# Patient Record
Sex: FEMALE | Race: WHITE | NOT HISPANIC OR LATINO | Employment: UNEMPLOYED | URBAN - METROPOLITAN AREA
[De-identification: names, ages, dates, MRNs, and addresses within clinical notes are randomized per-mention and may not be internally consistent; named-entity substitution may affect disease eponyms.]

---

## 2017-01-03 DIAGNOSIS — Z98.890 HISTORY OF HIP SURGERY: Primary | ICD-10-CM

## 2017-01-17 DIAGNOSIS — M25.552 HIP PAIN, LEFT: Primary | ICD-10-CM

## 2017-01-17 NOTE — TELEPHONE ENCOUNTER
Medication refill information reviewed.  Last visit on 12/21 states:      1. Medication Management:    1. Increase of oxycodone to 5/day x 4 days for dental pain, then decrease back to 3/day    2. Next month will continue the decrease of oxycodone by 15 tabs/month    Due date for oxycodone 5 mg is 1/22/17     Prescription prepped for review. Decrease monthly supply to #75 per Dr. Nicholas's plan.     Will route to provider.     LANCE BarberN, RN-BC  Patient Care Supervisor/Care Coordinator  [facility] [place]

## 2017-01-17 NOTE — TELEPHONE ENCOUNTER
Call coming in at 7:49am 1/16/17. Pt requesting a refill of Oxycodone.She would like to [facility] at  [facility]. Will route to SANG max.  Taisha rangel rn

## 2017-01-17 NOTE — TELEPHONE ENCOUNTER
Received call from patient requesting refill(s) of oxyCODONE (ROXICODONE) 5 MG IR tablet    Last picked up from pharmacy on 12/21/16    Pt last seen by prescribing provider on 12/21/16  Next appt scheduled for none    Last urine drug screen date 10/20/16  Current opioid agreement on file (completed within the last year) Yes Date of opioid agreement: 6/20/16    Processing (pick one and delete the others):   at [facility]    Iman Tan MA  [facility]    Will route to nursing pool for review and preparation of prescription(s).

## 2017-01-18 RX ORDER — OXYCODONE HYDROCHLORIDE 5 MG/1
5-10 TABLET ORAL EVERY 4 HOURS PRN
Qty: 75 TABLET | Refills: 0 | Status: SHIPPED | OUTPATIENT
Start: 2017-01-18 | End: 2017-02-14

## 2017-01-18 NOTE — TELEPHONE ENCOUNTER
Signed Prescriptions:                        Disp   Refills    oxyCODONE (ROXICODONE) 5 MG IR tablet      75 tab*0        Sig: Take 1-2 tablets (5-10 mg) by mouth every 4 hours as           needed for pain or other (Moderate to Severe) .           Max of 3/day, but also max of 75 tabs/month. 30           day supply.  Ok to dispense on/after 1/19/17 and           start on 1/22/17  Authorizing Provider: SHUKRI WAYNE MD  [facility]

## 2017-01-19 ENCOUNTER — TELEPHONE (OUTPATIENT)
Dept: NEUROLOGY | Facility: CLINIC | Age: 37
End: 2017-01-19

## 2017-01-19 NOTE — TELEPHONE ENCOUNTER
Prior Authorization Retail Medication Request  Medication/Dose: Rizatriptan Benzoate 10mg  Diagnosis and ICD code: Migraine with aura and without status migrainosus, not intractable [G43.109]   New/Renewal/Insurance Change PA: Renewal  Previously Tried and Failed Therapies:     Insurance ID (if provided): [employer]: [id number]  Insurance Phone (if provided): [phone]    Any additional info from fax request:     If you received a fax notification from an outside Pharmacy:  Pharmacy Name:[facility]  Pharmacy #:[id number]  Pharmacy Fax:[phone]

## 2017-02-01 ENCOUNTER — TRANSFERRED RECORDS (OUTPATIENT)
Dept: HEALTH INFORMATION MANAGEMENT | Facility: CLINIC | Age: 37
End: 2017-02-01

## 2017-02-08 NOTE — TELEPHONE ENCOUNTER
[facility] Prior Authorization Team   Phone: [phone]  Fax: [phone]      PA Initiation    Medication: Rizatriptan Benzoate 10mg  Insurance Company: [employer] - Phone [phone] Fax [phone]  Pharmacy Filling the Rx: [facility] - [id number] [facility]  Filling Pharmacy Phone: [phone]  Filling Pharmacy Fax: [phone]  Start Date: 2/8/2017

## 2017-02-13 NOTE — TELEPHONE ENCOUNTER
Called INS to follow up. Per rep this has been dismissed since they did not receive additional info. Clearly I had faxed it back and they even called to verify additional info. I'd requested them to refax form so I can start a new PA.

## 2017-02-14 DIAGNOSIS — M25.552 HIP PAIN, LEFT: ICD-10-CM

## 2017-02-14 NOTE — TELEPHONE ENCOUNTER
patient requesting refill(s) of oxyCODONE (ROXICODONE) 5 MG IR tablet    Last picked up from pharmacy on 1/19/17    Pt last seen by prescribing provider on 12/21/16  Next appt scheduled for No future appointment.     Last urine drug screen date 10/20/16  Current opioid agreement on file (completed within the last year) Yes Date of opioid agreement: 6/22/16    Processing (pick one)      Pt will  at [facility] 2nd floor     Will route to nursing pool for review and preparation of prescription(s).

## 2017-02-14 NOTE — TELEPHONE ENCOUNTER
Pt LM at 0526:    Would like a refill of oxycodone and would like to pick it up at the clinic on Friday (did not specify location)  ----  Will route to MA pool for assistance with gathering opioid refill information.    Devika Howe, LANCEN, RN-BC  Patient Care Supervisor/Care Coordinator  [facility] [place]

## 2017-02-14 NOTE — TELEPHONE ENCOUNTER
Medication refill information reviewed.     Last due:  Ok to dispense on/after 1/19/17 and start on 1/22/17    Due date:  2/21/17    Weaning instructions:  N/A    Prescriptions prepped for review.     Barbara Kebede RN-BSN  [facility]

## 2017-02-15 RX ORDER — OXYCODONE HYDROCHLORIDE 5 MG/1
5-10 TABLET ORAL EVERY 4 HOURS PRN
Qty: 60 TABLET | Refills: 0 | Status: SHIPPED | OUTPATIENT
Start: 2017-02-15 | End: 2017-03-16

## 2017-02-15 NOTE — TELEPHONE ENCOUNTER
Signed Prescriptions:                        Disp   Refills    oxyCODONE (ROXICODONE) 5 MG IR tablet      60 tab*0        Sig: Take 1-2 tablets (5-10 mg) by mouth every 4 hours as           needed for pain or other (Moderate to Severe) .           Max of 3/day, but also max of 60 tabs/month. Note           change in supply. 30 day supply.  Ok to dispense           on/after 2/20/17 and start on 2/21/17  Authorizing Provider: SHUKRI WAYNE    Continuing the wean    Yasemin Wayne MD  [facility]

## 2017-02-27 ENCOUNTER — OFFICE VISIT (OUTPATIENT)
Dept: FAMILY MEDICINE | Facility: CLINIC | Age: 37
End: 2017-02-27
Payer: COMMERCIAL

## 2017-02-27 VITALS
HEART RATE: 69 BPM | WEIGHT: 168 LBS | SYSTOLIC BLOOD PRESSURE: 127 MMHG | BODY MASS INDEX: 30.73 KG/M2 | DIASTOLIC BLOOD PRESSURE: 70 MMHG | TEMPERATURE: 96.9 F

## 2017-02-27 DIAGNOSIS — Z23 NEED FOR TDAP VACCINATION: ICD-10-CM

## 2017-02-27 DIAGNOSIS — Z30.42 ENCOUNTER FOR SURVEILLANCE OF INJECTABLE CONTRACEPTIVE: ICD-10-CM

## 2017-02-27 DIAGNOSIS — L02.214 SOFT TISSUE ABSCESS OF INGUINAL REGION: Primary | ICD-10-CM

## 2017-02-27 PROCEDURE — 90471 IMMUNIZATION ADMIN: CPT | Performed by: PHYSICIAN ASSISTANT

## 2017-02-27 PROCEDURE — 90715 TDAP VACCINE 7 YRS/> IM: CPT | Performed by: PHYSICIAN ASSISTANT

## 2017-02-27 PROCEDURE — 99214 OFFICE O/P EST MOD 30 MIN: CPT | Mod: 25 | Performed by: PHYSICIAN ASSISTANT

## 2017-02-27 PROCEDURE — 96372 THER/PROPH/DIAG INJ SC/IM: CPT | Performed by: PHYSICIAN ASSISTANT

## 2017-02-27 RX ORDER — CEPHALEXIN 500 MG/1
500 CAPSULE ORAL 3 TIMES DAILY
Qty: 30 CAPSULE | Refills: 0 | Status: SHIPPED | OUTPATIENT
Start: 2017-02-27 | End: 2017-09-07

## 2017-02-27 RX ORDER — MEDROXYPROGESTERONE ACETATE 150 MG/ML
150 INJECTION, SUSPENSION INTRAMUSCULAR
Qty: 1 ML | Refills: 3 | OUTPATIENT
Start: 2017-02-27 | End: 2018-05-09

## 2017-02-27 NOTE — NURSING NOTE
"Chief Complaint   Patient presents with     Derm Problem     possible cyst on vaginal area.        Initial /70 (Cuff Size: Adult Large)  Pulse 69  Temp 96.9  F (36.1  C) (Oral)  Wt 168 lb (76.2 kg)  BMI 30.73 kg/m2 Estimated body mass index is 30.73 kg/(m^2) as calculated from the following:    Height as of 12/29/16: 5' 2\" (1.575 m).    Weight as of this encounter: 168 lb (76.2 kg).  Medication Reconciliation: complete    ABIMAEL Lyle MA    "

## 2017-02-27 NOTE — MR AVS SNAPSHOT
After Visit Summary   2/27/2017    Mily Grullon    MRN: [id number]           Patient Information     Date Of Birth          1980        Visit Information        Provider Department      2/27/2017 10:20 AM Kehr, Kristen M, PA-C [facility]        Today's Diagnoses     Soft tissue abscess of inguinal region    -  1    Need for Tdap vaccination        Encounter for surveillance of injectable contraceptive           Follow-ups after your visit        Additional Services     GENERAL SURG ADULT REFERRAL       Your provider has referred you to: G: [facility] [phone]   [email or web address]    Please be aware that coverage of these services is subject to the terms and limitations of your health insurance plan.  Call member services at your health plan with any benefit or coverage questions.      Please bring the following with you to your appointment:    (1) Any X-Rays, CTs or MRIs which have been performed.  Contact the facility where they were done to arrange for  prior to your scheduled appointment.   (2) List of current medications   (3) This referral request   (4) Any documents/labs given to you for this referral                  Who to contact     If you have questions or need follow up information about today's clinic visit or your schedule please contact [facility] directly at [phone].  Normal or non-critical lab and imaging results will be communicated to you by MyChart, letter or phone within 4 business days after the clinic has received the results. If you do not hear from us within 7 days, please contact the clinic through MyChart or phone. If you have a critical or abnormal lab result, we will notify you by phone as soon as possible.  Submit refill requests through [employer] or call your pharmacy and they will forward the refill request to us. Please allow 3 business days for your refill to be completed.  "         Additional Information About Your Visit        MyChart Information     [employer] lets you send messages to your doctor, view your test results, renew your prescriptions, schedule appointments and more. To sign up, go to [email or web address] . Click on \"Log in\" on the left side of the screen, which will take you to the Welcome page. Then click on \"Sign up Now\" on the right side of the page.     You will be asked to enter the access code listed below, as well as some personal information. Please follow the directions to create your username and password.     Your access code is: [email or web address]  Expires: 2017 10:58 AM     Your access code will  in 90 days. If you need help or a new code, please call your [facility] clinic or [phone].        Care EveryWhere ID     This is your Care EveryWhere ID. This could be used by other organizations to access your [facility] medical records  [id number]        Your Vitals Were     Pulse Temperature BMI (Body Mass Index)             69 96.9  F (36.1  C) (Oral) 30.73 kg/m2          Blood Pressure from Last 3 Encounters:   17 127/70   16 108/79   16 142/74    Weight from Last 3 Encounters:   17 168 lb (76.2 kg)   16 168 lb 12.8 oz (76.6 kg)   16 166 lb (75.3 kg)              We Performed the Following     GENERAL SURG ADULT REFERRAL     TDAP (ADACEL AGES 11-64)          Today's Medication Changes          These changes are accurate as of: 17 10:58 AM.  If you have any questions, ask your nurse or doctor.               Start taking these medicines.        Dose/Directions    cephALEXin 500 MG capsule   Commonly known as:  KEFLEX   Used for:  Soft tissue abscess of inguinal region   Started by:  Kehr, Kristen M, PA-C        Dose:  500 mg   Take 1 capsule (500 mg) by mouth 3 times daily   Quantity:  30 capsule   Refills:  0         These medicines have changed or have updated prescriptions.        Dose/Directions    * " medroxyPROGESTERone 150 MG/ML injection   Commonly known as:  DEPO-PROVERA   This may have changed:  Another medication with the same name was added. Make sure you understand how and when to take each.   Used for:  Endometriosis   Changed by:  Jenifer Felipe PA-C        Dose:  150 mg   Inject 1 mL (150 mg) into the muscle every 3 months   Quantity:  1 mL   Refills:  3       * medroxyPROGESTERone 150 MG/ML injection   Commonly known as:  DEPO-PROVERA   This may have changed:  You were already taking a medication with the same name, and this prescription was added. Make sure you understand how and when to take each.   Used for:  Encounter for surveillance of injectable contraceptive   Changed by:  Kehr, Kristen M, PA-C        Dose:  150 mg   Inject 1 mL (150 mg) into the muscle every 3 months   Quantity:  1 mL   Refills:  3       * Notice:  This list has 2 medication(s) that are the same as other medications prescribed for you. Read the directions carefully, and ask your doctor or other care provider to review them with you.         Where to get your medicines      These medications were sent to [address], [address]  [address] [address] [place] [address], [facility] [address]     Phone:  [phone]     cephALEXin 500 MG capsule         Some of these will need a paper prescription and others can be bought over the counter.  Ask your nurse if you have questions.     You don't need a prescription for these medications     medroxyPROGESTERone 150 MG/ML injection                Primary Care Provider Office Phone # Fax #    Kristen M Kehr, PA-C [phone]       [facility] [address] [facility] [address]        Thank you!     Thank you for choosing [facility]  for your care. Our goal is always to provide you with excellent care. Hearing back from our patients is one way we can continue to improve our services. Please take a few  minutes to complete the written survey that you may receive in the mail after your visit with us. Thank you!             Your Updated Medication List - Protect others around you: Learn how to safely use, store and throw away your medicines at www.disposemymeds.org.          This list is accurate as of: 2/27/17 10:58 AM.  Always use your most recent med list.                   Brand Name Dispense Instructions for use    acetaminophen 325 MG tablet    TYLENOL    100 tablet    Take 2 tablets (650 mg) by mouth every 4 hours as needed for other (mild pain)       BENTYL 10 MG capsule   Generic drug:  dicyclomine      Take 20-40 mg by mouth 4 times daily (before meals and nightly)       cephALEXin 500 MG capsule    KEFLEX    30 capsule    Take 1 capsule (500 mg) by mouth 3 times daily       COMPAZINE PO      Take 10 mg by mouth every morning       * medroxyPROGESTERone 150 MG/ML injection    DEPO-PROVERA    1 mL    Inject 1 mL (150 mg) into the muscle every 3 months       * medroxyPROGESTERone 150 MG/ML injection    DEPO-PROVERA    1 mL    Inject 1 mL (150 mg) into the muscle every 3 months       NORTRIPTYLINE HCL PO      Take 25 mg by mouth Take 2 capsules at bedtime    If needed may take 1 tablet extra in case of flare-up       ondansetron 4 MG tablet    ZOFRAN     Take 1 Tab by mouth every eight hours.       oxyCODONE 5 MG IR tablet    ROXICODONE    60 tablet    Take 1-2 tablets (5-10 mg) by mouth every 4 hours as needed for pain or other (Moderate to Severe) . Max of 3/day, but also max of 60 tabs/month. Note change in supply. 30 day supply.  Ok to dispense on/after 2/20/17 and start on 2/21/17       propranolol 120 MG 24 hr capsule    INDERAL LA    60 capsule    Take 1 capsule (120 mg) by mouth 2 times daily       PROTONIX PO      Take 40 mg by mouth 2 times daily (before meals)       * Notice:  This list has 2 medication(s) that are the same as other medications prescribed for you. Read the directions carefully, and  ask your doctor or other care provider to review them with you.

## 2017-02-27 NOTE — TELEPHONE ENCOUNTER
Called to finish PA on phone. [facility] insist on getting actual documentation for trial and fail. Additional questions were sent to PA team.

## 2017-02-27 NOTE — NURSING NOTE
BLOOD PRESSURE: 127/70    DATE OF LAST PAP or ANNUAL EXAM:   Lab Results   Component Value Date    PAP NIL 08/30/2016     URINE HCG:not indicated    The following medication was given:     MEDICATION: Depo Provera 150mg  ROUTE: IM  SITE: Deltoid - Right  : v2 Ratings  LOT #: [id number]  EXPIRATION:02/2019  NEXT INJECTION DUE: May 15 - May 29 2017  NDC# [address]-0  Provider: Kehr,Kristen PA-C  Given by ABIMAEL Lyle MA  Reminder card was given to patient.

## 2017-02-27 NOTE — PROGRESS NOTES
SUBJECTIVE:                                                    Mily Grullon is a 36 year old female who presents to clinic today for the following health issues:      Possible cyst in right groin. She developed a swollen cyst 2 weeks ago. It drained yesterday.   She reports the drainage as thick and yellow. Today, there is no drainage, the area is much smaller.     PROBLEMS TO ADD ON...  She will also need her depo injection. She is due for a tetanus update also.     Problem list and histories reviewed & adjusted, as indicated.  Additional history: as documented    Patient Active Problem List   Diagnosis     Tobacco use disorder     Left elbow pain     CARDIOVASCULAR SCREENING; LDL GOAL LESS THAN 160     Gastritis     Headache     Senile osteoporosis     Closed nondisplaced intertrochanteric fracture of left femur (H)     Other postprocedural status(V45.89)     Abnormality of gait     Migraine without aura and without status migrainosus, not intractable     Hip pain, left     Aftercare following surgery of the musculoskeletal system     Abnormal gait     Past Surgical History   Procedure Laterality Date     Hc repair triangular cart,wrist jt  2005     Dr Eloy Sosa     Hc repair triangular cart,wrist jt  2007 or so     ulnar excision- Dr Eloy Sosa     Arthroscopy of joint unlisted  4/2004     Left wrist, with debridement and repair of tear.     Hc removal of ovary/tube(s)  6/2003     right with fallopian tube     Hysteroscopy       laproscopy for endometriosis x2     Lithotripsy       Arthroscopy hip, osteoplasty femur proximal, combined Left 6/29/2016     Procedure: COMBINED ARTHROSCOPY HIP, OSTEOPLASTY FEMUR PROXIMAL;  Surgeon: Godwin Deleon MD;  Location:  OR       Social History   Substance Use Topics     Smoking status: Former Smoker     Types: Cigarettes     Quit date: 2/2/2014     Smokeless tobacco: Never Used     Alcohol use Yes      Comment: rare      Family History   Problem Relation Age  of Onset     Hypertension Father      Lipids Father      Hypertension Maternal Grandmother      Genitourinary Problems Maternal Grandmother      kidney disease     Cardiovascular Maternal Grandfather      quad bypass     HEART DISEASE Maternal Grandfather      quad bypass     CANCER Paternal Grandmother      leukemia     Asthma No family hx of      C.A.D. No family hx of      DIABETES No family hx of      CEREBROVASCULAR DISEASE No family hx of      Breast Cancer No family hx of      Cancer - colorectal No family hx of      Prostate Cancer No family hx of      Alzheimer Disease No family hx of      Arthritis No family hx of      Blood Disease No family hx of      Circulatory No family hx of      Eye Disorder No family hx of      GASTROINTESTINAL DISEASE No family hx of      Musculoskeletal Disorder No family hx of      Neurologic Disorder No family hx of      Respiratory No family hx of      Thyroid Disease No family hx of          Allergies   Allergen Reactions     Amitriptyline Hives     Amoxicillin Diarrhea     Asa [Dihydroxyaluminum Aminoacetate]      Cipro [Ciprofloxacin]      Dizziness, vomiting, shortness of breath     Ibuprofen [Aspartame-Ibuprofen]      GI distress       Lyrica      extrematies swell up      Morphine      ithcy     Naproxen      GI distress     Neurontin [Gabapentin]      rash     Paxil [Paroxetine] Anaphylaxis     anaphylaxis     Phenergan [Promethazine Hcl]      dystonia     Prilosec [Omeprazole]      Rash, dizziness     Gabapentin Rash       ROS:  Constitutional, HEENT, cardiovascular, pulmonary, gi and gu systems are negative, except as otherwise noted.    OBJECTIVE:                                                    /70 (Cuff Size: Adult Large)  Pulse 69  Temp 96.9  F (36.1  C) (Oral)  Wt 168 lb (76.2 kg)  BMI 30.73 kg/m2  Body mass index is 30.73 kg/(m^2).  GENERAL: healthy, alert and no distress  SKIN: there is a swollen abscess in the right groin, induration present, but no  drainage. Mild tenderness.   Cyst is aprx 2 cm in diameter.   PSYCH: mentation appears normal, affect normal/bright    Diagnostic Test Results:  none      ASSESSMENT/PLAN:                                                        1. Soft tissue abscess of inguinal region  There is no drainage to culture today.   She will continue to use warm packs / baths along with adding antibiotic.   She has multiple allergies and intolerances, but able to start keflex.   Side effects and how to take the medication discussed.  Expect that the abscess will slowly resolve with these conservative treatments.   If there is any worsening or it persists, then she will make an appointment with General Surgery for removal.   - cephALEXin (KEFLEX) 500 MG capsule; Take 1 capsule (500 mg) by mouth 3 times daily  Dispense: 30 capsule; Refill: 0  - GENERAL SURG ADULT REFERRAL    2. Need for Tdap vaccination  - TDAP (ADACEL AGES 11-64)    3. Encounter for surveillance of injectable contraceptive  - medroxyPROGESTERone (DEPO-PROVERA) 150 MG/ML injection; Inject 1 mL (150 mg) into the muscle every 3 months  Dispense: 1 mL; Refill: 3  - MEDROXYPROGESTERONE INJ    Kristen M. Kehr, PA-C  [facility]    Screening Questionnaire for Adult Immunization    Are you sick today?   No   Do you have allergies to medications, food, a vaccine component or latex?   Yes, medications   Have you ever had a serious reaction after receiving a vaccination?   No   Do you have a long-term health problem with heart disease, lung disease, asthma, kidney disease, metabolic disease (e.g. diabetes), anemia, or other blood disorder?   No   Do you have cancer, leukemia, HIV/AIDS, or any other immune system problem?   No   In the past 3 months, have you taken medications that affect  your immune system, such as prednisone, other steroids, or anticancer drugs; drugs for the treatment of rheumatoid arthritis, Crohn s disease, or psoriasis; or have you had radiation  treatments?   No   Have you had a seizure, or a brain or other nervous system problem?   No   During the past year, have you received a transfusion of blood or blood     products, or been given immune (gamma) globulin or antiviral drug?   No   For women: Are you pregnant or is there a chance you could become        pregnant during the next month?   No   Have you received any vaccinations in the past 4 weeks?   No     Immunization questionnaire was positive for at least one answer.  Notified Kehr,Kristen PA-C.      MNVFC doesn't apply on this patient    Per orders of Kehr,Kristen PA-C injection of Adacel given by Samir Lyle. Patient instructed to remain in clinic for 20 minutes afterwards, and to report any adverse reaction to me immediately.       Screening performed by Samir Lyle on 2/27/2017 at 10:35 AM.

## 2017-03-01 NOTE — TELEPHONE ENCOUNTER
Prior Authorization Approval    Authorization Effective Date: 2/27/2017  Authorization Expiration Date: 2/27/2018  Medication: Rizatriptan Benzoate 10mg - APPROVED  Approved Dose/Quantity:   Reference #: [id number]   Insurance Company: [employer] - Phone [phone] Fax [phone]  Expected CoPay: $1.00     CoPay Card Available:      Foundation Assistance Needed:    Which Pharmacy is filling the prescription (Not needed for infusion/clinic administered): [facility] - [address] [facility]  Pharmacy Notified: Yes  Patient Notified: Yes

## 2017-03-15 ENCOUNTER — TELEPHONE (OUTPATIENT)
Dept: ORTHOPEDICS | Facility: CLINIC | Age: 37
End: 2017-03-15

## 2017-03-15 NOTE — TELEPHONE ENCOUNTER
3/15/17 @ 0809 patient calling for FCE results.States Report was fax'd here, but not in EPIC yet for the nurse to tel her the result.  I called [facility] in [place] [phone] and they are faxing me the report,then i will fax to the Ortho clinic nurses and have them call patient with report and results of FCE.  Alina Blevins, Admin Co[facility]'s [facility]land[facility]. II, Orthopaedics

## 2017-03-16 DIAGNOSIS — M25.552 HIP PAIN, LEFT: ICD-10-CM

## 2017-03-16 RX ORDER — OXYCODONE HYDROCHLORIDE 5 MG/1
5-10 TABLET ORAL EVERY 4 HOURS PRN
Qty: 45 TABLET | Refills: 0 | Status: SHIPPED | OUTPATIENT
Start: 2017-03-16 | End: 2017-04-17

## 2017-03-16 NOTE — TELEPHONE ENCOUNTER
Medication refill information reviewed.     Last due:  Ok to dispense on/after 2/20/17 and start on 2/21/17    Due date:  3/23/17    Weaning instructions:  N/A    Prescriptions prepped for review.     Barbara Kebede RN-BSN  [facility]

## 2017-03-16 NOTE — TELEPHONE ENCOUNTER
Received call from patient requesting refill(s) of oxyCODONE (ROXICODONE) 5 MG IR tablet    Last picked up from pharmacy on 2/20/17    Pt last seen by prescribing provider on 12/21/16  Next appt scheduled for none    Last urine drug screen date 10/20/16  Current opioid agreement on file (completed within the last year) Yes Date of opioid agreement: 6/22/16    Processing (pick one and delete the others):   [facility]  Iman Tan MA  [facility] Center    Will route to nursing pool for review and preparation of prescription(s).

## 2017-03-16 NOTE — TELEPHONE ENCOUNTER
Call came in at 6:44 am today.    Pt calling to request a refill on the Oxycodone. Pt did not mention how she wanted it processed. Will route to MA pool to initiate the process.  Taisha rangel rn

## 2017-03-16 NOTE — TELEPHONE ENCOUNTER
Continued wean of 15 tabs/month.    Signed Prescriptions:                        Disp   Refills    oxyCODONE (ROXICODONE) 5 MG IR tablet      45 tab*0        Sig: Take 1-2 tablets (5-10 mg) by mouth every 4 hours as           needed for pain or other (Moderate to Severe) .           Max of 3/day, but also max of 45 tabs/month.  30           day supply.  Ok to dispense on/after 3/21/17 and           start on 3/23/17  Authorizing Provider: SHUKRI WAYNE MD  [facility]

## 2017-03-17 NOTE — TELEPHONE ENCOUNTER
Called patient. Script for oxycodone was signed and kept in file folder for patient to pick at [facility] 2nd floor.       Aly Christensen MA

## 2017-04-17 DIAGNOSIS — M25.552 HIP PAIN, LEFT: ICD-10-CM

## 2017-04-17 RX ORDER — OXYCODONE HYDROCHLORIDE 5 MG/1
5-10 TABLET ORAL EVERY 4 HOURS PRN
Qty: 45 TABLET | Refills: 0 | Status: SHIPPED | OUTPATIENT
Start: 2017-04-17 | End: 2017-05-18

## 2017-04-17 NOTE — TELEPHONE ENCOUNTER
Received call from patient requesting refill(s) of oxyCODONE (ROXICODONE) 5 MG IR tablet    Last picked up from pharmacy on 3/21/17    Pt last seen by prescribing provider on 12/21/16  Next appt scheduled for none    Last urine drug screen date 10/20/16  Current opioid agreement on file (completed within the last year) Yes Date of opioid agreement: 6/15/16    Processing (pick one and delete the others):   [facility]    Iman Tan MA  Pain Management Center    Will route to nursing pool for review and preparation of prescription(s).

## 2017-04-17 NOTE — TELEPHONE ENCOUNTER
Signed Prescriptions:                        Disp   Refills    oxyCODONE (ROXICODONE) 5 MG IR tablet      45 tab*0        Sig: Take 1-2 tablets (5-10 mg) by mouth every 4 hours as           needed for pain or other (Moderate to Severe) .           Max of 3/day, but also max of 45 tabs/month.  30           day supply.  Ok to dispense on/after 4/20/17 and           start on 4/22/17  Authorizing Provider: SHUKRI WAYNE MD  [facility]

## 2017-04-17 NOTE — TELEPHONE ENCOUNTER
4/17 320am    Refill Oxycodone.  from [facility]  [phone]    January Butcher    [facility] Clinic

## 2017-04-17 NOTE — TELEPHONE ENCOUNTER
Medication refill information reviewed.     Due date for Oxycodone is 4/22/2017. Office visit made for June 29th    Prescriptions prepped for review.     Will route to provider.     Shira Vera RN, [facility]  Pain Clinic Care Coordinator

## 2017-04-20 ENCOUNTER — TRANSFERRED RECORDS (OUTPATIENT)
Dept: HEALTH INFORMATION MANAGEMENT | Facility: CLINIC | Age: 37
End: 2017-04-20

## 2017-04-20 LAB — PHQ9 SCORE: 11

## 2017-04-24 ENCOUNTER — TELEPHONE (OUTPATIENT)
Dept: PALLIATIVE MEDICINE | Facility: CLINIC | Age: 37
End: 2017-04-24

## 2017-04-24 NOTE — TELEPHONE ENCOUNTER
Thank you, information noted.    We are weaning the oxycodone    Yasemin Nicholas MD  [facility] [place]

## 2017-04-24 NOTE — TELEPHONE ENCOUNTER
Called pt and she stated there were no other messages she wanted provider to know she was put on those 2 new meds. Sending to provider.     Shira Vera RN, [facility]  Pain Clinic Care Coordinator

## 2017-04-24 NOTE — TELEPHONE ENCOUNTER
Patient left  4/24 at 8:50 am, she would like to let Dr. Nicholas know that she was put on Wellbutrin and Lorazepam for anxiety.       Cony CABALLERO    [facility]

## 2017-05-10 LAB — PHQ9 SCORE: 21

## 2017-05-18 DIAGNOSIS — M25.552 HIP PAIN, LEFT: ICD-10-CM

## 2017-05-18 RX ORDER — OXYCODONE HYDROCHLORIDE 5 MG/1
5-10 TABLET ORAL EVERY 4 HOURS PRN
Qty: 45 TABLET | Refills: 0 | Status: SHIPPED | OUTPATIENT
Start: 2017-05-18 | End: 2017-05-18

## 2017-05-18 RX ORDER — OXYCODONE HYDROCHLORIDE 5 MG/1
5-10 TABLET ORAL EVERY 4 HOURS PRN
Qty: 45 TABLET | Refills: 0 | Status: SHIPPED | OUTPATIENT
Start: 2017-05-18 | End: 2017-09-07

## 2017-05-18 NOTE — TELEPHONE ENCOUNTER
Patient left  5/18 at 5:31 am.      Reason for call: Medication   If this is a refill request, has the caller requested the refill from the pharmacy already? No  Will the patient be using a [facility]? Yes  Name of the pharmacy and phone number for the current request: [facility]     Name of the medication requested: Oxycodone    Other request: Please bring to pharmacy      Cony CABALLERO    [facility]

## 2017-05-18 NOTE — TELEPHONE ENCOUNTER
Signed Prescriptions:                        Disp   Refills    oxyCODONE (ROXICODONE) 5 MG IR tablet      45 tab*0        Sig: Take 1-2 tablets (5-10 mg) by mouth every 4 hours as           needed for pain or other (Moderate to Severe) .           Max of 2/day, but also max of 30 tabs/month.  30           day supply. Continue wean Ok to dispense on/after           5/20/17 and start on 5/22/17  Authorizing Provider: SHUKRI WAYNE MD  [facility]

## 2017-05-18 NOTE — TELEPHONE ENCOUNTER
Medication refill information reviewed.     Due date for Oxycodone 5 mg  is 5/22/17     Prescriptions prepped for review.     Will route to provider.     LANCE SanchezN, RN  Care Coordinator  [facility] [place]

## 2017-05-18 NOTE — TELEPHONE ENCOUNTER
Received call from patient requesting refill(s) of oxyCODONE (ROXICODONE) 5 MG IR tablet    Last picked up from pharmacy on 4/20/17    Pt last seen by prescribing provider on 12/21/16  Next appt scheduled for 6/29/17    Last urine drug screen date 10/20/16  Current opioid agreement on file (completed within the last year) Yes Date of opioid agreement: 6/17/16    Processing (pick one and delete the others):  Deliver to [facility] pharmacy    Iman Tan MA  Pain Management Center    Will route to nursing pool for review and preparation of prescription(s).

## 2017-05-19 ENCOUNTER — ALLIED HEALTH/NURSE VISIT (OUTPATIENT)
Dept: NURSING | Facility: CLINIC | Age: 37
End: 2017-05-19
Payer: COMMERCIAL

## 2017-05-19 ENCOUNTER — TELEPHONE (OUTPATIENT)
Dept: PALLIATIVE MEDICINE | Facility: CLINIC | Age: 37
End: 2017-05-19

## 2017-05-19 DIAGNOSIS — Z30.42 SURVEILLANCE FOR DEPO-PROVERA CONTRACEPTION: Primary | ICD-10-CM

## 2017-05-19 PROCEDURE — 96372 THER/PROPH/DIAG INJ SC/IM: CPT

## 2017-05-19 PROCEDURE — 99207 ZZC NO CHARGE NURSE ONLY: CPT

## 2017-05-19 NOTE — PROGRESS NOTES
BLOOD PRESSURE: Data Unavailable    DATE OF LAST PAP or ANNUAL EXAM: Lab Results       Component                Value               Date                       PAP                      NIL                 08/30/2016            URINE HCG:not indicated    The following medication was given:     MEDICATION: Depo Provera 150mg  ROUTE: IM  SITE: Deltoid - Left  : [employer]  LOT #: [id number]  EXPIRATION:11/2019  NEXT INJECTION DUE: august 4-august 18,2017  NDC [address]-4  Provider: Sabrina Mixon CMA

## 2017-05-19 NOTE — TELEPHONE ENCOUNTER
Per Dr. Nicholas:  The quantity should be #30 tabs.    Orlando Daniel, pharmacist informed.    Barbara Kebede, RN-BSN  [facility]

## 2017-05-19 NOTE — TELEPHONE ENCOUNTER
Shelton from  [facility] calling.  Needs clarification on oxycodone sig.  Says 30 tabs/month but the quantity is 45.    Routed to Dr. Nicholas to clarify.    Barbara Kebede RN-BSN  [facility]

## 2017-05-19 NOTE — TELEPHONE ENCOUNTER
Should be 30 tabs.    Per KOBE Jimenez, this has been addressed.    Yasemin Nicholas MD  [facility]

## 2017-05-19 NOTE — MR AVS SNAPSHOT
"              After Visit Summary   5/19/2017    Mily Grullon    MRN: [id number]           Patient Information     Date Of Birth          1980        Visit Information        Provider Department      5/19/2017 9:15 AM [facility]        Today's Diagnoses     Surveillance for Depo-Provera contraception    -  1       Follow-ups after your visit        Your next 10 appointments already scheduled     Jun 29, 2017  2:00 PM CDT   Return Visit with Kendal Nicholas MD   [facility] ([facility])    [address]  [place] [address] [phone]              Who to contact     If you have questions or need follow up information about today's clinic visit or your schedule please contact [facility] directly at [phone].  Normal or non-critical lab and imaging results will be communicated to you by MyChart, letter or phone within 4 business days after the clinic has received the results. If you do not hear from us within 7 days, please contact the clinic through MyChart or phone. If you have a critical or abnormal lab result, we will notify you by phone as soon as possible.  Submit refill requests through [employer] or call your pharmacy and they will forward the refill request to us. Please allow 3 business days for your refill to be completed.          Additional Information About Your Visit        MyChart Information     [employer] lets you send messages to your doctor, view your test results, renew your prescriptions, schedule appointments and more. To sign up, go to [email or web address] . Click on \"Log in\" on the left side of the screen, which will take you to the Welcome page. Then click on \"Sign up Now\" on the right side of the page.     You will be asked to enter the access code listed below, as well as some personal information. Please follow the directions to create your username and password.     Your access code is: " [email or web address]  Expires: 2017 11:58 AM     Your access code will  in 90 days. If you need help or a new code, please call your [place] clinic or [phone].        Care EveryWhere ID     This is your Care EveryWhere ID. This could be used by other organizations to access your [place] medical records  [id number]         Blood Pressure from Last 3 Encounters:   17 127/70   16 108/79   16 142/74    Weight from Last 3 Encounters:   17 168 lb (76.2 kg)   16 168 lb 12.8 oz (76.6 kg)   16 166 lb (75.3 kg)              We Performed the Following     INJECTION INTRAMUSCULAR OR SUB-Q     MEDROXYPROGESTERONE INJ        Primary Care Provider Office Phone # Fax #    Kristen M Kehr, PA-C [phone]       [facility] [address] [address] [address]        Thank you!     Thank you for choosing [facility]  for your care. Our goal is always to provide you with excellent care. Hearing back from our patients is one way we can continue to improve our services. Please take a few minutes to complete the written survey that you may receive in the mail after your visit with us. Thank you!             Your Updated Medication List - Protect others around you: Learn how to safely use, store and throw away your medicines at www.disposemymeds.org.          This list is accurate as of: 17  9:35 AM.  Always use your most recent med list.                   Brand Name Dispense Instructions for use    acetaminophen 325 MG tablet    TYLENOL    100 tablet    Take 2 tablets (650 mg) by mouth every 4 hours as needed for other (mild pain)       BENTYL 10 MG capsule   Generic drug:  dicyclomine      Take 20-40 mg by mouth 4 times daily (before meals and nightly)       cephALEXin 500 MG capsule    KEFLEX    30 capsule    Take 1 capsule (500 mg) by mouth 3 times daily       COMPAZINE PO      Take 10 mg by mouth every morning       * medroxyPROGESTERone 150  MG/ML injection    DEPO-PROVERA    1 mL    Inject 1 mL (150 mg) into the muscle every 3 months       * medroxyPROGESTERone 150 MG/ML injection    DEPO-PROVERA    1 mL    Inject 1 mL (150 mg) into the muscle every 3 months       NORTRIPTYLINE HCL PO      Take 25 mg by mouth Take 2 capsules at bedtime    If needed may take 1 tablet extra in case of flare-up       ondansetron 4 MG tablet    ZOFRAN     Take 1 Tab by mouth every eight hours.       oxyCODONE 5 MG IR tablet    ROXICODONE    45 tablet    Take 1-2 tablets (5-10 mg) by mouth every 4 hours as needed for pain or other (Moderate to Severe) . Max of 2/day, but also max of 30 tabs/month.  30 day supply. Continue wean Ok to dispense on/after 5/20/17 and start on 5/22/17       propranolol 120 MG 24 hr capsule    INDERAL LA    60 capsule    Take 1 capsule (120 mg) by mouth 2 times daily       PROTONIX PO      Take 40 mg by mouth 2 times daily (before meals)       * Notice:  This list has 2 medication(s) that are the same as other medications prescribed for you. Read the directions carefully, and ask your doctor or other care provider to review them with you.

## 2017-05-22 ENCOUNTER — TRANSFERRED RECORDS (OUTPATIENT)
Dept: HEALTH INFORMATION MANAGEMENT | Facility: CLINIC | Age: 37
End: 2017-05-22

## 2017-06-08 LAB — PHQ9 SCORE: 25

## 2017-07-10 ENCOUNTER — TRANSFERRED RECORDS (OUTPATIENT)
Dept: HEALTH INFORMATION MANAGEMENT | Facility: CLINIC | Age: 37
End: 2017-07-10

## 2017-07-12 LAB — PHQ9 SCORE: 22

## 2017-07-14 ENCOUNTER — DOCUMENTATION ONLY (OUTPATIENT)
Dept: ORTHOPEDICS | Facility: CLINIC | Age: 37
End: 2017-07-14

## 2017-07-14 NOTE — PROGRESS NOTES
Date Received:  7-14-17  Form Type: HCPR  Doctor:PMM  Status: Completed form fax'd to Natalio Craig @[phone] , mailed copy to patient and scanned into EPIC.   Alina Blevins, Admin Coord. II, Orthopaedics

## 2017-08-11 ENCOUNTER — TRANSFERRED RECORDS (OUTPATIENT)
Dept: HEALTH INFORMATION MANAGEMENT | Facility: CLINIC | Age: 37
End: 2017-08-11

## 2017-08-11 LAB — PHQ9 SCORE: 20

## 2017-08-18 ENCOUNTER — ALLIED HEALTH/NURSE VISIT (OUTPATIENT)
Dept: NURSING | Facility: CLINIC | Age: 37
End: 2017-08-18
Payer: COMMERCIAL

## 2017-08-18 PROCEDURE — 99207 ZZC NO CHARGE NURSE ONLY: CPT

## 2017-08-18 NOTE — MR AVS SNAPSHOT
"              After Visit Summary   2017    Mily Grullon    MRN: [id number]           Patient Information     Date Of Birth          1980        Visit Information        Provider Department      2017 3:00 PM BE ANCILLARY [facility] [place]        Today's Diagnoses     Contraception    -  1       Follow-ups after your visit        Who to contact     If you have questions or need follow up information about today's clinic visit or your schedule please contact [facility] directly at [phone].  Normal or non-critical lab and imaging results will be communicated to you by MyChart, letter or phone within 4 business days after the clinic has received the results. If you do not hear from us within 7 days, please contact the clinic through [employer] or phone. If you have a critical or abnormal lab result, we will notify you by phone as soon as possible.  Submit refill requests through [employer] or call your pharmacy and they will forward the refill request to us. Please allow 3 business days for your refill to be completed.          Additional Information About Your Visit        MyChart Information     [employer] lets you send messages to your doctor, view your test results, renew your prescriptions, schedule appointments and more. To sign up, go to [email or web address] . Click on \"Log in\" on the left side of the screen, which will take you to the Welcome page. Then click on \"Sign up Now\" on the right side of the page.     You will be asked to enter the access code listed below, as well as some personal information. Please follow the directions to create your username and password.     Your access code is: [email or web address]  Expires: 2017  3:37 PM     Your access code will  in 90 days. If you need help or a new code, please call your [facility] or [phone].        Care EveryWhere ID     This is your Care EveryWhere ID. This could be used by other organizations to access your " [place] medical records  [id number]         Blood Pressure from Last 3 Encounters:   02/27/17 127/70   12/21/16 108/79   11/02/16 142/74    Weight from Last 3 Encounters:   02/27/17 168 lb (76.2 kg)   12/29/16 168 lb 12.8 oz (76.6 kg)   12/21/16 166 lb (75.3 kg)              Today, you had the following     No orders found for display       Primary Care Provider Office Phone # Fax #    Kristen M Kehr, PA-C [phone] [address] [facility] [address]        Equal Access to Services     [facility] : Hadii mallika reese hadasho Sofeliali, waaxda luqadaha, qaybta kaalmada adeegyada, abdifatah fitzgerald . So [facility] [phone].    ATENCIÓN: Si habla español, tiene a lentz disposición servicios gratuitos de asistencia lingüística. Llame al [phone].    We comply with applicable federal civil rights laws and [place] laws. We do not discriminate on the basis of race, color, national origin, age, disability sex, sexual orientation or gender identity.            Thank you!     Thank you for choosing [facility]  for your care. Our goal is always to provide you with excellent care. Hearing back from our patients is one way we can continue to improve our services. Please take a few minutes to complete the written survey that you may receive in the mail after your visit with us. Thank you!             Your Updated Medication List - Protect others around you: Learn how to safely use, store and throw away your medicines at www.disposemymeds.org.          This list is accurate as of: 8/18/17  3:37 PM.  Always use your most recent med list.                   Brand Name Dispense Instructions for use Diagnosis    acetaminophen 325 MG tablet    TYLENOL    100 tablet    Take 2 tablets (650 mg) by mouth every 4 hours as needed for other (mild pain)    Orthopedic aftercare       BENTYL 10 MG capsule   Generic drug:  dicyclomine      Take 20-40 mg by mouth 4 times daily (before  meals and nightly)        cephALEXin 500 MG capsule    KEFLEX    30 capsule    Take 1 capsule (500 mg) by mouth 3 times daily    Soft tissue abscess of inguinal region       COMPAZINE PO      Take 10 mg by mouth every morning        * medroxyPROGESTERone 150 MG/ML injection    DEPO-PROVERA    1 mL    Inject 1 mL (150 mg) into the muscle every 3 months    Endometriosis       * medroxyPROGESTERone 150 MG/ML injection    DEPO-PROVERA    1 mL    Inject 1 mL (150 mg) into the muscle every 3 months    Encounter for surveillance of injectable contraceptive       NORTRIPTYLINE HCL PO      Take 25 mg by mouth Take 2 capsules at bedtime    If needed may take 1 tablet extra in case of flare-up        ondansetron 4 MG tablet    ZOFRAN     Take 1 Tab by mouth every eight hours.        oxyCODONE 5 MG IR tablet    ROXICODONE    45 tablet    Take 1-2 tablets (5-10 mg) by mouth every 4 hours as needed for pain or other (Moderate to Severe) . Max of 2/day, but also max of 30 tabs/month.  30 day supply. Continue wean Ok to dispense on/after 5/20/17 and start on 5/22/17    Hip pain, left       propranolol 120 MG 24 hr capsule    INDERAL LA    60 capsule    Take 1 capsule (120 mg) by mouth 2 times daily    Migraine with aura and without status migrainosus, not intractable       PROTONIX PO      Take 40 mg by mouth 2 times daily (before meals)        * Notice:  This list has 2 medication(s) that are the same as other medications prescribed for you. Read the directions carefully, and ask your doctor or other care provider to review them with you.

## 2017-08-18 NOTE — NURSING NOTE
"Chief Complaint   Patient presents with     Contraception     depo       Initial There were no vitals taken for this visit. Estimated body mass index is 30.73 kg/(m^2) as calculated from the following:    Height as of 12/29/16: 5' 2\" (1.575 m).    Weight as of 2/27/17: 168 lb (76.2 kg).  Medication Reconciliation: complete   Ana MARTINEZ CMA ([facility])            BP: Data Unavailable    LAST PAP/EXAM:   Lab Results   Component Value Date    PAP NIL 08/30/2016     URINE HCG:not indicated    The following medication was given:     MEDICATION: Depo Provera 150mg  ROUTE: IM  SITE: Deltoid - Right  : [employer]  LOT #: [id number]  EXP:01/2020  NEXT INJECTION DUE: 11/3/17 - 11/17/17   Provider: Kehr, Kristen Rachel J. CMA ([facility])      "

## 2017-09-07 ENCOUNTER — OFFICE VISIT (OUTPATIENT)
Dept: ORTHOPEDICS | Facility: CLINIC | Age: 37
End: 2017-09-07

## 2017-09-07 VITALS — HEIGHT: 62 IN | BODY MASS INDEX: 34.61 KG/M2 | WEIGHT: 188.1 LBS

## 2017-09-07 DIAGNOSIS — Z47.89 ORTHOPEDIC AFTERCARE: Primary | ICD-10-CM

## 2017-09-07 ASSESSMENT — ACTIVITIES OF DAILY LIVING (ADL)
ADL_MEAN: 1
ADL_SUBSCALE_SCORE: 75
ADL_SUM: 17

## 2017-09-07 NOTE — MR AVS SNAPSHOT
"              After Visit Summary   2017    Mily Grullon    MRN: [id number]           Patient Information     Date Of Birth          1980        Visit Information        Provider Department      2017 1:00 PM Godwin Deleon MD [facility]        Today's Diagnoses     Orthopedic aftercare    -  1       Follow-ups after your visit        Who to contact     Please call your clinic at [phone] to:    Ask questions about your health    Make or cancel appointments    Discuss your medicines    Learn about your test results    Speak to your doctor   If you have compliments or concerns about an experience at your clinic, or if you wish to file a complaint, please contact [facility] Patient Relations at [phone] or email us at [email or web address]         Additional Information About Your Visit        MyChart Information     [email or web address] is an electronic gateway that provides easy, online access to your medical records. With [email or web address], you can request a clinic appointment, read your test results, renew a prescription or communicate with your care team.     To sign up for [employer] visit the website at [email or web address]   You will be asked to enter the access code listed below, as well as some personal information. Please follow the directions to create your username and password.     Your access code is: [email or web address]  Expires: 2017  3:37 PM     Your access code will  in 90 days. If you need help or a new code, please contact your [facility] or call [phone] for assistance.        Care EveryWhere ID     This is your Care EveryWhere ID. This could be used by other organizations to access your [place] medical records  [id number]        Your Vitals Were     Height BMI (Body Mass Index)                1.575 m (5' 2\") 34.4 kg/m2           Blood Pressure from Last 3 Encounters:   17 127/70   16 " 108/79   11/02/16 142/74    Weight from Last 3 Encounters:   09/07/17 85.3 kg (188 lb 1.6 oz)   02/27/17 76.2 kg (168 lb)   12/29/16 76.6 kg (168 lb 12.8 oz)              Today, you had the following     No orders found for display       Primary Care Provider Office Phone # Fax #    [place] CERDA Kehr, PA-C [phone] [address] [facility] [address]        Equal Access to Services     [facility]: Hadii aad ku hadasho Soomaali, waaxda luqadaha, qaybta kaalmada adeegyada, waxliban fitzgerald . So [facility] [phone].    ATENCIÓN: Si habla español, tiene a lentz disposición servicios gratuitos de asistencia lingüística. Llame al [phone].    We comply with applicable federal civil rights laws and [place] laws. We do not discriminate on the basis of race, color, national origin, age, disability sex, sexual orientation or gender identity.            Thank you!     Thank you for choosing [facility]  for your care. Our goal is always to provide you with excellent care. Hearing back from our patients is one way we can continue to improve our services. Please take a few minutes to complete the written survey that you may receive in the mail after your visit with us. Thank you!             Your Updated Medication List - Protect others around you: Learn how to safely use, store and throw away your medicines at www.disposemymeds.org.          This list is accurate as of: 9/7/17  1:31 PM.  Always use your most recent med list.                   Brand Name Dispense Instructions for use Diagnosis    acetaminophen 325 MG tablet    TYLENOL    100 tablet    Take 2 tablets (650 mg) by mouth every 4 hours as needed for other (mild pain)    Orthopedic aftercare       BENTYL 10 MG capsule   Generic drug:  dicyclomine      Take 20-40 mg by mouth 4 times daily (before meals and nightly)        COMPAZINE PO      Take 10 mg by mouth every morning        medroxyPROGESTERone 150  MG/ML injection    DEPO-PROVERA    1 mL    Inject 1 mL (150 mg) into the muscle every 3 months    Encounter for surveillance of injectable contraceptive       NORTRIPTYLINE HCL PO      Take 25 mg by mouth Take 2 capsules at bedtime    If needed may take 1 tablet extra in case of flare-up        propranolol 120 MG 24 hr capsule    INDERAL LA    60 capsule    Take 1 capsule (120 mg) by mouth 2 times daily    Migraine with aura and without status migrainosus, not intractable       PROTONIX PO      Take 40 mg by mouth 2 times daily (before meals)

## 2017-09-07 NOTE — NURSING NOTE
"Reason For Visit:   Chief Complaint   Patient presents with     RECHECK     Pt. states that she is here today for annual follow up after surgery. She mention that she is doing well, she does experience aches at times due to work, on her feet 8 hrs a day. HX: Left Hip Arthroscopy, Labral Debridement. DOS: 06/29/2016                       HEIGHT: 5' 2\", WEIGHT: 188 lbs 1.6 oz, BMI: Body mass index is 34.4 kg/(m^2).      Current Outpatient Prescriptions   Medication Sig Dispense Refill     medroxyPROGESTERone (DEPO-PROVERA) 150 MG/ML injection Inject 1 mL (150 mg) into the muscle every 3 months 1 mL 3     propranolol (INDERAL LA) 120 MG 24 hr capsule Take 1 capsule (120 mg) by mouth 2 times daily 60 capsule 11     acetaminophen (TYLENOL) 325 MG tablet Take 2 tablets (650 mg) by mouth every 4 hours as needed for other (mild pain) 100 tablet 0     Pantoprazole Sodium (PROTONIX PO) Take 40 mg by mouth 2 times daily (before meals)       NORTRIPTYLINE HCL PO Take 25 mg by mouth Take 2 capsules at bedtime    If needed may take 1 tablet extra in case of flare-up       Prochlorperazine Maleate (COMPAZINE PO) Take 10 mg by mouth every morning       dicyclomine (BENTYL) 10 MG capsule Take 20-40 mg by mouth 4 times daily (before meals and nightly)             Allergies   Allergen Reactions     Amitriptyline Hives     Amoxicillin Diarrhea     Asa [Dihydroxyaluminum Aminoacetate]      Cipro [Ciprofloxacin]      Dizziness, vomiting, shortness of breath     Ibuprofen [Aspartame-Ibuprofen]      GI distress       Lyrica      extrematies swell up      Morphine      ithcy     Naproxen      GI distress     Neurontin [Gabapentin]      rash     Paxil [Paroxetine] Anaphylaxis     anaphylaxis     Phenergan [Promethazine Hcl]      dystonia     Prilosec [Omeprazole]      Rash, dizziness     Gabapentin Rash               "

## 2017-09-07 NOTE — PROGRESS NOTES
Returns today for her hip. She reports that she has started working at [employer]. She reports a recent weight gain despite dieting. Se reports that she is no longer using opioid pain medication. This is a significant improvement from her last examination.     On physical examination she rises easily from a chair and walks with a normal appearing gait. She gains the examination table easily. Her hip ROM is fluid and painless. She has 100 degrees of flexion and 20 degrees of internal rotation in flexion.     Assessment: doing well. Hip significantly improved from pre-op. She has goals of doing more heavy labor. Her Functional Assessment indicated that she may benefit from further physical therapy and she has decided that she would like to pursue it.   Plan: referral to non-operative orthopaedics.     Twenty minutes were spent with the patient with greater than 50% on counseling and coordination of care.

## 2017-09-07 NOTE — LETTER
9/7/2017       RE: Mily Grullon  [address] [facility] [address]     Dear Colleague,    Thank you for referring your patient, Mily Grullon, to the [facility] at [facility]. Please see a copy of my visit note below.    Returns today for her hip. She reports that she has started working at [employer]. She reports a recent weight gain despite dieting. Se reports that she is no longer using opioid pain medication. This is a significant improvement from her last examination.     On physical examination she rises easily from a chair and walks with a normal appearing gait. She gains the examination table easily. Her hip ROM is fluid and painless. She has 100 degrees of flexion and 20 degrees of internal rotation in flexion.     Assessment: doing well. Hip significantly improved from pre-op. She has goals of doing more heavy labor. Her Functional Assessment indicated that she may benefit from further physical therapy and she has decided that she would like to pursue it.   Plan: referral to non-operative orthopaedics.     Twenty minutes were spent with the patient with greater than 50% on counseling and coordination of care.       Again, thank you for allowing me to participate in the care of your patient.      Sincerely,    Godwin Deleon MD

## 2017-09-11 ENCOUNTER — OFFICE VISIT (OUTPATIENT)
Dept: FAMILY MEDICINE | Facility: CLINIC | Age: 37
End: 2017-09-11
Payer: COMMERCIAL

## 2017-09-11 VITALS
SYSTOLIC BLOOD PRESSURE: 126 MMHG | OXYGEN SATURATION: 99 % | HEART RATE: 85 BPM | WEIGHT: 186 LBS | DIASTOLIC BLOOD PRESSURE: 79 MMHG | TEMPERATURE: 96.7 F | BODY MASS INDEX: 34.02 KG/M2

## 2017-09-11 DIAGNOSIS — R53.83 OTHER FATIGUE: ICD-10-CM

## 2017-09-11 DIAGNOSIS — R11.0 NAUSEA: ICD-10-CM

## 2017-09-11 DIAGNOSIS — R63.5 WEIGHT GAIN: ICD-10-CM

## 2017-09-11 DIAGNOSIS — R42 VERTIGO: Primary | ICD-10-CM

## 2017-09-11 LAB
ALBUMIN SERPL-MCNC: 3.9 G/DL (ref 3.4–5)
ANION GAP SERPL CALCULATED.3IONS-SCNC: 5 MMOL/L (ref 3–14)
BASOPHILS # BLD AUTO: 0 10E9/L (ref 0–0.2)
BASOPHILS NFR BLD AUTO: 0.4 %
BUN SERPL-MCNC: 14 MG/DL (ref 7–30)
CALCIUM SERPL-MCNC: 8.8 MG/DL (ref 8.5–10.1)
CHLORIDE SERPL-SCNC: 113 MMOL/L (ref 94–109)
CO2 SERPL-SCNC: 24 MMOL/L (ref 20–32)
CREAT SERPL-MCNC: 1.03 MG/DL (ref 0.52–1.04)
DIFFERENTIAL METHOD BLD: ABNORMAL
EOSINOPHIL # BLD AUTO: 0.2 10E9/L (ref 0–0.7)
EOSINOPHIL NFR BLD AUTO: 3.3 %
ERYTHROCYTE [DISTWIDTH] IN BLOOD BY AUTOMATED COUNT: 14.9 % (ref 10–15)
GFR SERPL CREATININE-BSD FRML MDRD: 60 ML/MIN/1.7M2
GLUCOSE SERPL-MCNC: 68 MG/DL (ref 70–99)
HCT VFR BLD AUTO: 40 % (ref 35–47)
HGB BLD-MCNC: 12.8 G/DL (ref 11.7–15.7)
LYMPHOCYTES # BLD AUTO: 2.4 10E9/L (ref 0.8–5.3)
LYMPHOCYTES NFR BLD AUTO: 34.6 %
MCH RBC QN AUTO: 25.1 PG (ref 26.5–33)
MCHC RBC AUTO-ENTMCNC: 32 G/DL (ref 31.5–36.5)
MCV RBC AUTO: 78 FL (ref 78–100)
MONOCYTES # BLD AUTO: 0.7 10E9/L (ref 0–1.3)
MONOCYTES NFR BLD AUTO: 9.5 %
NEUTROPHILS # BLD AUTO: 3.7 10E9/L (ref 1.6–8.3)
NEUTROPHILS NFR BLD AUTO: 52.2 %
PHOSPHATE SERPL-MCNC: 3.1 MG/DL (ref 2.5–4.5)
PLATELET # BLD AUTO: 304 10E9/L (ref 150–450)
POTASSIUM SERPL-SCNC: 4 MMOL/L (ref 3.4–5.3)
RBC # BLD AUTO: 5.1 10E12/L (ref 3.8–5.2)
SODIUM SERPL-SCNC: 142 MMOL/L (ref 133–144)
TSH SERPL DL<=0.005 MIU/L-ACNC: 1.36 MU/L (ref 0.4–4)
WBC # BLD AUTO: 7 10E9/L (ref 4–11)

## 2017-09-11 PROCEDURE — 80069 RENAL FUNCTION PANEL: CPT | Performed by: PHYSICIAN ASSISTANT

## 2017-09-11 PROCEDURE — 84443 ASSAY THYROID STIM HORMONE: CPT | Performed by: PHYSICIAN ASSISTANT

## 2017-09-11 PROCEDURE — 36415 COLL VENOUS BLD VENIPUNCTURE: CPT | Performed by: PHYSICIAN ASSISTANT

## 2017-09-11 PROCEDURE — 99214 OFFICE O/P EST MOD 30 MIN: CPT | Performed by: PHYSICIAN ASSISTANT

## 2017-09-11 PROCEDURE — 85025 COMPLETE CBC W/AUTO DIFF WBC: CPT | Performed by: PHYSICIAN ASSISTANT

## 2017-09-11 RX ORDER — MECLIZINE HYDROCHLORIDE 25 MG/1
25 TABLET ORAL EVERY 6 HOURS PRN
Qty: 40 TABLET | Refills: 1 | Status: SHIPPED | OUTPATIENT
Start: 2017-09-11 | End: 2018-05-16

## 2017-09-11 RX ORDER — NORTRIPTYLINE HYDROCHLORIDE 50 MG/1
100 CAPSULE ORAL AT BEDTIME
COMMUNITY
End: 2022-05-27

## 2017-09-11 RX ORDER — ONDANSETRON 8 MG/1
8 TABLET, FILM COATED ORAL EVERY 8 HOURS PRN
Qty: 9 TABLET | Refills: 1 | Status: SHIPPED | OUTPATIENT
Start: 2017-09-11 | End: 2017-10-25

## 2017-09-11 RX ORDER — ONDANSETRON 8 MG/1
8 TABLET, FILM COATED ORAL ONCE
Qty: 1 TABLET | Refills: 0
Start: 2017-09-11 | End: 2017-09-11

## 2017-09-11 RX ORDER — QUETIAPINE FUMARATE 25 MG/1
TABLET, FILM COATED ORAL 4 TIMES DAILY PRN
COMMUNITY
End: 2017-10-25

## 2017-09-11 NOTE — LETTER
[facility]  [address] [facility] [address]  Phone: [phone]    September 11, 2017        Mily Grullon  [address] [facility] [address]          To whom it may concern:    RE: iMly Grullon    Patient was seen and treated today at our clinic and missed work.  Please excuse her from work 9/12/2017 due to medical reasons. I am anticipating her return to work on 9/13/2017.     Please contact me for questions or concerns.      Sincerely,        Kristen M. Kehr, PA-C

## 2017-09-11 NOTE — NURSING NOTE
"Chief Complaint   Patient presents with     Weight Problem     weight gain     Fatigue     Swelling     feet and hands, also feels tingling        Initial /79  Pulse 85  Temp 96.7  F (35.9  C) (Oral)  Wt 186 lb (84.4 kg)  SpO2 99%  BMI 34.02 kg/m2 Estimated body mass index is 34.02 kg/(m^2) as calculated from the following:    Height as of 9/7/17: 5' 2\" (1.575 m).    Weight as of this encounter: 186 lb (84.4 kg).  Medication Reconciliation: complete    ABIMAEL Lyle MA    "

## 2017-09-11 NOTE — PROGRESS NOTES
SUBJECTIVE:   Mily Grullon is a 37 year old female who presents to clinic today for the following health issues:    Discuss weight gain, fatigue, swelling in hands and feet, also tingling.  Mily has been frustrated over the past month with weight gain, despite trying to make dietary changes. She has also been walking more since her hip is better.     She also has developed vertigo within the past week. It is worse with change in position and creates nausea for her. She vomited at work since she is up and active at work and this made the vertigo worse. She works at [employer] and the constant movement has been difficult.       Problem list and histories reviewed & adjusted, as indicated.  Additional history: as documented    Patient Active Problem List   Diagnosis     Tobacco use disorder     Left elbow pain     CARDIOVASCULAR SCREENING; LDL GOAL LESS THAN 160     Gastritis     Headache     Senile osteoporosis     Closed nondisplaced intertrochanteric fracture of left femur (H)     Post-proc states NEC     Abnormality of gait     Migraine without aura and without status migrainosus, not intractable     Abnormal gait     Past Surgical History:   Procedure Laterality Date     ARTHROSCOPY HIP, OSTEOPLASTY FEMUR PROXIMAL, COMBINED Left 6/29/2016    Procedure: COMBINED ARTHROSCOPY HIP, OSTEOPLASTY FEMUR PROXIMAL;  Surgeon: Godwin Deleon MD;  Location: [facility] OR     ARTHROSCOPY OF JOINT UNLISTED  4/2004    Left wrist, with debridement and repair of tear.     HC REMOVAL OF OVARY/TUBE(S)  6/2003    right with fallopian tube     HC REPAIR TRIANGULAR CART,WRIST JT  2005    Dr Eloy Sosa     HC REPAIR TRIANGULAR CART,WRIST JT  2007 or so    ulnar excision- Dr Eloy Sosa     HYSTEROSCOPY      laproscopy for endometriosis x2     LITHOTRIPSY         Social History   Substance Use Topics     Smoking status: Former Smoker     Types: Cigarettes     Quit date: 2/2/2014     Smokeless tobacco: Never Used     Alcohol use Yes       Comment: rare      Family History   Problem Relation Age of Onset     Hypertension Father      Lipids Father      Hypertension Maternal Grandmother      Genitourinary Problems Maternal Grandmother      kidney disease     Cardiovascular Maternal Grandfather      quad bypass     HEART DISEASE Maternal Grandfather      quad bypass     CANCER Paternal Grandmother      leukemia     Asthma No family hx of      C.A.D. No family hx of      DIABETES No family hx of      CEREBROVASCULAR DISEASE No family hx of      Breast Cancer No family hx of      Cancer - colorectal No family hx of      Prostate Cancer No family hx of      Alzheimer Disease No family hx of      Arthritis No family hx of      Blood Disease No family hx of      Circulatory No family hx of      Eye Disorder No family hx of      GASTROINTESTINAL DISEASE No family hx of      Musculoskeletal Disorder No family hx of      Neurologic Disorder No family hx of      Respiratory No family hx of      Thyroid Disease No family hx of          Allergies   Allergen Reactions     Amitriptyline Hives     Amoxicillin Diarrhea     Asa [Dihydroxyaluminum Aminoacetate]      Cipro [Ciprofloxacin]      Dizziness, vomiting, shortness of breath     Ibuprofen [Aspartame-Ibuprofen]      GI distress       Lyrica      extrematies swell up      Morphine      ithcy     Naproxen      GI distress     Neurontin [Gabapentin]      rash     Paxil [Paroxetine] Anaphylaxis     anaphylaxis     Phenergan [Promethazine Hcl]      dystonia     Prilosec [Omeprazole]      Rash, dizziness     Gabapentin Rash         Reviewed and updated as needed this visit by clinical staffTobacco  Allergies  Meds  Med Hx  Surg Hx  Fam Hx  Soc Hx      Reviewed and updated as needed this visit by Provider         ROS:  CONSTITUTIONAL:weight gain  INTEGUMENTARY/SKIN: NEGATIVE for worrisome rashes, moles or lesions  EYES: NEGATIVE for vision changes or irritation  ENT/MOUTH: mild nasal congestion due to  allergies / seasonal.   RESP:NEGATIVE for significant cough or SOB  CV: NEGATIVE for chest pain, palpitations or peripheral edema  GI: no abdominal pain, she does have nausea from the vertigo  MUSCULOSKELETAL: NEGATIVE for significant arthralgias or myalgia  NEURO: NEGATIVE for weakness, dizziness or paresthesias, no headache or vision change  ENDOCRINE: NEGATIVE for temperature intolerance, skin/hair changes  PSYCHIATRIC: recent change in her medications. She stopped taking one of them 2 weeks ago. Frustrated because she feels they have been helpful for the mental illness, but may contribute to the weight.     OBJECTIVE:     /79  Pulse 85  Temp 96.7  F (35.9  C) (Oral)  Wt 186 lb (84.4 kg)  SpO2 99%  BMI 34.02 kg/m2  Body mass index is 34.02 kg/(m^2).  GENERAL: healthy, alert and no distress  EYES: Eyes grossly normal to inspection, PERRL and conjunctivae and sclerae normal  HENT: ear canals and TM's normal, nose and mouth without ulcers or lesions  NECK: no adenopathy, no asymmetry, masses, or scars and thyroid normal to palpation  RESP: lungs clear to auscultation - no rales, rhonchi or wheezes  CV: regular rate and rhythm, normal S1 S2, no S3 or S4, no murmur, click or rub, no peripheral edema and peripheral pulses strong  SKIN: no suspicious lesions or rashes  NEURO: Normal strength and tone, sensory exam grossly normal, mentation intact, cranial nerves 2-12 intact.   Hallpike maneuver done in the office and she has vertigo when turning her head to the right. No nystagmus on exam, but reported worsening symptoms by Mily. When she sat up, she became nauseated.   BACK: no CVA tenderness, no paralumbar tenderness  PSYCH: mentation appears normal, affect normal/bright    Diagnostic Test Results:  pending    ASSESSMENT/PLAN:       1. Vertigo  Positional vertigo, mainly lateralized to the right with =Hallpike maneuver in the office.   She became nauseated with the movement and was able to sit for a few  minutes in the exam room and was given zofran. Some improvement with the zofran and rest. She also had lab tests drawn in the exam room. CBC is normal.   She is going to take meclizine when she gets home and rest.   I gave her a letter for work also.   - TSH with free T4 reflex  - Renal panel  - CBC with platelets differential  - meclizine (ANTIVERT) 25 MG tablet; Take 1 tablet (25 mg) by mouth every 6 hours as needed for dizziness  Dispense: 40 tablet; Refill: 1  - NEUROLOGY ADULT REFERRAL    2. Nausea  See above, caused by the vertigo   - ondansetron (ZOFRAN) 8 MG tablet; Take 1 tablet (8 mg) by mouth once for 1 dose  Dispense: 1 tablet; Refill: 0  - ondansetron (ZOFRAN) 8 MG tablet; Take 1 tablet (8 mg) by mouth every 8 hours as needed for nausea  Dispense: 9 tablet; Refill: 1    3. Other fatigue  4. Weight gain  If tests are normal, consider medications as the cause.   Encouraged to work on exercise and healthy habits.   - TSH with free T4 reflex  - Renal panel  - CBC with platelets differential      45 minutes spent with Mily lety in the office.   Over 50% of time taken for discussion of above, counseling and coordination of care.       Kristen M. Kehr, PA-C  [facility]    The following medication was given:     MEDICATION: Ondansetron Orally Disintegrating Tablets 8mg  ROUTE: PO  SITE: mouth  DOSE: 8mg  LOT #: [id number]  :  [employer]  EXPIRATION DATE:  06/2018  NDC#: [address]-52  Given by Kristen Kehr PA-C

## 2017-09-11 NOTE — PATIENT INSTRUCTIONS
Benign Paroxysmal Positional Vertigo     Your health care provider may move your head in certain ways to treat your BPPV.     Benign paroxysmal positional vertigo (BPPV) is a problem with the inner ear. The inner ear contains the vestibular system. This system is what helps you keep your balance. BPPV causes a feeling of spinning. It is a common problem of the vestibular system.  Understanding the vestibular system  The vestibular system of the ear is made up of very tiny parts. They include the utricle, saccule, and semicircular canals. The utricle is a tiny organ that contains calcium crystals. In some people, the crystals can move into the semicircular canals. When this happens, the system no longer works as it should. This causes BPPV. Benign means it is not life-threatening. Paroxysmal means it happens suddenly. Positional means that it happens when you move your head. Vertigo is a feeling of spinning.  What causes BPPV?  Causes include injury to your head or neck. Other problems with the vestibular system may cause BPPV. In many people, the cause of BPPV is not known.  Symptoms of BPPV  You many have repeated feelings of spinning (vertigo). The vertigo usually lasts less than 1 minute. Some movements, suchas rolling over in bed, can bring on vertigo.  Diagnosing BPPV  Your primary health care provider may diagnose and treat your BPPV. Or you may see an ear, nose, and throat doctor (otolaryngologist). In some cases, you may see a nervous system doctor (neurologist).  The health care provider will ask about your symptoms and your medical history. He or she will examine you. You may have hearing and balance tests. As part of the exam, your health care provider may have you move your head and body in certain ways. If you have BPPV, the movements can bring on vertigo. Your provider will also look for abnormal movements of your eyes. You may have other tests to check your vestibular or nervous systems.  Treatment  for BPPV  Your health care provider may try to move the calcium crystals. This is done by having you move your head and neck in certain ways. This treatment is safe and often works well. You may also be told to do these movements at home. You may still have vertigo for a few weeks. Your health care provider will recheck your symptoms, usually in about a month. Special physical therapy may also be part of treatment. In rare cases surgery may be needed for BPPV that does not go away.     When to call the health care provider  Call your health care provider right away if you have any of these:    Symptoms that do not go away with treatment    Symptoms that get worse    New symptoms   Date Last Reviewed: 3/19/2015    [id number] [employer]. [address] [place] [address], [place] [address]. All rights reserved. This information is not intended as a substitute for professional medical care. Always follow your healthcare professional's instructions.

## 2017-09-11 NOTE — MR AVS SNAPSHOT
After Visit Summary   9/11/2017    Mily Grullon    MRN: [id number]           Patient Information     Date Of Birth          1980        Visit Information        Provider Department      9/11/2017 1:40 PM Kehr, Kristen M, PA-C [facility]        Today's Diagnoses     Vertigo    -  1    Other fatigue        Weight gain        Nausea          Care Instructions      Benign Paroxysmal Positional Vertigo     Your health care provider may move your head in certain ways to treat your BPPV.     Benign paroxysmal positional vertigo (BPPV) is a problem with the inner ear. The inner ear contains the vestibular system. This system is what helps you keep your balance. BPPV causes a feeling of spinning. It is a common problem of the vestibular system.  Understanding the vestibular system  The vestibular system of the ear is made up of very tiny parts. They include the utricle, saccule, and semicircular canals. The utricle is a tiny organ that contains calcium crystals. In some people, the crystals can move into the semicircular canals. When this happens, the system no longer works as it should. This causes BPPV. Benign means it is not life-threatening. Paroxysmal means it happens suddenly. Positional means that it happens when you move your head. Vertigo is a feeling of spinning.  What causes BPPV?  Causes include injury to your head or neck. Other problems with the vestibular system may cause BPPV. In many people, the cause of BPPV is not known.  Symptoms of BPPV  You many have repeated feelings of spinning (vertigo). The vertigo usually lasts less than 1 minute. Some movements, suchas rolling over in bed, can bring on vertigo.  Diagnosing BPPV  Your primary health care provider may diagnose and treat your BPPV. Or you may see an ear, nose, and throat doctor (otolaryngologist). In some cases, you may see a nervous system doctor (neurologist).  The health care provider will ask about your symptoms  and your medical history. He or she will examine you. You may have hearing and balance tests. As part of the exam, your health care provider may have you move your head and body in certain ways. If you have BPPV, the movements can bring on vertigo. Your provider will also look for abnormal movements of your eyes. You may have other tests to check your vestibular or nervous systems.  Treatment for BPPV  Your health care provider may try to move the calcium crystals. This is done by having you move your head and neck in certain ways. This treatment is safe and often works well. You may also be told to do these movements at home. You may still have vertigo for a few weeks. Your health care provider will recheck your symptoms, usually in about a month. Special physical therapy may also be part of treatment. In rare cases surgery may be needed for BPPV that does not go away.     When to call the health care provider  Call your health care provider right away if you have any of these:    Symptoms that do not go away with treatment    Symptoms that get worse    New symptoms   Date Last Reviewed: 3/19/2015    [id number] [employer]. [address] [place] [address]. All rights reserved. This information is not intended as a substitute for professional medical care. Always follow your healthcare professional's instructions.                Follow-ups after your visit        Additional Services     NEUROLOGY ADULT REFERRAL       Your provider has referred you for the following:   Consult at [facility] - Awilda Boss [phone]   [email or web address]    Please be aware that coverage of these services is subject to the terms and limitations of your health insurance plan.  Call member services at your health plan with any benefit or coverage questions.      Please bring the following with you to your appointment:    (1) Any X-Rays, CTs or MRIs which have been  "performed.  Contact the facility where they were done to arrange for  prior to your scheduled appointment.    (2) List of current medications  (3) This referral request   (4) Any documents/labs given to you for this referral                  Who to contact     If you have questions or need follow up information about today's clinic visit or your schedule please contact [facility] directly at [phone].  Normal or non-critical lab and imaging results will be communicated to you by MyChart, letter or phone within 4 business days after the clinic has received the results. If you do not hear from us within 7 days, please contact the clinic through PostBeyondhart or phone. If you have a critical or abnormal lab result, we will notify you by phone as soon as possible.  Submit refill requests through [email or web address] or call your pharmacy and they will forward the refill request to us. Please allow 3 business days for your refill to be completed.          Additional Information About Your Visit        PostBeyondharSproutel Information     [email or web address] lets you send messages to your doctor, view your test results, renew your prescriptions, schedule appointments and more. To sign up, go to [email or web address] . Click on \"Log in\" on the left side of the screen, which will take you to the Welcome page. Then click on \"Sign up Now\" on the right side of the page.     You will be asked to enter the access code listed below, as well as some personal information. Please follow the directions to create your username and password.     Your access code is: [email or web address]  Expires: 2017  3:37 PM     Your access code will  in 90 days. If you need help or a new code, please call your [facility] or [phone].        Care EveryWhere ID     This is your Care EveryWhere ID. This could be used by other organizations to access your [facility] medical records  [id number]        Your Vitals Were     Pulse Temperature Pulse Oximetry BMI " (Body Mass Index)          85 96.7  F (35.9  C) (Oral) 99% 34.02 kg/m2         Blood Pressure from Last 3 Encounters:   09/11/17 126/79   02/27/17 127/70   12/21/16 108/79    Weight from Last 3 Encounters:   09/11/17 186 lb (84.4 kg)   09/07/17 188 lb 1.6 oz (85.3 kg)   02/27/17 168 lb (76.2 kg)              We Performed the Following     CBC with platelets differential     NEUROLOGY ADULT REFERRAL     Renal panel     TSH with free T4 reflex          Today's Medication Changes          These changes are accurate as of: 9/11/17  2:57 PM.  If you have any questions, ask your nurse or doctor.               Start taking these medicines.        Dose/Directions    meclizine 25 MG tablet   Commonly known as:  ANTIVERT   Used for:  Vertigo   Started by:  Kehr, Kristen M, PA-C        Dose:  25 mg   Take 1 tablet (25 mg) by mouth every 6 hours as needed for dizziness   Quantity:  40 tablet   Refills:  1       * ondansetron 8 MG tablet   Commonly known as:  ZOFRAN   Used for:  Nausea   Started by:  Kehr, Kristen M, PA-C        Dose:  8 mg   Take 1 tablet (8 mg) by mouth once for 1 dose   Quantity:  1 tablet   Refills:  0       * ondansetron 8 MG tablet   Commonly known as:  ZOFRAN   Used for:  Nausea   Started by:  Kehr, Kristen M, PA-C        Dose:  8 mg   Take 1 tablet (8 mg) by mouth every 8 hours as needed for nausea   Quantity:  9 tablet   Refills:  1       * Notice:  This list has 2 medication(s) that are the same as other medications prescribed for you. Read the directions carefully, and ask your doctor or other care provider to review them with you.         Where to get your medicines      [facility] [address] [facility], [facility] [address]  [address] [address] [address]     Phone:  [phone]     meclizine 25 MG tablet    ondansetron 8 MG tablet         Some of these will need a paper prescription and others can be bought over the counter.  Ask your  nurse if you have questions.     You don't need a prescription for these medications     ondansetron 8 MG tablet                Primary Care Provider Office Phone # Fax #    Kristen M Kehr, PA-C [phone] [address] [facility] [address]        Equal Access to Services     JASSON VIZCARRA : Hadii aad ku hadasho Soomaali, waaxda luqadaha, qaybta kaalmada adeegyada, waxay rockin haylisandron susan fiorellaharpreet spivey. So [facility] [phone].    ATENCIÓN: Si habla español, tiene a lentz disposición servicios gratuitos de asistencia lingüística. [facility] [phone].    We comply with applicable federal civil rights laws and [place] laws. We do not discriminate on the basis of race, color, national origin, age, disability sex, sexual orientation or gender identity.            Thank you!     Thank you for choosing [facility]  for your care. Our goal is always to provide you with excellent care. Hearing back from our patients is one way we can continue to improve our services. Please take a few minutes to complete the written survey that you may receive in the mail after your visit with us. Thank you!             Your Updated Medication List - Protect others around you: Learn how to safely use, store and throw away your medicines at www.disposemymeds.org.          This list is accurate as of: 9/11/17  2:57 PM.  Always use your most recent med list.                   Brand Name Dispense Instructions for use Diagnosis    acetaminophen 325 MG tablet    TYLENOL    100 tablet    Take 2 tablets (650 mg) by mouth every 4 hours as needed for other (mild pain)    Orthopedic aftercare       BENTYL 10 MG capsule   Generic drug:  dicyclomine      Take 20-40 mg by mouth 4 times daily (before meals and nightly)        COMPAZINE PO      Take 10 mg by mouth every morning        meclizine 25 MG tablet    ANTIVERT    40 tablet    Take 1 tablet (25 mg) by mouth every 6 hours as needed for dizziness    Vertigo        medroxyPROGESTERone 150 MG/ML injection    DEPO-PROVERA    1 mL    Inject 1 mL (150 mg) into the muscle every 3 months    Encounter for surveillance of injectable contraceptive       nortriptyline 50 MG capsule    PAMELOR     Take by mouth 2 times daily    Vertigo, Other fatigue, Weight gain       * ondansetron 8 MG tablet    ZOFRAN    1 tablet    Take 1 tablet (8 mg) by mouth once for 1 dose    Nausea       * ondansetron 8 MG tablet    ZOFRAN    9 tablet    Take 1 tablet (8 mg) by mouth every 8 hours as needed for nausea    Nausea       propranolol 120 MG 24 hr capsule    INDERAL LA    60 capsule    Take 1 capsule (120 mg) by mouth 2 times daily    Migraine with aura and without status migrainosus, not intractable       PROTONIX PO      Take 40 mg by mouth 2 times daily (before meals)        QUEtiapine 25 MG tablet    SEROquel     Take by mouth 4 times daily as needed    Vertigo, Other fatigue, Weight gain       * Notice:  This list has 2 medication(s) that are the same as other medications prescribed for you. Read the directions carefully, and ask your doctor or other care provider to review them with you.

## 2017-09-12 ENCOUNTER — TELEPHONE (OUTPATIENT)
Dept: FAMILY MEDICINE | Facility: CLINIC | Age: 37
End: 2017-09-12

## 2017-09-12 NOTE — TELEPHONE ENCOUNTER
Patient/parent is informed of MD note below, as it is written. Verbalized good understanding.  Symptoms have improved.  Mariza Rinaldi RN

## 2017-09-12 NOTE — TELEPHONE ENCOUNTER
Please contact [facility] and let her know that her tests were normal.   Thyroid was normal.   I am hoping that the vertigo is better with the use of the meclizine today.   Kristen Kehr PA-C

## 2017-09-12 NOTE — TELEPHONE ENCOUNTER
Left message on answering machine for patient/parent to call back.   [phone].  Mariza Rinaldi RN

## 2017-09-14 ENCOUNTER — TRANSFERRED RECORDS (OUTPATIENT)
Dept: HEALTH INFORMATION MANAGEMENT | Facility: CLINIC | Age: 37
End: 2017-09-14

## 2017-09-18 DIAGNOSIS — G43.109 MIGRAINE WITH AURA AND WITHOUT STATUS MIGRAINOSUS, NOT INTRACTABLE: ICD-10-CM

## 2017-09-21 RX ORDER — PROPRANOLOL HYDROCHLORIDE 120 MG/1
CAPSULE, EXTENDED RELEASE ORAL
Qty: 60 CAPSULE | Refills: 0 | Status: SHIPPED | OUTPATIENT
Start: 2017-09-21 | End: 2018-05-16

## 2017-09-21 NOTE — TELEPHONE ENCOUNTER
I called and left message for patient to call back to [phone] to make follow up appt with Dr. Shin. Once appt made we can refill medication until appt.    Darla Severin-Brown, LPN

## 2017-09-21 NOTE — TELEPHONE ENCOUNTER
Patient scheduled a follow up appt for 10/11 at 12:30pm and would like to follow up on possibly getting her medication refilled to cover her until then. Please advise.

## 2017-09-21 NOTE — TELEPHONE ENCOUNTER
Pending Prescriptions:                       Disp   Refills    propranolol (INDERAL LA) 120 MG 24 hr caps*60 cap*0        Sig: TAKE ONE CAPSULE BY MOUTH TWICE A DAY    Writer received a refill request from: [employer].     Medication: see above  Sig: see above  Date last written: 8/2/16  Dispensed amount: 60  Refills: 11  Date last dispensed: unknown      Pt's last office visit: 8/2/2016  Next scheduled office visit: 10/11/17      Per the RN/LPN medication refill protocol, writer is authorized to refill this request. If able to refill, please call the pt to inform them the RX was sent to the pharmacy.

## 2017-10-12 ENCOUNTER — TRANSFERRED RECORDS (OUTPATIENT)
Dept: HEALTH INFORMATION MANAGEMENT | Facility: CLINIC | Age: 37
End: 2017-10-12

## 2017-10-25 ENCOUNTER — OFFICE VISIT (OUTPATIENT)
Dept: FAMILY MEDICINE | Facility: CLINIC | Age: 37
End: 2017-10-25
Payer: COMMERCIAL

## 2017-10-25 VITALS
OXYGEN SATURATION: 97 % | WEIGHT: 188 LBS | TEMPERATURE: 97.6 F | DIASTOLIC BLOOD PRESSURE: 74 MMHG | HEART RATE: 76 BPM | SYSTOLIC BLOOD PRESSURE: 132 MMHG | BODY MASS INDEX: 34.39 KG/M2

## 2017-10-25 DIAGNOSIS — M76.62 LEFT ACHILLES TENDINITIS: Primary | ICD-10-CM

## 2017-10-25 PROCEDURE — 99213 OFFICE O/P EST LOW 20 MIN: CPT | Performed by: PHYSICIAN ASSISTANT

## 2017-10-25 RX ORDER — DULOXETIN HYDROCHLORIDE 20 MG/1
CAPSULE, DELAYED RELEASE ORAL
COMMUNITY
Start: 2017-10-12 | End: 2018-05-16

## 2017-10-25 NOTE — MR AVS SNAPSHOT
After Visit Summary   10/25/2017    Mily Grullon    MRN: [id number]           Patient Information     Date Of Birth          1980        Visit Information        Provider Department      10/25/2017 12:00 PM Kehr, Kristen M, PA-C [facility]        Today's Diagnoses     Left Achilles tendinitis    -  1      Care Instructions    Make appointment with Dr. Alford          Follow-ups after your visit        Additional Services     ORTHOPEDICS ADULT REFERRAL       Your provider has referred you to: [facility]: [facility] [phone]   [email or web address]    Dr. Alford    Please be aware that coverage of these services is subject to the terms and limitations of your health insurance plan.  Call member services at your health plan with any benefit or coverage questions.      Please bring the following to your appointment:    >>   Any x-rays, CTs or MRIs which have been performed.  Contact the facility where they were done to arrange for  prior to your scheduled appointment.    >>   List of current medications   >>   This referral request   >>   Any documents/labs given to you for this referral                  Who to contact     If you have questions or need follow up information about today's clinic visit or your schedule please contact [employer] [employer] directly at [phone].  Normal or non-critical lab and imaging results will be communicated to you by MyChart, letter or phone within 4 business days after the clinic has received the results. If you do not hear from us within 7 days, please contact the clinic through MyChart or phone. If you have a critical or abnormal lab result, we will notify you by phone as soon as possible.  Submit refill requests through Lumoid or call your pharmacy and they will forward the refill request to us. Please allow 3 business days for your refill to be completed.           "Additional Information About Your Visit        MyChart Information     [email or web address] lets you send messages to your doctor, view your test results, renew your prescriptions, schedule appointments and more. To sign up, go to [email or web address] . Click on \"Log in\" on the left side of the screen, which will take you to the Welcome page. Then click on \"Sign up Now\" on the right side of the page.     You will be asked to enter the access code listed below, as well as some personal information. Please follow the directions to create your username and password.     Your access code is: [email or web address]  Expires: 2017  3:37 PM     Your access code will  in 90 days. If you need help or a new code, please call your [facility] clinic or [phone].        Care EveryWhere ID     This is your Care EveryWhere ID. This could be used by other organizations to access your [facility] medical records  [id number]        Your Vitals Were     Pulse Temperature Pulse Oximetry BMI (Body Mass Index)          76 97.6  F (36.4  C) (Oral) 97% 34.39 kg/m2         Blood Pressure from Last 3 Encounters:   10/25/17 132/74   17 126/79   17 127/70    Weight from Last 3 Encounters:   10/25/17 188 lb (85.3 kg)   17 186 lb (84.4 kg)   17 188 lb 1.6 oz (85.3 kg)              We Performed the Following     ORTHOPEDICS ADULT REFERRAL          Today's Medication Changes          These changes are accurate as of: 10/25/17 12:38 PM.  If you have any questions, ask your nurse or doctor.               Start taking these medicines.        Dose/Directions    order for DME   Used for:  Left Achilles tendinitis   Started by:  Kehr, Kristen M, PA-C        Left boot   Quantity:  1 Units   Refills:  0            Where to get your medicines      Some of these will need a paper prescription and others can be bought over the counter.  Ask your nurse if you have questions.     Bring a paper prescription for each of these medications     " order for DME                Primary Care Provider Office Phone # Fax #    Kristen M Kehr, PA-C [phone] [address] [facility] [address]        Equal Access to Services     JASSON VIZCARRA : Hadjosee mallika reese syedao Sofeliali, waaxda luqadaha, qaybta kaalmada adeegyada, abdifatah poonn susan eckert lia spivey. So [facility] [phone].    ATENCIÓN: Si habla español, tiene a lentz disposición servicios gratuitos de asistencia lingüística. Llame al [phone].    We comply with applicable federal civil rights laws and [place] laws. We do not discriminate on the basis of race, color, national origin, age, disability, sex, sexual orientation, or gender identity.            Thank you!     Thank you for choosing [facility]  for your care. Our goal is always to provide you with excellent care. Hearing back from our patients is one way we can continue to improve our services. Please take a few minutes to complete the written survey that you may receive in the mail after your visit with us. Thank you!             Your Updated Medication List - Protect others around you: Learn how to safely use, store and throw away your medicines at www.disposemymeds.org.          This list is accurate as of: 10/25/17 12:38 PM.  Always use your most recent med list.                   Brand Name Dispense Instructions for use Diagnosis    acetaminophen 325 MG tablet    TYLENOL    100 tablet    Take 2 tablets (650 mg) by mouth every 4 hours as needed for other (mild pain)    Orthopedic aftercare       COMPAZINE PO      Take 10 mg by mouth every morning        DULoxetine 20 MG EC capsule    CYMBALTA          meclizine 25 MG tablet    ANTIVERT    40 tablet    Take 1 tablet (25 mg) by mouth every 6 hours as needed for dizziness    Vertigo       medroxyPROGESTERone 150 MG/ML injection    DEPO-PROVERA    1 mL    Inject 1 mL (150 mg) into the muscle every 3 months    Encounter for surveillance of injectable  contraceptive       nortriptyline 50 MG capsule    PAMELOR     Take by mouth 2 times daily    Vertigo, Other fatigue, Weight gain       order for DME     1 Units    Left boot    Left Achilles tendinitis       propranolol 120 MG 24 hr capsule    INDERAL LA    60 capsule    TAKE ONE CAPSULE BY MOUTH TWICE A DAY    Migraine with aura and without status migrainosus, not intractable       PROTONIX PO      Take 40 mg by mouth 2 times daily (before meals)

## 2017-10-25 NOTE — LETTER
[facility]  [address] [facility] [address]  Phone: [phone]    October 25, 2017        Mily Grullon  [address] [facility] [address]          To whom it may concern:    RE: Mily Grullon    Patient was seen and treated today at our clinic.    She will be able to return to work 10/26/2017 with the following restrictions:  She will need to wear the boot on her left foot during her work shifts.   Limit walking and standing to less than 1 hour during her shift.   Sitting is preferred until her follow up with Orthopedic provider to determine the next step in treatment.     Restrictions should be in place from 10/26/2017 - 11/8/2017.    Please contact me for questions or concerns.      Sincerely,        Kristen M. Kehr, PA-C

## 2017-10-25 NOTE — NURSING NOTE
"Chief Complaint   Patient presents with     Ankle/Foot left     left ankle pain x 2 weeks       Initial /74  Pulse 76  Temp 97.6  F (36.4  C) (Oral)  Wt 188 lb (85.3 kg)  SpO2 97%  BMI 34.39 kg/m2 Estimated body mass index is 34.39 kg/(m^2) as calculated from the following:    Height as of 9/7/17: 5' 2\" (1.575 m).    Weight as of this encounter: 188 lb (85.3 kg).  Medication Reconciliation: complete    ABMIAEL Lyle MA    "

## 2017-10-25 NOTE — PROGRESS NOTES
SUBJECTIVE:   Mily Grullon is a 37 year old female who presents to clinic today for the following health issues:    Mily is here today after going to the Urgent Care for achilles tendonitis. She has continued to work and have pain. She is constantly needing to reach above her head and will have increase in pain when she goes to her toes to reach for things at work. She is struggling to get through a work shift.   She will have swelling at the end of her shift. She is wearing supportive shoes during her shift. Today she is wearing Crocs.       Concern - left ankle /heel  Onset: 2 weeks    Description:   Sometime sharp, burning, tingle, achy pain    Intensity: severe    Progression of Symptoms:  worsening    Accompanying Signs & Symptoms:      Previous history of similar problem:       Precipitating factors:   Worsened by: walking and standing    Alleviating factors:  Improved by: none    Therapies Tried and outcome: nothing        Problem list and histories reviewed & adjusted, as indicated.  Additional history: as documented    Patient Active Problem List   Diagnosis     Tobacco use disorder     Left elbow pain     CARDIOVASCULAR SCREENING; LDL GOAL LESS THAN 160     Gastritis     Headache     Senile osteoporosis     Closed nondisplaced intertrochanteric fracture of left femur (H)     Other postprocedural status(V45.89)     Abnormality of gait     Migraine without aura and without status migrainosus, not intractable     Abnormal gait     Past Surgical History:   Procedure Laterality Date     ARTHROSCOPY HIP, OSTEOPLASTY FEMUR PROXIMAL, COMBINED Left 6/29/2016    Procedure: COMBINED ARTHROSCOPY HIP, OSTEOPLASTY FEMUR PROXIMAL;  Surgeon: Godwin Deleon MD;  Location: [facility]     ARTHROSCOPY OF JOINT UNLISTED  4/2004    Left wrist, with debridement and repair of tear.     HC REMOVAL OF OVARY/TUBE(S)  6/2003    right with fallopian tube     HC REPAIR TRIANGULAR CART,WRIST JT  2005    Dr Eloy Sosa     HC  REPAIR TRIANGULAR CART,WRIST JT  2007 or so    ulnar excision- Dr Eloy Sosa     HYSTEROSCOPY      laproscopy for endometriosis x2     LITHOTRIPSY         Social History   Substance Use Topics     Smoking status: Former Smoker     Types: Cigarettes     Quit date: 2/2/2014     Smokeless tobacco: Never Used     Alcohol use Yes      Comment: rare      Family History   Problem Relation Age of Onset     Hypertension Father      Lipids Father      Hypertension Maternal Grandmother      Genitourinary Problems Maternal Grandmother      kidney disease     Cardiovascular Maternal Grandfather      quad bypass     HEART DISEASE Maternal Grandfather      quad bypass     CANCER Paternal Grandmother      leukemia     Asthma No family hx of      C.A.D. No family hx of      DIABETES No family hx of      CEREBROVASCULAR DISEASE No family hx of      Breast Cancer No family hx of      Cancer - colorectal No family hx of      Prostate Cancer No family hx of      Alzheimer Disease No family hx of      Arthritis No family hx of      Blood Disease No family hx of      Circulatory No family hx of      Eye Disorder No family hx of      GASTROINTESTINAL DISEASE No family hx of      Musculoskeletal Disorder No family hx of      Neurologic Disorder No family hx of      Respiratory No family hx of      Thyroid Disease No family hx of          Current Outpatient Prescriptions   Medication Sig Dispense Refill     DULoxetine (CYMBALTA) 20 MG EC capsule        order for DME Left boot 1 Units 0     propranolol (INDERAL LA) 120 MG 24 hr capsule TAKE ONE CAPSULE BY MOUTH TWICE A DAY 60 capsule 0     nortriptyline (PAMELOR) 50 MG capsule Take by mouth 2 times daily       meclizine (ANTIVERT) 25 MG tablet Take 1 tablet (25 mg) by mouth every 6 hours as needed for dizziness 40 tablet 1     medroxyPROGESTERone (DEPO-PROVERA) 150 MG/ML injection Inject 1 mL (150 mg) into the muscle every 3 months 1 mL 3     acetaminophen (TYLENOL) 325 MG tablet Take 2  tablets (650 mg) by mouth every 4 hours as needed for other (mild pain) 100 tablet 0     Pantoprazole Sodium (PROTONIX PO) Take 40 mg by mouth 2 times daily (before meals)       Prochlorperazine Maleate (COMPAZINE PO) Take 10 mg by mouth every morning       Allergies   Allergen Reactions     Amitriptyline Hives     Amoxicillin Diarrhea     Asa [Dihydroxyaluminum Aminoacetate]      Cipro [Ciprofloxacin]      Dizziness, vomiting, shortness of breath     Ibuprofen [Aspartame-Ibuprofen]      GI distress       Lyrica      extrematies swell up      Morphine      ithcy     Naproxen      GI distress     Neurontin [Gabapentin]      rash     Paxil [Paroxetine] Anaphylaxis     anaphylaxis     Phenergan [Promethazine Hcl]      dystonia     Prilosec [Omeprazole]      Rash, dizziness     Gabapentin Rash         Reviewed and updated as needed this visit by clinical staff       Reviewed and updated as needed this visit by Provider         ROS:  Constitutional, HEENT, cardiovascular, pulmonary, gi and gu systems are negative, except as otherwise noted.      OBJECTIVE:   /74  Pulse 76  Temp 97.6  F (36.4  C) (Oral)  Wt 188 lb (85.3 kg)  SpO2 97%  BMI 34.39 kg/m2  Body mass index is 34.39 kg/(m^2).  GENERAL: healthy, alert and no distress  MS: left lower extremity: normal inspection, there is no swelling present. She is tender over the posterior aspect of the heel. Normal ROM of the ankle and normal flexion and extension. She is able to bear weight.     Diagnostic Test Results:  none     ASSESSMENT/PLAN:         1. Left Achilles tendinitis  Xray was taken at her  visit and reported as normal.   She was given a boot to wear today.   Letter for work with restrictions for the next 2 weeks.   Plan NSAIDS as needed for pain.   Rest and elevate if swollen after her shift, but I think the boot and restrictions will help.   Plan follow up with Orthopedics.   - ORTHOPEDICS ADULT REFERRAL  - order for DME; Left boot  Dispense: 1  Units; Refill: 0        Kristen M. Kehr, PA-C  [facility]

## 2017-10-30 ENCOUNTER — OFFICE VISIT (OUTPATIENT)
Dept: ORTHOPEDICS | Facility: CLINIC | Age: 37
End: 2017-10-30
Payer: COMMERCIAL

## 2017-10-30 ENCOUNTER — RADIANT APPOINTMENT (OUTPATIENT)
Dept: GENERAL RADIOLOGY | Facility: CLINIC | Age: 37
End: 2017-10-30
Attending: PHYSICIAN ASSISTANT
Payer: COMMERCIAL

## 2017-10-30 VITALS — BODY MASS INDEX: 34.41 KG/M2 | HEIGHT: 62 IN | RESPIRATION RATE: 16 BRPM | WEIGHT: 187 LBS

## 2017-10-30 DIAGNOSIS — M76.62 ACHILLES TENDINITIS OF LEFT LOWER EXTREMITY: Primary | ICD-10-CM

## 2017-10-30 DIAGNOSIS — M76.62 ACHILLES TENDINITIS OF LEFT LOWER EXTREMITY: ICD-10-CM

## 2017-10-30 PROCEDURE — 99214 OFFICE O/P EST MOD 30 MIN: CPT | Performed by: ORTHOPAEDIC SURGERY

## 2017-10-30 PROCEDURE — 73610 X-RAY EXAM OF ANKLE: CPT | Mod: LT

## 2017-10-30 ASSESSMENT — PAIN SCALES - GENERAL: PAINLEVEL: SEVERE PAIN (6)

## 2017-10-30 NOTE — LETTER
10/30/2017         RE: Mily Grullon  [address] [address]        Dear Colleague,    Thank you for referring your patient, Mily Grullon, to the [facility] [place]. Please see a copy of my visit note below.    chief complaint:   Chief Complaint   Patient presents with     Ankle Pain     left achilles tendinitis. Onset: 10/13/17 started noticing at work without a specific event. Not a w/c claim.she has been in a short walking boot since 10/25/17 and working with weight bearing restrictions.        Mily Grullon is seen today in the [facility] for evaluation of left achilles tendonitis at the request of Kristen Kehr, PA-C     HISTORY OF PRESENT ILLNESS    Mily Grullon is a 37 year old female seen for evaluation of a left ankle pain that started on 10/13/2017, 2 weeks ago. No known injury. She started to notice the pain at work without a specific event.  Since that time, pain has been worsening. Her pain was so severe, she went to see her primary care physician on 10/25/2017. She was treated with a short walking boot and was put on weightbearing restrictions. Today her pain is severe, rated a 6/10. Pain with walking, standing, sitting. Been improving some in boot now.    Of note: this is not a work comp claim.      Present symptoms: moderate-severe pain, no swelling.    Symptoms occur walking and standing.    The frequency of symptoms: frequently.  Denies associated numbness or tingling.   Pain severity: 6/10  Pain quality: aching and sharp  Associated symptoms: none  Aggravating Factors: weightbearing  Relieving Factors: at rest, with brace    Treatment up to this point: short ankle boot  Has not tried: physical therapy   Prior history of related problems: none    Orthopedic PMH: history of left hip pain.    Other PMH:  has a past medical history of Endometriosis, site unspecified; Gastritis (2010); Urinary calculus, unspecified; and Wrist  injury (Nov 19, 2003).  Patient Active Problem List    Diagnosis Date Noted     Abnormal gait 07/21/2016     Priority: Medium     Migraine without aura and without status migrainosus, not intractable 11/10/2015     Priority: Medium     Abnormality of gait 06/09/2014     Priority: Medium     Other postprocedural status(V45.89) 03/10/2014     Priority: Medium     Closed nondisplaced intertrochanteric fracture of left femur (H) 02/10/2014     Priority: Medium     Senile osteoporosis 12/10/2013     Priority: Medium     Headache 10/31/2011     Priority: Medium     Problem list name updated by automated process. Provider to review       Gastritis      Priority: Medium     dx 2010- sees Dr Glover in [facility]       CARDIOVASCULAR SCREENING; LDL GOAL LESS THAN 160 10/31/2010     Priority: Medium     Left elbow pain 01/08/2010     Priority: Medium     Narcotic agreement on file       Tobacco use disorder 12/17/2009     Priority: Medium       Surgical Hx:  has a past surgical history that includes REPAIR TRIANGULAR CART,WRIST JT (2005); REPAIR TRIANGULAR CART,WRIST JT (2007 or so); arthroscopy of joint unlisted (4/2004); REMOVAL OF OVARY/TUBE(S) (6/2003); hysteroscopy; lithotripsy; and Arthroscopy hip, osteoplasty femur proximal, combined (Left, 6/29/2016).    Medications:   Current Outpatient Prescriptions:      DULoxetine (CYMBALTA) 20 MG EC capsule, , Disp: , Rfl:      order for DME, Left boot, Disp: 1 Units, Rfl: 0     propranolol (INDERAL LA) 120 MG 24 hr capsule, TAKE ONE CAPSULE BY MOUTH TWICE A DAY, Disp: 60 capsule, Rfl: 0     nortriptyline (PAMELOR) 50 MG capsule, Take by mouth 2 times daily, Disp: , Rfl:      meclizine (ANTIVERT) 25 MG tablet, Take 1 tablet (25 mg) by mouth every 6 hours as needed for dizziness, Disp: 40 tablet, Rfl: 1     medroxyPROGESTERone (DEPO-PROVERA) 150 MG/ML injection, Inject 1 mL (150 mg) into the muscle every 3 months, Disp: 1 mL, Rfl: 3     acetaminophen (TYLENOL) 325 MG  tablet, Take 2 tablets (650 mg) by mouth every 4 hours as needed for other (mild pain), Disp: 100 tablet, Rfl: 0     Pantoprazole Sodium (PROTONIX PO), Take 40 mg by mouth 2 times daily (before meals), Disp: , Rfl:      Prochlorperazine Maleate (COMPAZINE PO), Take 10 mg by mouth every morning, Disp: , Rfl:     Allergies:   Allergies   Allergen Reactions     Amitriptyline Hives     Amoxicillin Diarrhea     Asa [Dihydroxyaluminum Aminoacetate]      Cipro [Ciprofloxacin]      Dizziness, vomiting, shortness of breath     Ibuprofen [Aspartame-Ibuprofen]      GI distress       Lyrica      extrematies swell up      Morphine      ithcy     Naproxen      GI distress     Neurontin [Gabapentin]      rash     Paxil [Paroxetine] Anaphylaxis     anaphylaxis     Phenergan [Promethazine Hcl]      dystonia     Prilosec [Omeprazole]      Rash, dizziness     Gabapentin Rash       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 Her smoking use included Cigarettes. She has never used smokeless tobacco. She reports that she drinks alcohol. She reports that she does not use illicit drugs.    Family Hx: family history includes CANCER in her paternal grandmother; Cardiovascular in her maternal grandfather; Genitourinary Problems in her maternal grandmother; HEART DISEASE in her maternal grandfather; Hypertension in her father and maternal grandmother; Lipids in her father. There is no history of Asthma, C.A.D., DIABETES, CEREBROVASCULAR DISEASE, Breast Cancer, Cancer - colorectal, Prostate Cancer, Alzheimer Disease, Arthritis, Blood Disease, Circulatory, Eye Disorder, GASTROINTESTINAL DISEASE, Musculoskeletal Disorder, Neurologic Disorder, Respiratory, or Thyroid Disease.    REVIEW OF SYSTEMS:    CONSTITUTIONAL:NEGATIVE for fever, chills, change in weight  INTEGUMENTARY/SKIN: NEGATIVE for worrisome rashes, moles or lesions  MUSCULOSKELETAL:See HPI above  NEURO: NEGATIVE for weakness, dizziness or  "paresthesias    This document serves as a record of the services and decisions personally performed and made by Jose Alford MD. It was created on his behalf by Ashlee Linn, a trained medical scribe. The creation of this document is based the provider's statements to the medical scribe.    Scribe Ashlee Linn 10:32 AM 10/30/2017     PHYSICAL EXAM:  Resp 16  Ht 1.575 m (5' 2\")  Wt 84.8 kg (187 lb)  BMI 34.2 kg/m2   GENERAL APPEARANCE: healthy, alert, no distress  SKIN: no suspicious lesions or rashes  NEURO: Normal strength and tone, mentation intact and speech normal  PSYCH:  mentation appears normal and affect normal  RESPIRATORY: No increased work of breathing.    BILATERAL LOWER EXTREMITIES:  Gait: antalgic favoring left in boot.    Left lower extremity:  Left lower extremity weakness.  Intact sensation deep peroneal nerve, superficial peroneal nerve, med/lat tibial nerve, sural nerve, saphenous nerve  Intact EHL, EDL, TA, FHL, GS, quadriceps hamstrings and hip flexors  Toes warm and well perfused, brisk capillary refill. Palpable 2+ dp pulses.  calf soft and nttp or squeeze.  Edema: none    left ANKLE  Inspection: skin intact. No erythema or ecchymosis.   Swelling: none   Tender: distal achilles and achilles insertion  Range of Motion:grossly intact  Strength: intact  Tight heel cord, dorsiflexion to neutral with discomfort.      X-RAY:  3 views of the left ankle from 10/30/2017 were reviewed today. On my review, no obvious fractures or deformities.       Impression: 37 year old female with left ankle pain, left achilles tendonitis.    Plan:   Discussed with patient that exam and imaging studies consistent with achilles tendinosis, mostly insertional. Most cases respond well to nonoperative treatment.    * Rest  * Activity modification - avoid activities that aggravate symptoms.  * NSAIDS - regular use for inflammation, with food, as long as no contra-indications. Please discuss with pcp if needed.  * Ice " twice daily to three times daily.  * immobilization: continue with short ankle boot with activities. Can take it off for gentle range of motion only. Typically long boot works better, but already has short boot and seems to be helping so continue with that.  * Elevation of extremity to reduce swelling  * gentle heel cord stretching  * PT ordered for gentle heel cord stretching and range of motion exercises, modalities such as ultrasound as indicated. Then work on eccentric exercises.  * Tylenol as needed for pain  * Workability update: sedentary duties  * return to clinic 4 weeks, sooner if needed, for clinical recheck.    The information in this document, created by a scribe for me, accurately reflects the services I personally performed and the decisions made by me. I have reviewed and approved this document for accuracy.      Jose Alfrod M.D., M.S.  Dept. of Orthopaedic Surgery  [facility]          Again, thank you for allowing me to participate in the care of your patient.        Sincerely,        Jose Alford MD

## 2017-10-30 NOTE — NURSING NOTE
"Chief Complaint   Patient presents with     Ankle Pain     left achilles tendinitis. Onset: 10/13/17 started noticing at work without a specific event. Not a w/c claim.she has been in a short walking boot since 10/25/17 and working with weight bearing restrictions.        Initial Resp 16  Ht 1.575 m (5' 2\")  Wt 84.8 kg (187 lb)  BMI 34.2 kg/m2 Estimated body mass index is 34.2 kg/(m^2) as calculated from the following:    Height as of this encounter: 1.575 m (5' 2\").    Weight as of this encounter: 84.8 kg (187 lb).  Medication Reconciliation: complete   Jose Daniels PA-C, CAQ (Ortho)  Supervising Physician: Jose Alford M.D., M.S.  Dept. of Orthopaedic Surgery  [facility]      "

## 2017-10-30 NOTE — LETTER
[facility] [place]  [address]  [place] [id number] [phone]      WORKABILITY    [facility], Dr. Jose Alford M.D., DEONDRE Whitehead, Kanchan Johnson        10/30/2017      RE: Mily Grullon    [address] [address]        To whom it may concern:     Mily Grullon is under my professional care for   1. Achilles tendinitis of left lower extremity    .     Date of injury: 10/13/17.     Ms. Grullon can return to work WITH RESTRICTIONS: Sedentary duties only with intermittent standing/walking as necessary.  DURATION OF LIMITATIONS: 4 weeks      Next appointment: 1 month        Jose Daniels PA-C, CAQ (Ortho)  Supervising Physician: Jose Alford M.D., M.S.  Dept. of Orthopaedic Surgery  [facility]

## 2017-10-30 NOTE — PROGRESS NOTES
chief complaint:   Chief Complaint   Patient presents with     Ankle Pain     left achilles tendinitis. Onset: 10/13/17 started noticing at work without a specific event. Not a w/c claim.she has been in a short walking boot since 10/25/17 and working with weight bearing restrictions.        Mily Grullon is seen today in the [facility] for evaluation of left achilles tendonitis at the request of Kristen Kehr, PA-C     HISTORY OF PRESENT ILLNESS    Mily Grullon is a 37 year old female seen for evaluation of a left ankle pain that started on 10/13/2017, 2 weeks ago. No known injury. She started to notice the pain at work without a specific event.  Since that time, pain has been worsening. Her pain was so severe, she went to see her primary care physician on 10/25/2017. She was treated with a short walking boot and was put on weightbearing restrictions. Today her pain is severe, rated a 6/10. Pain with walking, standing, sitting. Been improving some in boot now.    Of note: this is not a work comp claim.      Present symptoms: moderate-severe pain, no swelling.    Symptoms occur walking and standing.    The frequency of symptoms: frequently.  Denies associated numbness or tingling.   Pain severity: 6/10  Pain quality: aching and sharp  Associated symptoms: none  Aggravating Factors: weightbearing  Relieving Factors: at rest, with brace    Treatment up to this point: short ankle boot  Has not tried: physical therapy   Prior history of related problems: none    Orthopedic PMH: history of left hip pain.    Other PMH:  has a past medical history of Endometriosis, site unspecified; Gastritis (2010); Urinary calculus, unspecified; and Wrist injury (Nov 19, 2003).  Patient Active Problem List    Diagnosis Date Noted     Abnormal gait 07/21/2016     Priority: Medium     Migraine without aura and without status migrainosus, not intractable 11/10/2015     Priority: Medium     Abnormality of gait  06/09/2014     Priority: Medium     Other postprocedural status(V45.89) 03/10/2014     Priority: Medium     Closed nondisplaced intertrochanteric fracture of left femur (H) 02/10/2014     Priority: Medium     Senile osteoporosis 12/10/2013     Priority: Medium     Headache 10/31/2011     Priority: Medium     Problem list name updated by automated process. Provider to review       Gastritis      Priority: Medium     dx 2010- sees Dr Glover in [facility]       CARDIOVASCULAR SCREENING; LDL GOAL LESS THAN 160 10/31/2010     Priority: Medium     Left elbow pain 01/08/2010     Priority: Medium     Narcotic agreement on file       Tobacco use disorder 12/17/2009     Priority: Medium       Surgical Hx:  has a past surgical history that includes REPAIR TRIANGULAR CART,WRIST JT (2005); REPAIR TRIANGULAR CART,WRIST JT (2007 or so); arthroscopy of joint unlisted (4/2004); REMOVAL OF OVARY/TUBE(S) (6/2003); hysteroscopy; lithotripsy; and Arthroscopy hip, osteoplasty femur proximal, combined (Left, 6/29/2016).    Medications:   Current Outpatient Prescriptions:      DULoxetine (CYMBALTA) 20 MG EC capsule, , Disp: , Rfl:      order for DME, Left boot, Disp: 1 Units, Rfl: 0     propranolol (INDERAL LA) 120 MG 24 hr capsule, TAKE ONE CAPSULE BY MOUTH TWICE A DAY, Disp: 60 capsule, Rfl: 0     nortriptyline (PAMELOR) 50 MG capsule, Take by mouth 2 times daily, Disp: , Rfl:      meclizine (ANTIVERT) 25 MG tablet, Take 1 tablet (25 mg) by mouth every 6 hours as needed for dizziness, Disp: 40 tablet, Rfl: 1     medroxyPROGESTERone (DEPO-PROVERA) 150 MG/ML injection, Inject 1 mL (150 mg) into the muscle every 3 months, Disp: 1 mL, Rfl: 3     acetaminophen (TYLENOL) 325 MG tablet, Take 2 tablets (650 mg) by mouth every 4 hours as needed for other (mild pain), Disp: 100 tablet, Rfl: 0     Pantoprazole Sodium (PROTONIX PO), Take 40 mg by mouth 2 times daily (before meals), Disp: , Rfl:      Prochlorperazine Maleate (COMPAZINE  PO), Take 10 mg by mouth every morning, Disp: , Rfl:     Allergies:   Allergies   Allergen Reactions     Amitriptyline Hives     Amoxicillin Diarrhea     Asa [Dihydroxyaluminum Aminoacetate]      Cipro [Ciprofloxacin]      Dizziness, vomiting, shortness of breath     Ibuprofen [Aspartame-Ibuprofen]      GI distress       Lyrica      extrematies swell up      Morphine      ithcy     Naproxen      GI distress     Neurontin [Gabapentin]      rash     Paxil [Paroxetine] Anaphylaxis     anaphylaxis     Phenergan [Promethazine Hcl]      dystonia     Prilosec [Omeprazole]      Rash, dizziness     Gabapentin Rash       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 Her smoking use included Cigarettes. She has never used smokeless tobacco. She reports that she drinks alcohol. She reports that she does not use illicit drugs.    Family Hx: family history includes CANCER in her paternal grandmother; Cardiovascular in her maternal grandfather; Genitourinary Problems in her maternal grandmother; HEART DISEASE in her maternal grandfather; Hypertension in her father and maternal grandmother; Lipids in her father. There is no history of Asthma, C.A.D., DIABETES, CEREBROVASCULAR DISEASE, Breast Cancer, Cancer - colorectal, Prostate Cancer, Alzheimer Disease, Arthritis, Blood Disease, Circulatory, Eye Disorder, GASTROINTESTINAL DISEASE, Musculoskeletal Disorder, Neurologic Disorder, Respiratory, or Thyroid Disease.    REVIEW OF SYSTEMS:    CONSTITUTIONAL:NEGATIVE for fever, chills, change in weight  INTEGUMENTARY/SKIN: NEGATIVE for worrisome rashes, moles or lesions  MUSCULOSKELETAL:See HPI above  NEURO: NEGATIVE for weakness, dizziness or paresthesias    This document serves as a record of the services and decisions personally performed and made by Jose Alford MD. It was created on his behalf by Ashlee Linn, a trained medical scribe. The creation of this document is based the provider's statements to the  "medical scribe.    Puneet Linn 10:32 AM 10/30/2017     PHYSICAL EXAM:  Resp 16  Ht 1.575 m (5' 2\")  Wt 84.8 kg (187 lb)  BMI 34.2 kg/m2   GENERAL APPEARANCE: healthy, alert, no distress  SKIN: no suspicious lesions or rashes  NEURO: Normal strength and tone, mentation intact and speech normal  PSYCH:  mentation appears normal and affect normal  RESPIRATORY: No increased work of breathing.    BILATERAL LOWER EXTREMITIES:  Gait: antalgic favoring left in boot.    Left lower extremity:  Left lower extremity weakness.  Intact sensation deep peroneal nerve, superficial peroneal nerve, med/lat tibial nerve, sural nerve, saphenous nerve  Intact EHL, EDL, TA, FHL, GS, quadriceps hamstrings and hip flexors  Toes warm and well perfused, brisk capillary refill. Palpable 2+ dp pulses.  calf soft and nttp or squeeze.  Edema: none    left ANKLE  Inspection: skin intact. No erythema or ecchymosis.   Swelling: none   Tender: distal achilles and achilles insertion  Range of Motion:grossly intact  Strength: intact  Tight heel cord, dorsiflexion to neutral with discomfort.      X-RAY:  3 views of the left ankle from 10/30/2017 were reviewed today. On my review, no obvious fractures or deformities.       Impression: 37 year old female with left ankle pain, left achilles tendonitis.    Plan:   Discussed with patient that exam and imaging studies consistent with achilles tendinosis, mostly insertional. Most cases respond well to nonoperative treatment.    * Rest  * Activity modification - avoid activities that aggravate symptoms.  * NSAIDS - regular use for inflammation, with food, as long as no contra-indications. Please discuss with pcp if needed.  * Ice twice daily to three times daily.  * immobilization: continue with short ankle boot with activities. Can take it off for gentle range of motion only. Typically long boot works better, but already has short boot and seems to be helping so continue with that.  * Elevation of " extremity to reduce swelling  * gentle heel cord stretching  * PT ordered for gentle heel cord stretching and range of motion exercises, modalities such as ultrasound as indicated. Then work on eccentric exercises.  * Tylenol as needed for pain  * Workability update: sedentary duties  * return to clinic 4 weeks, sooner if needed, for clinical recheck.    The information in this document, created by a scribe for me, accurately reflects the services I personally performed and the decisions made by me. I have reviewed and approved this document for accuracy.      Jose Alford M.D., M.S.  Dept. of Orthopaedic Surgery  [facility]

## 2017-10-30 NOTE — MR AVS SNAPSHOT
After Visit Summary   10/30/2017    Mily Grullon    MRN: [id number]           Patient Information     Date Of Birth          1980        Visit Information        Provider Department      10/30/2017 10:00 AM Jose Alford MD [facility]        Care Instructions    Please remember to call and schedule a follow up appointment if one was recommended at your earliest convenience.  Orthopedics CLINIC HOURS TELEPHONE NUMBER   Dr. Rocío Lawrence  Certified Medical Assistant   Monday & Wednesday   8am - 5pm  Thursday 1pm - 5pm  Friday 8am -11:30am Specialty schedulers:   [phone] to schedule your surgery.  Main Clinic:   [phone] to make an appointment with any provider.    Urgent Care locations:    [facility] Monday-Friday Closed  Saturday-Sunday 9am-5pm      Monday-Friday 12pm - 8pm  Saturday-Sunday 9am-5pm [phone]     If SURGERY has been recommended, please call our Specialty Schedulers at [phone] to schedule your procedure.    If you need a medication refill, please contact your pharmacy. Please allow 3 business days for your refill to be completed.    If an MRI or CT scan has been recommended, please call [facility] [facility] Schedulers at [phone] to schedule your appointment.  Use Favor (secure e-mail communication and access to your chart) to send a message or to make an appointment. Please ask how you can sign up for Favor.  Your care team's suggested websites for health information:   [email or web address] : Up to date and easily searchable information on multiple topics.   Www.health.[facility].[place].us : [place] dept of heat, public health issues in [place], N1N1              Follow-ups after your visit        Who to contact     If you have questions or need follow up information about today's clinic visit or your schedule please contact [facility] [place] directly at  "[phone].  Normal or non-critical lab and imaging results will be communicated to you by MyChart, letter or phone within 4 business days after the clinic has received the results. If you do not hear from us within 7 days, please contact the clinic through "[employer]"hart or phone. If you have a critical or abnormal lab result, we will notify you by phone as soon as possible.  Submit refill requests through [employer] or call your pharmacy and they will forward the refill request to us. Please allow 3 business days for your refill to be completed.          Additional Information About Your Visit        "[employer]"hart Information     [employer] lets you send messages to your doctor, view your test results, renew your prescriptions, schedule appointments and more. To sign up, go to [email or web address] . Click on \"Log in\" on the left side of the screen, which will take you to the Welcome page. Then click on \"Sign up Now\" on the right side of the page.     You will be asked to enter the access code listed below, as well as some personal information. Please follow the directions to create your username and password.     Your access code is: [email or web address]  Expires: 2017  3:37 PM     Your access code will  in 90 days. If you need help or a new code, please call your [place] clinic or [phone].        Care EveryWhere ID     This is your Care EveryWhere ID. This could be used by other organizations to access your [place] medical records  [id number]        Your Vitals Were     Respirations Height BMI (Body Mass Index)             16 1.575 m (5' 2\") 34.2 kg/m2          Blood Pressure from Last 3 Encounters:   10/25/17 132/74   17 126/79   17 127/70    Weight from Last 3 Encounters:   10/30/17 84.8 kg (187 lb)   10/25/17 85.3 kg (188 lb)   17 84.4 kg (186 lb)              Today, you had the following     No orders found for display       Primary Care Provider Office Phone # Fax #    Kristen M Kehr, PA-C " [phone]       [address] [facility] [address]        Equal Access to Services     JASSON VIZCARRA : Hadii aad ku hadhernán Bliss, wamontseda tyra, abel kaaidanda nithin, abdifatah eckert labenjamintonia patric. So [facility] [phone].    ATENCIÓN: Si habla español, tiene a lentz disposición servicios gratuitos de asistencia lingüística. Llame al [phone].    We comply with applicable federal civil rights laws and [place] laws. We do not discriminate on the basis of race, color, national origin, age, disability, sex, sexual orientation, or gender identity.            Thank you!     Thank you for choosing [facility]  for your care. Our goal is always to provide you with excellent care. Hearing back from our patients is one way we can continue to improve our services. Please take a few minutes to complete the written survey that you may receive in the mail after your visit with us. Thank you!             Your Updated Medication List - Protect others around you: Learn how to safely use, store and throw away your medicines at www.disposemymeds.org.          This list is accurate as of: 10/30/17 10:14 AM.  Always use your most recent med list.                   Brand Name Dispense Instructions for use Diagnosis    acetaminophen 325 MG tablet    TYLENOL    100 tablet    Take 2 tablets (650 mg) by mouth every 4 hours as needed for other (mild pain)    Orthopedic aftercare       COMPAZINE PO      Take 10 mg by mouth every morning        DULoxetine 20 MG EC capsule    CYMBALTA          meclizine 25 MG tablet    ANTIVERT    40 tablet    Take 1 tablet (25 mg) by mouth every 6 hours as needed for dizziness    Vertigo       medroxyPROGESTERone 150 MG/ML injection    DEPO-PROVERA    1 mL    Inject 1 mL (150 mg) into the muscle every 3 months    Encounter for surveillance of injectable contraceptive       nortriptyline 50 MG capsule    PAMELOR     Take by mouth 2 times  daily    Vertigo, Other fatigue, Weight gain       order for DME     1 Units    Left boot    Left Achilles tendinitis       propranolol 120 MG 24 hr capsule    INDERAL LA    60 capsule    TAKE ONE CAPSULE BY MOUTH TWICE A DAY    Migraine with aura and without status migrainosus, not intractable       PROTONIX PO      Take 40 mg by mouth 2 times daily (before meals)

## 2017-10-30 NOTE — PATIENT INSTRUCTIONS
Please remember to call and schedule a follow up appointment if one was recommended at your earliest convenience.  Orthopedics CLINIC HOURS TELEPHONE NUMBER   Dr. Rocío Lawrence  Certified Medical Assistant   Monday & Wednesday   8am - 5pm  Thursday 1pm - 5pm  Friday 8am -11:30am Specialty schedulers:   [phone] to schedule your surgery.  Main Clinic:   [phone] to make an appointment with any provider.    Urgent Care locations:    [facility] Monday-Friday Closed  Saturday-Sunday 9am-5pm      Monday-Friday 12pm - 8pm  Saturday-Sunday 9am-5pm [phone]     If SURGERY has been recommended, please call our Specialty Schedulers at [phone] to schedule your procedure.    If you need a medication refill, please contact your pharmacy. Please allow 3 business days for your refill to be completed.    If an MRI or CT scan has been recommended, please call [facility] Schedulers at [phone] to schedule your appointment.  Use [employer] (secure e-mail communication and access to your chart) to send a message or to make an appointment. Please ask how you can sign up for [employer].  Your care team's suggested websites for health information:   [email or web address] : Up to date and easily searchable information on multiple topics.   Www.health.[facility].[place].us : [place] dept of heat, public health issues in [place], N1N1

## 2017-11-14 ENCOUNTER — ALLIED HEALTH/NURSE VISIT (OUTPATIENT)
Dept: NURSING | Facility: CLINIC | Age: 37
End: 2017-11-14
Payer: COMMERCIAL

## 2017-11-14 DIAGNOSIS — Z30.42 SURVEILLANCE FOR DEPO-PROVERA CONTRACEPTION: Primary | ICD-10-CM

## 2017-11-14 PROCEDURE — 96372 THER/PROPH/DIAG INJ SC/IM: CPT

## 2017-11-14 PROCEDURE — 99207 ZZC NO CHARGE NURSE ONLY: CPT

## 2017-11-14 NOTE — PROGRESS NOTES
BP: Data Unavailable    LAST PAP/EXAM: Lab Results       Component                Value               Date                       PAP                      NIL                 08/30/2016            URINE HCG:not indicated    The following medication was given:     MEDICATION: Depo Provera 150mg  ROUTE: IM  SITE: Deltoid - Left  : Cycell  LOT #: [id number]  EXP:4/2019  NEXT INJECTION DUE: 1/30/18 - 2/13/18   Provider: Sabrina Mixon CMA

## 2017-11-14 NOTE — MR AVS SNAPSHOT
"              After Visit Summary   11/14/2017    Mily Grullon    MRN: [id number]           Patient Information     Date Of Birth          1980        Visit Information        Provider Department      11/14/2017 9:45 AM [facility]        Today's Diagnoses     Surveillance for Depo-Provera contraception    -  1       Follow-ups after your visit        Your next 10 appointments already scheduled     Nov 27, 2017 10:30 AM CST   New Visit with Jose Alford MD   [facility] [place] ([facility] [place])    [address]  [place] [id number] [phone]              Who to contact     If you have questions or need follow up information about today's clinic visit or your schedule please contact [facility] directly at [phone].  Normal or non-critical lab and imaging results will be communicated to you by MyChart, letter or phone within 4 business days after the clinic has received the results. If you do not hear from us within 7 days, please contact the clinic through MyChart or phone. If you have a critical or abnormal lab result, we will notify you by phone as soon as possible.  Submit refill requests through [employer] or call your pharmacy and they will forward the refill request to us. Please allow 3 business days for your refill to be completed.          Additional Information About Your Visit        MyChart Information     [employer] lets you send messages to your doctor, view your test results, renew your prescriptions, schedule appointments and more. To sign up, go to [email or web address] . Click on \"Log in\" on the left side of the screen, which will take you to the Welcome page. Then click on \"Sign up Now\" on the right side of the page.     You will be asked to enter the access code listed below, as well as some personal information. Please follow the directions to create your username and password.   "   Your access code is: [email or web address]  Expires: 2017  2:37 PM     Your access code will  in 90 days. If you need help or a new code, please call your [place] clinic or [phone].        Care EveryWhere ID     This is your Care EveryWhere ID. This could be used by other organizations to access your [place] medical records  [id number]         Blood Pressure from Last 3 Encounters:   10/25/17 132/74   17 126/79   17 127/70    Weight from Last 3 Encounters:   10/30/17 187 lb (84.8 kg)   10/25/17 188 lb (85.3 kg)   17 186 lb (84.4 kg)              We Performed the Following     INJECTION INTRAMUSCULAR OR SUB-Q     MEDROXYPROGESTERONE INJ        Primary Care Provider Office Phone # Fax #    Kristen M Kehr, PA-C [phone] [address] [address] [address]        Equal Access to Services     [facility] CenterPRASHANTH : Hadii aad ku hadasho Soomaali, waaxda luqadaha, qaybta kaalmada adeegyada, waxay idiin haylisandron susan fitzgerald . So [facility] [phone].    ATENCIÓN: Si habla español, tiene a lentz disposición servicios gratuitos de asistencia lingüística. LlProMedica Memorial Hospital [phone].    We comply with applicable federal civil rights laws and [place] laws. We do not discriminate on the basis of race, color, national origin, age, disability, sex, sexual orientation, or gender identity.            Thank you!     Thank you for choosing [facility]  for your care. Our goal is always to provide you with excellent care. Hearing back from our patients is one way we can continue to improve our services. Please take a few minutes to complete the written survey that you may receive in the mail after your visit with us. Thank you!             Your Updated Medication List - Protect others around you: Learn how to safely use, store and throw away your medicines at www.disposemymeds.org.          This list is accurate as of: 17 10:13 AM.  Always use your most recent med list.                    Brand Name Dispense Instructions for use Diagnosis    acetaminophen 325 MG tablet    TYLENOL    100 tablet    Take 2 tablets (650 mg) by mouth every 4 hours as needed for other (mild pain)    Orthopedic aftercare       COMPAZINE PO      Take 10 mg by mouth every morning        DULoxetine 20 MG EC capsule    CYMBALTA          meclizine 25 MG tablet    ANTIVERT    40 tablet    Take 1 tablet (25 mg) by mouth every 6 hours as needed for dizziness    Vertigo       medroxyPROGESTERone 150 MG/ML injection    DEPO-PROVERA    1 mL    Inject 1 mL (150 mg) into the muscle every 3 months    Encounter for surveillance of injectable contraceptive       nortriptyline 50 MG capsule    PAMELOR     Take by mouth 2 times daily    Vertigo, Other fatigue, Weight gain       order for DME     1 Units    Left boot    Left Achilles tendinitis       propranolol 120 MG 24 hr capsule    INDERAL LA    60 capsule    TAKE ONE CAPSULE BY MOUTH TWICE A DAY    Migraine with aura and without status migrainosus, not intractable       PROTONIX PO      Take 40 mg by mouth 2 times daily (before meals)

## 2017-11-15 ENCOUNTER — THERAPY VISIT (OUTPATIENT)
Dept: PHYSICAL THERAPY | Facility: CLINIC | Age: 37
End: 2017-11-15
Payer: COMMERCIAL

## 2017-11-15 ENCOUNTER — TELEPHONE (OUTPATIENT)
Dept: ORTHOPEDICS | Facility: CLINIC | Age: 37
End: 2017-11-15

## 2017-11-15 DIAGNOSIS — R26.9 ABNORMAL GAIT: Primary | ICD-10-CM

## 2017-11-15 DIAGNOSIS — M25.572 PAIN IN JOINT, ANKLE AND FOOT, LEFT: ICD-10-CM

## 2017-11-15 PROCEDURE — 97110 THERAPEUTIC EXERCISES: CPT | Mod: GP | Performed by: PHYSICAL THERAPIST

## 2017-11-15 PROCEDURE — 97116 GAIT TRAINING THERAPY: CPT | Mod: GP | Performed by: PHYSICAL THERAPIST

## 2017-11-15 PROCEDURE — 97161 PT EVAL LOW COMPLEX 20 MIN: CPT | Mod: GP | Performed by: PHYSICAL THERAPIST

## 2017-11-15 NOTE — PROGRESS NOTES
[facility] Initial Evaluation    Subjective:    Patient is a 37 year old female presenting with rehab left ankle/foot hpi.   Mily Grullon is a 37 year old female with a left ankle condition.  Condition occurred with:  Insidious onset.  Condition occurred: for unknown reasons.  This is a new condition  Insidious onset on/around 10/13/17. Has been walking in a boot since 10/25/17. Was doing better. Then about 10 days ago, she started getting increased pain again. Date of MD order for PT 10/30/17.    Patient reports pain:  Posterior and lateral.  Radiates to:  Other (also having anterior hip pain since walking in the boot).  Pain is described as aching and is intermittent (but more constant than before) and reported as 5/10.  Associated symptoms:  Edema and tingling (edema after work; tingling in arch of foot). Pain is the same all the time.  Symptoms are exacerbated by walking and standing and relieved by rest (laying down).  Since onset symptoms are gradually worsening.  Special tests:  X-ray (see chart).      General health as reported by patient is fair.  Pertinent medical history includes:  Osteoporosis, history of fractures, migraines and sleep disorder/apnea.  Medical allergies: yes (see chart).  Other surgeries include:  Orthopedic surgery (left hip X 2; left wrist X 2).  Current medications:  Anti-depressants and anti-inflammatory.  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  Primary job tasks include:  Prolonged standing.    Barriers include:  None as reported by patient.    Red flags:  None as reported by patient.                        Objective:      Gait:  Walks with left leg turned out and abducted, no knee flexion/extension; in short boot  Gait Type:  Antalgic               Ankle/Foot Evaluation  ROM:    AROM:    Dorsiflexion:   Left:   -5  Right:   0  Plantarflexion:  Left:  45    Right:  50          PROM:    Dorsiflexion:  Left:    -3 with pain in calf     Right:   5   Plantarflexion:  Left:    50 with ERP     Right:  55  Inversion:  Left:      WNL with ERP     Right:   WNL  Eversion: Left:   WNL with ERP     Right:  WNL          Strength:    Dorsiflexion:  Left: 4+/5     Pain:   Right: 5/5   Pain:  Plantarflexion: Left: 4/5   Pain:   Right: 4+/5  Pain:  Inversion:Left: 4/5   Pain:+     Right: 5/5  Pain:  Eversion:Left: 4/5   Pain:+  Right: 5/5  Pain:                  LIGAMENT TESTING: normal              SPECIAL TESTS: normal    PALPATION:     Left ankle tenderness not present at:   gastroc/soleus or achilles tendon  Right ankle tenderness present at:   gastroc/soleus and achilles tendon  EDEMA:   Left ankle edema present at: 1 cm larger than asymptomatic side        Figure 8 left: 1 cm larger than asymptomatic side  MOBILITY TESTING:       Talocrural Left: hypomobile    Talocrural Right: normal  Subtalar Left: hypomobile    Subtalar Right: normal           Lumbar/SI Evaluation  ROM:    AROM Lumbar:   Flexion:          To floor, increased numbness L foot  Ext:                    Mod loss   Side Bend:        Left:     Right:   Rotation:           Left:     Right:   Side Glide:        Left:  Min loss with end range stretch    Right:  Min loss with end range stretch                                                              Hip Evaluation  Hip PROM:    Flexion: Left: 115  Right:    Abduction: Left: 40   Right:    Internal Rotation: Left: 35   Right:  External Rotation: Left: 40   Right:              Hip Strength:        Abduction:  Left: 4/5     Pain:                  Hip Special Testing:      Left hip negative for the following special tests:  Roge or Fadir/Labrum                   General     ROS    Assessment/Plan:      Patient is a 37 year old female with left side ankle complaints.    Patient has the following significant findings  with corresponding treatment plan.                Diagnosis 1:  Acute on chronic achilles tendon pain; impaired gait - It appears that Taryn started to progress too quickly when her ankle started feeling better. A hasty return to weight bearing without her boot and faulty gait mechanics since returning to her boot seem to be the cause of her return of pain and the new symptom of left plantar surface numbness. I recommend that she use 1 crutch to correct her gait if pain is not tolerable walking in the boot with correct mechanics.  Pain -  US, manual therapy, education and home program  Decreased ROM/flexibility - manual therapy, therapeutic exercise and home program  Decreased joint mobility - manual therapy, therapeutic exercise and home program  Decreased strength - therapeutic exercise, therapeutic activities and home program  Impaired balance - neuro re-education, therapeutic activities and home program  Impaired gait - gait training and home program  Impaired muscle performance - neuro re-education and home program    Therapy Evaluation Codes:   1) History comprised of:   Personal factors that impact the plan of care:      Anxiety, Cognition and Past/current experiences.    Comorbidity factors that impact the plan of care are:      None.     Medications impacting care: None.  2) Examination of Body Systems comprised of:   Body structures and functions that impact the plan of care:      Ankle, Hip and Lumbar spine.   Activity limitations that impact the plan of care are:      Standing, Walking and Working.  3) Clinical presentation characteristics are:   Evolving/Changing.  4) Decision-Making    Moderate complexity using standardized patient assessment instrument and/or measureable assessment of functional outcome.  Cumulative Therapy Evaluation is: Moderate complexity.    Previous and current functional limitations:  (See Goal Flow Sheet for this information)    Short term and Long term goals: (See Goal Flow  Sheet for this information)     Communication ability:  Patient appears to be able to clearly communicate and understand verbal and written communication and follow directions correctly.  Treatment Explanation - The following has been discussed with the patient:   RX ordered/plan of care  Anticipated outcomes  Possible risks and side effects  This patient would benefit from PT intervention to resume normal activities.   Rehab potential is good.    Frequency:  1 X week, once daily  Duration:  for 6 weeks  Discharge Plan:  Achieve all LTG.  Independent in home treatment program.  Reach maximal therapeutic benefit.    Please refer to the daily flowsheet for treatment today, total treatment time and time spent performing 1:1 timed codes.

## 2017-11-15 NOTE — MR AVS SNAPSHOT
After Visit Summary   11/15/2017    Mily Grullon    MRN: [id number]           Patient Information     Date Of Birth          1980        Visit Information        Provider Department      11/15/2017 1:20 PM Todd Souza, PT [facility] [place] [facility]        Today's Diagnoses     Abnormal gait    -  1    Pain in joint, ankle and foot, left           Follow-ups after your visit        Your next 10 appointments already scheduled     Nov 27, 2017 10:30 AM CST   New Visit with Jose Alford MD   [facility] [place] ([facility] [place])    [address]  [place] [address]   [phone]            Nov 29, 2017  4:00 PM CST   CLEO Extremity with Ramsey Gamboa PTA   [facility] [place] [facility] ([facility] [place])    [address]  [place] [address]   [phone]            Dec 05, 2017  9:20 AM CST   CLEO Extremity with Todd Souza, PT   [facility] [place] [facility] ([facility] [place])    [address]  [place] [address]   [phone]            Dec 12, 2017  9:20 AM CST   CLEO Extremity with Todd Souza PT   [facility] [place] [facility] ([facility] [place])    [address]  [place] [address]   [phone]            Dec 19, 2017  9:20 AM CST   CLEO Extremity with Todd Souza PT   [facility] [place] [facility] ([facility] [place])    [address]  [place] [address]   [phone]            Dec 26, 2017  9:20 AM CST   CLEO Extremity with Todd Souza PT   [facility] [place] [facility] ([facility] [place])    [address]  [place] [address]   [phone]              Who to contact     If you have questions or need follow up information about today's clinic visit or your schedule please contact [facility]  "directly at [phone].  Normal or non-critical lab and imaging results will be communicated to you by [employer], letter or phone within 4 business days after the clinic has received the results. If you do not hear from us within 7 days, please contact the clinic through [employer] or phone. If you have a critical or abnormal lab result, we will notify you by phone as soon as possible.  Submit refill requests through Mogi or call your pharmacy and they will forward the refill request to us. Please allow 3 business days for your refill to be completed.          Additional Information About Your Visit        [employer] Information     Mogi lets you send messages to your doctor, view your test results, renew your prescriptions, schedule appointments and more. To sign up, go to [email or web address] . Click on \"Log in\" on the left side of the screen, which will take you to the Welcome page. Then click on \"Sign up Now\" on the right side of the page.     You will be asked to enter the access code listed below, as well as some personal information. Please follow the directions to create your username and password.     Your access code is: [email or web address]  Expires: 2017  2:37 PM     Your access code will  in 90 days. If you need help or a new code, please call your [facility] clinic or [phone].        Care EveryWhere ID     This is your Care EveryWhere ID. This could be used by other organizations to access your [facility] medical records  [id number]         Blood Pressure from Last 3 Encounters:   10/25/17 132/74   17 126/79   17 127/70    Weight from Last 3 Encounters:   10/30/17 84.8 kg (187 lb)   10/25/17 85.3 kg (188 lb)   17 84.4 kg (186 lb)              We Performed the Following     GAIT TRAINING THERAPY     HC PT EVAL, LOW COMPLEXITY     CLEO INITIAL EVAL REPORT     THERAPEUTIC EXERCISES        Primary Care Provider Office Phone # Fax #    Kristen M Kehr, PA-C [phone] " [phone]       [address] [address]        Equal Access to Services     JASSON VIZCARRA : Hadii aad ku hadhernán Bliss, wamontseda deedeeanne, nehata kakeri chelyreba, abdifatah jer ayahtonia mokeira yaniracruzharpreet spivey. So [facility] [phone].    ATENCIÓN: Si habla español, tiene a lentz disposición servicios gratuitos de asistencia lingüística. Yonisame al [phone].    We comply with applicable federal civil rights laws and [place] laws. We do not discriminate on the basis of race, color, national origin, age, disability, sex, sexual orientation, or gender identity.            Thank you!     Thank you for choosing [facility]  for your care. Our goal is always to provide you with excellent care. Hearing back from our patients is one way we can continue to improve our services. Please take a few minutes to complete the written survey that you may receive in the mail after your visit with us. Thank you!             Your Updated Medication List - Protect others around you: Learn how to safely use, store and throw away your medicines at www.disposemymeds.org.          This list is accurate as of: 11/15/17 11:59 PM.  Always use your most recent med list.                   Brand Name Dispense Instructions for use Diagnosis    acetaminophen 325 MG tablet    TYLENOL    100 tablet    Take 2 tablets (650 mg) by mouth every 4 hours as needed for other (mild pain)    Orthopedic aftercare       COMPAZINE PO      Take 10 mg by mouth every morning        DULoxetine 20 MG EC capsule    CYMBALTA          meclizine 25 MG tablet    ANTIVERT    40 tablet    Take 1 tablet (25 mg) by mouth every 6 hours as needed for dizziness    Vertigo       medroxyPROGESTERone 150 MG/ML injection    DEPO-PROVERA    1 mL    Inject 1 mL (150 mg) into the muscle every 3 months    Encounter for surveillance of injectable contraceptive       nortriptyline 50 MG capsule    PAMELOR     Take by mouth 2 times daily     Vertigo, Other fatigue, Weight gain       order for DME     1 Units    Left boot    Left Achilles tendinitis       propranolol 120 MG 24 hr capsule    INDERAL LA    60 capsule    TAKE ONE CAPSULE BY MOUTH TWICE A DAY    Migraine with aura and without status migrainosus, not intractable       PROTONIX PO      Take 40 mg by mouth 2 times daily (before meals)

## 2017-11-15 NOTE — TELEPHONE ENCOUNTER
Reason for Call:  Other call back    Detailed comments: Patient has stated she did not start physical therapy as instructed, and now having burning and tingling feelings, patient would like to discuss the next steps    Phone Number Patient can be reached at: Home number on file [phone] (home)    Best Time: today,     Can we leave a detailed message on this number? YES    Call taken on 11/15/2017 at 10:33 AM by Leola Conklin

## 2017-11-15 NOTE — TELEPHONE ENCOUNTER
Called and spoke to patient. Answered all questions to patient's satisfaction.   Melinda Richardson Certified Medical Assistant

## 2017-11-27 ENCOUNTER — OFFICE VISIT (OUTPATIENT)
Dept: ORTHOPEDICS | Facility: CLINIC | Age: 37
End: 2017-11-27
Payer: COMMERCIAL

## 2017-11-27 VITALS — BODY MASS INDEX: 34.41 KG/M2 | RESPIRATION RATE: 16 BRPM | HEIGHT: 62 IN | WEIGHT: 187 LBS

## 2017-11-27 DIAGNOSIS — M76.62 ACHILLES TENDINITIS, LEFT LEG: Primary | ICD-10-CM

## 2017-11-27 PROCEDURE — 99212 OFFICE O/P EST SF 10 MIN: CPT | Performed by: ORTHOPAEDIC SURGERY

## 2017-11-27 ASSESSMENT — PAIN SCALES - GENERAL: PAINLEVEL: MODERATE PAIN (4)

## 2017-11-27 NOTE — LETTER
[facility]  [address]  [place] [address]  Phone: [phone]  Fax: [phone]    November 27, 2017        Mily Grullon  [address] [address]        To whom it may concern:     Mily Grullon is under my professional care for   1. Achilles tendinitis of left lower extremity    .     Date of injury: 10/13/17.     Ms. Grullon can return to work WITH RESTRICTIONS: Sedentary duties only with intermittent standing/walking as necessary.  DURATION OF LIMITATIONS: 4 weeks      Next appointment: 1 month        Jose Daniels PA-C, CAQ (Ortho)  Supervising Physician: Jose Alford M.D., M.S.  Dept. of Orthopaedic Surgery  [facility]

## 2017-11-27 NOTE — PATIENT INSTRUCTIONS
Please remember to call and schedule a follow up appointment if one was recommended at your earliest convenience.  Orthopedics CLINIC HOURS TELEPHONE NUMBER   Dr. Rocío Lawrence  Certified Medical Assistant   Monday & Wednesday   8am - 5pm  Thursday 1pm - 5pm  Friday 8am -11:30am Specialty schedulers:   [phone] to schedule your surgery.  Main Clinic:   [phone] to make an appointment with any provider.    Urgent Care locations:    [facility] Monday-Friday Closed  Saturday-Sunday 9am-5pm      Monday-Friday 12pm - 8pm  Saturday-Sunday 9am-5pm [phone]     If SURGERY has been recommended, please call our Specialty Schedulers at [phone] to schedule your procedure.    If you need a medication refill, please contact your pharmacy. Please allow 3 business days for your refill to be completed.    If an MRI or CT scan has been recommended, please call [facility] Schedulers at [phone] to schedule your appointment.  Use [email or web address]@ (secure e-mail communication and access to your chart) to send a message or to make an appointment. Please ask how you can sign up for [email or web address]@.  Your care team's suggested websites for health information:   [email or web address] : Up to date and easily searchable information on multiple topics.   [email or web address] : [place] dept of heat, public health issues in [place], N1N1

## 2017-11-27 NOTE — NURSING NOTE
"Chief Complaint   Patient presents with     RECHECK     Left achilles tendinitis. DOI 10/13/17. Patient is present using one crutch in short boot. Patient states her achilles is doing a little bit better. She has been using a crutch for a little over a week, PT told her to use it. She has only been to PT one time.        Initial Resp 16  Ht 1.575 m (5' 2\")  Wt 84.8 kg (187 lb)  BMI 34.2 kg/m2 Estimated body mass index is 34.2 kg/(m^2) as calculated from the following:    Height as of this encounter: 1.575 m (5' 2\").    Weight as of this encounter: 84.8 kg (187 lb).  Medication Reconciliation: irvin Richardson Certified Medical Assistant    "

## 2017-11-27 NOTE — LETTER
11/27/2017         RE: Mily Grullon  [address] [facility] [address]        Dear Colleague,    Thank you for referring your patient, Mily Grullon, to the [facility]. Please see a copy of my visit note below.    chief complaint:   Chief Complaint   Patient presents with     RECHECK     Left achilles tendinitis. DOI 10/13/17. Patient is present using one crutch in short boot. Patient states her achilles is doing a little bit better. She has been using a crutch for a little over a week, PT told her to use it. She has only been to PT one time.        HISTORY OF PRESENT ILLNESS    Mily Grullon is a 37 year old female seen for follow up evaluation of a left ankle pain that started on 10/13/2017, 6 weeks ago. No known injury. She started to notice the pain at work without a specific event.  Since that time, pain had improved, but since again now has been worsening. After last visit she states she was doing better, but then thinks she may have overdone it and the pain worsened. Her pain was so severe, she went to see her primary care physician on 10/25/2017. She returns today using 1 crutch in a short boot.  She has been using the one crutch for over a little over a week per physical therapist recommendation. She has been to physical therapy one time.       Of note: this is not a work comp claim.      Present symptoms: moderate pain, no swelling.    Symptoms occur walking and standing.    The frequency of symptoms: frequently.  Denies associated numbness or tingling.   Pain severity: 4/10  Pain quality: aching and sharp  Associated symptoms: none  Aggravating Factors: weightbearing  Relieving Factors: at rest, with brace, 1 crutch    Treatment up to this point: short ankle boot, 1 crutch, physical therapy   Has not tried: none  Prior history of related problems: none    Orthopedic PMH: history of left hip pain.    Other PMH:  has a past medical history of Endometriosis, site  unspecified; Gastritis (2010); Urinary calculus, unspecified; and Wrist injury (Nov 19, 2003).  Patient Active Problem List    Diagnosis Date Noted     Pain in joint, ankle and foot, left 11/15/2017     Priority: Medium     Abnormal gait 07/21/2016     Priority: Medium     Migraine without aura and without status migrainosus, not intractable 11/10/2015     Priority: Medium     Abnormality of gait 06/09/2014     Priority: Medium     Other postprocedural status(V45.89) 03/10/2014     Priority: Medium     Closed nondisplaced intertrochanteric fracture of left femur (H) 02/10/2014     Priority: Medium     Senile osteoporosis 12/10/2013     Priority: Medium     Headache 10/31/2011     Priority: Medium     Problem list name updated by automated process. Provider to review       Gastritis      Priority: Medium     dx 2010- sees Dr Glover in [facility]       CARDIOVASCULAR SCREENING; LDL GOAL LESS THAN 160 10/31/2010     Priority: Medium     Left elbow pain 01/08/2010     Priority: Medium     Narcotic agreement on file       Tobacco use disorder 12/17/2009     Priority: Medium       Surgical Hx:  has a past surgical history that includes REPAIR TRIANGULAR CART,WRIST JT (2005); REPAIR TRIANGULAR CART,WRIST JT (2007 or so); arthroscopy of joint unlisted (4/2004); REMOVAL OF OVARY/TUBE(S) (6/2003); hysteroscopy; lithotripsy; and Arthroscopy hip, osteoplasty femur proximal, combined (Left, 6/29/2016).    Medications:   Current Outpatient Prescriptions:      DULoxetine (CYMBALTA) 20 MG EC capsule, , Disp: , Rfl:      order for DME, Left boot, Disp: 1 Units, Rfl: 0     nortriptyline (PAMELOR) 50 MG capsule, Take by mouth 2 times daily, Disp: , Rfl:      medroxyPROGESTERone (DEPO-PROVERA) 150 MG/ML injection, Inject 1 mL (150 mg) into the muscle every 3 months, Disp: 1 mL, Rfl: 3     acetaminophen (TYLENOL) 325 MG tablet, Take 2 tablets (650 mg) by mouth every 4 hours as needed for other (mild pain), Disp: 100 tablet,  Rfl: 0     propranolol (INDERAL LA) 120 MG 24 hr capsule, TAKE ONE CAPSULE BY MOUTH TWICE A DAY (Patient not taking: Reported on 11/27/2017), Disp: 60 capsule, Rfl: 0     meclizine (ANTIVERT) 25 MG tablet, Take 1 tablet (25 mg) by mouth every 6 hours as needed for dizziness (Patient not taking: Reported on 11/27/2017), Disp: 40 tablet, Rfl: 1     Pantoprazole Sodium (PROTONIX PO), Take 40 mg by mouth 2 times daily (before meals), Disp: , Rfl:      Prochlorperazine Maleate (COMPAZINE PO), Take 10 mg by mouth every morning, Disp: , Rfl:     Allergies:   Allergies   Allergen Reactions     Amitriptyline Hives     Amoxicillin Diarrhea     Asa [Dihydroxyaluminum Aminoacetate]      Cipro [Ciprofloxacin]      Dizziness, vomiting, shortness of breath     Ibuprofen [Aspartame-Ibuprofen]      GI distress       Lyrica      extrematies swell up      Morphine      ithcy     Naproxen      GI distress     Neurontin [Gabapentin]      rash     Paxil [Paroxetine] Anaphylaxis     anaphylaxis     Phenergan [Promethazine Hcl]      dystonia     Prilosec [Omeprazole]      Rash, dizziness     Gabapentin Rash       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 Her smoking use included Cigarettes. She has never used smokeless tobacco. She reports that she drinks alcohol. She reports that she does not use illicit drugs.    Family Hx: family history includes CANCER in her paternal grandmother; Cardiovascular in her maternal grandfather; Genitourinary Problems in her maternal grandmother; HEART DISEASE in her maternal grandfather; Hypertension in her father and maternal grandmother; Lipids in her father. There is no history of Asthma, C.A.D., DIABETES, CEREBROVASCULAR DISEASE, Breast Cancer, Cancer - colorectal, Prostate Cancer, Alzheimer Disease, Arthritis, Blood Disease, Circulatory, Eye Disorder, GASTROINTESTINAL DISEASE, Musculoskeletal Disorder, Neurologic Disorder, Respiratory, or Thyroid Disease.    REVIEW OF  "SYSTEMS:    CONSTITUTIONAL:NEGATIVE for fever, chills, change in weight  INTEGUMENTARY/SKIN: NEGATIVE for worrisome rashes, moles or lesions  MUSCULOSKELETAL:See HPI above  NEURO: NEGATIVE for weakness, dizziness or paresthesias    This document serves as a record of the services and decisions personally performed and made by Jose Alford MD. It was created on his behalf by Ashlee Linn, a trained medical scribe. The creation of this document is based the provider's statements to the medical scribe.    Scribe Ashlee Linn 10:47 AM 11/27/2017      PHYSICAL EXAM:  Resp 16  Ht 1.575 m (5' 2\")  Wt 84.8 kg (187 lb)  BMI 34.2 kg/m2   GENERAL APPEARANCE: healthy, alert, no distress  SKIN: no suspicious lesions or rashes  NEURO: Normal strength and tone, mentation intact and speech normal  PSYCH:  mentation appears normal and affect normal  RESPIRATORY: No increased work of breathing.    BILATERAL LOWER EXTREMITIES:  Gait: antalgic favoring left in boot with 1 crutch.    Left lower extremity:  Left lower extremity weakness.  Intact sensation deep peroneal nerve, superficial peroneal nerve, med/lat tibial nerve, sural nerve, saphenous nerve  Intact EHL, EDL, TA, FHL, GS, quadriceps hamstrings and hip flexors  Toes warm and well perfused, brisk capillary refill. Palpable 2+ dp pulses.  calf soft and nttp or squeeze.  Edema: none    left ANKLE  Inspection: skin intact. No erythema or ecchymosis.   Swelling: none   Tender: distal achilles and achilles insertion, calcaneus/heel  Positive calcaneal squeeze.  Range of Motion:grossly intact  Strength: intact  Tight heel cord, dorsiflexion to neutral with discomfort.      X-RAY:  no new x-rays  3 views of the left ankle from 10/30/2017 were reviewed today. On my review, no obvious fractures or deformities. Sclerotic band of calcaneus.      Impression: 37 year old female with left ankle pain, left achilles tendonitis, possible stress reaction of achilles.    Plan:   Discussed with " patient that exam and imaging studies consistent with achilles tendinosis, mostly insertional. Most cases respond well to nonoperative treatment.  * cannot rule out calcaneous stress reaction or fracture not seen on x-ray. Given longstanding left lower extremity issues over the past years, followed by increased activities, cannot rule out stress reaction.  * would recommend an MRI to further deduce diagnosis. Patient elects not to continue with MRI as treatment would be the same either way.  * treatment would mostly be the same.    * Rest  * Activity modification - avoid activities that aggravate symptoms.  * NSAIDS - regular use for inflammation, with food, as long as no contra-indications. Please discuss with pcp if needed.  * Ice twice daily to three times daily.  * immobilization: continue with short ankle boot with activities, use 1 or 2 crutches.   * Elevation of extremity to reduce swelling  * gentle heel cord stretching  * Continue physical therapy for gentle heel cord stretching and range of motion exercises, modalities such as ultrasound as indicated. Then work on eccentric exercises.  * Tylenol as needed for pain  * Workability update: sedentary duties  * return to clinic 4 weeks, sooner if needed, for clinical recheck.    The information in this document, created by a scribe for me, accurately reflects the services I personally performed and the decisions made by me. I have reviewed and approved this document for accuracy.      Jose Alford M.D., M.S.  Dept. of Orthopaedic Surgery  [facility]          Again, thank you for allowing me to participate in the care of your patient.        Sincerely,        Jose Alford MD

## 2017-11-27 NOTE — PROGRESS NOTES
chief complaint:   Chief Complaint   Patient presents with     RECHECK     Left achilles tendinitis. DOI 10/13/17. Patient is present using one crutch in short boot. Patient states her achilles is doing a little bit better. She has been using a crutch for a little over a week, PT told her to use it. She has only been to PT one time.        HISTORY OF PRESENT ILLNESS    Mily Grullon is a 37 year old female seen for follow up evaluation of a left ankle pain that started on 10/13/2017, 6 weeks ago. No known injury. She started to notice the pain at work without a specific event.  Since that time, pain had improved, but since again now has been worsening. After last visit she states she was doing better, but then thinks she may have overdone it and the pain worsened. Her pain was so severe, she went to see her primary care physician on 10/25/2017. She returns today using 1 crutch in a short boot.  She has been using the one crutch for over a little over a week per physical therapist recommendation. She has been to physical therapy one time.       Of note: this is not a work comp claim.      Present symptoms: moderate pain, no swelling.    Symptoms occur walking and standing.    The frequency of symptoms: frequently.  Denies associated numbness or tingling.   Pain severity: 4/10  Pain quality: aching and sharp  Associated symptoms: none  Aggravating Factors: weightbearing  Relieving Factors: at rest, with brace, 1 crutch    Treatment up to this point: short ankle boot, 1 crutch, physical therapy   Has not tried: none  Prior history of related problems: none    Orthopedic PMH: history of left hip pain.    Other PMH:  has a past medical history of Endometriosis, site unspecified; Gastritis (2010); Urinary calculus, unspecified; and Wrist injury (Nov 19, 2003).  Patient Active Problem List    Diagnosis Date Noted     Pain in joint, ankle and foot, left 11/15/2017     Priority: Medium     Abnormal gait 07/21/2016      Priority: Medium     Migraine without aura and without status migrainosus, not intractable 11/10/2015     Priority: Medium     Abnormality of gait 06/09/2014     Priority: Medium     Other postprocedural status(V45.89) 03/10/2014     Priority: Medium     Closed nondisplaced intertrochanteric fracture of left femur (H) 02/10/2014     Priority: Medium     Senile osteoporosis 12/10/2013     Priority: Medium     Headache 10/31/2011     Priority: Medium     Problem list name updated by automated process. Provider to review       Gastritis      Priority: Medium     dx 2010- sees Dr Glover in [facility]       CARDIOVASCULAR SCREENING; LDL GOAL LESS THAN 160 10/31/2010     Priority: Medium     Left elbow pain 01/08/2010     Priority: Medium     Narcotic agreement on file       Tobacco use disorder 12/17/2009     Priority: Medium       Surgical Hx:  has a past surgical history that includes REPAIR TRIANGULAR CART,WRIST JT (2005); REPAIR TRIANGULAR CART,WRIST JT (2007 or so); arthroscopy of joint unlisted (4/2004); REMOVAL OF OVARY/TUBE(S) (6/2003); hysteroscopy; lithotripsy; and Arthroscopy hip, osteoplasty femur proximal, combined (Left, 6/29/2016).    Medications:   Current Outpatient Prescriptions:      DULoxetine (CYMBALTA) 20 MG EC capsule, , Disp: , Rfl:      order for DME, Left boot, Disp: 1 Units, Rfl: 0     nortriptyline (PAMELOR) 50 MG capsule, Take by mouth 2 times daily, Disp: , Rfl:      medroxyPROGESTERone (DEPO-PROVERA) 150 MG/ML injection, Inject 1 mL (150 mg) into the muscle every 3 months, Disp: 1 mL, Rfl: 3     acetaminophen (TYLENOL) 325 MG tablet, Take 2 tablets (650 mg) by mouth every 4 hours as needed for other (mild pain), Disp: 100 tablet, Rfl: 0     propranolol (INDERAL LA) 120 MG 24 hr capsule, TAKE ONE CAPSULE BY MOUTH TWICE A DAY (Patient not taking: Reported on 11/27/2017), Disp: 60 capsule, Rfl: 0     meclizine (ANTIVERT) 25 MG tablet, Take 1 tablet (25 mg) by mouth every 6 hours  as needed for dizziness (Patient not taking: Reported on 11/27/2017), Disp: 40 tablet, Rfl: 1     Pantoprazole Sodium (PROTONIX PO), Take 40 mg by mouth 2 times daily (before meals), Disp: , Rfl:      Prochlorperazine Maleate (COMPAZINE PO), Take 10 mg by mouth every morning, Disp: , Rfl:     Allergies:   Allergies   Allergen Reactions     Amitriptyline Hives     Amoxicillin Diarrhea     Asa [Dihydroxyaluminum Aminoacetate]      Cipro [Ciprofloxacin]      Dizziness, vomiting, shortness of breath     Ibuprofen [Aspartame-Ibuprofen]      GI distress       Lyrica      extrematies swell up      Morphine      ithcy     Naproxen      GI distress     Neurontin [Gabapentin]      rash     Paxil [Paroxetine] Anaphylaxis     anaphylaxis     Phenergan [Promethazine Hcl]      dystonia     Prilosec [Omeprazole]      Rash, dizziness     Gabapentin Rash       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 Her smoking use included Cigarettes. She has never used smokeless tobacco. She reports that she drinks alcohol. She reports that she does not use illicit drugs.    Family Hx: family history includes CANCER in her paternal grandmother; Cardiovascular in her maternal grandfather; Genitourinary Problems in her maternal grandmother; HEART DISEASE in her maternal grandfather; Hypertension in her father and maternal grandmother; Lipids in her father. There is no history of Asthma, C.A.D., DIABETES, CEREBROVASCULAR DISEASE, Breast Cancer, Cancer - colorectal, Prostate Cancer, Alzheimer Disease, Arthritis, Blood Disease, Circulatory, Eye Disorder, GASTROINTESTINAL DISEASE, Musculoskeletal Disorder, Neurologic Disorder, Respiratory, or Thyroid Disease.    REVIEW OF SYSTEMS:    CONSTITUTIONAL:NEGATIVE for fever, chills, change in weight  INTEGUMENTARY/SKIN: NEGATIVE for worrisome rashes, moles or lesions  MUSCULOSKELETAL:See HPI above  NEURO: NEGATIVE for weakness, dizziness or paresthesias    This document serves  "as a record of the services and decisions personally performed and made by Jose Alford MD. It was created on his behalf by Ashlee Linn, a trained medical scribe. The creation of this document is based the provider's statements to the medical scribe.    Puneet Linn 10:47 AM 11/27/2017      PHYSICAL EXAM:  Resp 16  Ht 1.575 m (5' 2\")  Wt 84.8 kg (187 lb)  BMI 34.2 kg/m2   GENERAL APPEARANCE: healthy, alert, no distress  SKIN: no suspicious lesions or rashes  NEURO: Normal strength and tone, mentation intact and speech normal  PSYCH:  mentation appears normal and affect normal  RESPIRATORY: No increased work of breathing.    BILATERAL LOWER EXTREMITIES:  Gait: antalgic favoring left in boot with 1 crutch.    Left lower extremity:  Left lower extremity weakness.  Intact sensation deep peroneal nerve, superficial peroneal nerve, med/lat tibial nerve, sural nerve, saphenous nerve  Intact EHL, EDL, TA, FHL, GS, quadriceps hamstrings and hip flexors  Toes warm and well perfused, brisk capillary refill. Palpable 2+ dp pulses.  calf soft and nttp or squeeze.  Edema: none    left ANKLE  Inspection: skin intact. No erythema or ecchymosis.   Swelling: none   Tender: distal achilles and achilles insertion, calcaneus/heel  Positive calcaneal squeeze.  Range of Motion:grossly intact  Strength: intact  Tight heel cord, dorsiflexion to neutral with discomfort.      X-RAY:  no new x-rays  3 views of the left ankle from 10/30/2017 were reviewed today. On my review, no obvious fractures or deformities. Sclerotic band of calcaneus.      Impression: 37 year old female with left ankle pain, left achilles tendonitis, possible stress reaction of achilles.    Plan:   Discussed with patient that exam and imaging studies consistent with achilles tendinosis, mostly insertional. Most cases respond well to nonoperative treatment.  * cannot rule out calcaneous stress reaction or fracture not seen on x-ray. Given longstanding left lower " extremity issues over the past years, followed by increased activities, cannot rule out stress reaction.  * would recommend an MRI to further deduce diagnosis. Patient elects not to continue with MRI as treatment would be the same either way.  * treatment would mostly be the same.    * Rest  * Activity modification - avoid activities that aggravate symptoms.  * NSAIDS - regular use for inflammation, with food, as long as no contra-indications. Please discuss with pcp if needed.  * Ice twice daily to three times daily.  * immobilization: continue with short ankle boot with activities, use 1 or 2 crutches.   * Elevation of extremity to reduce swelling  * gentle heel cord stretching  * Continue physical therapy for gentle heel cord stretching and range of motion exercises, modalities such as ultrasound as indicated. Then work on eccentric exercises.  * Tylenol as needed for pain  * Workability update: sedentary duties  * return to clinic 4 weeks, sooner if needed, for clinical recheck.    The information in this document, created by a scribe for me, accurately reflects the services I personally performed and the decisions made by me. I have reviewed and approved this document for accuracy.      Jose Alford M.D., M.S.  Dept. of Orthopaedic Surgery  [facility]

## 2017-11-27 NOTE — MR AVS SNAPSHOT
After Visit Summary   11/27/2017    Mily Grullon    MRN: [id number]           Patient Information     Date Of Birth          1980        Visit Information        Provider Department      11/27/2017 10:30 AM Jose Alford MD [facility] [place] [facility] Instructions    Please remember to call and schedule a follow up appointment if one was recommended at your earliest convenience.  Orthopedics CLINIC HOURS TELEPHONE NUMBER   Dr. Rocío Lawrence  Certified Medical Assistant   Monday & Wednesday   8am - 5pm  Thursday 1pm - 5pm  Friday 8am -11:30am Specialty schedulers:   [phone] to schedule your surgery.  Main Clinic:   [phone] to make an appointment with any provider.    Urgent Care locations:    [facility] Monday-Friday Closed  Saturday-Sunday 9am-5pm      Monday-Friday 12pm - 8pm  Saturday-Sunday 9am-5pm [phone]     If SURGERY has been recommended, please call our Specialty Schedulers at [phone] to schedule your procedure.    If you need a medication refill, please contact your pharmacy. Please allow 3 business days for your refill to be completed.    If an MRI or CT scan has been recommended, please call [facility] [facility] Schedulers at [phone] to schedule your appointment.  Use better.t (secure e-mail communication and access to your chart) to send a message or to make an appointment. Please ask how you can sign up for Endosense.  Your care team's suggested websites for health information:   [email or web address] : Up to date and easily searchable information on multiple topics.   [email or web address] : [place] dept of heatl, public health issues in [place], N1N1              Follow-ups after your visit        Your next 10 appointments already scheduled     Nov 29, 2017  4:00 PM CST   CLEO Extremity with Ramsey Gamboa \[facility] [place] [facility]\""   [facility] [place] [facility] ([facility])     "[address]  [place] [address]   [phone]            Dec 05, 2017  9:20 AM CST   CLEO Extremity with Todd Souza, PT   [facility] [place] [facility] ([facility] [place])    [address]  [place] [address]   [phone]            Dec 12, 2017  9:20 AM CST   CLEO Extremity with Todd Souza, PT   [facility] [place] [facility] ([facility] [place])    [address]  [place] [address]   [phone]            Dec 19, 2017  9:20 AM CST   CLEO Extremity with Todd Souza, PT   [facility] [place] [facility] ([facility] [place])    [address]  [place] [address]   [phone]            Dec 26, 2017  9:20 AM CST   CLEO Extremity with Todd Souza, PT   [facility] [place] [facility] ([facility] [place])    [address]  [place] [address]   [phone]              Who to contact     If you have questions or need follow up information about today's clinic visit or your schedule please contact [facility] [place] directly at [phone].  Normal or non-critical lab and imaging results will be communicated to you by Upverter[employer], letter or phone within 4 business days after the clinic has received the results. If you do not hear from us within 7 days, please contact the clinic through [employer]verter[employer] or phone. If you have a critical or abnormal lab result, we will notify you by phone as soon as possible.  Submit refill requests through [employer] or call your pharmacy and they will forward the refill request to us. Please allow 3 business days for your refill to be completed.          Additional Information About Your Visit        Brains[employer] Information     Brains[employer] lets you send messages to your doctor, view your test results, renew your prescriptions, schedule appointments and more. To sign up, go to [email or web address] . Click on \"Log " "in\" on the left side of the screen, which will take you to the Welcome page. Then click on \"Sign up Now\" on the right side of the page.     You will be asked to enter the access code listed below, as well as some personal information. Please follow the directions to create your username and password.     Your access code is: [email or web address]  Expires: 2018 10:54 AM     Your access code will  in 90 days. If you need help or a new code, please call your [place] clinic or [phone].        Care EveryWhere ID     This is your Care EveryWhere ID. This could be used by other organizations to access your [place] medical records  [id number]        Your Vitals Were     Respirations Height BMI (Body Mass Index)             16 1.575 m (5' 2\") 34.2 kg/m2          Blood Pressure from Last 3 Encounters:   10/25/17 132/74   17 126/79   17 127/70    Weight from Last 3 Encounters:   17 84.8 kg (187 lb)   10/30/17 84.8 kg (187 lb)   10/25/17 85.3 kg (188 lb)              Today, you had the following     No orders found for display       Primary Care Provider Office Phone # Fax #    Kristen M Kehr, PA-C [phone] [address] [address]        Equal Access to Services     [facility] : Hadii aad ku hadasho Soomaali, waaxda luqadaha, qaybta kaalmada adeegyada, waxay jer hayedis fitzgerald . So [facility] [phone].    ATENCIÓN: Si habla español, tiene a lentz disposición servicios gratuitos de asistencia lingüística. Rosa al [phone].    We comply with applicable federal civil rights laws and [place] laws. We do not discriminate on the basis of race, color, national origin, age, disability, sex, sexual orientation, or gender identity.            Thank you!     Thank you for choosing [facility]  for your care. Our goal is always to provide you with excellent care. Hearing back from our patients is one way we can continue to " improve our services. Please take a few minutes to complete the written survey that you may receive in the mail after your visit with us. Thank you!             Your Updated Medication List - Protect others around you: Learn how to safely use, store and throw away your medicines at www.disposemymeds.org.          This list is accurate as of: 11/27/17 10:54 AM.  Always use your most recent med list.                   Brand Name Dispense Instructions for use Diagnosis    acetaminophen 325 MG tablet    TYLENOL    100 tablet    Take 2 tablets (650 mg) by mouth every 4 hours as needed for other (mild pain)    Orthopedic aftercare       COMPAZINE PO      Take 10 mg by mouth every morning        DULoxetine 20 MG EC capsule    CYMBALTA          meclizine 25 MG tablet    ANTIVERT    40 tablet    Take 1 tablet (25 mg) by mouth every 6 hours as needed for dizziness    Vertigo       medroxyPROGESTERone 150 MG/ML injection    DEPO-PROVERA    1 mL    Inject 1 mL (150 mg) into the muscle every 3 months    Encounter for surveillance of injectable contraceptive       nortriptyline 50 MG capsule    PAMELOR     Take by mouth 2 times daily    Vertigo, Other fatigue, Weight gain       order for DME     1 Units    Left boot    Left Achilles tendinitis       propranolol 120 MG 24 hr capsule    INDERAL LA    60 capsule    TAKE ONE CAPSULE BY MOUTH TWICE A DAY    Migraine with aura and without status migrainosus, not intractable       PROTONIX PO      Take 40 mg by mouth 2 times daily (before meals)

## 2017-11-29 ENCOUNTER — THERAPY VISIT (OUTPATIENT)
Dept: PHYSICAL THERAPY | Facility: CLINIC | Age: 37
End: 2017-11-29
Payer: COMMERCIAL

## 2017-11-29 DIAGNOSIS — M25.572 PAIN IN JOINT, ANKLE AND FOOT, LEFT: ICD-10-CM

## 2017-11-29 PROCEDURE — 97110 THERAPEUTIC EXERCISES: CPT | Mod: GP | Performed by: PHYSICAL THERAPY ASSISTANT

## 2017-11-29 PROCEDURE — 97140 MANUAL THERAPY 1/> REGIONS: CPT | Mod: GP | Performed by: PHYSICAL THERAPY ASSISTANT

## 2017-11-29 NOTE — PROGRESS NOTES
Subjective:    HPI                    Objective:    System    Physical Exam    General     ROS    Assessment/Plan:      SUBJECTIVE  Subjective changes as noted by pt:  Pt saw MD on Monday who told her most likely a stress fracture in left heel.  She is not sleeping well at night due to the pain in the left heel.    Current pain level:  5/10   Changes in function:  None     Adverse reaction to treatment or activity:  None    OBJECTIVE  Changes in objective findings:  PROM: left heel DF 0, PF 55, inversion WFl, eversion WFL.   Gait: arrives with short CAM boot on and crutch on R side, toes out 10-15% of the time during midstance phase. With cues pt tries to avoid toeing out on the L during midstance phase.       ASSESSMENT  Mily continues to require intervention to meet STG and LTG's: PT  No change of symptoms has been noted.  Patient is not progressing as expected.  Response to therapy has shown lack of progress in  pain level and gait  Progress made towards STG/LTG?  None    PLAN  Continue current treatment plan until patient demonstrates readiness to progress to higher level exercises.    PTA/ATC plan:  Will continue with present plan of care.    Please refer to the daily flowsheet for treatment today, total treatment time and time spent performing 1:1 timed codes.

## 2017-11-29 NOTE — MR AVS SNAPSHOT
After Visit Summary   11/29/2017    Mily Grullon    MRN: [id number]           Patient Information     Date Of Birth          1980        Visit Information        Provider Department      11/29/2017 4:00 PM Ramsey Gamboa PTA [facility] [place] [facility]        Today's Diagnoses     Pain in joint, ankle and foot, left           Follow-ups after your visit        Your next 10 appointments already scheduled     Dec 05, 2017  9:20 AM CST   CLEO Extremity with Todd Souza PT   [facility] [place] [facility] ([facility] [place])    [address]  [address]  [place] [address]   [phone]            Dec 12, 2017  9:20 AM CST   CLEO Extremity with Todd Souza PT   [facility] [place] [facility] ([facility] [place])    [address]  [address]  [place] [address]   [phone]            Dec 19, 2017  9:20 AM CST   CLEO Extremity with Todd Souza PT   [facility] [place] [facility] ([facility] [place])    [address]  [address]  [place] [address]   [phone]            Dec 26, 2017  9:20 AM CST   CLEO Extremity with Todd Souza PT   [facility] [place] [facility] ([facility] [place])    [address]  [address]  [place] [address]   [phone]            Dec 28, 2017  2:00 PM CST   Return Visit with Jose Alford MD   [facility] [place] ([facility] [place])    [address]  [place] [address]   [phone]              Who to contact     If you have questions or need follow up information about today's clinic visit or your schedule please contact [facility] [place] [facility] directly at [phone].  Normal or non-critical lab and imaging results will be communicated to you by MyChart, letter or phone within 4 business days after the clinic has received the results. If you do not hear from us within 7 days,  "please contact the clinic through [employer] or phone. If you have a critical or abnormal lab result, we will notify you by phone as soon as possible.  Submit refill requests through [employer] or call your pharmacy and they will forward the refill request to us. Please allow 3 business days for your refill to be completed.          Additional Information About Your Visit        [employer]us Information     [employer] lets you send messages to your doctor, view your test results, renew your prescriptions, schedule appointments and more. To sign up, go to [email or web address] . Click on \"Log in\" on the left side of the screen, which will take you to the Welcome page. Then click on \"Sign up Now\" on the right side of the page.     You will be asked to enter the access code listed below, as well as some personal information. Please follow the directions to create your username and password.     Your access code is: [email or web address]  Expires: 2018 10:54 AM     Your access code will  in 90 days. If you need help or a new code, please call your [place] clinic or [phone].        Care EveryWhere ID     This is your Care EveryWhere ID. This could be used by other organizations to access your [place] medical records  [id number]         Blood Pressure from Last 3 Encounters:   10/25/17 132/74   17 126/79   17 127/70    Weight from Last 3 Encounters:   17 84.8 kg (187 lb)   10/30/17 84.8 kg (187 lb)   10/25/17 85.3 kg (188 lb)              We Performed the Following     Manual Ther Tech, 1+Regions, EA 15 min     Therapeutic Exercises        Primary Care Provider Office Phone # Fax #    Kristen M Kehr, PA-C [phone] [address] [facility] [address]        Equal Access to Services     JASSON VIZCARRA : Norma Bliss, waaxda luqadaha, qaybta kaalmadeepa weller, abdifatah spivey. [facility] [phone].    ATENCIÓN: Si marycarmen meneses lentz " disposición servicios gratuitos de asistencia lingüística. Rosa randall [phone].    We comply with applicable federal civil rights laws and [place] laws. We do not discriminate on the basis of race, color, national origin, age, disability, sex, sexual orientation, or gender identity.            Thank you!     Thank you for choosing [facility] [place] [facility]  for your care. Our goal is always to provide you with excellent care. Hearing back from our patients is one way we can continue to improve our services. Please take a few minutes to complete the written survey that you may receive in the mail after your visit with us. Thank you!             Your Updated Medication List - Protect others around you: Learn how to safely use, store and throw away your medicines at www.disposemymeds.org.          This list is accurate as of: 11/29/17  5:21 PM.  Always use your most recent med list.                   Brand Name Dispense Instructions for use Diagnosis    acetaminophen 325 MG tablet    TYLENOL    100 tablet    Take 2 tablets (650 mg) by mouth every 4 hours as needed for other (mild pain)    Orthopedic aftercare       COMPAZINE PO      Take 10 mg by mouth every morning        DULoxetine 20 MG EC capsule    CYMBALTA          meclizine 25 MG tablet    ANTIVERT    40 tablet    Take 1 tablet (25 mg) by mouth every 6 hours as needed for dizziness    Vertigo       medroxyPROGESTERone 150 MG/ML injection    DEPO-PROVERA    1 mL    Inject 1 mL (150 mg) into the muscle every 3 months    Encounter for surveillance of injectable contraceptive       nortriptyline 50 MG capsule    PAMELOR     Take by mouth 2 times daily    Vertigo, Other fatigue, Weight gain       order for DME     1 Units    Left boot    Left Achilles tendinitis       propranolol 120 MG 24 hr capsule    INDERAL LA    60 capsule    TAKE ONE CAPSULE BY MOUTH TWICE A DAY    Migraine with aura and without status migrainosus, not intractable        PROTONIX PO      Take 40 mg by mouth 2 times daily (before meals)

## 2017-12-05 ENCOUNTER — THERAPY VISIT (OUTPATIENT)
Dept: PHYSICAL THERAPY | Facility: CLINIC | Age: 37
End: 2017-12-05
Payer: COMMERCIAL

## 2017-12-05 DIAGNOSIS — M25.572 PAIN IN JOINT, ANKLE AND FOOT, LEFT: ICD-10-CM

## 2017-12-05 PROCEDURE — 97116 GAIT TRAINING THERAPY: CPT | Mod: GP | Performed by: PHYSICAL THERAPIST

## 2017-12-05 PROCEDURE — 97110 THERAPEUTIC EXERCISES: CPT | Mod: GP | Performed by: PHYSICAL THERAPIST

## 2017-12-05 PROCEDURE — 97140 MANUAL THERAPY 1/> REGIONS: CPT | Mod: GP | Performed by: PHYSICAL THERAPIST

## 2017-12-05 NOTE — MR AVS SNAPSHOT
After Visit Summary   12/5/2017    Mily Grullon    MRN: [id number]           Patient Information     Date Of Birth          1980        Visit Information        Provider Department      12/5/2017 9:20 AM Todd Souza, PT [facility] [place] [facility]        Today's Diagnoses     Pain in joint, ankle and foot, left           Follow-ups after your visit        Your next 10 appointments already scheduled     Dec 12, 2017  9:20 AM CST   CLEO Extremity with Todd Souza PT   [facility] [place] [facility] ([facility] [place])    [address]  [address]  [place] [address]   [phone]            Dec 19, 2017  9:20 AM CST   CLEO Extremity with Todd Souza PT   [facility] [place] [facility] ([facility] [place])    [address]  [address]  [place] [address]   [phone]            Dec 26, 2017  9:20 AM CST   CLEO Extremity with Todd Souza PT   [facility] [place] [facility] ([facility] [place])    [address]  [address]  [place] [address]   [phone]            Dec 28, 2017  2:00 PM CST   Return Visit with Jose Alford MD   [facility] [place] ([facility] [place]    [address]  [place] [address]   [phone]              Who to contact     If you have questions or need follow up information about today's clinic visit or your schedule please contact [facility] [place] [facility] directly at [phone].  Normal or non-critical lab and imaging results will be communicated to you by MyChart, letter or phone within 4 business days after the clinic has received the results. If you do not hear from us within 7 days, please contact the clinic through MyChart or phone. If you have a critical or abnormal lab result, we will notify you by phone as soon as possible.  Submit refill requests through [employer] or call your pharmacy and they  "will forward the refill request to us. Please allow 3 business days for your refill to be completed.          Additional Information About Your Visit        [employer] Information     ???? lets you send messages to your doctor, view your test results, renew your prescriptions, schedule appointments and more. To sign up, go to [email or web address]???? . Click on \"Log in\" on the left side of the screen, which will take you to the Welcome page. Then click on \"Sign up Now\" on the right side of the page.     You will be asked to enter the access code listed below, as well as some personal information. Please follow the directions to create your username and password.     Your access code is: [email or web address]  Expires: 2018 10:54 AM     Your access code will  in 90 days. If you need help or a new code, please call your [facility] clinic or [phone].        Care EveryWhere ID     This is your Care EveryWhere ID. This could be used by other organizations to access your [facility] medical records  [id number]         Blood Pressure from Last 3 Encounters:   10/25/17 132/74   17 126/79   17 127/70    Weight from Last 3 Encounters:   17 84.8 kg (187 lb)   10/30/17 84.8 kg (187 lb)   10/25/17 85.3 kg (188 lb)              We Performed the Following     GAIT TRAINING THERAPY     MANUAL THER TECH,1+REGIONS,EA 15 MIN     THERAPEUTIC EXERCISES        Primary Care Provider Office Phone # Fax #    Kristen M Kehr, PA-C [phone] [address] [address]        Equal Access to Services     JASSON VIZCARRA : Hadjosee Bliss, waaxda tyra, qaybta kaalabdifatah mariee. So [facility] [phone].    ATENCIÓN: Si habla español, tiene a lentz disposición servicios gratuitos de asistencia lingüística. Rosa al [phone].    We comply with applicable federal civil rights laws and [place] laws. We do not discriminate on the basis of " race, color, national origin, age, disability, sex, sexual orientation, or gender identity.            Thank you!     Thank you for choosing [facility] [place] [facility]  for your care. Our goal is always to provide you with excellent care. Hearing back from our patients is one way we can continue to improve our services. Please take a few minutes to complete the written survey that you may receive in the mail after your visit with us. Thank you!             Your Updated Medication List - Protect others around you: Learn how to safely use, store and throw away your medicines at www.disposemymeds.org.          This list is accurate as of: 12/5/17  9:56 AM.  Always use your most recent med list.                   Brand Name Dispense Instructions for use Diagnosis    acetaminophen 325 MG tablet    TYLENOL    100 tablet    Take 2 tablets (650 mg) by mouth every 4 hours as needed for other (mild pain)    Orthopedic aftercare       COMPAZINE PO      Take 10 mg by mouth every morning        DULoxetine 20 MG EC capsule    CYMBALTA          meclizine 25 MG tablet    ANTIVERT    40 tablet    Take 1 tablet (25 mg) by mouth every 6 hours as needed for dizziness    Vertigo       medroxyPROGESTERone 150 MG/ML injection    DEPO-PROVERA    1 mL    Inject 1 mL (150 mg) into the muscle every 3 months    Encounter for surveillance of injectable contraceptive       nortriptyline 50 MG capsule    PAMELOR     Take by mouth 2 times daily    Vertigo, Other fatigue, Weight gain       order for DME     1 Units    Left boot    Left Achilles tendinitis       propranolol 120 MG 24 hr capsule    INDERAL LA    60 capsule    TAKE ONE CAPSULE BY MOUTH TWICE A DAY    Migraine with aura and without status migrainosus, not intractable       PROTONIX PO      Take 40 mg by mouth 2 times daily (before meals)

## 2017-12-05 NOTE — PROGRESS NOTES
Subjective:    HPI                    Objective:    System    Physical Exam    General     ROS    Assessment/Plan:      SUBJECTIVE  Subjective changes as noted by pt: Overall the foot feels a bit better.    Current pain level: 4/10   Changes in function:  None     Adverse reaction to treatment or activity:  None    OBJECTIVE  Changes in objective findings: PROM L foot df -3, pf min loss. Improved after manual therapy today.     ASSESSMENT  Mily continues to require intervention to meet STG and LTG's: PT  Patient is progressing as expected.  Response to therapy has shown an improvement in pain level and ROM   Progress made towards STG/LTG?  None    PLAN  Continue current treatment plan until patient demonstrates readiness to progress to higher level exercises.    PTA/ATC plan:  N/A    Please refer to the daily flowsheet for treatment today, total treatment time and time spent performing 1:1 timed codes.

## 2017-12-12 ENCOUNTER — THERAPY VISIT (OUTPATIENT)
Dept: PHYSICAL THERAPY | Facility: CLINIC | Age: 37
End: 2017-12-12
Payer: COMMERCIAL

## 2017-12-12 DIAGNOSIS — M25.572 PAIN IN JOINT, ANKLE AND FOOT, LEFT: ICD-10-CM

## 2017-12-12 PROCEDURE — 97140 MANUAL THERAPY 1/> REGIONS: CPT | Mod: GP | Performed by: PHYSICAL THERAPIST

## 2017-12-12 PROCEDURE — 97110 THERAPEUTIC EXERCISES: CPT | Mod: GP | Performed by: PHYSICAL THERAPIST

## 2017-12-12 NOTE — MR AVS SNAPSHOT
After Visit Summary   12/12/2017    Mily Grullon    MRN: [id number]           Patient Information     Date Of Birth          1980        Visit Information        Provider Department      12/12/2017 9:20 AM Todd Souza, PT [facility] [place] [facility]        Today's Diagnoses     Pain in joint, ankle and foot, left           Follow-ups after your visit        Your next 10 appointments already scheduled     Dec 19, 2017  9:20 AM CST   CLEO Extremity with Todd Souza PT   [facility] [place] [facility] ([facility] [place])    [address]  [address]  [place] [address]   [phone]            Dec 26, 2017  9:20 AM CST   CLEO Extremity with Todd Souza PT   [facility] [place] [facility] ([facility] [place])    [address]  [address]  [place] [address]   [phone]            Dec 28, 2017  2:00 PM CST   Return Visit with Jose Alford MD   [facility] [place] ([facility] [place])    [address]  [address]  [place] [address]   [phone]              Who to contact     If you have questions or need follow up information about today's clinic visit or your schedule please contact [facility] [place] [facility] directly at [phone].  Normal or non-critical lab and imaging results will be communicated to you by Quillhart, letter or phone within 4 business days after the clinic has received the results. If you do not hear from us within 7 days, please contact the clinic through Quillhart or phone. If you have a critical or abnormal lab result, we will notify you by phone as soon as possible.  Submit refill requests through Rock-It Cargo or call your pharmacy and they will forward the refill request to us. Please allow 3 business days for your refill to be completed.          Additional Information About Your Visit        Rock-It Cargo Information     Rock-It Cargo lets you send messages to  "your doctor, view your test results, renew your prescriptions, schedule appointments and more. To sign up, go to [email or web address] . Click on \"Log in\" on the left side of the screen, which will take you to the Welcome page. Then click on \"Sign up Now\" on the right side of the page.     You will be asked to enter the access code listed below, as well as some personal information. Please follow the directions to create your username and password.     Your access code is: [email or web address]  Expires: 2018 10:54 AM     Your access code will  in 90 days. If you need help or a new code, please call your [place] clinic or [phone].        Care EveryWhere ID     This is your Care EveryWhere ID. This could be used by other organizations to access your [place] medical records  [id number]         Blood Pressure from Last 3 Encounters:   10/25/17 132/74   17 126/79   17 127/70    Weight from Last 3 Encounters:   17 84.8 kg (187 lb)   10/30/17 84.8 kg (187 lb)   10/25/17 85.3 kg (188 lb)              We Performed the Following     MANUAL THER TECH,1+REGIONS,EA 15 MIN     THERAPEUTIC EXERCISES        Primary Care Provider Office Phone # Fax #    Kristen M Kehr, PA-C [phone] [address] [address] [address]        Equal Access to Services     [facility] : Hadii aad ku hadasho Soomaali, waaxda luqadaha, qaybta kaalmada adeegyada, abdifatah fitzgerald . So [facility] [phone].    ATENCIÓN: Si leila sommer, tiene a lentz disposición servicios gratuitos de asistencia lingüística. Rosa al [phone].    We comply with applicable federal civil rights laws and [place] laws. We do not discriminate on the basis of race, color, national origin, age, disability, sex, sexual orientation, or gender identity.            Thank you!     Thank you for choosing [facility] [place] [facility]  for your care. Our goal is always to provide you with " excellent care. Hearing back from our patients is one way we can continue to improve our services. Please take a few minutes to complete the written survey that you may receive in the mail after your visit with us. Thank you!             Your Updated Medication List - Protect others around you: Learn how to safely use, store and throw away your medicines at www.disposemymeds.org.          This list is accurate as of: 12/12/17 12:27 PM.  Always use your most recent med list.                   Brand Name Dispense Instructions for use Diagnosis    acetaminophen 325 MG tablet    TYLENOL    100 tablet    Take 2 tablets (650 mg) by mouth every 4 hours as needed for other (mild pain)    Orthopedic aftercare       COMPAZINE PO      Take 10 mg by mouth every morning        DULoxetine 20 MG EC capsule    CYMBALTA          meclizine 25 MG tablet    ANTIVERT    40 tablet    Take 1 tablet (25 mg) by mouth every 6 hours as needed for dizziness    Vertigo       medroxyPROGESTERone 150 MG/ML injection    DEPO-PROVERA    1 mL    Inject 1 mL (150 mg) into the muscle every 3 months    Encounter for surveillance of injectable contraceptive       nortriptyline 50 MG capsule    PAMELOR     Take by mouth 2 times daily    Vertigo, Other fatigue, Weight gain       order for DME     1 Units    Left boot    Left Achilles tendinitis       propranolol 120 MG 24 hr capsule    INDERAL LA    60 capsule    TAKE ONE CAPSULE BY MOUTH TWICE A DAY    Migraine with aura and without status migrainosus, not intractable       PROTONIX PO      Take 40 mg by mouth 2 times daily (before meals)

## 2017-12-19 ENCOUNTER — THERAPY VISIT (OUTPATIENT)
Dept: PHYSICAL THERAPY | Facility: CLINIC | Age: 37
End: 2017-12-19
Payer: COMMERCIAL

## 2017-12-19 DIAGNOSIS — M25.572 PAIN IN JOINT, ANKLE AND FOOT, LEFT: ICD-10-CM

## 2017-12-19 PROCEDURE — 97112 NEUROMUSCULAR REEDUCATION: CPT | Mod: GP | Performed by: PHYSICAL THERAPIST

## 2017-12-19 PROCEDURE — 97110 THERAPEUTIC EXERCISES: CPT | Mod: GP | Performed by: PHYSICAL THERAPIST

## 2017-12-19 PROCEDURE — 97140 MANUAL THERAPY 1/> REGIONS: CPT | Mod: GP | Performed by: PHYSICAL THERAPIST

## 2017-12-19 NOTE — MR AVS SNAPSHOT
"              After Visit Summary   12/19/2017    Mily Grullon    MRN: [id number]           Patient Information     Date Of Birth          1980        Visit Information        Provider Department      12/19/2017 9:20 AM Todd Souza, [facility] [place] [facility]        Today's Diagnoses     Pain in joint, ankle and foot, left           Follow-ups after your visit        Your next 10 appointments already scheduled     Dec 26, 2017  9:20 AM CST   CLEO Extremity with Todd Souza PT   [facility] [place] [facility] ([facility] [place])    [address]  [address]  [place] [address]   [phone]            Dec 28, 2017  2:00 PM CST   Return Visit with Jose Alford MD   [facility] [place] ([facility] [place])    [address]  [address]  [place] [address]   [phone]              Who to contact     If you have questions or need follow up information about today's clinic visit or your schedule please contact [facility] [place] [facility] directly at [phone].  Normal or non-critical lab and imaging results will be communicated to you by GHH Commercehart, letter or phone within 4 business days after the clinic has received the results. If you do not hear from us within 7 days, please contact the clinic through GHH Commercehart or phone. If you have a critical or abnormal lab result, we will notify you by phone as soon as possible.  Submit refill requests through Talkito or call your pharmacy and they will forward the refill request to us. Please allow 3 business days for your refill to be completed.          Additional Information About Your Visit        MyChart Information     Talkito lets you send messages to your doctor, view your test results, renew your prescriptions, schedule appointments and more. To sign up, go to [email or web address] . Click on \"Log in\" on the left side of the screen, which will take you to " "the Welcome page. Then click on \"Sign up Now\" on the right side of the page.     You will be asked to enter the access code listed below, as well as some personal information. Please follow the directions to create your username and password.     Your access code is: [email or web address]  Expires: 2018 10:54 AM     Your access code will  in 90 days. If you need help or a new code, please call your [place] clinic or [phone].        Care EveryWhere ID     This is your Care EveryWhere ID. This could be used by other organizations to access your [place] medical records  [id number]         Blood Pressure from Last 3 Encounters:   10/25/17 132/74   17 126/79   17 127/70    Weight from Last 3 Encounters:   17 84.8 kg (187 lb)   10/30/17 84.8 kg (187 lb)   10/25/17 85.3 kg (188 lb)              We Performed the Following     MANUAL THER TECH,1+REGIONS,EA 15 MIN     NEUROMUSCULAR RE-EDUCATION     THERAPEUTIC EXERCISES        Primary Care Provider Office Phone # Fax #    Kristen M Kehr, PA-C [phone] [address] [address] [address]        Equal Access to Services     JASSON VIZCARRA : Hadii aad ku hadasho Soomaali, waaxda luqadaha, qaybta kaalmada adeegyada, waxay idiin haylisandron susan spivey. So [facility] [phone].    ATENCIÓN: Si habla español, tiene a lentz disposición servicios gratuitos de asistencia lingüística. Rosa al [phone].    We comply with applicable federal civil rights laws and [place] laws. We do not discriminate on the basis of race, color, national origin, age, disability, sex, sexual orientation, or gender identity.            Thank you!     Thank you for choosing [facility]  for your care. Our goal is always to provide you with excellent care. Hearing back from our patients is one way we can continue to improve our services. Please take a few minutes to complete the written survey that you may receive in the " mail after your visit with us. Thank you!             Your Updated Medication List - Protect others around you: Learn how to safely use, store and throw away your medicines at www.disposemymeds.org.          This list is accurate as of: 12/19/17 10:00 AM.  Always use your most recent med list.                   Brand Name Dispense Instructions for use Diagnosis    acetaminophen 325 MG tablet    TYLENOL    100 tablet    Take 2 tablets (650 mg) by mouth every 4 hours as needed for other (mild pain)    Orthopedic aftercare       COMPAZINE PO      Take 10 mg by mouth every morning        DULoxetine 20 MG EC capsule    CYMBALTA          meclizine 25 MG tablet    ANTIVERT    40 tablet    Take 1 tablet (25 mg) by mouth every 6 hours as needed for dizziness    Vertigo       medroxyPROGESTERone 150 MG/ML injection    DEPO-PROVERA    1 mL    Inject 1 mL (150 mg) into the muscle every 3 months    Encounter for surveillance of injectable contraceptive       nortriptyline 50 MG capsule    PAMELOR     Take by mouth 2 times daily    Vertigo, Other fatigue, Weight gain       order for DME     1 Units    Left boot    Left Achilles tendinitis       propranolol 120 MG 24 hr capsule    INDERAL LA    60 capsule    TAKE ONE CAPSULE BY MOUTH TWICE A DAY    Migraine with aura and without status migrainosus, not intractable       PROTONIX PO      Take 40 mg by mouth 2 times daily (before meals)

## 2017-12-19 NOTE — PROGRESS NOTES
[facility]  Subjective:    HPI                    Objective:    System    Physical Exam    General     ROS    Assessment/Plan:      SUBJECTIVE  Subjective changes as noted by pt: Overall doing a little better. Still getting swelling at the end of the day. Was able to work a full 8 hour shift and 5 days in a row this week. Feeling more pain today d/t working longer yesterday.   Current pain level: 5/10   Changes in function:  None     Adverse reaction to treatment or activity:  None    OBJECTIVE  Changes in objective findings: No visible nor measureable swelling today. DF and gait limitations status quo     ASSESSMENT  Mily continues to require intervention to meet STG and LTG's: PT  Patient is not progressing as expected.  Response to therapy has shown lack of progress in  pain level, ROM  and gait  Progress made towards STG/LTG?  None    PLAN  Current treatment program is being advanced to more complex exercises.    PTA/ATC plan:  N/A    Please refer to the daily flowsheet for treatment today, total treatment time and time spent performing 1:1 timed codes.

## 2017-12-28 ENCOUNTER — OFFICE VISIT (OUTPATIENT)
Dept: ORTHOPEDICS | Facility: CLINIC | Age: 37
End: 2017-12-28
Payer: COMMERCIAL

## 2017-12-28 VITALS — HEIGHT: 62 IN | BODY MASS INDEX: 34.41 KG/M2 | WEIGHT: 187 LBS | RESPIRATION RATE: 16 BRPM

## 2017-12-28 DIAGNOSIS — M76.62 ACHILLES TENDINITIS OF LEFT LOWER EXTREMITY: Primary | ICD-10-CM

## 2017-12-28 PROCEDURE — 99212 OFFICE O/P EST SF 10 MIN: CPT | Performed by: ORTHOPAEDIC SURGERY

## 2017-12-28 RX ORDER — TRAZODONE HYDROCHLORIDE 50 MG/1
TABLET, FILM COATED ORAL
COMMUNITY
Start: 2017-12-11 | End: 2019-03-06

## 2017-12-28 ASSESSMENT — PAIN SCALES - GENERAL: PAINLEVEL: MODERATE PAIN (5)

## 2017-12-28 NOTE — NURSING NOTE
"Chief Complaint   Patient presents with     RECHECK     Left achilles tendonitis. DOI 10/13/17. Patient is present in short boot and using 1 crutch. Patient states her foot/heel is doing a little bit better. She still can't walk without sharp pain and has been dealing with swelling off and on. Therapy told her to use one crutch and remain in the boot. She is doing a sit down job right now.        Initial Resp 16  Ht 1.575 m (5' 2\")  Wt 84.8 kg (187 lb)  BMI 34.2 kg/m2 Estimated body mass index is 34.2 kg/(m^2) as calculated from the following:    Height as of this encounter: 1.575 m (5' 2\").    Weight as of this encounter: 84.8 kg (187 lb).  Medication Reconciliation: irvin Richardson Certified Medical Assistant    "

## 2017-12-28 NOTE — PATIENT INSTRUCTIONS
Please remember to call and schedule a follow up appointment if one was recommended at your earliest convenience.  Orthopedics CLINIC HOURS TELEPHONE NUMBER   Dr. Rocío Lawrence  Certified Medical Assistant   Monday & Wednesday   8am - 5pm  Thursday 1pm - 5pm  Friday 8am -11:30am Specialty schedulers:   [phone] to schedule your surgery.  Main Clinic:   [phone] to make an appointment with any provider.    Urgent Care locations:    [facility] Monday-Friday Closed  Saturday-Sunday 9am-5pm      Monday-Friday 12pm - 8pm  Saturday-Sunday 9am-5pm [phone]     If SURGERY has been recommended, please call our Specialty Schedulers at [phone] to schedule your procedure.    If you need a medication refill, please contact your pharmacy. Please allow 3 business days for your refill to be completed.    If an MRI or CT scan has been recommended, please call [facility] Schedulers at [phone] to schedule your appointment.  Use Game Trust (secure e-mail communication and access to your chart) to send a message or to make an appointment. Please ask how you can sign up for Game Trust.  Your care team's suggested websites for health information:   [email or web address] : Up to date and easily searchable information on multiple topics.   Www.[email or web address] : [place] dept [place], public health issues in [place], N1N1

## 2017-12-28 NOTE — MR AVS SNAPSHOT
After Visit Summary   12/28/2017    Mily Grullon    MRN: [id number]           Patient Information     Date Of Birth          1980        Visit Information        Provider Department      12/28/2017 2:00 PM Jose Alford MD [facility] [place]        Today's Diagnoses     Achilles tendinitis of left lower extremity    -  1      Care Instructions    Please remember to call and schedule a follow up appointment if one was recommended at your earliest convenience.  Orthopedics CLINIC HOURS TELEPHONE NUMBER   Dr. Rocío Lawrence  Certified Medical Assistant   Monday & Wednesday   8am - 5pm  Thursday 1pm - 5pm  Friday 8am -11:30am Specialty schedulers:   [phone] to schedule your surgery.  Main Clinic:   [phone] to make an appointment with any provider.    Urgent Care locations:    [facility] Monday-Friday [facility]  Saturday-Sunday 9am-5pm      Monday-Friday 12pm - 8pm  Saturday-Sunday 9am-5pm [phone]     If SURGERY has been recommended, please call our Specialty Schedulers at [phone] to schedule your procedure.    If you need a medication refill, please contact your pharmacy. Please allow 3 business days for your refill to be completed.    If an MRI or CT scan has been recommended, please call [facility] Schedulers at [phone] to schedule your appointment.  Use SmartestK12t (secure e-mail communication and access to your chart) to send a message or to make an appointment. Please ask how you can sign up for [place].  Your care team's suggested websites for health information:   Www.[email or web address] : Up to date and easily searchable information on multiple topics.   Www.health.[facility].[place].us : [place] dept [place] heat[place], public health issues in [place], N1N1              Follow-ups after your visit        Follow-up notes from your care team     Return in about 4 weeks (around 1/25/2018) for clinical recheck.     "  Your next 10 appointments already scheduled     2018  9:00 AM CST   Return Visit with Jose Alford MD   [facility] [place] ([facility] [place])    [address]  [place] [address]   [phone]              Future tests that were ordered for you today     Open Future Orders        Priority Expected Expires Ordered    MR Ankle Left w/o Contrast Routine  2018            Who to contact     If you have questions or need follow up information about today's clinic visit or your schedule please contact [facility] [place] directly at [phone].  Normal or non-critical lab and imaging results will be communicated to you by Lemur IMShart, letter or phone within 4 business days after the clinic has received the results. If you do not hear from us within 7 days, please contact the clinic through Lemur IMShart or phone. If you have a critical or abnormal lab result, we will notify you by phone as soon as possible.  Submit refill requests through PayStand or call your pharmacy and they will forward the refill request to us. Please allow 3 business days for your refill to be completed.          Additional Information About Your Visit        MyChart Information     PayStand lets you send messages to your doctor, view your test results, renew your prescriptions, schedule appointments and more. To sign up, go to [email or web address] . Click on \"Log in\" on the left side of the screen, which will take you to the Welcome page. Then click on \"Sign up Now\" on the right side of the page.     You will be asked to enter the access code listed below, as well as some personal information. Please follow the directions to create your username and password.     Your access code is: [email or web address]  Expires: 2018 10:54 AM     Your access code will  in 90 days. If you need help or a new code, please call your [place] clinic or " "[phone].        Care EveryWhere ID     This is your Care EveryWhere ID. This could be used by other organizations to access your [place] medical records  [id number]        Your Vitals Were     Respirations Height BMI (Body Mass Index)             16 5' 2\" (1.575 m) 34.2 kg/m2          Blood Pressure from Last 3 Encounters:   10/25/17 132/74   09/11/17 126/79   02/27/17 127/70    Weight from Last 3 Encounters:   12/28/17 187 lb (84.8 kg)   11/27/17 187 lb (84.8 kg)   10/30/17 187 lb (84.8 kg)               Primary Care Provider Office Phone # Fax #    Kristen M Kehr, PA-C [phone] [address] [address] [address]        Equal Access to Services     [facility]: Hadii aad ku hadasho Soomaali, waaxda luqadaha, qaybta kaalmada adeegyada, waxay rockin hayaan susan fitzgerald . So [facility] [phone].    ATENCIÓN: Si habla español, tiene a lentz disposición servicios gratuitos de asistencia lingüística. Llame al [phone].    We comply with applicable federal civil rights laws and [place] laws. We do not discriminate on the basis of race, color, national origin, age, disability, sex, sexual orientation, or gender identity.            Thank you!     Thank you for choosing [facility]  for your care. Our goal is always to provide you with excellent care. Hearing back from our patients is one way we can continue to improve our services. Please take a few minutes to complete the written survey that you may receive in the mail after your visit with us. Thank you!             Your Updated Medication List - Protect others around you: Learn how to safely use, store and throw away your medicines at www.disposemymeds.org.          This list is accurate as of: 12/28/17  2:37 PM.  Always use your most recent med list.                   Brand Name Dispense Instructions for use Diagnosis    acetaminophen 325 MG tablet    TYLENOL    100 tablet    Take 2 tablets " (650 mg) by mouth every 4 hours as needed for other (mild pain)    Orthopedic aftercare       COMPAZINE PO      Take 10 mg by mouth every morning        DULoxetine 20 MG EC capsule    CYMBALTA          meclizine 25 MG tablet    ANTIVERT    40 tablet    Take 1 tablet (25 mg) by mouth every 6 hours as needed for dizziness    Vertigo       medroxyPROGESTERone 150 MG/ML injection    DEPO-PROVERA    1 mL    Inject 1 mL (150 mg) into the muscle every 3 months    Encounter for surveillance of injectable contraceptive       nortriptyline 50 MG capsule    PAMELOR     Take by mouth 2 times daily    Vertigo, Other fatigue, Weight gain       order for DME     1 Units    Left boot    Left Achilles tendinitis       propranolol 120 MG 24 hr capsule    INDERAL LA    60 capsule    TAKE ONE CAPSULE BY MOUTH TWICE A DAY    Migraine with aura and without status migrainosus, not intractable       PROTONIX PO      Take 40 mg by mouth 2 times daily (before meals)        traZODone 50 MG tablet    DESYREL

## 2017-12-28 NOTE — LETTER
12/28/2017         RE: Mily Grullon  [address] [facility] [address]        Dear Colleague,    Thank you for referring your patient, Mily Grullon, to the [facility] [facility] [place]. Please see a copy of my visit note below.    chief complaint:   Chief Complaint   Patient presents with     RECHECK     Left achilles tendonitis. DOI 10/13/17. Patient is present in short boot and using 1 crutch. Patient states her foot/heel is doing a little bit better. She still can't walk without sharp pain and has been dealing with swelling off and on. Therapy told her to use one crutch and remain in the boot. She is doing a sit down job right now.        HISTORY OF PRESENT ILLNESS    Mily Grullon is a 37 year old female seen for follow up evaluation of a left ankle pain that started on 10/13/2017, 11 weeks ago. No known injury. She started to notice the pain at work without a specific event.  Since last visit, pain improved a little.  She returns today using 1 crutch in a short boot. Continues to use 1 crutch per physical therapist recommendation. Her pain is moderate, rated a 5/10. She notes the foot/heel is doing slightly better. She still is unable to walk without sharp pain and has been dealing with swelling in the ankle, off and on. Currently is using 1 crutch, boot, and a heel pad. Is doing sedentary duties at work.    Of note: this is not a work comp claim.      Present symptoms: moderate pain, no swelling.    Symptoms occur walking and standing.    The frequency of symptoms: frequently.  Denies associated numbness or tingling.   Pain severity: 5/10  Pain quality: aching and sharp  Associated symptoms: none  Aggravating Factors: weightbearing  Relieving Factors: at rest, with brace, 1 crutch    Treatment up to this point: short ankle boot, 1 crutch, physical therapy   Has not tried: none  Prior history of related problems: none    Orthopedic PMH: history of left hip pain.    Other PMH:  has a  past medical history of Endometriosis, site unspecified; Gastritis (2010); Urinary calculus, unspecified; and Wrist injury (Nov 19, 2003).  Patient Active Problem List    Diagnosis Date Noted     Pain in joint, ankle and foot, left 11/15/2017     Priority: Medium     Abnormal gait 07/21/2016     Priority: Medium     Migraine without aura and without status migrainosus, not intractable 11/10/2015     Priority: Medium     Abnormality of gait 06/09/2014     Priority: Medium     Other postprocedural status(V45.89) 03/10/2014     Priority: Medium     Closed nondisplaced intertrochanteric fracture of left femur (H) 02/10/2014     Priority: Medium     Senile osteoporosis 12/10/2013     Priority: Medium     Headache 10/31/2011     Priority: Medium     Problem list name updated by automated process. Provider to review       Gastritis      Priority: Medium     dx 2010- sees Dr Glover in [facility]       CARDIOVASCULAR SCREENING; LDL GOAL LESS THAN 160 10/31/2010     Priority: Medium     Left elbow pain 01/08/2010     Priority: Medium     Narcotic agreement on file       Tobacco use disorder 12/17/2009     Priority: Medium       Surgical Hx:  has a past surgical history that includes REPAIR TRIANGULAR CART,WRIST JT (2005); REPAIR TRIANGULAR CART,WRIST JT (2007 or so); arthroscopy of joint unlisted (4/2004); REMOVAL OF OVARY/TUBE(S) (6/2003); hysteroscopy; lithotripsy; and Arthroscopy hip, osteoplasty femur proximal, combined (Left, 6/29/2016).    Medications:   Current Outpatient Prescriptions:      traZODone (DESYREL) 50 MG tablet, , Disp: , Rfl:      DULoxetine (CYMBALTA) 20 MG EC capsule, , Disp: , Rfl:      order for DME, Left boot, Disp: 1 Units, Rfl: 0     nortriptyline (PAMELOR) 50 MG capsule, Take by mouth 2 times daily, Disp: , Rfl:      medroxyPROGESTERone (DEPO-PROVERA) 150 MG/ML injection, Inject 1 mL (150 mg) into the muscle every 3 months, Disp: 1 mL, Rfl: 3     acetaminophen (TYLENOL) 325 MG tablet,  Take 2 tablets (650 mg) by mouth every 4 hours as needed for other (mild pain), Disp: 100 tablet, Rfl: 0     propranolol (INDERAL LA) 120 MG 24 hr capsule, TAKE ONE CAPSULE BY MOUTH TWICE A DAY (Patient not taking: Reported on 12/28/2017), Disp: 60 capsule, Rfl: 0     meclizine (ANTIVERT) 25 MG tablet, Take 1 tablet (25 mg) by mouth every 6 hours as needed for dizziness (Patient not taking: Reported on 12/28/2017), Disp: 40 tablet, Rfl: 1     Pantoprazole Sodium (PROTONIX PO), Take 40 mg by mouth 2 times daily (before meals), Disp: , Rfl:      Prochlorperazine Maleate (COMPAZINE PO), Take 10 mg by mouth every morning, Disp: , Rfl:     Allergies:   Allergies   Allergen Reactions     Amitriptyline Hives     Amoxicillin Diarrhea     Asa [Dihydroxyaluminum Aminoacetate]      Cipro [Ciprofloxacin]      Dizziness, vomiting, shortness of breath     Ibuprofen [Aspartame-Ibuprofen]      GI distress       Lyrica      extrematies swell up      Morphine      ithcy     Naproxen      GI distress     Neurontin [Gabapentin]      rash     Paxil [Paroxetine] Anaphylaxis     anaphylaxis     Phenergan [Promethazine Hcl]      dystonia     Prilosec [Omeprazole]      Rash, dizziness     Gabapentin Rash       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 Her smoking use included Cigarettes. She has never used smokeless tobacco. She reports that she drinks alcohol. She reports that she does not use illicit drugs.    Family Hx: family history includes CANCER in her paternal grandmother; Cardiovascular in her maternal grandfather; Genitourinary Problems in her maternal grandmother; HEART DISEASE in her maternal grandfather; Hypertension in her father and maternal grandmother; Lipids in her father. There is no history of Asthma, C.A.D., DIABETES, CEREBROVASCULAR DISEASE, Breast Cancer, Cancer - colorectal, Prostate Cancer, Alzheimer Disease, Arthritis, Blood Disease, Circulatory, Eye Disorder, GASTROINTESTINAL  "DISEASE, Musculoskeletal Disorder, Neurologic Disorder, Respiratory, or Thyroid Disease.    REVIEW OF SYSTEMS:    CONSTITUTIONAL:NEGATIVE for fever, chills, change in weight  INTEGUMENTARY/SKIN: NEGATIVE for worrisome rashes, moles or lesions  MUSCULOSKELETAL:See HPI above  NEURO: NEGATIVE for weakness, dizziness or paresthesias    This document serves as a record of the services and decisions personally performed and made by Jose Alford MD. It was created on his behalf by Ashlee Linn, a trained medical scribe. The creation of this document is based the provider's statements to the medical scribe.    Scribe Ashlee Linn 10:47 AM 11/27/2017      PHYSICAL EXAM:  Resp 16  Ht 1.575 m (5' 2\")  Wt 84.8 kg (187 lb)  BMI 34.2 kg/m2   GENERAL APPEARANCE: healthy, alert, no distress  SKIN: no suspicious lesions or rashes  NEURO: Normal strength and tone, mentation intact and speech normal  PSYCH:  mentation appears normal and affect normal  RESPIRATORY: No increased work of breathing.    BILATERAL LOWER EXTREMITIES:  Gait: antalgic favoring left in boot with 1 crutch.    Left lower extremity:  Left lower extremity weakness.  Intact sensation deep peroneal nerve, superficial peroneal nerve, med/lat tibial nerve, sural nerve, saphenous nerve  Intact EHL, EDL, TA, FHL, GS, quadriceps hamstrings and hip flexors  Toes warm and well perfused, brisk capillary refill. Palpable 2+ dp pulses.  calf soft and nttp or squeeze.  Edema: none    left ANKLE  Inspection: skin intact. No erythema or ecchymosis.   Swelling: none   Tender: distal achilles just proximal to the insertion, minimal tenderness at the achilles insertion, calcaneus/heel  Positive calcaneal squeeze.  Range of Motion:grossly intact  Strength: intact  Tight heel cord, dorsiflexion to neutral with discomfort.      X-RAY:  no new x-rays  3 views of the left ankle from 10/30/2017 were reviewed today. On my review, no obvious fractures or deformities. Sclerotic band of " calcaneus.      Impression: 37 year old female with left ankle pain, left achilles tendonitis, possible stress reaction of achilles.    Plan:   Discussed with patient that exam and imaging studies consistent with achilles tendinosis. Most cases respond well to nonoperative treatment.  * cannot rule out calcaneous stress reaction or fracture not seen on x-ray. Given longstanding left lower extremity issues over the past years, followed by increased activities, cannot rule out stress reaction.  * would recommend an MRI to further deduce diagnosis.  * MRI of the left ankle was ordered today.  * Continue non-op treatment below.    * Rest  * Activity modification - avoid activities that aggravate symptoms.  * NSAIDS - regular use for inflammation, with food, as long as no contra-indications. Please discuss with pcp if needed.  * Ice twice daily to three times daily.  * immobilization: continue with short ankle boot with activities, use 1 or 2 crutches.   * Elevation of extremity to reduce swelling  * gentle heel cord stretching  * Continue physical therapy for gentle heel cord stretching and range of motion exercises, modalities such as ultrasound as indicated. Then work on eccentric exercises.  * Tylenol as needed for pain  * Workability update: sedentary duties  * return to clinic 4 weeks, sooner if needed, for clinical recheck.    * After having the MRI, I would like the patient to call me so that we can discuss the results as well as how to proceed.        The information in this document, created by a scribe for me, accurately reflects the services I personally performed and the decisions made by me. I have reviewed and approved this document for accuracy.      Jose Alford M.D., M.S.  Dept. of Orthopaedic Surgery  [facility]    Again, thank you for allowing me to participate in the care of your patient.        Sincerely,        Jose Alford MD

## 2017-12-28 NOTE — PROGRESS NOTES
chief complaint:   Chief Complaint   Patient presents with     RECHECK     Left achilles tendonitis. DOI 10/13/17. Patient is present in short boot and using 1 crutch. Patient states her foot/heel is doing a little bit better. She still can't walk without sharp pain and has been dealing with swelling off and on. Therapy told her to use one crutch and remain in the boot. She is doing a sit down job right now.        HISTORY OF PRESENT ILLNESS    Mily Grullon is a 37 year old female seen for follow up evaluation of a left ankle pain that started on 10/13/2017, 11 weeks ago. No known injury. She started to notice the pain at work without a specific event.  Since last visit, pain improved a little.  She returns today using 1 crutch in a short boot. Continues to use 1 crutch per physical therapist recommendation. Her pain is moderate, rated a 5/10. She notes the foot/heel is doing slightly better. She still is unable to walk without sharp pain and has been dealing with swelling in the ankle, off and on. Currently is using 1 crutch, boot, and a heel pad. Is doing sedentary duties at work.    Of note: this is not a work comp claim.      Present symptoms: moderate pain, no swelling.    Symptoms occur walking and standing.    The frequency of symptoms: frequently.  Denies associated numbness or tingling.   Pain severity: 5/10  Pain quality: aching and sharp  Associated symptoms: none  Aggravating Factors: weightbearing  Relieving Factors: at rest, with brace, 1 crutch    Treatment up to this point: short ankle boot, 1 crutch, physical therapy   Has not tried: none  Prior history of related problems: none    Orthopedic PMH: history of left hip pain.    Other PMH:  has a past medical history of Endometriosis, site unspecified; Gastritis (2010); Urinary calculus, unspecified; and Wrist injury (Nov 19, 2003).  Patient Active Problem List    Diagnosis Date Noted     Pain in joint, ankle and foot, left 11/15/2017     Priority:  Medium     Abnormal gait 07/21/2016     Priority: Medium     Migraine without aura and without status migrainosus, not intractable 11/10/2015     Priority: Medium     Abnormality of gait 06/09/2014     Priority: Medium     Other postprocedural status(V45.89) 03/10/2014     Priority: Medium     Closed nondisplaced intertrochanteric fracture of left femur (H) 02/10/2014     Priority: Medium     Senile osteoporosis 12/10/2013     Priority: Medium     Headache 10/31/2011     Priority: Medium     Problem list name updated by automated process. Provider to review       Gastritis      Priority: Medium     dx 2010- sees Dr Glover in [facility]       CARDIOVASCULAR SCREENING; LDL GOAL LESS THAN 160 10/31/2010     Priority: Medium     Left elbow pain 01/08/2010     Priority: Medium     Narcotic agreement on file       Tobacco use disorder 12/17/2009     Priority: Medium       Surgical Hx:  has a past surgical history that includes REPAIR TRIANGULAR CART,WRIST JT (2005); REPAIR TRIANGULAR CART,WRIST JT (2007 or so); arthroscopy of joint unlisted (4/2004); REMOVAL OF OVARY/TUBE(S) (6/2003); hysteroscopy; lithotripsy; and Arthroscopy hip, osteoplasty femur proximal, combined (Left, 6/29/2016).    Medications:   Current Outpatient Prescriptions:      traZODone (DESYREL) 50 MG tablet, , Disp: , Rfl:      DULoxetine (CYMBALTA) 20 MG EC capsule, , Disp: , Rfl:      order for DME, Left boot, Disp: 1 Units, Rfl: 0     nortriptyline (PAMELOR) 50 MG capsule, Take by mouth 2 times daily, Disp: , Rfl:      medroxyPROGESTERone (DEPO-PROVERA) 150 MG/ML injection, Inject 1 mL (150 mg) into the muscle every 3 months, Disp: 1 mL, Rfl: 3     acetaminophen (TYLENOL) 325 MG tablet, Take 2 tablets (650 mg) by mouth every 4 hours as needed for other (mild pain), Disp: 100 tablet, Rfl: 0     propranolol (INDERAL LA) 120 MG 24 hr capsule, TAKE ONE CAPSULE BY MOUTH TWICE A DAY (Patient not taking: Reported on 12/28/2017), Disp: 60  capsule, Rfl: 0     meclizine (ANTIVERT) 25 MG tablet, Take 1 tablet (25 mg) by mouth every 6 hours as needed for dizziness (Patient not taking: Reported on 12/28/2017), Disp: 40 tablet, Rfl: 1     Pantoprazole Sodium (PROTONIX PO), Take 40 mg by mouth 2 times daily (before meals), Disp: , Rfl:      Prochlorperazine Maleate (COMPAZINE PO), Take 10 mg by mouth every morning, Disp: , Rfl:     Allergies:   Allergies   Allergen Reactions     Amitriptyline Hives     Amoxicillin Diarrhea     Asa [Dihydroxyaluminum Aminoacetate]      Cipro [Ciprofloxacin]      Dizziness, vomiting, shortness of breath     Ibuprofen [Aspartame-Ibuprofen]      GI distress       Lyrica      extrematies swell up      Morphine      ithcy     Naproxen      GI distress     Neurontin [Gabapentin]      rash     Paxil [Paroxetine] Anaphylaxis     anaphylaxis     Phenergan [Promethazine Hcl]      dystonia     Prilosec [Omeprazole]      Rash, dizziness     Gabapentin Rash       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 Her smoking use included Cigarettes. She has never used smokeless tobacco. She reports that she drinks alcohol. She reports that she does not use illicit drugs.    Family Hx: family history includes CANCER in her paternal grandmother; Cardiovascular in her maternal grandfather; Genitourinary Problems in her maternal grandmother; HEART DISEASE in her maternal grandfather; Hypertension in her father and maternal grandmother; Lipids in her father. There is no history of Asthma, C.A.D., DIABETES, CEREBROVASCULAR DISEASE, Breast Cancer, Cancer - colorectal, Prostate Cancer, Alzheimer Disease, Arthritis, Blood Disease, Circulatory, Eye Disorder, GASTROINTESTINAL DISEASE, Musculoskeletal Disorder, Neurologic Disorder, Respiratory, or Thyroid Disease.    REVIEW OF SYSTEMS:    CONSTITUTIONAL:NEGATIVE for fever, chills, change in weight  INTEGUMENTARY/SKIN: NEGATIVE for worrisome rashes, moles or  "lesions  MUSCULOSKELETAL:See HPI above  NEURO: NEGATIVE for weakness, dizziness or paresthesias    This document serves as a record of the services and decisions personally performed and made by Jose Alford MD. It was created on his behalf by Ashlee Linn, a trained medical scribe. The creation of this document is based the provider's statements to the medical scribe.    Ellenibnanci Linn 10:47 AM 11/27/2017      PHYSICAL EXAM:  Resp 16  Ht 1.575 m (5' 2\")  Wt 84.8 kg (187 lb)  BMI 34.2 kg/m2   GENERAL APPEARANCE: healthy, alert, no distress  SKIN: no suspicious lesions or rashes  NEURO: Normal strength and tone, mentation intact and speech normal  PSYCH:  mentation appears normal and affect normal  RESPIRATORY: No increased work of breathing.    BILATERAL LOWER EXTREMITIES:  Gait: antalgic favoring left in boot with 1 crutch.    Left lower extremity:  Left lower extremity weakness.  Intact sensation deep peroneal nerve, superficial peroneal nerve, med/lat tibial nerve, sural nerve, saphenous nerve  Intact EHL, EDL, TA, FHL, GS, quadriceps hamstrings and hip flexors  Toes warm and well perfused, brisk capillary refill. Palpable 2+ dp pulses.  calf soft and nttp or squeeze.  Edema: none    left ANKLE  Inspection: skin intact. No erythema or ecchymosis.   Swelling: none   Tender: distal achilles just proximal to the insertion, minimal tenderness at the achilles insertion, calcaneus/heel  Positive calcaneal squeeze.  Range of Motion:grossly intact  Strength: intact  Tight heel cord, dorsiflexion to neutral with discomfort.      X-RAY:  no new x-rays  3 views of the left ankle from 10/30/2017 were reviewed today. On my review, no obvious fractures or deformities. Sclerotic band of calcaneus.      Impression: 37 year old female with left ankle pain, left achilles tendonitis, possible stress reaction of achilles.    Plan:   Discussed with patient that exam and imaging studies consistent with achilles tendinosis. Most " cases respond well to nonoperative treatment.  * cannot rule out calcaneous stress reaction or fracture not seen on x-ray. Given longstanding left lower extremity issues over the past years, followed by increased activities, cannot rule out stress reaction.  * would recommend an MRI to further deduce diagnosis.  * MRI of the left ankle was ordered today.  * Continue non-op treatment below.    * Rest  * Activity modification - avoid activities that aggravate symptoms.  * NSAIDS - regular use for inflammation, with food, as long as no contra-indications. Please discuss with pcp if needed.  * Ice twice daily to three times daily.  * immobilization: continue with short ankle boot with activities, use 1 or 2 crutches.   * Elevation of extremity to reduce swelling  * gentle heel cord stretching  * Continue physical therapy for gentle heel cord stretching and range of motion exercises, modalities such as ultrasound as indicated. Then work on eccentric exercises.  * Tylenol as needed for pain  * Workability update: sedentary duties  * return to clinic 4 weeks, sooner if needed, for clinical recheck.    * After having the MRI, I would like the patient to call me so that we can discuss the results as well as how to proceed.        The information in this document, created by a scribe for me, accurately reflects the services I personally performed and the decisions made by me. I have reviewed and approved this document for accuracy.      Jose Alford M.D., M.S.  Dept. of Orthopaedic Surgery  [facility]

## 2017-12-28 NOTE — LETTER
[facility] [place]  [address]  [place] [address] [phone]  WORKABILITY    [facility], Rhea Wolff M.D. [place]        12/28/2017      RE: Mily Grullon    [address] [address]        To whom it may concern:     Mily Grullon is under my professional care for   1. Achilles tendinitis of left lower extremity         Date of injury: 10/13/17.      Ms. Grullon can return to work WITH RESTRICTIONS: Sedentary duties only with intermittent standing/walking as necessary.  DURATION OF LIMITATIONS: 4 weeks        Next appointment: 4 weeks          Electronically Signed (as below)  Dr. Jose Alford M.D.

## 2018-01-04 ENCOUNTER — TELEPHONE (OUTPATIENT)
Dept: ORTHOPEDICS | Facility: CLINIC | Age: 38
End: 2018-01-04

## 2018-01-04 ENCOUNTER — RADIANT APPOINTMENT (OUTPATIENT)
Dept: MRI IMAGING | Facility: CLINIC | Age: 38
End: 2018-01-04
Attending: ORTHOPAEDIC SURGERY
Payer: COMMERCIAL

## 2018-01-04 DIAGNOSIS — M76.62 ACHILLES TENDINITIS OF LEFT LOWER EXTREMITY: ICD-10-CM

## 2018-01-04 PROCEDURE — 73721 MRI JNT OF LWR EXTRE W/O DYE: CPT | Mod: TC

## 2018-01-04 NOTE — TELEPHONE ENCOUNTER
Called and spoke to Mily regarding her ankle MRI, showing stress fracture of the calcaneus. Recommend boot, heel cushion, protected weight bearing with crutches until symptoms resolve. Will reassess towards the end of the month. She was appreciative of the call back.    Jose Alford M.D., M.S.  Dept. of Orthopaedic Surgery  [facility]

## 2018-01-22 ENCOUNTER — OFFICE VISIT (OUTPATIENT)
Dept: ORTHOPEDICS | Facility: CLINIC | Age: 38
End: 2018-01-22
Payer: COMMERCIAL

## 2018-01-22 ENCOUNTER — RADIANT APPOINTMENT (OUTPATIENT)
Dept: GENERAL RADIOLOGY | Facility: CLINIC | Age: 38
End: 2018-01-22
Attending: ORTHOPAEDIC SURGERY
Payer: COMMERCIAL

## 2018-01-22 VITALS — BODY MASS INDEX: 34.41 KG/M2 | RESPIRATION RATE: 16 BRPM | HEIGHT: 62 IN | WEIGHT: 187 LBS

## 2018-01-22 DIAGNOSIS — M84.376A STRESS FRACTURE OF CALCANEUS: ICD-10-CM

## 2018-01-22 DIAGNOSIS — M84.375G STRESS FRACTURE OF LEFT CALCANEUS WITH DELAYED HEALING, SUBSEQUENT ENCOUNTER: Primary | ICD-10-CM

## 2018-01-22 PROCEDURE — 73650 X-RAY EXAM OF HEEL: CPT | Mod: LT

## 2018-01-22 PROCEDURE — 99213 OFFICE O/P EST LOW 20 MIN: CPT | Performed by: ORTHOPAEDIC SURGERY

## 2018-01-22 ASSESSMENT — PAIN SCALES - GENERAL: PAINLEVEL: MODERATE PAIN (5)

## 2018-01-22 NOTE — LETTER
1/22/2018         RE: Mily Grullon  [address] [address]        Dear Colleague,    Thank you for referring your patient, Miyl Grullon, to the [facility] [place]. Please see a copy of my visit note below.    chief complaint:   Chief Complaint   Patient presents with     RECHECK     Left achilles tendonitis. DOI 10/13/17, 14.5 week s/p. Patient states her foot is not any better. She continues to use one crutch and wears the boot. Work lightened her load so now the swelling stays down a little longer. She still can't walk without sharp pain.        HISTORY OF PRESENT ILLNESS    Mily Grullon is a 37 year old female seen for follow up evaluation of a left ankle and heel pain that started on 10/13/2017, 14/5 weeks ago. No known injury. She started to notice the pain at work without a specific event.  Since last visit, her symptoms are the same. Had MRI showing stress fracture of the calcaneus. She continues to use 1 crutch as the other broke and wears the boot. Her work load has lightened. Continues to have swelling in the foot as the day goes on. With any walking, she will have sharp pain in the heel. Today her pain is moderate, rated a 5/10.      Of note: this is not a work comp claim.      Present symptoms: moderate pain, swelling.    Symptoms occur walking and standing.    The frequency of symptoms: frequently.  Denies associated numbness or tingling.   Pain severity: 5/10  Pain quality: aching and sharp  Associated symptoms: none  Aggravating Factors: weightbearing  Relieving Factors: at rest, with brace, 1 crutch    Treatment up to this point: short ankle boot, 1 crutch, physical therapy   Has not tried: none  Prior history of related problems: none    Orthopedic PMH: history of left hip pain.    Other PMH:  has a past medical history of Endometriosis, site unspecified; Gastritis (2010); Urinary calculus, unspecified; and Wrist injury (Nov 19, 2003).  Patient  Active Problem List    Diagnosis Date Noted     Pain in joint, ankle and foot, left 11/15/2017     Priority: Medium     Abnormal gait 07/21/2016     Priority: Medium     Migraine without aura and without status migrainosus, not intractable 11/10/2015     Priority: Medium     Abnormality of gait 06/09/2014     Priority: Medium     Other postprocedural status(V45.89) 03/10/2014     Priority: Medium     Closed nondisplaced intertrochanteric fracture of left femur (H) 02/10/2014     Priority: Medium     Senile osteoporosis 12/10/2013     Priority: Medium     Headache 10/31/2011     Priority: Medium     Problem list name updated by automated process. Provider to review       Gastritis      Priority: Medium     dx 2010- sees Dr Glover in [facility]       CARDIOVASCULAR SCREENING; LDL GOAL LESS THAN 160 10/31/2010     Priority: Medium     Left elbow pain 01/08/2010     Priority: Medium     Narcotic agreement on file       Tobacco use disorder 12/17/2009     Priority: Medium       Surgical Hx:  has a past surgical history that includes REPAIR TRIANGULAR CART,WRIST JT (2005); REPAIR TRIANGULAR CART,WRIST JT (2007 or so); arthroscopy of joint unlisted (4/2004); REMOVAL OF OVARY/TUBE(S) (6/2003); hysteroscopy; lithotripsy; and Arthroscopy hip, osteoplasty femur proximal, combined (Left, 6/29/2016).    Medications:   Current Outpatient Prescriptions:      traZODone (DESYREL) 50 MG tablet, , Disp: , Rfl:      DULoxetine (CYMBALTA) 20 MG EC capsule, , Disp: , Rfl:      order for DME, Left boot, Disp: 1 Units, Rfl: 0     propranolol (INDERAL LA) 120 MG 24 hr capsule, TAKE ONE CAPSULE BY MOUTH TWICE A DAY, Disp: 60 capsule, Rfl: 0     nortriptyline (PAMELOR) 50 MG capsule, Take by mouth 2 times daily, Disp: , Rfl:      meclizine (ANTIVERT) 25 MG tablet, Take 1 tablet (25 mg) by mouth every 6 hours as needed for dizziness, Disp: 40 tablet, Rfl: 1     medroxyPROGESTERone (DEPO-PROVERA) 150 MG/ML injection, Inject 1 mL  (150 mg) into the muscle every 3 months, Disp: 1 mL, Rfl: 3     acetaminophen (TYLENOL) 325 MG tablet, Take 2 tablets (650 mg) by mouth every 4 hours as needed for other (mild pain), Disp: 100 tablet, Rfl: 0     Pantoprazole Sodium (PROTONIX PO), Take 40 mg by mouth 2 times daily (before meals), Disp: , Rfl:      Prochlorperazine Maleate (COMPAZINE PO), Take 10 mg by mouth every morning, Disp: , Rfl:     Allergies:   Allergies   Allergen Reactions     Amitriptyline Hives     Amoxicillin Diarrhea     Asa [Dihydroxyaluminum Aminoacetate]      Cipro [Ciprofloxacin]      Dizziness, vomiting, shortness of breath     Ibuprofen [Aspartame-Ibuprofen]      GI distress       Lyrica      extrematies swell up      Morphine      ithcy     Naproxen      GI distress     Neurontin [Gabapentin]      rash     Paxil [Paroxetine] Anaphylaxis     anaphylaxis     Phenergan [Promethazine Hcl]      dystonia     Prilosec [Omeprazole]      Rash, dizziness     Gabapentin Rash       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 Her smoking use included Cigarettes. She has never used smokeless tobacco. She reports that she drinks alcohol. She reports that she does not use illicit drugs.    Family Hx: family history includes CANCER in her paternal grandmother; Cardiovascular in her maternal grandfather; Genitourinary Problems in her maternal grandmother; HEART DISEASE in her maternal grandfather; Hypertension in her father and maternal grandmother; Lipids in her father. There is no history of Asthma, C.A.D., DIABETES, CEREBROVASCULAR DISEASE, Breast Cancer, Cancer - colorectal, Prostate Cancer, Alzheimer Disease, Arthritis, Blood Disease, Circulatory, Eye Disorder, GASTROINTESTINAL DISEASE, Musculoskeletal Disorder, Neurologic Disorder, Respiratory, or Thyroid Disease.    REVIEW OF SYSTEMS:    CONSTITUTIONAL:NEGATIVE for fever, chills, change in weight  INTEGUMENTARY/SKIN: NEGATIVE for worrisome rashes, moles or  "lesions  MUSCULOSKELETAL:See HPI above  NEURO: NEGATIVE for weakness, dizziness or paresthesias    This document serves as a record of the services and decisions personally performed and made by Jose Alford MD. It was created on his behalf by Ashlee Linn, a trained medical scribe. The creation of this document is based the provider's statements to the medical scribe.    Ellenibnanci Linn 9:29 AM 1/22/2018       PHYSICAL EXAM:  Resp 16  Ht 1.575 m (5' 2\")  Wt 84.8 kg (187 lb)  BMI 34.2 kg/m2   GENERAL APPEARANCE: healthy, alert, no distress  SKIN: no suspicious lesions or rashes  NEURO: Normal strength and tone, mentation intact and speech normal  PSYCH:  mentation appears normal and affect normal  RESPIRATORY: No increased work of breathing.    BILATERAL LOWER EXTREMITIES:  Gait: antalgic favoring left in boot with 1 crutch.    Left lower extremity:  Left lower extremity weakness.  Intact sensation deep peroneal nerve, superficial peroneal nerve, med/lat tibial nerve, sural nerve, saphenous nerve  Intact EHL, EDL, TA, FHL, GS, quadriceps hamstrings and hip flexors  Toes warm and well perfused, brisk capillary refill. Palpable 2+ dp pulses.  calf soft and nttp or squeeze.  Edema: none    left ANKLE  Inspection: skin intact. No erythema or ecchymosis.   Swelling: none   Tender: distal achilles just proximal to the insertion, minimal tenderness at the achilles insertion, calcaneus/heel  Positive calcaneal squeeze.  Range of Motion:grossly intact  Strength: intact  Tight heel cord, dorsiflexion to neutral with discomfort.      X-RAY: 2 calcaneal views 1/22/2018 were reviewed today. On my review,Sclerotic band of calcaneus, slight less distinct to prior xrays 10/30/2017    MRI of the left ankle taken on 1/4/2018 was reviewed today.    IMPRESSION:    1. Calcaneal stress fracture.  2. Trace retrocalcaneal bursitis.        Impression: 37 year old female with left ankle/heel pain, calcaneal stress fracture, left " achilles tendonitis    Plan:   * Discussed with patient that exam and imaging studies consistent with calcaneal stress fracture as well as achilles tendinosis. Most cases respond well to nonoperative treatment.  * Continue non-op treatment below.    * Rest  * Activity modification - avoid activities that aggravate symptoms.  * Ice twice daily to three times daily.  * immobilization: continue with short ankle boot with activities, use of 2 crutches.   * essentially strict non weight bearing given duration of symptoms.  * Elevation of extremity to reduce swelling  * gentle heel cord stretching  * Tylenol as needed for pain  * Workability update: no return to work at this time. Plan to return to work on 2/5/2018 with 4 hour work restrictions, strictly sedentary.  * return to clinic 4 weeks, sooner if needed, for clinical recheck.      The information in this document, created by a scribe for me, accurately reflects the services I personally performed and the decisions made by me. I have reviewed and approved this document for accuracy.      Jose Alford M.D., M.S.  Dept. of Orthopaedic Surgery  [facility]    Patient was fitted with kids size crutches. All questions were answered to patient's satisfaction. DME form explained, signed, and copy given to the patient for their records.   Melinda Richardson Clinical Medical Assistant        Again, thank you for allowing me to participate in the care of your patient.        Sincerely,        Jose Alford MD

## 2018-01-22 NOTE — PATIENT INSTRUCTIONS
Please remember to call and schedule a follow up appointment if one was recommended at your earliest convenience.  Orthopedics CLINIC HOURS TELEPHONE NUMBER   Dr. Rocío Lawrence  Certified Medical Assistant   Monday & Wednesday   8am - 5pm  Thursday 1pm - 5pm  Friday 8am -11:30am Specialty schedulers:   [phone] to schedule your surgery.  Main Clinic:   [phone] to make an appointment with any provider.    Urgent Care locations:    [facility] Monday-Friday Closed  Saturday-Sunday 9am-5pm      Monday-Friday 12pm - 8pm  Saturday-Sunday 9am-5pm [phone]     If SURGERY has been recommended, please call our Specialty Schedulers at [phone] to schedule your procedure.    If you need a medication refill, please contact your pharmacy. Please allow 3 business days for your refill to be completed.    If an MRI or CT scan has been recommended, please call [facility] Schedulers at [phone] to schedule your appointment.  Use Smart Mocha (secure e-mail communication and access to your chart) to send a message or to make an appointment. Please ask how you can sign up for Smart Mocha.  Your care team's suggested websites for health information:   [email or web address] : Up to date and easily searchable information on multiple topics.   [email or web address] : [place] dept of heat, public health issues in [place], N1N1

## 2018-01-22 NOTE — MR AVS SNAPSHOT
After Visit Summary   1/22/2018    Mily Grullon    MRN: [id number]           Patient Information     Date Of Birth          1980        Visit Information        Provider Department      1/22/2018 9:00 AM Jose Alford MD [facility] [place]        Today's Diagnoses     Stress fracture of left calcaneus with delayed healing, subsequent encounter    -  1      Care Instructions    Please remember to call and schedule a follow up appointment if one was recommended at your earliest convenience.  Orthopedics CLINIC HOURS TELEPHONE NUMBER   Dr. Rocío Lawrence  Certified Medical Assistant   Monday & Wednesday   8am - 5pm  Thursday 1pm - 5pm  Friday 8am -11:30am Specialty schedulers:   [phone] to schedule your surgery.  Main Clinic:   [phone] to make an appointment with any provider.    Urgent Care locations:    [facility] Monday-Friday Closed  Saturday-Sunday 9am-5pm      Monday-Friday 12pm - 8pm  Saturday-Sunday 9am-5pm [phone]     If SURGERY has been recommended, please call our Specialty Schedulers at [phone] to schedule your procedure.    If you need a medication refill, please contact your pharmacy. Please allow 3 business days for your refill to be completed.    If an MRI or CT scan has been recommended, please call [facility] Schedulers at [phone] to schedule your appointment.  Use [employer] (secure e-mail communication and access to your chart) to send a message or to make an appointment. Please ask how you can sign up for [employer].  Your care team's suggested websites for health information:   [email or web address] : Up to date and easily searchable information on multiple topics.   [email or web address] : [place] dept [place], public health issues in [place], N1N1              Follow-ups after your visit        Follow-up notes from your care team     Return in about 4 weeks (around  "2018) for Fracture recheck, clinical recheck.      Your next 10 appointments already scheduled     2018  9:15 AM CST   Return Visit with Jose Alford MD   [facility] [place] ([facility] [place])    [address]  [place] [id number] [phone]              Who to contact     If you have questions or need follow up information about today's clinic visit or your schedule please contact [facility] [place] directly at [phone].  Normal or non-critical lab and imaging results will be communicated to you by AppSlingrhart, letter or phone within 4 business days after the clinic has received the results. If you do not hear from us within 7 days, please contact the clinic through AppSlingrhart or phone. If you have a critical or abnormal lab result, we will notify you by phone as soon as possible.  Submit refill requests through Geekatoo or call your pharmacy and they will forward the refill request to us. Please allow 3 business days for your refill to be completed.          Additional Information About Your Visit        MyChart Information     Geekatoo lets you send messages to your doctor, view your test results, renew your prescriptions, schedule appointments and more. To sign up, go to [email or web address] . Click on \"Log in\" on the left side of the screen, which will take you to the Welcome page. Then click on \"Sign up Now\" on the right side of the page.     You will be asked to enter the access code listed below, as well as some personal information. Please follow the directions to create your username and password.     Your access code is: [email or web address]  Expires: 2018 10:54 AM     Your access code will  in 90 days. If you need help or a new code, please call your [facility] clinic or [phone].        Care EveryWhere ID     This is your Care EveryWhere ID. This could be used by other organizations to access your " "[facility] medical records  [id number]        Your Vitals Were     Respirations Height BMI (Body Mass Index)             16 5' 2\" (1.575 m) 34.2 kg/m2          Blood Pressure from Last 3 Encounters:   10/25/17 132/74   09/11/17 126/79   02/27/17 127/70    Weight from Last 3 Encounters:   01/22/18 187 lb (84.8 kg)   12/28/17 187 lb (84.8 kg)   11/27/17 187 lb (84.8 kg)               Primary Care Provider Office Phone # Fax #    Kristen M Kehr, PA-C [phone] [address] [address]        Equal Access to Services     JASSON VIZCARRA : Hadii aad ku hadasho Sofeliali, waaxda luqadaha, qaybta kaalmada adeegyada, abdifatah fitzgerald . So [facility] [phone].    ATENCIÓN: Si habla español, tiene a lentz disposición servicios gratuitos de asistencia lingüística. [facility] [phone].    We comply with applicable federal civil rights laws and [place] laws. We do not discriminate on the basis of race, color, national origin, age, disability, sex, sexual orientation, or gender identity.            Thank you!     Thank you for choosing [facility]  for your care. Our goal is always to provide you with excellent care. Hearing back from our patients is one way we can continue to improve our services. Please take a few minutes to complete the written survey that you may receive in the mail after your visit with us. Thank you!             Your Updated Medication List - Protect others around you: Learn how to safely use, store and throw away your medicines at www.disposemymeds.org.          This list is accurate as of: 1/22/18  2:20 PM.  Always use your most recent med list.                   Brand Name Dispense Instructions for use Diagnosis    acetaminophen 325 MG tablet    TYLENOL    100 tablet    Take 2 tablets (650 mg) by mouth every 4 hours as needed for other (mild pain)    Orthopedic aftercare       COMPAZINE PO      Take 10 mg by mouth every " morning        DULoxetine 20 MG EC capsule    CYMBALTA          meclizine 25 MG tablet    ANTIVERT    40 tablet    Take 1 tablet (25 mg) by mouth every 6 hours as needed for dizziness    Vertigo       medroxyPROGESTERone 150 MG/ML injection    DEPO-PROVERA    1 mL    Inject 1 mL (150 mg) into the muscle every 3 months    Encounter for surveillance of injectable contraceptive       nortriptyline 50 MG capsule    PAMELOR     Take by mouth 2 times daily    Vertigo, Other fatigue, Weight gain       order for DME     1 Units    Left boot    Left Achilles tendinitis       propranolol 120 MG 24 hr capsule    INDERAL LA    60 capsule    TAKE ONE CAPSULE BY MOUTH TWICE A DAY    Migraine with aura and without status migrainosus, not intractable       PROTONIX PO      Take 40 mg by mouth 2 times daily (before meals)        traZODone 50 MG tablet    DESYREL

## 2018-01-22 NOTE — LETTER
[facility] [place]  [address]  [place] [id number] [phone]  WORKABILITY    [facility], Rhea Wolff M.D. [place]        1/22/2018      RE: Mily Grullon    [address] [address]        To whom it may concern:     Mily Grullon is under my care for   1. Stress fracture of calcaneus         Work related injury: No       Date of injury: 10/13/17          Return to work date:     NO return to work at this time. Return to work on 2/5/18 with 4 hours/day, sitting duties only. No weight bearing with left lower extremity. DURATION OF LIMITATIONS: 4 weeks        Next appointment: 4 weeks        Electronically Signed (as below)  Dr. Jose Alford M.D.

## 2018-01-22 NOTE — PROGRESS NOTES
chief complaint:   Chief Complaint   Patient presents with     RECHECK     Left achilles tendonitis. DOI 10/13/17, 14.5 week s/p. Patient states her foot is not any better. She continues to use one crutch and wears the boot. Work lightened her load so now the swelling stays down a little longer. She still can't walk without sharp pain.        HISTORY OF PRESENT ILLNESS    Mily Grullon is a 37 year old female seen for follow up evaluation of a left ankle and heel pain that started on 10/13/2017, 14/5 weeks ago. No known injury. She started to notice the pain at work without a specific event.  Since last visit, her symptoms are the same. Had MRI showing stress fracture of the calcaneus. She continues to use 1 crutch as the other broke and wears the boot. Her work load has lightened. Continues to have swelling in the foot as the day goes on. With any walking, she will have sharp pain in the heel. Today her pain is moderate, rated a 5/10.      Of note: this is not a work comp claim.      Present symptoms: moderate pain, swelling.    Symptoms occur walking and standing.    The frequency of symptoms: frequently.  Denies associated numbness or tingling.   Pain severity: 5/10  Pain quality: aching and sharp  Associated symptoms: none  Aggravating Factors: weightbearing  Relieving Factors: at rest, with brace, 1 crutch    Treatment up to this point: short ankle boot, 1 crutch, physical therapy   Has not tried: none  Prior history of related problems: none    Orthopedic PMH: history of left hip pain.    Other PMH:  has a past medical history of Endometriosis, site unspecified; Gastritis (2010); Urinary calculus, unspecified; and Wrist injury (Nov 19, 2003).  Patient Active Problem List    Diagnosis Date Noted     Pain in joint, ankle and foot, left 11/15/2017     Priority: Medium     Abnormal gait 07/21/2016     Priority: Medium     Migraine without aura and without status migrainosus, not intractable 11/10/2015      Priority: Medium     Abnormality of gait 06/09/2014     Priority: Medium     Other postprocedural status(V45.89) 03/10/2014     Priority: Medium     Closed nondisplaced intertrochanteric fracture of left femur (H) 02/10/2014     Priority: Medium     Senile osteoporosis 12/10/2013     Priority: Medium     Headache 10/31/2011     Priority: Medium     Problem list name updated by automated process. Provider to review       Gastritis      Priority: Medium     dx 2010- sees Dr Glover in [facility]       CARDIOVASCULAR SCREENING; LDL GOAL LESS THAN 160 10/31/2010     Priority: Medium     Left elbow pain 01/08/2010     Priority: Medium     Narcotic agreement on file       Tobacco use disorder 12/17/2009     Priority: Medium       Surgical Hx:  has a past surgical history that includes REPAIR TRIANGULAR CART,WRIST JT (2005); REPAIR TRIANGULAR CART,WRIST JT (2007 or so); arthroscopy of joint unlisted (4/2004); REMOVAL OF OVARY/TUBE(S) (6/2003); hysteroscopy; lithotripsy; and Arthroscopy hip, osteoplasty femur proximal, combined (Left, 6/29/2016).    Medications:   Current Outpatient Prescriptions:      traZODone (DESYREL) 50 MG tablet, , Disp: , Rfl:      DULoxetine (CYMBALTA) 20 MG EC capsule, , Disp: , Rfl:      order for DME, Left boot, Disp: 1 Units, Rfl: 0     propranolol (INDERAL LA) 120 MG 24 hr capsule, TAKE ONE CAPSULE BY MOUTH TWICE A DAY, Disp: 60 capsule, Rfl: 0     nortriptyline (PAMELOR) 50 MG capsule, Take by mouth 2 times daily, Disp: , Rfl:      meclizine (ANTIVERT) 25 MG tablet, Take 1 tablet (25 mg) by mouth every 6 hours as needed for dizziness, Disp: 40 tablet, Rfl: 1     medroxyPROGESTERone (DEPO-PROVERA) 150 MG/ML injection, Inject 1 mL (150 mg) into the muscle every 3 months, Disp: 1 mL, Rfl: 3     acetaminophen (TYLENOL) 325 MG tablet, Take 2 tablets (650 mg) by mouth every 4 hours as needed for other (mild pain), Disp: 100 tablet, Rfl: 0     Pantoprazole Sodium (PROTONIX PO), Take 40 mg  by mouth 2 times daily (before meals), Disp: , Rfl:      Prochlorperazine Maleate (COMPAZINE PO), Take 10 mg by mouth every morning, Disp: , Rfl:     Allergies:   Allergies   Allergen Reactions     Amitriptyline Hives     Amoxicillin Diarrhea     Asa [Dihydroxyaluminum Aminoacetate]      Cipro [Ciprofloxacin]      Dizziness, vomiting, shortness of breath     Ibuprofen [Aspartame-Ibuprofen]      GI distress       Lyrica      extrematies swell up      Morphine      ithcy     Naproxen      GI distress     Neurontin [Gabapentin]      rash     Paxil [Paroxetine] Anaphylaxis     anaphylaxis     Phenergan [Promethazine Hcl]      dystonia     Prilosec [Omeprazole]      Rash, dizziness     Gabapentin Rash       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 Her smoking use included Cigarettes. She has never used smokeless tobacco. She reports that she drinks alcohol. She reports that she does not use illicit drugs.    Family Hx: family history includes CANCER in her paternal grandmother; Cardiovascular in her maternal grandfather; Genitourinary Problems in her maternal grandmother; HEART DISEASE in her maternal grandfather; Hypertension in her father and maternal grandmother; Lipids in her father. There is no history of Asthma, C.A.D., DIABETES, CEREBROVASCULAR DISEASE, Breast Cancer, Cancer - colorectal, Prostate Cancer, Alzheimer Disease, Arthritis, Blood Disease, Circulatory, Eye Disorder, GASTROINTESTINAL DISEASE, Musculoskeletal Disorder, Neurologic Disorder, Respiratory, or Thyroid Disease.    REVIEW OF SYSTEMS:    CONSTITUTIONAL:NEGATIVE for fever, chills, change in weight  INTEGUMENTARY/SKIN: NEGATIVE for worrisome rashes, moles or lesions  MUSCULOSKELETAL:See HPI above  NEURO: NEGATIVE for weakness, dizziness or paresthesias    This document serves as a record of the services and decisions personally performed and made by Jose Alford MD. It was created on his behalf by roge Lawrence  "trained medical scribe. The creation of this document is based the provider's statements to the medical scribe.    Puneet Ashlee Linn 9:29 AM 1/22/2018       PHYSICAL EXAM:  Resp 16  Ht 1.575 m (5' 2\")  Wt 84.8 kg (187 lb)  BMI 34.2 kg/m2   GENERAL APPEARANCE: healthy, alert, no distress  SKIN: no suspicious lesions or rashes  NEURO: Normal strength and tone, mentation intact and speech normal  PSYCH:  mentation appears normal and affect normal  RESPIRATORY: No increased work of breathing.    BILATERAL LOWER EXTREMITIES:  Gait: antalgic favoring left in boot with 1 crutch.    Left lower extremity:  Left lower extremity weakness.  Intact sensation deep peroneal nerve, superficial peroneal nerve, med/lat tibial nerve, sural nerve, saphenous nerve  Intact EHL, EDL, TA, FHL, GS, quadriceps hamstrings and hip flexors  Toes warm and well perfused, brisk capillary refill. Palpable 2+ dp pulses.  calf soft and nttp or squeeze.  Edema: none    left ANKLE  Inspection: skin intact. No erythema or ecchymosis.   Swelling: none   Tender: distal achilles just proximal to the insertion, minimal tenderness at the achilles insertion, calcaneus/heel  Positive calcaneal squeeze.  Range of Motion:grossly intact  Strength: intact  Tight heel cord, dorsiflexion to neutral with discomfort.      X-RAY: 2 calcaneal views 1/22/2018 were reviewed today. On my review,Sclerotic band of calcaneus, slight less distinct to prior xrays 10/30/2017    MRI of the left ankle taken on 1/4/2018 was reviewed today.    IMPRESSION:    1. Calcaneal stress fracture.  2. Trace retrocalcaneal bursitis.        Impression: 37 year old female with left ankle/heel pain, calcaneal stress fracture, left achilles tendonitis    Plan:   * Discussed with patient that exam and imaging studies consistent with calcaneal stress fracture as well as achilles tendinosis. Most cases respond well to nonoperative treatment.  * Continue non-op treatment below.    * Rest  * " Activity modification - avoid activities that aggravate symptoms.  * Ice twice daily to three times daily.  * immobilization: continue with short ankle boot with activities, use of 2 crutches.   * essentially strict non weight bearing given duration of symptoms.  * Elevation of extremity to reduce swelling  * gentle heel cord stretching  * Tylenol as needed for pain  * Workability update: no return to work at this time. Plan to return to work on 2/5/2018 with 4 hour work restrictions, strictly sedentary.  * return to clinic 4 weeks, sooner if needed, for clinical recheck.      The information in this document, created by a scribe for me, accurately reflects the services I personally performed and the decisions made by me. I have reviewed and approved this document for accuracy.      Jose Alford M.D., M.S.  Dept. of Orthopaedic Surgery  [facility]

## 2018-02-12 ENCOUNTER — ALLIED HEALTH/NURSE VISIT (OUTPATIENT)
Dept: NURSING | Facility: CLINIC | Age: 38
End: 2018-02-12
Payer: COMMERCIAL

## 2018-02-12 DIAGNOSIS — Z30.42 SURVEILLANCE FOR DEPO-PROVERA CONTRACEPTION: Primary | ICD-10-CM

## 2018-02-12 PROCEDURE — 96372 THER/PROPH/DIAG INJ SC/IM: CPT

## 2018-02-12 PROCEDURE — 99207 ZZC NO CHARGE NURSE ONLY: CPT

## 2018-02-12 NOTE — MR AVS SNAPSHOT
"              After Visit Summary   2/12/2018    Mily Grullon    MRN: [id number]           Patient Information     Date Of Birth          1980        Visit Information        Provider Department      2/12/2018 12:00 PM [facility]        Today's Diagnoses     Surveillance for Depo-Provera contraception    -  1       Follow-ups after your visit        Your next 10 appointments already scheduled     Feb 26, 2018  9:15 AM CST   Return Visit with Jose Alford MD   [facility] [place] ([facility] [place])    [address]  [place] [id number] [phone]              Who to contact     If you have questions or need follow up information about today's clinic visit or your schedule please contact [facility] directly at [phone].  Normal or non-critical lab and imaging results will be communicated to you by MyChart, letter or phone within 4 business days after the clinic has received the results. If you do not hear from us within 7 days, please contact the clinic through MyChart or phone. If you have a critical or abnormal lab result, we will notify you by phone as soon as possible.  Submit refill requests through [employer] or call your pharmacy and they will forward the refill request to us. Please allow 3 business days for your refill to be completed.          Additional Information About Your Visit        MyChart Information     [employer] lets you send messages to your doctor, view your test results, renew your prescriptions, schedule appointments and more. To sign up, go to [email or web address] . Click on \"Log in\" on the left side of the screen, which will take you to the Welcome page. Then click on \"Sign up Now\" on the right side of the page.     You will be asked to enter the access code listed below, as well as some personal information. Please follow the directions to create your username and password.   "   Your access code is: [email or web address]  Expires: 2018 10:54 AM     Your access code will  in 90 days. If you need help or a new code, please call your [place] clinic or [phone].        Care EveryWhere ID     This is your Care EveryWhere ID. This could be used by other organizations to access your [place] medical records  [id number]         Blood Pressure from Last 3 Encounters:   10/25/17 132/74   17 126/79   17 127/70    Weight from Last 3 Encounters:   18 187 lb (84.8 kg)   17 187 lb (84.8 kg)   17 187 lb (84.8 kg)              We Performed the Following     INJECTION INTRAMUSCULAR OR SUB-Q     MEDROXYPROGESTERONE INJ        Primary Care Provider Office Phone # Fax #    Kristen M Kehr, PA-C [phone] [address] [address]        Equal Access to Services     [facility] : Hadii aad ku hadasho Soomaali, waaxda luqadaha, qaybta kaalmada adeegyada, waxay idiin haylisandron susan fitzgerald . So [facility] [phone].    ATENCIÓN: Si habla español, tiene a lentz disposición servicios gratuitos de asistencia lingüística. [facility] [phone].    We comply with applicable federal civil rights laws and [place] laws. We do not discriminate on the basis of race, color, national origin, age, disability, sex, sexual orientation, or gender identity.            Thank you!     Thank you for choosing [facility]  for your care. Our goal is always to provide you with excellent care. Hearing back from our patients is one way we can continue to improve our services. Please take a few minutes to complete the written survey that you may receive in the mail after your visit with us. Thank you!             Your Updated Medication List - Protect others around you: Learn how to safely use, store and throw away your medicines at www.disposemymeds.org.          This list is accurate as of 18 12:11 PM.  Always use your most recent med list.                    Brand Name Dispense Instructions for use Diagnosis    acetaminophen 325 MG tablet    TYLENOL    100 tablet    Take 2 tablets (650 mg) by mouth every 4 hours as needed for other (mild pain)    Orthopedic aftercare       COMPAZINE PO      Take 10 mg by mouth every morning        DULoxetine 20 MG EC capsule    CYMBALTA          meclizine 25 MG tablet    ANTIVERT    40 tablet    Take 1 tablet (25 mg) by mouth every 6 hours as needed for dizziness    Vertigo       medroxyPROGESTERone 150 MG/ML injection    DEPO-PROVERA    1 mL    Inject 1 mL (150 mg) into the muscle every 3 months    Encounter for surveillance of injectable contraceptive       nortriptyline 50 MG capsule    PAMELOR     Take by mouth 2 times daily    Vertigo, Other fatigue, Weight gain       order for DME     1 Units    Left boot    Left Achilles tendinitis       propranolol 120 MG 24 hr capsule    INDERAL LA    60 capsule    TAKE ONE CAPSULE BY MOUTH TWICE A DAY    Migraine with aura and without status migrainosus, not intractable       PROTONIX PO      Take 40 mg by mouth 2 times daily (before meals)        traZODone 50 MG tablet    DESYREL

## 2018-02-12 NOTE — PROGRESS NOTES
BP: Data Unavailable    LAST PAP/EXAM: Lab Results       Component                Value               Date                       PAP                      NIL                 08/30/2016            URINE HCG:not indicated    The following medication was given:     MEDICATION: Depo Provera 150mg  ROUTE: IM  SITE: Deltoid - Right  : [employer]  LOT #: [id number]  EXP:5/2020  NEXT INJECTION DUE: 4/30/18 - 5/14/18   NDC [address]-3  Provider: Sabrina Mixon CMA

## 2018-02-26 ENCOUNTER — OFFICE VISIT (OUTPATIENT)
Dept: ORTHOPEDICS | Facility: CLINIC | Age: 38
End: 2018-02-26
Payer: COMMERCIAL

## 2018-02-26 ENCOUNTER — RADIANT APPOINTMENT (OUTPATIENT)
Dept: GENERAL RADIOLOGY | Facility: CLINIC | Age: 38
End: 2018-02-26
Attending: ORTHOPAEDIC SURGERY
Payer: COMMERCIAL

## 2018-02-26 VITALS — RESPIRATION RATE: 16 BRPM | BODY MASS INDEX: 34.41 KG/M2 | HEIGHT: 62 IN | WEIGHT: 187 LBS

## 2018-02-26 DIAGNOSIS — M84.375D STRESS FRACTURE OF LEFT CALCANEUS WITH ROUTINE HEALING, SUBSEQUENT ENCOUNTER: Primary | ICD-10-CM

## 2018-02-26 DIAGNOSIS — M76.62 ACHILLES TENDINITIS OF LEFT LOWER EXTREMITY: ICD-10-CM

## 2018-02-26 PROCEDURE — 73650 X-RAY EXAM OF HEEL: CPT | Mod: LT

## 2018-02-26 PROCEDURE — 99213 OFFICE O/P EST LOW 20 MIN: CPT | Performed by: ORTHOPAEDIC SURGERY

## 2018-02-26 ASSESSMENT — PAIN SCALES - GENERAL: PAINLEVEL: MODERATE PAIN (4)

## 2018-02-26 NOTE — PROGRESS NOTES
chief complaint:   Chief Complaint   Patient presents with     RECHECK     Left achilles tendonitis. DOI: 10/13/17, 4 month s/p. Patient states her heel might be a little better. She is still dealing with swelling off and on. She is still unable to weight bear without pain. She is present in boot and using 2 crutches.        HISTORY OF PRESENT ILLNESS    Mily Grullon is a 37 year old female seen for follow up evaluation of a left ankle and heel pain that started on 10/13/2017, 14/5 weeks ago. No known injury. She started to notice the pain at work without a specific event. Had MRI showing stress fracture of the calcaneus. She continues to use 2 crutches and is in a boot. Since last visit, her pain has mildly improved. Pain is moderate, rated a 4/10. She has been dealing with frequent swelling. Her swelling improves when she as not at work. Swelling worsened with weightbearing such as being in the shower or walking. She still is unable to bear weight without pain. Presents with her mom today.      Of note: this is not a work comp claim. Patient has osteoporosis.       Present symptoms: mild-moderate pain, swelling.    Symptoms occur walking and standing.    The frequency of symptoms: frequently.  Denies associated numbness or tingling.   Pain severity: 4  Pain quality: aching and sharp  Associated symptoms: none  Aggravating Factors: weightbearing  Relieving Factors: at rest, with brace, 2 crutch    Treatment up to this point: short ankle boot, 2 crutch, physical therapy   Has not tried: none  Prior history of related problems: none    Orthopedic PMH: history of left hip pain.    Other PMH:  has a past medical history of Endometriosis, site unspecified; Gastritis (2010); Urinary calculus, unspecified; and Wrist injury (Nov 19, 2003).  Patient Active Problem List    Diagnosis Date Noted     Pain in joint, ankle and foot, left 11/15/2017     Priority: Medium     Abnormal gait 07/21/2016     Priority: Medium      Migraine without aura and without status migrainosus, not intractable 11/10/2015     Priority: Medium     Abnormality of gait 06/09/2014     Priority: Medium     Other postprocedural status(V45.89) 03/10/2014     Priority: Medium     Closed nondisplaced intertrochanteric fracture of left femur (H) 02/10/2014     Priority: Medium     Senile osteoporosis 12/10/2013     Priority: Medium     Headache 10/31/2011     Priority: Medium     Problem list name updated by automated process. Provider to review       Gastritis      Priority: Medium     dx 2010- sees Dr Glover in [facility]       CARDIOVASCULAR SCREENING; LDL GOAL LESS THAN 160 10/31/2010     Priority: Medium     Left elbow pain 01/08/2010     Priority: Medium     Narcotic agreement on file       Tobacco use disorder 12/17/2009     Priority: Medium       Surgical Hx:  has a past surgical history that includes REPAIR TRIANGULAR CART,WRIST JT (2005); REPAIR TRIANGULAR CART,WRIST JT (2007 or so); arthroscopy of joint unlisted (4/2004); REMOVAL OF OVARY/TUBE(S) (6/2003); hysteroscopy; lithotripsy; and Arthroscopy hip, osteoplasty femur proximal, combined (Left, 6/29/2016).    Medications:   Current Outpatient Prescriptions:      traZODone (DESYREL) 50 MG tablet, , Disp: , Rfl:      DULoxetine (CYMBALTA) 20 MG EC capsule, , Disp: , Rfl:      order for DME, Left boot, Disp: 1 Units, Rfl: 0     propranolol (INDERAL LA) 120 MG 24 hr capsule, TAKE ONE CAPSULE BY MOUTH TWICE A DAY, Disp: 60 capsule, Rfl: 0     nortriptyline (PAMELOR) 50 MG capsule, Take by mouth 2 times daily, Disp: , Rfl:      meclizine (ANTIVERT) 25 MG tablet, Take 1 tablet (25 mg) by mouth every 6 hours as needed for dizziness, Disp: 40 tablet, Rfl: 1     medroxyPROGESTERone (DEPO-PROVERA) 150 MG/ML injection, Inject 1 mL (150 mg) into the muscle every 3 months, Disp: 1 mL, Rfl: 3     acetaminophen (TYLENOL) 325 MG tablet, Take 2 tablets (650 mg) by mouth every 4 hours as needed for other  (mild pain), Disp: 100 tablet, Rfl: 0     Pantoprazole Sodium (PROTONIX PO), Take 40 mg by mouth 2 times daily (before meals), Disp: , Rfl:      Prochlorperazine Maleate (COMPAZINE PO), Take 10 mg by mouth every morning, Disp: , Rfl:     Allergies:   Allergies   Allergen Reactions     Amitriptyline Hives     Amoxicillin Diarrhea     Asa [Dihydroxyaluminum Aminoacetate]      Cipro [Ciprofloxacin]      Dizziness, vomiting, shortness of breath     Ibuprofen [Aspartame-Ibuprofen]      GI distress       Lyrica      extrematies swell up      Morphine      ithcy     Naproxen      GI distress     Neurontin [Gabapentin]      rash     Paxil [Paroxetine] Anaphylaxis     anaphylaxis     Phenergan [Promethazine Hcl]      dystonia     Prilosec [Omeprazole]      Rash, dizziness     Gabapentin Rash       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 Her smoking use included Cigarettes. She has never used smokeless tobacco. She reports that she drinks alcohol. She reports that she does not use illicit drugs.    Family Hx: family history includes CANCER in her paternal grandmother; Cardiovascular in her maternal grandfather; Genitourinary Problems in her maternal grandmother; HEART DISEASE in her maternal grandfather; Hypertension in her father and maternal grandmother; Lipids in her father. There is no history of Asthma, C.A.D., DIABETES, CEREBROVASCULAR DISEASE, Breast Cancer, Cancer - colorectal, Prostate Cancer, Alzheimer Disease, Arthritis, Blood Disease, Circulatory, Eye Disorder, GASTROINTESTINAL DISEASE, Musculoskeletal Disorder, Neurologic Disorder, Respiratory, or Thyroid Disease.    REVIEW OF SYSTEMS:    CONSTITUTIONAL:NEGATIVE for fever, chills, change in weight  INTEGUMENTARY/SKIN: NEGATIVE for worrisome rashes, moles or lesions  MUSCULOSKELETAL:See HPI above  NEURO: NEGATIVE for weakness, dizziness or paresthesias    This document serves as a record of the services and decisions personally  "performed and made by Jose Alford MD. It was created on his behalf by Ashlee Linn, a trained medical scribe. The creation of this document is based the provider's statements to the medical scribe.    Scribnanci Linn 9:35 AM 2/26/2018        PHYSICAL EXAM:  Resp 16  Ht 1.575 m (5' 2\")  Wt 84.8 kg (187 lb)  BMI 34.2 kg/m2   GENERAL APPEARANCE: healthy, alert, no distress; accompanied by her mother  SKIN: no suspicious lesions or rashes  NEURO: Normal strength and tone, mentation intact and speech normal  PSYCH:  mentation appears normal and affect normal  RESPIRATORY: No increased work of breathing.    BILATERAL LOWER EXTREMITIES:  Gait: antalgic favoring left in boot with 2 crutches.    Left lower extremity:  Left lower extremity weakness.  Intact sensation deep peroneal nerve, superficial peroneal nerve, med/lat tibial nerve, sural nerve, saphenous nerve  Intact EHL, EDL, TA, FHL, GS, quadriceps hamstrings and hip flexors  Toes warm and well perfused, brisk capillary refill. Palpable 2+ dp pulses.  calf soft and nttp or squeeze.  Edema: none    left ANKLE  Inspection: skin intact. No erythema or ecchymosis.   Swelling: none   Tender: distal achilles just proximal to the insertion, minimal tenderness at the achilles insertion, calcaneus/heel  Positive calcaneal squeeze.  Range of Motion:grossly intact  Strength: intact  Tight heel cord, dorsiflexion to neutral with discomfort.      X-RAY: 2 calcaneal views 2/26/2018  were reviewed today. On my review, decreased sclerotic band of calcaneus, less distinct to prior xrays 1/22/2018    MRI of the left ankle taken on 1/4/2018 was reviewed today.    IMPRESSION:    1. Calcaneal stress fracture.  2. Trace retrocalcaneal bursitis.        Impression: 37 year old female with left ankle/heel pain, calcaneal stress fracture, left achilles tendonitis    Plan:   * Discussed with patient and mom that exam and imaging studies consistent with calcaneal stress fracture as well as " achilles tendinosis. Most cases respond well to nonoperative treatment.  * review of xrays today show decreased band of sclerosis of the calcaneus to suggest some healing since prior visit.  * Continue non-op treatment below.    * Physical Therapy. Achilles stretching, ankle range of motion, consider u/s.  * Rest  * Activity modification - avoid activities that aggravate symptoms.  * Ice twice daily to three times daily.  * immobilization: continue with short ankle boot with activities, use of 2 crutches. Can take off for range of motion, while at rest and at night.  Wear during the day and when out. Does not need to be 24/7.   * essentially strict non weight bearing given duration of symptoms.  * Elevation of extremity to reduce swelling  * gentle heel cord stretching  * Tylenol as needed for pain  * Workability update: Continue to work with 4 hour work restrictions, strictly sedentary.  * return to clinic 4 weeks, sooner if needed, for clinical recheck.      The information in this document, created by a scribe for me, accurately reflects the services I personally performed and the decisions made by me. I have reviewed and approved this document for accuracy.      Jose Alford M.D., M.S.  Dept. of Orthopaedic Surgery  [facility]

## 2018-02-26 NOTE — PATIENT INSTRUCTIONS
Please remember to call and schedule a follow up appointment if one was recommended at your earliest convenience.  Orthopedics CLINIC HOURS TELEPHONE NUMBER   Dr. Rocío Lawrence  Certified Medical Assistant   Monday & Wednesday   8am - 5pm  Thursday 1pm - 5pm  Friday 8am -11:30am Specialty schedulers:   [phone] to schedule your surgery.  Main Clinic:   [phone] to make an appointment with any provider.    Urgent Care locations:    [facility] Monday-Friday Closed  Saturday-Sunday 9am-5pm      Monday-Friday 12pm - 8pm  Saturday-Sunday 9am-5pm [phone]     If SURGERY has been recommended, please call our Specialty Schedulers at [phone] to schedule your procedure.    If you need a medication refill, please contact your pharmacy. Please allow 3 business days for your refill to be completed.    If an MRI or CT scan has been recommended, please call [facility] Schedulers at [phone] to schedule your appointment.  Use Med[employer] (secure e-mail communication and access to your chart) to send a message or to make an appointment. Please ask how you can sign up for Mediamind.  Your care team's suggested websites for health information:   [email or web address] : Up to date and easily searchable information on multiple topics.   [email or web address] : [place] dept of heat, public health issues in [place], N1N1

## 2018-02-26 NOTE — LETTER
[facility]  [address]  [place] [address] [phone]  WORKABILITY    [facility], Rhea Wolff M.D. [place]        2/26/2018      RE: Mily Grullon    [address] [address]        To whom it may concern:     Mily Grullon is under my care for   1. Stress fracture of calcaneus          Work related injury: No       Date of injury: 10/13/17           Return to work date: 2/26/18    With restrictions:  4 hours/day, sitting duties only. No weight bearing with left lower extremity. DURATION OF LIMITATIONS: 4 weeks           Next appointment: 4 weeks          Electronically Signed (as below)  Dr. Jose Alford M.D.

## 2018-02-26 NOTE — LETTER
2/26/2018         RE: Mily Grullon  [address] [address]        Dear Colleague,    Thank you for referring your patient, Mily Grullon, to the [facility]. Please see a copy of my visit note below.    chief complaint:   Chief Complaint   Patient presents with     RECHECK     Left achilles tendonitis. DOI: 10/13/17, 4 month s/p. Patient states her heel might be a little better. She is still dealing with swelling off and on. She is still unable to weight bear without pain. She is present in boot and using 2 crutches.        HISTORY OF PRESENT ILLNESS    Mily Grullon is a 37 year old female seen for follow up evaluation of a left ankle and heel pain that started on 10/13/2017, 14/5 weeks ago. No known injury. She started to notice the pain at work without a specific event. Had MRI showing stress fracture of the calcaneus. She continues to use 2 crutches and is in a boot. Since last visit, her pain has mildly improved. Pain is moderate, rated a 4/10. She has been dealing with frequent swelling. Her swelling improves when she as not at work. Swelling worsened with weightbearing such as being in the shower or walking. She still is unable to bear weight without pain. Presents with her mom today.      Of note: this is not a work comp claim. Patient has osteoporosis.       Present symptoms: mild-moderate pain, swelling.    Symptoms occur walking and standing.    The frequency of symptoms: frequently.  Denies associated numbness or tingling.   Pain severity: 4  Pain quality: aching and sharp  Associated symptoms: none  Aggravating Factors: weightbearing  Relieving Factors: at rest, with brace, 2 crutch    Treatment up to this point: short ankle boot, 2 crutch, physical therapy   Has not tried: none  Prior history of related problems: none    Orthopedic PMH: history of left hip pain.    Other PMH:  has a past medical history of Endometriosis, site unspecified;  Gastritis (2010); Urinary calculus, unspecified; and Wrist injury (Nov 19, 2003).  Patient Active Problem List    Diagnosis Date Noted     Pain in joint, ankle and foot, left 11/15/2017     Priority: Medium     Abnormal gait 07/21/2016     Priority: Medium     Migraine without aura and without status migrainosus, not intractable 11/10/2015     Priority: Medium     Abnormality of gait 06/09/2014     Priority: Medium     Other postprocedural status(V45.89) 03/10/2014     Priority: Medium     Closed nondisplaced intertrochanteric fracture of left femur (H) 02/10/2014     Priority: Medium     Senile osteoporosis 12/10/2013     Priority: Medium     Headache 10/31/2011     Priority: Medium     Problem list name updated by automated process. Provider to review       Gastritis      Priority: Medium     dx 2010- sees Dr Glover in [facility]       CARDIOVASCULAR SCREENING; LDL GOAL LESS THAN 160 10/31/2010     Priority: Medium     Left elbow pain 01/08/2010     Priority: Medium     Narcotic agreement on file       Tobacco use disorder 12/17/2009     Priority: Medium       Surgical Hx:  has a past surgical history that includes REPAIR TRIANGULAR CART,WRIST JT (2005); REPAIR TRIANGULAR CART,WRIST JT (2007 or so); arthroscopy of joint unlisted (4/2004); REMOVAL OF OVARY/TUBE(S) (6/2003); hysteroscopy; lithotripsy; and Arthroscopy hip, osteoplasty femur proximal, combined (Left, 6/29/2016).    Medications:   Current Outpatient Prescriptions:      traZODone (DESYREL) 50 MG tablet, , Disp: , Rfl:      DULoxetine (CYMBALTA) 20 MG EC capsule, , Disp: , Rfl:      order for DME, Left boot, Disp: 1 Units, Rfl: 0     propranolol (INDERAL LA) 120 MG 24 hr capsule, TAKE ONE CAPSULE BY MOUTH TWICE A DAY, Disp: 60 capsule, Rfl: 0     nortriptyline (PAMELOR) 50 MG capsule, Take by mouth 2 times daily, Disp: , Rfl:      meclizine (ANTIVERT) 25 MG tablet, Take 1 tablet (25 mg) by mouth every 6 hours as needed for dizziness, Disp:  40 tablet, Rfl: 1     medroxyPROGESTERone (DEPO-PROVERA) 150 MG/ML injection, Inject 1 mL (150 mg) into the muscle every 3 months, Disp: 1 mL, Rfl: 3     acetaminophen (TYLENOL) 325 MG tablet, Take 2 tablets (650 mg) by mouth every 4 hours as needed for other (mild pain), Disp: 100 tablet, Rfl: 0     Pantoprazole Sodium (PROTONIX PO), Take 40 mg by mouth 2 times daily (before meals), Disp: , Rfl:      Prochlorperazine Maleate (COMPAZINE PO), Take 10 mg by mouth every morning, Disp: , Rfl:     Allergies:   Allergies   Allergen Reactions     Amitriptyline Hives     Amoxicillin Diarrhea     Asa [Dihydroxyaluminum Aminoacetate]      Cipro [Ciprofloxacin]      Dizziness, vomiting, shortness of breath     Ibuprofen [Aspartame-Ibuprofen]      GI distress       Lyrica      extrematies swell up      Morphine      ithcy     Naproxen      GI distress     Neurontin [Gabapentin]      rash     Paxil [Paroxetine] Anaphylaxis     anaphylaxis     Phenergan [Promethazine Hcl]      dystonia     Prilosec [Omeprazole]      Rash, dizziness     Gabapentin Rash       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 Her smoking use included Cigarettes. She has never used smokeless tobacco. She reports that she drinks alcohol. She reports that she does not use illicit drugs.    Family Hx: family history includes CANCER in her paternal grandmother; Cardiovascular in her maternal grandfather; Genitourinary Problems in her maternal grandmother; HEART DISEASE in her maternal grandfather; Hypertension in her father and maternal grandmother; Lipids in her father. There is no history of Asthma, C.A.D., DIABETES, CEREBROVASCULAR DISEASE, Breast Cancer, Cancer - colorectal, Prostate Cancer, Alzheimer Disease, Arthritis, Blood Disease, Circulatory, Eye Disorder, GASTROINTESTINAL DISEASE, Musculoskeletal Disorder, Neurologic Disorder, Respiratory, or Thyroid Disease.    REVIEW OF SYSTEMS:    CONSTITUTIONAL:NEGATIVE for fever,  "chills, change in weight  INTEGUMENTARY/SKIN: NEGATIVE for worrisome rashes, moles or lesions  MUSCULOSKELETAL:See HPI above  NEURO: NEGATIVE for weakness, dizziness or paresthesias    This document serves as a record of the services and decisions personally performed and made by Jose Alford MD. It was created on his behalf by Ashlee Linn, a trained medical scribe. The creation of this document is based the provider's statements to the medical scribe.    Scribe Ashlee Linn 9:35 AM 2/26/2018        PHYSICAL EXAM:  Resp 16  Ht 1.575 m (5' 2\")  Wt 84.8 kg (187 lb)  BMI 34.2 kg/m2   GENERAL APPEARANCE: healthy, alert, no distress; accompanied by her mother  SKIN: no suspicious lesions or rashes  NEURO: Normal strength and tone, mentation intact and speech normal  PSYCH:  mentation appears normal and affect normal  RESPIRATORY: No increased work of breathing.    BILATERAL LOWER EXTREMITIES:  Gait: antalgic favoring left in boot with 2 crutches.    Left lower extremity:  Left lower extremity weakness.  Intact sensation deep peroneal nerve, superficial peroneal nerve, med/lat tibial nerve, sural nerve, saphenous nerve  Intact EHL, EDL, TA, FHL, GS, quadriceps hamstrings and hip flexors  Toes warm and well perfused, brisk capillary refill. Palpable 2+ dp pulses.  calf soft and nttp or squeeze.  Edema: none    left ANKLE  Inspection: skin intact. No erythema or ecchymosis.   Swelling: none   Tender: distal achilles just proximal to the insertion, minimal tenderness at the achilles insertion, calcaneus/heel  Positive calcaneal squeeze.  Range of Motion:grossly intact  Strength: intact  Tight heel cord, dorsiflexion to neutral with discomfort.      X-RAY: 2 calcaneal views 2/26/2018  were reviewed today. On my review, decreased sclerotic band of calcaneus, less distinct to prior xrays 1/22/2018    MRI of the left ankle taken on 1/4/2018 was reviewed today.    IMPRESSION:    1. Calcaneal stress fracture.  2. Trace " retrocalcaneal bursitis.        Impression: 37 year old female with left ankle/heel pain, calcaneal stress fracture, left achilles tendonitis    Plan:   * Discussed with patient and mom that exam and imaging studies consistent with calcaneal stress fracture as well as achilles tendinosis. Most cases respond well to nonoperative treatment.  * review of xrays today show decreased band of sclerosis of the calcaneus to suggest some healing since prior visit.  * Continue non-op treatment below.    * Physical Therapy. Achilles stretching, ankle range of motion, consider u/s.  * Rest  * Activity modification - avoid activities that aggravate symptoms.  * Ice twice daily to three times daily.  * immobilization: continue with short ankle boot with activities, use of 2 crutches. Can take off for range of motion, while at rest and at night.  Wear during the day and when out. Does not need to be 24/7.   * essentially strict non weight bearing given duration of symptoms.  * Elevation of extremity to reduce swelling  * gentle heel cord stretching  * Tylenol as needed for pain  * Workability update: Continue to work with 4 hour work restrictions, strictly sedentary.  * return to clinic 4 weeks, sooner if needed, for clinical recheck.      The information in this document, created by a scribe for me, accurately reflects the services I personally performed and the decisions made by me. I have reviewed and approved this document for accuracy.      Jose Alford M.D., M.S.  Dept. of Orthopaedic Surgery  [facility]    Again, thank you for allowing me to participate in the care of your patient.        Sincerely,        Jose Alford MD

## 2018-03-09 ENCOUNTER — THERAPY VISIT (OUTPATIENT)
Dept: PHYSICAL THERAPY | Facility: CLINIC | Age: 38
End: 2018-03-09
Attending: ORTHOPAEDIC SURGERY
Payer: COMMERCIAL

## 2018-03-09 DIAGNOSIS — G89.29 HEEL PAIN, CHRONIC, LEFT: ICD-10-CM

## 2018-03-09 DIAGNOSIS — M79.672 HEEL PAIN, CHRONIC, LEFT: ICD-10-CM

## 2018-03-09 DIAGNOSIS — S92.015G CLOSED NONDISPLACED FRACTURE OF BODY OF LEFT CALCANEUS WITH DELAYED HEALING, SUBSEQUENT ENCOUNTER: Primary | ICD-10-CM

## 2018-03-09 PROCEDURE — 97110 THERAPEUTIC EXERCISES: CPT | Mod: GP | Performed by: PHYSICAL THERAPIST

## 2018-03-09 PROCEDURE — 97161 PT EVAL LOW COMPLEX 20 MIN: CPT | Mod: GP | Performed by: PHYSICAL THERAPIST

## 2018-03-09 NOTE — PROGRESS NOTES
[facility] Initial Evaluation  Subjective:  Patient is a 37 year old female presenting with rehab left ankle/foot hpi. The history is provided by the patient. No  was used.   Mily Grullon is a 37 year old female with a left foot condition.  Condition occurred with:  Insidious onset.  Condition occurred: in the community.    Fracture diagnosed 4 months ago. First two months CAM WBAT, second 2+ months of NWB with crutches.   Pain started in October, 2017..    Patient reports pain:  Sub calcaneus.  Radiates to:  Lower leg.  Pain is described as shooting and sharp and is intermittent (with weight-bearing) and reported as 6/10.  Associated symptoms:  Tingling and loss of motion/stiffness (cramps from boot). Pain is the same all the time.  Symptoms are exacerbated by weight bearing and relieved by bracing/immobilizing, rest and ice.  Since onset symptoms are gradually improving.  Special tests:  MRI.  Previous treatment includes other (walking boot).  There was no improvement following previous treatment.  General health as reported by patient is fair.  Pertinent medical history includes:  History of fractures, osteoporosis, migraines and sleep disorder/apnea.  Medical allergies: yes (see chart).  Other surgeries include:  Orthopedic surgery (left hip X 2; left wrist X 2).  Current medications:  Anti-inflammatory and anti-depressants.  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  Patient is working in normal job with restrictions.  Primary job tasks include:  Prolonged sitting.    Barriers include:  None as reported by patient.    Red flags:  None as reported by patient.                        Objective:    Gait:    Weight Bearing Status:  NWB   Assistive Devices:  Crutches and CAM            Ankle/Foot Evaluation  ROM:    AROM:    Dorsiflexion:  Left:   -1  Right:   8  Plantarflexion:  Left:  42    Right:  42  Inversion:  Left:  27     Right:  40  Eversion:  19     Right:   19      PROM:    Dorsiflexion: Left:    8     Right:       Inversion: Left:      40     Right:            Endfeel:  Firm in all directions  Strength:    Dorsiflexion:  Left: 4+/5      Pain:+   Right: 5/5    Pain:-  Plantarflexion: Left: 3/5    Pain:+   Right: 5/5   Pain:-  Inversion:Left: 4/5   Pain:+     Right: 5/5   Pain:-  Eversion:Left: 4/5   Pain:+  Right: 5/5   Pain:-                      PALPATION:   Left ankle tenderness present at:  plantar fascia; navicular and medial calcaneal  Left ankle tenderness not present at:   achilles tendon; anterior tibialis or posterior tibialis    Right ankle tenderness not present at:  posterior tibialis                                                        General     ROS    Assessment/Plan:    Patient is a 37 year old female with left side ankle complaints.    Patient has the following significant findings with corresponding treatment plan.                Diagnosis 1:  L calcaneal fracture  Pain -  hot/cold therapy, electric stimulation, manual therapy, self management, education and home program  Decreased ROM/flexibility - manual therapy, therapeutic exercise and home program  Decreased strength - therapeutic exercise, therapeutic activities and home program  Impaired gait - gait training and home program  Impaired muscle performance - neuro re-education and home program  Decreased function - therapeutic activities and home program    Therapy Evaluation Codes:   1) History comprised of:   Personal factors that impact the plan of care:      Past/current experiences.    Comorbidity factors that impact the plan of care are:      None.     Medications impacting care: None.  2) Examination of Body Systems comprised of:   Body structures and functions that impact the plan of care:      Ankle.   Activity limitations that impact the plan of care are:      Stairs, Standing, Walking and Working.  3) Clinical presentation characteristics  are:   Stable/Uncomplicated.  4) Decision-Making    Low complexity using standardized patient assessment instrument and/or measureable assessment of functional outcome.  Cumulative Therapy Evaluation is: Low complexity.    Previous and current functional limitations:  (See Goal Flow Sheet for this information)    Short term and Long term goals: (See Goal Flow Sheet for this information)     Communication ability:  Patient appears to be able to clearly communicate and understand verbal and written communication and follow directions correctly.  Treatment Explanation - The following has been discussed with the patient:   RX ordered/plan of care  Anticipated outcomes  Possible risks and side effects  This patient would benefit from PT intervention to resume normal activities.   Rehab potential is good.    Frequency:  1 X week, once daily  Duration:  for 8 weeks  Discharge Plan:  Achieve all LTG.  Independent in home treatment program.  Reach maximal therapeutic benefit.    Please refer to the daily flowsheet for treatment today, total treatment time and time spent performing 1:1 timed codes.

## 2018-03-09 NOTE — MR AVS SNAPSHOT
After Visit Summary   3/9/2018    Mily Grullon    MRN: [id number]           Patient Information     Date Of Birth          1980        Visit Information        Provider Department      3/9/2018 12:50 PM Ramsey Willis, PT [facility] [place] [facility]        Today's Diagnoses     Closed nondisplaced fracture of body of left calcaneus with delayed healing, subsequent encounter    -  1    Heel pain, chronic, left           Follow-ups after your visit        Your next 10 appointments already scheduled     Mar 16, 2018  2:50 PM CDT   CLEO Extremity with Ramsey Gamboa, PTA   [facility] [place] [facility] ([facility] [place])    [address]  [address]  [place] [address]   [phone]            Mar 22, 2018  8:40 AM CDT   CLEO Extremity with Ramsey Willis, PT   [facility] [place] [facility] ([facility] [place])    [address]  [address]  [place] [address]   [phone]            Mar 26, 2018  9:00 AM CDT   Return Visit with Jose Alford MD   [facility] [place] ([facility] [place])    [address]  [place] [address]   [phone]            Mar 29, 2018  9:20 AM CDT   CLEO Extremity with Ramsey Willis, PT   [facility] [place] [facility] ([facility] [place])    [address]  [address]  [place] [address]   [phone]              Who to contact     If you have questions or need follow up information about today's clinic visit or your schedule please contact [facility] directly at [phone].  Normal or non-critical lab and imaging results will be communicated to you by MyChart, letter or phone within 4 business days after the clinic has received the results. If you do not hear from us within 7 days, please contact the clinic through MyChart or phone. If you have a critical or abnormal lab result,  "we will notify you by phone as soon as possible.  Submit refill requests through [employer] or call your pharmacy and they will forward the refill request to us. Please allow 3 business days for your refill to be completed.          Additional Information About Your Visit        [employer] Information     [employer] lets you send messages to your doctor, view your test results, renew your prescriptions, schedule appointments and more. To sign up, go to [email or web address] . Click on \"Log in\" on the left side of the screen, which will take you to the Welcome page. Then click on \"Sign up Now\" on the right side of the page.     You will be asked to enter the access code listed below, as well as some personal information. Please follow the directions to create your username and password.     Your access code is: [email or web address]  Expires: 2018  5:08 PM     Your access code will  in 90 days. If you need help or a new code, please call your [facility] clinic or [phone].        Care EveryWhere ID     This is your Care EveryWhere ID. This could be used by other organizations to access your [facility] medical records  [id number]         Blood Pressure from Last 3 Encounters:   10/25/17 132/74   17 126/79   17 127/70    Weight from Last 3 Encounters:   18 84.8 kg (187 lb)   18 84.8 kg (187 lb)   17 84.8 kg (187 lb)              We Performed the Following     PT Eval, Low Complexity (35455)     Therapeutic Exercises        Primary Care Provider Office Phone # Fax #    Kristen M Kehr, PA-C [phone] [address] [address]        Equal Access to Services     JASSON VIZCARRA : Norma Bliss, waralph mary, abel kaalmada nithin, abdifatah spivey. So [facility] [phone].    ATENCIÓN: Si habla español, tiene a lentz disposición servicios gratuitos de asistencia lingüística. Llstevan al [phone].    We comply with " applicable federal civil rights laws and [place] laws. We do not discriminate on the basis of race, color, national origin, age, disability, sex, sexual orientation, or gender identity.            Thank you!     Thank you for choosing [facility]  for your care. Our goal is always to provide you with excellent care. Hearing back from our patients is one way we can continue to improve our services. Please take a few minutes to complete the written survey that you may receive in the mail after your visit with us. Thank you!             Your Updated Medication List - Protect others around you: Learn how to safely use, store and throw away your medicines at www.disposemymeds.org.          This list is accurate as of 3/9/18  5:08 PM.  Always use your most recent med list.                   Brand Name Dispense Instructions for use Diagnosis    acetaminophen 325 MG tablet    TYLENOL    100 tablet    Take 2 tablets (650 mg) by mouth every 4 hours as needed for other (mild pain)    Orthopedic aftercare       COMPAZINE PO      Take 10 mg by mouth every morning        DULoxetine 20 MG EC capsule    CYMBALTA          meclizine 25 MG tablet    ANTIVERT    40 tablet    Take 1 tablet (25 mg) by mouth every 6 hours as needed for dizziness    Vertigo       medroxyPROGESTERone 150 MG/ML injection    DEPO-PROVERA    1 mL    Inject 1 mL (150 mg) into the muscle every 3 months    Encounter for surveillance of injectable contraceptive       nortriptyline 50 MG capsule    PAMELOR     Take by mouth 2 times daily    Vertigo, Other fatigue, Weight gain       order for DME     1 Units    Left boot    Left Achilles tendinitis       propranolol 120 MG 24 hr capsule    INDERAL LA    60 capsule    TAKE ONE CAPSULE BY MOUTH TWICE A DAY    Migraine with aura and without status migrainosus, not intractable       PROTONIX PO      Take 40 mg by mouth 2 times daily (before meals)        traZODone 50 MG tablet    DESYREL

## 2018-03-16 ENCOUNTER — THERAPY VISIT (OUTPATIENT)
Dept: PHYSICAL THERAPY | Facility: CLINIC | Age: 38
End: 2018-03-16
Payer: COMMERCIAL

## 2018-03-16 DIAGNOSIS — G89.29 HEEL PAIN, CHRONIC, LEFT: ICD-10-CM

## 2018-03-16 DIAGNOSIS — S92.015G CLOSED NONDISPLACED FRACTURE OF BODY OF LEFT CALCANEUS WITH DELAYED HEALING, SUBSEQUENT ENCOUNTER: ICD-10-CM

## 2018-03-16 DIAGNOSIS — M79.672 HEEL PAIN, CHRONIC, LEFT: ICD-10-CM

## 2018-03-16 PROCEDURE — 97110 THERAPEUTIC EXERCISES: CPT | Mod: GP | Performed by: PHYSICAL THERAPY ASSISTANT

## 2018-03-16 NOTE — MR AVS SNAPSHOT
After Visit Summary   3/16/2018    Mily Grullon    MRN: [id number]           Patient Information     Date Of Birth          1980        Visit Information        Provider Department      3/16/2018 2:50 PM Ramsey Gamboa PTA [facility] [place] [facility]        Today's Diagnoses     Closed nondisplaced fracture of body of left calcaneus with delayed healing, subsequent encounter        Heel pain, chronic, left           Follow-ups after your visit        Your next 10 appointments already scheduled     Mar 22, 2018  8:40 AM CDT   CLEO Extremity with Ramsey Willis, PT   [facility] [place] [facility] ([facility] [place])    [address]  [address]  [place] [address]   [phone]            Mar 26, 2018  9:00 AM CDT   Return Visit with Jose Alford MD   [facility] [place] ([facility] [place])    [address]  [address]  [place] [address]   [phone]            Mar 29, 2018  9:20 AM CDT   CLEO Extremity with aRmsey Willis, PT   [facility] [place] [facility] ([facility] [place])    [address]  [address]  [place] [address]   [phone]              Who to contact     If you have questions or need follow up information about today's clinic visit or your schedule please contact [facility] [place] [facility] directly at [phone].  Normal or non-critical lab and imaging results will be communicated to you by MyChart, letter or phone within 4 business days after the clinic has received the results. If you do not hear from us within 7 days, please contact the clinic through MyChart or phone. If you have a critical or abnormal lab result, we will notify you by phone as soon as possible.  Submit refill requests through [employer] or call your pharmacy and they will forward the refill request to us. Please allow 3 business days for your refill to be completed.     "      Additional Information About Your Visit        MyChart Information     [employer] lets you send messages to your doctor, view your test results, renew your prescriptions, schedule appointments and more. To sign up, go to [email or web address] . Click on \"Log in\" on the left side of the screen, which will take you to the Welcome page. Then click on \"Sign up Now\" on the right side of the page.     You will be asked to enter the access code listed below, as well as some personal information. Please follow the directions to create your username and password.     Your access code is: [email or web address]  Expires: 2018  6:08 PM     Your access code will  in 90 days. If you need help or a new code, please call your [place] clinic or [phone].        Care EveryWhere ID     This is your Care EveryWhere ID. This could be used by other organizations to access your [place] medical records  [id number]         Blood Pressure from Last 3 Encounters:   10/25/17 132/74   17 126/79   17 127/70    Weight from Last 3 Encounters:   18 84.8 kg (187 lb)   18 84.8 kg (187 lb)   17 84.8 kg (187 lb)              We Performed the Following     Therapeutic Exercises        Primary Care Provider Office Phone # Fax #    Kristen M Kehr, PA-C [phone] [address] [address]        Equal Access to Services     [facility] ZACKERY : Hadii aad ku hadasho Soomaali, waaxda luqadaha, qaybta kaalmada adeegyada, waxay jer fitzgerald . So [facility] [phone].    ATENCIÓN: Si habla español, tiene a lentz disposición servicios gratuitos de asistencia lingüística. Llame al [phone].    We comply with applicable federal civil rights laws and [place] laws. We do not discriminate on the basis of race, color, national origin, age, disability, sex, sexual orientation, or gender identity.            Thank you!     Thank you for choosing [facility] [place] [facility]  for your care. Our goal is always to provide you with excellent care. Hearing back from our patients is one way we can continue to improve our services. Please take a few minutes to complete the written survey that you may receive in the mail after your visit with us. Thank you!             Your Updated Medication List - Protect others around you: Learn how to safely use, store and throw away your medicines at www.disposemymeds.org.          This list is accurate as of 3/16/18  3:45 PM.  Always use your most recent med list.                   Brand Name Dispense Instructions for use Diagnosis    acetaminophen 325 MG tablet    TYLENOL    100 tablet    Take 2 tablets (650 mg) by mouth every 4 hours as needed for other (mild pain)    Orthopedic aftercare       COMPAZINE PO      Take 10 mg by mouth every morning        DULoxetine 20 MG EC capsule    CYMBALTA          meclizine 25 MG tablet    ANTIVERT    40 tablet    Take 1 tablet (25 mg) by mouth every 6 hours as needed for dizziness    Vertigo       medroxyPROGESTERone 150 MG/ML injection    DEPO-PROVERA    1 mL    Inject 1 mL (150 mg) into the muscle every 3 months    Encounter for surveillance of injectable contraceptive       nortriptyline 50 MG capsule    PAMELOR     Take by mouth 2 times daily    Vertigo, Other fatigue, Weight gain       order for DME     1 Units    Left boot    Left Achilles tendinitis       propranolol 120 MG 24 hr capsule    INDERAL LA    60 capsule    TAKE ONE CAPSULE BY MOUTH TWICE A DAY    Migraine with aura and without status migrainosus, not intractable       PROTONIX PO      Take 40 mg by mouth 2 times daily (before meals)        traZODone 50 MG tablet    DESYREL

## 2018-03-16 NOTE — PROGRESS NOTES
Subjective:  HPI                    Objective:  System    Physical Exam    General     ROS    Assessment/Plan:    SUBJECTIVE  Subjective changes as noted by pt: No changes in the left ankle per pt. Pt sits in chair while at work and that has been good, (4 hour shifts for now per MD).    Current pain level:  4/10   Changes in function:  None     Adverse reaction to treatment or activity:  None    OBJECTIVE  Changes in objective findings:  AROM: left ankle DF 0, PF 30, inversion 22, eversion 8 degrees.      ASSESSMENT  Mily continues to require intervention to meet STG and LTG's: PT  No change of symptoms has been noted.  Response to therapy has shown lack of progress in  pain level and ROM   Progress made towards STG/LTG?  None    PLAN  Continue current treatment plan until patient demonstrates readiness to progress to higher level exercises.    PTA/ATC plan:  Will continue with present plan of care.    Please refer to the daily flowsheet for treatment today, total treatment time and time spent performing 1:1 timed codes.

## 2018-03-22 ENCOUNTER — THERAPY VISIT (OUTPATIENT)
Dept: PHYSICAL THERAPY | Facility: CLINIC | Age: 38
End: 2018-03-22
Payer: COMMERCIAL

## 2018-03-22 DIAGNOSIS — M79.672 HEEL PAIN, CHRONIC, LEFT: ICD-10-CM

## 2018-03-22 DIAGNOSIS — S92.015G CLOSED NONDISPLACED FRACTURE OF BODY OF LEFT CALCANEUS WITH DELAYED HEALING, SUBSEQUENT ENCOUNTER: ICD-10-CM

## 2018-03-22 DIAGNOSIS — G89.29 HEEL PAIN, CHRONIC, LEFT: ICD-10-CM

## 2018-03-22 PROCEDURE — 97140 MANUAL THERAPY 1/> REGIONS: CPT | Mod: GP | Performed by: PHYSICAL THERAPIST

## 2018-03-22 PROCEDURE — 97110 THERAPEUTIC EXERCISES: CPT | Mod: GP | Performed by: PHYSICAL THERAPIST

## 2018-03-22 NOTE — MR AVS SNAPSHOT
After Visit Summary   3/22/2018    Mily Grullon    MRN: [id number]           Patient Information     Date Of Birth          1980        Visit Information        Provider Department      3/22/2018 8:40 AM Ramsey Willis, PT [facility] [place] [facility]        Today's Diagnoses     Closed nondisplaced fracture of body of left calcaneus with delayed healing, subsequent encounter        Heel pain, chronic, left           Follow-ups after your visit        Your next 10 appointments already scheduled     Mar 26, 2018  9:00 AM CDT   Return Visit with Jose Alford MD   [facility] [place] ([facility] [place])    [address]  [address]  [place] [address]   [phone]            Mar 29, 2018  9:20 AM CDT   CLEO Extremity with Ramsey Willis PT   [facility] [place] [facility] ([facility] [place])    [address]  [address]  [place] [address]   [phone]              Who to contact     If you have questions or need follow up information about today's clinic visit or your schedule please contact [facility] [place] [facility] directly at [phone].  Normal or non-critical lab and imaging results will be communicated to you by MyChart, letter or phone within 4 business days after the clinic has received the results. If you do not hear from us within 7 days, please contact the clinic through [employer] or phone. If you have a critical or abnormal lab result, we will notify you by phone as soon as possible.  Submit refill requests through [employer] or call your pharmacy and they will forward the refill request to us. Please allow 3 business days for your refill to be completed.          Additional Information About Your Visit        MyChart Information     [employer] lets you send messages to your doctor, view your test results, renew your prescriptions, schedule appointments and more. To sign up, go  "to [email or web address] . Click on \"Log in\" on the left side of the screen, which will take you to the Welcome page. Then click on \"Sign up Now\" on the right side of the page.     You will be asked to enter the access code listed below, as well as some personal information. Please follow the directions to create your username and password.     Your access code is: [email or web address]  Expires: 2018  6:08 PM     Your access code will  in 90 days. If you need help or a new code, please call your [place] clinic or [phone].        Care EveryWhere ID     This is your Care EveryWhere ID. This could be used by other organizations to access your [place] medical records  [id number]         Blood Pressure from Last 3 Encounters:   10/25/17 132/74   17 126/79   17 127/70    Weight from Last 3 Encounters:   18 84.8 kg (187 lb)   18 84.8 kg (187 lb)   17 84.8 kg (187 lb)              We Performed the Following     Manual Ther Tech, 1+Regions, EA 15 min     Therapeutic Exercises        Primary Care Provider Office Phone # Fax #    Kristen M Kehr, PA-C [phone] [address] [address] [address]        Equal Access to Services     [facility]: Hadii aad ku hadasho Soomaali, waaxda luqadaha, qaybta kaalmada adeegyada, abdifatah fitzgerald . So [facility] [phone].    ATENCIÓN: Si habla español, tiene a lentz disposición servicios gratuitos de asistencia lingüística. Rosa al [phone].    We comply with applicable federal civil rights laws and [place] laws. We do not discriminate on the basis of race, color, national origin, age, disability, sex, sexual orientation, or gender identity.            Thank you!     Thank you for choosing [facility]   for your care. Our goal is always to provide you with excellent care. Hearing back from our patients is one way we can continue to improve our services. Please take a " few minutes to complete the written survey that you may receive in the mail after your visit with us. Thank you!             Your Updated Medication List - Protect others around you: Learn how to safely use, store and throw away your medicines at www.disposemymeds.org.          This list is accurate as of 3/22/18 11:27 AM.  Always use your most recent med list.                   Brand Name Dispense Instructions for use Diagnosis    acetaminophen 325 MG tablet    TYLENOL    100 tablet    Take 2 tablets (650 mg) by mouth every 4 hours as needed for other (mild pain)    Orthopedic aftercare       COMPAZINE PO      Take 10 mg by mouth every morning        DULoxetine 20 MG EC capsule    CYMBALTA          meclizine 25 MG tablet    ANTIVERT    40 tablet    Take 1 tablet (25 mg) by mouth every 6 hours as needed for dizziness    Vertigo       medroxyPROGESTERone 150 MG/ML injection    DEPO-PROVERA    1 mL    Inject 1 mL (150 mg) into the muscle every 3 months    Encounter for surveillance of injectable contraceptive       nortriptyline 50 MG capsule    PAMELOR     Take by mouth 2 times daily    Vertigo, Other fatigue, Weight gain       order for DME     1 Units    Left boot    Left Achilles tendinitis       propranolol 120 MG 24 hr capsule    INDERAL LA    60 capsule    TAKE ONE CAPSULE BY MOUTH TWICE A DAY    Migraine with aura and without status migrainosus, not intractable       PROTONIX PO      Take 40 mg by mouth 2 times daily (before meals)        traZODone 50 MG tablet    DESYREL

## 2018-03-22 NOTE — PROGRESS NOTES
Subjective:  HPI                    Objective:  System    Physical Exam    General     ROS    Assessment/Plan:    SUBJECTIVE  Subjective: No changes, work is unchanged. Cannot walk without pain. Has MD appt on Monday.    Current Pain level: 4/10   Changes in function:  Yes (See Goal flowsheet attached for changes in current functional level)     Adverse reaction to treatment or activity:  None    OBJECTIVE  Objective: AROM: knee flexed, left ankle DF: 12, knee extended 4. PF: 46, INV: 36 EV: 18     ASSESSMENT  Mily continues to require intervention to meet STG and LTG's: PT  Patient is progressing as expected.  Response to therapy has shown an improvement in  pain level and ROM   Progress made towards STG/LTG?  None    PLAN  Continue current treatment until MD allows progression of the treatment plan.    PTA/ATC plan:  N/A    Please refer to the daily flowsheet for treatment today, total treatment time and time spent performing 1:1 timed codes.

## 2018-03-26 ENCOUNTER — OFFICE VISIT (OUTPATIENT)
Dept: ORTHOPEDICS | Facility: CLINIC | Age: 38
End: 2018-03-26
Payer: COMMERCIAL

## 2018-03-26 ENCOUNTER — RADIANT APPOINTMENT (OUTPATIENT)
Dept: GENERAL RADIOLOGY | Facility: CLINIC | Age: 38
End: 2018-03-26
Attending: PHYSICIAN ASSISTANT
Payer: COMMERCIAL

## 2018-03-26 VITALS
DIASTOLIC BLOOD PRESSURE: 83 MMHG | SYSTOLIC BLOOD PRESSURE: 137 MMHG | HEIGHT: 62 IN | BODY MASS INDEX: 34.41 KG/M2 | WEIGHT: 187 LBS | RESPIRATION RATE: 16 BRPM

## 2018-03-26 DIAGNOSIS — M84.375D: Primary | ICD-10-CM

## 2018-03-26 DIAGNOSIS — M76.62 ACHILLES TENDINITIS OF LEFT LOWER EXTREMITY: ICD-10-CM

## 2018-03-26 DIAGNOSIS — M84.375D: ICD-10-CM

## 2018-03-26 PROCEDURE — 99213 OFFICE O/P EST LOW 20 MIN: CPT | Performed by: ORTHOPAEDIC SURGERY

## 2018-03-26 PROCEDURE — 73650 X-RAY EXAM OF HEEL: CPT | Mod: LT

## 2018-03-26 ASSESSMENT — PAIN SCALES - GENERAL: PAINLEVEL: MILD PAIN (3)

## 2018-03-26 NOTE — LETTER
3/26/2018         RE: Mily Grullon  [address] [address]        Dear Colleague,    Thank you for referring your patient, Mily Grullon, to the [facility]. Please see a copy of my visit note below.    chief complaint:   No chief complaint on file.    INJURY: left calcaneal stress fracture, achilles tendonitis  ONSET: 10/2017      HISTORY OF PRESENT ILLNESS    Mily Grullon is a 37 year old female seen for follow up evaluation of a left ankle and heel pain that started on 10/13/2017, 5 months ago. No known injury. She started to notice the pain at work without a specific event. Had MRI showing stress fracture of the calcaneus. Today patient returns with mild pain, rated a 3/10. She has started to notice improvement. She is able to stand with crutches for a longer period of time. It also takes a shorter time to recover from her pain. Continues to use 2 crutches and is in the boot almost 24/7, only taking it off for showering. She still has pain, however is not as severe as at last visit. Presents with her mom today.    Of note: this is not a work comp claim. Patient has osteoporosis. She briefly mentioned having hip pain today. Hasn't followed up or discussed with Dr. Deleon.      Present symptoms: mild pain, swelling.    Symptoms occur walking and standing.    The frequency of symptoms: frequently.  Denies associated numbness or tingling.   Pain severity: 3/10  Pain quality: aching and sharp  Associated symptoms: none  Aggravating Factors: weightbearing  Relieving Factors: at rest, with brace, 2 crutch    Treatment up to this point: short ankle boot, 2 crutch, physical therapy   Has not tried: none  Prior history of related problems: none    Orthopedic PMH: history of left hip pain.    Other PMH:  has a past medical history of Endometriosis, site unspecified; Gastritis (2010); Urinary calculus, unspecified; and Wrist injury (Nov 19, 2003).  Patient Active  Problem List    Diagnosis Date Noted     Closed nondisplaced fracture of body of left calcaneus with delayed healing, subsequent encounter 03/09/2018     Priority: Medium     Heel pain, chronic, left 03/09/2018     Priority: Medium     Pain in joint, ankle and foot, left 11/15/2017     Priority: Medium     Abnormal gait 07/21/2016     Priority: Medium     Migraine without aura and without status migrainosus, not intractable 11/10/2015     Priority: Medium     Abnormality of gait 06/09/2014     Priority: Medium     Other postprocedural status(V45.89) 03/10/2014     Priority: Medium     Closed nondisplaced intertrochanteric fracture of left femur (H) 02/10/2014     Priority: Medium     Senile osteoporosis 12/10/2013     Priority: Medium     Headache 10/31/2011     Priority: Medium     Problem list name updated by automated process. Provider to review       Gastritis      Priority: Medium     dx 2010- sees Dr Gl[facility]       CARDIOVASCULAR SCREENING; LDL GOAL LESS THAN 160 10/31/2010     Priority: Medium     Left elbow pain 01/08/2010     Priority: Medium     Narcotic agreement on file       Tobacco use disorder 12/17/2009     Priority: Medium       Surgical Hx:  has a past surgical history that includes REPAIR TRIANGULAR CART,WRIST JT (2005); REPAIR TRIANGULAR CART,WRIST JT (2007 or so); arthroscopy of joint unlisted (4/2004); REMOVAL OF OVARY/TUBE(S) (6/2003); hysteroscopy; lithotripsy; and Arthroscopy hip, osteoplasty femur proximal, combined (Left, 6/29/2016).    Medications:   Current Outpatient Prescriptions:      traZODone (DESYREL) 50 MG tablet, , Disp: , Rfl:      DULoxetine (CYMBALTA) 20 MG EC capsule, , Disp: , Rfl:      order for DME, Left boot, Disp: 1 Units, Rfl: 0     propranolol (INDERAL LA) 120 MG 24 hr capsule, TAKE ONE CAPSULE BY MOUTH TWICE A DAY, Disp: 60 capsule, Rfl: 0     nortriptyline (PAMELOR) 50 MG capsule, Take by mouth 2 times daily, Disp: , Rfl:      meclizine  (ANTIVERT) 25 MG tablet, Take 1 tablet (25 mg) by mouth every 6 hours as needed for dizziness, Disp: 40 tablet, Rfl: 1     medroxyPROGESTERone (DEPO-PROVERA) 150 MG/ML injection, Inject 1 mL (150 mg) into the muscle every 3 months, Disp: 1 mL, Rfl: 3     acetaminophen (TYLENOL) 325 MG tablet, Take 2 tablets (650 mg) by mouth every 4 hours as needed for other (mild pain), Disp: 100 tablet, Rfl: 0     Pantoprazole Sodium (PROTONIX PO), Take 40 mg by mouth 2 times daily (before meals), Disp: , Rfl:      Prochlorperazine Maleate (COMPAZINE PO), Take 10 mg by mouth every morning, Disp: , Rfl:     Allergies:   Allergies   Allergen Reactions     Amitriptyline Hives     Amoxicillin Diarrhea     Asa [Dihydroxyaluminum Aminoacetate]      Cipro [Ciprofloxacin]      Dizziness, vomiting, shortness of breath     Ibuprofen [Aspartame-Ibuprofen]      GI distress       Lyrica      extrematies swell up      Morphine      ithcy     Naproxen      GI distress     Neurontin [Gabapentin]      rash     Paxil [Paroxetine] Anaphylaxis     anaphylaxis     Phenergan [Promethazine Hcl]      dystonia     Prilosec [Omeprazole]      Rash, dizziness     Gabapentin Rash       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 Her smoking use included Cigarettes. She has never used smokeless tobacco. She reports that she drinks alcohol. She reports that she does not use illicit drugs.    Family Hx: family history includes CANCER in her paternal grandmother; Cardiovascular in her maternal grandfather; Genitourinary Problems in her maternal grandmother; HEART DISEASE in her maternal grandfather; Hypertension in her father and maternal grandmother; Lipids in her father. There is no history of Asthma, C.A.D., DIABETES, CEREBROVASCULAR DISEASE, Breast Cancer, Cancer - colorectal, Prostate Cancer, Alzheimer Disease, Arthritis, Blood Disease, Circulatory, Eye Disorder, GASTROINTESTINAL DISEASE, Musculoskeletal Disorder, Neurologic  "Disorder, Respiratory, or Thyroid Disease.    REVIEW OF SYSTEMS:    CONSTITUTIONAL:NEGATIVE for fever, chills, change in weight  INTEGUMENTARY/SKIN: NEGATIVE for worrisome rashes, moles or lesions  MUSCULOSKELETAL:See HPI above  NEURO: NEGATIVE for weakness, dizziness or paresthesias    This document serves as a record of the services and decisions personally performed and made by Jose Alford MD. It was created on his behalf by Ashlee Linn, a trained medical scribe. The creation of this document is based the provider's statements to the medical scribe.    Scribe Ashlee Linn 9:39 AM 3/26/2018     PHYSICAL EXAM:  /83 (BP Location: Right arm)  Resp 16  Ht 1.575 m (5' 2\")  Wt 84.8 kg (187 lb)  BMI 34.2 kg/m2   GENERAL APPEARANCE: healthy, alert, no distress; accompanied by her mother  SKIN: no suspicious lesions or rashes  NEURO: Normal strength and tone, mentation intact and speech normal  PSYCH:  mentation appears normal and affect normal  RESPIRATORY: No increased work of breathing.    BILATERAL LOWER EXTREMITIES:  Gait: antalgic favoring left in boot with 2 crutches.    Left lower extremity:  Left lower extremity weakness.  Intact sensation deep peroneal nerve, superficial peroneal nerve, med/lat tibial nerve, sural nerve, saphenous nerve  Intact EHL, EDL, TA, FHL, GS, quadriceps hamstrings and hip flexors  Toes warm and well perfused, brisk capillary refill. Palpable 2+ dp pulses.  calf soft and nttp or squeeze.  Edema: none    left ANKLE  Inspection: skin intact. No erythema or ecchymosis.   Swelling: none   Tender: distal achilles just proximal to the insertion, minimal tenderness at the achilles insertion, calcaneus/heel  Positive calcaneal squeeze.  Range of Motion:grossly intact  Strength: intact  Tight heel cord, dorsiflexion to neutral with discomfort.      X-RAY: 2 calcaneal views 3/26/2018 were reviewed today. On my review, decreased sclerotic band of calcaneus compared to 1/22/2018..    MRI of " the left ankle taken on 1/4/2018 was reviewed today.    IMPRESSION:    1. Calcaneal stress fracture.  2. Trace retrocalcaneal bursitis.        Impression: 37 year old female with left ankle/heel pain, calcaneal stress fracture, left achilles tendonitis    Plan:   * Discussed with patient and mom that exam and imaging studies consistent with calcaneal stress fracture as well as achilles tendinosis. Most cases respond well to nonoperative treatment.  * review of xrays today show decreased band of sclerosis of the calcaneus to suggest some healing since prior visit.  * Continue non-op treatment below.    * Physical Therapy. Achilles stretching, ankle range of motion, consider u/s.  * Rest  * Activity modification - avoid activities that aggravate symptoms.  * Ice twice daily to three times daily.  * immobilization: can wean from boot at this time. regular shoe and heel cushion. Start with 2 crutches and weightbearing. Gradually wean to 1 crutch, then none as comfort allows.  * Elevation of extremity to reduce swelling  * gentle heel cord stretching  * Tylenol as needed for pain  * Workability update: Continue to work with 5 hour work restrictions, strictly sedentary.  * return to clinic 4 weeks, sooner if needed, for clinical recheck.      The information in this document, created by a scribe for me, accurately reflects the services I personally performed and the decisions made by me. I have reviewed and approved this document for accuracy.      Jose Alford M.D., M.S.  Dept. of Orthopaedic Surgery  [facility]    Again, thank you for allowing me to participate in the care of your patient.        Sincerely,        Jose Alford MD

## 2018-03-26 NOTE — PROGRESS NOTES
chief complaint:   No chief complaint on file.    INJURY: left calcaneal stress fracture, achilles tendonitis  ONSET: 10/2017      HISTORY OF PRESENT ILLNESS    Mily Grullon is a 37 year old female seen for follow up evaluation of a left ankle and heel pain that started on 10/13/2017, 5 months ago. No known injury. She started to notice the pain at work without a specific event. Had MRI showing stress fracture of the calcaneus. Today patient returns with mild pain, rated a 3/10. She has started to notice improvement. She is able to stand with crutches for a longer period of time. It also takes a shorter time to recover from her pain. Continues to use 2 crutches and is in the boot almost 24/7, only taking it off for showering. She still has pain, however is not as severe as at last visit. Presents with her mom today.    Of note: this is not a work comp claim. Patient has osteoporosis. She briefly mentioned having hip pain today. Hasn't followed up or discussed with Dr. Deleon.      Present symptoms: mild pain, swelling.    Symptoms occur walking and standing.    The frequency of symptoms: frequently.  Denies associated numbness or tingling.   Pain severity: 3/10  Pain quality: aching and sharp  Associated symptoms: none  Aggravating Factors: weightbearing  Relieving Factors: at rest, with brace, 2 crutch    Treatment up to this point: short ankle boot, 2 crutch, physical therapy   Has not tried: none  Prior history of related problems: none    Orthopedic PMH: history of left hip pain.    Other PMH:  has a past medical history of Endometriosis, site unspecified; Gastritis (2010); Urinary calculus, unspecified; and Wrist injury (Nov 19, 2003).  Patient Active Problem List    Diagnosis Date Noted     Closed nondisplaced fracture of body of left calcaneus with delayed healing, subsequent encounter 03/09/2018     Priority: Medium     Heel pain, chronic, left 03/09/2018     Priority: Medium     Pain in joint, ankle and  foot, left 11/15/2017     Priority: Medium     Abnormal gait 07/21/2016     Priority: Medium     Migraine without aura and without status migrainosus, not intractable 11/10/2015     Priority: Medium     Abnormality of gait 06/09/2014     Priority: Medium     Other postprocedural status(V45.89) 03/10/2014     Priority: Medium     Closed nondisplaced intertrochanteric fracture of left femur (H) 02/10/2014     Priority: Medium     Senile osteoporosis 12/10/2013     Priority: Medium     Headache 10/31/2011     Priority: Medium     Problem list name updated by automated process. Provider to review       Gastritis      Priority: Medium     dx 2010- sees Dr Glover in [facility]       CARDIOVASCULAR SCREENING; LDL GOAL LESS THAN 160 10/31/2010     Priority: Medium     Left elbow pain 01/08/2010     Priority: Medium     Narcotic agreement on file       Tobacco use disorder 12/17/2009     Priority: Medium       Surgical Hx:  has a past surgical history that includes REPAIR TRIANGULAR CART,WRIST JT (2005); REPAIR TRIANGULAR CART,WRIST JT (2007 or so); arthroscopy of joint unlisted (4/2004); REMOVAL OF OVARY/TUBE(S) (6/2003); hysteroscopy; lithotripsy; and Arthroscopy hip, osteoplasty femur proximal, combined (Left, 6/29/2016).    Medications:   Current Outpatient Prescriptions:      traZODone (DESYREL) 50 MG tablet, , Disp: , Rfl:      DULoxetine (CYMBALTA) 20 MG EC capsule, , Disp: , Rfl:      order for DME, Left boot, Disp: 1 Units, Rfl: 0     propranolol (INDERAL LA) 120 MG 24 hr capsule, TAKE ONE CAPSULE BY MOUTH TWICE A DAY, Disp: 60 capsule, Rfl: 0     nortriptyline (PAMELOR) 50 MG capsule, Take by mouth 2 times daily, Disp: , Rfl:      meclizine (ANTIVERT) 25 MG tablet, Take 1 tablet (25 mg) by mouth every 6 hours as needed for dizziness, Disp: 40 tablet, Rfl: 1     medroxyPROGESTERone (DEPO-PROVERA) 150 MG/ML injection, Inject 1 mL (150 mg) into the muscle every 3 months, Disp: 1 mL, Rfl: 3      acetaminophen (TYLENOL) 325 MG tablet, Take 2 tablets (650 mg) by mouth every 4 hours as needed for other (mild pain), Disp: 100 tablet, Rfl: 0     Pantoprazole Sodium (PROTONIX PO), Take 40 mg by mouth 2 times daily (before meals), Disp: , Rfl:      Prochlorperazine Maleate (COMPAZINE PO), Take 10 mg by mouth every morning, Disp: , Rfl:     Allergies:   Allergies   Allergen Reactions     Amitriptyline Hives     Amoxicillin Diarrhea     Asa [Dihydroxyaluminum Aminoacetate]      Cipro [Ciprofloxacin]      Dizziness, vomiting, shortness of breath     Ibuprofen [Aspartame-Ibuprofen]      GI distress       Lyrica      extrematies swell up      Morphine      ithcy     Naproxen      GI distress     Neurontin [Gabapentin]      rash     Paxil [Paroxetine] Anaphylaxis     anaphylaxis     Phenergan [Promethazine Hcl]      dystonia     Prilosec [Omeprazole]      Rash, dizziness     Gabapentin Rash       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 Her smoking use included Cigarettes. She has never used smokeless tobacco. She reports that she drinks alcohol. She reports that she does not use illicit drugs.    Family Hx: family history includes CANCER in her paternal grandmother; Cardiovascular in her maternal grandfather; Genitourinary Problems in her maternal grandmother; HEART DISEASE in her maternal grandfather; Hypertension in her father and maternal grandmother; Lipids in her father. There is no history of Asthma, C.A.D., DIABETES, CEREBROVASCULAR DISEASE, Breast Cancer, Cancer - colorectal, Prostate Cancer, Alzheimer Disease, Arthritis, Blood Disease, Circulatory, Eye Disorder, GASTROINTESTINAL DISEASE, Musculoskeletal Disorder, Neurologic Disorder, Respiratory, or Thyroid Disease.    REVIEW OF SYSTEMS:    CONSTITUTIONAL:NEGATIVE for fever, chills, change in weight  INTEGUMENTARY/SKIN: NEGATIVE for worrisome rashes, moles or lesions  MUSCULOSKELETAL:See HPI above  NEURO: NEGATIVE for weakness,  "dizziness or paresthesias    This document serves as a record of the services and decisions personally performed and made by Jose Alford MD. It was created on his behalf by Ashlee Linn, a trained medical scribe. The creation of this document is based the provider's statements to the medical scribe.    Scribe Ashlee Linn 9:39 AM 3/26/2018     PHYSICAL EXAM:  /83 (BP Location: Right arm)  Resp 16  Ht 1.575 m (5' 2\")  Wt 84.8 kg (187 lb)  BMI 34.2 kg/m2   GENERAL APPEARANCE: healthy, alert, no distress; accompanied by her mother  SKIN: no suspicious lesions or rashes  NEURO: Normal strength and tone, mentation intact and speech normal  PSYCH:  mentation appears normal and affect normal  RESPIRATORY: No increased work of breathing.    BILATERAL LOWER EXTREMITIES:  Gait: antalgic favoring left in boot with 2 crutches.    Left lower extremity:  Left lower extremity weakness.  Intact sensation deep peroneal nerve, superficial peroneal nerve, med/lat tibial nerve, sural nerve, saphenous nerve  Intact EHL, EDL, TA, FHL, GS, quadriceps hamstrings and hip flexors  Toes warm and well perfused, brisk capillary refill. Palpable 2+ dp pulses.  calf soft and nttp or squeeze.  Edema: none    left ANKLE  Inspection: skin intact. No erythema or ecchymosis.   Swelling: none   Tender: distal achilles just proximal to the insertion, minimal tenderness at the achilles insertion, calcaneus/heel  Positive calcaneal squeeze.  Range of Motion:grossly intact  Strength: intact  Tight heel cord, dorsiflexion to neutral with discomfort.      X-RAY: 2 calcaneal views 3/26/2018 were reviewed today. On my review, decreased sclerotic band of calcaneus compared to 1/22/2018..    MRI of the left ankle taken on 1/4/2018 was reviewed today.    IMPRESSION:    1. Calcaneal stress fracture.  2. Trace retrocalcaneal bursitis.        Impression: 37 year old female with left ankle/heel pain, calcaneal stress fracture, left achilles " tendonitis    Plan:   * Discussed with patient and mom that exam and imaging studies consistent with calcaneal stress fracture as well as achilles tendinosis. Most cases respond well to nonoperative treatment.  * review of xrays today show decreased band of sclerosis of the calcaneus to suggest some healing since prior visit.  * Continue non-op treatment below.    * Physical Therapy. Achilles stretching, ankle range of motion, consider u/s.  * Rest  * Activity modification - avoid activities that aggravate symptoms.  * Ice twice daily to three times daily.  * immobilization: can wean from boot at this time. regular shoe and heel cushion. Start with 2 crutches and weightbearing. Gradually wean to 1 crutch, then none as comfort allows.  * Elevation of extremity to reduce swelling  * gentle heel cord stretching  * Tylenol as needed for pain  * Workability update: Continue to work with 5 hour work restrictions, strictly sedentary.  * return to clinic 4 weeks, sooner if needed, for clinical recheck.      The information in this document, created by a scribe for me, accurately reflects the services I personally performed and the decisions made by me. I have reviewed and approved this document for accuracy.      Jose Alford M.D., M.S.  Dept. of Orthopaedic Surgery  [facility]

## 2018-03-26 NOTE — LETTER
[facility]  [address]  [place] [address] [phone]      WORKABILITY    [facility], Dr. Jose Alford M.D., DEONDRE [place] [facility]        3/26/2018      RE: Mily Grullon    [address] [address]        To whom it may concern:     Mily Grullon is under my professional care for   1. Stress fracture of left calcaneus with routine healing        Ms. Grullon can return to work WITH RESTRICTIONS: 5 hours/day, protected weight bearing with left lower extremity with the use of crutches. DURATION OF LIMITATIONS: 4 weeks       Next appointment: 1 month        Jose Daniels PA-C, CAQ (Ortho)  Supervising Physician: Jose Alford M.D., M.S.  Dept. of Orthopaedic Surgery  [facility]

## 2018-03-29 ENCOUNTER — THERAPY VISIT (OUTPATIENT)
Dept: PHYSICAL THERAPY | Facility: CLINIC | Age: 38
End: 2018-03-29
Payer: COMMERCIAL

## 2018-03-29 DIAGNOSIS — G89.29 HEEL PAIN, CHRONIC, LEFT: ICD-10-CM

## 2018-03-29 DIAGNOSIS — S92.015G CLOSED NONDISPLACED FRACTURE OF BODY OF LEFT CALCANEUS WITH DELAYED HEALING, SUBSEQUENT ENCOUNTER: ICD-10-CM

## 2018-03-29 DIAGNOSIS — M79.672 HEEL PAIN, CHRONIC, LEFT: ICD-10-CM

## 2018-03-29 PROCEDURE — 97110 THERAPEUTIC EXERCISES: CPT | Mod: GP | Performed by: PHYSICAL THERAPIST

## 2018-03-29 PROCEDURE — 97116 GAIT TRAINING THERAPY: CPT | Mod: GP | Performed by: PHYSICAL THERAPIST

## 2018-03-29 NOTE — PROGRESS NOTES
Subjective:  HPI                    Objective:  System    Physical Exam    General     ROS    Assessment/Plan:    SUBJECTIVE  Subjective: Patient reports having lots of pain in her entire left leg, but is getting some relief from ibuprofen. Patient tried new shoes, but they were too tight and felt her ankle throbbing. Patient is now working 5 hour shifts instead of 4. Patient asked if she could bike at home, informed her this was okay.  Current Pain level: 5/10   Changes in function:  Yes (See Goal flowsheet attached for changes in current functional level)     Adverse reaction to treatment or activity:  None    OBJECTIVE  Objective: Uses step-to pattern with crutches, very slow amanda, flexed posture. Crutch handles too low, adjusted to fit patient.      ASSESSMENT  Mily continues to require intervention to meet STG and LTG's: PT  Patient is progressing as expected.  Response to therapy has shown an improvement in  ROM  and gait  Progress made towards STG/LTG?  Yes (See Goal flowsheet attached for updates on achievement of STG and LTG)  Goals adjusted based on MD orders for removal of walking boot and maintaining use of crutches. Orders are for WBAT without boot, with 2 crutches, weaning to 1 crutch as tolerated, and ultimately no crutches as tolerated.    PLAN  Current treatment program is being advanced to more complex exercises.    PTA/ATC plan:  N/A    Please refer to the daily flowsheet for treatment today, total treatment time and time spent performing 1:1 timed codes.

## 2018-03-29 NOTE — MR AVS SNAPSHOT
After Visit Summary   3/29/2018    Mily Grullon    MRN: [id number]           Patient Information     Date Of Birth          1980        Visit Information        Provider Department      3/29/2018 9:20 AM Ramsey Willis PT [facility] [place] [facility]        Today's Diagnoses     Closed nondisplaced fracture of body of left calcaneus with delayed healing, subsequent encounter        Heel pain, chronic, left           Follow-ups after your visit        Your next 10 appointments already scheduled     Apr 02, 2018  1:10 PM CDT   CLEO Extremity with Ramsey Gamboa PTA   [facility] [place] [facility] ([facility] [place])    [id number]  [place] [address]   [phone]            Apr 06, 2018  1:30 PM CDT   CLEO Extremity with Ramsey Gamoba PTA   [facility] [place] [facility] ([facility] [place])    [address]  [address]  [place] [address]   [phone]            Apr 09, 2018  1:50 PM CDT   CLEO Extremity with Ramsey Willis PT   [facility] [place] [facility] ([facility] [place])    [address]  [address]  [place] [address]   [phone]            Apr 12, 2018  1:50 PM CDT   CLEO Extremity with Ramsey Willis PT   [facility] [place] [facility] ([facility] [place])    [address]  [address]  [place] [address]   [phone]            Apr 16, 2018  1:10 PM CDT   CLEO Extremity with Ramsey Gamboa PTA   [facility] [place] [facility] ([facility] [place])    [address]  [address]  [place] [address]   [phone]            Apr 19, 2018 12:40 PM CDT   CLEO Extremity with Ramsey Gamboa PTA   [facility] [place] [facility] ([facility] [place])    [address]  [address]  [place] [address]   [phone]            Apr 23, 2018  9:15 AM CDT   Return Visit with Jose Alford MD   [facility] [place] ([facility] [place])    [address]  [place] [address]   [phone]            Apr 24, 2018  1:50 PM CDT   CLEO Extremity with Ramsey Willis, PT   [facility] [place] [facility] ([facility] [place])    [address]  [address]  [place] [address]   [phone]            Apr 27, 2018  1:30 PM CDT   CLEO Extremity with Ramsey Willis, PT   [facility] [place] [facility] ([facility] [place])    [address]  [address]  [place] [address]   [phone]              Who to contact     If you have questions or need follow up information about today's clinic visit or your schedule please contact [facility] directly at [phone].  Normal or non-critical lab and imaging results will be communicated to you by Mr. Youth[employer], letter or phone within 4 business days after the clinic has received the results. If you do not hear from us within 7 days, please contact the clinic through TRAt or phone. If you have a critical or abnormal lab result, we will notify you by phone as soon as possible.  Submit refill requests through [employer] or call your pharmacy and they will forward the refill request to us. Please allow 3 business days for your refill to be completed.          Additional Information About Your Visit        Mr. Youthharbookletmobile Information     [employer] lets you send messages to your doctor, view your test results, renew your prescriptions, schedule appointments and more. To sign up, go to [email or web address] . Click on \"Log in\" on the left side of the screen, which will take you to the Welcome page. Then click on \"Sign up Now\" on the right side of the page.     You will be asked to enter the access code listed below, as well as some personal information. Please follow the directions to create your username and password.     Your access code is: [email or web address]  Expires: 6/7/2018  " 6:08 PM     Your access code will  in 90 days. If you need help or a new code, please call your [facility] clinic or [phone].        Care EveryWhere ID     This is your Care EveryWhere ID. This could be used by other organizations to access your [facility] medical records  [id number]         Blood Pressure from Last 3 Encounters:   18 137/83   10/25/17 132/74   17 126/79    Weight from Last 3 Encounters:   18 84.8 kg (187 lb)   18 84.8 kg (187 lb)   18 84.8 kg (187 lb)              We Performed the Following     Gait Training Therapy     Therapeutic Exercises        Primary Care Provider Office Phone # Fax #    Kristen M Kehr, PA-C [phone] [address] [address]        Equal Access to Services     [facility]: Hadii aad ku hadasho Soomaali, waaxda luqadaha, qaybta kaalmada adeegyada, waxay idiin hayaan adekeira fitzgerald . So [facility] [phone].    ATENCIÓN: Si habla español, tiene a lentz disposición servicios gratuitos de asistencia lingüística. Llame al [phone].    We comply with applicable federal civil rights laws and [place] laws. We do not discriminate on the basis of race, color, national origin, age, disability, sex, sexual orientation, or gender identity.            Thank you!     Thank you for choosing [facility]  for your care. Our goal is always to provide you with excellent care. Hearing back from our patients is one way we can continue to improve our services. Please take a few minutes to complete the written survey that you may receive in the mail after your visit with us. Thank you!             Your Updated Medication List - Protect others around you: Learn how to safely use, store and throw away your medicines at www.disposemymeds.org.          This list is accurate as of 3/29/18 10:46 AM.  Always use your most recent med list.                   Brand Name Dispense Instructions for  use Diagnosis    acetaminophen 325 MG tablet    TYLENOL    100 tablet    Take 2 tablets (650 mg) by mouth every 4 hours as needed for other (mild pain)    Orthopedic aftercare       COMPAZINE PO      Take 10 mg by mouth every morning        DULoxetine 20 MG EC capsule    CYMBALTA          meclizine 25 MG tablet    ANTIVERT    40 tablet    Take 1 tablet (25 mg) by mouth every 6 hours as needed for dizziness    Vertigo       medroxyPROGESTERone 150 MG/ML injection    DEPO-PROVERA    1 mL    Inject 1 mL (150 mg) into the muscle every 3 months    Encounter for surveillance of injectable contraceptive       nortriptyline 50 MG capsule    PAMELOR     Take by mouth 2 times daily    Vertigo, Other fatigue, Weight gain       order for DME     1 Units    Left boot    Left Achilles tendinitis       propranolol 120 MG 24 hr capsule    INDERAL LA    60 capsule    TAKE ONE CAPSULE BY MOUTH TWICE A DAY    Migraine with aura and without status migrainosus, not intractable       PROTONIX PO      Take 40 mg by mouth 2 times daily (before meals)        traZODone 50 MG tablet    DESYREL

## 2018-04-02 ENCOUNTER — THERAPY VISIT (OUTPATIENT)
Dept: PHYSICAL THERAPY | Facility: CLINIC | Age: 38
End: 2018-04-02
Payer: COMMERCIAL

## 2018-04-02 DIAGNOSIS — M79.672 HEEL PAIN, CHRONIC, LEFT: ICD-10-CM

## 2018-04-02 DIAGNOSIS — S92.015G CLOSED NONDISPLACED FRACTURE OF BODY OF LEFT CALCANEUS WITH DELAYED HEALING, SUBSEQUENT ENCOUNTER: ICD-10-CM

## 2018-04-02 DIAGNOSIS — G89.29 HEEL PAIN, CHRONIC, LEFT: ICD-10-CM

## 2018-04-02 PROCEDURE — 97110 THERAPEUTIC EXERCISES: CPT | Mod: GP | Performed by: PHYSICAL THERAPY ASSISTANT

## 2018-04-02 PROCEDURE — 97116 GAIT TRAINING THERAPY: CPT | Mod: GP | Performed by: PHYSICAL THERAPY ASSISTANT

## 2018-04-06 ENCOUNTER — THERAPY VISIT (OUTPATIENT)
Dept: PHYSICAL THERAPY | Facility: CLINIC | Age: 38
End: 2018-04-06
Payer: COMMERCIAL

## 2018-04-06 DIAGNOSIS — S92.015G CLOSED NONDISPLACED FRACTURE OF BODY OF LEFT CALCANEUS WITH DELAYED HEALING, SUBSEQUENT ENCOUNTER: ICD-10-CM

## 2018-04-06 DIAGNOSIS — G89.29 HEEL PAIN, CHRONIC, LEFT: ICD-10-CM

## 2018-04-06 DIAGNOSIS — M79.672 HEEL PAIN, CHRONIC, LEFT: ICD-10-CM

## 2018-04-06 PROCEDURE — 97110 THERAPEUTIC EXERCISES: CPT | Mod: GP | Performed by: PHYSICAL THERAPIST

## 2018-04-06 PROCEDURE — 97116 GAIT TRAINING THERAPY: CPT | Mod: GP | Performed by: PHYSICAL THERAPIST

## 2018-04-06 NOTE — PROGRESS NOTES
Subjective:  HPI                    Objective:  System    Physical Exam    General     ROS    Assessment/Plan:    SUBJECTIVE  Subjective: Pt still sore, c/o throbbing, shooting pain. It had been feeling better on Monday, but got worse through week. C/o of being unable to fit into her newer shoes, as her ankle is too swollen. Only able to don her older pair of shoes at the moment.    Current Pain level: 5/10   Changes in function:  Yes (See Goal flowsheet attached for changes in current functional level)     Adverse reaction to treatment or activity:  None    OBJECTIVE  Objective: Sidra improved for gait on level surface with crutches, using strep-through pattern with more symmetrical step length than previously.      ASSESSMENT  Mily continues to require intervention to meet STG and LTG's: PT  Patient is progressing as expected.  Response to therapy has shown an improvement in  gait  Progress made towards STG/LTG?  Yes (See Goal flowsheet attached for updates on achievement of STG and LTG)    PLAN  Continue current treatment plan until patient demonstrates readiness to progress to higher level exercises.    PTA/ATC plan:  N/A    Please refer to the daily flowsheet for treatment today, total treatment time and time spent performing 1:1 timed codes.

## 2018-04-06 NOTE — MR AVS SNAPSHOT
After Visit Summary   4/6/2018    Mily Grullon    MRN: [id number]           Patient Information     Date Of Birth          1980        Visit Information        Provider Department      4/6/2018 1:30 PM Ramsey Willis PT [facility] [place] [facility]        Today's Diagnoses     Closed nondisplaced fracture of body of left calcaneus with delayed healing, subsequent encounter        Heel pain, chronic, left           Follow-ups after your visit        Your next 10 appointments already scheduled     Apr 09, 2018  1:50 PM CDT   CLEO Extremity with Ramsey Willis PT   [facility] [place] [facility] ([facility] [place])    [address]  [address]  [place] [address]   [phone]            Apr 12, 2018  1:50 PM CDT   CLEO Extremity with Ramsey Willis PT   [facility] [place] [facility] ([facility] [place])    [address]  [address]  [place] [address]   [phone]            Apr 16, 2018  1:10 PM CDT   CLEO Extremity with Ramsey Gamboa PTA   [facility] [place] [facility] ([facility] [place])    [address]  [address]  [place] [address]   [phone]            Apr 19, 2018 12:40 PM CDT   CLEO Extremity with Ramsey Gamboa PTA   [facility] [place] [facility] ([facility] [place])    [address]  [address]  [place] [address]   [phone]            Apr 23, 2018  9:15 AM CDT   Return Visit with Jose Alford MD   [facility] [place] ([facility] [place]    [address]  [address]  [place] [address]   [phone]            Apr 24, 2018  1:50 PM CDT   CLEO Extremity with Ramsey Willis PT   [facility] [place] [facility] ([facility] [place])    [address]  [address]  [place] [address]   [phone]            Apr 27, 2018  1:30 PM CDT   CLEO Extremity with Ramsey Willis, PT  "  [facility] [place] [facility] ([facility] [place])    [address]  [address]  [place] [address] [phone]              Who to contact     If you have questions or need follow up information about today's clinic visit or your schedule please contact [facility] directly at [phone].  Normal or non-critical lab and imaging results will be communicated to you by [employer], letter or phone within 4 business days after the clinic has received the results. If you do not hear from us within 7 days, please contact the clinic through [employer] or phone. If you have a critical or abnormal lab result, we will notify you by phone as soon as possible.  Submit refill requests through [employer] or call your pharmacy and they will forward the refill request to us. Please allow 3 business days for your refill to be completed.          Additional Information About Your Visit        [employer] Information     [employer] lets you send messages to your doctor, view your test results, renew your prescriptions, schedule appointments and more. To sign up, go to [email or web address] . Click on \"Log in\" on the left side of the screen, which will take you to the Welcome page. Then click on \"Sign up Now\" on the right side of the page.     You will be asked to enter the access code listed below, as well as some personal information. Please follow the directions to create your username and password.     Your access code is: [email or web address]  Expires: 2018  6:08 PM     Your access code will  in 90 days. If you need help or a new code, please call your [place] clinic or [phone].        Care EveryWhere ID     This is your Care EveryWhere ID. This could be used by other organizations to access your [place] medical records  [id number]         Blood Pressure from Last 3 Encounters:   18 137/83   10/25/17 132/74   17 126/79    Weight from Last 3 Encounters:   18 " 84.8 kg (187 lb)   02/26/18 84.8 kg (187 lb)   01/22/18 84.8 kg (187 lb)              We Performed the Following     Gait Training Therapy     Therapeutic Exercises        Primary Care Provider Office Phone # Fax #    Kristen M Kehr, PA-C [phone] [address] [facility] [address]        Equal Access to Services     [facility] ZACKERY : Hadii aad ku hadasho Soomaali, waaxda luqadaha, qaybta kaalmada adeegyada, waxay idiin hayaan adeeg kharash la'aan . So [facility] [phone].    ATENCIÓN: Si leila sommer, tiene a lentz disposición servicios gratuitos de asistencia lingüística. Llame al [phone].    We comply with applicable federal civil rights laws and [place] laws. We do not discriminate on the basis of race, color, national origin, age, disability, sex, sexual orientation, or gender identity.            Thank you!     Thank you for choosing [facility]  for your care. Our goal is always to provide you with excellent care. Hearing back from our patients is one way we can continue to improve our services. Please take a few minutes to complete the written survey that you may receive in the mail after your visit with us. Thank you!             Your Updated Medication List - Protect others around you: Learn how to safely use, store and throw away your medicines at www.disposemymeds.org.          This list is accurate as of 4/6/18  3:02 PM.  Always use your most recent med list.                   Brand Name Dispense Instructions for use Diagnosis    acetaminophen 325 MG tablet    TYLENOL    100 tablet    Take 2 tablets (650 mg) by mouth every 4 hours as needed for other (mild pain)    Orthopedic aftercare       COMPAZINE PO      Take 10 mg by mouth every morning        DULoxetine 20 MG EC capsule    CYMBALTA          meclizine 25 MG tablet    ANTIVERT    40 tablet    Take 1 tablet (25 mg) by mouth every 6 hours as needed for dizziness    Vertigo        medroxyPROGESTERone 150 MG/ML injection    DEPO-PROVERA    1 mL    Inject 1 mL (150 mg) into the muscle every 3 months    Encounter for surveillance of injectable contraceptive       nortriptyline 50 MG capsule    PAMELOR     Take by mouth 2 times daily    Vertigo, Other fatigue, Weight gain       order for DME     1 Units    Left boot    Left Achilles tendinitis       propranolol 120 MG 24 hr capsule    INDERAL LA    60 capsule    TAKE ONE CAPSULE BY MOUTH TWICE A DAY    Migraine with aura and without status migrainosus, not intractable       PROTONIX PO      Take 40 mg by mouth 2 times daily (before meals)        traZODone 50 MG tablet    DESYREL

## 2018-04-09 ENCOUNTER — THERAPY VISIT (OUTPATIENT)
Dept: PHYSICAL THERAPY | Facility: CLINIC | Age: 38
End: 2018-04-09
Payer: COMMERCIAL

## 2018-04-09 DIAGNOSIS — S92.015G CLOSED NONDISPLACED FRACTURE OF BODY OF LEFT CALCANEUS WITH DELAYED HEALING, SUBSEQUENT ENCOUNTER: ICD-10-CM

## 2018-04-09 DIAGNOSIS — M79.672 HEEL PAIN, CHRONIC, LEFT: ICD-10-CM

## 2018-04-09 DIAGNOSIS — G89.29 HEEL PAIN, CHRONIC, LEFT: ICD-10-CM

## 2018-04-09 PROCEDURE — 97140 MANUAL THERAPY 1/> REGIONS: CPT | Mod: GP | Performed by: PHYSICAL THERAPIST

## 2018-04-09 PROCEDURE — 97110 THERAPEUTIC EXERCISES: CPT | Mod: GP | Performed by: PHYSICAL THERAPIST

## 2018-04-09 PROCEDURE — 97116 GAIT TRAINING THERAPY: CPT | Mod: GP | Performed by: PHYSICAL THERAPIST

## 2018-04-12 ENCOUNTER — THERAPY VISIT (OUTPATIENT)
Dept: PHYSICAL THERAPY | Facility: CLINIC | Age: 38
End: 2018-04-12
Payer: COMMERCIAL

## 2018-04-12 DIAGNOSIS — M79.672 HEEL PAIN, CHRONIC, LEFT: ICD-10-CM

## 2018-04-12 DIAGNOSIS — S92.015G CLOSED NONDISPLACED FRACTURE OF BODY OF LEFT CALCANEUS WITH DELAYED HEALING, SUBSEQUENT ENCOUNTER: ICD-10-CM

## 2018-04-12 DIAGNOSIS — G89.29 HEEL PAIN, CHRONIC, LEFT: ICD-10-CM

## 2018-04-12 PROCEDURE — 97116 GAIT TRAINING THERAPY: CPT | Mod: GP | Performed by: PHYSICAL THERAPIST

## 2018-04-12 PROCEDURE — 97110 THERAPEUTIC EXERCISES: CPT | Mod: GP | Performed by: PHYSICAL THERAPIST

## 2018-04-12 NOTE — PROGRESS NOTES
"Subjective:  HPI                    Objective:  System    Physical Exam    General     ROS    Assessment/Plan:    PROGRESS  REPORT    Progress reporting period is from 3/09/2018 to 4/12/2018.       SUBJECTIVE  Subjective: Patient says walking is going okay. Her hip, calf, and heel still bother her, but she can \"deal with it.\" She says she still has problems with swelling and her shoes fitting. Patient reports not having taken any ibuprofen today. By end of session, patient states that she feels like her \"toes are about to burst out of her shoes.\"     Current Pain level: 4/10.     Initial Pain level: 6/10.   Changes in function:  Yes (See Goal flowsheet attached for changes in current functional level)  Adverse reaction to treatment or activity: None    OBJECTIVE  Objective: TTP over L anterior talofibular ligament, L calcaneus. Coolness over L heel, warmth to touch over forefoot into toes on L. Gait with 2 crutches shows better weight shift onto L and more even stride length compared to using 1 crutch. Gait heavily antalgic without crutches.      ASSESSMENT/PLAN  Updated problem list and treatment plan: Diagnosis 1:  S/P L calcaneal fracture  Pain -  self management, education and home program  Decreased strength - therapeutic exercise, therapeutic activities and home program  Edema - cold therapy and self management/home program  Impaired gait - gait training, assistive devices and home program  Decreased function - therapeutic activities and home program  STG/LTGs have been met or progress has been made towards goals:  Slow progress towards goals. Patient still dependent on two crutches for non-antalgic gait pattern. Her gait has improved, showing symmetrical step length bilaterally with normal upright posture, however continues to demonstrate decreased heel strike on L.  Assessment of Progress: Patient is slowly progressing towards STGs and LTGs, however is limited by pain and swelling.   Self Management Plans:  " Patient has been instructed in a home treatment program.  Mily continues to require the following intervention to meet STG and LTG's:  PT    Recommendations:  This patient would benefit from continued therapy.     Frequency: 2  X week, once daily  Duration:  for 4 weeks        Please refer to the daily flowsheet for treatment today, total treatment time and time spent performing 1:1 timed codes.

## 2018-04-12 NOTE — MR AVS SNAPSHOT
After Visit Summary   4/12/2018    Mily Grullon    MRN: [id number]           Patient Information     Date Of Birth          1980        Visit Information        Provider Department      4/12/2018 1:50 PM Ramsey Willis PT [facility] [place] [facility]        Today's Diagnoses     Closed nondisplaced fracture of body of left calcaneus with delayed healing, subsequent encounter        Heel pain, chronic, left           Follow-ups after your visit        Your next 10 appointments already scheduled     Apr 16, 2018  1:10 PM CDT   CLEO Extremity with Ramsey Gamboa PTA   [facility] [place] [facility] ([facility] [place])    [address]  [address]  [place] [address]   [phone]            Apr 19, 2018 12:40 PM CDT   CLEO Extremity with Ramsey Gamboa PTA   [facility] [place] [facility] ([facility] [place])    [address]  [address]  [place] [address]   [phone]            Apr 23, 2018  9:15 AM CDT   Return Visit with Jose Alford MD   [facility] [place] ([facility] [place])    [address]  [address]  [place] [address]   [phone]            Apr 24, 2018  1:50 PM CDT   CLEO Extremity with Ramsey Willis PT   [facility] [place] [facility] ([facility] [place])    [address]  [address]  [place] [address]   [phone]            Apr 27, 2018  1:30 PM CDT   CLEO Extremity with Ramsey Willis, PT   [facility] [place] [facility] ([facility] [place])    [address]  [address]  [place] [address]   [phone]              Who to contact     If you have questions or need follow up information about today's clinic visit or your schedule please contact [facility] directly at [phone].  Normal or non-critical lab and imaging results will be communicated to you by [employer], letter  "or phone within 4 business days after the clinic has received the results. If you do not hear from us within 7 days, please contact the clinic through [employer] or phone. If you have a critical or abnormal lab result, we will notify you by phone as soon as possible.  Submit refill requests through [employer] or call your pharmacy and they will forward the refill request to us. Please allow 3 business days for your refill to be completed.          Additional Information About Your Visit        [employer] Information     [employer] lets you send messages to your doctor, view your test results, renew your prescriptions, schedule appointments and more. To sign up, go to [email or web address] . Click on \"Log in\" on the left side of the screen, which will take you to the Welcome page. Then click on \"Sign up Now\" on the right side of the page.     You will be asked to enter the access code listed below, as well as some personal information. Please follow the directions to create your username and password.     Your access code is: [email or web address]  Expires: 2018  6:08 PM     Your access code will  in 90 days. If you need help or a new code, please call your [place] clinic or [phone].        Care EveryWhere ID     This is your Care EveryWhere ID. This could be used by other organizations to access your [place] medical records  [id number]         Blood Pressure from Last 3 Encounters:   18 137/83   10/25/17 132/74   17 126/79    Weight from Last 3 Encounters:   18 84.8 kg (187 lb)   18 84.8 kg (187 lb)   18 84.8 kg (187 lb)              We Performed the Following     Gait Training Therapy     Therapeutic Exercises        Primary Care Provider Office Phone # Fax #    Kristen M Kehr, PA-C [phone] [address] [facility] [address]        Equal Access to Services     JASSON VIZCARRA AH: Norma Bliss, waaxda luya, qaybta aminaalabdifatah mariee " jer doylecruzharpreet leary'aan ah. So [facility] [phone].    ATENCIÓN: Si leila sommer, tiene a lentz disposición servicios gratuitos de asistencia lingüística. Rosa randall [phone].    We comply with applicable federal civil rights laws and [place] laws. We do not discriminate on the basis of race, color, national origin, age, disability, sex, sexual orientation, or gender identity.            Thank you!     Thank you for choosing [facility]  for your care. Our goal is always to provide you with excellent care. Hearing back from our patients is one way we can continue to improve our services. Please take a few minutes to complete the written survey that you may receive in the mail after your visit with us. Thank you!             Your Updated Medication List - Protect others around you: Learn how to safely use, store and throw away your medicines at www.disposemymeds.org.          This list is accurate as of 4/12/18  4:13 PM.  Always use your most recent med list.                   Brand Name Dispense Instructions for use Diagnosis    acetaminophen 325 MG tablet    TYLENOL    100 tablet    Take 2 tablets (650 mg) by mouth every 4 hours as needed for other (mild pain)    Orthopedic aftercare       COMPAZINE PO      Take 10 mg by mouth every morning        DULoxetine 20 MG EC capsule    CYMBALTA          meclizine 25 MG tablet    ANTIVERT    40 tablet    Take 1 tablet (25 mg) by mouth every 6 hours as needed for dizziness    Vertigo       medroxyPROGESTERone 150 MG/ML injection    DEPO-PROVERA    1 mL    Inject 1 mL (150 mg) into the muscle every 3 months    Encounter for surveillance of injectable contraceptive       nortriptyline 50 MG capsule    PAMELOR     Take by mouth 2 times daily    Vertigo, Other fatigue, Weight gain       order for DME     1 Units    Left boot    Left Achilles tendinitis       propranolol 120 MG 24 hr capsule    INDERAL LA    60 capsule    TAKE ONE CAPSULE BY  MOUTH TWICE A DAY    Migraine with aura and without status migrainosus, not intractable       PROTONIX PO      Take 40 mg by mouth 2 times daily (before meals)        traZODone 50 MG tablet    DESYREL

## 2018-04-16 ENCOUNTER — THERAPY VISIT (OUTPATIENT)
Dept: PHYSICAL THERAPY | Facility: CLINIC | Age: 38
End: 2018-04-16
Payer: COMMERCIAL

## 2018-04-16 DIAGNOSIS — M79.672 HEEL PAIN, CHRONIC, LEFT: ICD-10-CM

## 2018-04-16 DIAGNOSIS — S92.015G CLOSED NONDISPLACED FRACTURE OF BODY OF LEFT CALCANEUS WITH DELAYED HEALING, SUBSEQUENT ENCOUNTER: ICD-10-CM

## 2018-04-16 DIAGNOSIS — G89.29 HEEL PAIN, CHRONIC, LEFT: ICD-10-CM

## 2018-04-16 PROCEDURE — 97110 THERAPEUTIC EXERCISES: CPT | Mod: GP | Performed by: PHYSICAL THERAPY ASSISTANT

## 2018-04-16 PROCEDURE — 97140 MANUAL THERAPY 1/> REGIONS: CPT | Mod: GP | Performed by: PHYSICAL THERAPY ASSISTANT

## 2018-04-16 NOTE — MR AVS SNAPSHOT
After Visit Summary   4/16/2018    Mily Grullon    MRN: [id number]           Patient Information     Date Of Birth          1980        Visit Information        Provider Department      4/16/2018 1:10 PM Ramsey Gamboa PTA [facility] [place] [facility]        Today's Diagnoses     Closed nondisplaced fracture of body of left calcaneus with delayed healing, subsequent encounter        Heel pain, chronic, left           Follow-ups after your visit        Your next 10 appointments already scheduled     Apr 19, 2018 12:40 PM CDT   CLEO Extremity with Ramsey Gamboa PTA   [facility] [place] [facility] ([facility] [place])    [address]  [address]  [place] [address]   [phone]            Apr 23, 2018  9:15 AM CDT   Return Visit with Jose Alford MD   [facility] [place] ([facility] [place]    [address]  [address]  [place] [address]   [phone]            Apr 24, 2018  1:50 PM CDT   CLEO Extremity with Ramsey Willis, PT   [facility] [place] [facility] ([facility] [place])    [address]  [address]  [place] [address]   [phone]            Apr 27, 2018  1:30 PM CDT   CLEO Extremity with Ramsey Willis, PT   [facility] [place] [facility] ([facility] [place])    [address]  [address]  [place] [address]   [phone]              Who to contact     If you have questions or need follow up information about today's clinic visit or your schedule please contact [facility] directly at [phone].  Normal or non-critical lab and imaging results will be communicated to you by MyChart, letter or phone within 4 business days after the clinic has received the results. If you do not hear from us within 7 days, please contact the clinic through MyChart or phone. If you have a critical or abnormal lab result, we  "will notify you by phone as soon as possible.  Submit refill requests through [employer] or call your pharmacy and they will forward the refill request to us. Please allow 3 business days for your refill to be completed.          Additional Information About Your Visit        [employer] Information     [employer] lets you send messages to your doctor, view your test results, renew your prescriptions, schedule appointments and more. To sign up, go to [email or web address] . Click on \"Log in\" on the left side of the screen, which will take you to the Welcome page. Then click on \"Sign up Now\" on the right side of the page.     You will be asked to enter the access code listed below, as well as some personal information. Please follow the directions to create your username and password.     Your access code is: [email or web address]  Expires: 2018  6:08 PM     Your access code will  in 90 days. If you need help or a new code, please call your [place] clinic or [phone].        Care EveryWhere ID     This is your Care EveryWhere ID. This could be used by other organizations to access your [place] medical records  [id number]         Blood Pressure from Last 3 Encounters:   18 137/83   10/25/17 132/74   17 126/79    Weight from Last 3 Encounters:   18 84.8 kg (187 lb)   18 84.8 kg (187 lb)   18 84.8 kg (187 lb)              We Performed the Following     Manual Ther Tech, 1+Regions, EA 15 min     Therapeutic Exercises        Primary Care Provider Office Phone # Fax #    Kristen M Kehr, PA-C [phone] [address] [address] [address]        Equal Access to Services     JASSON VIZCARRA : Norma Bliss, wamontseda deedeeadaha, qaybta kaalmada nithin, abdifatha spivey. So [facility] [phone].    ATENCIÓN: Si habla español, tiene a lentz disposición servicios gratuitos de asistencia lingüística. Llstevan al [phone].    We comply with " applicable federal civil rights laws and [place] laws. We do not discriminate on the basis of race, color, national origin, age, disability, sex, sexual orientation, or gender identity.            Thank you!     Thank you for choosing [facility]  for your care. Our goal is always to provide you with excellent care. Hearing back from our patients is one way we can continue to improve our services. Please take a few minutes to complete the written survey that you may receive in the mail after your visit with us. Thank you!             Your Updated Medication List - Protect others around you: Learn how to safely use, store and throw away your medicines at www.disposemymeds.org.          This list is accurate as of 4/16/18  2:11 PM.  Always use your most recent med list.                   Brand Name Dispense Instructions for use Diagnosis    acetaminophen 325 MG tablet    TYLENOL    100 tablet    Take 2 tablets (650 mg) by mouth every 4 hours as needed for other (mild pain)    Orthopedic aftercare       COMPAZINE PO      Take 10 mg by mouth every morning        DULoxetine 20 MG EC capsule    CYMBALTA          meclizine 25 MG tablet    ANTIVERT    40 tablet    Take 1 tablet (25 mg) by mouth every 6 hours as needed for dizziness    Vertigo       medroxyPROGESTERone 150 MG/ML injection    DEPO-PROVERA    1 mL    Inject 1 mL (150 mg) into the muscle every 3 months    Encounter for surveillance of injectable contraceptive       nortriptyline 50 MG capsule    PAMELOR     Take by mouth 2 times daily    Vertigo, Other fatigue, Weight gain       order for DME     1 Units    Left boot    Left Achilles tendinitis       propranolol 120 MG 24 hr capsule    INDERAL LA    60 capsule    TAKE ONE CAPSULE BY MOUTH TWICE A DAY    Migraine with aura and without status migrainosus, not intractable       PROTONIX PO      Take 40 mg by mouth 2 times daily (before meals)        traZODone 50 MG tablet    DESYREL

## 2018-04-19 NOTE — PROGRESS NOTES
"Subjective:  HPI                    Objective:  System    Physical Exam    General     ROS    Assessment/Plan:    PROGRESS  REPORT    Progress reporting period is from 3/9/2018 to 4/19/2018.  (info is from 3/9 to 4/16 as pt cancelled PT on 4/19/2018).     SUBJECTIVE  Subjective changes noted by patient: Pt states the left leg remains sore but, not as sore as last week in which her left foot felt like it was going to \"burst in her shoe\".    States she still sits for her work duties.   Current pain level is  4/10.     Previous pain level was  6/10.   Changes in function:  None  Adverse reaction to treatment or activity: None    OBJECTIVE  Changes noted in objective findings:  Pt remains tender along the left medial gastrocnemius, not as sore along the left calcaneous.    Gait: remains heavy more on the right leg, antalgic pattern without use of crutches.      ASSESSMENT/PLAN  Updated problem list and treatment plan: Diagnosis 1:  Left calcaneous fracture  Pain -  hot/cold therapy, manual therapy, splint/taping/bracing/orthotics, self management and home program  Decreased ROM/flexibility - manual therapy, therapeutic exercise and home program  Decreased strength - therapeutic exercise, therapeutic activities and home program  Inflammation - cold therapy and self management/home program  Impaired gait - gait training, CAM boot, and home program  Decreased function - therapeutic activities and home program  Impaired posture - neuro re-education and home program  STG/LTGs have been met or progress has been made towards goals:  None  Assessment of Progress: The patient's progress has slowed.  Self Management Plans:  Patient has been instructed in a home treatment program.  Patient  has been instructed in self management of symptoms.  I have re-evaluated this patient and find that the nature, scope, duration and intensity of the therapy is appropriate for the medical condition of the patient.  Mily continues to require " the following intervention to meet STG and LTG's:  PT    Recommendations:  This patient would benefit from continued therapy.     Frequency:  1-2 X week, once daily  Duration:  for 2-3 weeks    The progress note summary was written in collaboration with and reviewed by the physical therapist.    Please refer to the daily flowsheet for treatment today, total treatment time and time spent performing 1:1 timed codes.

## 2018-04-23 ENCOUNTER — OFFICE VISIT (OUTPATIENT)
Dept: ORTHOPEDICS | Facility: CLINIC | Age: 38
End: 2018-04-23
Payer: COMMERCIAL

## 2018-04-23 VITALS
BODY MASS INDEX: 36.44 KG/M2 | WEIGHT: 198 LBS | HEIGHT: 62 IN | DIASTOLIC BLOOD PRESSURE: 81 MMHG | SYSTOLIC BLOOD PRESSURE: 130 MMHG | RESPIRATION RATE: 16 BRPM

## 2018-04-23 DIAGNOSIS — M84.375G STRESS FRACTURE OF LEFT CALCANEUS WITH DELAYED HEALING, SUBSEQUENT ENCOUNTER: Primary | ICD-10-CM

## 2018-04-23 DIAGNOSIS — M76.62 ACHILLES TENDINITIS OF LEFT LOWER EXTREMITY: ICD-10-CM

## 2018-04-23 PROCEDURE — 99213 OFFICE O/P EST LOW 20 MIN: CPT | Performed by: ORTHOPAEDIC SURGERY

## 2018-04-23 ASSESSMENT — PAIN SCALES - GENERAL: PAINLEVEL: MODERATE PAIN (4)

## 2018-04-23 NOTE — PROGRESS NOTES
chief complaint:   Chief Complaint   Patient presents with     RECHECK     Left calcaneal stress fracture. Onset: 10/2017. Going to PT 2x week. She is starting to use her foot more and weight bearing with crutches, probably 50-75%. SHe has increased discomfort due to increased use. SHe is frustrated and just wants her foot better.      INJURY: left calcaneal stress fracture, achilles tendonitis  ONSET: 10/2017      HISTORY OF PRESENT ILLNESS    Mily Grullon is a 37 year old female seen for follow up evaluation of a left ankle and heel pain that started on 10/13/2017, 6 months ago. No known injury. She started to notice the pain at work without a specific event. Had MRI showing stress fracture of the calcaneus. She returns today with moderate pain today, rated a 4/10. Has been weightbearing with crutches, ~50-75% weightbearing. Patient tries to stand at work, but an only last 15 minutes before having a lot of pain. She continues to have swelling and pain. She does not know how far to push herself because of the pain. She is using a compression brace for her foot and bought new shoes for work. Also is taking ibuprofen again for pain. Continues to go to physical therapy 2x/week. Overall, patient is frustrated and just wants to her foot to get better.       Of note: this is not a work comp claim. Patient has osteoporosis.       Present symptoms: mild pain, swelling.    Symptoms occur walking and standing.    The frequency of symptoms: frequently.  Denies associated numbness or tingling.   Pain severity: 4/10  Pain quality: aching and sharp  Associated symptoms: none  Aggravating Factors: weightbearing  Relieving Factors: at rest, with brace, 2 crutch    Treatment up to this point: short ankle boot, 2 crutch, physical therapy   Has not tried: none  Prior history of related problems: none    Orthopedic PMH: history of left hip pain.    Other PMH:  has a past medical history of Endometriosis, site unspecified; Gastritis  (2010); Urinary calculus, unspecified; and Wrist injury (Nov 19, 2003).  Patient Active Problem List    Diagnosis Date Noted     Closed nondisplaced fracture of body of left calcaneus with delayed healing, subsequent encounter 03/09/2018     Priority: Medium     Heel pain, chronic, left 03/09/2018     Priority: Medium     Pain in joint, ankle and foot, left 11/15/2017     Priority: Medium     Abnormal gait 07/21/2016     Priority: Medium     Migraine without aura and without status migrainosus, not intractable 11/10/2015     Priority: Medium     Abnormality of gait 06/09/2014     Priority: Medium     Other postprocedural status(V45.89) 03/10/2014     Priority: Medium     Closed nondisplaced intertrochanteric fracture of left femur (H) 02/10/2014     Priority: Medium     Senile osteoporosis 12/10/2013     Priority: Medium     Headache 10/31/2011     Priority: Medium     Problem list name updated by automated process. Provider to review       Gastritis      Priority: Medium     dx 2010- sees Dr Glover in [facility]       CARDIOVASCULAR SCREENING; LDL GOAL LESS THAN 160 10/31/2010     Priority: Medium     Left elbow pain 01/08/2010     Priority: Medium     Narcotic agreement on file       Tobacco use disorder 12/17/2009     Priority: Medium       Surgical Hx:  has a past surgical history that includes REPAIR TRIANGULAR CART,WRIST JT (2005); REPAIR TRIANGULAR CART,WRIST JT (2007 or so); arthroscopy of joint unlisted (4/2004); REMOVAL OF OVARY/TUBE(S) (6/2003); hysteroscopy; lithotripsy; and Arthroscopy hip, osteoplasty femur proximal, combined (Left, 6/29/2016).    Medications:   Current Outpatient Prescriptions:      acetaminophen (TYLENOL) 325 MG tablet, Take 2 tablets (650 mg) by mouth every 4 hours as needed for other (mild pain), Disp: 100 tablet, Rfl: 0     DULoxetine (CYMBALTA) 20 MG EC capsule, , Disp: , Rfl:      meclizine (ANTIVERT) 25 MG tablet, Take 1 tablet (25 mg) by mouth every 6 hours as  needed for dizziness, Disp: 40 tablet, Rfl: 1     medroxyPROGESTERone (DEPO-PROVERA) 150 MG/ML injection, Inject 1 mL (150 mg) into the muscle every 3 months, Disp: 1 mL, Rfl: 3     nortriptyline (PAMELOR) 50 MG capsule, Take by mouth 2 times daily, Disp: , Rfl:      order for DME, Left boot, Disp: 1 Units, Rfl: 0     Pantoprazole Sodium (PROTONIX PO), Take 40 mg by mouth 2 times daily (before meals), Disp: , Rfl:      Prochlorperazine Maleate (COMPAZINE PO), Take 10 mg by mouth every morning, Disp: , Rfl:      propranolol (INDERAL LA) 120 MG 24 hr capsule, TAKE ONE CAPSULE BY MOUTH TWICE A DAY, Disp: 60 capsule, Rfl: 0     traZODone (DESYREL) 50 MG tablet, , Disp: , Rfl:     Allergies:   Allergies   Allergen Reactions     Amitriptyline Hives     Amoxicillin Diarrhea     Asa [Dihydroxyaluminum Aminoacetate]      Cipro [Ciprofloxacin]      Dizziness, vomiting, shortness of breath     Ibuprofen [Aspartame-Ibuprofen]      GI distress       Lyrica      extrematies swell up      Morphine      ithcy     Naproxen      GI distress     Neurontin [Gabapentin]      rash     Paxil [Paroxetine] Anaphylaxis     anaphylaxis     Phenergan [Promethazine Hcl]      dystonia     Prilosec [Omeprazole]      Rash, dizziness     Gabapentin Rash       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 Her smoking use included Cigarettes. She has never used smokeless tobacco. She reports that she drinks alcohol. She reports that she does not use illicit drugs.    Family Hx: family history includes CANCER in her paternal grandmother; Cardiovascular in her maternal grandfather; Genitourinary Problems in her maternal grandmother; HEART DISEASE in her maternal grandfather; Hypertension in her father and maternal grandmother; Lipids in her father. There is no history of Asthma, C.A.D., DIABETES, CEREBROVASCULAR DISEASE, Breast Cancer, Cancer - colorectal, Prostate Cancer, Alzheimer Disease, Arthritis, Blood Disease,  "Circulatory, Eye Disorder, GASTROINTESTINAL DISEASE, Musculoskeletal Disorder, Neurologic Disorder, Respiratory, or Thyroid Disease.    REVIEW OF SYSTEMS:    CONSTITUTIONAL:NEGATIVE for fever, chills, change in weight  INTEGUMENTARY/SKIN: NEGATIVE for worrisome rashes, moles or lesions  MUSCULOSKELETAL:See HPI above  NEURO: NEGATIVE for weakness, dizziness or paresthesias    This document serves as a record of the services and decisions personally performed and made by Jose Alford MD. It was created on his behalf by Ashlee Linn, a trained medical scribe. The creation of this document is based the provider's statements to the medical scribe.    Scribe Ashlee Linn 9:39 AM 3/26/2018     PHYSICAL EXAM:  /81 (BP Location: Right arm)  Resp 16  Ht 1.575 m (5' 2\")  Wt 89.8 kg (198 lb)  BMI 36.21 kg/m2   GENERAL APPEARANCE: healthy, alert, no distress  SKIN: no suspicious lesions or rashes  NEURO: Normal strength and tone, mentation intact and speech normal  PSYCH:  mentation appears normal and affect normal  RESPIRATORY: No increased work of breathing.    BILATERAL LOWER EXTREMITIES:  Gait: antalgic favoring left in shoe with 2 crutches.    Left lower extremity:  Left lower extremity weakness.  Intact sensation deep peroneal nerve, superficial peroneal nerve, med/lat tibial nerve, sural nerve, saphenous nerve  Intact EHL, EDL, TA, FHL, GS, quadriceps hamstrings and hip flexors  Toes warm and well perfused, brisk capillary refill. Palpable 2+ dp pulses.  calf soft and nttp or squeeze.  Edema: none    left ANKLE  Inspection: skin intact. No erythema or ecchymosis.   Swelling: none   Tender: distal achilles just proximal to the insertion, minimal tenderness at the achilles insertion, calcaneus/heel  Positive calcaneal squeeze.  Range of Motion:grossly intact  Strength: intact  Tight heel cord, dorsiflexion to neutral with discomfort.      X-RAY: 2 calcaneal views 3/26/2018 were reviewed today. On my review, " decreased sclerotic band of calcaneus compared to 1/22/2018..    MRI of the left ankle taken on 1/4/2018 was reviewed today.    IMPRESSION:    1. Calcaneal stress fracture.  2. Trace retrocalcaneal bursitis.        Impression: 37 year old female with left ankle/heel pain, calcaneal stress fracture, left achilles tendonitis    Plan:   * more pain with increased weight bearing not unusual, but she continues to have more pain than I'd expect at this time now 5 months out from onset.  * given continued pain, suggest repeat MRI.  * An MRI of the left ankle was ordered for further evaluation of the calcaneus.   * Continue non-op treatment below.    * Physical Therapy. Achilles stretching, ankle range of motion, consider u/s.  * Rest  * Activity modification - avoid activities that aggravate symptoms.  * Ice twice daily to three times daily.  * immobilization: Regular shoe and heel cushion. Start with 2 crutches and weightbearing. Gradually wean to 1 crutch, then none as comfort allows.  * Elevation of extremity to reduce swelling  * gentle heel cord stretching  * Tylenol as needed for pain  * Workability update: Continue to work with 5 hour work restrictions, strictly sedentary.  * return to clinic 6 weeks, sooner if needed, for clinical recheck.    * After having the MRI, I would like the patient to call me so that we can discuss the results as well as how to proceed.     The information in this document, created by a scribe for me, accurately reflects the services I personally performed and the decisions made by me. I have reviewed and approved this document for accuracy.      Jose Alford M.D., M.S.  Dept. of Orthopaedic Surgery  [facility]

## 2018-04-23 NOTE — LETTER
[facility]  [address]  [place] [address]  [phone]    WORKABILITY    [facility], Dr. Jose Alford M.D., DEONDRE Whitehead, Kanchan Johnson        4/23/2018      RE: Mily Grullon    [address] [address]        To whom it may concern:     Mily Grullon is under my professional care for   1. Stress fracture of left calcaneus with delayed healing, subsequent encounter    2. Achilles tendinitis of left lower extremity        Ms. rGullon can return to work WITH RESTRICTIONS: 5 hours/day, protected weight bearing with left lower extremity with the use of crutches. DURATION OF LIMITATIONS: 6 weeks.    Next appointment: 6 weeks        Jose Daniels PA-C, CAQ (Ortho)  Supervising Physician: Jose Alford M.D., M.S.  Dept. of Orthopaedic Surgery  [facility]

## 2018-04-23 NOTE — LETTER
4/23/2018         RE: Mily Grullon  [address] [address]        Dear Colleague,    Thank you for referring your patient, Mily Grullon, to the [facility] [place]. Please see a copy of my visit note below.    chief complaint:   Chief Complaint   Patient presents with     RECHECK     Left calcaneal stress fracture. Onset: 10/2017. Going to PT 2x week. She is starting to use her foot more and weight bearing with crutches, probably 50-75%. SHe has increased discomfort due to increased use. SHe is frustrated and just wants her foot better.      INJURY: left calcaneal stress fracture, achilles tendonitis  ONSET: 10/2017      HISTORY OF PRESENT ILLNESS    Mily Grullon is a 37 year old female seen for follow up evaluation of a left ankle and heel pain that started on 10/13/2017, 6 months ago. No known injury. She started to notice the pain at work without a specific event. Had MRI showing stress fracture of the calcaneus. She returns today with moderate pain today, rated a 4/10. Has been weightbearing with crutches, ~50-75% weightbearing. Patient tries to stand at work, but an only last 15 minutes before having a lot of pain. She continues to have swelling and pain. She does not know how far to push herself because of the pain. She is using a compression brace for her foot and bought new shoes for work. Also is taking ibuprofen again for pain. Continues to go to physical therapy 2x/week. Overall, patient is frustrated and just wants to her foot to get better.       Of note: this is not a work comp claim. Patient has osteoporosis.       Present symptoms: mild pain, swelling.    Symptoms occur walking and standing.    The frequency of symptoms: frequently.  Denies associated numbness or tingling.   Pain severity: 4/10  Pain quality: aching and sharp  Associated symptoms: none  Aggravating Factors: weightbearing  Relieving Factors: at rest, with brace, 2 crutch    Treatment  up to this point: short ankle boot, 2 crutch, physical therapy   Has not tried: none  Prior history of related problems: none    Orthopedic PMH: history of left hip pain.    Other PMH:  has a past medical history of Endometriosis, site unspecified; Gastritis (2010); Urinary calculus, unspecified; and Wrist injury (Nov 19, 2003).  Patient Active Problem List    Diagnosis Date Noted     Closed nondisplaced fracture of body of left calcaneus with delayed healing, subsequent encounter 03/09/2018     Priority: Medium     Heel pain, chronic, left 03/09/2018     Priority: Medium     Pain in joint, ankle and foot, left 11/15/2017     Priority: Medium     Abnormal gait 07/21/2016     Priority: Medium     Migraine without aura and without status migrainosus, not intractable 11/10/2015     Priority: Medium     Abnormality of gait 06/09/2014     Priority: Medium     Other postprocedural status(V45.89) 03/10/2014     Priority: Medium     Closed nondisplaced intertrochanteric fracture of left femur (H) 02/10/2014     Priority: Medium     Senile osteoporosis 12/10/2013     Priority: Medium     Headache 10/31/2011     Priority: Medium     Problem list name updated by automated process. Provider to review       Gastritis      Priority: Medium     dx 2010- sees Dr Glover in [facility]       CARDIOVASCULAR SCREENING; LDL GOAL LESS THAN 160 10/31/2010     Priority: Medium     Left elbow pain 01/08/2010     Priority: Medium     Narcotic agreement on file       Tobacco use disorder 12/17/2009     Priority: Medium       Surgical Hx:  has a past surgical history that includes REPAIR TRIANGULAR CART,WRIST JT (2005); REPAIR TRIANGULAR CART,WRIST JT (2007 or so); arthroscopy of joint unlisted (4/2004); REMOVAL OF OVARY/TUBE(S) (6/2003); hysteroscopy; lithotripsy; and Arthroscopy hip, osteoplasty femur proximal, combined (Left, 6/29/2016).    Medications:   Current Outpatient Prescriptions:      acetaminophen (TYLENOL) 325 MG  tablet, Take 2 tablets (650 mg) by mouth every 4 hours as needed for other (mild pain), Disp: 100 tablet, Rfl: 0     DULoxetine (CYMBALTA) 20 MG EC capsule, , Disp: , Rfl:      meclizine (ANTIVERT) 25 MG tablet, Take 1 tablet (25 mg) by mouth every 6 hours as needed for dizziness, Disp: 40 tablet, Rfl: 1     medroxyPROGESTERone (DEPO-PROVERA) 150 MG/ML injection, Inject 1 mL (150 mg) into the muscle every 3 months, Disp: 1 mL, Rfl: 3     nortriptyline (PAMELOR) 50 MG capsule, Take by mouth 2 times daily, Disp: , Rfl:      order for DME, Left boot, Disp: 1 Units, Rfl: 0     Pantoprazole Sodium (PROTONIX PO), Take 40 mg by mouth 2 times daily (before meals), Disp: , Rfl:      Prochlorperazine Maleate (COMPAZINE PO), Take 10 mg by mouth every morning, Disp: , Rfl:      propranolol (INDERAL LA) 120 MG 24 hr capsule, TAKE ONE CAPSULE BY MOUTH TWICE A DAY, Disp: 60 capsule, Rfl: 0     traZODone (DESYREL) 50 MG tablet, , Disp: , Rfl:     Allergies:   Allergies   Allergen Reactions     Amitriptyline Hives     Amoxicillin Diarrhea     Asa [Dihydroxyaluminum Aminoacetate]      Cipro [Ciprofloxacin]      Dizziness, vomiting, shortness of breath     Ibuprofen [Aspartame-Ibuprofen]      GI distress       Lyrica      extrematies swell up      Morphine      ithcy     Naproxen      GI distress     Neurontin [Gabapentin]      rash     Paxil [Paroxetine] Anaphylaxis     anaphylaxis     Phenergan [Promethazine Hcl]      dystonia     Prilosec [Omeprazole]      Rash, dizziness     Gabapentin Rash       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 Her smoking use included Cigarettes. She has never used smokeless tobacco. She reports that she drinks alcohol. She reports that she does not use illicit drugs.    Family Hx: family history includes CANCER in her paternal grandmother; Cardiovascular in her maternal grandfather; Genitourinary Problems in her maternal grandmother; HEART DISEASE in her maternal  "grandfather; Hypertension in her father and maternal grandmother; Lipids in her father. There is no history of Asthma, C.A.D., DIABETES, CEREBROVASCULAR DISEASE, Breast Cancer, Cancer - colorectal, Prostate Cancer, Alzheimer Disease, Arthritis, Blood Disease, Circulatory, Eye Disorder, GASTROINTESTINAL DISEASE, Musculoskeletal Disorder, Neurologic Disorder, Respiratory, or Thyroid Disease.    REVIEW OF SYSTEMS:    CONSTITUTIONAL:NEGATIVE for fever, chills, change in weight  INTEGUMENTARY/SKIN: NEGATIVE for worrisome rashes, moles or lesions  MUSCULOSKELETAL:See HPI above  NEURO: NEGATIVE for weakness, dizziness or paresthesias    This document serves as a record of the services and decisions personally performed and made by Jose Alford MD. It was created on his behalf by Ashlee Linn, a trained medical scribe. The creation of this document is based the provider's statements to the medical scribe.    Scribe Ashlee Linn 9:39 AM 3/26/2018     PHYSICAL EXAM:  /81 (BP Location: Right arm)  Resp 16  Ht 1.575 m (5' 2\")  Wt 89.8 kg (198 lb)  BMI 36.21 kg/m2   GENERAL APPEARANCE: healthy, alert, no distress  SKIN: no suspicious lesions or rashes  NEURO: Normal strength and tone, mentation intact and speech normal  PSYCH:  mentation appears normal and affect normal  RESPIRATORY: No increased work of breathing.    BILATERAL LOWER EXTREMITIES:  Gait: antalgic favoring left in shoe with 2 crutches.    Left lower extremity:  Left lower extremity weakness.  Intact sensation deep peroneal nerve, superficial peroneal nerve, med/lat tibial nerve, sural nerve, saphenous nerve  Intact EHL, EDL, TA, FHL, GS, quadriceps hamstrings and hip flexors  Toes warm and well perfused, brisk capillary refill. Palpable 2+ dp pulses.  calf soft and nttp or squeeze.  Edema: none    left ANKLE  Inspection: skin intact. No erythema or ecchymosis.   Swelling: none   Tender: distal achilles just proximal to the insertion, minimal tenderness at " the achilles insertion, calcaneus/heel  Positive calcaneal squeeze.  Range of Motion:grossly intact  Strength: intact  Tight heel cord, dorsiflexion to neutral with discomfort.      X-RAY: 2 calcaneal views 3/26/2018 were reviewed today. On my review, decreased sclerotic band of calcaneus compared to 1/22/2018..    MRI of the left ankle taken on 1/4/2018 was reviewed today.    IMPRESSION:    1. Calcaneal stress fracture.  2. Trace retrocalcaneal bursitis.        Impression: 37 year old female with left ankle/heel pain, calcaneal stress fracture, left achilles tendonitis    Plan:   * more pain with increased weight bearing not unusual, but she continues to have more pain than I'd expect at this time now 5 months out from onset.  * given continued pain, suggest repeat MRI.  * An MRI of the left ankle was ordered for further evaluation of the calcaneus.   * Continue non-op treatment below.    * Physical Therapy. Achilles stretching, ankle range of motion, consider u/s.  * Rest  * Activity modification - avoid activities that aggravate symptoms.  * Ice twice daily to three times daily.  * immobilization: Regular shoe and heel cushion. Start with 2 crutches and weightbearing. Gradually wean to 1 crutch, then none as comfort allows.  * Elevation of extremity to reduce swelling  * gentle heel cord stretching  * Tylenol as needed for pain  * Workability update: Continue to work with 5 hour work restrictions, strictly sedentary.  * return to clinic 6 weeks, sooner if needed, for clinical recheck.    * After having the MRI, I would like the patient to call me so that we can discuss the results as well as how to proceed.     The information in this document, created by a scribe for me, accurately reflects the services I personally performed and the decisions made by me. I have reviewed and approved this document for accuracy.      Jose Alford M.D., M.S.  Dept. of Orthopaedic Surgery  [facility]    Again,  thank you for allowing me to participate in the care of your patient.        Sincerely,        Jose Alford MD

## 2018-04-23 NOTE — MR AVS SNAPSHOT
After Visit Summary   4/23/2018    Mily Grullon    MRN: [id number]           Patient Information     Date Of Birth          1980        Visit Information        Provider Department      4/23/2018 9:15 AM Jose Alford MD [facility] [place]        Today's Diagnoses     Stress fracture of left calcaneus with delayed healing, subsequent encounter    -  1    Achilles tendinitis of left lower extremity           Follow-ups after your visit        Follow-up notes from your care team     Return in about 6 weeks (around 6/4/2018) for clinical recheck.      Your next 10 appointments already scheduled     Apr 24, 2018  1:50 PM CDT   CLEO Extremity with Ramsey Willis PT   [facility] [place] [facility] ([facility] [place])    [address]  [place] [address]   [phone]            Apr 26, 2018  8:45 AM CDT   (Arrive by 8:30 AM)   MR ANKLE LEFT W/O CONTRAST with BEMR1   [facility] [place] ([facility])    [address]  [place] [address]   [phone]           Take your medicines as usual, unless your doctor tells you not to. Bring a list of your current medicines to your exam (including vitamins, minerals and over-the-counter drugs). Also bring the results of similar scans you may have had.  Please remove any body piercings and hair extensions before you arrive.  Follow your doctor s orders. If you do not, we may have to postpone your exam.  You may or may not receive IV contrast for this exam pending the discretion of the Radiologist.  You do not need to do anything special to prepare.  The MRI machine uses a strong magnet. Please wear clothes without metal (snaps, zippers). A sweatsuit works well, or we may give you a hospital gown.   **IMPORTANT** THE INSTRUCTIONS BELOW ARE ONLY FOR THOSE PATIENTS WHO HAVE BEEN PRESCRIBED SEDATION OR GENERAL ANESTHESIA DURING THEIR MRI PROCEDURE:  IF YOUR DOCTOR  PRESCRIBED ORAL SEDATION (take medicine to help you relax during your exam):   You must get the medicine from your doctor (oral medication) before you arrive. Bring the medicine to the exam. Do not take it at home. You ll be told when to take it upon arriving for your exam.   Arrive one hour early. Bring someone who can take you home after the test. Your medicine will make you sleepy. After the exam, you may not drive, take a bus or take a taxi by yourself.  IF YOUR DOCTOR PRESCRIBED IV SEDATION:   Arrive one hour early. Bring someone who can take you home after the test. Your medicine will make you sleepy. After the exam, you may not drive, take a bus or take a taxi by yourself.   No eating 6 hours before your exam. You may have clear liquids up until 4 hours before your exam. (Clear liquids include water, clear tea, black coffee and fruit juice without pulp.)  IF YOUR DOCTOR PRESCRIBED ANESTHESIA (be asleep for your exam):   Arrive 1 1/2 hours early. Bring someone who can take you home after the test. You may not drive, take a bus or take a taxi by yourself.   No eating 8 hours before your exam. You may have clear liquids up until 4 hours before your exam. (Clear liquids include water, clear tea, black coffee and fruit juice without pulp.)   You will spend four to five hours in the recovery room.  Please call the Imaging Department at your exam site with any questions.            Apr 27, 2018  1:30 PM CDT   CLEO Extremity with Ramsey Willis PT   [facility] [place] [facility] ([facility] [place])    [address]  [place] [address]   [phone]            Jun 04, 2018  9:00 AM CDT   Return Visit with Jose Alford MD   [facility] [place] ([facility] [place])    [address]  [place] [address]   [phone]              Future tests that were ordered for you today     Open Future Orders        Priority  "Expected Expires Ordered    MR Ankle Left w/o Contrast Routine  2019            Who to contact     If you have questions or need follow up information about today's clinic visit or your schedule please contact [facility] [place] directly at [phone].  Normal or non-critical lab and imaging results will be communicated to you by MyChart, letter or phone within 4 business days after the clinic has received the results. If you do not hear from us within 7 days, please contact the clinic through Lumoidhart or phone. If you have a critical or abnormal lab result, we will notify you by phone as soon as possible.  Submit refill requests through Linguastat or call your pharmacy and they will forward the refill request to us. Please allow 3 business days for your refill to be completed.          Additional Information About Your Visit        MyChart Information     Linguastat lets you send messages to your doctor, view your test results, renew your prescriptions, schedule appointments and more. To sign up, go to [email or web address] . Click on \"Log in\" on the left side of the screen, which will take you to the Welcome page. Then click on \"Sign up Now\" on the right side of the page.     You will be asked to enter the access code listed below, as well as some personal information. Please follow the directions to create your username and password.     Your access code is: [email or web address]  Expires: 2018  6:08 PM     Your access code will  in 90 days. If you need help or a new code, please call your [place] clinic or [phone].        Care EveryWhere ID     This is your Care EveryWhere ID. This could be used by other organizations to access your [place] medical records  [id number]        Your Vitals Were     Respirations Height BMI (Body Mass Index)             16 5' 2\" (1.575 m) 36.21 kg/m2          Blood Pressure from Last 3 Encounters:   18 130/81   18 137/83 "   10/25/17 132/74    Weight from Last 3 Encounters:   04/23/18 198 lb (89.8 kg)   03/26/18 187 lb (84.8 kg)   02/26/18 187 lb (84.8 kg)               Primary Care Provider Office Phone # Fax #    Kristen M Kehr, PA-C [phone] [address] [address]        Equal Access to Services     [facility] : Hadii aad ku hadasho Soomaali, waaxda luqadaha, qaybta kaalmada adeegyada, waxay idiin hayaan adeeg kharash la'lisandron . So [facility] [phone].    ATENCIÓN: Si leila sommer, tiene a lentz disposición servicios gratuitos de asistencia lingüística. LlTri[facility] [phone].    We comply with applicable federal civil rights laws and [place] laws. We do not discriminate on the basis of race, color, national origin, age, disability, sex, sexual orientation, or gender identity.            Thank you!     Thank you for choosing [facility]  for your care. Our goal is always to provide you with excellent care. Hearing back from our patients is one way we can continue to improve our services. Please take a few minutes to complete the written survey that you may receive in the mail after your visit with us. Thank you!             Your Updated Medication List - Protect others around you: Learn how to safely use, store and throw away your medicines at www.disposemymeds.org.          This list is accurate as of 4/23/18  2:17 PM.  Always use your most recent med list.                   Brand Name Dispense Instructions for use Diagnosis    acetaminophen 325 MG tablet    TYLENOL    100 tablet    Take 2 tablets (650 mg) by mouth every 4 hours as needed for other (mild pain)    Orthopedic aftercare       COMPAZINE PO      Take 10 mg by mouth every morning        DULoxetine 20 MG EC capsule    CYMBALTA          meclizine 25 MG tablet    ANTIVERT    40 tablet    Take 1 tablet (25 mg) by mouth every 6 hours as needed for dizziness    Vertigo       medroxyPROGESTERone 150 MG/ML  injection    DEPO-PROVERA    1 mL    Inject 1 mL (150 mg) into the muscle every 3 months    Encounter for surveillance of injectable contraceptive       nortriptyline 50 MG capsule    PAMELOR     Take by mouth 2 times daily    Vertigo, Other fatigue, Weight gain       order for DME     1 Units    Left boot    Left Achilles tendinitis       propranolol 120 MG 24 hr capsule    INDERAL LA    60 capsule    TAKE ONE CAPSULE BY MOUTH TWICE A DAY    Migraine with aura and without status migrainosus, not intractable       PROTONIX PO      Take 40 mg by mouth 2 times daily (before meals)        traZODone 50 MG tablet    DESYREL

## 2018-04-24 ENCOUNTER — THERAPY VISIT (OUTPATIENT)
Dept: PHYSICAL THERAPY | Facility: CLINIC | Age: 38
End: 2018-04-24
Payer: COMMERCIAL

## 2018-04-24 DIAGNOSIS — S92.015G CLOSED NONDISPLACED FRACTURE OF BODY OF LEFT CALCANEUS WITH DELAYED HEALING, SUBSEQUENT ENCOUNTER: ICD-10-CM

## 2018-04-24 DIAGNOSIS — G89.29 HEEL PAIN, CHRONIC, LEFT: ICD-10-CM

## 2018-04-24 DIAGNOSIS — M79.672 HEEL PAIN, CHRONIC, LEFT: ICD-10-CM

## 2018-04-24 PROCEDURE — 97140 MANUAL THERAPY 1/> REGIONS: CPT | Mod: GP | Performed by: PHYSICAL THERAPIST

## 2018-04-24 PROCEDURE — 97110 THERAPEUTIC EXERCISES: CPT | Mod: GP | Performed by: PHYSICAL THERAPIST

## 2018-04-26 ENCOUNTER — TELEPHONE (OUTPATIENT)
Dept: ORTHOPEDICS | Facility: CLINIC | Age: 38
End: 2018-04-26

## 2018-04-26 ENCOUNTER — RADIANT APPOINTMENT (OUTPATIENT)
Dept: MRI IMAGING | Facility: CLINIC | Age: 38
End: 2018-04-26
Attending: PHYSICIAN ASSISTANT
Payer: COMMERCIAL

## 2018-04-26 DIAGNOSIS — M76.62 ACHILLES TENDINITIS OF LEFT LOWER EXTREMITY: ICD-10-CM

## 2018-04-26 DIAGNOSIS — M84.375G STRESS FRACTURE OF LEFT CALCANEUS WITH DELAYED HEALING, SUBSEQUENT ENCOUNTER: ICD-10-CM

## 2018-04-26 PROCEDURE — 73721 MRI JNT OF LWR EXTRE W/O DYE: CPT | Mod: TC

## 2018-04-26 NOTE — TELEPHONE ENCOUNTER
Called and spoke to patient regarding her ankle MRI. Appears the calcaneal stress fracture has healed and no other abnormalities to explain her pain. Recommend to continue current treatment plan and f/up as scheduled. She was appreciative of the call back.    Jose Alford M.D., M.S.  Dept. of Orthopaedic Surgery  [facility]

## 2018-04-26 NOTE — TELEPHONE ENCOUNTER
Reason for Call:  Other call back    Detailed comments:  Patient calling. She had her mri. Done @ [facility] today.     Phone Number Patient can be reached at: Home number on file [phone] (home)    Best Time: any     Can we leave a detailed message on this number? YES    Call taken on 4/26/2018 at 3:48 PM by Clari Gomez

## 2018-04-27 ENCOUNTER — THERAPY VISIT (OUTPATIENT)
Dept: PHYSICAL THERAPY | Facility: CLINIC | Age: 38
End: 2018-04-27
Payer: COMMERCIAL

## 2018-04-27 DIAGNOSIS — M79.672 HEEL PAIN, CHRONIC, LEFT: ICD-10-CM

## 2018-04-27 DIAGNOSIS — G89.29 HEEL PAIN, CHRONIC, LEFT: ICD-10-CM

## 2018-04-27 DIAGNOSIS — S92.015G CLOSED NONDISPLACED FRACTURE OF BODY OF LEFT CALCANEUS WITH DELAYED HEALING, SUBSEQUENT ENCOUNTER: ICD-10-CM

## 2018-04-27 PROCEDURE — 97140 MANUAL THERAPY 1/> REGIONS: CPT | Mod: GP | Performed by: PHYSICAL THERAPY ASSISTANT

## 2018-04-27 PROCEDURE — 97110 THERAPEUTIC EXERCISES: CPT | Mod: GP | Performed by: PHYSICAL THERAPY ASSISTANT

## 2018-04-27 NOTE — MR AVS SNAPSHOT
After Visit Summary   4/27/2018    Mily Grullon    MRN: [id number]           Patient Information     Date Of Birth          1980        Visit Information        Provider Department      4/27/2018 1:30 PM Ramsey Gamboa, PTA [facility] [place] [facility]        Today's Diagnoses     Closed nondisplaced fracture of body of left calcaneus with delayed healing, subsequent encounter        Heel pain, chronic, left           Follow-ups after your visit        Your next 10 appointments already scheduled     May 01, 2018 12:30 PM CDT   CLEO Extremity with Ramsey Willis, PT   [facility] [place] [facility] ([facility] [place])    [address]  [place] [address]   [phone]            May 03, 2018  1:50 PM CDT   CLEO Extremity with Ramsey Gamboa PTA   [facility] [place] [facility] ([facility] [place])    [address]  [address]  [place] [address]   [phone]            May 07, 2018  1:50 PM CDT   CLEO Extremity with Ramsey Willis, PT   [facility] [place] [facility] ([facility] [place])    [address]  [address]  [place] [address]   [phone]            May 10, 2018 10:00 AM CDT   CLEO Extremity with Ramsey Willis, PT   [facility] [place] [facility] ([facility] [place])    [address]  [place] [address]   [phone]            May 15, 2018 12:30 PM CDT   CLEO Extremity with Ramsey Willis, PT   [facility] [place] [facility] ([facility] [place]    [address]  [place] [address]   [phone]            May 17, 2018 12:00 PM CDT   CLEO Extremity with Ramsey Willis PT   [facility] [place] [facility] ([facility] [place]    [address]  [place] [address]   [phone]            May 21, 2018  1:10 PM CDT   CLEO Extremity with Ramsey Willis, PT  "  [facility] [place] [facility] ([facility] [place])    [address]  [address]  [place] [address]   [phone]            May 24, 2018 10:00 AM CDT   CLEO Extremity with Ramsey Willis PT   [facility] [place] [facility] ([facility] [place])    [address]  [address]  [place] [address]   [phone]            Jun 04, 2018  9:00 AM CDT   Return Visit with Jose Alford MD   [facility] [place] ([facility] [place]    [address]  [place] [address]   [phone]              Who to contact     If you have questions or need follow up information about today's clinic visit or your schedule please contact [facility] [place] [facility] directly at [phone].  Normal or non-critical lab and imaging results will be communicated to you by OmegaGenesishart, letter or phone within 4 business days after the clinic has received the results. If you do not hear from us within 7 days, please contact the clinic through App47t or phone. If you have a critical or abnormal lab result, we will notify you by phone as soon as possible.  Submit refill requests through ARDACO or call your pharmacy and they will forward the refill request to us. Please allow 3 business days for your refill to be completed.          Additional Information About Your Visit        OmegaGenesishart Information     ARDACO lets you send messages to your doctor, view your test results, renew your prescriptions, schedule appointments and more. To sign up, go to [email or web address] . Click on \"Log in\" on the left side of the screen, which will take you to the Welcome page. Then click on \"Sign up Now\" on the right side of the page.     You will be asked to enter the access code listed below, as well as some personal information. Please follow the directions to create your username and password.     Your access code is: " [email or web address]  Expires: 2018  6:08 PM     Your access code will  in 90 days. If you need help or a new code, please call your [place] clinic or [phone].        Care EveryWhere ID     This is your Care EveryWhere ID. This could be used by other organizations to access your [place] medical records  [id number]         Blood Pressure from Last 3 Encounters:   18 130/81   18 137/83   10/25/17 132/74    Weight from Last 3 Encounters:   18 89.8 kg (198 lb)   18 84.8 kg (187 lb)   18 84.8 kg (187 lb)              We Performed the Following     Manual Ther Tech, 1+Regions, EA 15 min     Therapeutic Exercises        Primary Care Provider Office Phone # Fax #    Kristen M Kehr, PA-C [phone] [address] [address]        Equal Access to Services     [facility] : Hadii aad ku hadasho Soomaali, waaxda luqadaha, qaybta kaalmada adeegyada, waxay idiin hayaan adeeg kharash la'aan . So [facility] [place] [facility] [phone].    ATENCIÓN: Si habla español, tiene a lentz disposición servicios gratuitos de asistencia lingüística. Llame al [phone].    We comply with applicable federal civil rights laws and [place] laws. We do not discriminate on the basis of race, color, national origin, age, disability, sex, sexual orientation, or gender identity.            Thank you!     Thank you for choosing [facility] [place]   for your care. Our goal is always to provide you with excellent care. Hearing back from our patients is one way we can continue to improve our services. Please take a few minutes to complete the written survey that you may receive in the mail after your visit with us. Thank you!             Your Updated Medication List - Protect others around you: Learn how to safely use, store and throw away your medicines at www.disposemymeds.org.          This list is accurate as of 18  3:58 PM.  Always use your most recent med list.                    Brand Name Dispense Instructions for use Diagnosis    acetaminophen 325 MG tablet    TYLENOL    100 tablet    Take 2 tablets (650 mg) by mouth every 4 hours as needed for other (mild pain)    Orthopedic aftercare       COMPAZINE PO      Take 10 mg by mouth every morning        DULoxetine 20 MG EC capsule    CYMBALTA          meclizine 25 MG tablet    ANTIVERT    40 tablet    Take 1 tablet (25 mg) by mouth every 6 hours as needed for dizziness    Vertigo       medroxyPROGESTERone 150 MG/ML injection    DEPO-PROVERA    1 mL    Inject 1 mL (150 mg) into the muscle every 3 months    Encounter for surveillance of injectable contraceptive       nortriptyline 50 MG capsule    PAMELOR     Take by mouth 2 times daily    Vertigo, Other fatigue, Weight gain       order for DME     1 Units    Left boot    Left Achilles tendinitis       propranolol 120 MG 24 hr capsule    INDERAL LA    60 capsule    TAKE ONE CAPSULE BY MOUTH TWICE A DAY    Migraine with aura and without status migrainosus, not intractable       PROTONIX PO      Take 40 mg by mouth 2 times daily (before meals)        traZODone 50 MG tablet    DESYREL

## 2018-04-27 NOTE — PROGRESS NOTES
Subjective:  HPI                    Objective:  System    Physical Exam    General     ROS    Assessment/Plan:    SUBJECTIVE  Subjective changes as noted by pt:  Pt stated she got some good news this week that the calcaneal fracture is healed on the left. She is hoping to get stronger in the left foot and be able to stand for longer periods. She wants to some day stop sitting at work for her job duties.    Current pain level:  4/10   Changes in function:  None     Adverse reaction to treatment or activity:  None    OBJECTIVE  Changes in objective findings:  Pt ambulated at 62% BW in Alter G treadmill 10 minutes at  1.2 to 1.4 mph. Pt able to perform left heelstrike (with the camera angle/visual cues to focus) on increasing her left DF.   AROM: Left ankle DF 7, right ankle DF 8 degrees.      ASSESSMENT   Mily continues to require intervention to meet STG and LTG's: PT  Patient is progressing as expected.  Response to therapy has shown an improvement in  ROM  and muscle control  Progress made towards STG/LTG?  STG not fully met as pt had been using both crutches up until today and short distances were tried with 1 crutch on R side today. Pt still feels more confident using both crutches for now.     PLAN  Continue current treatment plan until patient demonstrates readiness to progress to higher level exercises.    PTA/ATC plan:  Will continue with present plan of care.    Please refer to the daily flowsheet for treatment today, total treatment time and time spent performing 1:1 timed codes.

## 2018-05-01 ENCOUNTER — THERAPY VISIT (OUTPATIENT)
Dept: PHYSICAL THERAPY | Facility: CLINIC | Age: 38
End: 2018-05-01
Payer: COMMERCIAL

## 2018-05-01 DIAGNOSIS — M79.672 HEEL PAIN, CHRONIC, LEFT: ICD-10-CM

## 2018-05-01 DIAGNOSIS — S92.015G CLOSED NONDISPLACED FRACTURE OF BODY OF LEFT CALCANEUS WITH DELAYED HEALING, SUBSEQUENT ENCOUNTER: ICD-10-CM

## 2018-05-01 DIAGNOSIS — G89.29 HEEL PAIN, CHRONIC, LEFT: ICD-10-CM

## 2018-05-01 PROCEDURE — 97140 MANUAL THERAPY 1/> REGIONS: CPT | Mod: GP | Performed by: PHYSICAL THERAPIST

## 2018-05-01 PROCEDURE — 97110 THERAPEUTIC EXERCISES: CPT | Mod: GP | Performed by: PHYSICAL THERAPIST

## 2018-05-03 ENCOUNTER — THERAPY VISIT (OUTPATIENT)
Dept: PHYSICAL THERAPY | Facility: CLINIC | Age: 38
End: 2018-05-03
Payer: COMMERCIAL

## 2018-05-03 DIAGNOSIS — G89.29 HEEL PAIN, CHRONIC, LEFT: ICD-10-CM

## 2018-05-03 DIAGNOSIS — M79.672 HEEL PAIN, CHRONIC, LEFT: ICD-10-CM

## 2018-05-03 DIAGNOSIS — S92.015G CLOSED NONDISPLACED FRACTURE OF BODY OF LEFT CALCANEUS WITH DELAYED HEALING, SUBSEQUENT ENCOUNTER: ICD-10-CM

## 2018-05-03 PROCEDURE — 97110 THERAPEUTIC EXERCISES: CPT | Mod: GP | Performed by: PHYSICAL THERAPY ASSISTANT

## 2018-05-03 PROCEDURE — 97140 MANUAL THERAPY 1/> REGIONS: CPT | Mod: GP | Performed by: PHYSICAL THERAPY ASSISTANT

## 2018-05-03 NOTE — PROGRESS NOTES
Subjective:  HPI                    Objective:  System    Physical Exam    General     ROS    Assessment/Plan:    SUBJECTIVE  Subjective changes as noted by pt:   Pt feeling more confident with walking as she is now using 1 crutch on R side.    Current pain level:  3/10   Changes in function:  Yes (See Goal flowsheet attached for changes in current functional level)     Adverse reaction to treatment or activity:  None    OBJECTIVE  Changes in objective findings:  Gait: arrives into clinic 1 crutch on right side improving left ankle DF during pre-swing phase.      ASSESSMENT  Mily continues to require intervention to meet STG and LTG's: PT  Patient is progressing as expected.  Response to therapy has shown an improvement in  gait  Progress made towards STG/LTG?  Yes (See Goal flowsheet attached for updates on achievement of STG and LTG)    PLAN  Continue current treatment plan until patient demonstrates readiness to progress to higher level exercises.    PTA/ATC plan:  Will continue with present plan of care.    Please refer to the daily flowsheet for treatment today, total treatment time and time spent performing 1:1 timed codes.

## 2018-05-03 NOTE — MR AVS SNAPSHOT
After Visit Summary   5/3/2018    Mily Grullon    MRN: [id number]           Patient Information     Date Of Birth          1980        Visit Information        Provider Department      5/3/2018 1:50 PM Ramsey Gamboa PTA [facility] [place] [facility]        Today's Diagnoses     Closed nondisplaced fracture of body of left calcaneus with delayed healing, subsequent encounter        Heel pain, chronic, left           Follow-ups after your visit        Your next 10 appointments already scheduled     May 07, 2018  1:50 PM CDT   CLEO Extremity with Ramsey Willis, PT   [facility] [place] [facility] ([facility] [place])    [address]  [address]  [place] [address]   [phone]            May 10, 2018 10:00 AM CDT   CLEO Extremity with Ramsey Willis, PT   [facility] [place] [facility] ([facility] [place])    [address]  [address]  [place] [address]   [phone]            May 15, 2018 12:30 PM CDT   CLEO Extremity with Ramsey Willis, PT   [facility] [place] [facility] ([facility] [place])    [address]  [address]  [place] [address]   [phone]            May 17, 2018 12:00 PM CDT   CLEO Extremity with Ramsey Willis PT   [facility] [place] [facility] ([facility] [place])    [address]  [address]  [place] [address]   [phone]            May 21, 2018  1:10 PM CDT   CLEO Extremity with Ramsey Willis PT   [facility] [place] [facility] ([facility] [place])    [address]  [address]  [place] [address]   [phone]            May 24, 2018 10:00 AM CDT   CLEO Extremity with Ramsey Willis PT   [facility] [place] [facility] ([facility] [place])    [address]  [address]  [place] [address]   [phone]            Jun 04, 2018  9:00 AM CDT   Return Visit with Jose Alford MD   [facility] [place] ([facility] [place]    [address]  [place] [address] [phone]              Who to contact     If you have questions or need follow up information about today's clinic visit or your schedule please contact [facility] directly at [phone].  Normal or non-critical lab and imaging results will be communicated to you by [employer], letter or phone within 4 business days after the clinic has received the results. If you do not hear from us within 7 days, please contact the clinic through [employer] or phone. If you have a critical or abnormal lab result, we will notify you by phone as soon as possible.  Submit refill requests through [employer] or call your pharmacy and they will forward the refill request to us. Please allow 3 business days for your refill to be completed.          Additional Information About Your Visit        [employer] Information     [employer] lets you send messages to your doctor, view your test results, renew your prescriptions, schedule appointments and more. To sign up, go to [email or web address] . Click on \"Log in\" on the left side of the screen, which will take you to the Welcome page. Then click on \"Sign up Now\" on the right side of the page.     You will be asked to enter the access code listed below, as well as some personal information. Please follow the directions to create your username and password.     Your access code is: [email or web address]  Expires: 2018  6:08 PM     Your access code will  in 90 days. If you need help or a new code, please call your [place] clinic or [phone].        Care EveryWhere ID     This is your Care EveryWhere ID. This could be used by other organizations to access your [place] medical records  [id number]         Blood Pressure from Last 3 Encounters:   18 130/81   18 137/83   10/25/17 132/74    Weight from Last 3 Encounters: "   04/23/18 89.8 kg (198 lb)   03/26/18 84.8 kg (187 lb)   02/26/18 84.8 kg (187 lb)              We Performed the Following     Manual Ther Tech, 1+Regions, EA 15 min     Therapeutic Exercises        Primary Care Provider Office Phone # Fax #    Kristen M Kehr, PA-C [phone] [address] [address]        Equal Access to Services     [facility]: Hadii aad ku hadasho Soomaali, waaxda luqadaha, qaybta kaalmada adeegyada, waxay idiin hayaan adeeg kharash la'aan . So [facility] [phone].    ATENCIÓN: Si benedictola kemal, tiene a lentz disposición servicios gratuitos de asistencia lingüística. [facility] [phone].    We comply with applicable federal civil rights laws and [place] laws. We do not discriminate on the basis of race, color, national origin, age, disability, sex, sexual orientation, or gender identity.            Thank you!     Thank you for choosing [facility]   for your care. Our goal is always to provide you with excellent care. Hearing back from our patients is one way we can continue to improve our services. Please take a few minutes to complete the written survey that you may receive in the mail after your visit with us. Thank you!             Your Updated Medication List - Protect others around you: Learn how to safely use, store and throw away your medicines at www.disposemymeds.org.          This list is accurate as of 5/3/18  2:34 PM.  Always use your most recent med list.                   Brand Name Dispense Instructions for use Diagnosis    acetaminophen 325 MG tablet    TYLENOL    100 tablet    Take 2 tablets (650 mg) by mouth every 4 hours as needed for other (mild pain)    Orthopedic aftercare       COMPAZINE PO      Take 10 mg by mouth every morning        DULoxetine 20 MG EC capsule    CYMBALTA          meclizine 25 MG tablet    ANTIVERT    40 tablet    Take 1 tablet (25 mg) by mouth every 6 hours as needed for dizziness     Vertigo       medroxyPROGESTERone 150 MG/ML injection    DEPO-PROVERA    1 mL    Inject 1 mL (150 mg) into the muscle every 3 months    Encounter for surveillance of injectable contraceptive       nortriptyline 50 MG capsule    PAMELOR     Take by mouth 2 times daily    Vertigo, Other fatigue, Weight gain       order for DME     1 Units    Left boot    Left Achilles tendinitis       propranolol 120 MG 24 hr capsule    INDERAL LA    60 capsule    TAKE ONE CAPSULE BY MOUTH TWICE A DAY    Migraine with aura and without status migrainosus, not intractable       PROTONIX PO      Take 40 mg by mouth 2 times daily (before meals)        traZODone 50 MG tablet    DESYREL

## 2018-05-08 ENCOUNTER — ALLIED HEALTH/NURSE VISIT (OUTPATIENT)
Dept: NURSING | Facility: CLINIC | Age: 38
End: 2018-05-08
Payer: COMMERCIAL

## 2018-05-08 PROCEDURE — 99207 ZZC NO CHARGE NURSE ONLY: CPT

## 2018-05-08 PROCEDURE — 96372 THER/PROPH/DIAG INJ SC/IM: CPT

## 2018-05-08 NOTE — PROGRESS NOTES
Screening Questionnaire for Adult Immunization    Are you sick today?   No   Do you have allergies to medications, food, a vaccine component or latex?   No   Have you ever had a serious reaction after receiving a vaccination?   No   Do you have a long-term health problem with heart disease, lung disease, asthma, kidney disease, metabolic disease (e.g. diabetes), anemia, or other blood disorder?   No   Do you have cancer, leukemia, HIV/AIDS, or any other immune system problem?   No   In the past 3 months, have you taken medications that affect  your immune system, such as prednisone, other steroids, or anticancer drugs; drugs for the treatment of rheumatoid arthritis, Crohn s disease, or psoriasis; or have you had radiation treatments?   No   Have you had a seizure, or a brain or other nervous system problem?   No   During the past year, have you received a transfusion of blood or blood     products, or been given immune (gamma) globulin or antiviral drug?   No   For women: Are you pregnant or is there a chance you could become        pregnant during the next month?   No   Have you received any vaccinations in the past 4 weeks?   No     Immunization questionnaire answers were all negative.        Per orders of Sabrina Payne, injection of Depo given by Lashay Quezada. Patient instructed to remain in clinic for 15 minutes afterwards, and to report any adverse reaction to me immediately.       Screening performed by Lashay Quezada on 5/8/2018 at 11:39 AM.

## 2018-05-08 NOTE — MR AVS SNAPSHOT
After Visit Summary   5/8/2018    Mily Grullon    MRN: [id number]           Patient Information     Date Of Birth          1980        Visit Information        Provider Department      5/8/2018 11:30 AM BE [facility] [place]        Today's Diagnoses     Contraception    -  1       Follow-ups after your visit        Your next 10 appointments already scheduled     May 10, 2018 10:00 AM CDT   CLEO Extremity with Ramsey Willis, PT   [facility] [place] [facility] ([facility] [place]    [address]  [address]  [place] [address]   [phone]            May 11, 2018  9:40 AM CDT   CLEO Extremity with Ramsey Gamboa, PTA   [facility] [place] [facility] ([facility] [place]    [address]  [address]  [place] [address]   [phone]            May 15, 2018 12:30 PM CDT   CLEO Extremity with Ramsey Willis PT   [facility] [place] [facility] ([facility] [place])    [address]  [address]  [place] [address]   [phone]            May 17, 2018 12:00 PM CDT   CLEO Extremity with Ramsey Willis PT   [facility] [place] [facility] ([facility] [place]    [address]  [address]  [place] [address]   [phone]            May 21, 2018  1:10 PM CDT   CLEO Extremity with Ramsey Willis PT   [facility] [place] [facility] ([facility] [place]    [address]  [address]  [place] [address]   [phone]            May 24, 2018 10:00 AM CDT   CLEO Extremity with Ramsey Willis PT   [facility] [place] [facility] ([facility] [place])    [address]  [address]  [place] [address]   [phone]            Jun 04, 2018  9:00 AM CDT   Return Visit with Jose Alford MD   [facility] [place] [facility] [place]    [address]  [address]  [place] [address] [phone]     "          Who to contact     If you have questions or need follow up information about today's clinic visit or your schedule please contact [facility] directly at [phone].  Normal or non-critical lab and imaging results will be communicated to you by MyChart, letter or phone within 4 business days after the clinic has received the results. If you do not hear from us within 7 days, please contact the clinic through MyChart or phone. If you have a critical or abnormal lab result, we will notify you by phone as soon as possible.  Submit refill requests through [employer] or call your pharmacy and they will forward the refill request to us. Please allow 3 business days for your refill to be completed.          Additional Information About Your Visit        [employer] Information     [employer] lets you send messages to your doctor, view your test results, renew your prescriptions, schedule appointments and more. To sign up, go to [email or web address] . Click on \"Log in\" on the left side of the screen, which will take you to the Welcome page. Then click on \"Sign up Now\" on the right side of the page.     You will be asked to enter the access code listed below, as well as some personal information. Please follow the directions to create your username and password.     Your access code is: [email or web address]  Expires: 2018  6:08 PM     Your access code will  in 90 days. If you need help or a new code, please call your [place] clinic or [phone].        Care EveryWhere ID     This is your Care EveryWhere ID. This could be used by other organizations to access your [place] medical records  [id number]         Blood Pressure from Last 3 Encounters:   18 130/81   18 137/83   10/25/17 132/74    Weight from Last 3 Encounters:   18 198 lb (89.8 kg)   18 187 lb (84.8 kg)   18 187 lb (84.8 kg)              We Performed the Following     MEDROXYPROGESTERONE INJ        Primary Care " Provider Office Phone # Fax #    Kristen M Kehr, PA-C [phone] [address] [address]        Equal Access to Services     JASSON VIZCARRA : Hadii mallika ku hadvenuo Sofeliali, waaxda luqadaha, qaybta kaalmada adeegyada, abdifatah eckert laCesaredis spivey. So [facility] [phone].    ATENCIÓN: Si habla español, tiene a lentz disposición servicios gratuitos de asistencia lingüística. Llame al [phone].    We comply with applicable federal civil rights laws and [place] laws. We do not discriminate on the basis of race, color, national origin, age, disability, sex, sexual orientation, or gender identity.            Thank you!     Thank you for choosing [facility]  for your care. Our goal is always to provide you with excellent care. Hearing back from our patients is one way we can continue to improve our services. Please take a few minutes to complete the written survey that you may receive in the mail after your visit with us. Thank you!             Your Updated Medication List - Protect others around you: Learn how to safely use, store and throw away your medicines at www.disposemymeds.org.          This list is accurate as of 5/8/18 11:45 AM.  Always use your most recent med list.                   Brand Name Dispense Instructions for use Diagnosis    acetaminophen 325 MG tablet    TYLENOL    100 tablet    Take 2 tablets (650 mg) by mouth every 4 hours as needed for other (mild pain)    Orthopedic aftercare       COMPAZINE PO      Take 10 mg by mouth every morning        DULoxetine 20 MG EC capsule    CYMBALTA          meclizine 25 MG tablet    ANTIVERT    40 tablet    Take 1 tablet (25 mg) by mouth every 6 hours as needed for dizziness    Vertigo       medroxyPROGESTERone 150 MG/ML injection    DEPO-PROVERA    1 mL    Inject 1 mL (150 mg) into the muscle every 3 months    Encounter for surveillance of injectable contraceptive       nortriptyline 50 MG capsule     PAMELOR     Take by mouth 2 times daily    Vertigo, Other fatigue, Weight gain       order for DME     1 Units    Left boot    Left Achilles tendinitis       propranolol 120 MG 24 hr capsule    INDERAL LA    60 capsule    TAKE ONE CAPSULE BY MOUTH TWICE A DAY    Migraine with aura and without status migrainosus, not intractable       PROTONIX PO      Take 40 mg by mouth 2 times daily (before meals)        traZODone 50 MG tablet    DESYREL

## 2018-05-09 DIAGNOSIS — Z30.42 ENCOUNTER FOR SURVEILLANCE OF INJECTABLE CONTRACEPTIVE: ICD-10-CM

## 2018-05-09 RX ORDER — MEDROXYPROGESTERONE ACETATE 150 MG/ML
150 INJECTION, SUSPENSION INTRAMUSCULAR
Qty: 1 ML | Refills: 3 | OUTPATIENT
Start: 2018-05-09 | End: 2019-01-22

## 2018-05-11 ENCOUNTER — THERAPY VISIT (OUTPATIENT)
Dept: PHYSICAL THERAPY | Facility: CLINIC | Age: 38
End: 2018-05-11
Payer: COMMERCIAL

## 2018-05-11 DIAGNOSIS — M79.672 HEEL PAIN, CHRONIC, LEFT: ICD-10-CM

## 2018-05-11 DIAGNOSIS — S92.015G CLOSED NONDISPLACED FRACTURE OF BODY OF LEFT CALCANEUS WITH DELAYED HEALING, SUBSEQUENT ENCOUNTER: ICD-10-CM

## 2018-05-11 DIAGNOSIS — G89.29 HEEL PAIN, CHRONIC, LEFT: ICD-10-CM

## 2018-05-11 PROCEDURE — 97140 MANUAL THERAPY 1/> REGIONS: CPT | Mod: GP | Performed by: PHYSICAL THERAPY ASSISTANT

## 2018-05-11 PROCEDURE — 97110 THERAPEUTIC EXERCISES: CPT | Mod: GP | Performed by: PHYSICAL THERAPY ASSISTANT

## 2018-05-11 NOTE — PROGRESS NOTES
Subjective:  HPI                    Objective:  System    Physical Exam    General     ROS    Assessment/Plan:    SUBJECTIVE  Subjective changes as noted by pt:   Pt is feeling more confident with walking and wants to try weaning off crutch. She has no been using it at home, only uses it when out and at work.    Current Pain level: 2/10   Changes in function:  Yes, pt feel she can walk well without crutch but gets tired  Adverse reaction to treatment or activity:  None    OBJECTIVE   The objective findings below are from DOS:  Pt. arrived with moderate use of one crutch. pt. walked on Alter G treadmill 11 min at 70% BW, at 1.2 mph. PT issued red tubing for HEP to improve left ankle stregth (DF, PF, inversion, eversion 2x10 reps).  MMT dorsiflexion 5-/5 plantarflexion 5/5 inversion 5-/5 eversion 5-/5. But, instructed for pt to focus/encourage more toe clearance with level surface walking and up/down curbs outside/at work site..    ASSESSMENT  Mily continues to require intervention to meet STG and LTG's: PT  Patient is progressing as expected.  Response to therapy has shown an improvement in  strength, balance, gait and function  Progress made towards STG/LTG?  Yes, pt has met STG but has not yet met LTG to walk 20 min without crutch. Doesn't not use crutch at home, dpans to start weaning off crutch outside of the home.    PLAN  Current treatment program is being advanced to more complex exercises.    PTA/ATC plan:  Will continue with present plan of care.    Please refer to the daily flowsheet for treatment today, total treatment time and time spent performing 1:1 timed codes.

## 2018-05-11 NOTE — MR AVS SNAPSHOT
After Visit Summary   5/11/2018    Mily Grullon    MRN: [id number]           Patient Information     Date Of Birth          1980        Visit Information        Provider Department      5/11/2018 9:40 AM Ramsey Gamboa PTA [facility] [place] [facility]        Today's Diagnoses     Closed nondisplaced fracture of body of left calcaneus with delayed healing, subsequent encounter        Heel pain, chronic, left           Follow-ups after your visit        Your next 10 appointments already scheduled     May 15, 2018 12:30 PM CDT   CLEO Extremity with Ramsey Willis, PT   [facility] [place] [facility] ([facility] [place]    [address]  [address]  [place] [address]   [phone]            May 17, 2018 12:00 PM CDT   CLEO Extremity with Ramsey Willis, PT   [facility] [place] [facility] ([facility] [place]    [address]  [address]  [place] [address]   [phone]            May 21, 2018  1:10 PM CDT   CLEO Extremity with Ramsey Willis, PT   [facility] [place] [facility] ([facility] [place]    [address]  [address]  [place] [address]   [phone]            May 24, 2018 10:00 AM CDT   CLEO Extremity with Ramsey Willis PT   [facility] [place] [facility] ([facility] [place]    [address]  [address]  [place] [address]   [phone]            Jun 04, 2018  9:00 AM CDT   Return Visit with Jose Alford MD   [facility] [place] [facility] [place]    [address]  [address]  [place] [address]   [phone]              Who to contact     If you have questions or need follow up information about today's clinic visit or your schedule please contact [facility] directly at [phone].  Normal or non-critical lab and imaging results will be communicated to you by [place]  "letter or phone within 4 business days after the clinic has received the results. If you do not hear from us within 7 days, please contact the clinic through [employer] or phone. If you have a critical or abnormal lab result, we will notify you by phone as soon as possible.  Submit refill requests through [employer] or call your pharmacy and they will forward the refill request to us. Please allow 3 business days for your refill to be completed.          Additional Information About Your Visit        [employer] Information     [employer] lets you send messages to your doctor, view your test results, renew your prescriptions, schedule appointments and more. To sign up, go to [email or web address] . Click on \"Log in\" on the left side of the screen, which will take you to the Welcome page. Then click on \"Sign up Now\" on the right side of the page.     You will be asked to enter the access code listed below, as well as some personal information. Please follow the directions to create your username and password.     Your access code is: [email or web address]  Expires: 2018  6:08 PM     Your access code will  in 90 days. If you need help or a new code, please call your [place] clinic or [phone].        Care EveryWhere ID     This is your Care EveryWhere ID. This could be used by other organizations to access your [place] medical records  [id number]         Blood Pressure from Last 3 Encounters:   18 130/81   18 137/83   10/25/17 132/74    Weight from Last 3 Encounters:   18 89.8 kg (198 lb)   18 84.8 kg (187 lb)   18 84.8 kg (187 lb)              We Performed the Following     Manual Ther Tech, 1+Regions, EA 15 min     Therapeutic Exercises        Primary Care Provider Office Phone # Fax #    Kristen M Kehr, PA-C [phone] [address] [address]        Equal Access to Services     JASSON VIZCARRA AH: Hadii mallika Bliss, oxana mary, qaybta " abdifatah abebepoly laCesaraan ah. So [facility] [phone].    ATENCIÓN: Si leila sommer, tiene a lentz disposición servicios gratuitos de asistencia lingüística. Rosa al [phone].    We comply with applicable federal civil rights laws and [place] laws. We do not discriminate on the basis of race, color, national origin, age, disability, sex, sexual orientation, or gender identity.            Thank you!     Thank you for choosing [facility]  for your care. Our goal is always to provide you with excellent care. Hearing back from our patients is one way we can continue to improve our services. Please take a few minutes to complete the written survey that you may receive in the mail after your visit with us. Thank you!             Your Updated Medication List - Protect others around you: Learn how to safely use, store and throw away your medicines at www.disposemymeds.org.          This list is accurate as of 5/11/18 10:58 AM.  Always use your most recent med list.                   Brand Name Dispense Instructions for use Diagnosis    acetaminophen 325 MG tablet    TYLENOL    100 tablet    Take 2 tablets (650 mg) by mouth every 4 hours as needed for other (mild pain)    Orthopedic aftercare       COMPAZINE PO      Take 10 mg by mouth every morning        DULoxetine 20 MG EC capsule    CYMBALTA          meclizine 25 MG tablet    ANTIVERT    40 tablet    Take 1 tablet (25 mg) by mouth every 6 hours as needed for dizziness    Vertigo       medroxyPROGESTERone 150 MG/ML injection    DEPO-PROVERA    1 mL    Inject 1 mL (150 mg) into the muscle every 3 months    Encounter for surveillance of injectable contraceptive       nortriptyline 50 MG capsule    PAMELOR     Take by mouth 2 times daily    Vertigo, Other fatigue, Weight gain       order for DME     1 Units    Left boot    Left Achilles tendinitis       propranolol 120 MG 24 hr capsule    INDERAL LA    60  capsule    TAKE ONE CAPSULE BY MOUTH TWICE A DAY    Migraine with aura and without status migrainosus, not intractable       PROTONIX PO      Take 40 mg by mouth 2 times daily (before meals)        traZODone 50 MG tablet    DESYREL

## 2018-05-15 ENCOUNTER — THERAPY VISIT (OUTPATIENT)
Dept: PHYSICAL THERAPY | Facility: CLINIC | Age: 38
End: 2018-05-15
Payer: COMMERCIAL

## 2018-05-15 DIAGNOSIS — M79.672 HEEL PAIN, CHRONIC, LEFT: ICD-10-CM

## 2018-05-15 DIAGNOSIS — G89.29 HEEL PAIN, CHRONIC, LEFT: ICD-10-CM

## 2018-05-15 DIAGNOSIS — S92.015G CLOSED NONDISPLACED FRACTURE OF BODY OF LEFT CALCANEUS WITH DELAYED HEALING, SUBSEQUENT ENCOUNTER: ICD-10-CM

## 2018-05-15 PROCEDURE — 97140 MANUAL THERAPY 1/> REGIONS: CPT | Mod: GP | Performed by: PHYSICAL THERAPIST

## 2018-05-15 PROCEDURE — 97110 THERAPEUTIC EXERCISES: CPT | Mod: GP | Performed by: PHYSICAL THERAPIST

## 2018-05-16 ENCOUNTER — OFFICE VISIT (OUTPATIENT)
Dept: FAMILY MEDICINE | Facility: CLINIC | Age: 38
End: 2018-05-16
Payer: COMMERCIAL

## 2018-05-16 VITALS
TEMPERATURE: 97 F | SYSTOLIC BLOOD PRESSURE: 130 MMHG | HEIGHT: 62 IN | OXYGEN SATURATION: 91 % | BODY MASS INDEX: 35.88 KG/M2 | RESPIRATION RATE: 18 BRPM | HEART RATE: 117 BPM | WEIGHT: 195 LBS | DIASTOLIC BLOOD PRESSURE: 87 MMHG

## 2018-05-16 DIAGNOSIS — R04.0 EPISTAXIS: Primary | ICD-10-CM

## 2018-05-16 LAB
ERYTHROCYTE [DISTWIDTH] IN BLOOD BY AUTOMATED COUNT: 14.2 % (ref 10–15)
HCT VFR BLD AUTO: 39.9 % (ref 35–47)
HGB BLD-MCNC: 13 G/DL (ref 11.7–15.7)
INR PPP: 1.06 (ref 0.86–1.14)
MCH RBC QN AUTO: 25.1 PG (ref 26.5–33)
MCHC RBC AUTO-ENTMCNC: 32.6 G/DL (ref 31.5–36.5)
MCV RBC AUTO: 77 FL (ref 78–100)
PLATELET # BLD AUTO: 347 10E9/L (ref 150–450)
RBC # BLD AUTO: 5.17 10E12/L (ref 3.8–5.2)
WBC # BLD AUTO: 8.6 10E9/L (ref 4–11)

## 2018-05-16 PROCEDURE — 85027 COMPLETE CBC AUTOMATED: CPT | Performed by: FAMILY MEDICINE

## 2018-05-16 PROCEDURE — 85610 PROTHROMBIN TIME: CPT | Performed by: FAMILY MEDICINE

## 2018-05-16 PROCEDURE — 99213 OFFICE O/P EST LOW 20 MIN: CPT | Performed by: FAMILY MEDICINE

## 2018-05-16 PROCEDURE — 36415 COLL VENOUS BLD VENIPUNCTURE: CPT | Performed by: FAMILY MEDICINE

## 2018-05-16 ASSESSMENT — PATIENT HEALTH QUESTIONNAIRE - PHQ9: 5. POOR APPETITE OR OVEREATING: SEVERAL DAYS

## 2018-05-16 ASSESSMENT — ANXIETY QUESTIONNAIRES
1. FEELING NERVOUS, ANXIOUS, OR ON EDGE: NEARLY EVERY DAY
5. BEING SO RESTLESS THAT IT IS HARD TO SIT STILL: MORE THAN HALF THE DAYS
3. WORRYING TOO MUCH ABOUT DIFFERENT THINGS: MORE THAN HALF THE DAYS
7. FEELING AFRAID AS IF SOMETHING AWFUL MIGHT HAPPEN: NOT AT ALL
GAD7 TOTAL SCORE: 11
2. NOT BEING ABLE TO STOP OR CONTROL WORRYING: MORE THAN HALF THE DAYS
6. BECOMING EASILY ANNOYED OR IRRITABLE: SEVERAL DAYS

## 2018-05-16 NOTE — MR AVS SNAPSHOT
After Visit Summary   5/16/2018    Mily Grullon    MRN: [id number]           Patient Information     Date Of Birth          1980        Visit Information        Provider Department      5/16/2018 12:00 PM Zara Finch MD [facility]        Today's Diagnoses     Epistaxis    -  1       Follow-ups after your visit        Additional Services     OTOLARYNGOLOGY REFERRAL       Your provider has referred you to: [facility]: [facility] [place] [phone]  [email or web address]    Please be aware that coverage of these services is subject to the terms and limitations of your health insurance plan.  Call member services at your health plan with any benefit or coverage questions.      Please bring the following with you to your appointment:    (1) Any X-Rays, CTs or MRIs which have been performed.  Contact the facility where they were done to arrange for  prior to your scheduled appointment.   (2) List of current medications  (3) This referral request   (4) Any documents/labs given to you for this referral                  Your next 10 appointments already scheduled     May 17, 2018 12:00 PM CDT   CLEO Extremity with Ramsey Willis PT   [facility] [place] [facility] ([facility] [place])    [address]  [address]  [place] [address]   [phone]            May 21, 2018  1:10 PM CDT   CLEO Extremity with Ramsey Willis PT   [facility] [place] [facility] ([facility] [place])    [address]  [address]  [place] [address]   [phone]            May 24, 2018 10:00 AM CDT   CLEO Extremity with Ramsey Willis PT   [facility] [place] [facility] ([facility] [place])    [address]  [address]  [place] [address]   [phone]            Jun 04, 2018  9:00 AM CDT   Return Visit with Jose Alford MD   [facility] [place] ([facility] [place]    [address]  [place] [address] [phone]              Who to contact     If you have questions or need follow up information about today's clinic visit or your schedule please contact [facility] directly at [phone].  Normal or non-critical lab and imaging results will be communicated to you by MyChart, letter or phone within 4 business days after the clinic has received the results. If you do not hear from us within 7 days, please contact the clinic through MyChart or phone. If you have a critical or abnormal lab result, we will notify you by phone as soon as possible.  Submit refill requests through [employer] or call your pharmacy and they will forward the refill request to us. Please allow 3 business days for your refill to be completed.          Additional Information About Your Visit        MyChart Information     Ev[employer] lets you send messages to your doctor, view your test results, renew your prescriptions, schedule appointments and more. To sign up, go to [email or web address] . Click on \"Log in\" on the left side of the screen, which will take you to the Welcome page. Then click on \"Sign up Now\" on the right side of the page.     You will be asked to enter the access code listed below, as well as some personal information. Please follow the directions to create your username and password.     Your access code is: [email or web address]  Expires: 2018  6:08 PM     Your access code will  in 90 days. If you need help or a new code, please call your [facility] or [phone].        Care EveryWhere ID     This is your Care EveryWhere ID. This could be used by other organizations to access your [facility] medical records  [id number]        Your Vitals Were     Pulse Temperature Respirations Height Pulse Oximetry BMI (Body Mass Index)    117 97  F (36.1  C) (Oral) 18 5' 2\" (1.575 m) 91% 35.67 kg/m2       Blood Pressure from Last 3 " Encounters:   05/16/18 130/87   04/23/18 130/81   03/26/18 137/83    Weight from Last 3 Encounters:   05/16/18 195 lb (88.5 kg)   04/23/18 198 lb (89.8 kg)   03/26/18 187 lb (84.8 kg)              We Performed the Following     CBC with platelets     INR     OTOLARYNGOLOGY REFERRAL        Primary Care Provider Office Phone # Fax #    Kristen M Kehr, PA-C [phone] [address] [address] [address]        Equal Access to Services     [facility]: Hadii aad ku hadasho Soomaali, waaxda luqadaha, qaybta kaalmada adeegyada, abdifatah fitzgerald . So [facility] [phone].    ATENCIÓN: Si habla español, tiene a lentz disposición servicios gratuitos de asistencia lingüística. [facility] [phone].    We comply with applicable federal civil rights laws and [place] laws. We do not discriminate on the basis of race, color, national origin, age, disability, sex, sexual orientation, or gender identity.            Thank you!     Thank you for choosing [facility]  for your care. Our goal is always to provide you with excellent care. Hearing back from our patients is one way we can continue to improve our services. Please take a few minutes to complete the written survey that you may receive in the mail after your visit with us. Thank you!             Your Updated Medication List - Protect others around you: Learn how to safely use, store and throw away your medicines at www.disposemymeds.org.          This list is accurate as of 5/16/18 12:29 PM.  Always use your most recent med list.                   Brand Name Dispense Instructions for use Diagnosis    acetaminophen 325 MG tablet    TYLENOL    100 tablet    Take 2 tablets (650 mg) by mouth every 4 hours as needed for other (mild pain)    Orthopedic aftercare       medroxyPROGESTERone 150 MG/ML injection    DEPO-PROVERA    1 mL    Inject 1 mL (150 mg) into the muscle every 3 months    Encounter for surveillance of  injectable contraceptive       nortriptyline 50 MG capsule    PAMELOR     Take by mouth 2 times daily    Vertigo, Other fatigue, Weight gain       order for DME     1 Units    Left boot    Left Achilles tendinitis       traZODone 50 MG tablet    DESYREL

## 2018-05-16 NOTE — LETTER
May 17, 2018    Mily Grullon  [address] [address]        Dear Bassam Minor hemoglobin and INR ( measure of bloods ability to clot) are both normal.    Zara Black    Results for orders placed or performed in visit on 05/16/18   CBC with platelets   Result Value Ref Range    WBC 8.6 4.0 - 11.0 10e9/L    RBC Count 5.17 3.8 - 5.2 10e12/L    Hemoglobin 13.0 11.7 - 15.7 g/dL    Hematocrit 39.9 35.0 - 47.0 %    MCV 77 (L) 78 - 100 fl    MCH 25.1 (L) 26.5 - 33.0 pg    MCHC 32.6 31.5 - 36.5 g/dL    RDW 14.2 10.0 - 15.0 %    Platelet Count 347 150 - 450 10e9/L   INR   Result Value Ref Range    INR 1.06 0.86 - 1.14

## 2018-05-16 NOTE — PROGRESS NOTES
"  SUBJECTIVE:   Mily Grullon is a 37 year old female who presents to clinic today for the following health issues:        Frequent nose bleeds, getting worse    Pt notes bloody nose for past 7 months mainly on right side and now more recently notes on the left side as well  Typically bleeds 3-4 times per day. States she is sometimes swallowing blood with these bleeds  Denies any allergy symptoms or congestion other then due to the bleeding  Did try vaseline and neosporin in the nose  Does admit she picks her nose to help her breathe        Problem list and histories reviewed & adjusted, as indicated.  Additional history: as documented    Labs reviewed in EPIC    Reviewed and updated as needed this visit by clinical staff  Tobacco  Allergies  Meds  Med Hx  Surg Hx  Fam Hx  Soc Hx      Reviewed and updated as needed this visit by Provider         ROS:  Constitutional, HEENT, cardiovascular, pulmonary, gi and gu systems are negative, except as otherwise noted.    OBJECTIVE:     /87  Pulse 117  Temp 97  F (36.1  C) (Oral)  Resp 18  Ht 5' 2\" (1.575 m)  Wt 195 lb (88.5 kg)  SpO2 91%  BMI 35.67 kg/m2  Body mass index is 35.67 kg/(m^2).  GENERAL: healthy, alert and no distress  HENT: normal cephalic/atraumatic, nose and mouth without ulcers or lesions, oropharynx clear, oral mucous membranes moist and scabs noted over septum in right nares    Diagnostic Test Results:  none     ASSESSMENT/PLAN:     1. Epistaxis  To ENT for further evaluation and treatment. Recommended to stop picking nose  - CBC with platelets  - INR  - OTOLARYNGOLOGY REFERRAL        Zara Black MD  [facility]    "

## 2018-05-17 ENCOUNTER — THERAPY VISIT (OUTPATIENT)
Dept: PHYSICAL THERAPY | Facility: CLINIC | Age: 38
End: 2018-05-17
Payer: COMMERCIAL

## 2018-05-17 DIAGNOSIS — S92.015G CLOSED NONDISPLACED FRACTURE OF BODY OF LEFT CALCANEUS WITH DELAYED HEALING, SUBSEQUENT ENCOUNTER: ICD-10-CM

## 2018-05-17 DIAGNOSIS — G89.29 HEEL PAIN, CHRONIC, LEFT: ICD-10-CM

## 2018-05-17 DIAGNOSIS — M79.672 HEEL PAIN, CHRONIC, LEFT: ICD-10-CM

## 2018-05-17 PROCEDURE — 97140 MANUAL THERAPY 1/> REGIONS: CPT | Mod: GP | Performed by: PHYSICAL THERAPY ASSISTANT

## 2018-05-17 PROCEDURE — 97112 NEUROMUSCULAR REEDUCATION: CPT | Mod: GP | Performed by: PHYSICAL THERAPY ASSISTANT

## 2018-05-17 PROCEDURE — 97110 THERAPEUTIC EXERCISES: CPT | Mod: GP | Performed by: PHYSICAL THERAPY ASSISTANT

## 2018-05-17 ASSESSMENT — ANXIETY QUESTIONNAIRES: GAD7 TOTAL SCORE: 11

## 2018-05-17 ASSESSMENT — PATIENT HEALTH QUESTIONNAIRE - PHQ9: SUM OF ALL RESPONSES TO PHQ QUESTIONS 1-9: 12

## 2018-05-17 NOTE — PROGRESS NOTES
Subjective:  HPI                    Objective:  System    Physical Exam    General     ROS    Assessment/Plan:    PROGRESS  REPORT    Progress reporting period is from 3/9/2018 to 5/17/2018.       SUBJECTIVE  Subjective changes noted by patient:  Pt stated she tried to stand at work yesterday and tolerated 45 minutes at one given point, the left calf/foot felt ok during but then was tired in the muscles of the calf. She was still sore in the left calf after last PT visit few days ago.     Current pain level is  3/10 at the inner left calf area.     Previous pain level was 6/10.   Changes in function:  None  Adverse reaction to treatment or activity: see above    OBJECTIVE  Changes noted in objective findings: Left ankle AROM DF 8, PF 35, inversion 25, eversion 15 degrees. Gait: in PT clinic no longer using crutch. Noted improving left ankle DF and PF during heel strike and push off phases.  Ambulated in Alter G treadmill 70% BW at 1.6 mph for 11 minutes, focus on increasing her heel strike on left and PF with push off phase.    ASSESSMENT/PLAN  Updated problem list and treatment plan: Diagnosis 1:  Left calcaneal fracture   Pain -  hot/cold therapy, manual therapy, self management and home program  Decreased ROM/flexibility - manual therapy, therapeutic exercise and home program  Decreased strength - therapeutic exercise, therapeutic activities and home program  Impaired gait - gait training and home program  Decreased function - therapeutic activities and home program  STG/LTGs have been met or progress has been made towards goals:  STG was met, LTG for distance walking of 20 minutes with no crutch use has not been met.   Assessment of Progress: The patient's condition has potential to improve.  Patient is meeting short term goals and is progressing towards long term goals.  Self Management Plans:  Patient has been instructed in a home treatment program.  Patient  has been instructed in self management of  symptoms.  I have re-evaluated this patient and find that the nature, scope, duration and intensity of the therapy is appropriate for the medical condition of the patient.  Mily continues to require the following intervention to meet STG and LTG's:  PT    Recommendations:  This patient would benefit from continued therapy.     Frequency:  1 X week, once daily  Duration:  for 3 weeks  Pt is to see her MD for follow-up visit on June 4th    The progress note summary was written in collaboration with and reviewed by the physical therapist.    Please refer to the daily flowsheet for treatment today, total treatment time and time spent performing 1:1 timed codes.

## 2018-05-17 NOTE — MR AVS SNAPSHOT
After Visit Summary   5/17/2018    Mily Grullon    MRN: [id number]           Patient Information     Date Of Birth          1980        Visit Information        Provider Department      5/17/2018 12:00 PM Ramsey Gamboa PTA [facility] [place] [facility]        Today's Diagnoses     Closed nondisplaced fracture of body of left calcaneus with delayed healing, subsequent encounter        Heel pain, chronic, left           Follow-ups after your visit        Your next 10 appointments already scheduled     May 18, 2018  1:15 PM CDT   [facility]    [address] [address]   [phone]            May 21, 2018  1:10 PM CDT   CLEO Extremity with Ramsey Willis PT   [facility] [place] [facility] ([facility] [place])    [address]  [place] [address]   [phone]            May 24, 2018 10:00 AM CDT   CLEO Extremity with Ramsey Willis PT   [facility] [place] [facility] ([facility] [place])    [address]  [address]  [place] [address]   [phone]            Jun 04, 2018  9:00 AM CDT   Return Visit with Jose Alford MD   [facility] [place] [facility] [place]    [address]  [place] [address]   [phone]              Who to contact     If you have questions or need follow up information about today's clinic visit or your schedule please contact [facility] [facility] directly at [phone].  Normal or non-critical lab and imaging results will be communicated to you by MyChart, letter or phone within 4 business days after the clinic has received the results. If you do not hear from us within 7 days, please contact the clinic through MyChart or phone. If you have a critical or abnormal lab result, we will notify you by phone as  "soon as possible.  Submit refill requests through [employer] or call your pharmacy and they will forward the refill request to us. Please allow 3 business days for your refill to be completed.          Additional Information About Your Visit        [employer] Information     Playfire lets you send messages to your doctor, view your test results, renew your prescriptions, schedule appointments and more. To sign up, go to [email or web address] . Click on \"Log in\" on the left side of the screen, which will take you to the Welcome page. Then click on \"Sign up Now\" on the right side of the page.     You will be asked to enter the access code listed below, as well as some personal information. Please follow the directions to create your username and password.     Your access code is: [email or web address]  Expires: 2018  6:08 PM     Your access code will  in 90 days. If you need help or a new code, please call your [place] clinic or [phone].        Care EveryWhere ID     This is your Care EveryWhere ID. This could be used by other organizations to access your [place] medical records  [id number]         Blood Pressure from Last 3 Encounters:   18 130/87   18 130/81   18 137/83    Weight from Last 3 Encounters:   18 88.5 kg (195 lb)   18 89.8 kg (198 lb)   18 84.8 kg (187 lb)              We Performed the Following     Manual Ther Tech, 1+[facility], EA 15 min     Neuromuscular Re-Education     Therapeutic Exercises        Primary Care Provider Office Phone # Fax #    Kristen M Kehr, PA-C [phone] [address] [address] [address]        Equal Access to Services     MINAL VIZCARRA : Norma Bliss, oxana mary, abel weller, abdifatah spivey. [facility] [phone].    ATENCIÓN: Si habla español, tiene a lentz disposición servicios gratuitos de asistencia lingüística. Llame al [phone].    We comply with " applicable federal civil rights laws and [place] laws. We do not discriminate on the basis of race, color, national origin, age, disability, sex, sexual orientation, or gender identity.            Thank you!     Thank you for choosing [facility]  for your care. Our goal is always to provide you with excellent care. Hearing back from our patients is one way we can continue to improve our services. Please take a few minutes to complete the written survey that you may receive in the mail after your visit with us. Thank you!             Your Updated Medication List - Protect others around you: Learn how to safely use, store and throw away your medicines at www.disposemymeds.org.          This list is accurate as of 5/17/18 12:57 PM.  Always use your most recent med list.                   Brand Name Dispense Instructions for use Diagnosis    acetaminophen 325 MG tablet    TYLENOL    100 tablet    Take 2 tablets (650 mg) by mouth every 4 hours as needed for other (mild pain)    Orthopedic aftercare       medroxyPROGESTERone 150 MG/ML injection    DEPO-PROVERA    1 mL    Inject 1 mL (150 mg) into the muscle every 3 months    Encounter for surveillance of injectable contraceptive       nortriptyline 50 MG capsule    PAMELOR     Take by mouth 2 times daily    Vertigo, Other fatigue, Weight gain       order for DME     1 Units    Left boot    Left Achilles tendinitis       traZODone 50 MG tablet    DESYREL

## 2018-05-18 ENCOUNTER — OFFICE VISIT (OUTPATIENT)
Dept: OTOLARYNGOLOGY | Facility: CLINIC | Age: 38
End: 2018-05-18
Payer: COMMERCIAL

## 2018-05-18 ENCOUNTER — NURSE TRIAGE (OUTPATIENT)
Dept: NURSING | Facility: CLINIC | Age: 38
End: 2018-05-18

## 2018-05-18 VITALS
HEIGHT: 62 IN | SYSTOLIC BLOOD PRESSURE: 130 MMHG | WEIGHT: 195 LBS | BODY MASS INDEX: 35.88 KG/M2 | DIASTOLIC BLOOD PRESSURE: 87 MMHG | RESPIRATION RATE: 20 BRPM

## 2018-05-18 DIAGNOSIS — R04.0 EPISTAXIS: Primary | ICD-10-CM

## 2018-05-18 PROCEDURE — 30901 CONTROL OF NOSEBLEED: CPT | Performed by: OTOLARYNGOLOGY

## 2018-05-18 PROCEDURE — 99243 OFF/OP CNSLTJ NEW/EST LOW 30: CPT | Mod: 25 | Performed by: OTOLARYNGOLOGY

## 2018-05-18 NOTE — LETTER
5/18/2018         RE: Mily Grullon  [address] [address]        Dear Colleague,    Thank you for referring your patient, Mily Grullon, to the [facility]. Please see a copy of my visit note below.    I am seeing this patient in consultation for epistaxis at the request of the provider Dr. Zara Black.      Chief Complaint - Epistaxis    History of Present Illness - Mily Grullon is a 37 year old female presents with approximately 7 months of epistaxis. It occurs on the left side. It usually only comes out the front of the nose, but it has ran down the back of the throat at times. The bleeding occurs approximately daily. The patient can get the bleeding to stop by waiting. The patient has never gone to the emergency department or required a blood transfusion due to nose bleeding. The patient has no personal or family history of bleeding disorders. The patient takes no blood-thinning medication. The patient has tried nothing. She admits to picking a lot because she cannot breath well.     Past Medical History -   Patient Active Problem List   Diagnosis     Tobacco use disorder     Left elbow pain     CARDIOVASCULAR SCREENING; LDL GOAL LESS THAN 160     Gastritis     Headache     Senile osteoporosis     Closed nondisplaced intertrochanteric fracture of left femur (H)     Other postprocedural status(V45.89)     Abnormality of gait     Migraine without aura and without status migrainosus, not intractable     Abnormal gait     Pain in joint, ankle and foot, left     Closed nondisplaced fracture of body of left calcaneus with delayed healing, subsequent encounter     Heel pain, chronic, left       Current Medications -   Current Outpatient Prescriptions:      acetaminophen (TYLENOL) 325 MG tablet, Take 2 tablets (650 mg) by mouth every 4 hours as needed for other (mild pain), Disp: 100 tablet, Rfl: 0     medroxyPROGESTERone (DEPO-PROVERA) 150 MG/ML injection, Inject 1 mL (150 mg)  into the muscle every 3 months, Disp: 1 mL, Rfl: 3     nortriptyline (PAMELOR) 50 MG capsule, Take by mouth 2 times daily, Disp: , Rfl:      order for DME, Left boot, Disp: 1 Units, Rfl: 0     traZODone (DESYREL) 50 MG tablet, , Disp: , Rfl:     Allergies -   Allergies   Allergen Reactions     Amitriptyline Hives     Amoxicillin Diarrhea     Asa [Dihydroxyaluminum Aminoacetate]      Cipro [Ciprofloxacin]      Dizziness, vomiting, shortness of breath     Ibuprofen [Aspartame-Ibuprofen]      GI distress       Lyrica      extrematies swell up      Morphine      ithcy     Naproxen      GI distress     Neurontin [Gabapentin]      rash     Paxil [Paroxetine] Anaphylaxis     anaphylaxis     Phenergan [Promethazine Hcl]      dystonia     Prilosec [Omeprazole]      Rash, dizziness     Gabapentin Rash       Social History -   Social History     Social History     Marital status: Single     Spouse name: N/A     Number of children: N/A     Years of education: N/A     Social History Main Topics     Smoking status: Former Smoker     Types: Cigarettes     Quit date: 2/2/2014     Smokeless tobacco: Never Used     Alcohol use Yes      Comment: rare      Drug use: No     Sexual activity: Not Currently     Birth control/ protection: Injection      Comment: depo provera     Other Topics Concern     Not on file     Social History Narrative       Family History -   Family History   Problem Relation Age of Onset     Hypertension Father      Lipids Father      Hypertension Maternal Grandmother      Genitourinary Problems Maternal Grandmother      kidney disease     Cardiovascular Maternal Grandfather      quad bypass     HEART DISEASE Maternal Grandfather      quad bypass     CANCER Paternal Grandmother      leukemia     Asthma No family hx of      C.A.D. No family hx of      DIABETES No family hx of      CEREBROVASCULAR DISEASE No family hx of      Breast Cancer No family hx of      Cancer - colorectal No family hx of      Prostate Cancer  "No family hx of      Alzheimer Disease No family hx of      Arthritis No family hx of      Blood Disease No family hx of      Circulatory No family hx of      Eye Disorder No family hx of      GASTROINTESTINAL DISEASE No family hx of      Musculoskeletal Disorder No family hx of      Neurologic Disorder No family hx of      Respiratory No family hx of      Thyroid Disease No family hx of        Review of Systems - As per HPI and PMHx, otherwise 7 system review of the head and neck negative.    Physical Exam  /87  Resp 20  Ht 1.575 m (5' 2\")  Wt 88.5 kg (195 lb)  BMI 35.67 kg/m2  General - The patient is in no distress.  Alert and oriented x3, answers questions and cooperates with examination appropriately.   Voice and Breathing - The patient was breathing comfortably without the use of accessory muscles. There was no wheezing, stridor, or stertor.  The patients voice was clear and strong, with no dysphonia.  Head and Face - Normocephalic and atraumatic.    Eyes - Extraocular movements intact. Sclera were not icteric or injected, conjunctiva were pink and moist.  Neurologic - Cranial nerves II-XII are grossly intact. Specifically, the facial nerve is intact, House-Brackmann grade 1 of 6.   Nose - No significant external deformity. The nasal mucosa along the anterior septum on both sides shows crusting, ulceration, and dried blood. It is dry. Looks like picking. More posterior mucosa is okay. The septum was midline, turbinates are of normal size and position.  No polyps, masses, or purulence.  Neck -  No masses, abscess, or rashes.    Procedure - I sprayed the left anterior nasal cavity and septum with phenylephrine and lidocaine. I applied 2 sticks of silver nitrate to the prominent blood vessels along the anterior septum. Hemostasis was achieved. I applied bacitracin ointment to the wound with a q-tip.    A/P - Mily Grullon is a 37 year old female with epistaxis. This is almost certainly coming from both " sides of the anterior septum. She admits to picking and has sores on both sides. I cauterized the left today. She needs to stop picking the nose. Use nasal saline irrigations instead. I explained using vaseline 2-3 daily to the anterior septum, and a humidifier at the bedside at night.    I also explained applying digital pressure to the nasal tip for a minimum of 15-20 minutes to stop a nose bleed. If that doesn't stop the bleeding the patient needs to proceed to the nearest emergency department. I will see the patient back in 2-4 weeks. We can consider cautery on the right side. Also need to consider wegener's or biopsy given the ulcerations if this fails.     Koby Michael MD  Otolaryngology  [facility]      Again, thank you for allowing me to participate in the care of your patient.        Sincerely,        Koby Michael MD

## 2018-05-18 NOTE — PROGRESS NOTES
I am seeing this patient in consultation for epistaxis at the request of the provider Dr. Zara Black.      Chief Complaint - Epistaxis    History of Present Illness - Mily Grullon is a 37 year old female presents with approximately 7 months of epistaxis. It occurs on the left side. It usually only comes out the front of the nose, but it has ran down the back of the throat at times. The bleeding occurs approximately daily. The patient can get the bleeding to stop by waiting. The patient has never gone to the emergency department or required a blood transfusion due to nose bleeding. The patient has no personal or family history of bleeding disorders. The patient takes no blood-thinning medication. The patient has tried nothing. She admits to picking a lot because she cannot breath well.     Past Medical History -   Patient Active Problem List   Diagnosis     Tobacco use disorder     Left elbow pain     CARDIOVASCULAR SCREENING; LDL GOAL LESS THAN 160     Gastritis     Headache     Senile osteoporosis     Closed nondisplaced intertrochanteric fracture of left femur (H)     Other postprocedural status(V45.89)     Abnormality of gait     Migraine without aura and without status migrainosus, not intractable     Abnormal gait     Pain in joint, ankle and foot, left     Closed nondisplaced fracture of body of left calcaneus with delayed healing, subsequent encounter     Heel pain, chronic, left       Current Medications -   Current Outpatient Prescriptions:      acetaminophen (TYLENOL) 325 MG tablet, Take 2 tablets (650 mg) by mouth every 4 hours as needed for other (mild pain), Disp: 100 tablet, Rfl: 0     medroxyPROGESTERone (DEPO-PROVERA) 150 MG/ML injection, Inject 1 mL (150 mg) into the muscle every 3 months, Disp: 1 mL, Rfl: 3     nortriptyline (PAMELOR) 50 MG capsule, Take by mouth 2 times daily, Disp: , Rfl:      order for DME, Left boot, Disp: 1 Units, Rfl: 0     traZODone (DESYREL) 50 MG tablet, , Disp: , Rfl:      Allergies -   Allergies   Allergen Reactions     Amitriptyline Hives     Amoxicillin Diarrhea     Asa [Dihydroxyaluminum Aminoacetate]      Cipro [Ciprofloxacin]      Dizziness, vomiting, shortness of breath     Ibuprofen [Aspartame-Ibuprofen]      GI distress       Lyrica      extrematies swell up      Morphine      ithcy     Naproxen      GI distress     Neurontin [Gabapentin]      rash     Paxil [Paroxetine] Anaphylaxis     anaphylaxis     Phenergan [Promethazine Hcl]      dystonia     Prilosec [Omeprazole]      Rash, dizziness     Gabapentin Rash       Social History -   Social History     Social History     Marital status: Single     Spouse name: N/A     Number of children: N/A     Years of education: N/A     Social History Main Topics     Smoking status: Former Smoker     Types: Cigarettes     Quit date: 2/2/2014     Smokeless tobacco: Never Used     Alcohol use Yes      Comment: rare      Drug use: No     Sexual activity: Not Currently     Birth control/ protection: Injection      Comment: depo provera     Other Topics Concern     Not on file     Social History Narrative       Family History -   Family History   Problem Relation Age of Onset     Hypertension Father      Lipids Father      Hypertension Maternal Grandmother      Genitourinary Problems Maternal Grandmother      kidney disease     Cardiovascular Maternal Grandfather      quad bypass     HEART DISEASE Maternal Grandfather      quad bypass     CANCER Paternal Grandmother      leukemia     Asthma No family hx of      C.A.D. No family hx of      DIABETES No family hx of      CEREBROVASCULAR DISEASE No family hx of      Breast Cancer No family hx of      Cancer - colorectal No family hx of      Prostate Cancer No family hx of      Alzheimer Disease No family hx of      Arthritis No family hx of      Blood Disease No family hx of      Circulatory No family hx of      Eye Disorder No family hx of      GASTROINTESTINAL DISEASE No family hx of       "Musculoskeletal Disorder No family hx of      Neurologic Disorder No family hx of      Respiratory No family hx of      Thyroid Disease No family hx of        Review of Systems - As per HPI and PMHx, otherwise 7 system review of the head and neck negative.    Physical Exam  /87  Resp 20  Ht 1.575 m (5' 2\")  Wt 88.5 kg (195 lb)  BMI 35.67 kg/m2  General - The patient is in no distress.  Alert and oriented x3, answers questions and cooperates with examination appropriately.   Voice and Breathing - The patient was breathing comfortably without the use of accessory muscles. There was no wheezing, stridor, or stertor.  The patients voice was clear and strong, with no dysphonia.  Head and Face - Normocephalic and atraumatic.    Eyes - Extraocular movements intact. Sclera were not icteric or injected, conjunctiva were pink and moist.  Neurologic - Cranial nerves II-XII are grossly intact. Specifically, the facial nerve is intact, House-Brackmann grade 1 of 6.   Nose - No significant external deformity. The nasal mucosa along the anterior septum on both sides shows crusting, ulceration, and dried blood. It is dry. Looks like picking. More posterior mucosa is okay. The septum was midline, turbinates are of normal size and position.  No polyps, masses, or purulence.  Neck -  No masses, abscess, or rashes.    Procedure - I sprayed the left anterior nasal cavity and septum with phenylephrine and lidocaine. I applied 2 sticks of silver nitrate to the prominent blood vessels along the anterior septum. Hemostasis was achieved. I applied bacitracin ointment to the wound with a q-tip.    A/P - Mily CERDA Yadi is a 37 year old female with epistaxis. This is almost certainly coming from both sides of the anterior septum. She admits to picking and has sores on both sides. I cauterized the left today. She needs to stop picking the nose. Use nasal saline irrigations instead. I explained using vaseline 2-3 daily to the anterior " septum, and a humidifier at the bedside at night.    I also explained applying digital pressure to the nasal tip for a minimum of 15-20 minutes to stop a nose bleed. If that doesn't stop the bleeding the patient needs to proceed to the nearest emergency department. I will see the patient back in 2-4 weeks. We can consider cautery on the right side. Also need to consider wegener's or biopsy given the ulcerations if this fails.     Koby Michael MD  Otolaryngology  [facility]

## 2018-05-18 NOTE — TELEPHONE ENCOUNTER
Seen today by ENT. Inside of nose treated for chronic epistaxis. Note states pt has sore inside nose due to picking. Pt says nose hurts inside since the treatment today. No bleeding or other new sx at this time.  Advised pt follow ENT doctor's instructions from today's visit - saline irrigation, Vaseline inside nose, humidified air. Take Tylenol or ibuprofen for discomfort. Call back if new sx or if no improvement over weekend. Pt voiced understanding and agreement. Infection screening questions asked at today's visit. Not asked again. Coral Summers RN/FNA      Additional Information    Negative: Shock suspected (e.g., cold/pale/clammy skin, too weak to stand, low BP, rapid pulse)    Negative: Difficult to awaken or acting confused  (e.g., disoriented, slurred speech)    Negative: Sounds like a life-threatening emergency to the triager    Negative: Recent (within last 24 hours) medical visit for an injury    Negative: Recent surgery or surgical procedure    Negative: Recent discharge from the hospital    Negative: Asthma attack diagnosed recently    Negative: Flu (influenza) diagnosed recently    Negative: Ear infection (otitis media, middle ear infection) diagnosed recently    Negative: Ear infection (otitis externa, swimmer's ear) diagnosed recently    Negative: [1] Sinus infection AND [2] taking an antibiotic    Negative: Skin infection (cellulitis) diagnosed recently    Negative: Strep throat (strep pharyngitis) diagnosed recently    Negative: Threatened miscarriage (threatened ) recently diagnosed    Negative: Urine infection (FEMALE; cystitis, pyelonephritis, urethritis) ) diagnosed recently    Negative: Urine infection (MALE; cystitis, pyelonephritis, prostatitis, epidydimitis, orchitis, urethritis) diagnosed recently    Negative: Taking antibiotic for other infection    Negative: More than 24 hours since medical visit    Negative: [1] Drinking very little AND [2] dehydration suspected (e.g., no urine  > 12 hours, very dry mouth, very lightheaded)    Negative: Patient sounds very sick or weak to the triager    Negative: Fever > 104 F (40 C)    Negative: SEVERE pain (e.g., excruciating, pain scale 8-10) AND [2] not improved after pain medications    Negative: [1] Recent medical visit within 24 hours AND [2] condition/symptoms WORSE    Negative: [1] Recent medical visit within 24 hours AND [2] NEW symptom AND [2] that could be serious    Negative: [1] Caller has URGENT question (includes prescribed medication questions) AND [2] triager unable to answer    Negative: [1] Recent medical visit within 24 hours AND [2] NEW onset of fever AND [3] HCP said to call if this occurred    Negative: [1] Caller has NON-URGENT question (includes prescribed medication questions) AND [2] triager unable to answer    [1] Recent medical visit within 24 hours AND [2] condition/symptoms SAME (unchanged) AND [3] caller has additional questions triager can answer (all triage questions negative)    Protocols used: RECENT MEDICAL VISIT FOR ILLNESS FOLLOW-UP CALL[facility]

## 2018-05-18 NOTE — MR AVS SNAPSHOT
After Visit Summary   5/18/2018    Mily Grullon    MRN: [id number]           Patient Information     Date Of Birth          1980        Visit Information        Provider Department      5/18/2018 1:15 PM Koby Michael MD [facility]        Today's Diagnoses     Epistaxis    -  1       Follow-ups after your visit        Your next 10 appointments already scheduled     May 21, 2018  1:10 PM CDT   CLEO Extremity with Ramsey Willis PT   [facility] [place] [facility] ([facility] [place])    [address]  [address]  [place] [address]   [phone]            May 24, 2018 10:00 AM CDT   CLEO Extremity with Ramsey Willis PT   [facility] [place] [facility] ([facility] [place])    [address]  [address]  [place] [address]   [phone]            Jun 04, 2018  9:00 AM CDT   Return Visit with Jose Alford MD   [facility] [place] ([facility] [place]    [address]  [place] [address]   [phone]            Jun 22, 2018  1:00 PM CDT   New Visit with Koby Michael MD   [facility]    [address] [address] [phone]              Who to contact     If you have questions or need follow up information about today's clinic visit or your schedule please contact [facility] directly at [phone].  Normal or non-critical lab and imaging results will be communicated to you by MyChart, letter or phone within 4 business days after the clinic has received the results. If you do not hear from us within 7 days, please contact the clinic through MyChart or phone. If you have a critical or abnormal lab result, we will notify you by phone as soon as possible.  Submit refill requests through [employer] or call your pharmacy and they will forward the refill request to us. Please allow 3 business  "days for your refill to be completed.          Additional Information About Your Visit        Farecasthart Information     [employer] lets you send messages to your doctor, view your test results, renew your prescriptions, schedule appointments and more. To sign up, go to [email or web address] . Click on \"Log in\" on the left side of the screen, which will take you to the Welcome page. Then click on \"Sign up Now\" on the right side of the page.     You will be asked to enter the access code listed below, as well as some personal information. Please follow the directions to create your username and password.     Your access code is: [email or web address]  Expires: 2018  6:08 PM     Your access code will  in 90 days. If you need help or a new code, please call your [place] clinic or [phone].        Care EveryWhere ID     This is your [facility] EveryWhere ID. This could be used by other organizations to access your [place] medical records  [id number]        Your Vitals Were     Respirations Height BMI (Body Mass Index)             20 1.575 m (5' 2\") 35.67 kg/m2          Blood Pressure from Last 3 Encounters:   18 130/87   18 130/87   18 130/81    Weight from Last 3 Encounters:   18 88.5 kg (195 lb)   18 88.5 kg (195 lb)   18 89.8 kg (198 lb)              We Performed the Following     Nasal Cautery Simple [facility]        Primary Care Provider Office Phone # Fax #    Kristen M Kehr, PA-C [phone] [address] [facility] [address] [facility] [address]        Equal Access to Services     MINAL VIZCARRA : Hadii mallika Bliss, waaxda luqadaha, qaybta kaalmada nithin, abdifatah spivey. So [facility] [phone].    ATENCIÓN: Si habla español, tiene a lentz disposición servicios gratuitos de asistencia lingüística. Llame al [phone].    We comply with applicable federal civil rights laws and [place] laws. We do not discriminate on the basis of race, " color, national origin, age, disability, sex, sexual orientation, or gender identity.            Thank you!     Thank you for choosing [facility]  for your [facility]. Our goal is always to provide you with excellent care. Hearing back from our patients is one way we can continue to improve our services. Please take a few minutes to complete the written survey that you may receive in the mail after your visit with us. Thank you!             Your Updated Medication List - Protect others around you: Learn how to safely use, store and throw away your medicines at www.disposemymeds.org.          This list is accurate as of 5/18/18  1:46 PM.  Always use your most recent med list.                   Brand Name Dispense Instructions for use Diagnosis    acetaminophen 325 MG tablet    TYLENOL    100 tablet    Take 2 tablets (650 mg) by mouth every 4 hours as needed for other (mild pain)    Orthopedic aftercare       medroxyPROGESTERone 150 MG/ML injection    DEPO-PROVERA    1 mL    Inject 1 mL (150 mg) into the muscle every 3 months    Encounter for surveillance of injectable contraceptive       nortriptyline 50 MG capsule    PAMELOR     Take by mouth 2 times daily    Vertigo, Other fatigue, Weight gain       order for DME     1 Units    Left boot    Left Achilles tendinitis       traZODone 50 MG tablet    DESYREL

## 2018-05-18 NOTE — TELEPHONE ENCOUNTER
[facility]  ----- Message from Madeline Parr sent at 5/18/2018  5:32 PM CDT -----  Reason for call:  Other   Patient called regarding (reason for call): Nose pain  Additional comments: [facility] still is having [facility] pain.    Phone number to reach patient:  Home number on file [phone] (home)    Best Time:  Anytime     Can we leave a detailed message on this number?  YES

## 2018-05-21 ENCOUNTER — THERAPY VISIT (OUTPATIENT)
Dept: PHYSICAL THERAPY | Facility: CLINIC | Age: 38
End: 2018-05-21
Payer: COMMERCIAL

## 2018-05-21 DIAGNOSIS — S92.015G CLOSED NONDISPLACED FRACTURE OF BODY OF LEFT CALCANEUS WITH DELAYED HEALING, SUBSEQUENT ENCOUNTER: ICD-10-CM

## 2018-05-21 DIAGNOSIS — M79.672 HEEL PAIN, CHRONIC, LEFT: ICD-10-CM

## 2018-05-21 DIAGNOSIS — G89.29 HEEL PAIN, CHRONIC, LEFT: ICD-10-CM

## 2018-05-21 PROCEDURE — 97112 NEUROMUSCULAR REEDUCATION: CPT | Mod: GP | Performed by: PHYSICAL THERAPIST

## 2018-05-21 PROCEDURE — 97110 THERAPEUTIC EXERCISES: CPT | Mod: GP | Performed by: PHYSICAL THERAPIST

## 2018-05-24 ENCOUNTER — THERAPY VISIT (OUTPATIENT)
Dept: PHYSICAL THERAPY | Facility: CLINIC | Age: 38
End: 2018-05-24
Payer: COMMERCIAL

## 2018-05-24 DIAGNOSIS — S92.015G CLOSED NONDISPLACED FRACTURE OF BODY OF LEFT CALCANEUS WITH DELAYED HEALING, SUBSEQUENT ENCOUNTER: ICD-10-CM

## 2018-05-24 DIAGNOSIS — G89.29 HEEL PAIN, CHRONIC, LEFT: ICD-10-CM

## 2018-05-24 DIAGNOSIS — M79.672 HEEL PAIN, CHRONIC, LEFT: ICD-10-CM

## 2018-05-24 PROCEDURE — 97112 NEUROMUSCULAR REEDUCATION: CPT | Mod: GP | Performed by: PHYSICAL THERAPIST

## 2018-05-24 PROCEDURE — 97110 THERAPEUTIC EXERCISES: CPT | Mod: GP | Performed by: PHYSICAL THERAPIST

## 2018-05-24 NOTE — MR AVS SNAPSHOT
After Visit Summary   5/24/2018    Mily Grullon    MRN: [id number]           Patient Information     Date Of Birth          1980        Visit Information        Provider Department      5/24/2018 10:00 AM Ramsey Willis PT [facility] [place] [facility]        Today's Diagnoses     Closed nondisplaced fracture of body of left calcaneus with delayed healing, subsequent encounter        Heel pain, chronic, left           Follow-ups after your visit        Your next 10 appointments already scheduled     Jun 04, 2018  9:00 AM CDT   Return Visit with Jose Alford MD   [facility] [place] ([facility] [place])    [address]  [place] [address] [phone]            Jun 22, 2018  1:00 PM CDT   New Visit with Koby Michael MD   [facility]    [address] [address] [phone]              Who to contact     If you have questions or need follow up information about today's clinic visit or your schedule please contact [facility] directly at [phone].  Normal or non-critical lab and imaging results will be communicated to you by MyChart, letter or phone within 4 business days after the clinic has received the results. If you do not hear from us within 7 days, please contact the clinic through MyChart or phone. If you have a critical or abnormal lab result, we will notify you by phone as soon as possible.  Submit refill requests through [employer] or call your pharmacy and they will forward the refill request to us. Please allow 3 business days for your refill to be completed.          Additional Information About Your Visit        Care EveryWhere ID     This is your Care EveryWhere ID. This could be used by other organizations to access your [place] medical records  [id number]         Blood Pressure from Last 3  Encounters:   05/18/18 130/87   05/16/18 130/87   04/23/18 130/81    Weight from Last 3 Encounters:   05/18/18 88.5 kg (195 lb)   05/16/18 88.5 kg (195 lb)   04/23/18 89.8 kg (198 lb)              We Performed the Following     NEUROMUSCULAR RE-EDUCATION     THERAPEUTIC EXERCISES        Primary Care Provider Office Phone # Fax #    Kristen M Kehr, PA-C [phone] [address] [address]        Equal Access to Services     [facility]: Hadii aad ku hadasho Soomaali, waaxda luqadaha, qaybta kaalmada adeegyada, waxliban fitzgerald . So [facility] [phone].    ATENCIÓN: Si habla español, tiene a lentz disposición servicios gratuitos de asistencia lingüística. [facility] [phone].    We comply with applicable federal civil rights laws and [place] laws. We do not discriminate on the basis of race, color, national origin, age, disability, sex, sexual orientation, or gender identity.            Thank you!     Thank you for choosing [facility]  for your care. Our goal is always to provide you with excellent care. Hearing back from our patients is one way we can continue to improve our services. Please take a few minutes to complete the written survey that you may receive in the mail after your visit with us. Thank you!             Your Updated Medication List - Protect others around you: Learn how to safely use, store and throw away your medicines at www.disposemymeds.org.          This list is accurate as of 5/24/18 10:40 AM.  Always use your most recent med list.                   Brand Name Dispense Instructions for use Diagnosis    acetaminophen 325 MG tablet    TYLENOL    100 tablet    Take 2 tablets (650 mg) by mouth every 4 hours as needed for other (mild pain)    Orthopedic aftercare       medroxyPROGESTERone 150 MG/ML injection    DEPO-PROVERA    1 mL    Inject 1 mL (150 mg) into the muscle every 3 months    Encounter for  surveillance of injectable contraceptive       nortriptyline 50 MG capsule    PAMELOR     Take by mouth 2 times daily    Vertigo, Other fatigue, Weight gain       order for DME     1 Units    Left boot    Left Achilles tendinitis       traZODone 50 MG tablet    DESYREL

## 2018-05-24 NOTE — PROGRESS NOTES
Subjective:  HPI                    Objective:  System    Physical Exam    General     ROS    Assessment/Plan:    SUBJECTIVE  Subjective: Pain in ankle and heel gets worse as the day goes on, especially if she walks a lot   Current Pain level: 3/10   Changes in function:  Yes (See Goal flowsheet attached for changes in current functional level)     Adverse reaction to treatment or activity:  None    OBJECTIVE  Objective: Better heel strike today with improved timing to foot flat.  Functional AROM of L ankle but heel cord tightness with DF     ASSESSMENT  Mily continues to require intervention to meet STG and LTG's: PT  Patient is progressing as expected.  Response to therapy has shown an improvement in  pain level, ROM  and gait  Progress made towards STG/LTG?  Yes (See Goal flowsheet attached for updates on achievement of STG and LTG)    PLAN  Current treatment program is being advanced to more complex exercises.    PTA/ATC plan:  N/A    Please refer to the daily flowsheet for treatment today, total treatment time and time spent performing 1:1 timed codes.

## 2018-05-31 ENCOUNTER — THERAPY VISIT (OUTPATIENT)
Dept: PHYSICAL THERAPY | Facility: CLINIC | Age: 38
End: 2018-05-31
Payer: COMMERCIAL

## 2018-05-31 DIAGNOSIS — G89.29 HEEL PAIN, CHRONIC, LEFT: ICD-10-CM

## 2018-05-31 DIAGNOSIS — S92.015G CLOSED NONDISPLACED FRACTURE OF BODY OF LEFT CALCANEUS WITH DELAYED HEALING, SUBSEQUENT ENCOUNTER: ICD-10-CM

## 2018-05-31 DIAGNOSIS — M79.672 HEEL PAIN, CHRONIC, LEFT: ICD-10-CM

## 2018-05-31 PROCEDURE — 97112 NEUROMUSCULAR REEDUCATION: CPT | Mod: GP | Performed by: PHYSICAL THERAPY ASSISTANT

## 2018-05-31 PROCEDURE — 97110 THERAPEUTIC EXERCISES: CPT | Mod: GP | Performed by: PHYSICAL THERAPY ASSISTANT

## 2018-05-31 NOTE — PROGRESS NOTES
Subjective:  HPI                    Objective:  System    Physical Exam    General     ROS    Assessment/Plan:    PROGRESS  REPORT    Progress reporting period is from 3/9/2018 to 5/31/2018.       SUBJECTIVE  Subjective changes noted by patient: Pt notes the bottom of the left arch gets tired by end of long day of stand/walking. Pt notes she tried to stand for 45 minutes at owrk and really felt within the hour.     Current pain level is 2/10.     Previous pain level was  6/10.   Changes in function:  None  Adverse reaction to treatment or activity: None    OBJECTIVE  Changes noted in objective findings:  AROM: left ankle is functional. left heel cord remains tight with the DF.  Gait: left heel strike improves following training in the Alter G treadmill at 70% BW, 1.8 mph x12 min.       ASSESSMENT/PLAN  Updated problem list and treatment plan: Diagnosis 1:  Left calcaneous fracture.   Pain -  hot/cold therapy, manual therapy, self management and home program  Decreased ROM/flexibility - manual therapy, therapeutic exercise and home program  Decreased strength - therapeutic exercise, therapeutic activities and home program  Impaired gait - gait training and home program  Decreased function - therapeutic activities and home program  STG/LTGs have been met or progress has been made towards goals:  STG met, LTG not fully met.  Assessment of Progress: The patient's condition has potential to improve.  Self Management Plans:  Patient has been instructed in a home treatment program.  Patient  has been instructed in self management of symptoms.  I have re-evaluated this patient and find that the nature, scope, duration and intensity of the therapy is appropriate for the medical condition of the patient.  Mily continues to require the following intervention to meet STG and LTG's:  PT    Recommendations:  This patient would benefit from continued therapy.     Frequency:  1 X week, once daily  Duration:  for 4 weeks    The  progress note summary was written in collaboration with and reviewed by the physical therapist.    Please refer to the daily flowsheet for treatment today, total treatment time and time spent performing 1:1 timed codes.

## 2018-05-31 NOTE — MR AVS SNAPSHOT
After Visit Summary   5/31/2018    Mily Grullon    MRN: [id number]           Patient Information     Date Of Birth          1980        Visit Information        Provider Department      5/31/2018 9:20 AM Ramsey Gamboa PTA [facility] [place] [facility]        Today's Diagnoses     Closed nondisplaced fracture of body of left calcaneus with delayed healing, subsequent encounter        Heel pain, chronic, left           Follow-ups after your visit        Your next 10 appointments already scheduled     Jun 04, 2018  9:00 AM CDT   Return Visit with Jose Alford MD   [facility] [place] ([facility] [place])    [address]  [place] [address]   [phone]            Jun 07, 2018 11:20 AM CDT   CLEO Extremity with Ramsey Willis, PT   [facility] [place] [facility] ([facility] [place])    [address]  [address]  [place] [address]   [phone]            Jun 14, 2018 10:00 AM CDT   CLEO Extremity with Ramsey Gamboa PTA   [facility] [place] [facility] ([facility] [place])    [address]  [address]  [place] [address]   [phone]            Jun 21, 2018 11:20 AM CDT   CLEO Extremity with Ramsey Willis PT   [facility] [place] [facility] ([facility] [place])    [address]  [address]  [place] [address]   [phone]            Jun 22, 2018  1:00 PM CDT   [facility]    [address] [facility] [address] [phone]              Who to contact     If you have questions or need follow up information about today's clinic visit or your schedule please contact [facility] [facility] directly at [phone].  Normal or non-critical lab and imaging results will be communicated to you by MyChart, letter or phone within 4 business  days after the clinic has received the results. If you do not hear from us within 7 days, please contact the clinic through [employer] or phone. If you have a critical or abnormal lab result, we will notify you by phone as soon as possible.  Submit refill requests through [address] or call your pharmacy and they will forward the refill request to us. Please allow 3 business days for your refill to be completed.          Additional Information About Your Visit        Care EveryWhere ID     This is your Care EveryWhere ID. This could be used by other organizations to access your [place] medical records  [id number]         Blood Pressure from Last 3 Encounters:   05/18/18 130/87   05/16/18 130/87   04/23/18 130/81    Weight from Last 3 Encounters:   05/18/18 88.5 kg (195 lb)   05/16/18 88.5 kg (195 lb)   04/23/18 89.8 kg (198 lb)              We Performed the Following     Neuromuscular Re-Education     Therapeutic Exercises        Primary Care Provider Office Phone # Fax #    Kristen M Kehr, PA-C [phone] [address] [facility] [address]        Equal Access to Services     [facility] : Hadii aad ku hadasho Soomaali, waaxda luqadaha, qaybta kaalmada adeegyada, abdifatah fitzgerald . So [facility] [phone].    ATENCIÓN: Si habla español, tiene a lentz disposición servicios gratuitos de asistencia lingüística. [facility] [phone].    We comply with applicable federal civil rights laws and [place] laws. We do not discriminate on the basis of race, color, national origin, age, disability, sex, sexual orientation, or gender identity.            Thank you!     Thank you for choosing [facility] [place] [facility]  for your care. Our goal is always to provide you with excellent care. Hearing back from our patients is one way we can continue to improve our services. Please take a few minutes to complete the written survey that you may receive in the mail after your  visit with us. Thank you!             Your Updated Medication List - Protect others around you: Learn how to safely use, store and throw away your medicines at www.disposemymeds.org.          This list is accurate as of 5/31/18 10:06 AM.  Always use your most recent med list.                   Brand Name Dispense Instructions for use Diagnosis    acetaminophen 325 MG tablet    TYLENOL    100 tablet    Take 2 tablets (650 mg) by mouth every 4 hours as needed for other (mild pain)    Orthopedic aftercare       medroxyPROGESTERone 150 MG/ML injection    DEPO-PROVERA    1 mL    Inject 1 mL (150 mg) into the muscle every 3 months    Encounter for surveillance of injectable contraceptive       nortriptyline 50 MG capsule    PAMELOR     Take by mouth 2 times daily    Vertigo, Other fatigue, Weight gain       order for DME     1 Units    Left boot    Left Achilles tendinitis       traZODone 50 MG tablet    DESYREL

## 2018-06-04 ENCOUNTER — OFFICE VISIT (OUTPATIENT)
Dept: ORTHOPEDICS | Facility: CLINIC | Age: 38
End: 2018-06-04
Payer: COMMERCIAL

## 2018-06-04 VITALS
DIASTOLIC BLOOD PRESSURE: 86 MMHG | WEIGHT: 199.7 LBS | HEIGHT: 62 IN | SYSTOLIC BLOOD PRESSURE: 137 MMHG | BODY MASS INDEX: 36.75 KG/M2 | HEART RATE: 112 BPM

## 2018-06-04 DIAGNOSIS — M76.62 ACHILLES TENDINITIS OF LEFT LOWER EXTREMITY: Primary | ICD-10-CM

## 2018-06-04 DIAGNOSIS — M72.2 PLANTAR FASCIITIS: ICD-10-CM

## 2018-06-04 PROCEDURE — 99213 OFFICE O/P EST LOW 20 MIN: CPT | Performed by: ORTHOPAEDIC SURGERY

## 2018-06-04 RX ORDER — TIZANIDINE 2 MG/1
2 TABLET ORAL 3 TIMES DAILY PRN
Qty: 30 TABLET | Refills: 1 | Status: SHIPPED | OUTPATIENT
Start: 2018-06-04 | End: 2018-06-25

## 2018-06-04 ASSESSMENT — PAIN SCALES - GENERAL: PAINLEVEL: MILD PAIN (3)

## 2018-06-04 NOTE — MR AVS SNAPSHOT
After Visit Summary   6/4/2018    Mily Grullon    MRN: [id number]           Patient Information     Date Of Birth          1980        Visit Information        Provider Department      6/4/2018 9:00 AM Jose Alford MD [facility] [place]        Today's Diagnoses     Achilles tendinitis of left lower extremity    -  1    Plantar fasciitis           Follow-ups after your visit        Follow-up notes from your care team     Return in about 6 weeks (around 7/16/2018) for clinical recheck.      Your next 10 appointments already scheduled     Jun 07, 2018 11:20 AM CDT   CLEO Extremity with Ramsey Willis, PT   [facility] [place] [facility] ([facility] [place])    [address]  [address]  [place] [address]   [phone]            Jun 14, 2018 10:00 AM CDT   CLEO Extremity with Ramsey Gamboa PTA   [facility] [place] [facility] ([facility] [place])    [address]  [address]  [place] [address]   [phone]            Jun 21, 2018 11:20 AM CDT   CLEO Extremity with Ramsey Willis, PT   [facility] [place] [facility] ([facility] [place])    [address]  [address]  [place] [address]   [phone]            Jun 22, 2018  1:00 PM CDT   New Visit with Koby Michael MD   [facility]    [address] [address] [phone]            Jul 16, 2018  9:00 AM CDT   Return Visit with Jose Alford MD   [facility] [place] ([facility] [place])    [address]  [address]  [place] [address]   [phone]              Who to contact     If you have questions or need follow up information about today's clinic visit or your schedule please contact [facility] [place] directly at [phone].  Normal or non-critical lab and imaging results will be communicated to  "you by MyChart, letter or phone within 4 business days after the clinic has received the results. If you do not hear from us within 7 days, please contact the clinic through MyChart or phone. If you have a critical or abnormal lab result, we will notify you by phone as soon as possible.  Submit refill requests through [employer] or call your pharmacy and they will forward the refill request to us. Please allow 3 business days for your refill to be completed.          Additional Information About Your Visit        Care EveryWhere ID     This is your Care EveryWhere ID. This could be used by other organizations to access your [facility] medical records  [id number]        Your Vitals Were     Pulse Height BMI (Body Mass Index)             112 5' 2\" (1.575 m) 36.53 kg/m2          Blood Pressure from Last 3 Encounters:   06/04/18 137/86   05/18/18 130/87   05/16/18 130/87    Weight from Last 3 Encounters:   06/04/18 199 lb 11.2 oz (90.6 kg)   05/18/18 195 lb (88.5 kg)   05/16/18 195 lb (88.5 kg)              Today, you had the following     No orders found for display         Today's Medication Changes          These changes are accurate as of 6/4/18 10:31 AM.  If you have any questions, ask your nurse or doctor.               Start taking these medicines.        Dose/Directions    tiZANidine 2 MG tablet   Commonly known as:  ZANAFLEX   Used for:  Achilles tendinitis of left lower extremity   Started by:  Jose Alford MD        Dose:  2 mg   Take 1 tablet (2 mg) by mouth 3 times daily as needed for muscle spasms   Quantity:  30 tablet   Refills:  1            Where to get your medicines      These medications were sent to [facility] - [address] [facility]  [address] [facility] [facility] [address]     Phone:  [phone]     tiZANidine 2 MG tablet                Primary Care Provider Office Phone # Fax #    Kristen M Kehr, PA-C [phone] [address] [facility] " [place] [address]        Equal Access to Services     [facility] Center[facility] : Hadii mallika reese joselyn Bliss, wamontseda luqadaha, qaybta kaaidandeepa weller, abdifatah odylecruzharpreet spivey. So [facility] [phone].    ATENCIÓN: Si habla español, tiene a lentz disposición servicios gratuitos de asistencia lingüística. Llame al [phone].    We comply with applicable federal civil rights laws and [place] laws. We do not discriminate on the basis of race, color, national origin, age, disability, sex, sexual orientation, or gender identity.            Thank you!     Thank you for choosing [facility]  for your care. Our goal is always to provide you with excellent care. Hearing back from our patients is one way we can continue to improve our services. Please take a few minutes to complete the written survey that you may receive in the mail after your visit with us. Thank you!             Your Updated Medication List - Protect others around you: Learn how to safely use, store and throw away your medicines at www.disposemymeds.org.          This list is accurate as of 6/4/18 10:31 AM.  Always use your most recent med list.                   Brand Name Dispense Instructions for use Diagnosis    acetaminophen 325 MG tablet    TYLENOL    100 tablet    Take 2 tablets (650 mg) by mouth every 4 hours as needed for other (mild pain)    Orthopedic aftercare       medroxyPROGESTERone 150 MG/ML injection    DEPO-PROVERA    1 mL    Inject 1 mL (150 mg) into the muscle every 3 months    Encounter for surveillance of injectable contraceptive       nortriptyline 50 MG capsule    PAMELOR     Take by mouth 2 times daily    Vertigo, Other fatigue, Weight gain       tiZANidine 2 MG tablet    ZANAFLEX    30 tablet    Take 1 tablet (2 mg) by mouth 3 times daily as needed for muscle spasms    Achilles tendinitis of left lower extremity       traZODone 50 MG tablet    DESYREL

## 2018-06-04 NOTE — LETTER
[facility] [place]  [address]  [place] [address] [phone]  WORKABILITY    [facility], Rhea Wolff M.D. [place]        6/4/2018      RE: Mily Grullon    [address] [address]        To whom it may concern:     Mily Grullon is under my care for   1. Achilles tendinitis of left lower extremity        Date of injury: 10/2017          Return to work date: 6/4/18   ** WITH RESTRICTIONS? Yes, with work restrictions: * Other: 6 hours a day.  DURATION OF LIMITATIONS: 6 weeks        Next appointment: 6 weeks        Electronically Signed (as below)  Dr. Jose Alford M.D.

## 2018-06-04 NOTE — LETTER
6/4/2018         RE: Mily Grullon  [address] [address]        Dear Colleague,    Thank you for referring your patient, Mily Grullon, to the [facility]. Please see a copy of my visit note below.    chief complaint:   Chief Complaint   Patient presents with     RECHECK     S/p left calcaneal stress fx. DOI 10/2017. Patient states her heel still hurts but it is alright. She is not able to stand very long. She continues to go to PT.      INJURY: left calcaneal stress fracture, achilles tendonitis  ONSET: 10/2017      HISTORY OF PRESENT ILLNESS    Mily Grullon is a 37 year old female seen for follow up evaluation of a left ankle and heel pain that started on 10/13/2017, 8 months ago. No known injury. She started to notice the pain at work without a specific event. Had MRI showing stress fracture of the calcaneus. She returns today with moderate pain today, rated a 3/10. She continues to have pain at the bottom of the foot. The longest she is able to stand is 45 minutes. At that point, she has shaking in the leg. She no longer is using crutches. At physical therapy, she is 70% body weight with using the gravity treadmill.      Of note: this is not a work comp claim. Patient has osteoporosis.       Present symptoms: mild pain, swelling.    Symptoms occur walking and standing.    The frequency of symptoms: frequently.  Denies associated numbness or tingling.   Pain severity: 3/10  Pain quality: aching and sharp  Associated symptoms: none  Aggravating Factors: weightbearing  Relieving Factors: at rest, with brace, 2 crutch    Treatment up to this point: short ankle boot, 2 crutch, physical therapy     Orthopedic PMH: history of left hip pain and stress fracture, with open-reduction, internal fixation of intertrochanteric femur stress fracture (Dr. Alford) and a subsequent left hip arthroscopy (Dr. Deleon).    Other PMH:  has a past medical history of  Endometriosis, site unspecified; Gastritis (2010); Urinary calculus, unspecified; and Wrist injury (Nov 19, 2003).  Patient Active Problem List    Diagnosis Date Noted     Closed nondisplaced fracture of body of left calcaneus with delayed healing, subsequent encounter 03/09/2018     Priority: Medium     Heel pain, chronic, left 03/09/2018     Priority: Medium     Pain in joint, ankle and foot, left 11/15/2017     Priority: Medium     Abnormal gait 07/21/2016     Priority: Medium     Migraine without aura and without status migrainosus, not intractable 11/10/2015     Priority: Medium     Abnormality of gait 06/09/2014     Priority: Medium     Other postprocedural status(V45.89) 03/10/2014     Priority: Medium     Closed nondisplaced intertrochanteric fracture of left femur (H) 02/10/2014     Priority: Medium     Senile osteoporosis 12/10/2013     Priority: Medium     Headache 10/31/2011     Priority: Medium     Problem list name updated by automated process. Provider to review       Gastritis      Priority: Medium     dx 2010- sees Dr Gl[facility]       CARDIOVASCULAR SCREENING; LDL GOAL LESS THAN 160 10/31/2010     Priority: Medium     Left elbow pain 01/08/2010     Priority: Medium     Narcotic agreement on file       Tobacco use disorder 12/17/2009     Priority: Medium       Surgical Hx:  has a past surgical history that includes REPAIR TRIANGULAR CART,WRIST JT (2005); REPAIR TRIANGULAR CART,WRIST JT (2007 or so); arthroscopy of joint unlisted (4/2004); REMOVAL OF OVARY/TUBE(S) (6/2003); hysteroscopy; lithotripsy; and Arthroscopy hip, osteoplasty femur proximal, combined (Left, 6/29/2016).    Medications:   Current Outpatient Prescriptions:      acetaminophen (TYLENOL) 325 MG tablet, Take 2 tablets (650 mg) by mouth every 4 hours as needed for other (mild pain), Disp: 100 tablet, Rfl: 0     medroxyPROGESTERone (DEPO-PROVERA) 150 MG/ML injection, Inject 1 mL (150 mg) into the muscle every 3  months, Disp: 1 mL, Rfl: 3     nortriptyline (PAMELOR) 50 MG capsule, Take by mouth 2 times daily, Disp: , Rfl:      tiZANidine (ZANAFLEX) 2 MG tablet, Take 1 tablet (2 mg) by mouth 3 times daily as needed for muscle spasms, Disp: 30 tablet, Rfl: 1     traZODone (DESYREL) 50 MG tablet, , Disp: , Rfl:     Allergies:   Allergies   Allergen Reactions     Amitriptyline Hives     Amoxicillin Diarrhea     Asa [Dihydroxyaluminum Aminoacetate]      Cipro [Ciprofloxacin]      Dizziness, vomiting, shortness of breath     Ibuprofen [Aspartame-Ibuprofen]      GI distress       Lyrica      extrematies swell up      Morphine      ithcy     Naproxen      GI distress     Neurontin [Gabapentin]      rash     Paxil [Paroxetine] Anaphylaxis     anaphylaxis     Phenergan [Promethazine Hcl]      dystonia     Prilosec [Omeprazole]      Rash, dizziness     Gabapentin Rash       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 Her smoking use included Cigarettes. She has never used smokeless tobacco. She reports that she drinks alcohol. She reports that she does not use illicit drugs.    Family Hx: family history includes CANCER in her paternal grandmother; Cardiovascular in her maternal grandfather; Genitourinary Problems in her maternal grandmother; HEART DISEASE in her maternal grandfather; Hypertension in her father and maternal grandmother; Lipids in her father. There is no history of Asthma, C.A.D., DIABETES, CEREBROVASCULAR DISEASE, Breast Cancer, Cancer - colorectal, Prostate Cancer, Alzheimer Disease, Arthritis, Blood Disease, Circulatory, Eye Disorder, GASTROINTESTINAL DISEASE, Musculoskeletal Disorder, Neurologic Disorder, Respiratory, or Thyroid Disease.    REVIEW OF SYSTEMS:    CONSTITUTIONAL:NEGATIVE for fever, chills, change in weight  INTEGUMENTARY/SKIN: NEGATIVE for worrisome rashes, moles or lesions  MUSCULOSKELETAL:See HPI above  NEURO: NEGATIVE for weakness, dizziness or paresthesias    This  "document serves as a record of the services and decisions personally performed and made by Jose Alford MD. It was created on his behalf by Ashlee Linn, a trained medical scribe. The creation of this document is based the provider's statements to the medical scribe.    Puneet Linn 9:15 AM 6/4/2018      PHYSICAL EXAM:  /86  Pulse 112  Ht 1.575 m (5' 2\")  Wt 90.6 kg (199 lb 11.2 oz)  BMI 36.53 kg/m2   GENERAL APPEARANCE: healthy, alert, no distress  SKIN: no suspicious lesions or rashes  NEURO: Normal strength and tone, mentation intact and speech normal  PSYCH:  mentation appears normal and affect normal  RESPIRATORY: No increased work of breathing.    BILATERAL LOWER EXTREMITIES:  Gait: antalgic favoring left in shoe unassisted.    Left lower extremity:  Left lower extremity weakness.  Intact sensation deep peroneal nerve, superficial peroneal nerve, med/lat tibial nerve, sural nerve, saphenous nerve  Intact EHL, EDL, TA, FHL, GS, quadriceps hamstrings and hip flexors  Toes warm and well perfused, brisk capillary refill. Palpable 2+ dp pulses.  calf soft and nttp or squeeze.  Edema: none    left ANKLE  Inspection: skin intact. No erythema or ecchymosis.   Swelling: none   Tender: plantar fascia, distal achilles just proximal to the insertion, minimal tenderness at the achilles insertion, calcaneus/heel, gastrocs  Positive calcaneal squeeze.  Range of Motion:grossly intact  Strength: intact  Tight heel cord, dorsiflexion to neutral with discomfort.      X-RAY: no new images today.     2 calcaneal views 3/26/2018 were reviewed today. On my review, decreased sclerotic band of calcaneus compared to 1/22/2018..    MRI of the left ankle taken on 1/4/2018 was reviewed today.    IMPRESSION:    1. Calcaneal stress fracture.  2. Trace retrocalcaneal bursitis.    MRI of the left ankle taken on 4/26/2018 was reviewed today.  IMPRESSION:    1. Interval resolution of minimal bone marrow edema " previously  associated with calcaneal stress fracture. Remaining linear focus of  decreased signal is likely residual scarring. No acute bony  abnormalities.  2. Markedly thinned or absent anterior talofibular ligament without  acute edema or change, likely related to an old injury.      Impression: 37 year old female with left ankle/heel pain, healed calcaneal stress fracture, left achilles tendonitis, plantar fasciitis    Plan:   * more pain with increased weight bearing not unusual, but she continues to have more pain than I'd expect at this time now 8 months out from onset.  * MRI shows heel has healed up.  * Continue non-op treatment below.    * Physical Therapy. Achilles stretching, ankle range of motion, consider u/s.  * Rest  * Activity modification - avoid activities that aggravate symptoms.  * Ice twice daily to three times daily.  * immobilization: no immobilization  * Soup can or tennis ball to help stretch out the plantar fascia  * Muscle relaxant given today for occasional leg spasms.  * Elevation of extremity to reduce swelling  * aggressive heel cord stretching  * Tylenol as needed for pain  * Workability update: Continue to work with 6 hour work restrictions.   * return to clinic 6 weeks, sooner if needed, for clinical recheck.         The information in this document, created by a scribe for me, accurately reflects the services I personally performed and the decisions made by me. I have reviewed and approved this document for accuracy.      Jose Alford M.D., M.S.  Dept. of Orthopaedic Surgery  [facility]    Again, thank you for allowing me to participate in the care of your patient.        Sincerely,        Jose Alford MD

## 2018-06-04 NOTE — PROGRESS NOTES
chief complaint:   Chief Complaint   Patient presents with     RECHECK     S/p left calcaneal stress fx. DOI 10/2017. Patient states her heel still hurts but it is alright. She is not able to stand very long. She continues to go to PT.      INJURY: left calcaneal stress fracture, achilles tendonitis  ONSET: 10/2017      HISTORY OF PRESENT ILLNESS    Mily Grullon is a 37 year old female seen for follow up evaluation of a left ankle and heel pain that started on 10/13/2017, 8 months ago. No known injury. She started to notice the pain at work without a specific event. Had MRI showing stress fracture of the calcaneus. She returns today with moderate pain today, rated a 3/10. She continues to have pain at the bottom of the foot. The longest she is able to stand is 45 minutes. At that point, she has shaking in the leg. She no longer is using crutches. At physical therapy, she is 70% body weight with using the gravity treadmill.      Of note: this is not a work comp claim. Patient has osteoporosis.       Present symptoms: mild pain, swelling.    Symptoms occur walking and standing.    The frequency of symptoms: frequently.  Denies associated numbness or tingling.   Pain severity: 3/10  Pain quality: aching and sharp  Associated symptoms: none  Aggravating Factors: weightbearing  Relieving Factors: at rest, with brace, 2 crutch    Treatment up to this point: short ankle boot, 2 crutch, physical therapy     Orthopedic PMH: history of left hip pain and stress fracture, with open-reduction, internal fixation of intertrochanteric femur stress fracture (Dr. Alford) and a subsequent left hip arthroscopy (Dr. Deleon).    Other PMH:  has a past medical history of Endometriosis, site unspecified; Gastritis (2010); Urinary calculus, unspecified; and Wrist injury (Nov 19, 2003).  Patient Active Problem List    Diagnosis Date Noted     Closed nondisplaced fracture of body of left calcaneus with delayed healing, subsequent encounter  03/09/2018     Priority: Medium     Heel pain, chronic, left 03/09/2018     Priority: Medium     Pain in joint, ankle and foot, left 11/15/2017     Priority: Medium     Abnormal gait 07/21/2016     Priority: Medium     Migraine without aura and without status migrainosus, not intractable 11/10/2015     Priority: Medium     Abnormality of gait 06/09/2014     Priority: Medium     Other postprocedural status(V45.89) 03/10/2014     Priority: Medium     Closed nondisplaced intertrochanteric fracture of left femur (H) 02/10/2014     Priority: Medium     Senile osteoporosis 12/10/2013     Priority: Medium     Headache 10/31/2011     Priority: Medium     Problem list name updated by automated process. Provider to review       Gastritis      Priority: Medium     dx 2010- sees Dr Glover in [facility]       CARDIOVASCULAR SCREENING; LDL GOAL LESS THAN 160 10/31/2010     Priority: Medium     Left elbow pain 01/08/2010     Priority: Medium     Narcotic agreement on file       Tobacco use disorder 12/17/2009     Priority: Medium       Surgical Hx:  has a past surgical history that includes REPAIR TRIANGULAR CART,WRIST JT (2005); REPAIR TRIANGULAR CART,WRIST JT (2007 or so); arthroscopy of joint unlisted (4/2004); REMOVAL OF OVARY/TUBE(S) (6/2003); hysteroscopy; lithotripsy; and Arthroscopy hip, osteoplasty femur proximal, combined (Left, 6/29/2016).    Medications:   Current Outpatient Prescriptions:      acetaminophen (TYLENOL) 325 MG tablet, Take 2 tablets (650 mg) by mouth every 4 hours as needed for other (mild pain), Disp: 100 tablet, Rfl: 0     medroxyPROGESTERone (DEPO-PROVERA) 150 MG/ML injection, Inject 1 mL (150 mg) into the muscle every 3 months, Disp: 1 mL, Rfl: 3     nortriptyline (PAMELOR) 50 MG capsule, Take by mouth 2 times daily, Disp: , Rfl:      tiZANidine (ZANAFLEX) 2 MG tablet, Take 1 tablet (2 mg) by mouth 3 times daily as needed for muscle spasms, Disp: 30 tablet, Rfl: 1     traZODone (DESYREL)  50 MG tablet, , Disp: , Rfl:     Allergies:   Allergies   Allergen Reactions     Amitriptyline Hives     Amoxicillin Diarrhea     Asa [Dihydroxyaluminum Aminoacetate]      Cipro [Ciprofloxacin]      Dizziness, vomiting, shortness of breath     Ibuprofen [Aspartame-Ibuprofen]      GI distress       Lyrica      extrematies swell up      Morphine      ithcy     Naproxen      GI distress     Neurontin [Gabapentin]      rash     Paxil [Paroxetine] Anaphylaxis     anaphylaxis     Phenergan [Promethazine Hcl]      dystonia     Prilosec [Omeprazole]      Rash, dizziness     Gabapentin Rash       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 Her smoking use included Cigarettes. She has never used smokeless tobacco. She reports that she drinks alcohol. She reports that she does not use illicit drugs.    Family Hx: family history includes CANCER in her paternal grandmother; Cardiovascular in her maternal grandfather; Genitourinary Problems in her maternal grandmother; HEART DISEASE in her maternal grandfather; Hypertension in her father and maternal grandmother; Lipids in her father. There is no history of Asthma, C.A.D., DIABETES, CEREBROVASCULAR DISEASE, Breast Cancer, Cancer - colorectal, Prostate Cancer, Alzheimer Disease, Arthritis, Blood Disease, Circulatory, Eye Disorder, GASTROINTESTINAL DISEASE, Musculoskeletal Disorder, Neurologic Disorder, Respiratory, or Thyroid Disease.    REVIEW OF SYSTEMS:    CONSTITUTIONAL:NEGATIVE for fever, chills, change in weight  INTEGUMENTARY/SKIN: NEGATIVE for worrisome rashes, moles or lesions  MUSCULOSKELETAL:See HPI above  NEURO: NEGATIVE for weakness, dizziness or paresthesias    This document serves as a record of the services and decisions personally performed and made by Jose Alford MD. It was created on his behalf by Ashlee Linn, a trained medical scribe. The creation of this document is based the provider's statements to the medical scribe.    Scribe  "Ashlee Linn 9:15 AM 6/4/2018      PHYSICAL EXAM:  /86  Pulse 112  Ht 1.575 m (5' 2\")  Wt 90.6 kg (199 lb 11.2 oz)  BMI 36.53 kg/m2   GENERAL APPEARANCE: healthy, alert, no distress  SKIN: no suspicious lesions or rashes  NEURO: Normal strength and tone, mentation intact and speech normal  PSYCH:  mentation appears normal and affect normal  RESPIRATORY: No increased work of breathing.    BILATERAL LOWER EXTREMITIES:  Gait: antalgic favoring left in shoe unassisted.    Left lower extremity:  Left lower extremity weakness.  Intact sensation deep peroneal nerve, superficial peroneal nerve, med/lat tibial nerve, sural nerve, saphenous nerve  Intact EHL, EDL, TA, FHL, GS, quadriceps hamstrings and hip flexors  Toes warm and well perfused, brisk capillary refill. Palpable 2+ dp pulses.  calf soft and nttp or squeeze.  Edema: none    left ANKLE  Inspection: skin intact. No erythema or ecchymosis.   Swelling: none   Tender: plantar fascia, distal achilles just proximal to the insertion, minimal tenderness at the achilles insertion, calcaneus/heel, gastrocs  Positive calcaneal squeeze.  Range of Motion:grossly intact  Strength: intact  Tight heel cord, dorsiflexion to neutral with discomfort.      X-RAY: no new images today.     2 calcaneal views 3/26/2018 were reviewed today. On my review, decreased sclerotic band of calcaneus compared to 1/22/2018..    MRI of the left ankle taken on 1/4/2018 was reviewed today.    IMPRESSION:    1. Calcaneal stress fracture.  2. Trace retrocalcaneal bursitis.    MRI of the left ankle taken on 4/26/2018 was reviewed today.  IMPRESSION:    1. Interval resolution of minimal bone marrow edema previously  associated with calcaneal stress fracture. Remaining linear focus of  decreased signal is likely residual scarring. No acute bony  abnormalities.  2. Markedly thinned or absent anterior talofibular ligament without  acute edema or change, likely related to an old " injury.      Impression: 37 year old female with left ankle/heel pain, healed calcaneal stress fracture, left achilles tendonitis, plantar fasciitis    Plan:   * more pain with increased weight bearing not unusual, but she continues to have more pain than I'd expect at this time now 8 months out from onset.  * MRI shows heel has healed up.  * Continue non-op treatment below.    * Physical Therapy. Achilles stretching, ankle range of motion, consider u/s.  * Rest  * Activity modification - avoid activities that aggravate symptoms.  * Ice twice daily to three times daily.  * immobilization: no immobilization  * Soup can or tennis ball to help stretch out the plantar fascia  * Muscle relaxant given today for occasional leg spasms.  * Elevation of extremity to reduce swelling  * aggressive heel cord stretching  * Tylenol as needed for pain  * Workability update: Continue to work with 6 hour work restrictions.   * return to clinic 6 weeks, sooner if needed, for clinical recheck.         The information in this document, created by a scribe for me, accurately reflects the services I personally performed and the decisions made by me. I have reviewed and approved this document for accuracy.      Jose Alford M.D., M.S.  Dept. of Orthopaedic Surgery  [facility]

## 2018-06-14 ENCOUNTER — THERAPY VISIT (OUTPATIENT)
Dept: PHYSICAL THERAPY | Facility: CLINIC | Age: 38
End: 2018-06-14
Payer: COMMERCIAL

## 2018-06-14 DIAGNOSIS — M79.672 HEEL PAIN, CHRONIC, LEFT: ICD-10-CM

## 2018-06-14 DIAGNOSIS — G89.29 HEEL PAIN, CHRONIC, LEFT: ICD-10-CM

## 2018-06-14 DIAGNOSIS — S92.015G CLOSED NONDISPLACED FRACTURE OF BODY OF LEFT CALCANEUS WITH DELAYED HEALING, SUBSEQUENT ENCOUNTER: ICD-10-CM

## 2018-06-14 PROCEDURE — 97112 NEUROMUSCULAR REEDUCATION: CPT | Mod: GP | Performed by: PHYSICAL THERAPY ASSISTANT

## 2018-06-14 PROCEDURE — 97110 THERAPEUTIC EXERCISES: CPT | Mod: GP | Performed by: PHYSICAL THERAPY ASSISTANT

## 2018-06-14 NOTE — PROGRESS NOTES
Subjective:  HPI                    Objective:  System    Physical Exam    General     ROS    Assessment/Plan:    SUBJECTIVE  Subjective changes as noted by pt:  Pt trying her muscle relaxers during the day and is standing for about 1/2 the time during her work shifts.  Pt is walking her dog 2x/day for about 10 minutes each now.  Current pain level:  2/10   Changes in function:  None     Adverse reaction to treatment or activity:  None    OBJECTIVE  Changes in objective findings:  Gait: improving left ankle DF at 70 and at 75% BW no toeing out noted on the left leg at mid stance phase.      ASSESSMENT  Mily continues to require intervention to meet STG and LTG's: PT  Patient is progressing as expected.  Response to therapy has shown an improvement in  gait  Progress made towards STG/LTG?  None    PLAN  Continue current treatment plan until patient demonstrates readiness to progress to higher level exercises.    PTA/ATC plan:  Will continue with present plan of care.    Please refer to the daily flowsheet for treatment today, total treatment time and time spent performing 1:1 timed codes.

## 2018-06-14 NOTE — MR AVS SNAPSHOT
After Visit Summary   6/14/2018    Mily Grullon    MRN: [id number]           Patient Information     Date Of Birth          1980        Visit Information        Provider Department      6/14/2018 10:00 AM Ramsey Gamboa PTA [facility] [place] [facility]        Today's Diagnoses     Closed nondisplaced fracture of body of left calcaneus with delayed healing, subsequent encounter        Heel pain, chronic, left           Follow-ups after your visit        Your next 10 appointments already scheduled     Jun 21, 2018 11:20 AM CDT   CLEO Extremity with Ramsey Willis, PT   [facility] [place] [facility] ([facility] [place])    [address]  [address]  [place] [address]   [phone]            Jun 22, 2018  1:00 PM CDT   [facility]    [address] [address] [phone]            Jul 16, 2018  9:00 AM CDT   Return Visit with Jose Alford MD   [facility] [place] ([facility] [place])    [address]  [address]  [place] [address] [phone]              Who to contact     If you have questions or need follow up information about today's clinic visit or your schedule please contact [facility] [place] directly at [phone].  Normal or non-critical lab and imaging results will be communicated to you by MyChart, letter or phone within 4 business days after the clinic has received the results. If you do not hear from us within 7 days, please contact the clinic through MyChart or phone. If you have a critical or abnormal lab result, we will notify you by phone as soon as possible.  Submit refill requests through HEXIO or call your pharmacy and they will forward the refill request to us. Please allow 3 business days for your refill to be completed.          Additional Information  About Your Visit        Care EveryWhere ID     This is your Care EveryWhere ID. This could be used by other organizations to access your [facility] medical records  [id number]         Blood Pressure from Last 3 Encounters:   06/04/18 137/86   05/18/18 130/87   05/16/18 130/87    Weight from Last 3 Encounters:   06/04/18 90.6 kg (199 lb 11.2 oz)   05/18/18 88.5 kg (195 lb)   05/16/18 88.5 kg (195 lb)              We Performed the Following     Neuromuscular Re-Education     Therapeutic Exercises        Primary Care Provider Office Phone # Fax #    Kristen M Kehr, PA-C [phone] [address] [address]        Equal Access to Services     JASSON VIZCARRA : Hadii mallika reese hadasho Soomaali, waaxda luqadaha, qaybta kaalmada adeegyada, waxliban wilkersonin rod fitzgerald . So [facility] [phone].    ATENCIÓN: Si habla español, tiene a lentz disposición servicios gratuitos de asistencia lingüística. Ll[facility] [phone].    We comply with applicable federal civil rights laws and [place] laws. We do not discriminate on the basis of race, color, national origin, age, disability, sex, sexual orientation, or gender identity.            Thank you!     Thank you for choosing [facility]  for your care. Our goal is always to provide you with excellent care. Hearing back from our patients is one way we can continue to improve our services. Please take a few minutes to complete the written survey that you may receive in the mail after your visit with us. Thank you!             Your Updated Medication List - Protect others around you: Learn how to safely use, store and throw away your medicines at www.disposemymeds.org.          This list is accurate as of 6/14/18 10:44 AM.  Always use your most recent med list.                   Brand Name Dispense Instructions for use Diagnosis    acetaminophen 325 MG tablet    TYLENOL    100 tablet    Take 2 tablets (650 mg) by mouth  every 4 hours as needed for other (mild pain)    Orthopedic aftercare       medroxyPROGESTERone 150 MG/ML injection    DEPO-PROVERA    1 mL    Inject 1 mL (150 mg) into the muscle every 3 months    Encounter for surveillance of injectable contraceptive       nortriptyline 50 MG capsule    PAMELOR     Take by mouth 2 times daily    Vertigo, Other fatigue, Weight gain       tiZANidine 2 MG tablet    ZANAFLEX    30 tablet    Take 1 tablet (2 mg) by mouth 3 times daily as needed for muscle spasms    Achilles tendinitis of left lower extremity       traZODone 50 MG tablet    DESYREL

## 2018-06-19 NOTE — PROGRESS NOTES
This patient did not return to Physical Therapy to complete their plan of care, thus, full DC status is unknown. Please refer to the SOAP note dated 12/19/17 for discharge information.   This bout of care ranged from 11/15/17 to 12/19/17.  Discharge patient from PT at this time.

## 2018-06-21 ENCOUNTER — THERAPY VISIT (OUTPATIENT)
Dept: PHYSICAL THERAPY | Facility: CLINIC | Age: 38
End: 2018-06-21
Payer: COMMERCIAL

## 2018-06-21 DIAGNOSIS — M79.672 HEEL PAIN, CHRONIC, LEFT: ICD-10-CM

## 2018-06-21 DIAGNOSIS — S92.015G CLOSED NONDISPLACED FRACTURE OF BODY OF LEFT CALCANEUS WITH DELAYED HEALING, SUBSEQUENT ENCOUNTER: ICD-10-CM

## 2018-06-21 DIAGNOSIS — G89.29 HEEL PAIN, CHRONIC, LEFT: ICD-10-CM

## 2018-06-21 PROCEDURE — 97110 THERAPEUTIC EXERCISES: CPT | Mod: GP | Performed by: PHYSICAL THERAPIST

## 2018-06-21 PROCEDURE — 97112 NEUROMUSCULAR REEDUCATION: CPT | Mod: GP | Performed by: PHYSICAL THERAPIST

## 2018-06-21 NOTE — PROGRESS NOTES
Subjective:  HPI                    Objective:  System    Physical Exam    General     ROS    Assessment/Plan:    SUBJECTIVE  Subjective: Thinks the muscle relaxers have been helping because she can intermittently stand for half her work shift now   Current Pain level: 3/10   Changes in function:  Yes (See Goal flowsheet attached for changes in current functional level)     Adverse reaction to treatment or activity:  None    OBJECTIVE  Objective: Heel/toe pattern continues to improve and patient is less guarded     ASSESSMENT  Mily continues to require intervention to meet STG and LTG's: PT  Patient is progressing as expected.  Response to therapy has shown an improvement in  pain level, flexibility and gait  Progress made towards STG/LTG?  Yes (See Goal flowsheet attached for updates on achievement of STG and LTG)    PLAN  Current treatment program is being advanced to more complex exercises.    PTA/ATC plan:  N/A    Please refer to the daily flowsheet for treatment today, total treatment time and time spent performing 1:1 timed codes.

## 2018-06-25 ENCOUNTER — TELEPHONE (OUTPATIENT)
Dept: ORTHOPEDICS | Facility: CLINIC | Age: 38
End: 2018-06-25

## 2018-06-25 DIAGNOSIS — M76.62 ACHILLES TENDINITIS OF LEFT LOWER EXTREMITY: ICD-10-CM

## 2018-06-25 NOTE — TELEPHONE ENCOUNTER
Reason for Call:  Medication or medication refill:    Do you use a [facility]?  Name of the pharmacy and phone number for the current request:  [facility] [place] [phone]    Name of the medication requested: tiZANidine     Other request: pt pharmacy states that they have not heard from provider about Pt refill    Can we leave a detailed message on this number? YES    Phone number patient can be reached at: Home number on file [phone] (home)    Best Time: any    Call taken on 6/25/2018 at 12:15 PM by Tavares Thomas

## 2018-06-26 RX ORDER — TIZANIDINE 2 MG/1
2 TABLET ORAL 3 TIMES DAILY PRN
Qty: 30 TABLET | Refills: 1 | Status: SHIPPED | OUTPATIENT
Start: 2018-06-26 | End: 2018-07-16

## 2018-06-26 NOTE — TELEPHONE ENCOUNTER
Routing refill request to provider for review/approval because:  Drug not on the [facility] refill protocol       Requested Prescriptions   Pending Prescriptions Disp Refills     tiZANidine (ZANAFLEX) 2 MG tablet  Last Written Prescription Date:  6/4/18  Last Fill Quantity: 30,  # refills: 1   Last office visit: 6/4/2018 with prescribing provider:     Future Office Visit:   Next 5 appointments (look out 90 days)     Jul 16, 2018  9:00 AM CDT   Return Visit with Jose Alford MD   [facility] [place] ([facility] [place])    [address] [address]  [place] [address]   [phone]                  30 tablet 1     Sig: Take 1 tablet (2 mg) by mouth 3 times daily as needed for muscle spasms    There is no refill protocol information for this order        Carissa Smith RN - BC

## 2018-07-05 ENCOUNTER — THERAPY VISIT (OUTPATIENT)
Dept: PHYSICAL THERAPY | Facility: CLINIC | Age: 38
End: 2018-07-05
Payer: COMMERCIAL

## 2018-07-05 DIAGNOSIS — S92.015G CLOSED NONDISPLACED FRACTURE OF BODY OF LEFT CALCANEUS WITH DELAYED HEALING, SUBSEQUENT ENCOUNTER: ICD-10-CM

## 2018-07-05 DIAGNOSIS — G89.29 HEEL PAIN, CHRONIC, LEFT: ICD-10-CM

## 2018-07-05 DIAGNOSIS — M79.672 HEEL PAIN, CHRONIC, LEFT: ICD-10-CM

## 2018-07-05 PROCEDURE — 97110 THERAPEUTIC EXERCISES: CPT | Mod: GP | Performed by: PHYSICAL THERAPY ASSISTANT

## 2018-07-05 PROCEDURE — 97112 NEUROMUSCULAR REEDUCATION: CPT | Mod: GP | Performed by: PHYSICAL THERAPY ASSISTANT

## 2018-07-05 PROCEDURE — 97140 MANUAL THERAPY 1/> REGIONS: CPT | Mod: GP | Performed by: PHYSICAL THERAPY ASSISTANT

## 2018-07-05 NOTE — PROGRESS NOTES
"Subjective:  HPI                    Objective:  System    Physical Exam    General     ROS    Assessment/Plan:    SUBJECTIVE  Subjective changes as noted by pt:  Pt was ill this week and had to miss work stating she felt very puffy and sore in the legs and arms. She is going to see if a water pill will be needed after PT today.    Current pain level:  4/10 (left achilles soreness)   Changes in function:  None     Adverse reaction to treatment or activity:  None    OBJECTIVE  Changes in objective findings:  Pt tender along the left achilles insertion today. Pt noted to not have \"normal\" heel to toe pattern compared to the right ankle today. While in Alter G: at 1.8 mph 70% BW improved heel to toe on the left ankle.      ASSESSMENT  Mily continues to require intervention to meet STG and LTG's: PT  Patient is not progressing as expected.  Response to therapy has shown lack of progress in  pain level and function  Progress made towards STG/LTG?  None    PLAN  Continue current treatment plan until patient demonstrates readiness to progress to higher level exercises.    PTA/ATC plan:  Will continue with present plan of care.  Pt going to resume wearing her LANDRY sock during the day and is looking at possibly getting a water pill to rid of her \"puffy\" legs/arms per her choice.    Please refer to the daily flowsheet for treatment today, total treatment time and time spent performing 1:1 timed codes.          "

## 2018-07-05 NOTE — MR AVS SNAPSHOT
After Visit Summary   7/5/2018    Mily Grullon    MRN: [id number]           Patient Information     Date Of Birth          1980        Visit Information        Provider Department      7/5/2018 10:00 AM Ramsey Gamboa PTA [facility] [place] [facility]        Today's Diagnoses     Closed nondisplaced fracture of body of left calcaneus with delayed healing, subsequent encounter        Heel pain, chronic, left           Follow-ups after your visit        Your next 10 appointments already scheduled     Jul 12, 2018 10:00 AM CDT   CLEO Extremity with Ramsey Gamboa PTA   [facility] [place] [facility] ([facility] [place])    [address]  [address]  [place] [address]   [phone]            Jul 16, 2018  9:00 AM CDT   Return Visit with Jose Alford MD   [facility] [place] ([facility] [place])    [address]  [address]  [place] [address]   [phone]            Jul 19, 2018 10:00 AM CDT   CLEO Extremity with Ramsey Willis, PT   [facility] [place] [facility] ([facility] [place])    [address]  [address]  [place] [address]   [phone]              Who to contact     If you have questions or need follow up information about today's clinic visit or your schedule please contact [facility] [place] [facility] directly at [phone].  Normal or non-critical lab and imaging results will be communicated to you by MyChart, letter or phone within 4 business days after the clinic has received the results. If you do not hear from us within 7 days, please contact the clinic through MyChart or phone. If you have a critical or abnormal lab result, we will notify you by phone as soon as possible.  Submit refill requests through [employer] or call your pharmacy and they will forward the refill request to us. Please allow 3 business days for your refill to be completed.           Additional Information About Your Visit        Care EveryWhere ID     This is your Care EveryWhere ID. This could be used by other organizations to access your [facility] medical records  [id number]         Blood Pressure from Last 3 Encounters:   06/04/18 137/86   05/18/18 130/87   05/16/18 130/87    Weight from Last 3 Encounters:   06/04/18 90.6 kg (199 lb 11.2 oz)   05/18/18 88.5 kg (195 lb)   05/16/18 88.5 kg (195 lb)              We Performed the Following     Manual Ther Tech, 1+Regions, EA 15 min     Neuromuscular Re-Education     Therapeutic Exercises        Primary Care Provider Office Phone # Fax #    Kristen M Kehr, PA-C [phone] [address] [address]        Equal Access to Services     [facility]: Hadii aad ku hadasho Soomaali, waaxda luqadaha, qaybta kaalmada adeegyada, waxay rockin haylisandron susan fitzgerald . So [facility] [phone].    ATENCIÓN: Si habla español, tiene a lentz disposición servicios gratuitos de asistencia lingüística. [facility] [phone].    We comply with applicable federal civil rights laws and [place] laws. We do not discriminate on the basis of race, color, national origin, age, disability, sex, sexual orientation, or gender identity.            Thank you!     Thank you for choosing [facility] [place]   for your care. Our goal is always to provide you with excellent care. Hearing back from our patients is one way we can continue to improve our services. Please take a few minutes to complete the written survey that you may receive in the mail after your visit with us. Thank you!             Your Updated Medication List - Protect others around you: Learn how to safely use, store and throw away your medicines at www.disposemymeds.org.          This list is accurate as of 7/5/18 10:42 AM.  Always use your most recent med list.                   Brand Name Dispense Instructions for use Diagnosis    acetaminophen 325 MG  tablet    TYLENOL    100 tablet    Take 2 tablets (650 mg) by mouth every 4 hours as needed for other (mild pain)    Orthopedic aftercare       medroxyPROGESTERone 150 MG/ML injection    DEPO-PROVERA    1 mL    Inject 1 mL (150 mg) into the muscle every 3 months    Encounter for surveillance of injectable contraceptive       nortriptyline 50 MG capsule    PAMELOR     Take by mouth 2 times daily    Vertigo, Other fatigue, Weight gain       tiZANidine 2 MG tablet    ZANAFLEX    30 tablet    Take 1 tablet (2 mg) by mouth 3 times daily as needed for muscle spasms    Achilles tendinitis of left lower extremity       traZODone 50 MG tablet    DESYREL

## 2018-07-12 ENCOUNTER — THERAPY VISIT (OUTPATIENT)
Dept: PHYSICAL THERAPY | Facility: CLINIC | Age: 38
End: 2018-07-12
Payer: COMMERCIAL

## 2018-07-12 DIAGNOSIS — S92.015G CLOSED NONDISPLACED FRACTURE OF BODY OF LEFT CALCANEUS WITH DELAYED HEALING, SUBSEQUENT ENCOUNTER: ICD-10-CM

## 2018-07-12 DIAGNOSIS — M79.672 HEEL PAIN, CHRONIC, LEFT: ICD-10-CM

## 2018-07-12 DIAGNOSIS — G89.29 HEEL PAIN, CHRONIC, LEFT: ICD-10-CM

## 2018-07-12 PROCEDURE — 99207 C STAT PT MSK ASSESSMENT - IAM: CPT | Mod: 25 | Performed by: PHYSICAL THERAPY ASSISTANT

## 2018-07-12 PROCEDURE — 97140 MANUAL THERAPY 1/> REGIONS: CPT | Mod: GP | Performed by: PHYSICAL THERAPY ASSISTANT

## 2018-07-12 PROCEDURE — 97110 THERAPEUTIC EXERCISES: CPT | Mod: GP | Performed by: PHYSICAL THERAPY ASSISTANT

## 2018-07-12 NOTE — PROGRESS NOTES
Subjective:  HPI                    Objective:  System    Physical Exam    General     ROS    Assessment/Plan:    PROGRESS  REPORT    Progress reporting period is from 3/19/2018 to 7/12/2018.       SUBJECTIVE  Subjective changes noted by patient: Pt states she can stand for part of her work shift and feels ok in the left leg but, gets sore in the lower achilles by end of her work shift.  Feels 60% better in her left ankle/heel.     Current pain level is  By end of a work shift 2-3/10 (left achilles soreness).     Previous pain level was  6/10.   Changes in function:  Yes (See Goal flowsheet attached for changes in current functional level)  Adverse reaction to treatment or activity: None    OBJECTIVE  Changes noted in objective findings:  AROM: left ankle is WNL for all motions. Less tender in the left achilles sore when she tries to walk up/down stairs as she does not fully DF when she pushes off the left ankle. Ambulation in Alter G treadmill at 75% then 80% BW 2.0 mph noted good left ankle DF/PF with heel strike and push off phases.       ASSESSMENT/PLAN  Updated problem list and treatment plan: Diagnosis 1: left calcaneous fracture  Pain -  hot/cold therapy, manual therapy, self management, education and home program  Decreased ROM/flexibility - manual therapy, therapeutic exercise and home program  Decreased strength - therapeutic exercise, therapeutic activities and home program  Impaired gait - gait training and home program  Decreased function - therapeutic activities and home program  STG/LTGs have been met or progress has been made towards goals:  Yes (See Goal flow sheet completed today.)  Assessment of Progress: The patient's condition is improving.  Patient is meeting short term goals and is progressing towards long term goals.  Self Management Plans:  Patient has been instructed in a home treatment program.  Patient  has been instructed in self management of symptoms.  I have re-evaluated this patient  and find that the nature, scope, duration and intensity of the therapy is appropriate for the medical condition of the patient.  Mily continues to require the following intervention to meet STG and LTG's:  PT intervention is no longer required to meet STG/LTG.    Recommendations:  Pt is to see her MD next and we will await any further instructions from her MD otherwise, plan to d/c from PT has met all goals.    The progress note/discharge summary was written in collaboration with and reviewed by the physical therapist.    Please refer to the daily flowsheet for treatment today, total treatment time and time spent performing 1:1 timed codes.

## 2018-07-12 NOTE — MR AVS SNAPSHOT
After Visit Summary   7/12/2018    Mily Grullon    MRN: [id number]           Patient Information     Date Of Birth          1980        Visit Information        Provider Department      7/12/2018 10:00 AM Ramsey Gamboa PTA [facility] [place] [facility]        Today's Diagnoses     Closed nondisplaced fracture of body of left calcaneus with delayed healing, subsequent encounter        Heel pain, chronic, left           Follow-ups after your visit        Your next 10 appointments already scheduled     Jul 16, 2018  9:00 AM CDT   Return Visit with Jose Alford MD   [facility] [place] ([facility] [place])    [address]  [address]  [place] [address]   [phone]            Jul 19, 2018 10:00 AM CDT   CLEO Extremity with Ramsey Willis PT   [facility] [place] [facility] ([facility] [place])    [address]  [address]  [place] [address]   [phone]              Who to contact     If you have questions or need follow up information about today's clinic visit or your schedule please contact [facility] directly at [phone].  Normal or non-critical lab and imaging results will be communicated to you by MyChart, letter or phone within 4 business days after the clinic has received the results. If you do not hear from us within 7 days, please contact the clinic through MyChart or phone. If you have a critical or abnormal lab result, we will notify you by phone as soon as possible.  Submit refill requests through [email or web address] or call your pharmacy and they will forward the refill request to us. Please allow 3 business days for your refill to be completed.          Additional Information About Your Visit        Care EveryWhere ID     This is your Care EveryWhere ID. This could be used by other organizations to access your [facility] medical records  [id number]         Blood  Pressure from Last 3 Encounters:   06/04/18 137/86   05/18/18 130/87   05/16/18 130/87    Weight from Last 3 Encounters:   06/04/18 90.6 kg (199 lb 11.2 oz)   05/18/18 88.5 kg (195 lb)   05/16/18 88.5 kg (195 lb)              We Performed the Following     Manual Ther Tech, 1+Regions, EA 15 min     Musculoskeletal Exam     Therapeutic Exercises        Primary Care Provider Office Phone # Fax #    Kristen M Kehr, PA-C [phone] [address] [address] [address]        Equal Access to Services     [facility]: Hadii aad hugh hadvenuo Sorandolph, waaxda luqadaha, qaybta kaalmada adekeirayada, abdifatah fitzgerald . [facility] [phone].    ATENCIÓN: Si habla español, tiene a lentz disposición servicios gratuitos de asistencia lingüística. [facility] [phone].    We comply with applicable federal civil rights laws and [place] laws. We do not discriminate on the basis of race, color, national origin, age, disability, sex, sexual orientation, or gender identity.            Thank you!     Thank you for choosing [facility]   for your care. Our goal is always to provide you with excellent care. Hearing back from our patients is one way we can continue to improve our services. Please take a few minutes to complete the written survey that you may receive in the mail after your visit with us. Thank you!             Your Updated Medication List - Protect others around you: Learn how to safely use, store and throw away your medicines at www.disposemymeds.org.          This list is accurate as of 7/12/18 11:06 AM.  Always use your most recent med list.                   Brand Name Dispense Instructions for use Diagnosis    acetaminophen 325 MG tablet    TYLENOL    100 tablet    Take 2 tablets (650 mg) by mouth every 4 hours as needed for other (mild pain)    Orthopedic aftercare       medroxyPROGESTERone 150 MG/ML injection    DEPO-PROVERA    1 mL    Inject 1  mL (150 mg) into the muscle every 3 months    Encounter for surveillance of injectable contraceptive       nortriptyline 50 MG capsule    PAMELOR     Take by mouth 2 times daily    Vertigo, Other fatigue, Weight gain       tiZANidine 2 MG tablet    ZANAFLEX    30 tablet    Take 1 tablet (2 mg) by mouth 3 times daily as needed for muscle spasms    Achilles tendinitis of left lower extremity       traZODone 50 MG tablet    DESYREL

## 2018-07-16 ENCOUNTER — OFFICE VISIT (OUTPATIENT)
Dept: ORTHOPEDICS | Facility: CLINIC | Age: 38
End: 2018-07-16
Payer: COMMERCIAL

## 2018-07-16 VITALS
RESPIRATION RATE: 16 BRPM | DIASTOLIC BLOOD PRESSURE: 78 MMHG | BODY MASS INDEX: 37.26 KG/M2 | HEIGHT: 62 IN | SYSTOLIC BLOOD PRESSURE: 136 MMHG | WEIGHT: 202.5 LBS

## 2018-07-16 DIAGNOSIS — M76.62 ACHILLES TENDINITIS OF LEFT LOWER EXTREMITY: Primary | ICD-10-CM

## 2018-07-16 PROCEDURE — 99213 OFFICE O/P EST LOW 20 MIN: CPT | Performed by: ORTHOPAEDIC SURGERY

## 2018-07-16 RX ORDER — TIZANIDINE 2 MG/1
2 TABLET ORAL 3 TIMES DAILY PRN
Qty: 90 TABLET | Refills: 1 | Status: SHIPPED | OUTPATIENT
Start: 2018-07-16 | End: 2018-09-12

## 2018-07-16 ASSESSMENT — PAIN SCALES - GENERAL: PAINLEVEL: MILD PAIN (2)

## 2018-07-16 NOTE — LETTER
7/16/2018         RE: Mily Grullon  [address] [address]        Dear Colleague,    Thank you for referring your patient, Mily Grullon, to the [facility]. Please see a copy of my visit note below.    chief complaint:   Chief Complaint   Patient presents with     Left Ankle - Pain     Left achilles tendinitis. Onset: 2-3 months.      INJURY: left calcaneal stress fracture, achilles tendonitis  ONSET: 10/2017      HISTORY OF PRESENT ILLNESS    Mily Grullon is a 37 year old female seen for follow up evaluation of a left ankle and heel pain that started on 10/13/2017, 9 months ago. No known injury. She started to notice the pain at work without a specific event. Had MRI showing stress fracture of the calcaneus. She returns today with mild pain today, rated a 2/10. She continues to have occasional pain and aching in the heel cord and will develop spasms after on her feet for a few hours. For treatment, she continues to take Zanaflex. This does help her walk more and stand longer. Continues to notice slow improvements.    Of note: this is not a work comp claim. Patient has osteoporosis. Patient reports she has started to develop edema swelling and is now gaining weight.       Present symptoms: mild pain, swelling.    Symptoms occur walking and standing.    The frequency of symptoms: frequently.  Denies associated numbness or tingling.   Pain severity: 2/10  Pain quality: aching and sharp  Associated symptoms: none  Aggravating Factors: weightbearing  Relieving Factors: at rest, zanaflex    Treatment up to this point: short ankle boot, 2 crutch, physical therapy, zanaflex    Orthopedic PMH: history of left hip pain and stress fracture, with open-reduction, internal fixation of intertrochanteric femur stress fracture (Dr. Alford) and a subsequent left hip arthroscopy (Dr. Deleon).    Other PMH:  has a past medical history of Endometriosis, site unspecified;  Gastritis (2010); Urinary calculus, unspecified; and Wrist injury (Nov 19, 2003).  Patient Active Problem List    Diagnosis Date Noted     Closed nondisplaced fracture of body of left calcaneus with delayed healing, subsequent encounter 03/09/2018     Priority: Medium     Heel pain, chronic, left 03/09/2018     Priority: Medium     Pain in joint, ankle and foot, left 11/15/2017     Priority: Medium     Abnormal gait 07/21/2016     Priority: Medium     Migraine without aura and without status migrainosus, not intractable 11/10/2015     Priority: Medium     Abnormality of gait 06/09/2014     Priority: Medium     Other postprocedural status(V45.89) 03/10/2014     Priority: Medium     Closed nondisplaced intertrochanteric fracture of left femur (H) 02/10/2014     Priority: Medium     Senile osteoporosis 12/10/2013     Priority: Medium     Headache 10/31/2011     Priority: Medium     Problem list name updated by automated process. Provider to review       Gastritis      Priority: Medium     dx 2010- sees Dr Gl[facility] in [facility]       CARDIOVASCULAR SCREENING; LDL GOAL LESS THAN 160 10/31/2010     Priority: Medium     Left elbow pain 01/08/2010     Priority: Medium     Narcotic agreement on file       Tobacco use disorder 12/17/2009     Priority: Medium       Surgical Hx:  has a past surgical history that includes REPAIR TRIANGULAR CART,WRIST JT (2005); REPAIR TRIANGULAR CART,WRIST JT (2007 or so); arthroscopy of joint unlisted (4/2004); REMOVAL OF OVARY/TUBE(S) (6/2003); hysteroscopy; lithotripsy; and Arthroscopy hip, osteoplasty femur proximal, combined (Left, 6/29/2016).    Medications:   Current Outpatient Prescriptions:      acetaminophen (TYLENOL) 325 MG tablet, Take 2 tablets (650 mg) by mouth every 4 hours as needed for other (mild pain), Disp: 100 tablet, Rfl: 0     medroxyPROGESTERone (DEPO-PROVERA) 150 MG/ML injection, Inject 1 mL (150 mg) into the muscle every 3 months, Disp: 1 mL, Rfl: 3      nortriptyline (PAMELOR) 50 MG capsule, Take by mouth 2 times daily, Disp: , Rfl:      tiZANidine (ZANAFLEX) 2 MG tablet, Take 1 tablet (2 mg) by mouth 3 times daily as needed for muscle spasms, Disp: 90 tablet, Rfl: 1     traZODone (DESYREL) 50 MG tablet, , Disp: , Rfl:     Allergies:   Allergies   Allergen Reactions     Amitriptyline Hives     Amoxicillin Diarrhea     Asa [Dihydroxyaluminum Aminoacetate]      Cipro [Ciprofloxacin]      Dizziness, vomiting, shortness of breath     Ibuprofen [Aspartame-Ibuprofen]      GI distress       Lyrica      extrematies swell up      Morphine      ithcy     Naproxen      GI distress     Neurontin [Gabapentin]      rash     Paxil [Paroxetine] Anaphylaxis     anaphylaxis     Phenergan [Promethazine Hcl]      dystonia     Prilosec [Omeprazole]      Rash, dizziness     Gabapentin Rash       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 Her smoking use included Cigarettes. She has never used smokeless tobacco. She reports that she drinks alcohol. She reports that she does not use illicit drugs.    Family Hx: family history includes Cancer in her paternal grandmother; Cardiovascular in her maternal grandfather; Genitourinary Problems in her maternal grandmother; HEART DISEASE in her maternal grandfather; Hypertension in her father and maternal grandmother; Lipids in her father. There is no history of Asthma, C.A.D., Diabetes, Cerebrovascular Disease, Breast Cancer, Cancer - colorectal, Prostate Cancer, Alzheimer Disease, Arthritis, Blood Disease, Circulatory, Eye Disorder, GASTROINTESTINAL DISEASE, Musculoskeletal Disorder, Neurologic Disorder, Respiratory, or Thyroid Disease.    REVIEW OF SYSTEMS:    CONSTITUTIONAL:NEGATIVE for fever, chills, change in weight  INTEGUMENTARY/SKIN: NEGATIVE for worrisome rashes, moles or lesions  MUSCULOSKELETAL:See HPI above  NEURO: NEGATIVE for weakness, dizziness or paresthesias    This document serves as a record of the  "services and decisions personally performed and made by Jose Alford MD. It was created on his behalf by Ashlee Linn, a trained medical scribe. The creation of this document is based the provider's statements to the medical scribe.    Ellenibnanci Linn 9:23 AM 7/16/2018     PHYSICAL EXAM:  /78  Resp 16  Ht 1.575 m (5' 2\")  Wt 91.9 kg (202 lb 8 oz)  BMI 37.04 kg/m2   GENERAL APPEARANCE: healthy, alert, no distress  SKIN: no suspicious lesions or rashes  NEURO: Normal strength and tone, mentation intact and speech normal  PSYCH:  mentation appears normal and affect normal  RESPIRATORY: No increased work of breathing.    BILATERAL LOWER EXTREMITIES:  Gait: subtle favors left in shoe.    Left lower extremity:  Left lower extremity weakness.  Intact sensation deep peroneal nerve, superficial peroneal nerve, med/lat tibial nerve, sural nerve, saphenous nerve  Intact EHL, EDL, TA, FHL, GS, quadriceps hamstrings and hip flexors  Toes warm and well perfused, brisk capillary refill. Palpable 2+ dp pulses.  calf soft and nttp or squeeze.  Edema: none    left ANKLE  Inspection: skin intact. No erythema or ecchymosis.   Swelling: none   Tender: plantar fascia, distal achilles just proximal to the insertion, minimal tenderness at the achilles insertion, calcaneus/heel, gastrocs  Positive calcaneal squeeze.  Range of Motion:grossly intact  Strength: intact  Tight heel cord, dorsiflexion to just past neutral with mild discomfort.      X-RAY: no new images today.     2 calcaneal views 3/26/2018 were reviewed today. On my review, decreased sclerotic band of calcaneus compared to 1/22/2018..    MRI of the left ankle taken on 1/4/2018 was reviewed today.    IMPRESSION:    1. Calcaneal stress fracture.  2. Trace retrocalcaneal bursitis.    MRI of the left ankle taken on 4/26/2018 was reviewed today.  IMPRESSION:    1. Interval resolution of minimal bone marrow edema previously  associated with calcaneal stress fracture. " Remaining linear focus of  decreased signal is likely residual scarring. No acute bony  abnormalities.  2. Markedly thinned or absent anterior talofibular ligament without  acute edema or change, likely related to an old injury.      Impression: 37 year old female with left ankle/heel pain, healed calcaneal stress fracture, left achilles tendonitis, plantar fasciitis    Plan:       * must continue Achilles stretching, ankle range of motion, home exercise program is fine as long as she is diligent on continuing exercises.  * Rest  * Activity modification - avoid activities that aggravate symptoms.  * Ice twice daily to three times daily.  * Immobilization: no immobilization, aggressive range of motion.  * Soup can or tennis ball to help stretch out the plantar fascia  * Muscle relaxant prescription refilled today for occasional leg spasms.  * Elevation of extremity to reduce swelling  * aggressive heel cord stretching  * Tylenol as needed for pain  * Workability update: Continue to work with 8 hour work restrictions, sitting for 20 minutes, every hour.   * return to clinic 8 weeks.         The information in this document, created by a scribe for me, accurately reflects the services I personally performed and the decisions made by me. I have reviewed and approved this document for accuracy.      Jose Alford M.D., M.S.  Dept. of Orthopaedic Surgery  [facility]    Again, thank you for allowing me to participate in the care of your patient.        Sincerely,        Jose Alford MD

## 2018-07-16 NOTE — PROGRESS NOTES
chief complaint:   Chief Complaint   Patient presents with     Left Ankle - Pain     Left achilles tendinitis. Onset: 2-3 months.      INJURY: left calcaneal stress fracture, achilles tendonitis  ONSET: 10/2017      HISTORY OF PRESENT ILLNESS    Mily Grullon is a 37 year old female seen for follow up evaluation of a left ankle and heel pain that started on 10/13/2017, 9 months ago. No known injury. She started to notice the pain at work without a specific event. Had MRI showing stress fracture of the calcaneus. She returns today with mild pain today, rated a 2/10. She continues to have occasional pain and aching in the heel cord and will develop spasms after on her feet for a few hours. For treatment, she continues to take Zanaflex. This does help her walk more and stand longer. Continues to notice slow improvements.    Of note: this is not a work comp claim. Patient has osteoporosis. Patient reports she has started to develop edema swelling and is now gaining weight.       Present symptoms: mild pain, swelling.    Symptoms occur walking and standing.    The frequency of symptoms: frequently.  Denies associated numbness or tingling.   Pain severity: 2/10  Pain quality: aching and sharp  Associated symptoms: none  Aggravating Factors: weightbearing  Relieving Factors: at rest, zanaflex    Treatment up to this point: short ankle boot, 2 crutch, physical therapy, zanaflex    Orthopedic PMH: history of left hip pain and stress fracture, with open-reduction, internal fixation of intertrochanteric femur stress fracture (Dr. Alford) and a subsequent left hip arthroscopy (Dr. Deleon).    Other PMH:  has a past medical history of Endometriosis, site unspecified; Gastritis (2010); Urinary calculus, unspecified; and Wrist injury (Nov 19, 2003).  Patient Active Problem List    Diagnosis Date Noted     Closed nondisplaced fracture of body of left calcaneus with delayed healing, subsequent encounter 03/09/2018     Priority:  Medium     Heel pain, chronic, left 03/09/2018     Priority: Medium     Pain in joint, ankle and foot, left 11/15/2017     Priority: Medium     Abnormal gait 07/21/2016     Priority: Medium     Migraine without aura and without status migrainosus, not intractable 11/10/2015     Priority: Medium     Abnormality of gait 06/09/2014     Priority: Medium     Other postprocedural status(V45.89) 03/10/2014     Priority: Medium     Closed nondisplaced intertrochanteric fracture of left femur (H) 02/10/2014     Priority: Medium     Senile osteoporosis 12/10/2013     Priority: Medium     Headache 10/31/2011     Priority: Medium     Problem list name updated by automated process. Provider to review       Gastritis      Priority: Medium     dx 2010- sees Dr Glover in [facility]       CARDIOVASCULAR SCREENING; LDL GOAL LESS THAN 160 10/31/2010     Priority: Medium     Left elbow pain 01/08/2010     Priority: Medium     Narcotic agreement on file       Tobacco use disorder 12/17/2009     Priority: Medium       Surgical Hx:  has a past surgical history that includes REPAIR TRIANGULAR CART,WRIST JT (2005); REPAIR TRIANGULAR CART,WRIST JT (2007 or so); arthroscopy of joint unlisted (4/2004); REMOVAL OF OVARY/TUBE(S) (6/2003); hysteroscopy; lithotripsy; and Arthroscopy hip, osteoplasty femur proximal, combined (Left, 6/29/2016).    Medications:   Current Outpatient Prescriptions:      acetaminophen (TYLENOL) 325 MG tablet, Take 2 tablets (650 mg) by mouth every 4 hours as needed for other (mild pain), Disp: 100 tablet, Rfl: 0     medroxyPROGESTERone (DEPO-PROVERA) 150 MG/ML injection, Inject 1 mL (150 mg) into the muscle every 3 months, Disp: 1 mL, Rfl: 3     nortriptyline (PAMELOR) 50 MG capsule, Take by mouth 2 times daily, Disp: , Rfl:      tiZANidine (ZANAFLEX) 2 MG tablet, Take 1 tablet (2 mg) by mouth 3 times daily as needed for muscle spasms, Disp: 90 tablet, Rfl: 1     traZODone (DESYREL) 50 MG tablet, , Disp: ,  Rfl:     Allergies:   Allergies   Allergen Reactions     Amitriptyline Hives     Amoxicillin Diarrhea     Asa [Dihydroxyaluminum Aminoacetate]      Cipro [Ciprofloxacin]      Dizziness, vomiting, shortness of breath     Ibuprofen [Aspartame-Ibuprofen]      GI distress       Lyrica      extrematies swell up      Morphine      ithcy     Naproxen      GI distress     Neurontin [Gabapentin]      rash     Paxil [Paroxetine] Anaphylaxis     anaphylaxis     Phenergan [Promethazine Hcl]      dystonia     Prilosec [Omeprazole]      Rash, dizziness     Gabapentin Rash       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 Her smoking use included Cigarettes. She has never used smokeless tobacco. She reports that she drinks alcohol. She reports that she does not use illicit drugs.    Family Hx: family history includes Cancer in her paternal grandmother; Cardiovascular in her maternal grandfather; Genitourinary Problems in her maternal grandmother; HEART DISEASE in her maternal grandfather; Hypertension in her father and maternal grandmother; Lipids in her father. There is no history of Asthma, C.A.D., Diabetes, Cerebrovascular Disease, Breast Cancer, Cancer - colorectal, Prostate Cancer, Alzheimer Disease, Arthritis, Blood Disease, Circulatory, Eye Disorder, GASTROINTESTINAL DISEASE, Musculoskeletal Disorder, Neurologic Disorder, Respiratory, or Thyroid Disease.    REVIEW OF SYSTEMS:    CONSTITUTIONAL:NEGATIVE for fever, chills, change in weight  INTEGUMENTARY/SKIN: NEGATIVE for worrisome rashes, moles or lesions  MUSCULOSKELETAL:See HPI above  NEURO: NEGATIVE for weakness, dizziness or paresthesias    This document serves as a record of the services and decisions personally performed and made by Jose Alford MD. It was created on his behalf by Ashlee Linn, a trained medical scribe. The creation of this document is based the provider's statements to the medical scribe.    Scribe Ashlee Linn 9:23 AM  "7/16/2018     PHYSICAL EXAM:  /78  Resp 16  Ht 1.575 m (5' 2\")  Wt 91.9 kg (202 lb 8 oz)  BMI 37.04 kg/m2   GENERAL APPEARANCE: healthy, alert, no distress  SKIN: no suspicious lesions or rashes  NEURO: Normal strength and tone, mentation intact and speech normal  PSYCH:  mentation appears normal and affect normal  RESPIRATORY: No increased work of breathing.    BILATERAL LOWER EXTREMITIES:  Gait: subtle favors left in shoe.    Left lower extremity:  Left lower extremity weakness.  Intact sensation deep peroneal nerve, superficial peroneal nerve, med/lat tibial nerve, sural nerve, saphenous nerve  Intact EHL, EDL, TA, FHL, GS, quadriceps hamstrings and hip flexors  Toes warm and well perfused, brisk capillary refill. Palpable 2+ dp pulses.  calf soft and nttp or squeeze.  Edema: none    left ANKLE  Inspection: skin intact. No erythema or ecchymosis.   Swelling: none   Tender: plantar fascia, distal achilles just proximal to the insertion, minimal tenderness at the achilles insertion, calcaneus/heel, gastrocs  Positive calcaneal squeeze.  Range of Motion:grossly intact  Strength: intact  Tight heel cord, dorsiflexion to just past neutral with mild discomfort.      X-RAY: no new images today.     2 calcaneal views 3/26/2018 were reviewed today. On my review, decreased sclerotic band of calcaneus compared to 1/22/2018..    MRI of the left ankle taken on 1/4/2018 was reviewed today.    IMPRESSION:    1. Calcaneal stress fracture.  2. Trace retrocalcaneal bursitis.    MRI of the left ankle taken on 4/26/2018 was reviewed today.  IMPRESSION:    1. Interval resolution of minimal bone marrow edema previously  associated with calcaneal stress fracture. Remaining linear focus of  decreased signal is likely residual scarring. No acute bony  abnormalities.  2. Markedly thinned or absent anterior talofibular ligament without  acute edema or change, likely related to an old injury.      Impression: 37 year old female " with left ankle/heel pain, healed calcaneal stress fracture, left achilles tendonitis, plantar fasciitis    Plan:       * must continue Achilles stretching, ankle range of motion, home exercise program is fine as long as she is diligent on continuing exercises.  * Rest  * Activity modification - avoid activities that aggravate symptoms.  * Ice twice daily to three times daily.  * Immobilization: no immobilization, aggressive range of motion.  * Soup can or tennis ball to help stretch out the plantar fascia  * Muscle relaxant prescription refilled today for occasional leg spasms.  * Elevation of extremity to reduce swelling  * aggressive heel cord stretching  * Tylenol as needed for pain  * Workability update: Continue to work with 8 hour work restrictions, sitting for 20 minutes, every hour.   * return to clinic 8 weeks.         The information in this document, created by a scribe for me, accurately reflects the services I personally performed and the decisions made by me. I have reviewed and approved this document for accuracy.      Jose Alford M.D., M.S.  Dept. of Orthopaedic Surgery  [facility]

## 2018-07-16 NOTE — LETTER
[facility] [place]  [address]  [place] [address] [phone]  WORKABILITY    [facility], Dr. Jose Alford M.D.  Rhea [address] [place]        7/16/2018      RE: Mily Grullon    [address] [address]        To whom it may concern:     Mily Grullon is under my care for   1. Achilles tendinitis, left leg    2. Achilles tendinitis of left lower extremity         Date of injury: 10/2017         Return to work date: 7/16/18   ** WITH RESTRICTIONS? Yes, with work restrictions: * Other: Limit 8 hours per shift. Please allow sitting 20 minutes every hour.  DURATION OF LIMITATIONS: 8 weeks        Next appointment: 8 weeks        Electronically Signed (as below)  Dr. Jose Alford M.D.

## 2018-07-16 NOTE — MR AVS SNAPSHOT
After Visit Summary   7/16/2018    Mily Grullon    MRN: [id number]           Patient Information     Date Of Birth          1980        Visit Information        Provider Department      7/16/2018 9:00 AM Jose Alford MD [facility] [place]        Today's Diagnoses     Achilles tendinitis of left lower extremity    -  1       Follow-ups after your visit        Follow-up notes from your care team     Return in about 2 months (around 9/16/2018) for clinical recheck.      Your next 10 appointments already scheduled     Jul 19, 2018 10:00 AM CDT   CLEO Extremity with Ramsey Willis, PT   [facility] [place] [facility] ([facility] [place])    [address]  [address]  [place] [address]   [phone]            Sep 17, 2018  9:00 AM CDT   Return Visit with Jose Alford MD   [facility] [place] ([facility] [place])    [address]  [place] [address]   [phone]              Who to contact     If you have questions or need follow up information about today's clinic visit or your schedule please contact [facility] [place] directly at [phone].  Normal or non-critical lab and imaging results will be communicated to you by MyChart, letter or phone within 4 business days after the clinic has received the results. If you do not hear from us within 7 days, please contact the clinic through MyChart or phone. If you have a critical or abnormal lab result, we will notify you by phone as soon as possible.  Submit refill requests through [employer] or call your pharmacy and they will forward the refill request to us. Please allow 3 business days for your refill to be completed.          Additional Information About Your Visit        Care EveryWhere ID     This is your Care EveryWhere ID. This could be used by other organizations to access your [facility]  "records  [id number]        Your Vitals Were     Respirations Height BMI (Body Mass Index)             16 5' 2\" (1.575 m) 37.04 kg/m2          Blood Pressure from Last 3 Encounters:   07/16/18 136/78   06/04/18 137/86   05/18/18 130/87    Weight from Last 3 Encounters:   07/16/18 202 lb 8 oz (91.9 kg)   06/04/18 199 lb 11.2 oz (90.6 kg)   05/18/18 195 lb (88.5 kg)              Today, you had the following     No orders found for display         Where to get your medicines      These medications were sent to [facility] - [address]  [address] [address]     Phone:  [phone]     tiZANidine 2 MG tablet          Primary Care Provider Office Phone # Fax #    Kristen M Kehr, PA-C [phone] [address] [address]        Equal Access to Services     [facility]: Hadii aad ku hadasho Soomaali, waaxda luqadaha, qaybta kaalmada adeegyada, waxliban randle hayedis fitzgerald . So [facility] [phone].    ATENCIÓN: Si habla español, tiene a lentz disposición servicios gratuitos de asistencia lingüística. Llame al [phone].    We comply with applicable federal civil rights laws and [place] laws. We do not discriminate on the basis of race, color, national origin, age, disability, sex, sexual orientation, or gender identity.            Thank you!     Thank you for choosing [facility]  for your care. Our goal is always to provide you with excellent care. Hearing back from our patients is one way we can continue to improve our services. Please take a few minutes to complete the written survey that you may receive in the mail after your visit with us. Thank you!             Your Updated Medication List - Protect others around you: Learn how to safely use, store and throw away your medicines at www.disposemymeds.org.          This list is accurate as of 7/16/18 10:21 AM.  Always use your most " recent med list.                   Brand Name Dispense Instructions for use Diagnosis    acetaminophen 325 MG tablet    TYLENOL    100 tablet    Take 2 tablets (650 mg) by mouth every 4 hours as needed for other (mild pain)    Orthopedic aftercare       medroxyPROGESTERone 150 MG/ML injection    DEPO-PROVERA    1 mL    Inject 1 mL (150 mg) into the muscle every 3 months    Encounter for surveillance of injectable contraceptive       nortriptyline 50 MG capsule    PAMELOR     Take by mouth 2 times daily    Vertigo, Other fatigue, Weight gain       tiZANidine 2 MG tablet    ZANAFLEX    90 tablet    Take 1 tablet (2 mg) by mouth 3 times daily as needed for muscle spasms    Achilles tendinitis of left lower extremity       traZODone 50 MG tablet    DESYREL

## 2018-07-28 ENCOUNTER — TRANSFERRED RECORDS (OUTPATIENT)
Dept: HEALTH INFORMATION MANAGEMENT | Facility: CLINIC | Age: 38
End: 2018-07-28

## 2018-07-31 ENCOUNTER — TELEPHONE (OUTPATIENT)
Dept: FAMILY MEDICINE | Facility: CLINIC | Age: 38
End: 2018-07-31

## 2018-07-31 ENCOUNTER — OFFICE VISIT (OUTPATIENT)
Dept: FAMILY MEDICINE | Facility: CLINIC | Age: 38
End: 2018-07-31
Payer: COMMERCIAL

## 2018-07-31 ENCOUNTER — DOCUMENTATION ONLY (OUTPATIENT)
Dept: LAB | Facility: CLINIC | Age: 38
End: 2018-07-31

## 2018-07-31 VITALS
RESPIRATION RATE: 20 BRPM | DIASTOLIC BLOOD PRESSURE: 87 MMHG | TEMPERATURE: 98.2 F | WEIGHT: 200 LBS | BODY MASS INDEX: 36.58 KG/M2 | SYSTOLIC BLOOD PRESSURE: 134 MMHG | OXYGEN SATURATION: 97 % | HEART RATE: 98 BPM

## 2018-07-31 DIAGNOSIS — Z30.42 ENCOUNTER FOR MANAGEMENT AND INJECTION OF DEPO-PROVERA: ICD-10-CM

## 2018-07-31 DIAGNOSIS — T14.8XXA BRUISING: ICD-10-CM

## 2018-07-31 DIAGNOSIS — R63.5 ABNORMAL WEIGHT GAIN: Primary | ICD-10-CM

## 2018-07-31 PROCEDURE — 96372 THER/PROPH/DIAG INJ SC/IM: CPT | Performed by: PHYSICIAN ASSISTANT

## 2018-07-31 PROCEDURE — 99213 OFFICE O/P EST LOW 20 MIN: CPT | Mod: 25 | Performed by: PHYSICIAN ASSISTANT

## 2018-07-31 RX ORDER — DEXAMETHASONE 1 MG
TABLET ORAL
Qty: 1 TABLET | Refills: 0 | Status: SHIPPED | OUTPATIENT
Start: 2018-07-31 | End: 2018-09-19

## 2018-07-31 NOTE — NURSING NOTE
BP: 134/87    LAST PAP/EXAM:   Lab Results   Component Value Date    PAP NIL 08/30/2016     URINE HCG:not indicated    The following medication was given:     MEDICATION: Depo Provera 150mg  ROUTE: IM  SITE: Deltoid - Right  : Sevar Consult  LOT #: [id number]  EXP:06/2020  NDC# 35785-340-0  NEXT INJECTION DUE: 10/16/18 - 10/30/18   Provider: Kehr,Kristen PA-C  Given by Samir BROTHERS MA

## 2018-07-31 NOTE — PROGRESS NOTES
Patient has a lab appointment on 08/09/2018. Per the lab schedule scrubbing protocol they are not due for anything. Please review chart and send orders or let patient know appointment is not needed.    Thank you,  Birdie Jacobson

## 2018-07-31 NOTE — TELEPHONE ENCOUNTER
Patient is calling stated that she had gotten a steriod injection in her hip a year ago.   Please call to advise  Thank you

## 2018-07-31 NOTE — MR AVS SNAPSHOT
After Visit Summary   7/31/2018    Mily Grullon    MRN: [id number]           Patient Information     Date Of Birth          1980        Visit Information        Provider Department      7/31/2018 9:20 AM Kehr, Kristen M, PA-C [facility]        Today's Diagnoses     Abnormal weight gain    -  1    Bruising        Encounter for management and injection of depo-Provera           Follow-ups after your visit        Your next 10 appointments already scheduled     Aug 09, 2018  8:00 AM CDT   LAB with AN LAB   [facility] ([facility]    [address] [address]   [phone]           Please do not eat 10-12 hours before your appointment if you are coming in fasting for labs on lipids, cholesterol, or glucose (sugar). This does not apply to pregnant women. Water, hot tea and black coffee (with nothing added) are okay. Do not drink other fluids, diet soda or chew gum.            Sep 17, 2018  9:00 AM CDT   Return Visit with Jose Alford MD   [facility] [place] ([facility] [place])    [address]  [place] [address] [phone]              Future tests that were ordered for you today     Open Future Orders        Priority Expected Expires Ordered    Cortisol Routine 8/9/2018 12/31/2018 7/31/2018    CBC with platelets differential Routine 8/9/2018 12/31/2018 7/31/2018    Comprehensive metabolic panel Routine 8/9/2018 12/31/2018 7/31/2018            Who to contact     If you have questions or need follow up information about today's clinic visit or your schedule please contact [facility] directly at [phone].  Normal or non-critical lab and imaging results will be communicated to you by MyChart, letter or phone within 4 business days after the clinic has received the results. If you do not hear from us within 7 days, please contact the clinic through [employer] or  phone. If you have a critical or abnormal lab result, we will notify you by phone as soon as possible.  Submit refill requests through [employer] or call your pharmacy and they will forward the refill request to us. Please allow 3 business days for your refill to be completed.          Additional Information About Your Visit        Care EveryWhere ID     This is your Care EveryWhere ID. This could be used by other organizations to access your [facility] medical records  [id number]        Your Vitals Were     Pulse Temperature Respirations Pulse Oximetry BMI (Body Mass Index)       98 98.2  F (36.8  C) (Oral) 20 97% 36.58 kg/m2        Blood Pressure from Last 3 Encounters:   07/31/18 134/87   07/16/18 136/78   06/04/18 137/86    Weight from Last 3 Encounters:   07/31/18 200 lb (90.7 kg)   07/16/18 202 lb 8 oz (91.9 kg)   06/04/18 199 lb 11.2 oz (90.6 kg)              We Performed the Following     MEDROXYPROGESTERONE INJ          Today's Medication Changes          These changes are accurate as of 7/31/18 11:59 PM.  If you have any questions, ask your nurse or doctor.               Start taking these medicines.        Dose/Directions    dexamethasone 1 MG tablet   Commonly known as:  DECADRON   Used for:  Abnormal weight gain   Started by:  Kehr, Kristen M, PA-C        1 tablet to be taken between 11 pm and 12 am the night prior to cortisol testing / lab appointment.   Quantity:  1 tablet   Refills:  0            Where to get your medicines      These medications were sent to [facility] [address]  [address] [facility] [address]     Phone:  [phone]     dexamethasone 1 MG tablet                Primary Care Provider Office Phone # Fax #    Kristen M Kehr, PA-C [phone] [address] [address]        Equal Access to Services     JASSON VIZCARRA AH: Norma Bliss, wamontseda luqanne, qamelia weller,  abdifatah mokeira leary'aan ah. So [facility] [phone].    ATENCIÓN: Si benedictola kemal, tiene a lentz disposición servicios gratuitos de asistencia lingüística. Rosa randall [phone].    We comply with applicable federal civil rights laws and [place] laws. We do not discriminate on the basis of race, color, national origin, age, disability, sex, sexual orientation, or gender identity.            Thank you!     Thank you for choosing [facility]  for your care. Our goal is always to provide you with excellent care. Hearing back from our patients is one way we can continue to improve our services. Please take a few minutes to complete the written survey that you may receive in the mail after your visit with us. Thank you!             Your Updated Medication List - Protect others around you: Learn how to safely use, store and throw away your medicines at www.disposemymeds.org.          This list is accurate as of 7/31/18 11:59 PM.  Always use your most recent med list.                   Brand Name Dispense Instructions for use Diagnosis    acetaminophen 325 MG tablet    TYLENOL    100 tablet    Take 2 tablets (650 mg) by mouth every 4 hours as needed for other (mild pain)    Orthopedic aftercare       dexamethasone 1 MG tablet    DECADRON    1 tablet    1 tablet to be taken between 11 pm and 12 am the night prior to cortisol testing / lab appointment.    Abnormal weight gain       medroxyPROGESTERone 150 MG/ML injection    DEPO-PROVERA    1 mL    Inject 1 mL (150 mg) into the muscle every 3 months    Encounter for surveillance of injectable contraceptive       nortriptyline 50 MG capsule    PAMELOR     Take by mouth 2 times daily    Vertigo, Other fatigue, Weight gain       tiZANidine 2 MG tablet    ZANAFLEX    90 tablet    Take 1 tablet (2 mg) by mouth 3 times daily as needed for muscle spasms    Achilles tendinitis of left lower extremity       traZODone 50 MG tablet    DESYREL

## 2018-07-31 NOTE — NURSING NOTE
"Chief Complaint   Patient presents with     Weight Problem     discuss weight gain     Imm/Inj     depo     Health Maintenance     Edema       Initial /87  Pulse 98  Temp 98.2  F (36.8  C) (Oral)  Resp 20  Wt 200 lb (90.7 kg)  SpO2 97%  BMI 36.58 kg/m2 Estimated body mass index is 36.58 kg/(m^2) as calculated from the following:    Height as of 7/16/18: 5' 2\" (1.575 m).    Weight as of this encounter: 200 lb (90.7 kg).  Medication Reconciliation: complete    ABIMAEL Lyle MA    "

## 2018-07-31 NOTE — PROGRESS NOTES
SUBJECTIVE:   Mily Grullon is a 37 year old female who presents to clinic today for the following health issues:      Discuss edema and weight gain. Renew depo.     Mily is frustrated with her weight gain over the past year. She has not been able to be very active due to a heel fracture. She recently started working again. She is walking for exercise as much as she is able. Prior to this injury, she had a hip injury that did not allow her to be active prior to that. She is trying to watch her calorie intake. She eats mainly frozen meals and trying to keep the sodium level low. She has swelling in her lower extremities off and on. She was seen last year and testing was normal. Other things she has noticed is easy bruising and stretch marks.       Problem list and histories reviewed & adjusted, as indicated.  Additional history: as documented    Patient Active Problem List   Diagnosis     Tobacco use disorder     Left elbow pain     CARDIOVASCULAR SCREENING; LDL GOAL LESS THAN 160     Gastritis     Headache     Senile osteoporosis     Closed nondisplaced intertrochanteric fracture of left femur (H)     Other postprocedural status(V45.89)     Abnormality of gait     Migraine without aura and without status migrainosus, not intractable     Abnormal gait     Pain in joint, ankle and foot, left     Closed nondisplaced fracture of body of left calcaneus with delayed healing, subsequent encounter     Heel pain, chronic, left     Past Surgical History:   Procedure Laterality Date     ARTHROSCOPY HIP, OSTEOPLASTY FEMUR PROXIMAL, COMBINED Left 6/29/2016    Procedure: COMBINED ARTHROSCOPY HIP, OSTEOPLASTY FEMUR PROXIMAL;  Surgeon: Godwin Deleon MD;  Location: [facility]     ARTHROSCOPY OF JOINT UNLISTED  4/2004    Left wrist, with debridement and repair of tear.     HC REMOVAL OF OVARY/TUBE(S)  6/2003    right with fallopian tube     HC REPAIR TRIANGULAR CART,WRIST JT  2005    Dr Eloy Sosa     HC REPAIR TRIANGULAR  CART,WRIST JT  2007 or so    ulnar excision- Dr Eloy Sosa     HYSTEROSCOPY      laproscopy for endometriosis x2     LITHOTRIPSY         Social History   Substance Use Topics     Smoking status: Former Smoker     Types: Cigarettes     Quit date: 2/2/2014     Smokeless tobacco: Never Used     Alcohol use Yes      Comment: rare      Family History   Problem Relation Age of Onset     Hypertension Father      Lipids Father      Hypertension Maternal Grandmother      Genitourinary Problems Maternal Grandmother      kidney disease     Cardiovascular Maternal Grandfather      quad bypass     HEART DISEASE Maternal Grandfather      quad bypass     Cancer Paternal Grandmother      leukemia     Asthma No family hx of      C.A.D. No family hx of      Diabetes No family hx of      Cerebrovascular Disease No family hx of      Breast Cancer No family hx of      Cancer - colorectal No family hx of      Prostate Cancer No family hx of      Alzheimer Disease No family hx of      Arthritis No family hx of      Blood Disease No family hx of      Circulatory No family hx of      Eye Disorder No family hx of      GASTROINTESTINAL DISEASE No family hx of      Musculoskeletal Disorder No family hx of      Neurologic Disorder No family hx of      Respiratory No family hx of      Thyroid Disease No family hx of          Current Outpatient Prescriptions   Medication Sig Dispense Refill     acetaminophen (TYLENOL) 325 MG tablet Take 2 tablets (650 mg) by mouth every 4 hours as needed for other (mild pain) 100 tablet 0     medroxyPROGESTERone (DEPO-PROVERA) 150 MG/ML injection Inject 1 mL (150 mg) into the muscle every 3 months 1 mL 3     nortriptyline (PAMELOR) 50 MG capsule Take by mouth 2 times daily       tiZANidine (ZANAFLEX) 2 MG tablet Take 1 tablet (2 mg) by mouth 3 times daily as needed for muscle spasms 90 tablet 1     traZODone (DESYREL) 50 MG tablet        Allergies   Allergen Reactions     Amitriptyline Hives     Amoxicillin  Diarrhea     Asa [Dihydroxyaluminum Aminoacetate]      Cipro [Ciprofloxacin]      Dizziness, vomiting, shortness of breath     Ibuprofen [Aspartame-Ibuprofen]      GI distress       Lyrica      extrematies swell up      Morphine      ithcy     Naproxen      GI distress     Neurontin [Gabapentin]      rash     Paxil [Paroxetine] Anaphylaxis     anaphylaxis     Phenergan [Promethazine Hcl]      dystonia     Prilosec [Omeprazole]      Rash, dizziness     Gabapentin Rash       Reviewed and updated as needed this visit by clinical staff       Reviewed and updated as needed this visit by Provider         ROS:  Constitutional, HEENT, cardiovascular, pulmonary, GI, , musculoskeletal, neuro, skin, endocrine and psych systems are negative, except as otherwise noted.    OBJECTIVE:     /87  Pulse 98  Temp 98.2  F (36.8  C) (Oral)  Resp 20  Wt 200 lb (90.7 kg)  SpO2 97%  BMI 36.58 kg/m2  Body mass index is 36.58 kg/(m^2).  GENERAL: healthy, alert and no distress  NECK: no adenopathy, no asymmetry, masses, or scars and thyroid normal to palpation  RESP: lungs clear to auscultation - no rales, rhonchi or wheezes  CV: regular rate and rhythm, normal S1 S2, no S3 or S4, no murmur, click or rub, no peripheral edema and peripheral pulses strong  MS: no gross musculoskeletal defects noted, no edema  SKIN: no suspicious lesions or rashes, no bruising, but there are light stretch marks on her abdomen  NEURO: Normal strength and tone, mentation intact and speech normal  PSYCH: mentation appears normal, affect normal/bright    Diagnostic Test Results:  none     ASSESSMENT/PLAN:       1. Abnormal weight gain  The following tests will be done in the future.   Dexamethasone challenge will be done for possible C[place]'s.   I coordinated the prescription after her appointment, she was contacted with instructions to take the dexamethasone  Between 11pm-12 am the night prior to her blood draw. She was scheduled for an 8 am draw at  the time of her appointment. She will come fasting to rule out diabetes type 2 also.   - Cortisol; Future  - CBC with platelets differential; Future  - Comprehensive metabolic panel; Future  - dexamethasone (DECADRON) 1 MG tablet; 1 tablet to be taken between 11 pm and 12 am the night prior to cortisol testing / lab appointment.  Dispense: 1 tablet; Refill: 0    2. Bruising  - CBC with platelets differential; Future  - Comprehensive metabolic panel; Future    3. Encounter for management and injection of depo-Provera  - MEDROXYPROGESTERONE INJ        Kristen M. Kehr, PA-C  [facility]

## 2018-08-01 NOTE — TELEPHONE ENCOUNTER
Kristen Kehr, PA-C addressed this in telephone call today.  See office visit notes for details.  Mariza Rinaldi RN

## 2018-08-09 DIAGNOSIS — R63.5 ABNORMAL WEIGHT GAIN: ICD-10-CM

## 2018-08-09 DIAGNOSIS — T14.8XXA BRUISING: ICD-10-CM

## 2018-08-09 LAB
ALBUMIN SERPL-MCNC: 3.9 G/DL (ref 3.4–5)
ALP SERPL-CCNC: 135 U/L (ref 40–150)
ALT SERPL W P-5'-P-CCNC: 30 U/L (ref 0–50)
ANION GAP SERPL CALCULATED.3IONS-SCNC: 10 MMOL/L (ref 3–14)
AST SERPL W P-5'-P-CCNC: 20 U/L (ref 0–45)
BASOPHILS # BLD AUTO: 0 10E9/L (ref 0–0.2)
BASOPHILS NFR BLD AUTO: 0.5 %
BILIRUB SERPL-MCNC: 0.3 MG/DL (ref 0.2–1.3)
BUN SERPL-MCNC: 7 MG/DL (ref 7–30)
CALCIUM SERPL-MCNC: 9.4 MG/DL (ref 8.5–10.1)
CHLORIDE SERPL-SCNC: 110 MMOL/L (ref 94–109)
CO2 SERPL-SCNC: 21 MMOL/L (ref 20–32)
CORTIS SERPL-MCNC: 0.9 UG/DL (ref 4–22)
CREAT SERPL-MCNC: 0.9 MG/DL (ref 0.52–1.04)
DIFFERENTIAL METHOD BLD: ABNORMAL
EOSINOPHIL # BLD AUTO: 0.1 10E9/L (ref 0–0.7)
EOSINOPHIL NFR BLD AUTO: 0.7 %
ERYTHROCYTE [DISTWIDTH] IN BLOOD BY AUTOMATED COUNT: 13.8 % (ref 10–15)
GFR SERPL CREATININE-BSD FRML MDRD: 70 ML/MIN/1.7M2
GLUCOSE SERPL-MCNC: 94 MG/DL (ref 70–99)
HCT VFR BLD AUTO: 42.6 % (ref 35–47)
HGB BLD-MCNC: 13.6 G/DL (ref 11.7–15.7)
LYMPHOCYTES # BLD AUTO: 1.5 10E9/L (ref 0.8–5.3)
LYMPHOCYTES NFR BLD AUTO: 20.4 %
MCH RBC QN AUTO: 24.4 PG (ref 26.5–33)
MCHC RBC AUTO-ENTMCNC: 31.9 G/DL (ref 31.5–36.5)
MCV RBC AUTO: 77 FL (ref 78–100)
MONOCYTES # BLD AUTO: 0.2 10E9/L (ref 0–1.3)
MONOCYTES NFR BLD AUTO: 3.1 %
NEUTROPHILS # BLD AUTO: 5.5 10E9/L (ref 1.6–8.3)
NEUTROPHILS NFR BLD AUTO: 75.3 %
PLATELET # BLD AUTO: 360 10E9/L (ref 150–450)
POTASSIUM SERPL-SCNC: 4 MMOL/L (ref 3.4–5.3)
PROT SERPL-MCNC: 7.7 G/DL (ref 6.8–8.8)
RBC # BLD AUTO: 5.57 10E12/L (ref 3.8–5.2)
SODIUM SERPL-SCNC: 141 MMOL/L (ref 133–144)
WBC # BLD AUTO: 7.3 10E9/L (ref 4–11)

## 2018-08-09 PROCEDURE — 82533 TOTAL CORTISOL: CPT | Performed by: PHYSICIAN ASSISTANT

## 2018-08-09 PROCEDURE — 80053 COMPREHEN METABOLIC PANEL: CPT | Performed by: PHYSICIAN ASSISTANT

## 2018-08-09 PROCEDURE — 36415 COLL VENOUS BLD VENIPUNCTURE: CPT | Performed by: PHYSICIAN ASSISTANT

## 2018-08-09 PROCEDURE — 85025 COMPLETE CBC W/AUTO DIFF WBC: CPT | Performed by: PHYSICIAN ASSISTANT

## 2018-08-15 ENCOUNTER — TELEPHONE (OUTPATIENT)
Dept: FAMILY MEDICINE | Facility: CLINIC | Age: 38
End: 2018-08-15

## 2018-08-15 DIAGNOSIS — M81.0 OSTEOPOROSIS, UNSPECIFIED OSTEOPOROSIS TYPE, UNSPECIFIED PATHOLOGICAL FRACTURE PRESENCE: Primary | ICD-10-CM

## 2018-08-15 DIAGNOSIS — S92.015G CLOSED NONDISPLACED FRACTURE OF BODY OF LEFT CALCANEUS WITH DELAYED HEALING, SUBSEQUENT ENCOUNTER: ICD-10-CM

## 2018-08-15 NOTE — TELEPHONE ENCOUNTER
Patient informed of result note as below and to follow up with endocrinology. Scheduling number given.  Patient verbalized understanding  Xenia LUCASN, RN, CPN

## 2018-08-15 NOTE — TELEPHONE ENCOUNTER
Please let her know that all of her tests returned normal.   I don't have a reason for all of her symptoms.   Since she has been unable to exercise because of her orthopedic problems have been contributing to the weight gain and swelling.   She should start being more active and eating healthy, avoid salt since that helps with water retention.   She needs to have a consultation by Endocrinology regarding the osteoporosis, recurrent fractures and her current symptoms.   I have a referral in place for the Endocrinologist in [place]. She can make the appointment [phone]  Kristen Kehr PA-C

## 2018-08-15 NOTE — TELEPHONE ENCOUNTER
Patient would like the results for her recent tests and wondering what she can do for the from edema she has on hands finger/and she is having a lot of brusing  lately on her legs stated she doesn't think she should have to take a water pill everyday    Please call to discuss  Thank you

## 2018-09-02 ENCOUNTER — TRANSFERRED RECORDS (OUTPATIENT)
Dept: HEALTH INFORMATION MANAGEMENT | Facility: CLINIC | Age: 38
End: 2018-09-02

## 2018-09-03 ENCOUNTER — TRANSFERRED RECORDS (OUTPATIENT)
Dept: HEALTH INFORMATION MANAGEMENT | Facility: CLINIC | Age: 38
End: 2018-09-03

## 2018-09-04 ENCOUNTER — OFFICE VISIT (OUTPATIENT)
Dept: FAMILY MEDICINE | Facility: CLINIC | Age: 38
End: 2018-09-04
Payer: COMMERCIAL

## 2018-09-04 VITALS
SYSTOLIC BLOOD PRESSURE: 132 MMHG | BODY MASS INDEX: 37.31 KG/M2 | OXYGEN SATURATION: 98 % | TEMPERATURE: 99.4 F | DIASTOLIC BLOOD PRESSURE: 80 MMHG | RESPIRATION RATE: 18 BRPM | HEART RATE: 115 BPM | WEIGHT: 204 LBS

## 2018-09-04 DIAGNOSIS — K21.9 GASTROESOPHAGEAL REFLUX DISEASE, ESOPHAGITIS PRESENCE NOT SPECIFIED: Primary | ICD-10-CM

## 2018-09-04 PROCEDURE — 99213 OFFICE O/P EST LOW 20 MIN: CPT | Performed by: PHYSICIAN ASSISTANT

## 2018-09-04 RX ORDER — PRAZOSIN HYDROCHLORIDE 1 MG/1
CAPSULE ORAL
COMMUNITY
Start: 2018-08-16 | End: 2018-09-10

## 2018-09-04 RX ORDER — PANTOPRAZOLE SODIUM 20 MG/1
TABLET, DELAYED RELEASE ORAL
Qty: 90 TABLET | Refills: 3 | COMMUNITY
Start: 2018-09-04 | End: 2018-09-28

## 2018-09-04 RX ORDER — CEPHALEXIN 500 MG/1
CAPSULE ORAL
COMMUNITY
Start: 2018-09-03 | End: 2018-09-10

## 2018-09-04 ASSESSMENT — PAIN SCALES - GENERAL: PAINLEVEL: NO PAIN (0)

## 2018-09-04 NOTE — NURSING NOTE
"Chief Complaint   Patient presents with     Hospital F/U     [place]       Initial BP (!) 142/99  Pulse 115  Temp 99.4  F (37.4  C) (Oral)  Resp 18  Wt 204 lb (92.5 kg)  SpO2 98%  BMI 37.31 kg/m2 Estimated body mass index is 37.31 kg/(m^2) as calculated from the following:    Height as of 7/16/18: 5' 2\" (1.575 m).    Weight as of this encounter: 204 lb (92.5 kg).  Medication Reconciliation: complete    ABIMAEL Lyle MA    "

## 2018-09-04 NOTE — PROGRESS NOTES
SUBJECTIVE:   Mily Grullon is a 38 year old female who presents to clinic today for the following health issues:      ED/UC Followup:    Facility:  [place]  Date of visit: 09/2/18 and 09/03/18  Reason for visit: chest pain  Current Status: still having chest pain but not as bad as it was on 09/02/18.         She had stopped taking the medication for acid reflux and is back on the protonix. She has stopped using the NSAIDS.     Problem list and histories reviewed & adjusted, as indicated.  Additional history: as documented    Patient Active Problem List   Diagnosis     Tobacco use disorder     Left elbow pain     CARDIOVASCULAR SCREENING; LDL GOAL LESS THAN 160     Gastritis     Headache     Senile osteoporosis     Closed nondisplaced intertrochanteric fracture of left femur (H)     Other postprocedural status(V45.89)     Abnormality of gait     Migraine without aura and without status migrainosus, not intractable     Abnormal gait     Pain in joint, ankle and foot, left     Closed nondisplaced fracture of body of left calcaneus with delayed healing, subsequent encounter     Heel pain, chronic, left     Past Surgical History:   Procedure Laterality Date     ARTHROSCOPY HIP, OSTEOPLASTY FEMUR PROXIMAL, COMBINED Left 6/29/2016    Procedure: COMBINED ARTHROSCOPY HIP, OSTEOPLASTY FEMUR PROXIMAL;  Surgeon: Godwin Deleon MD;  [facility]     ARTHROSCOPY OF JOINT UNLISTED  4/2004    Left wrist, with debridement and repair of tear.     HC REMOVAL OF OVARY/TUBE(S)  6/2003    right with fallopian tube     HC REPAIR TRIANGULAR CART,WRIST JT  2005    Dr Eloy Sosa     HC REPAIR TRIANGULAR CART,WRIST JT  2007 or so    ulnar excision- Dr Eloy Sosa     HYSTEROSCOPY      laproscopy for endometriosis x2     LITHOTRIPSY         Social History   Substance Use Topics     Smoking status: Former Smoker     Types: Cigarettes     Quit date: 2/2/2014     Smokeless tobacco: Never Used     Alcohol use Yes      Comment: rare       Family History   Problem Relation Age of Onset     Hypertension Father      Lipids Father      Hypertension Maternal Grandmother      Genitourinary Problems Maternal Grandmother      kidney disease     Cardiovascular Maternal Grandfather      quad bypass     HEART DISEASE Maternal Grandfather      quad bypass     Cancer Paternal Grandmother      leukemia     Asthma No family hx of      C.A.D. No family hx of      Diabetes No family hx of      Cerebrovascular Disease No family hx of      Breast Cancer No family hx of      Cancer - colorectal No family hx of      Prostate Cancer No family hx of      Alzheimer Disease No family hx of      Arthritis No family hx of      Blood Disease No family hx of      Circulatory No family hx of      Eye Disorder No family hx of      GASTROINTESTINAL DISEASE No family hx of      Musculoskeletal Disorder No family hx of      Neurologic Disorder No family hx of      Respiratory No family hx of      Thyroid Disease No family hx of          Current Outpatient Prescriptions   Medication Sig Dispense Refill     acetaminophen (TYLENOL) 325 MG tablet Take 2 tablets (650 mg) by mouth every 4 hours as needed for other (mild pain) 100 tablet 0     medroxyPROGESTERone (DEPO-PROVERA) 150 MG/ML injection Inject 1 mL (150 mg) into the muscle every 3 months 1 mL 3     nortriptyline (PAMELOR) 50 MG capsule Take by mouth 2 times daily       pantoprazole (PROTONIX) 20 MG EC tablet Take by mouth 30-60 minutes before a meal. 90 tablet 3     tiZANidine (ZANAFLEX) 2 MG tablet Take 1 tablet (2 mg) by mouth 3 times daily as needed for muscle spasms 90 tablet 1     traZODone (DESYREL) 50 MG tablet        cephALEXin (KEFLEX) 500 MG capsule        dexamethasone (DECADRON) 1 MG tablet 1 tablet to be taken between 11 pm and 12 am the night prior to cortisol testing / lab appointment. 1 tablet 0     prazosin (MINIPRESS) 1 MG capsule        Allergies   Allergen Reactions     Amitriptyline Hives     Amoxicillin  Diarrhea     Asa [Dihydroxyaluminum Aminoacetate]      Cipro [Ciprofloxacin]      Dizziness, vomiting, shortness of breath     Ibuprofen [Aspartame-Ibuprofen]      GI distress       Lyrica      extrematies swell up      Morphine      ithcy     Naproxen      GI distress     Neurontin [Gabapentin]      rash     Paxil [Paroxetine] Anaphylaxis     anaphylaxis     Phenergan [Promethazine Hcl]      dystonia     Prilosec [Omeprazole]      Rash, dizziness     Gabapentin Rash       Reviewed and updated as needed this visit by clinical staff       Reviewed and updated as needed this visit by Provider         ROS:  Constitutional, HEENT, cardiovascular, pulmonary, GI, , musculoskeletal, neuro, skin, endocrine and psych systems are negative, except as otherwise noted.    OBJECTIVE:     /80  Pulse 115  Temp 99.4  F (37.4  C) (Oral)  Resp 18  Wt 204 lb (92.5 kg)  SpO2 98%  BMI 37.31 kg/m2  Body mass index is 37.31 kg/(m^2).  GENERAL: healthy, alert and no distress  RESP: lungs clear to auscultation - no rales, rhonchi or wheezes  CV: regular rate and rhythm, normal S1 S2, no S3 or S4, no murmur, click or rub, no peripheral edema and peripheral pulses strong  SKIN: no suspicious lesions or rashes  NEURO: Normal strength and tone, mentation intact and speech normal  PSYCH: mentation appears normal, affect normal/bright    Diagnostic Test Results:  none     ASSESSMENT/PLAN:         ICD-10-CM    1. Gastroesophageal reflux disease, esophagitis presence not specified K21.9      She has already had some improvement after a few days of the protonix.   Add zantac short term for up to 2 weeks along with the protonix.   Avoid NSAIDS as much as possible.   Tylenol for pain, discussed seeing Pain Management again if needed.       Kristen M. Kehr, PA-C  [facility]

## 2018-09-04 NOTE — MR AVS SNAPSHOT
After Visit Summary   9/4/2018    Mily Grullon    MRN: [id number]           Patient Information     Date Of Birth          1980        Visit Information        Provider Department      9/4/2018 11:00 AM Kehr, Kristen M, PA-C [facility]        Today's Diagnoses     Gastroesophageal reflux disease, esophagitis presence not specified    -  1       Follow-ups after your visit        Your next 10 appointments already scheduled     Sep 17, 2018  9:00 AM CDT   Return Visit with Jose Alford MD   [facility] [place] ([facility] [place])    [address]  [place] [address] [phone]              Who to contact     If you have questions or need follow up information about today's clinic visit or your schedule please contact [facility] directly at [phone].  Normal or non-critical lab and imaging results will be communicated to you by MyChart, letter or phone within 4 business days after the clinic has received the results. If you do not hear from us within 7 days, please contact the clinic through MyChart or phone. If you have a critical or abnormal lab result, we will notify you by phone as soon as possible.  Submit refill requests through [employer] or call your pharmacy and they will forward the refill request to us. Please allow 3 business days for your refill to be completed.          Additional Information About Your Visit        Care EveryWhere ID     This is your Care EveryWhere ID. This could be used by other organizations to access your [place] medical records  [id number]        Your Vitals Were     Pulse Temperature Respirations Pulse Oximetry BMI (Body Mass Index)       115 99.4  F (37.4  C) (Oral) 18 98% 37.31 kg/m2        Blood Pressure from Last 3 Encounters:   09/04/18 132/80   07/31/18 134/87   07/16/18 136/78    Weight from Last 3 Encounters:   09/04/18 204 lb (92.5 kg)   07/31/18 200 lb  (90.7 kg)   07/16/18 202 lb 8 oz (91.9 kg)              Today, you had the following     No orders found for display       Primary Care Provider Office Phone # Fax #    Kristen M Kehr, PA-C [phone] [address] [place] [address]        Equal Access to Services     [facility]: Hadii aad ku hadasho Soomaali, waaxda luqadaha, qaybta kaalmada adeegyada, waxay idiin hayaan adeeg kharash la'aan . So [facility] [phone].    ATENCIÓN: Si habla español, tiene a lentz disposición servicios gratuitos de asistencia lingüística. Llame al [phone].    We comply with applicable federal civil rights laws and [place] laws. We do not discriminate on the basis of race, color, national origin, age, disability, sex, sexual orientation, or gender identity.            Thank you!     Thank you for choosing [facility]  for your care. Our goal is always to provide you with excellent care. Hearing back from our patients is one way we can continue to improve our services. Please take a few minutes to complete the written survey that you may receive in the mail after your visit with us. Thank you!             Your Updated Medication List - Protect others around you: Learn how to safely use, store and throw away your medicines at www.disposemymeds.org.          This list is accurate as of 9/4/18 11:59 PM.  Always use your most recent med list.                   Brand Name Dispense Instructions for use Diagnosis    acetaminophen 325 MG tablet    TYLENOL    100 tablet    Take 2 tablets (650 mg) by mouth every 4 hours as needed for other (mild pain)    Orthopedic aftercare       cephALEXin 500 MG capsule    KEFLEX          dexamethasone 1 MG tablet    DECADRON    1 tablet    1 tablet to be taken between 11 pm and 12 am the night prior to cortisol testing / lab appointment.    Abnormal weight gain       medroxyPROGESTERone 150 MG/ML injection    DEPO-PROVERA    1 mL    Inject 1 mL (150 mg) into the  muscle every 3 months    Encounter for surveillance of injectable contraceptive       nortriptyline 50 MG capsule    PAMELOR     Take by mouth 2 times daily    Vertigo, Other fatigue, Weight gain       pantoprazole 20 MG EC tablet    PROTONIX    90 tablet    Take by mouth 30-60 minutes before a meal.        prazosin 1 MG capsule    MINIPRESS          tiZANidine 2 MG tablet    ZANAFLEX    90 tablet    Take 1 tablet (2 mg) by mouth 3 times daily as needed for muscle spasms    Achilles tendinitis of left lower extremity       traZODone 50 MG tablet    DESYREL

## 2018-09-10 ENCOUNTER — TELEPHONE (OUTPATIENT)
Dept: FAMILY MEDICINE | Facility: CLINIC | Age: 38
End: 2018-09-10

## 2018-09-10 ENCOUNTER — OFFICE VISIT (OUTPATIENT)
Dept: FAMILY MEDICINE | Facility: CLINIC | Age: 38
End: 2018-09-10
Payer: COMMERCIAL

## 2018-09-10 VITALS
TEMPERATURE: 98.4 F | RESPIRATION RATE: 18 BRPM | BODY MASS INDEX: 37.91 KG/M2 | HEART RATE: 106 BPM | DIASTOLIC BLOOD PRESSURE: 80 MMHG | HEIGHT: 62 IN | SYSTOLIC BLOOD PRESSURE: 136 MMHG | OXYGEN SATURATION: 99 % | WEIGHT: 206 LBS

## 2018-09-10 DIAGNOSIS — I89.1 LYMPHANGITIS: Primary | ICD-10-CM

## 2018-09-10 PROCEDURE — 99213 OFFICE O/P EST LOW 20 MIN: CPT | Performed by: PHYSICIAN ASSISTANT

## 2018-09-10 RX ORDER — DOXYCYCLINE 100 MG/1
100 CAPSULE ORAL 2 TIMES DAILY
Qty: 14 CAPSULE | Refills: 0 | Status: SHIPPED | OUTPATIENT
Start: 2018-09-10 | End: 2019-02-06

## 2018-09-10 NOTE — NURSING NOTE
"Chief Complaint   Patient presents with     RECHECK     Health Maintenance     orders pended       Initial BP (!) 152/99  Pulse 106  Temp 98.4  F (36.9  C) (Oral)  Resp 18  Ht 5' 2\" (1.575 m)  Wt 206 lb (93.4 kg)  SpO2 99%  Breastfeeding? No  BMI 37.68 kg/m2 Estimated body mass index is 37.68 kg/(m^2) as calculated from the following:    Height as of this encounter: 5' 2\" (1.575 m).    Weight as of this encounter: 206 lb (93.4 kg).  Medication Reconciliation: complete      Fredy Daniel MA    "

## 2018-09-10 NOTE — PROGRESS NOTES
SUBJECTIVE:                                                    Mily Grullon is a 38 year old female who presents to clinic today for the following health issues:      ED/UC Followup:    Facility:  [facility]  Date of visit: 09/03/2018  Reason for visit:   Lymphangitis   Current Status: Per pt notice SX are getting worst after antibiotics.       Patient had inflammation near where her IV was placed in the emergency room when she saw them for chest pain (negative workup for this).  Given antibiotics because she had streaking in her arm ascending from the IV site and initially her symptoms completely resolved on keflex until this morning per patient. She Has one capsule of antibiotic left (keflex).   More burning pain now.    Just started this morning. She is very worried about this.   Can't take anti-inflammatories due to her stomach/history of bad reaction with this.     No fevers.       Patient states the streaking was gone but now this morning has appeared again.   Problem list and histories reviewed & adjusted, as indicated.  Additional history: as documented    Patient Active Problem List   Diagnosis     Tobacco use disorder     Left elbow pain     CARDIOVASCULAR SCREENING; LDL GOAL LESS THAN 160     Gastritis     Headache     Senile osteoporosis     Closed nondisplaced intertrochanteric fracture of left femur (H)     Other postprocedural status(V45.89)     Abnormality of gait     Migraine without aura and without status migrainosus, not intractable     Abnormal gait     Pain in joint, ankle and foot, left     Closed nondisplaced fracture of body of left calcaneus with delayed healing, subsequent encounter     Heel pain, chronic, left     Gastroesophageal reflux disease, esophagitis presence not specified     Past Surgical History:   Procedure Laterality Date     ARTHROSCOPY HIP, OSTEOPLASTY FEMUR PROXIMAL, COMBINED Left 6/29/2016    Procedure: COMBINED ARTHROSCOPY HIP, OSTEOPLASTY FEMUR PROXIMAL;   Surgeon: Godwin Deleon MD;  Location: [facility] OR     ARTHROSCOPY OF JOINT UNLISTED  4/2004    Left wrist, with debridement and repair of tear.     HC REMOVAL OF OVARY/TUBE(S)  6/2003    right with fallopian tube     HC REPAIR TRIANGULAR CART,WRIST JT  2005    Dr Eloy Sosa     HC REPAIR TRIANGULAR CART,WRIST JT  2007 or so    ulnar excision- Dr Eloy Sosa     HYSTEROSCOPY      laproscopy for endometriosis x2     LITHOTRIPSY         Social History   Substance Use Topics     Smoking status: Former Smoker     Types: Cigarettes     Quit date: 2/2/2014     Smokeless tobacco: Never Used     Alcohol use Yes      Comment: rare      Family History   Problem Relation Age of Onset     Hypertension Father      Lipids Father      Hypertension Maternal Grandmother      Genitourinary Problems Maternal Grandmother      kidney disease     Cardiovascular Maternal Grandfather      quad bypass     HEART DISEASE Maternal Grandfather      quad bypass     Cancer Paternal Grandmother      leukemia     Asthma No family hx of      C.A.D. No family hx of      Diabetes No family hx of      Cerebrovascular Disease No family hx of      Breast Cancer No family hx of      Cancer - colorectal No family hx of      Prostate Cancer No family hx of      Alzheimer Disease No family hx of      Arthritis No family hx of      Blood Disease No family hx of      Circulatory No family hx of      Eye Disorder No family hx of      GASTROINTESTINAL DISEASE No family hx of      Musculoskeletal Disorder No family hx of      Neurologic Disorder No family hx of      Respiratory No family hx of      Thyroid Disease No family hx of          Current Outpatient Prescriptions   Medication Sig Dispense Refill     acetaminophen (TYLENOL) 325 MG tablet Take 2 tablets (650 mg) by mouth every 4 hours as needed for other (mild pain) 100 tablet 0     dexamethasone (DECADRON) 1 MG tablet 1 tablet to be taken between 11 pm and 12 am the night prior to cortisol testing /  "lab appointment. 1 tablet 0     doxycycline monohydrate 100 MG capsule Take 1 capsule (100 mg) by mouth 2 times daily for 7 days With food. 14 capsule 0     medroxyPROGESTERone (DEPO-PROVERA) 150 MG/ML injection Inject 1 mL (150 mg) into the muscle every 3 months 1 mL 3     nortriptyline (PAMELOR) 50 MG capsule Take by mouth 2 times daily       pantoprazole (PROTONIX) 20 MG EC tablet Take by mouth 30-60 minutes before a meal. 90 tablet 3     tiZANidine (ZANAFLEX) 2 MG tablet Take 1 tablet (2 mg) by mouth 3 times daily as needed for muscle spasms 90 tablet 1     traZODone (DESYREL) 50 MG tablet        Allergies   Allergen Reactions     Amitriptyline Hives     Amoxicillin Diarrhea     Asa [Dihydroxyaluminum Aminoacetate]      Cipro [Ciprofloxacin]      Dizziness, vomiting, shortness of breath     Ibuprofen [Aspartame-Ibuprofen]      GI distress       Lyrica      extrematies swell up      Morphine      ithcy     Naproxen      GI distress     Neurontin [Gabapentin]      rash     Paxil [Paroxetine] Anaphylaxis     anaphylaxis     Phenergan [Promethazine Hcl]      dystonia     Prilosec [Omeprazole]      Rash, dizziness     Gabapentin Rash       ROS:  Constitutional, HEENT, cardiovascular, pulmonary, GI, , musculoskeletal, neuro, skin, endocrine and psych systems are negative, except as otherwise noted.    OBJECTIVE:     /80  Pulse 106  Temp 98.4  F (36.9  C) (Oral)  Resp 18  Ht 5' 2\" (1.575 m)  Wt 206 lb (93.4 kg)  SpO2 99%  Breastfeeding? No  BMI 37.68 kg/m2  Body mass index is 37.68 kg/(m^2).  GENERAL: healthy, alert and no distress  RESP: lungs clear to auscultation - no rales, rhonchi or wheezes  CV: regular rate and rhythm, normal S1 S2, no S3 or S4, no murmur, click or rub, no peripheral edema and peripheral pulses strong  MS: no gross musculoskeletal defects noted, no edema  SKIN: {:R forearm-IV site is visible with ascending linear pale pink streak. Tender to touch. Distal sensation, cap refill, " and pulses intact. Range of motion in that arm is normal. Temp is appropriate.   NEURO: Normal strength and tone, mentation intact and speech normal  PSYCH: mentation appears normal, affect normal/bright        ASSESSMENT/PLAN:     ASSESSMENT / PLAN:  (I89.1) Lymphangitis  (primary encounter diagnosis)  Comment: change to more broad spectrum antibiotic, cant use augmentin she has intolerance  Plan: doxycycline monohydrate 100 MG capsule        See below    Patient Instructions   Take new antibiotic with food  To emergency room with worsening symptoms or if fevers occur  If nerve pain continues past two more weeks, let me know and I will refer you to neurology        Jenifer Felipe PA-C  [facility]

## 2018-09-10 NOTE — PATIENT INSTRUCTIONS
Take new antibiotic with food  To emergency room with worsening symptoms or if fevers occur  If nerve pain continues past two more weeks, let me know and I will refer you to neurology

## 2018-09-10 NOTE — TELEPHONE ENCOUNTER
Patient is on antibiotics for an infection for an IV infection. She is on her last day of antibiotics. Her right arm is swollen again and the veins are brown and red again. Please advise. Ok to leave a detailed message. She is at work and needs to know what to do. She will check messages.

## 2018-09-10 NOTE — MR AVS SNAPSHOT
"              After Visit Summary   9/10/2018    Mily Grullon    MRN: [id number]           Patient Information     Date Of Birth          1980        Visit Information        Provider Department      9/10/2018 2:00 PM Jenifer Felipe PA-C [facility]        Today's Diagnoses     Lymphangitis    -  1      Care Instructions    Take new antibiotic with food  To emergency room with worsening symptoms or if fevers occur  If nerve pain continues past two more weeks, let me know and I will refer you to neurology          Follow-ups after your visit        Your next 10 appointments already scheduled     Sep 17, 2018  9:00 AM CDT   Return Visit with Jose Alford MD   [facility] [place] ([facility] [place])    [address]  [place] [address] [phone]              Who to contact     If you have questions or need follow up information about today's clinic visit or your schedule please contact [facility] directly at [phone].  Normal or non-critical lab and imaging results will be communicated to you by MyChart, letter or phone within 4 business days after the clinic has received the results. If you do not hear from us within 7 days, please contact the clinic through MyChart or phone. If you have a critical or abnormal lab result, we will notify you by phone as soon as possible.  Submit refill requests through [employer] or call your pharmacy and they will forward the refill request to us. Please allow 3 business days for your refill to be completed.          Additional Information About Your Visit        Care EveryWhere ID     This is your Care EveryWhere ID. This could be used by other organizations to access your [facility] medical records  [id number]        Your Vitals Were     Pulse Temperature Respirations Height Pulse Oximetry Breastfeeding?    106 98.4  F (36.9  C) (Oral) 18 5' 2\" (1.575 m) 99% No    BMI " (Body Mass Index)                   37.68 kg/m2            Blood Pressure from Last 3 Encounters:   09/10/18 136/80   09/04/18 132/80   07/31/18 134/87    Weight from Last 3 Encounters:   09/10/18 206 lb (93.4 kg)   09/04/18 204 lb (92.5 kg)   07/31/18 200 lb (90.7 kg)              Today, you had the following     No orders found for display         Today's Medication Changes          These changes are accurate as of 9/10/18  2:22 PM.  If you have any questions, ask your nurse or doctor.               Start taking these medicines.        Dose/Directions    doxycycline monohydrate 100 MG capsule   Used for:  Lymphangitis   Started by:  Jenifer Felipe PA-C        Dose:  100 mg   Take 1 capsule (100 mg) by mouth 2 times daily for 7 days With food.   Quantity:  14 capsule   Refills:  0            Where to get your medicines      These medications were sent to [facility] [address], [facility] [address]  [id number] [place] [address]     Phone:  [phone]     doxycycline monohydrate 100 MG capsule                Primary Care Provider Office Phone # Fax #    Kristen M Kehr, PA-C [phone] [address] [facility] [address]        Equal Access to Services     JASSON VIZCARRA AH: Hadii mallika reese hadasho Soomaali, waaxda luqadaha, qaybta kaalmada adeegyada, abdifatah randle hayedis fitzgerald . So [facility] [phone].    ATENCIÓN: Si habla español, tiene a lentz disposición servicios gratuitos de asistencia lingüística. Llame al [phone].    We comply with applicable federal civil rights laws and [place] laws. We do not discriminate on the basis of race, color, national origin, age, disability, sex, sexual orientation, or gender identity.            Thank you!     Thank you for choosing [facility]  for your care. Our goal is always to provide you with excellent care. Hearing back from our patients is one way we can continue to  improve our services. Please take a few minutes to complete the written survey that you may receive in the mail after your visit with us. Thank you!             Your Updated Medication List - Protect others around you: Learn how to safely use, store and throw away your medicines at www.disposemymeds.org.          This list is accurate as of 9/10/18  2:22 PM.  Always use your most recent med list.                   Brand Name Dispense Instructions for use Diagnosis    acetaminophen 325 MG tablet    TYLENOL    100 tablet    Take 2 tablets (650 mg) by mouth every 4 hours as needed for other (mild pain)    Orthopedic aftercare       dexamethasone 1 MG tablet    DECADRON    1 tablet    1 tablet to be taken between 11 pm and 12 am the night prior to cortisol testing / lab appointment.    Abnormal weight gain       doxycycline monohydrate 100 MG capsule     14 capsule    Take 1 capsule (100 mg) by mouth 2 times daily for 7 days With food.    Lymphangitis       medroxyPROGESTERone 150 MG/ML injection    DEPO-PROVERA    1 mL    Inject 1 mL (150 mg) into the muscle every 3 months    Encounter for surveillance of injectable contraceptive       nortriptyline 50 MG capsule    PAMELOR     Take by mouth 2 times daily    Vertigo, Other fatigue, Weight gain       pantoprazole 20 MG EC tablet    PROTONIX    90 tablet    Take by mouth 30-60 minutes before a meal.        tiZANidine 2 MG tablet    ZANAFLEX    90 tablet    Take 1 tablet (2 mg) by mouth 3 times daily as needed for muscle spasms    Achilles tendinitis of left lower extremity       traZODone 50 MG tablet    DESYREL

## 2018-09-10 NOTE — TELEPHONE ENCOUNTER
Per patient she is afebrile, currently at work.  Patient was treated with antibiotic for lymphangitis last week.  Last day  of antibiotic today, however symptoms have reoccurred. Right forearm is swollen again and veins are brown and red again.  Patient was diagnosed at emergency department 9/3/18.  Advise evaluation.  An appointment is scheduled for today with DIANELYS Valdes.  Warning signs and symptoms are reviewed and pt should proceed to the ER.   The patient/parent agrees with the plan and verbalized good understanding.  Mariza Rinaldi RN

## 2018-09-12 DIAGNOSIS — M76.62 ACHILLES TENDINITIS OF LEFT LOWER EXTREMITY: ICD-10-CM

## 2018-09-12 RX ORDER — TIZANIDINE 2 MG/1
2 TABLET ORAL 3 TIMES DAILY PRN
Qty: 90 TABLET | Refills: 1 | Status: SHIPPED | OUTPATIENT
Start: 2018-09-12 | End: 2018-11-16

## 2018-09-12 NOTE — TELEPHONE ENCOUNTER
Tizanidine 2mg      Last Written Prescription Date:  07/16/18  Last Fill Quantity: 90,   # refills: 1  Last Office Visit: 07/16/18  Future Office visit:    Next 5 appointments (look out 90 days)     Sep 17, 2018  9:00 AM CDT   Return Visit with Jose Alford MD   [facility] [place] ([facility] [place]    [address] [address]  [place] [address]   [phone]                   Thank You,  Amina Lazo, Pharmacy Tech  [place]

## 2018-09-17 ENCOUNTER — TELEPHONE (OUTPATIENT)
Dept: FAMILY MEDICINE | Facility: CLINIC | Age: 38
End: 2018-09-17

## 2018-09-17 ENCOUNTER — OFFICE VISIT (OUTPATIENT)
Dept: ORTHOPEDICS | Facility: CLINIC | Age: 38
End: 2018-09-17
Payer: COMMERCIAL

## 2018-09-17 VITALS
BODY MASS INDEX: 37.5 KG/M2 | OXYGEN SATURATION: 98 % | HEART RATE: 118 BPM | HEIGHT: 62 IN | DIASTOLIC BLOOD PRESSURE: 81 MMHG | SYSTOLIC BLOOD PRESSURE: 137 MMHG | WEIGHT: 203.8 LBS

## 2018-09-17 DIAGNOSIS — M84.375D STRESS FRACTURE OF LEFT CALCANEUS WITH ROUTINE HEALING, SUBSEQUENT ENCOUNTER: Primary | ICD-10-CM

## 2018-09-17 DIAGNOSIS — M76.62 ACHILLES TENDINITIS OF LEFT LOWER EXTREMITY: ICD-10-CM

## 2018-09-17 DIAGNOSIS — M77.42 METATARSALGIA OF LEFT FOOT: ICD-10-CM

## 2018-09-17 PROCEDURE — 99213 OFFICE O/P EST LOW 20 MIN: CPT | Performed by: ORTHOPAEDIC SURGERY

## 2018-09-17 ASSESSMENT — PAIN SCALES - GENERAL: PAINLEVEL: MILD PAIN (2)

## 2018-09-17 NOTE — LETTER
[facility]  [address]  [place] [address] [phone]  WORKABILITY    [facility], Rhea Wolff M.D. [place]        9/17/2018      RE: Mily Grullon    [address] [address]        To whom it may concern:     Mily Grullon is under my care for   1. Achilles tendinitis, left leg    2. Achilles tendinitis of left lower extremity        Date of injury: 10/2017          Return to work date: 7/16/18     ** WITH RESTRICTIONS? Yes, with work restrictions: * Other: Limit 8 hours per shift. Please allow sitting 20 minutes every hour.  DURATION OF LIMITATIONS: 8 weeks           Next appointment: 8 weeks        Electronically Signed (as below)  Dr. Jose Alford M.D.

## 2018-09-17 NOTE — MR AVS SNAPSHOT
After Visit Summary   9/17/2018    Mily Grullon    MRN: [id number]           Patient Information     Date Of Birth          1980        Visit Information        Provider Department      9/17/2018 9:00 AM Jose Alford MD [facility] [place]        Today's Diagnoses     Stress fracture of left calcaneus with routine healing, subsequent encounter    -  1    Achilles tendinitis of left lower extremity        Metatarsalgia of left foot           Follow-ups after your visit        Follow-up notes from your care team     Return in about 2 months (around 11/17/2018) for clinical recheck.      Your next 10 appointments already scheduled     Nov 12, 2018 11:00 AM CST   Return Visit with Jose Alford MD   [facility] [place] ([facility] [place])    [address] [facility] [address]  [place] [address] [phone]              Who to contact     If you have questions or need follow up information about today's clinic visit or your schedule please contact [facility] directly at [phone].  Normal or non-critical lab and imaging results will be communicated to you by MyChart, letter or phone within 4 business days after the clinic has received the results. If you do not hear from us within 7 days, please contact the clinic through MyChart or phone. If you have a critical or abnormal lab result, we will notify you by phone as soon as possible.  Submit refill requests through [employer] or call your pharmacy and they will forward the refill request to us. Please allow 3 business days for your refill to be completed.          Additional Information About Your Visit        Care EveryWhere ID     This is your Care EveryWhere ID. This could be used by other organizations to access your [facility] medical records  [id number]        Your Vitals Were     Pulse Height Pulse Oximetry BMI (Body Mass Index)          118  "5' 2\" (1.575 m) 98% 37.28 kg/m2         Blood Pressure from Last 3 Encounters:   09/17/18 137/81   09/10/18 136/80   09/04/18 132/80    Weight from Last 3 Encounters:   09/17/18 203 lb 12.8 oz (92.4 kg)   09/10/18 206 lb (93.4 kg)   09/04/18 204 lb (92.5 kg)              Today, you had the following     No orders found for display       Primary Care Provider Office Phone # Fax #    Kristen M Kehr, PA-C [phone] [address] [facility] [address]        Equal Access to Services     JASSON VIZCARRA : Norma landao Sorandolph, waaxda luqadaha, qaybta kaalmada adekeirayada, abdifatah fitzgerald . So [facility] [phone].    ATENCIÓN: Si habla español, tiene a lentz disposición servicios gratuitos de asistencia lingüística. Llame al [phone].    We comply with applicable federal civil rights laws and [place] laws. We do not discriminate on the basis of race, color, national origin, age, disability, sex, sexual orientation, or gender identity.            Thank you!     Thank you for choosing [facility]  for your care. Our goal is always to provide you with excellent care. Hearing back from our patients is one way we can continue to improve our services. Please take a few minutes to complete the written survey that you may receive in the mail after your visit with us. Thank you!             Your Updated Medication List - Protect others around you: Learn how to safely use, store and throw away your medicines at www.disposemymeds.org.          This list is accurate as of 9/17/18 10:11 AM.  Always use your most recent med list.                   Brand Name Dispense Instructions for use Diagnosis    acetaminophen 325 MG tablet    TYLENOL    100 tablet    Take 2 tablets (650 mg) by mouth every 4 hours as needed for other (mild pain)    Orthopedic aftercare       dexamethasone 1 MG tablet    DECADRON    1 tablet    1 tablet to be taken between 11 pm " and 12 am the night prior to cortisol testing / lab appointment.    Abnormal weight gain       doxycycline monohydrate 100 MG capsule     14 capsule    Take 1 capsule (100 mg) by mouth 2 times daily for 7 days With food.    Lymphangitis       medroxyPROGESTERone 150 MG/ML injection    DEPO-PROVERA    1 mL    Inject 1 mL (150 mg) into the muscle every 3 months    Encounter for surveillance of injectable contraceptive       nortriptyline 50 MG capsule    PAMELOR     Take by mouth 2 times daily    Vertigo, Other fatigue, Weight gain       pantoprazole 20 MG EC tablet    PROTONIX    90 tablet    Take by mouth 30-60 minutes before a meal.        tiZANidine 2 MG tablet    ZANAFLEX    90 tablet    Take 1 tablet (2 mg) by mouth 3 times daily as needed for muscle spasms    Achilles tendinitis of left lower extremity       traZODone 50 MG tablet    DESYREL

## 2018-09-17 NOTE — PROGRESS NOTES
chief complaint:   Chief Complaint   Patient presents with     Left Ankle - RECHECK     Achilles tendonitis/s/p left calcaneal stress fracture. DOI 10/2017. Patient states her heel is doing really well but is having pain in the ball of her foot and back of the ankle. Pain has been going on for a couple of weeks.      INJURY: left calcaneal stress fracture, achilles tendonitis  ONSET: 10/2017      HISTORY OF PRESENT ILLNESS    Mily Grullon is a 38 year old female seen for follow up evaluation of a left ankle and heel pain that started on 10/13/2017, 11 months ago. No known injury. She started to notice the pain at work without a specific event. Had MRI showing stress fracture of the calcaneus. Returns today with mild pain, 2/10. Pain is located over the back of the heel as well as the pad of the plantar foot (ball of foot). Pain with big toe range of motion. No recent injury.      Present symptoms: mild pain, swelling.    Symptoms occur walking and standing.    The frequency of symptoms: frequently.  Denies associated numbness or tingling.   Pain severity: 2/10  Pain quality: aching and sharp  Associated symptoms: none  Aggravating Factors: weightbearing  Relieving Factors: at rest, zanaflex    Treatment up to this point: short ankle boot, 2 crutch, physical therapy, zanaflex    Orthopedic PMH: history of left hip pain and stress fracture, with open-reduction, internal fixation of intertrochanteric femur stress fracture (Dr. Alford) and a subsequent left hip arthroscopy (Dr. Deleon).    Other PMH:  has a past medical history of Endometriosis, site unspecified; Gastritis (2010); Urinary calculus, unspecified; and Wrist injury (Nov 19, 2003).  Patient Active Problem List    Diagnosis Date Noted     Gastroesophageal reflux disease, esophagitis presence not specified 09/10/2018     Priority: Medium     Closed nondisplaced fracture of body of left calcaneus with delayed healing, subsequent encounter 03/09/2018      Priority: Medium     Heel pain, chronic, left 03/09/2018     Priority: Medium     Pain in joint, ankle and foot, left 11/15/2017     Priority: Medium     Abnormal gait 07/21/2016     Priority: Medium     Migraine without aura and without status migrainosus, not intractable 11/10/2015     Priority: Medium     Abnormality of gait 06/09/2014     Priority: Medium     Other postprocedural status(V45.89) 03/10/2014     Priority: Medium     Closed nondisplaced intertrochanteric fracture of left femur (H) 02/10/2014     Priority: Medium     Senile osteoporosis 12/10/2013     Priority: Medium     Headache 10/31/2011     Priority: Medium     Problem list name updated by automated process. Provider to review       Gastritis      Priority: Medium     dx 2010- sees Dr Glover in [facility]       CARDIOVASCULAR SCREENING; LDL GOAL LESS THAN 160 10/31/2010     Priority: Medium     Left elbow pain 01/08/2010     Priority: Medium     Narcotic agreement on file       Tobacco use disorder 12/17/2009     Priority: Medium       Surgical Hx:  has a past surgical history that includes REPAIR TRIANGULAR CART,WRIST JT (2005); REPAIR TRIANGULAR CART,WRIST JT (2007 or so); arthroscopy of joint unlisted (4/2004); REMOVAL OF OVARY/TUBE(S) (6/2003); hysteroscopy; lithotripsy; and Arthroscopy hip, osteoplasty femur proximal, combined (Left, 6/29/2016).    Medications:   Current Outpatient Prescriptions:      acetaminophen (TYLENOL) 325 MG tablet, Take 2 tablets (650 mg) by mouth every 4 hours as needed for other (mild pain), Disp: 100 tablet, Rfl: 0     dexamethasone (DECADRON) 1 MG tablet, 1 tablet to be taken between 11 pm and 12 am the night prior to cortisol testing / lab appointment., Disp: 1 tablet, Rfl: 0     doxycycline monohydrate 100 MG capsule, Take 1 capsule (100 mg) by mouth 2 times daily for 7 days With food., Disp: 14 capsule, Rfl: 0     nortriptyline (PAMELOR) 50 MG capsule, Take by mouth 2 times daily, Disp: , Rfl:       pantoprazole (PROTONIX) 20 MG EC tablet, Take by mouth 30-60 minutes before a meal., Disp: 90 tablet, Rfl: 3     tiZANidine (ZANAFLEX) 2 MG tablet, Take 1 tablet (2 mg) by mouth 3 times daily as needed for muscle spasms, Disp: 90 tablet, Rfl: 1     traZODone (DESYREL) 50 MG tablet, , Disp: , Rfl:      medroxyPROGESTERone (DEPO-PROVERA) 150 MG/ML injection, Inject 1 mL (150 mg) into the muscle every 3 months (Patient not taking: Reported on 9/17/2018), Disp: 1 mL, Rfl: 3    Allergies:   Allergies   Allergen Reactions     Amitriptyline Hives     Amoxicillin Diarrhea     Asa [Dihydroxyaluminum Aminoacetate]      Cipro [Ciprofloxacin]      Dizziness, vomiting, shortness of breath     Ibuprofen [Aspartame-Ibuprofen]      GI distress       Lyrica      extrematies swell up      Morphine      ithcy     Naproxen      GI distress     Neurontin [Gabapentin]      rash     Paxil [Paroxetine] Anaphylaxis     anaphylaxis     Phenergan [Promethazine Hcl]      dystonia     Prilosec [Omeprazole]      Rash, dizziness     Gabapentin Rash       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 Her smoking use included Cigarettes. She has never used smokeless tobacco. She reports that she drinks alcohol. She reports that she does not use illicit drugs.    Family Hx: family history includes Cancer in her paternal grandmother; Cardiovascular in her maternal grandfather; Genitourinary Problems in her maternal grandmother; HEART DISEASE in her maternal grandfather; Hypertension in her father and maternal grandmother; Lipids in her father. There is no history of Asthma, C.A.D., Diabetes, Cerebrovascular Disease, Breast Cancer, Cancer - colorectal, Prostate Cancer, Alzheimer Disease, Arthritis, Blood Disease, Circulatory, Eye Disorder, GASTROINTESTINAL DISEASE, Musculoskeletal Disorder, Neurologic Disorder, Respiratory, or Thyroid Disease.    REVIEW OF SYSTEMS:    CONSTITUTIONAL:NEGATIVE for fever, chills, change  "in weight  INTEGUMENTARY/SKIN: NEGATIVE for worrisome rashes, moles or lesions  MUSCULOSKELETAL:See HPI above  NEURO: NEGATIVE for weakness, dizziness or paresthesias    This document serves as a record of the services and decisions personally performed and made by Jose Alford MD. It was created on his behalf by Ashlee Linn, a trained medical scribe. The creation of this document is based the provider's statements to the medical scribe.    Scribe Ashlee Linn 9:23 AM 7/16/2018     PHYSICAL EXAM:  /81  Pulse 118  Ht 5' 2\" (1.575 m)  Wt 203 lb 12.8 oz (92.4 kg)  SpO2 98%  BMI 37.28 kg/m2   GENERAL APPEARANCE: healthy, alert, no distress  SKIN: no suspicious lesions or rashes  NEURO: Normal strength and tone, mentation intact and speech normal  PSYCH:  mentation appears normal and affect normal  RESPIRATORY: No increased work of breathing.    BILATERAL LOWER EXTREMITIES:  Gait: subtle favors left in tennis shoe.    Left lower extremity:  Left lower extremity weakness.  Intact sensation deep peroneal nerve, superficial peroneal nerve, med/lat tibial nerve, sural nerve, saphenous nerve  Intact EHL, EDL, TA, FHL, GS, quadriceps hamstrings and hip flexors  Toes warm and well perfused, brisk capillary refill. Palpable 2+ dp pulses.  calf soft and nttp or squeeze.  Edema: none    left ANKLE  Inspection: skin intact. No erythema or ecchymosis.   Swelling: none   Tender: plantar fascia, distal achilles just proximal to the insertion, minimal tenderness at the achilles insertion, calcaneus/heel, gastrocnemius. Also tenderness of the plantar aspect of the MTP joint of the great toe.  Equivocal calcaneal squeeze.  Range of Motion:grossly intact  Strength: intact  Tight heel cord, dorsiflexion to just past neutral with mild discomfort.      X-RAY: no new images today.   2 calcaneal views 3/26/2018 were reviewed today. On my review, decreased sclerotic band of calcaneus compared to 1/22/2018..    MRI of the left ankle " taken on 1/4/2018 was reviewed today.    IMPRESSION:    1. Calcaneal stress fracture.  2. Trace retrocalcaneal bursitis.    MRI of the left ankle taken on 4/26/2018 was reviewed today.  IMPRESSION:    1. Interval resolution of minimal bone marrow edema previously  associated with calcaneal stress fracture. Remaining linear focus of  decreased signal is likely residual scarring. No acute bony  abnormalities.  2. Markedly thinned or absent anterior talofibular ligament without  acute edema or change, likely related to an old injury.      Impression: 38 year old female with left ankle/heel pain, healed calcaneal stress fracture, left achilles tendonitis, plantar fasciitis, possibly metatarsalgia.     Plan:   * must continue Achilles stretching, ankle range of motion, home exercise program is fine as long as she is diligent on continuing exercises.  * tarsal pad given today to put into shoe. Consider podiatry in future as needed.  * Rest  * Activity modification - avoid activities that aggravate symptoms.  * Ice twice daily to three times daily.  * Immobilization: no immobilization, aggressive range of motion.  * Soup can or tennis ball to help stretch out the plantar fascia  * Muscle relaxant prescription refilled today for occasional leg spasms.  * Elevation of extremity to reduce swelling  * aggressive heel cord stretching  * Tylenol as needed for pain  * Workability update: Continue to work with 8 hour work restrictions, sitting for 20 minutes, every hour.   * return to clinic 8 weeks.         The information in this document, created by a scribe for me, accurately reflects the services I personally performed and the decisions made by me. I have reviewed and approved this document for accuracy.      Jose Alford M.D., M.S.  Dept. of Orthopaedic Surgery  [facility]

## 2018-09-17 NOTE — TELEPHONE ENCOUNTER
Patient/parent is informed of MD note below, as it is written. Verbalized good understanding.    Patient wonders if she still has the Lymphangitis.  Patient states yesterday symptoms were improving, but today the puncture is visible again.  Pain is still present 2-3, scale, hurts worse when touched/rubbed, lay down.  Patient states the vein is still raised.  Patient is afebrile.  An appointment is scheduled with Dr Smith 9/19/18.  Please advise  Mariza Rinaldi RN

## 2018-09-17 NOTE — LETTER
9/17/2018         RE: Mily Grullon  [address] [address]        Dear Colleague,    Thank you for referring your patient, Mily Grullon, to the [facility]. Please see a copy of my visit note below.    chief complaint:   Chief Complaint   Patient presents with     Left Ankle - RECHECK     Achilles tendonitis/s/p left calcaneal stress fracture. DOI 10/2017. Patient states her heel is doing really well but is having pain in the ball of her foot and back of the ankle. Pain has been going on for a couple of weeks.      INJURY: left calcaneal stress fracture, achilles tendonitis  ONSET: 10/2017      HISTORY OF PRESENT ILLNESS    Mily Grullon is a 38 year old female seen for follow up evaluation of a left ankle and heel pain that started on 10/13/2017, 11 months ago. No known injury. She started to notice the pain at work without a specific event. Had MRI showing stress fracture of the calcaneus. Returns today with mild pain, 2/10. Pain is located over the back of the heel as well as the pad of the plantar foot (ball of foot). Pain with big toe range of motion. No recent injury.      Present symptoms: mild pain, swelling.    Symptoms occur walking and standing.    The frequency of symptoms: frequently.  Denies associated numbness or tingling.   Pain severity: 2/10  Pain quality: aching and sharp  Associated symptoms: none  Aggravating Factors: weightbearing  Relieving Factors: at rest, zanaflex    Treatment up to this point: short ankle boot, 2 crutch, physical therapy, zanaflex    Orthopedic PMH: history of left hip pain and stress fracture, with open-reduction, internal fixation of intertrochanteric femur stress fracture (Dr. Alford) and a subsequent left hip arthroscopy (Dr. Deleon).    Other PMH:  has a past medical history of Endometriosis, site unspecified; Gastritis (2010); Urinary calculus, unspecified; and Wrist injury (Nov 19, 2003).  Patient Active  Problem List    Diagnosis Date Noted     Gastroesophageal reflux disease, esophagitis presence not specified 09/10/2018     Priority: Medium     Closed nondisplaced fracture of body of left calcaneus with delayed healing, subsequent encounter 03/09/2018     Priority: Medium     Heel pain, chronic, left 03/09/2018     Priority: Medium     Pain in joint, ankle and foot, left 11/15/2017     Priority: Medium     Abnormal gait 07/21/2016     Priority: Medium     Migraine without aura and without status migrainosus, not intractable 11/10/2015     Priority: Medium     Abnormality of gait 06/09/2014     Priority: Medium     Other postprocedural status(V45.89) 03/10/2014     Priority: Medium     Closed nondisplaced intertrochanteric fracture of left femur (H) 02/10/2014     Priority: Medium     Senile osteoporosis 12/10/2013     Priority: Medium     Headache 10/31/2011     Priority: Medium     Problem list name updated by automated process. Provider to review       Gastritis      Priority: Medium     dx 2010- sees Dr Glover in [facility]       CARDIOVASCULAR SCREENING; LDL GOAL LESS THAN 160 10/31/2010     Priority: Medium     Left elbow pain 01/08/2010     Priority: Medium     Narcotic agreement on file       Tobacco use disorder 12/17/2009     Priority: Medium       Surgical Hx:  has a past surgical history that includes REPAIR TRIANGULAR CART,WRIST JT (2005); REPAIR TRIANGULAR CART,WRIST JT (2007 or so); arthroscopy of joint unlisted (4/2004); REMOVAL OF OVARY/TUBE(S) (6/2003); hysteroscopy; lithotripsy; and Arthroscopy hip, osteoplasty femur proximal, combined (Left, 6/29/2016).    Medications:   Current Outpatient Prescriptions:      acetaminophen (TYLENOL) 325 MG tablet, Take 2 tablets (650 mg) by mouth every 4 hours as needed for other (mild pain), Disp: 100 tablet, Rfl: 0     dexamethasone (DECADRON) 1 MG tablet, 1 tablet to be taken between 11 pm and 12 am the night prior to cortisol testing / lab  appointment., Disp: 1 tablet, Rfl: 0     doxycycline monohydrate 100 MG capsule, Take 1 capsule (100 mg) by mouth 2 times daily for 7 days With food., Disp: 14 capsule, Rfl: 0     nortriptyline (PAMELOR) 50 MG capsule, Take by mouth 2 times daily, Disp: , Rfl:      pantoprazole (PROTONIX) 20 MG EC tablet, Take by mouth 30-60 minutes before a meal., Disp: 90 tablet, Rfl: 3     tiZANidine (ZANAFLEX) 2 MG tablet, Take 1 tablet (2 mg) by mouth 3 times daily as needed for muscle spasms, Disp: 90 tablet, Rfl: 1     traZODone (DESYREL) 50 MG tablet, , Disp: , Rfl:      medroxyPROGESTERone (DEPO-PROVERA) 150 MG/ML injection, Inject 1 mL (150 mg) into the muscle every 3 months (Patient not taking: Reported on 9/17/2018), Disp: 1 mL, Rfl: 3    Allergies:   Allergies   Allergen Reactions     Amitriptyline Hives     Amoxicillin Diarrhea     Asa [Dihydroxyaluminum Aminoacetate]      Cipro [Ciprofloxacin]      Dizziness, vomiting, shortness of breath     Ibuprofen [Aspartame-Ibuprofen]      GI distress       Lyrica      extrematies swell up      Morphine      ithcy     Naproxen      GI distress     Neurontin [Gabapentin]      rash     Paxil [Paroxetine] Anaphylaxis     anaphylaxis     Phenergan [Promethazine Hcl]      dystonia     Prilosec [Omeprazole]      Rash, dizziness     Gabapentin Rash       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 Her smoking use included Cigarettes. She has never used smokeless tobacco. She reports that she drinks alcohol. She reports that she does not use illicit drugs.    Family Hx: family history includes Cancer in her paternal grandmother; Cardiovascular in her maternal grandfather; Genitourinary Problems in her maternal grandmother; HEART DISEASE in her maternal grandfather; Hypertension in her father and maternal grandmother; Lipids in her father. There is no history of Asthma, C.A.D., Diabetes, Cerebrovascular Disease, Breast Cancer, Cancer - colorectal, Prostate  "Cancer, Alzheimer Disease, Arthritis, Blood Disease, Circulatory, Eye Disorder, GASTROINTESTINAL DISEASE, Musculoskeletal Disorder, Neurologic Disorder, Respiratory, or Thyroid Disease.    REVIEW OF SYSTEMS:    CONSTITUTIONAL:NEGATIVE for fever, chills, change in weight  INTEGUMENTARY/SKIN: NEGATIVE for worrisome rashes, moles or lesions  MUSCULOSKELETAL:See HPI above  NEURO: NEGATIVE for weakness, dizziness or paresthesias    This document serves as a record of the services and decisions personally performed and made by Jose Alford MD. It was created on his behalf by Ashlee Linn, a trained medical scribe. The creation of this document is based the provider's statements to the medical scribe.    Scribe Ashlee Linn 9:23 AM 7/16/2018     PHYSICAL EXAM:  /81  Pulse 118  Ht 5' 2\" (1.575 m)  Wt 203 lb 12.8 oz (92.4 kg)  SpO2 98%  BMI 37.28 kg/m2   GENERAL APPEARANCE: healthy, alert, no distress  SKIN: no suspicious lesions or rashes  NEURO: Normal strength and tone, mentation intact and speech normal  PSYCH:  mentation appears normal and affect normal  RESPIRATORY: No increased work of breathing.    BILATERAL LOWER EXTREMITIES:  Gait: subtle favors left in tennis shoe.    Left lower extremity:  Left lower extremity weakness.  Intact sensation deep peroneal nerve, superficial peroneal nerve, med/lat tibial nerve, sural nerve, saphenous nerve  Intact EHL, EDL, TA, FHL, GS, quadriceps hamstrings and hip flexors  Toes warm and well perfused, brisk capillary refill. Palpable 2+ dp pulses.  calf soft and nttp or squeeze.  Edema: none    left ANKLE  Inspection: skin intact. No erythema or ecchymosis.   Swelling: none   Tender: plantar fascia, distal achilles just proximal to the insertion, minimal tenderness at the achilles insertion, calcaneus/heel, gastrocnemius. Also tenderness of the plantar aspect of the MTP joint of the great toe.  Equivocal calcaneal squeeze.  Range of Motion:grossly intact  Strength: " intact  Tight heel cord, dorsiflexion to just past neutral with mild discomfort.      X-RAY: no new images today.   2 calcaneal views 3/26/2018 were reviewed today. On my review, decreased sclerotic band of calcaneus compared to 1/22/2018..    MRI of the left ankle taken on 1/4/2018 was reviewed today.    IMPRESSION:    1. Calcaneal stress fracture.  2. Trace retrocalcaneal bursitis.    MRI of the left ankle taken on 4/26/2018 was reviewed today.  IMPRESSION:    1. Interval resolution of minimal bone marrow edema previously  associated with calcaneal stress fracture. Remaining linear focus of  decreased signal is likely residual scarring. No acute bony  abnormalities.  2. Markedly thinned or absent anterior talofibular ligament without  acute edema or change, likely related to an old injury.      Impression: 38 year old female with left ankle/heel pain, healed calcaneal stress fracture, left achilles tendonitis, plantar fasciitis, possibly metatarsalgia.     Plan:   * must continue Achilles stretching, ankle range of motion, home exercise program is fine as long as she is diligent on continuing exercises.  * tarsal pad given today to put into shoe. Consider podiatry in future as needed.  * Rest  * Activity modification - avoid activities that aggravate symptoms.  * Ice twice daily to three times daily.  * Immobilization: no immobilization, aggressive range of motion.  * Soup can or tennis ball to help stretch out the plantar fascia  * Muscle relaxant prescription refilled today for occasional leg spasms.  * Elevation of extremity to reduce swelling  * aggressive heel cord stretching  * Tylenol as needed for pain  * Workability update: Continue to work with 8 hour work restrictions, sitting for 20 minutes, every hour.   * return to clinic 8 weeks.         The information in this document, created by a scribe for me, accurately reflects the services I personally performed and the decisions made by me. I have reviewed  and approved this document for accuracy.      Jose Alford M.D., M.S.  Dept. of Orthopaedic Surgery  [facility]    Again, thank you for allowing me to participate in the care of your patient.        Sincerely,        Jose Alford MD

## 2018-09-17 NOTE — TELEPHONE ENCOUNTER
She is on her second course of antibiotic prescribed for this condition.   According to the ER notes from 9/12 - it was improving  No information in the notes from Orthopedics today / but notes state that skin appears normal.   This patient needs to transition to an Internal Medicine  Provider for primary care.  I am not sure what to do from here.  , please contact Mily to help get her an appointment with Internal Medicine. I believe there is now a provider in the [place] office. I think [facility] also has Internal Medicine. Please see if she can get an appointment this week.   Kristen Kehr PA-C

## 2018-09-18 ENCOUNTER — TELEPHONE (OUTPATIENT)
Dept: FAMILY MEDICINE | Facility: CLINIC | Age: 38
End: 2018-09-18

## 2018-09-18 ENCOUNTER — RADIANT APPOINTMENT (OUTPATIENT)
Dept: ULTRASOUND IMAGING | Facility: CLINIC | Age: 38
End: 2018-09-18
Attending: PHYSICIAN ASSISTANT
Payer: COMMERCIAL

## 2018-09-18 ENCOUNTER — OFFICE VISIT (OUTPATIENT)
Dept: FAMILY MEDICINE | Facility: CLINIC | Age: 38
End: 2018-09-18
Payer: COMMERCIAL

## 2018-09-18 VITALS
SYSTOLIC BLOOD PRESSURE: 141 MMHG | TEMPERATURE: 97.7 F | DIASTOLIC BLOOD PRESSURE: 76 MMHG | HEART RATE: 96 BPM | OXYGEN SATURATION: 98 %

## 2018-09-18 DIAGNOSIS — L03.129: Primary | ICD-10-CM

## 2018-09-18 DIAGNOSIS — I80.9 SUPERFICIAL PHLEBITIS: ICD-10-CM

## 2018-09-18 DIAGNOSIS — L03.129: ICD-10-CM

## 2018-09-18 PROCEDURE — 96372 THER/PROPH/DIAG INJ SC/IM: CPT | Performed by: PHYSICIAN ASSISTANT

## 2018-09-18 PROCEDURE — 93971 EXTREMITY STUDY: CPT | Mod: RT

## 2018-09-18 PROCEDURE — 99214 OFFICE O/P EST MOD 30 MIN: CPT | Mod: 25 | Performed by: PHYSICIAN ASSISTANT

## 2018-09-18 RX ORDER — CEFTRIAXONE 1 G/1
1000 INJECTION, POWDER, FOR SOLUTION INTRAMUSCULAR; INTRAVENOUS ONCE
Qty: 10 ML | Refills: 0 | OUTPATIENT
Start: 2018-09-18 | End: 2019-02-06

## 2018-09-18 NOTE — PROGRESS NOTES
SUBJECTIVE:   Mily Grullon is a 38 year old female who presents to clinic today for the following health issues:        Patient c/o of infection from IV site X 2 weeks ago. Patient has redness, pain, burning at site and is slowly spreading. Patient states she has been on X 2 antibiotics just finished last dose yesterday. Did a 7 day course of Keflex, then 7 day course of Doxy. As soon as she is done streak comes back. Has not tried warm packs.    Has appt with IM tomorrow for this.    No leg swelling. No SHORTNESS OF BREATH or CP.  No fever             Allergies   Allergen Reactions     Amitriptyline Hives     Amoxicillin Diarrhea     Asa [Dihydroxyaluminum Aminoacetate]      Cipro [Ciprofloxacin]      Dizziness, vomiting, shortness of breath     Ibuprofen [Aspartame-Ibuprofen]      GI distress       Lyrica      extrematies swell up      Morphine      ithcy     Naproxen      GI distress     Neurontin [Gabapentin]      rash     Paxil [Paroxetine] Anaphylaxis     anaphylaxis     Phenergan [Promethazine Hcl]      dystonia     Prilosec [Omeprazole]      Rash, dizziness     Gabapentin Rash       Past Medical History:   Diagnosis Date     Endometriosis, site unspecified      Gastritis 2010    dx 2010- sees Dr Glover in [facility]     Urinary calculus, unspecified     kidney stones, age 19-20     Wrist injury Nov 19, 2003    Workman's Comp- left wrist- narc agreement on file         Current Outpatient Prescriptions on File Prior to Visit:  acetaminophen (TYLENOL) 325 MG tablet Take 2 tablets (650 mg) by mouth every 4 hours as needed for other (mild pain)   dexamethasone (DECADRON) 1 MG tablet 1 tablet to be taken between 11 pm and 12 am the night prior to cortisol testing / lab appointment.   medroxyPROGESTERone (DEPO-PROVERA) 150 MG/ML injection Inject 1 mL (150 mg) into the muscle every 3 months   nortriptyline (PAMELOR) 50 MG capsule Take by mouth 2 times daily   pantoprazole (PROTONIX) 20 MG EC tablet  Take by mouth 30-60 minutes before a meal.   tiZANidine (ZANAFLEX) 2 MG tablet Take 1 tablet (2 mg) by mouth 3 times daily as needed for muscle spasms   traZODone (DESYREL) 50 MG tablet      No current facility-administered medications on file prior to visit.     Social History   Substance Use Topics     Smoking status: Former Smoker     Types: Cigarettes     Quit date: 2/2/2014     Smokeless tobacco: Never Used     Alcohol use Yes      Comment: rare        ROS:  General: negative for fever  SKIN: + as above      Physcial Exam:  /76  Pulse 96  Temp 97.7  F (36.5  C) (Oral)  SpO2 98%    GENERAL: alert, no acute distress  EYES: conjunctival clear  RESP: Regular breathing rate  NEURO: awake .  SKIN: R proximal volar forearm with red, tender vein, swollen about 1 inch in length but a red streak travels up to elbow.  Vascular- good palpable radial pulse  Study Result   US UPPER EXTREMITY VENOUS DUPLEX RIGHT  9/18/2018 8:25 PM      HISTORY: acute lymphangitis after IV not responding to Keflex and  Doxy.; Acute lymphangitis of forearm     COMPARISON: None.     TECHNIQUE: Examination of the upper extremity veins was performed with  graded compression and 2-D ultrasound and color doppler spectral  waveform analysis.      FINDINGS:  There is thrombus in the right cephalic vein extending from  the elbow to within 5 cm of the wrist. This is a superficial vein.   The veins in the upper arm are negative for DVT         IMPRESSION:  1. Study is positive for thrombus in the cephalic vein in the forearm.  This is a superficial vein.  2. The veins in the upper arm are negative for DVT.       ASSESSMENT:    ICD-10-CM    1. Acute lymphangitis of forearm L03.129 cefTRIAXone (ROCEPHIN) 1 GM vial     INJECTION INTRAMUSCULAR OR SUB-Q     US Extremity Upper Venous rt   2. Superficial phlebitis I80.9          PLAN: As not resolving with 2 antibiotics will get US to rule out deep vein involvement. Venous Doppler US today, hold and  call. IM Rocephin. Keep Internal Med appt for tomorrow  See today's orders.  Follow-up with primary clinic if not improving.  Advised about symptoms which might herald more serious problems.       Addendum: Results as above. Relayed to patient. Warm packs, elevate. Keep appointment with IM tomorrow. ? Clindamycin or Augmentin..  Christianne Baker PA-C

## 2018-09-18 NOTE — TELEPHONE ENCOUNTER
Due to this patient's complex medical history, she has been advised to establish care with an MD or Internal Medicine provider. Kristen Kehr PA-C

## 2018-09-18 NOTE — NURSING NOTE
Rocephin 1G ordered  Ceftiaxone 1G used with 1% lidocaine    given I.M right gluetus  Given @ 3:35   Ceftriaxone lot. [id number] exp. 10/01/2020  1% lidocaine lot.[id number] exp. 09/01/2019  NDC # 4401-5818-63 / 58093-115-80  Patient instructed to wait 20 min incase of reaction.  Hermila Martin CMA

## 2018-09-18 NOTE — MR AVS SNAPSHOT
After Visit Summary   9/18/2018    Mily Grullon    MRN: [id number]           Patient Information     Date Of Birth          1980        Visit Information        Provider Department      9/18/2018 3:00 PM Christianne Baker PA-C [facility]        Today's Diagnoses     Acute lymphangitis of forearm    -  1       Follow-ups after your visit        Your next 10 appointments already scheduled     Sep 18, 2018  7:00 PM CDT   US VENOUS with BEUS1   [facility]    [address]  [place] [address]   [phone]           How do I prepare for my exam? (Food and drink instructions) No Food and Drink Restrictions.  How do I prepare for my exam? (Other instructions) You do not need to do anything special to prepare for your exam.  What should I wear: Wear comfortable clothes.  How long does the exam take: Most ultrasounds take 30 to 60 minutes.  What should I bring: Bring a list of your medicines, including vitamins, minerals and over-the-counter drugs. It is safest to leave personal items at home.  Do I need a :  No  is needed.  What do I need to tell my doctor: Tell your doctor about any allergies you may have.  What should I do after the exam: No restrictions, You may resume normal activities.  What is this test: An ultrasound uses sound waves to make pictures of the body. Sound waves do not cause pain. The only discomfort may be the pressure of the wand against your skin or full bladder.  Who should I call with questions: If you have any questions, please call the Imaging Department where you will have your exam. Directions, parking instructions, and other information is available on our website, [email or web address].            Sep 19, 2018 11:00 AM CDT   SHORT with William Smith MD   [facility] [place] [facility]    [address] [place] [address]   [phone]            Nov 12, 2018  11:00 AM CST   Return Visit with Jose Alford MD   [facility] [place] ([facility] [place])    [address]  [place] [address] [phone]              Future tests that were ordered for you today     Open Future Orders        Priority Expected Expires Ordered    US Extremity Upper Venous rt STAT 9/18/2018 9/18/2018 9/18/2018            Who to contact     If you have questions or need follow up information about today's clinic visit or your schedule please contact [facility] directly at [phone].  Normal or non-critical lab and imaging results will be communicated to you by MyChart, letter or phone within 4 business days after the clinic has received the results. If you do not hear from us within 7 days, please contact the clinic through MyChart or phone. If you have a critical or abnormal lab result, we will notify you by phone as soon as possible.  Submit refill requests through Appknox or call your pharmacy and they will forward the refill request to us. Please allow 3 business days for your refill to be completed.          Additional Information About Your Visit        Care EveryWhere ID     This is your Care EveryWhere ID. This could be used by other organizations to access your [facility] medical records  [id number]        Your Vitals Were     Pulse Temperature Pulse Oximetry             96 97.7  F (36.5  C) (Oral) 98%          Blood Pressure from Last 3 Encounters:   09/18/18 141/76   09/17/18 137/81   09/10/18 136/80    Weight from Last 3 Encounters:   09/17/18 203 lb 12.8 oz (92.4 kg)   09/10/18 206 lb (93.4 kg)   09/04/18 204 lb (92.5 kg)              We Performed the Following     INJECTION INTRAMUSCULAR OR SUB-Q          Today's Medication Changes          These changes are accurate as of 9/18/18  3:29 PM.  If you have any questions, ask your nurse or doctor.               Start taking these medicines.         Dose/Directions    cefTRIAXone 1 GM vial   Commonly known as:  ROCEPHIN   Used for:  Acute lymphangitis of forearm   Started by:  Christianne Baker PA-C        Dose:  1000 mg   Inject 1 g (1,000 mg) into the muscle once for 1 dose   Quantity:  10 mL   Refills:  0            Where to get your medicines      Some of these will need a paper prescription and others can be bought over the counter.  Ask your nurse if you have questions.     You don't need a prescription for these medications     cefTRIAXone 1 GM vial                Primary Care Provider Office Phone # Fax #    Kristen M Kehr, PA-C [phone] [address] [address]        Equal Access to Services     JASSON VIZCARRA : Norma landao Sorandolph, waaxda tyra, qaybta kaalmada nithin, abdifatah fitzgerald . So [facility] [phone].    ATENCIÓN: Si habla español, tiene a lentz disposición servicios gratuitos de asistencia lingüística. Llame al [phone].    We comply with applicable federal civil rights laws and [place] laws. We do not discriminate on the basis of race, color, national origin, age, disability, sex, sexual orientation, or gender identity.            Thank you!     Thank you for choosing [facility]  for your care. Our goal is always to provide you with excellent care. Hearing back from our patients is one way we can continue to improve our services. Please take a few minutes to complete the written survey that you may receive in the mail after your visit with us. Thank you!             Your Updated Medication List - Protect others around you: Learn how to safely use, store and throw away your medicines at www.disposemymeds.org.          This list is accurate as of 9/18/18  3:29 PM.  Always use your most recent med list.                   Brand Name Dispense Instructions for use Diagnosis    acetaminophen 325 MG tablet    TYLENOL    100 tablet    Take 2 tablets (650  mg) by mouth every 4 hours as needed for other (mild pain)    Orthopedic aftercare       cefTRIAXone 1 GM vial    ROCEPHIN    10 mL    Inject 1 g (1,000 mg) into the muscle once for 1 dose    Acute lymphangitis of forearm       dexamethasone 1 MG tablet    DECADRON    1 tablet    1 tablet to be taken between 11 pm and 12 am the night prior to cortisol testing / lab appointment.    Abnormal weight gain       medroxyPROGESTERone 150 MG/ML injection    DEPO-PROVERA    1 mL    Inject 1 mL (150 mg) into the muscle every 3 months    Encounter for surveillance of injectable contraceptive       nortriptyline 50 MG capsule    PAMELOR     Take by mouth 2 times daily    Vertigo, Other fatigue, Weight gain       pantoprazole 20 MG EC tablet    PROTONIX    90 tablet    Take by mouth 30-60 minutes before a meal.        tiZANidine 2 MG tablet    ZANAFLEX    90 tablet    Take 1 tablet (2 mg) by mouth 3 times daily as needed for muscle spasms    Achilles tendinitis of left lower extremity       traZODone 50 MG tablet    DESYREL

## 2018-09-18 NOTE — TELEPHONE ENCOUNTER
Information below is reviewed with  Kristen Kehr, PA-C, Verbal Orders to keep appointment with Dr Smith to determine further treatment if any.  The patient/parent agrees with the plan and verbalized good understanding.    Per protocol, will route encounter to be cosigned by provider for Verbal Orders.  Mariza Rinaldi RN

## 2018-09-19 ENCOUNTER — ANTICOAGULATION THERAPY VISIT (OUTPATIENT)
Dept: NURSING | Facility: CLINIC | Age: 38
End: 2018-09-19
Payer: COMMERCIAL

## 2018-09-19 ENCOUNTER — OFFICE VISIT (OUTPATIENT)
Dept: INTERNAL MEDICINE | Facility: CLINIC | Age: 38
End: 2018-09-19
Payer: COMMERCIAL

## 2018-09-19 ENCOUNTER — HOME INFUSION (PRE-WILLOW HOME INFUSION) (OUTPATIENT)
Dept: PHARMACY | Facility: CLINIC | Age: 38
End: 2018-09-19

## 2018-09-19 VITALS
SYSTOLIC BLOOD PRESSURE: 118 MMHG | DIASTOLIC BLOOD PRESSURE: 78 MMHG | WEIGHT: 204 LBS | RESPIRATION RATE: 16 BRPM | BODY MASS INDEX: 37.54 KG/M2 | TEMPERATURE: 97.8 F | HEIGHT: 62 IN | HEART RATE: 102 BPM

## 2018-09-19 DIAGNOSIS — Z87.891 PERSONAL HISTORY OF NICOTINE DEPENDENCE: ICD-10-CM

## 2018-09-19 DIAGNOSIS — M81.0 SENILE OSTEOPOROSIS: ICD-10-CM

## 2018-09-19 DIAGNOSIS — I74.9 EMBOLISM AND THROMBOSIS (H): ICD-10-CM

## 2018-09-19 DIAGNOSIS — I82.611 ACUTE CEPHALIC VEIN THROMBOSIS, RIGHT: ICD-10-CM

## 2018-09-19 DIAGNOSIS — Z79.01 LONG-TERM (CURRENT) USE OF ANTICOAGULANTS: ICD-10-CM

## 2018-09-19 DIAGNOSIS — L03.123 ACUTE LYMPHANGITIS OF RIGHT UPPER EXTREMITY: Primary | ICD-10-CM

## 2018-09-19 DIAGNOSIS — K29.70 GASTRITIS WITHOUT BLEEDING, UNSPECIFIED CHRONICITY, UNSPECIFIED GASTRITIS TYPE: ICD-10-CM

## 2018-09-19 LAB — INR POINT OF CARE: 1.1 (ref 0.86–1.14)

## 2018-09-19 PROCEDURE — 99207 ZZC NO CHARGE NURSE ONLY: CPT

## 2018-09-19 PROCEDURE — 85610 PROTHROMBIN TIME: CPT | Mod: QW

## 2018-09-19 PROCEDURE — 96372 THER/PROPH/DIAG INJ SC/IM: CPT | Performed by: INTERNAL MEDICINE

## 2018-09-19 PROCEDURE — 99215 OFFICE O/P EST HI 40 MIN: CPT | Mod: 25 | Performed by: INTERNAL MEDICINE

## 2018-09-19 PROCEDURE — 36416 COLLJ CAPILLARY BLOOD SPEC: CPT

## 2018-09-19 RX ORDER — SUCRALFATE 1 G/1
1 TABLET ORAL 4 TIMES DAILY
Qty: 40 TABLET | Refills: 1 | COMMUNITY
Start: 2018-09-19 | End: 2018-09-19

## 2018-09-19 RX ORDER — WARFARIN SODIUM 5 MG/1
5 TABLET ORAL DAILY
Qty: 30 TABLET | Refills: 1 | Status: SHIPPED | OUTPATIENT
Start: 2018-09-19 | End: 2018-11-13

## 2018-09-19 RX ORDER — SUCRALFATE 1 G/1
1 TABLET ORAL 4 TIMES DAILY
Qty: 60 TABLET | Refills: 1 | Status: SHIPPED | OUTPATIENT
Start: 2018-09-19 | End: 2019-05-03

## 2018-09-19 NOTE — MR AVS SNAPSHOT
After Visit Summary   9/19/2018    Mily Grullon    MRN: [id number]           Patient Information     Date Of Birth          1980        Visit Information        Provider Department      9/19/2018 11:00 AM [facility]        Today's Diagnoses     Senile osteoporosis    -  1    Personal history of nicotine dependence        Gastritis without bleeding, unspecified chronicity, unspecified gastritis type        Acute lymphangitis of right upper extremity        Acute cephalic vein thrombosis, right           Follow-ups after your visit        Additional Services     Home infusion referral       Your provider has referred you to: [facility]: [facility] [phone]   [email or web address]    Local Address (if different from home address): same    Anticipated Length of Therapy: 5 days    Home Infusion Pharmacist to adjust therapy based on labs and clinical assessments: Yes    Labs:  May draw labs from Venous Catheter: No  Home Infusion Pharmacist to order labs based on therapy type and clinical assessments: Yes  Call/Fax Lab Results to: [phone] Dr. Smith's care team    Agency Staff to assess nursing needs for Infusion Therapy.    Access Device Management:  IV Access Type: Peripheral IV  Flush with Heparin and Normal Saline IVP PRN and routine site care (per agency protocol) to maintain access device? Yes            INR CLINIC REFERRAL       Your provider has referred you to INR Services.    Please be aware that coverage of these services is subject to the terms and limitations of your health insurance plan.  Call member services at your health plan with any benefit or coverage questions.    Indication for Anticoagulation: Other: superficial thrombosis  If nonstandard INR is desired, indicate goal range and explanation:   Expected Duration of Therapy: Other: 1-2 months                  Follow-up notes from your care  "team     Return if symptoms worsen or fail to improve.      Your next 10 appointments already scheduled     Sep 19, 2018  1:00 PM CDT   Anticoagulation Visit with BE ANTI COAG   [facility] [place] [facility] [place]    [address]  [place] [address]   [phone]            Sep 21, 2018  1:30 PM CDT   Anticoagulation Visit with BE ANTI COAG   [facility] [place] ([facility] [place])    [address]  [place] [address]   [phone]            Nov 12, 2018 11:00 AM CST   Return Visit with Jose Alford MD   [facility] [place] ([facility] [place])    [address]  [place] [address]   [phone]              Future tests that were ordered for you today     Open Future Orders        Priority Expected Expires Ordered    INR Routine  9/19/2019 9/19/2018            Who to contact     Normal or non-critical lab and imaging results will be communicated to you by MyChart, letter or phone within 4 business days after the clinic has received the results. If you do not hear from us within 7 days, please contact the clinic through oboxohart or phone. If you have a critical or abnormal lab result, we will notify you by phone as soon as possible.  Submit refill requests through FatSkunk or call your pharmacy and they will forward the refill request to us. Please allow 3 business days for your refill to be completed.          If you need to speak with a  for additional information , please call: [phone]             Additional Information About Your Visit        Care EveryWhere ID     This is your Care EveryWhere ID. This could be used by other organizations to access your [facility] medical records  [id number]        Your Vitals Were     Pulse Temperature Respirations Height BMI (Body Mass Index)       102 97.8  F (36.6  C) (Tympanic) 16 5' 2\" (1.575 m) 37.31 kg/m2        Blood Pressure from " Last 3 Encounters:   09/19/18 118/78   09/18/18 141/76   09/17/18 137/81    Weight from Last 3 Encounters:   09/19/18 204 lb (92.5 kg)   09/17/18 203 lb 12.8 oz (92.4 kg)   09/10/18 206 lb (93.4 kg)              We Performed the Following     Home infusion referral     INR CLINIC REFERRAL          Today's Medication Changes          These changes are accurate as of 9/19/18 12:47 PM.  If you have any questions, ask your nurse or doctor.               Start taking these medicines.        Dose/Directions    warfarin 5 MG tablet   Commonly known as:  COUMADIN   Used for:  Acute cephalic vein thrombosis, right   Started by:  William Smith MD        Dose:  5 mg   Take 1 tablet (5 mg) by mouth daily Then as directed by INR nurse   Quantity:  30 tablet   Refills:  1         Stop taking these medicines if you haven't already. Please contact your care team if you have questions.     dexamethasone 1 MG tablet   Commonly known as:  DECADRON   Stopped by:  William Smith MD                Where to get your medicines      These medications were sent to [facility] [place]  [place] - [address]  [address] [address]     Phone:  [phone]     sucralfate 1 GM tablet    warfarin 5 MG tablet                Primary Care Provider Office Phone # Fax #    Kristen M Kehr, PA-C [phone] [address] [address]        Equal Access to Services     [facility] AH: Hadii mallika ku hadasho Sofeliali, waaxda luqadaha, qaybta kaalmada adeegyada, waxay jer velasco adekeira sipvey. So [facility] [phone].    ATENCIÓN: Si habla español, tiene a lentz disposición servicios gratuitos de asistencia lingüística. Llame al [phone].    We comply with applicable federal civil rights laws and [place] laws. We do not discriminate on the basis of race, color, national origin, age, disability, sex, sexual orientation, or gender identity.            Thank you!     Thank you for  choosing [facility]  for your care. Our goal is always to provide you with excellent care. Hearing back from our patients is one way we can continue to improve our services. Please take a few minutes to complete the written survey that you may receive in the mail after your visit with us. Thank you!             Your Updated Medication List - Protect others around you: Learn how to safely use, store and throw away your medicines at www.disposemymeds.org.          This list is accurate as of 9/19/18 12:47 PM.  Always use your most recent med list.                   Brand Name Dispense Instructions for use Diagnosis    acetaminophen 325 MG tablet    TYLENOL    100 tablet    Take 2 tablets (650 mg) by mouth every 4 hours as needed for other (mild pain)    Orthopedic aftercare       medroxyPROGESTERone 150 MG/ML injection    DEPO-PROVERA    1 mL    Inject 1 mL (150 mg) into the muscle every 3 months    Encounter for surveillance of injectable contraceptive       nortriptyline 50 MG capsule    PAMELOR     Take 100 mg by mouth At Bedtime    Vertigo, Other fatigue, Weight gain       pantoprazole 20 MG EC tablet    PROTONIX    90 tablet    Take by mouth 30-60 minutes before a meal.        sucralfate 1 GM tablet    CARAFATE    60 tablet    Take 1 tablet (1 g) by mouth 4 times daily    Gastritis without bleeding, unspecified chronicity, unspecified gastritis type       tiZANidine 2 MG tablet    ZANAFLEX    90 tablet    Take 1 tablet (2 mg) by mouth 3 times daily as needed for muscle spasms    Achilles tendinitis of left lower extremity       traZODone 50 MG tablet    DESYREL          warfarin 5 MG tablet    COUMADIN    30 tablet    Take 1 tablet (5 mg) by mouth daily Then as directed by INR nurse    Acute cephalic vein thrombosis, right

## 2018-09-19 NOTE — TELEPHONE ENCOUNTER
Call from US at [place].  Pt has been sent to r/o DVT of upper arm  US Tech noted positive thrombus in cephalic vein but no deep clots, only superficial  Pt called and made aware of the results  Pt told to hot pack the arm and follow-up with internal medicine MD as previously discussed by her MD    Zara Black

## 2018-09-19 NOTE — PROGRESS NOTES
Therapy: IV abx  Insurance: [facility]  Copay per dispense YES    Co-Insurance: 100%    In reference to referral made on 9/19/18 to check IV abx coverage    Please contact Intake with any questions, [phone] or In Basket pool, [facility] ([id number]).

## 2018-09-19 NOTE — PROGRESS NOTES
"  SUBJECTIVE:   Mily Grullon is a 38 year old female who presents to clinic today for the following health issues:      Blood clot in right forearm - cephalic vein.  She attributes this to infection from IV placement prior.  Is having pain and swelling.  Mother especially notices symptomatic improvement since Rocephin injection (IM) yesterday, however.    Patient concerned about workability as she works as [employer] and is right-handed.    Patient reports significant intolerance of all oral antibiotics and history of Clostridium difficile.  She notes she has improved previously with four times daily sucralfate, and PPI treatment.    Patient reports ongoing smoking and gets Depo-Medrol.    1. Acute lymphangitis of right upper extremity    2. Senile osteoporosis    3. Personal history of nicotine dependence    4. Gastritis without bleeding, unspecified chronicity, unspecified gastritis type    5. Acute cephalic vein thrombosis, right        PMH: Updated and/or reviewed in chart.    PSH: Updated and/or reviewed in chart.    Family History: Updated and/or reviewed in chart.     ROS:  Constitutional, neuro, pulmonary, cardiac, gastrointestinal, musculoskeletal, integument and psychiatric systems are otherwise negative.    OBJECTIVE:                                                    /78  Pulse 102  Temp 97.8  F (36.6  C) (Tympanic)  Resp 16  Ht 5' 2\" (1.575 m)  Wt 204 lb (92.5 kg)  BMI 37.31 kg/m2    GEN: No acute distress, smells of tobacco   EYES: No conjunctival injection or icterus, EOMI grossly intact  RESP: Unlabored, regular  NEURO: Normal gait, MAEx4, light touch sensation grossly intact  PSYCH: Normal mood and affect; perseverative on pain, anxious about concerns; redirectable - demonstrates adequate understanding to some concepts  MUSCULOSKELETAL: mild right forearm edema and small erythematous tract along superficial branch of more distal brachiocephalic vein not fully distended and only mildly " tender to palpation        ASSESSMENT/PLAN:                                                    4. Acute lymphangitis of right upper extremity  5. Acute cephalic vein thrombosis, right  Discussed nature of superficial thrombus, relation to infection, and symptomatic management.  She reports a significant response to antibiotic and may have some residual inflammation that will respond to a course of Augmentin or 3rd gen cephalosporin.  She refused oral antibiotic due to intolerance.  We discussed risks, benefits, and alternatives of parenteral antibiotics and my recommend was against; however, given similar benefit and comparable overall risk from non-oral route (higher risk of introduced infection vs fairly reliable GI intolerance), I relented and allowed a completion of a week's course of 1g Rocephin.  Patient agreed with plan and demonstrated understanding to contact us for help if not improving or sooner if worsening or if other questions or concerns arise.  We also discussed temporary anticoagulation as optional and after discussed of risks and benefits agreed to pursue month at a time, probably not to exceed 2-3 months as tolerated.  - CEFTRIAXONE NA INJ /250MG  - INJECTION INTRAMUSCULAR OR SUB-Q  - Home infusion referral  - warfarin (COUMADIN) 5 MG tablet; Take 1 tablet (5 mg) by mouth daily Then as directed by INR nurse  Dispense: 30 tablet; Refill: 1  - INR CLINIC REFERRAL  - INR; Future    2. Personal history of nicotine dependence  Discussed importance of smoking cessation -- patient demurred.    3. Gastritis without bleeding, unspecified chronicity, unspecified gastritis type  Reasonable to continue while on anticoagulation -- discussed GIB symptoms and prophylaxis   - sucralfate (CARAFATE) 1 GM tablet; Take 1 tablet (1 g) by mouth 4 times daily  Dispense: 60 tablet; Refill: 1    1. Senile osteoporosis  Noted - on chronic PPI      Orders Placed This Encounter     CEFTRIAXONE NA INJ /250MG     INJECTION  INTRAMUSCULAR OR SUB-Q     INR     INR CLINIC REFERRAL     Home infusion referral     DISCONTD: sucralfate (CARAFATE) 1 GM tablet     sucralfate (CARAFATE) 1 GM tablet     warfarin (COUMADIN) 5 MG tablet              William Smith MD

## 2018-09-19 NOTE — MR AVS SNAPSHOT
Mily CERDA Yadi   9/19/2018 1:00 PM   Anticoagulation Therapy Visit    Description:  38 year old female   Provider:  SERINA ANTI COAG   Department:  Be Nurse           INR as of 9/19/2018     Today's INR 1.1!      Anticoagulation Summary as of 9/19/2018     INR goal 2.0-3.0   Today's INR 1.1!   Full warfarin instructions 9/19: 7.5 mg; 9/20: 7.5 mg   Next INR check 9/21/2018    Indications   Long-term (current) use of anticoagulants [Z79.01] [Z79.01]  Embolism and thrombosis (H) [I74.9] [I74.9]         Your next Anticoagulation Clinic appointment(s)     Sep 19, 2018  1:00 PM CDT   Anticoagulation Visit with SERINA ANTI COAG   [facility] [place] [facility] ([facility] [place])    [address]  [place] [address] [phone]            Sep 21, 2018  1:30 PM CDT   Anticoagulation Visit with BE ANTI COAG   [place] [facility] ([facility] [place])    [address]  [place] [address]   [phone]              Contact Numbers     [facility]  Please call [phone] with any problems or questions regarding your therapy, or to cancel or reschedule your appointment.          September 2018 Details    Sun Mon Tue Wed Thu Fri Sat           1                 2               3               4               5               6               7               8                 9               10               11               12               13               14               15                 16               17               18               19      7.5 mg   See details      20      7.5 mg         21            22                 23               24               25               26               27               28               29                 30                      Date Details   09/19 This INR check       Date of next INR:  9/21/2018         How to take your warfarin dose     To take:  7.5 mg Take 1.5 of the 5 mg tablets.

## 2018-09-19 NOTE — PROGRESS NOTES
ANTICOAGULATION INITIAL CLINIC VISIT    Patient Name:  Mily Grullon  Date:  9/19/2018  Referred by: Dr. Smith  Contact Type:  Face to Face    SUBJECTIVE:  Coumadin education was completed today.  Topics covered include:  -Introduction to coumadin  -Proper Administration  -INR Testing  -Sign/Symptoms of Bleeding  -Signs/Symptoms of Clot Formation or Stroke  -Dietary Intake of Vitamin K  -Drug Interactions  -Anticoagulation Identification (bracelet, necklace or wallet card)  -Future Surgery  -Effects of Alcohol, Tobacco, and Exercise on Coumadin    Coumadin Education Booklet and Coumadin Identification Wallet Card were given to the patient.       Patient Findings     Positives Initiation of therapy, No Problem Findings          OBJECTIVE    INR Protime   Date Value Ref Range Status   09/19/2018 1.1 0.86 - 1.14 Final       ASSESSMENT / PLAN  INR assessment SUB    Recheck INR In: 2 DAYS    INR Location Clinic      Anticoagulation Summary as of 9/19/2018     INR goal 2.0-3.0   Today's INR 1.1!   Warfarin maintenance plan No maintenance plan   Full warfarin instructions 9/19: 7.5 mg; 9/20: 7.5 mg   Plan last modified Radha Ling (9/19/2018)   Next INR check 9/21/2018   Target end date 11/19/2018    Indications   Long-term (current) use of anticoagulants [Z79.01] [Z79.01]  Embolism and thrombosis (H) [I74.9] [I74.9]         Anticoagulation Episode Summary     INR check location     Preferred lab     Send INR reminders to [facility]    Comments       Anticoagulation Care Providers     Provider Role Specialty Phone number    William Smith MD [facility] Internal Medicine [phone]            See the Encounter Report to view Anticoagulation Flowsheet and Dosing Calendar (Go to Encounters tab in chart review, and find the Anticoagulation Therapy Visit)        RADHA LING

## 2018-09-19 NOTE — NURSING NOTE
The following medication was given:     MEDICATION: Rocephin 1 gram and Lidocaine 2.1cc  ROUTE: IM  SITE: LUQ - Gluteus  DOSE: 1 ml  LOT #: [id number]   :  Kvng  EXPIRATION DATE:  8/1/2020  NDC#: 0907-7715-59  TJ Mixon

## 2018-09-20 ENCOUNTER — TELEPHONE (OUTPATIENT)
Dept: INTERNAL MEDICINE | Facility: CLINIC | Age: 38
End: 2018-09-20

## 2018-09-20 ENCOUNTER — HOME INFUSION (PRE-WILLOW HOME INFUSION) (OUTPATIENT)
Dept: PHARMACY | Facility: CLINIC | Age: 38
End: 2018-09-20

## 2018-09-20 NOTE — PROGRESS NOTES
This is a recent snapshot of the patient's [facility] medical record.  For current drug dose and complete information and questions, call [phone] or In Basket pool, fv home infusion ([id number])  CSN Number:  [id number]

## 2018-09-20 NOTE — TELEPHONE ENCOUNTER
Can be as needed if continues to improve.  May return after 1 month to discuss cessation of warfarin anticoagulation treatment, sooner if questions or concerns otherwise.

## 2018-09-20 NOTE — TELEPHONE ENCOUNTER
Patient is calling would like to know when shes suppose to come back in to see provider for f/u. Please call to advise. Thank you.

## 2018-09-21 ENCOUNTER — ANTICOAGULATION THERAPY VISIT (OUTPATIENT)
Dept: NURSING | Facility: CLINIC | Age: 38
End: 2018-09-21
Payer: COMMERCIAL

## 2018-09-21 ENCOUNTER — OFFICE VISIT (OUTPATIENT)
Dept: INTERNAL MEDICINE | Facility: CLINIC | Age: 38
End: 2018-09-21
Payer: COMMERCIAL

## 2018-09-21 ENCOUNTER — TELEPHONE (OUTPATIENT)
Dept: INTERNAL MEDICINE | Facility: CLINIC | Age: 38
End: 2018-09-21

## 2018-09-21 ENCOUNTER — HOME INFUSION (PRE-WILLOW HOME INFUSION) (OUTPATIENT)
Dept: PHARMACY | Facility: CLINIC | Age: 38
End: 2018-09-21

## 2018-09-21 VITALS — DIASTOLIC BLOOD PRESSURE: 77 MMHG | SYSTOLIC BLOOD PRESSURE: 126 MMHG | HEART RATE: 96 BPM

## 2018-09-21 DIAGNOSIS — L03.123 ACUTE LYMPHANGITIS OF RIGHT UPPER EXTREMITY: Primary | ICD-10-CM

## 2018-09-21 DIAGNOSIS — Z79.01 LONG-TERM (CURRENT) USE OF ANTICOAGULANTS: ICD-10-CM

## 2018-09-21 DIAGNOSIS — I74.9 EMBOLISM AND THROMBOSIS (H): ICD-10-CM

## 2018-09-21 LAB — INR POINT OF CARE: 2 (ref 0.86–1.14)

## 2018-09-21 PROCEDURE — 99207 ZZC NO CHARGE NURSE ONLY: CPT

## 2018-09-21 PROCEDURE — 85610 PROTHROMBIN TIME: CPT | Mod: QW

## 2018-09-21 PROCEDURE — 36416 COLLJ CAPILLARY BLOOD SPEC: CPT

## 2018-09-21 PROCEDURE — 99214 OFFICE O/P EST MOD 30 MIN: CPT | Performed by: INTERNAL MEDICINE

## 2018-09-21 RX ORDER — CEFPODOXIME PROXETIL 100 MG/1
100 TABLET, FILM COATED ORAL 2 TIMES DAILY
Qty: 28 TABLET | Refills: 0 | Status: SHIPPED | OUTPATIENT
Start: 2018-09-21 | End: 2018-09-26

## 2018-09-21 NOTE — PROGRESS NOTES
SUBJECTIVE:   Mily Gurllon is a 38 year old female who presents to clinic today for the following health issues:      Recheck  Feels right-sided vein is more red and swollen.  Has not been able to start Rocephin infusions.  Left after multiple attempts at peripheral IV placement today before ultrasound guidance could be used.  Concerned about options if she cannot tolerate successful IV placement.  No fever, chills, or night sweats, weakness, numbness, tingling.    On exam,  /77  Pulse 96  She is healthy-appearing but quite anxious with mild worsening of erythema from 2 days ago of mid-distal right forearm branch of brachiocephalic vein, mildly tender to palpation without further spreading erythema or edema otherwise.  She demonstrates multiple small ecchymoses at venipuncture/IV attempt sites.    1. Acute lymphangitis of right upper extremity  Worsened despite IM antibiotic 2 days ago.  Advised ultrasound guided IV placement.  We discussed again risks and benefits of any antibiotic and agreed that back up of oral 3rd gen cephalosporin would be reasonable for 1-2 weeks depending on tolerance of any adverse effects and clinical improvement.  If continues to worsen off antibiotics, either needs modification of antibiotic treatment, including longer IV antibiotic course perhaps with ceftriaxone vs amp-sulbactam.  Strongly consider ID consult in that case.  Will benefit from anticoagulation I believe for the month as well, unclear if additional month or two may serve benefit also.  Follow-up 4 weeks to discuss.  - cefpodoxime (VANTIN) 100 MG tablet; Take 1 tablet (100 mg) by mouth 2 times daily  Dispense: 28 tablet; Refill: 0.

## 2018-09-21 NOTE — MR AVS SNAPSHOT
After Visit Summary   9/21/2018    Mily Grullon    MRN: [id number]           Patient Information     Date Of Birth          1980        Visit Information        Provider Department      9/21/2018 2:00 PM William Smith MD [facility]        Today's Diagnoses     Acute lymphangitis of right upper extremity    -  1       Follow-ups after your visit        Follow-up notes from your care team     Return in about 4 weeks (around 10/19/2018) for Follow-up.      Your next 10 appointments already scheduled     Sep 24, 2018 11:45 AM CDT   Anticoagulation Visit with BE ANTI COAG   [facility] [place] ([facility] [place])    [address]  [place] [address]   [phone]            Nov 12, 2018 11:00 AM CST   Return Visit with Jose Alford MD   [facility] [place] ([facility] [place])    [address]  [place] [address]   [phone]              Who to contact     Normal or non-critical lab and imaging results will be communicated to you by MyChart, letter or phone within 4 business days after the clinic has received the results. If you do not hear from us within 7 days, please contact the clinic through MyChart or phone. If you have a critical or abnormal lab result, we will notify you by phone as soon as possible.  Submit refill requests through [employer] or call your pharmacy and they will forward the refill request to us. Please allow 3 business days for your refill to be completed.          If you need to speak with a  for additional information , please call: [phone]             Additional Information About Your Visit        Care EveryWhere ID     This is your Care EveryWhere ID. This could be used by other organizations to access your [facility] medical records  [id number]        Your Vitals Were     Pulse                   96            Blood Pressure from Last 3 Encounters:    09/21/18 126/77   09/19/18 118/78   09/18/18 141/76    Weight from Last 3 Encounters:   09/19/18 204 lb (92.5 kg)   09/17/18 203 lb 12.8 oz (92.4 kg)   09/10/18 206 lb (93.4 kg)              Today, you had the following     No orders found for display         Today's Medication Changes          These changes are accurate as of 9/21/18  2:12 PM.  If you have any questions, ask your nurse or doctor.               Start taking these medicines.        Dose/Directions    cefpodoxime 100 MG tablet   Commonly known as:  VANTIN   Used for:  Acute lymphangitis of right upper extremity   Started by:  William Smith MD        Dose:  100 mg   Take 1 tablet (100 mg) by mouth 2 times daily   Quantity:  28 tablet   Refills:  0            Where to get your medicines      These medications were sent to [facility] - [address]  [address] [address]     Phone:  [phone]     cefpodoxime 100 MG tablet                Primary Care Provider Office Phone # Fax #    Kristen M Kehr, PA-C [phone] [address] [address]        Equal Access to Services     JASSON VIZCARRA AH: Hadii mallika ku hadasho Soomaali, waaxda luqadaha, qaybta kaalmada adeegyada, abdifatah wilkersonin haylisandron susan spivey. So [facility] [phone].    ATENCIÓN: Si habla español, tiene a lentz disposición servicios gratuitos de asistencia lingüística. Llame al [phone].    We comply with applicable federal civil rights laws and [place] laws. We do not discriminate on the basis of race, color, national origin, age, disability, sex, sexual orientation, or gender identity.            Thank you!     Thank you for choosing [facility]  for your care. Our goal is always to provide you with excellent care. Hearing back from our patients is one way we can continue to improve our services. Please take a few minutes to complete the written survey that you may receive in the mail  after your visit with us. Thank you!             Your Updated Medication List - Protect others around you: Learn how to safely use, store and throw away your medicines at www.disposemymeds.org.          This list is accurate as of 9/21/18  2:12 PM.  Always use your most recent med list.                   Brand Name Dispense Instructions for use Diagnosis    acetaminophen 325 MG tablet    TYLENOL    100 tablet    Take 2 tablets (650 mg) by mouth every 4 hours as needed for other (mild pain)    Orthopedic aftercare       cefpodoxime 100 MG tablet    VANTIN    28 tablet    Take 1 tablet (100 mg) by mouth 2 times daily    Acute lymphangitis of right upper extremity       medroxyPROGESTERone 150 MG/ML injection    DEPO-PROVERA    1 mL    Inject 1 mL (150 mg) into the muscle every 3 months    Encounter for surveillance of injectable contraceptive       nortriptyline 50 MG capsule    PAMELOR     Take 100 mg by mouth At Bedtime    Vertigo, Other fatigue, Weight gain       pantoprazole 20 MG EC tablet    PROTONIX    90 tablet    Take by mouth 30-60 minutes before a meal.        sucralfate 1 GM tablet    CARAFATE    60 tablet    Take 1 tablet (1 g) by mouth 4 times daily    Gastritis without bleeding, unspecified chronicity, unspecified gastritis type       tiZANidine 2 MG tablet    ZANAFLEX    90 tablet    Take 1 tablet (2 mg) by mouth 3 times daily as needed for muscle spasms    Achilles tendinitis of left lower extremity       traZODone 50 MG tablet    DESYREL          warfarin 5 MG tablet    COUMADIN    30 tablet    Take 1 tablet (5 mg) by mouth daily Then as directed by INR nurse    Acute cephalic vein thrombosis, right

## 2018-09-21 NOTE — TELEPHONE ENCOUNTER
Left message on voice mail for patient to call clinic or can give information at INR appt, left appt time reminder for patient. [phone].  Radha Hardy RN

## 2018-09-21 NOTE — PROGRESS NOTES
ANTICOAGULATION FOLLOW-UP CLINIC VISIT    Patient Name:  Mily Grullon  Date:  9/21/2018  Contact Type:  Face to Face    SUBJECTIVE:     Patient Findings     Positives Initiation of therapy    Comments NR 2.0 after two days of 7.5 mg of coumadin.  Will have patient take 2.5 mg every day until Monday when she comes for recheck.           OBJECTIVE    INR Protime   Date Value Ref Range Status   09/21/2018 2.0 (A) 0.86 - 1.14 Final       ASSESSMENT / PLAN  INR assessment THER    Recheck INR In: 3 DAYS    INR Location Clinic      Anticoagulation Summary as of 9/21/2018     INR goal 2.0-3.0   Today's INR 2.0   Warfarin maintenance plan No maintenance plan   Full warfarin instructions 9/21: 2.5 mg; 9/22: 2.5 mg; 9/23: 2.5 mg   Plan last modified Radha Hardy (9/19/2018)   Next INR check 9/24/2018   Target end date 11/19/2018    Indications   Long-term (current) use of anticoagulants [Z79.01] [Z79.01]  Embolism and thrombosis (H) [I74.9] [I74.9]         Anticoagulation Episode Summary     INR check location     Preferred lab     Send INR reminders to [facility]    Comments       Anticoagulation Care Providers     Provider Role Specialty Phone number    William Smith MD [facility] Internal Medicine [phone]            See the Encounter Report to view Anticoagulation Flowsheet and Dosing Calendar (Go to Encounters tab in chart review, and find the Anticoagulation Therapy Visit)        Sara Davis RN

## 2018-09-21 NOTE — PROGRESS NOTES
This is a recent snapshot of the patient's [facility] medical record.  For current drug dose and complete information and questions, call [phone] or In Basket pool, fv home infusion ([id number])  CSN Number:  [id number]

## 2018-09-21 NOTE — MR AVS SNAPSHOT
Mily PRASHANT Grullon   9/21/2018 1:30 PM   Anticoagulation Therapy Visit    Description:  38 year old female   Provider:  SERINA ANTI COAG   Department:  Be Nurse           INR as of 9/21/2018     Today's INR 2.0      Anticoagulation Summary as of 9/21/2018     INR goal 2.0-3.0   Today's INR 2.0   Full warfarin instructions 9/21: 2.5 mg; 9/22: 2.5 mg; 9/23: 2.5 mg   Next INR check 9/24/2018    Indications   Long-term (current) use of anticoagulants [Z79.01] [Z79.01]  Embolism and thrombosis (H) [I74.9] [I74.9]         Your next Anticoagulation Clinic appointment(s)     Sep 24, 2018 11:45 AM CDT   Anticoagulation Visit with BE ANTI COAG   [facility] [place] [facility]    [address]  [place] [address] [phone]              Contact Numbers     [facility]  Please call [phone] with any problems or questions regarding your therapy, or to cancel or reschedule your appointment.          September 2018 Details    Sun Mon Tue Wed Thu Fri Sat           1                 2               3               4               5               6               7               8                 9               10               11               12               13               14               15                 16               17               18               19               20               21      2.5 mg   See details      22      2.5 mg           23      2.5 mg         24            25               26               27               28               29                 30                      Date Details   09/21 This INR check       Date of next INR:  9/24/2018         How to take your warfarin dose     To take:  2.5 mg Take 0.5 of a 5 mg tablet.

## 2018-09-24 ENCOUNTER — TELEPHONE (OUTPATIENT)
Dept: INTERNAL MEDICINE | Facility: CLINIC | Age: 38
End: 2018-09-24

## 2018-09-24 ENCOUNTER — HOME INFUSION (PRE-WILLOW HOME INFUSION) (OUTPATIENT)
Dept: PHARMACY | Facility: CLINIC | Age: 38
End: 2018-09-24

## 2018-09-24 ENCOUNTER — ANTICOAGULATION THERAPY VISIT (OUTPATIENT)
Dept: NURSING | Facility: CLINIC | Age: 38
End: 2018-09-24
Payer: COMMERCIAL

## 2018-09-24 LAB — INR POINT OF CARE: 1.4 (ref 0.86–1.14)

## 2018-09-24 PROCEDURE — 99207 ZZC NO CHARGE NURSE ONLY: CPT

## 2018-09-24 PROCEDURE — 85610 PROTHROMBIN TIME: CPT | Mod: QW

## 2018-09-24 PROCEDURE — 36416 COLLJ CAPILLARY BLOOD SPEC: CPT

## 2018-09-24 NOTE — PROGRESS NOTES
This is a recent snapshot of the patient's [facility] medical record.  For current drug dose and complete information and questions, call [phone] or In Basket pool, fv home infusion ([id number])  CSN Number:  [id number]

## 2018-09-24 NOTE — TELEPHONE ENCOUNTER
Was able to catch pt on her way out of the INR visit and the INR RN had already assessed pt and was sending to the ER. Pt stated she was gong to have her father bring her in.

## 2018-09-24 NOTE — PROGRESS NOTES
ANTICOAGULATION FOLLOW-UP CLINIC VISIT    Patient Name:  Mily Grullon  Date:  9/24/2018  Contact Type:  Face to Face    SUBJECTIVE:     Patient Findings     Positives Other complaints, Inflammation, Antibiotic use or infection    Comments Pt seen by Dr. Smith on 9/21 after INR visit. Diagnosed with Lymphangitis in her right forearm from previous venipuncture. Has area circled for redness. Redness has now extended significantly past area circled. Pt stating she is extremely tired, constantly thirsty. Checked tympanic temperature at INR appointment and was 99.4. Pt stating she is not feeling any better and her antibiotic is nearly complete. Advised patient based on her symptoms to go to ER immediately. Pt asking if Dr. Smith can just see her and we do the therapy here in clinic. Advised patient that we do not have the supplies and capabilities to perform these tasks in clinic. Would likely need U/S of forearm to ensure her clot is not growing/moving. Was given 2 weeks of IV antibiotics to infuse into her PICC line. Given new dosing instructions with change for today and Tuesday to increase to 7.5 mg and take 5 mg on Wednesday and to see us in INR Clinic on Thursday. Patient verbalized understanding and agrees with plan. F/U INR made. Pt was instructed to F/U with Dr. Smith on 10/19 however no appt made.     Sending this to Dr. Smith for an FYI. Will route to RN pool to follow up with patient in the next day or so.    Yasemin Page RN on 9/24/2018 at 12:11 PM             OBJECTIVE    INR Protime   Date Value Ref Range Status   09/24/2018 1.4 (A) 0.86 - 1.14 Final       ASSESSMENT / PLAN  INR assessment SUB    Recheck INR In: 4 DAYS    INR Location Clinic      Anticoagulation Summary as of 9/24/2018     INR goal 2.0-3.0   Today's INR 1.4!   Warfarin maintenance plan No maintenance plan   Full warfarin instructions 9/24: 7.5 mg; 9/25: 7.5 mg; 9/26: 5 mg   Plan last modified Radha Hardy (9/19/2018)   Next INR  check 9/27/2018   Target end date 11/19/2018    Indications   Long-term (current) use of anticoagulants [Z79.01] [Z79.01]  Embolism and thrombosis (H) [I74.9] [I74.9]         Anticoagulation Episode Summary     INR check location     Preferred lab     Send INR reminders to [facility]    Comments       Anticoagulation Care Providers     Provider Role Specialty Phone number    William Smith MD [facility] Internal Medicine [phone]            See the Encounter Report to view Anticoagulation Flowsheet and Dosing Calendar (Go to Encounters tab in chart review, and find the Anticoagulation Therapy Visit)    Dosage adjustment made based on physician directed care plan. See above for full details of appt. Advised patient to go to ER immediately for further evaluation and management of clot/lymphadenitis. Patient verbalized understanding and agrees with plan.       Yasemin Page RN

## 2018-09-24 NOTE — TELEPHONE ENCOUNTER
Pharmacist is calling concerned, patient status on RX: cefpodoxime (VANTIN) 100 MG tablet, stating patient is reporting ho sweats, and infection is not looking any better. Please call to discuss. Thank you.

## 2018-09-24 NOTE — TELEPHONE ENCOUNTER
Spoke with Pharmacist and notified her that Dr. Smith did see pt last Friday and ordered a new medication. Pharmacist said she talked to pt today and pt does not feel that she is getting better. Pt is coming in for INR today, will talk with pt and triage in clinic if she needs an appt or needs to go to ER today.     Pharmacy also stated she is on an IV script on 9/19 but I do not see that on the med list as ordered.   Pharmacist said they got a phone order on ceftriaxone for 5 doses IV but all I see on current list is-   cefTRIAXone (ROCEPHIN) 1 GM vial 10 mL 0 9/18/2018 9/18/2018 --     Sig - Route: Inject 1 g (1,000 mg) into the muscle once for 1 dose - Intramuscular

## 2018-09-24 NOTE — MR AVS SNAPSHOT
Mily PRASHANT Grullon   9/24/2018 11:45 AM   Anticoagulation Therapy Visit    Description:  38 year old female   Provider:  [facility]   Department:  Be Nurse           INR as of 9/24/2018     Today's INR 1.4!      Anticoagulation Summary as of 9/24/2018     INR goal 2.0-3.0   Today's INR 1.4!   Full warfarin instructions 9/24: 7.5 mg; 9/25: 7.5 mg; 9/26: 5 mg   Next INR check 9/27/2018    Indications   Long-term (current) use of anticoagulants [Z79.01] [Z79.01]  Embolism and thrombosis (H) [I74.9] [I74.9]         Your next Anticoagulation Clinic appointment(s)     Sep 27, 2018 11:30 AM CDT   Anticoagulation Visit with BE ANTI COAG   [facility] [place] ([facility])    [address]  [place] [address] [phone]              Contact Numbers     [facility]  Please call [phone] with any problems or questions regarding your therapy, or to cancel or reschedule your appointment.          September 2018 Details    Sun Mon Tue Wed Thu Fri Sat           1                 2               3               4               5               6               7               8                 9               10               11               12               13               14               15                 16               17               18               19               20               21               22                 23               24      7.5 mg   See details      25      7.5 mg         26      5 mg         27            28               29                 30                      Date Details   09/24 This INR check       Date of next INR:  9/27/2018         How to take your warfarin dose     To take:  5 mg Take 1 of the 5 mg tablets.    To take:  7.5 mg Take 1.5 of the 5 mg tablets.

## 2018-09-25 NOTE — PROGRESS NOTES
This is a recent snapshot of the patient's [facility] medical record.  For current drug dose and complete information and questions, call [phone] or In [facility] pool, fv home infusion ([id number])  CSN Number:  [id number]

## 2018-09-26 ENCOUNTER — OFFICE VISIT (OUTPATIENT)
Dept: INTERNAL MEDICINE | Facility: CLINIC | Age: 38
End: 2018-09-26
Payer: COMMERCIAL

## 2018-09-26 VITALS — SYSTOLIC BLOOD PRESSURE: 126 MMHG | HEART RATE: 102 BPM | DIASTOLIC BLOOD PRESSURE: 84 MMHG

## 2018-09-26 DIAGNOSIS — Z79.01 LONG-TERM (CURRENT) USE OF ANTICOAGULANTS: ICD-10-CM

## 2018-09-26 DIAGNOSIS — M25.522 LEFT ELBOW PAIN: Primary | ICD-10-CM

## 2018-09-26 DIAGNOSIS — F43.22 ADJUSTMENT DISORDER WITH ANXIOUS MOOD: ICD-10-CM

## 2018-09-26 PROCEDURE — 96372 THER/PROPH/DIAG INJ SC/IM: CPT | Performed by: INTERNAL MEDICINE

## 2018-09-26 PROCEDURE — 99214 OFFICE O/P EST MOD 30 MIN: CPT | Mod: 25 | Performed by: INTERNAL MEDICINE

## 2018-09-26 NOTE — MR AVS SNAPSHOT
After Visit Summary   9/26/2018    Mily Grullon    MRN: [id number]           Patient Information     Date Of Birth          1980        Visit Information        Provider Department      9/26/2018 3:30 PM William Smith MD [facility] [place]         Follow-ups after your visit        Your next 10 appointments already scheduled     Sep 27, 2018 11:30 AM CDT   Anticoagulation Visit with BE ANTI COAG   [facility] [place] ([facility] [place])    [address]  [place] [address]   [phone]            Nov 12, 2018 11:00 AM CST   Return Visit with Jose Alford MD   [facility] [place] ([facility] [place])    [address]  [address]  [place] [address]   [phone]              Who to contact     Normal or non-critical lab and imaging results will be communicated to you by MyChart, letter or phone within 4 business days after the clinic has received the results. If you do not hear from us within 7 days, please contact the clinic through MyChart or phone. If you have a critical or abnormal lab result, we will notify you by phone as soon as possible.  Submit refill requests through [employer] or call your pharmacy and they will forward the refill request to us. Please allow 3 business days for your refill to be completed.          If you need to speak with a  for additional information , please call: [phone]             Additional Information About Your Visit        Care EveryWhere ID     This is your Care EveryWhere ID. This could be used by other organizations to access your [facility] medical records  [id number]        Your Vitals Were     Pulse                   102            Blood Pressure from Last 3 Encounters:   09/26/18 126/84   09/21/18 126/77   09/19/18 118/78    Weight from Last 3 Encounters:   09/26/18 (P) 204 lb (92.5 kg)   09/19/18 204 lb (92.5 kg)   09/17/18 203 lb 12.8 oz (92.4 kg)               Today, you had the following     No orders found for display         Today's Medication Changes          These changes are accurate as of 9/26/18  4:20 PM.  If you have any questions, ask your nurse or doctor.               Stop taking these medicines if you haven't already. Please contact your care team if you have questions.     cefpodoxime 100 MG tablet   Commonly known as:  VANTIN                    Primary Care Provider Office Phone # Fax #    Kristen M Kehr, PA-C [phone] [address] [address]        Equal Access to Services     [facility]: Hadii aad ku hadasho Soomaali, waaxda luqadaha, qaybta kaalmada adeegyada, waxliban fitzgerald . So [facility] [phone].    ATENCIÓN: Si habla español, tiene a lentz disposición servicios gratuitos de asistencia lingüística. [facility] [phone].    We comply with applicable federal civil rights laws and [place] laws. We do not discriminate on the basis of race, color, national origin, age, disability, sex, sexual orientation, or gender identity.            Thank you!     Thank you for choosing [facility]  for your care. Our goal is always to provide you with excellent care. Hearing back from our patients is one way we can continue to improve our services. Please take a few minutes to complete the written survey that you may receive in the mail after your visit with us. Thank you!             Your Updated Medication List - Protect others around you: Learn how to safely use, store and throw away your medicines at www.disposemymeds.org.          This list is accurate as of 9/26/18  4:20 PM.  Always use your most recent med list.                   Brand Name Dispense Instructions for use Diagnosis    acetaminophen 325 MG tablet    TYLENOL    100 tablet    Take 2 tablets (650 mg) by mouth every 4 hours as needed for other (mild pain)    Orthopedic aftercare       medroxyPROGESTERone 150 MG/ML  injection    DEPO-PROVERA    1 mL    Inject 1 mL (150 mg) into the muscle every 3 months    Encounter for surveillance of injectable contraceptive       nortriptyline 50 MG capsule    PAMELOR     Take 100 mg by mouth At Bedtime    Vertigo, Other fatigue, Weight gain       pantoprazole 20 MG EC tablet    PROTONIX    90 tablet    Take by mouth 30-60 minutes before a meal.        sucralfate 1 GM tablet    CARAFATE    60 tablet    Take 1 tablet (1 g) by mouth 4 times daily    Gastritis without bleeding, unspecified chronicity, unspecified gastritis type       tiZANidine 2 MG tablet    ZANAFLEX    90 tablet    Take 1 tablet (2 mg) by mouth 3 times daily as needed for muscle spasms    Achilles tendinitis of left lower extremity       traZODone 50 MG tablet    DESYREL          warfarin 5 MG tablet    COUMADIN    30 tablet    Take 1 tablet (5 mg) by mouth daily Then as directed by INR nurse    Acute cephalic vein thrombosis, right

## 2018-09-26 NOTE — PROGRESS NOTES
SUBJECTIVE:   Mily Grullon is a 38 year old female who presents to clinic today for the following health issues:      ED/UC Followup:    Facility:  [facility]  Date of visit: 9/24/2018  Reason for visit: DVT  Current Status: pain in arm increasing     Patient remains very concerned about pain in right arm and very minor erythematous changes to elbow side of arm.  She notes no new pain near superficial clot and prior IV site.  She tolerated antibiotic therapy well.  She is concerned about pain with movement of her right arm and work and would like another work note for limited hours and duties until heals further.    1. Left elbow pain    2. Adjustment disorder with anxious mood        PMH: Updated and/or reviewed in chart.    ROS:  Constitutional, skin, neuro, psych, cardiovascular, pulmonary, systems are otherwise negative.    OBJECTIVE:                                                    /84  Pulse 102  Wt (P) 204 lb (92.5 kg)  BMI (P) 37.31 kg/m2    SKIN: evolving superficial thrombophlebitis right brachiocephalic branch, slightly darker in color with less intense erythema, mild contact dermatitis posterior right proximal forearm and distal arm  RESP: unlabored, regular  PSYCH: very anxious, perseverative but visibly calmer and reassured after repeated attempted explanations     ASSESSMENT/PLAN:                                                    1. Left elbow pain, arm vein clot, anticoagulation concerns  Discussed lower likelihood of dangerous spread of clot or development of DVT given warfarin anticoagulation therapy.  Last INR was low but 2.0 prior.  We discussed symptoms of DVT/PE and when to follow-up.  Discussed avoiding opioid medications and the risks, benefits, and alternatives or PO vs IM non-steroidal anti-inflammatory drugs.  Patient to continue sucralfate while using non-steroidal anti-inflammatory drug for PUD prophylaxis.  - KETOROLAC (TORADOL) 15 MG  - INJECTION INTRAMUSCULAR OR SUB-Q    2.  Adjustment disorder with anxious mood  Contributing significantly to repeated concerns.  Could consider more significant anxiety medication but patient is higher risk given lower insight and I have impulse control concerns with her.         Orders Placed This Encounter     KETOROLAC (TORADOL) 15 MG     INJECTION INTRAMUSCULAR OR SUB-Q              William Smith MD

## 2018-09-26 NOTE — NURSING NOTE
The following medication was given:     MEDICATION: Toradol 30 mg  ROUTE: IM  SITE: [facility]  DOSE: 1 ml  LOT #: [id number]  :  [employer]  EXPIRATION DATE:  11/2019  NDC#: 86301-194-85  Provider: William Mixon CMA

## 2018-09-26 NOTE — LETTER
September 26, 2018      Mily Grullon  [address] [address]        To Whom It May Concern:    Mily Grullon was seen in our clinic. She may return to work with the following: limited to 4 hour workday and make reasonable accommodations for her to reduce the use of her right arm until 10/17/2018 or as tolerated.      Sincerely,        William Smith MD

## 2018-09-27 ENCOUNTER — ANTICOAGULATION THERAPY VISIT (OUTPATIENT)
Dept: NURSING | Facility: CLINIC | Age: 38
End: 2018-09-27
Payer: COMMERCIAL

## 2018-09-27 ENCOUNTER — TELEPHONE (OUTPATIENT)
Dept: FAMILY MEDICINE | Facility: CLINIC | Age: 38
End: 2018-09-27

## 2018-09-27 ENCOUNTER — OFFICE VISIT (OUTPATIENT)
Dept: FAMILY MEDICINE | Facility: CLINIC | Age: 38
End: 2018-09-27
Payer: COMMERCIAL

## 2018-09-27 ENCOUNTER — RADIANT APPOINTMENT (OUTPATIENT)
Dept: ULTRASOUND IMAGING | Facility: CLINIC | Age: 38
End: 2018-09-27
Attending: PHYSICIAN ASSISTANT
Payer: COMMERCIAL

## 2018-09-27 VITALS
DIASTOLIC BLOOD PRESSURE: 84 MMHG | OXYGEN SATURATION: 96 % | BODY MASS INDEX: 37.31 KG/M2 | HEART RATE: 115 BPM | SYSTOLIC BLOOD PRESSURE: 148 MMHG | TEMPERATURE: 98.7 F | WEIGHT: 204 LBS | RESPIRATION RATE: 20 BRPM

## 2018-09-27 DIAGNOSIS — M79.621 PAIN OF RIGHT UPPER ARM: Primary | ICD-10-CM

## 2018-09-27 DIAGNOSIS — M79.621 PAIN OF RIGHT UPPER ARM: ICD-10-CM

## 2018-09-27 DIAGNOSIS — Z79.01 LONG-TERM (CURRENT) USE OF ANTICOAGULANTS: ICD-10-CM

## 2018-09-27 DIAGNOSIS — K21.9 GASTROESOPHAGEAL REFLUX DISEASE, ESOPHAGITIS PRESENCE NOT SPECIFIED: Primary | ICD-10-CM

## 2018-09-27 DIAGNOSIS — I80.9 SUPERFICIAL PHLEBITIS: ICD-10-CM

## 2018-09-27 DIAGNOSIS — I74.9 EMBOLISM AND THROMBOSIS (H): ICD-10-CM

## 2018-09-27 LAB — INR POINT OF CARE: 3 (ref 0.86–1.14)

## 2018-09-27 PROCEDURE — 93971 EXTREMITY STUDY: CPT | Mod: RT

## 2018-09-27 PROCEDURE — 99214 OFFICE O/P EST MOD 30 MIN: CPT | Performed by: PHYSICIAN ASSISTANT

## 2018-09-27 PROCEDURE — 36416 COLLJ CAPILLARY BLOOD SPEC: CPT

## 2018-09-27 PROCEDURE — 99207 ZZC NO CHARGE NURSE ONLY: CPT

## 2018-09-27 PROCEDURE — 85610 PROTHROMBIN TIME: CPT | Mod: QW

## 2018-09-27 ASSESSMENT — PAIN SCALES - GENERAL: PAINLEVEL: SEVERE PAIN (7)

## 2018-09-27 NOTE — TELEPHONE ENCOUNTER
JACOB ONLY  Spoke with patient and she reports she is having continued right arm pain and more redness to the arm.  She was seen yesterday, discussed needs to be seen again to evaluate, since complains of worsening symptoms  No openings at BE, Dr. Smith not in office today, patient declined UC/ER asked if can be seen at AN?  appt scheduled, she will keep her 11:30 INR appt, if she is running late RN instructed her to call and she can be worked into INR at later opening.  Patient voiced understanding and agreement.  Radha Hardy RN

## 2018-09-27 NOTE — MR AVS SNAPSHOT
After Visit Summary   9/27/2018    Mily Grullon    MRN: [id number]           Patient Information     Date Of Birth          1980        Visit Information        Provider Department      9/27/2018 10:20 AM Jose Shelton PA-C [facility]        Today's Diagnoses     Pain of right upper arm    -  1    Superficial phlebitis           Follow-ups after your visit        Your next 10 appointments already scheduled     Oct 04, 2018 11:30 AM CDT   Anticoagulation Visit with BE ANTI COAG   [facility] [place] ([facility])    [address]  [place] [address] [phone]            Nov 12, 2018 11:00 AM CST   Return Visit with Jose Alford MD   [facility] [place] ([facility] [place])    [address]  [address]  [place] [address] [phone]              Who to contact     If you have questions or need follow up information about today's clinic visit or your schedule please contact [facility] directly at [phone].  Normal or non-critical lab and imaging results will be communicated to you by MyChart, letter or phone within 4 business days after the clinic has received the results. If you do not hear from us within 7 days, please contact the clinic through MyChart or phone. If you have a critical or abnormal lab result, we will notify you by phone as soon as possible.  Submit refill requests through [employer] or call your pharmacy and they will forward the refill request to us. Please allow 3 business days for your refill to be completed.          Additional Information About Your Visit        Care EveryWhere ID     This is your Care EveryWhere ID. This could be used by other organizations to access your [facility] medical records  [id number]        Your Vitals Were     Pulse Temperature Respirations Pulse Oximetry BMI (Body Mass Index)       115 98.7  F (37.1  C) (Oral) 20 96%  37.31 kg/m2        Blood Pressure from Last 3 Encounters:   09/27/18 (!) 148/100   09/26/18 126/84   09/21/18 126/77    Weight from Last 3 Encounters:   09/27/18 204 lb (92.5 kg)   09/26/18 (P) 204 lb (92.5 kg)   09/19/18 204 lb (92.5 kg)                 Today's Medication Changes          These changes are accurate as of 9/27/18  2:56 PM.  If you have any questions, ask your nurse or doctor.               Start taking these medicines.        Dose/Directions    acetaminophen-codeine 300-30 MG per tablet   Commonly known as:  TYLENOL #3   Used for:  Pain of right upper arm   Started by:  Jose Shelton PA-C        Dose:  1 tablet   Take 1 tablet by mouth every 6 hours as needed for severe pain   Quantity:  10 tablet   Refills:  0            Where to get your medicines      Some of these will need a paper prescription and others can be bought over the counter.  Ask your nurse if you have questions.     Bring a paper prescription for each of these medications     acetaminophen-codeine 300-30 MG per tablet               Information about OPIOIDS     PRESCRIPTION OPIOIDS: WHAT YOU NEED TO KNOW   We gave you an opioid (narcotic) pain medicine. It is important to manage your pain, but opioids are not always the best choice. You should first try all the other options your care team gave you. Take this medicine for as short a time (and as few doses) as possible.    Some activities can increase your pain, such as bandage changes or therapy sessions. It may help to take your pain medicine 30 to 60 minutes before these activities. Reduce your stress by getting enough sleep, working on hobbies you enjoy and practicing relaxation or meditation. Talk to your care team about ways to manage your pain beyond prescription opioids.    These medicines have risks:    DO NOT drive when on new or higher doses of pain medicine. These medicines can affect your alertness and reaction times, and you could be arrested for driving under  the influence (DUI). If you need to use opioids long-term, talk to your care team about driving.    DO NOT operate heavy machinery    DO NOT do any other dangerous activities while taking these medicines.    DO NOT drink any alcohol while taking these medicines.     If the opioid prescribed includes acetaminophen, DO NOT take with any other medicines that contain acetaminophen. Read all labels carefully. Look for the word  acetaminophen  or  Tylenol.  Ask your pharmacist if you have questions or are unsure.    You can get addicted to pain medicines, especially if you have a history of addiction (chemical, alcohol or substance dependence). Talk to your care team about ways to reduce this risk.    All opioids tend to cause constipation. Drink plenty of water and eat foods that have a lot of fiber, such as fruits, vegetables, prune juice, apple juice and high-fiber cereal. Take a laxative (Miralax, milk of magnesia, Colace, Senna) if you don t move your bowels at least every other day. Other side effects include upset stomach, sleepiness, dizziness, throwing up, tolerance (needing more of the medicine to have the same effect), physical dependence and slowed breathing.    Store your pills in a secure place, locked if possible. We will not replace any lost or stolen medicine. If you don t finish your medicine, please throw away (dispose) as directed by your pharmacist. The [place] Pollution Control Agency has more information about safe disposal: https://[email or web address].[email or web address].[email or web address].us/living-green/managing-unwanted-medications         Primary Care Provider Office Phone # Fax #    Kristen M Kehr, PA-C [phone] [address] [address]        Equal Access to Services     [facility]: Hadii aad hugh hadhernán Sorandolph, waaxda luqadaha, qaybta kaalmadeepa weller, abdifatah spivey. So [facility] [phone].    ATENCIÓN: Si habla español, tiene a lentz disposición servicios gratuitos  de asistencia lingüística. Rosa randall [phone].    We comply with applicable federal civil rights laws and [place] laws. We do not discriminate on the basis of race, color, national origin, age, disability, sex, sexual orientation, or gender identity.            Thank you!     Thank you for choosing [facility]  for your [facility]. Our goal is always to provide you with excellent care. Hearing back from our patients is one way we can continue to improve our services. Please take a few minutes to complete the written survey that you may receive in the mail after your visit with us. Thank you!             Your Updated Medication List - Protect others around you: Learn how to safely use, store and throw away your medicines at www.disposemymeds.org.          This list is accurate as of 9/27/18  2:56 PM.  Always use your most recent med list.                   Brand Name Dispense Instructions for use Diagnosis    acetaminophen 325 MG tablet    TYLENOL    100 tablet    Take 2 tablets (650 mg) by mouth every 4 hours as needed for other (mild pain)    Orthopedic aftercare       acetaminophen-codeine 300-30 MG per tablet    TYLENOL #3    10 tablet    Take 1 tablet by mouth every 6 hours as needed for severe pain    Pain of right upper arm       medroxyPROGESTERone 150 MG/ML injection    DEPO-PROVERA    1 mL    Inject 1 mL (150 mg) into the muscle every 3 months    Encounter for surveillance of injectable contraceptive       nortriptyline 50 MG capsule    PAMELOR     Take 100 mg by mouth At Bedtime    Vertigo, Other fatigue, Weight gain       pantoprazole 20 MG EC tablet    PROTONIX    90 tablet    Take by mouth 30-60 minutes before a meal.        sucralfate 1 GM tablet    CARAFATE    60 tablet    Take 1 tablet (1 g) by mouth 4 times daily    Gastritis without bleeding, unspecified chronicity, unspecified gastritis type       tiZANidine 2 MG tablet    ZANAFLEX    90 tablet    Take 1 tablet (2 mg) by mouth 3 times  daily as needed for muscle spasms    Achilles tendinitis of left lower extremity       traZODone 50 MG tablet    DESYREL          warfarin 5 MG tablet    COUMADIN    30 tablet    Take 1 tablet (5 mg) by mouth daily Then as directed by INR nurse    Acute cephalic vein thrombosis, right

## 2018-09-27 NOTE — NURSING NOTE
"Chief Complaint   Patient presents with     Deep Vein Thrombosis     right arm       Initial BP (!) 148/100  Pulse 115  Temp 98.7  F (37.1  C) (Oral)  Resp 20  Wt 204 lb (92.5 kg)  SpO2 96%  BMI 37.31 kg/m2 Estimated body mass index is 37.31 kg/(m^2) as calculated from the following:    Height as of 9/19/18: 5' 2\" (1.575 m).    Weight as of this encounter: 204 lb (92.5 kg).  Medication Reconciliation: complete    ABIMAEL Lyle MA    "

## 2018-09-27 NOTE — PROGRESS NOTES
ANTICOAGULATION FOLLOW-UP CLINIC VISIT    Patient Name:  Mily Grullon  Date:  9/27/2018  Contact Type:  Face to Face    SUBJECTIVE:     Patient Findings     Positives Other complaints, Inflammation    Comments Patient went to [facility] ER as instructed at last INR, per patient sepsis protocol was initiated and was given IV fluids via rapid infuser. Pt saw Dr. Smith yesterday and went to Urgent Care at [facility] today. Significant pain noted on left forearm on top side, noting rash on top of arm, redness on underside of forearm where vessel redness was has significantly improved. Going for an ultrasound of her arm today at 2 pm at [facility].           OBJECTIVE    INR Protime   Date Value Ref Range Status   09/27/2018 3.0 (A) 0.86 - 1.14 Final       ASSESSMENT / PLAN  INR assessment THER    Recheck INR In: 1 WEEK    INR Location Clinic      Anticoagulation Summary as of 9/27/2018     INR goal 2.0-3.0   Today's INR 3.0   Warfarin maintenance plan 5 mg (5 mg x 1) every day   Full warfarin instructions 5 mg every day   Weekly warfarin total 35 mg   Plan last modified Yasemin Page RN (9/27/2018)   Next INR check 10/4/2018   Target end date 11/19/2018    Indications   Long-term (current) use of anticoagulants [Z79.01] [Z79.01]  Embolism and thrombosis (H) [I74.9] [I74.9]         Anticoagulation Episode Summary     INR check location     Preferred lab     Send INR reminders to [facility] ANTICOAG CLINIC    Comments       Anticoagulation Care Providers     Provider Role Specialty Phone number    William Smith MD [facility] [phone]            See the Encounter Report to view Anticoagulation Flowsheet and Dosing Calendar (Go to Encounters tab in chart review, and find the Anticoagulation Therapy Visit)    Dosage adjustment made based on physician directed care plan.    Yasemin Page RN

## 2018-09-27 NOTE — PROGRESS NOTES
SUBJECTIVE:   Mily Grullon is a 38 year old female who presents to clinic today for the following health issues:      Patient is still having severe pain on right arm, patient was diagnosis with blood clot x 2 weeks ago. Patient is treated with coumadin 5 mg.     Care Everywhere Reviewed    Been on multiple antibiotics for cellulitis.   Poor historian.   Here today due to increase pain. Call into her PCP who advised follow up  With ED and she is now seeing myself.   Was started on coumadin for superficial right are clot.   No fevers, no numbness/tingling.  No trauma.     Problem list and histories reviewed & adjusted, as indicated.  Additional history: as documented    Patient Active Problem List   Diagnosis     Personal history of nicotine dependence     Left elbow pain     CARDIOVASCULAR SCREENING; LDL GOAL LESS THAN 160     Gastritis     Headache     Senile osteoporosis     Closed nondisplaced intertrochanteric fracture of left femur (H)     Other postprocedural status(V45.89)     Abnormality of gait     Migraine without aura and without status migrainosus, not intractable     Abnormal gait     Pain in joint, ankle and foot, left     Closed nondisplaced fracture of body of left calcaneus with delayed healing, subsequent encounter     Heel pain, chronic, left     Gastroesophageal reflux disease, esophagitis presence not specified     Long-term (current) use of anticoagulants [Z79.01]     Embolism and thrombosis (H) [I74.9]     Superficial phlebitis     Past Surgical History:   Procedure Laterality Date     ARTHROSCOPY HIP, OSTEOPLASTY FEMUR PROXIMAL, COMBINED Left 6/29/2016    Procedure: COMBINED ARTHROSCOPY HIP, OSTEOPLASTY FEMUR PROXIMAL;  Surgeon: Godwin Deleon MD;  Location: [facility]     ARTHROSCOPY OF JOINT UNLISTED  4/2004    Left wrist, with debridement and repair of tear.     HC REMOVAL OF OVARY/TUBE(S)  6/2003    right with fallopian tube     HC REPAIR TRIANGULAR CART,WRIST JT  2005    Dr Lyons  [place]     HC REPAIR TRIANGULAR CART,WRIST JT  2007 or so    ulnar excision- Dr Eloy Sosa     HYSTEROSCOPY      laproscopy for endometriosis x2     LITHOTRIPSY         Social History   Substance Use Topics     Smoking status: Former Smoker     Types: Cigarettes     Quit date: 2/2/2014     Smokeless tobacco: Never Used     Alcohol use Yes      Comment: rare      Family History   Problem Relation Age of Onset     Hypertension Father      Lipids Father      Hypertension Maternal Grandmother      Genitourinary Problems Maternal Grandmother      kidney disease     Cancer Paternal Grandmother      leukemia     Asthma No family hx of      C.A.D. No family hx of      Diabetes No family hx of      Cerebrovascular Disease No family hx of      Breast Cancer No family hx of      Cancer - colorectal No family hx of      Prostate Cancer No family hx of      Alzheimer Disease No family hx of      Arthritis No family hx of      Blood Disease No family hx of      Circulatory No family hx of      Eye Disorder No family hx of      GASTROINTESTINAL DISEASE No family hx of      Musculoskeletal Disorder No family hx of      Neurologic Disorder No family hx of      Respiratory No family hx of      Thyroid Disease No family hx of          Current Outpatient Prescriptions   Medication Sig Dispense Refill     acetaminophen (TYLENOL) 325 MG tablet Take 2 tablets (650 mg) by mouth every 4 hours as needed for other (mild pain) 100 tablet 0     acetaminophen-codeine (TYLENOL #3) 300-30 MG per tablet Take 1 tablet by mouth every 6 hours as needed for severe pain 10 tablet 0     medroxyPROGESTERone (DEPO-PROVERA) 150 MG/ML injection Inject 1 mL (150 mg) into the muscle every 3 months 1 mL 3     nortriptyline (PAMELOR) 50 MG capsule Take 100 mg by mouth At Bedtime       pantoprazole (PROTONIX) 20 MG EC tablet Take by mouth 30-60 minutes before a meal. 90 tablet 3     sucralfate (CARAFATE) 1 GM tablet Take 1 tablet (1 g) by mouth 4 times  daily 60 tablet 1     tiZANidine (ZANAFLEX) 2 MG tablet Take 1 tablet (2 mg) by mouth 3 times daily as needed for muscle spasms 90 tablet 1     traZODone (DESYREL) 50 MG tablet        warfarin (COUMADIN) 5 MG tablet Take 1 tablet (5 mg) by mouth daily Then as directed by INR nurse 30 tablet 1     Allergies   Allergen Reactions     Amitriptyline Hives     Amoxicillin Diarrhea     Asa [Dihydroxyaluminum Aminoacetate]      Cipro [Ciprofloxacin]      Dizziness, vomiting, shortness of breath     Ibuprofen [Aspartame-Ibuprofen]      GI distress       Lyrica      extrematies swell up      Morphine      ithcy     Naproxen      GI distress     Neurontin [Gabapentin]      rash     Paxil [Paroxetine] Anaphylaxis     anaphylaxis     Phenergan [Promethazine Hcl]      dystonia     Prilosec [Omeprazole]      Rash, dizziness     Gabapentin Rash       Reviewed and updated as needed this visit by clinical staff  Tobacco  Allergies  Meds  Med Hx  Surg Hx  Fam Hx  Soc Hx      Reviewed and updated as needed this visit by Provider         ROS:  Constitutional, HEENT, cardiovascular, pulmonary, gi and gu systems are negative, except as otherwise noted.    OBJECTIVE:     /84  Pulse 115  Temp 98.7  F (37.1  C) (Oral)  Resp 20  Wt 204 lb (92.5 kg)  SpO2 96%  BMI 37.31 kg/m2  Body mass index is 37.31 kg/(m^2).  GENERAL: healthy, alert and no distress  Right arm: medial forearm liner contusion and firmness. full range of motion. Skin with slight pink and dryness. Neurovascularly Intact Distally.      Diagnostic Test Results:  US of right arm  ASSESSMENT/PLAN:         ICD-10-CM    1. Pain of right upper arm M79.621 US Upper Extremity Venous Duplex Right     acetaminophen-codeine (TYLENOL #3) 300-30 MG per tablet   2. Superficial phlebitis I80.9    U.S. Neg for DVT and superficial phlebitis.   Tylenol 3 for pain.   Follow up  With PCP in 2-3 days if increase symptoms. warning signs discussed.  side effects discussed     Jose  Graham Shelton PA-C  [facility]

## 2018-09-27 NOTE — TELEPHONE ENCOUNTER
Mily saw Dr Smith yesterday.  She still has arm pain.  Now there are red blotches on her forearm.  Please call as soon as possible to advise.

## 2018-09-27 NOTE — Clinical Note
Mily Putnam Dr. stated that you were her primary provider. I say her today for con't right arm pain. As you have seen her also for this.  Complaints of increase pain. Exam essentially normal. Repeated U.S   IMPRESSION: Again there is cephalic vein thrombosis, at the proximal forearm and elbow levels, similar to the previous exam. No DVT otherwise is seen in the upper extremity.   . I reassured her and gave her T3.  Blood pressure is elevated. I advised follow up  With you next week for recheck. Please intervene if you feel that she needs to be seen sooner.   Thanks Jose Shelton PA-C

## 2018-09-27 NOTE — MR AVS SNAPSHOT
Mily CERDA Yadi   9/27/2018 11:30 AM   Anticoagulation Therapy Visit    Description:  38 year old female   Provider:  SERINA ANTI COAPATIENCE   Department:  Be Nurse           INR as of 9/27/2018     Today's INR 3.0      Anticoagulation Summary as of 9/27/2018     INR goal 2.0-3.0   Today's INR 3.0   Full warfarin instructions 5 mg every day   Next INR check 10/4/2018    Indications   Long-term (current) use of anticoagulants [Z79.01] [Z79.01]  Embolism and thrombosis (H) [I74.9] [I74.9]         Instructions    Some signs and symptoms of bleeding include: Nose bleed or cut that does not stop bleeding in 10 minutes, bleeding of the gums, vomiting (will look like coffee grounds) or coughing up blood, unusual, easy or large areas of bruising, increased or unexpected  Vaginal bleeding or increased menstrual flow, red or black stools, red or orange urine, prolonged or severe headache, pale skin, unusual or constant tiredness.  If you have these please call 911 or seek medical care immediately.     Some signs and symptoms of clots include: pain or tenderness in arm or leg, swelling in arm or leg, changes in skin color, or area is warm to touch, shortness or breath, trouble breathing.  Numbness or weakness especially on 1 side of the body, sudden trouble speaking or swallowing, sudden trouble seeing, sudden confusion, dizzy spells or headache.  If you have these please call 911 or seek medical care immediately.           Your next Anticoagulation Clinic appointment(s)     Oct 04, 2018 11:30 AM CDT   Anticoagulation Visit with SERINA ANTI JAYLENE Fitch ([facility] [place])    [address]  [place] [address] [phone]              Contact Numbers     [facility] ([facility]  Please call [phone] with any problems or questions regarding your therapy, or to cancel or reschedule your appointment.          September 2018 Details    Sun Mon Tue Wed Thu Fri Sat           1                 2                3               4               5               6               7               8                 9               10               11               12               13               14               15                 16               17               18               19               20               21               22                 23               24               25               26               27      5 mg   See details      28      5 mg         29      5 mg           30      5 mg                Date Details   09/27 This INR check               How to take your warfarin dose     To take:  5 mg Take 1 of the 5 mg tablets.           October 2018 Details    Sun Mon Tue Wed Thu Fri Sat      1      5 mg         2      5 mg         3      5 mg         4            5               6                 7               8               9               10               11               12               13                 14               15               16               17               18               19               20                 21               22               23               24               25               26               27                 28               29               30               31                   Date Details   No additional details    Date of next INR:  10/4/2018         How to take your warfarin dose     To take:  5 mg Take 1 of the 5 mg tablets.

## 2018-09-28 ENCOUNTER — TRANSFERRED RECORDS (OUTPATIENT)
Dept: HEALTH INFORMATION MANAGEMENT | Facility: CLINIC | Age: 38
End: 2018-09-28

## 2018-09-28 RX ORDER — PANTOPRAZOLE SODIUM 20 MG/1
TABLET, DELAYED RELEASE ORAL
Qty: 90 TABLET | Refills: 1 | Status: SHIPPED | OUTPATIENT
Start: 2018-09-28 | End: 2019-04-03

## 2018-09-28 NOTE — TELEPHONE ENCOUNTER
Prescription approved per [facility] Refill Protocol.  Patient is informed the prescription has been sent to the pharmacy.  Verbalized good understanding.   Mariza Rinaldi RN

## 2018-10-01 ENCOUNTER — TELEPHONE (OUTPATIENT)
Dept: INTERNAL MEDICINE | Facility: CLINIC | Age: 38
End: 2018-10-01

## 2018-10-01 DIAGNOSIS — M79.621 PAIN OF RIGHT UPPER ARM: ICD-10-CM

## 2018-10-01 NOTE — TELEPHONE ENCOUNTER
Routing refill request to provider for review/approval because:  Patient informed provider not in office today.  Patient asked that we send message to provider pool.  Drug not on the [facility] refill protocol

## 2018-10-01 NOTE — TELEPHONE ENCOUNTER
Patient is calling for refill RX: acetaminophen-codeine (TYLENOL #3) 300-30 MG per tablet. Thank you.

## 2018-10-02 ENCOUNTER — TELEPHONE (OUTPATIENT)
Dept: INTERNAL MEDICINE | Facility: CLINIC | Age: 38
End: 2018-10-02

## 2018-10-02 NOTE — TELEPHONE ENCOUNTER
Prescription for acetaminophen-codeine (Tylenol #3) signed by Dr. Smith, and walked down to pharmacy by Ellie Song MA.   Ellie Song MA

## 2018-10-02 NOTE — TELEPHONE ENCOUNTER
Talked to pt and told her prescription was delivered to [facility].

## 2018-10-04 ENCOUNTER — ANTICOAGULATION THERAPY VISIT (OUTPATIENT)
Dept: NURSING | Facility: CLINIC | Age: 38
End: 2018-10-04
Payer: COMMERCIAL

## 2018-10-04 DIAGNOSIS — I74.9 EMBOLISM AND THROMBOSIS (H): ICD-10-CM

## 2018-10-04 LAB
INR PPP: 3.2 (ref 0.86–1.14)
INR PPP: 6

## 2018-10-04 PROCEDURE — 36415 COLL VENOUS BLD VENIPUNCTURE: CPT | Performed by: INTERNAL MEDICINE

## 2018-10-04 PROCEDURE — 85610 PROTHROMBIN TIME: CPT | Performed by: INTERNAL MEDICINE

## 2018-10-04 PROCEDURE — 99207 ZZC NO CHARGE NURSE ONLY: CPT

## 2018-10-04 NOTE — PATIENT INSTRUCTIONS
Some signs and symptoms of bleeding include: Nose bleed or cut that does not stop bleeding in 10 minutes, bleeding of the gums, vomiting (will look like coffee grounds) or coughing up blood, unusual, easy or large areas of bruising, increased or unexpected  Vaginal bleeding or increased menstrual flow, red or black stools, red or orange urine, prolonged or severe headache, pale skin, unusual or constant tiredness.  If you have these please call 911 or seek medical care immediately.

## 2018-10-04 NOTE — MR AVS SNAPSHOT
Mily CERDA Yadi   10/4/2018 2:15 PM   Anticoagulation Therapy Visit    Description:  38 year old female   Provider:  SERINA ANTI COAG   Department:  Be Nurse           INR as of 10/4/2018     Today's INR 6.0!      Anticoagulation Summary as of 10/4/2018     INR goal 2.0-3.0   Today's INR 6.0!   Full warfarin instructions 10/4: Hold; Otherwise 5 mg every day   Next INR check 10/5/2018    Indications   Long-term (current) use of anticoagulants [Z79.01] [Z79.01]  Embolism and thrombosis (H) [I74.9] [I74.9]         Instructions    Some signs and symptoms of bleeding include: Nose bleed or cut that does not stop bleeding in 10 minutes, bleeding of the gums, vomiting (will look like coffee grounds) or coughing up blood, unusual, easy or large areas of bruising, increased or unexpected  Vaginal bleeding or increased menstrual flow, red or black stools, red or orange urine, prolonged or severe headache, pale skin, unusual or constant tiredness.  If you have these please call 911 or seek medical care immediately.            Your next Anticoagulation Clinic appointment(s)     Oct 05, 2018  2:30 PM CDT   Anticoagulation Visit with SERINA ANTI COAG   [place] Mariya Fitch ([facility] [place])    [address]  [place] [address] [phone]              Contact Numbers     [facility]  Please call [phone] with any problems or questions regarding your therapy, or to cancel or reschedule your appointment.          October 2018 Details    Sun Mon Tue Wed Thu Fri Sat      1               2               3               4      Hold   See details      5            6                 7               8               9               10               11               12               13                 14               15               16               17               18               19               20                 21               22               23               24               25               26                27                 28               29               30               31                   Date Details   10/04 This INR check       Date of next INR:  10/5/2018         How to take your warfarin dose     To take:  5 mg Take 1 of the 5 mg tablets.    Hold Do not take your warfarin dose. See the Details table to the right for additional instructions.

## 2018-10-04 NOTE — PROGRESS NOTES
ANTICOAGULATION FOLLOW-UP CLINIC VISIT    Patient Name:  Mily Grullon  Date:  10/4/2018  Contact Type:  Face to Face    SUBJECTIVE:     Patient Findings     Positives Bleed (clinic visit)    Comments Noting cuticle bleeding, denies all other bleeding concerns. INR 6.0. Will hold today's dose, send to lab for confirmation and bring back tomorrow. Patient verbalized understanding and agrees with plan.   Patient has been taking Tylenol #3 for arm cellulitis/discomfort (see previous encounters for details).           OBJECTIVE    INR   Date Value Ref Range Status   10/04/2018 6.0  Final       ASSESSMENT / PLAN  INR assessment SUPRA confirmation via lab   Recheck INR In: 1 DAY    INR Location Clinic    Vit K given? None holding dose today, bringing back to INR clinic tomorrow for recheck     Anticoagulation Summary as of 10/4/2018     INR goal 2.0-3.0   Today's INR 6.0!   Warfarin maintenance plan 5 mg (5 mg x 1) every day   Full warfarin instructions 10/4: Hold; Otherwise 5 mg every day   Weekly warfarin total 35 mg   Plan last modified Yasemin Page RN (9/27/2018)   Next INR check 10/5/2018   Target end date 11/19/2018    Indications   Long-term (current) use of anticoagulants [Z79.01] [Z79.01]  Embolism and thrombosis (H) [I74.9] [I74.9]         Anticoagulation Episode Summary     INR check location     Preferred lab     Send INR reminders to [facility]    Comments       Anticoagulation Care Providers     Provider Role Specialty Phone number    William Smith MD [facility] Internal Medicine [phone]            See the Encounter Report to view Anticoagulation Flowsheet and Dosing Calendar (Go to Encounters tab in chart review, and find the Anticoagulation Therapy Visit)    Dosage adjustment made based on physician directed care plan.    Yasemin Page RN

## 2018-10-05 ENCOUNTER — TELEPHONE (OUTPATIENT)
Dept: NURSING | Facility: CLINIC | Age: 38
End: 2018-10-05

## 2018-10-05 ENCOUNTER — ANTICOAGULATION THERAPY VISIT (OUTPATIENT)
Dept: NURSING | Facility: CLINIC | Age: 38
End: 2018-10-05
Payer: COMMERCIAL

## 2018-10-05 DIAGNOSIS — M79.621 PAIN OF RIGHT UPPER ARM: ICD-10-CM

## 2018-10-05 DIAGNOSIS — I74.9 EMBOLISM AND THROMBOSIS (H): ICD-10-CM

## 2018-10-05 LAB — INR POINT OF CARE: 2.4 (ref 0.86–1.14)

## 2018-10-05 PROCEDURE — 36416 COLLJ CAPILLARY BLOOD SPEC: CPT

## 2018-10-05 PROCEDURE — 85610 PROTHROMBIN TIME: CPT | Mod: QW

## 2018-10-05 PROCEDURE — 99207 ZZC NO CHARGE NURSE ONLY: CPT

## 2018-10-05 NOTE — TELEPHONE ENCOUNTER
Pt stating while at INR appointment today that she will run out of her Tylenol #3 prior to Froy 10/7.    Pt requesting refill.      Yasemin Page RN on 10/5/2018 at 3:17 PM

## 2018-10-05 NOTE — TELEPHONE ENCOUNTER
Called and spoke with patient. Notified her of small rx written and Tylenol instructions after the medications are completed.  Patient verbalized understanding and agrees with plan.     Yasemin Page RN on 10/5/2018 at 3:42 PM

## 2018-10-05 NOTE — MR AVS SNAPSHOT
Mily Grullon   10/5/2018 2:30 PM   Anticoagulation Therapy Visit    Description:  38 year old female   Provider:  SERINA ANTI COAG   Department:  Be Nurse           INR as of 10/5/2018     Today's INR 2.4      Anticoagulation Summary as of 10/5/2018     INR goal 2.0-3.0   Today's INR 2.4   Full warfarin instructions 2.5 mg on Fri; 5 mg all other days   Next INR check 10/12/2018    Indications   Long-term (current) use of anticoagulants [Z79.01] [Z79.01]  Embolism and thrombosis (H) [I74.9] [I74.9]         Your next Anticoagulation Clinic appointment(s)     Oct 12, 2018 10:00 AM CDT   Anticoagulation Visit with BE ANTI COAG   [place] Mariya Fitch ([facility] [place])    [address]  [place] [address] [phone]              Contact Numbers     [facility]  Please call [phone] with any problems or questions regarding your therapy, or to cancel or reschedule your appointment.          October 2018 Details    Sun Mon Tue Wed Thu Fri Sat      1               2               3               4               5      2.5 mg   See details      6      5 mg           7      5 mg         8      5 mg         9      5 mg         10      5 mg         11      5 mg         12            13                 14               15               16               17               18               19               20                 21               22               23               24               25               26               27                 28               29               30               31                   Date Details   10/05 This INR check       Date of next INR:  10/12/2018         How to take your warfarin dose     To take:  2.5 mg Take 0.5 of a 5 mg tablet.    To take:  5 mg Take 1 of the 5 mg tablets.

## 2018-10-05 NOTE — TELEPHONE ENCOUNTER
Small refill at reduced total daily dose signed in my outbox    Patient to move to acetaminophen 1g four times daily PRN when this prescription is done (or sooner!)

## 2018-10-12 ENCOUNTER — ANTICOAGULATION THERAPY VISIT (OUTPATIENT)
Dept: NURSING | Facility: CLINIC | Age: 38
End: 2018-10-12
Payer: COMMERCIAL

## 2018-10-12 DIAGNOSIS — I74.9 EMBOLISM AND THROMBOSIS (H): ICD-10-CM

## 2018-10-12 LAB — INR POINT OF CARE: 3.8 (ref 0.86–1.14)

## 2018-10-12 PROCEDURE — 36416 COLLJ CAPILLARY BLOOD SPEC: CPT

## 2018-10-12 PROCEDURE — 99207 ZZC NO CHARGE NURSE ONLY: CPT

## 2018-10-12 PROCEDURE — 85610 PROTHROMBIN TIME: CPT | Mod: QW

## 2018-10-12 NOTE — PROGRESS NOTES
ANTICOAGULATION FOLLOW-UP CLINIC VISIT    Patient Name:  Mily Grullon  Date:  10/12/2018  Contact Type:  Face to Face    SUBJECTIVE:     Patient Findings     Positives Initiation of therapy, No Problem Findings    Comments INR 3.8, will decrease to 2.5 mg for the next 2 days and recheck in one week.           OBJECTIVE    INR Protime   Date Value Ref Range Status   10/12/2018 3.8 (A) 0.86 - 1.14 Final       ASSESSMENT / PLAN  INR assessment SUPRA    Recheck INR In: 1 WEEK    INR Location Clinic      Anticoagulation Summary as of 10/12/2018     INR goal 2.0-3.0   Today's INR 3.8!   Warfarin maintenance plan 2.5 mg (5 mg x 0.5) on Fri; 5 mg (5 mg x 1) all other days   Full warfarin instructions 10/13: 2.5 mg; 10/14: 2.5 mg; Otherwise 2.5 mg on Fri; 5 mg all other days   Weekly warfarin total 32.5 mg   Plan last modified Yasemin Page RN (10/12/2018)   Next INR check 10/19/2018   Target end date 11/19/2018    Indications   Long-term (current) use of anticoagulants [Z79.01] [Z79.01]  Embolism and thrombosis (H) [I74.9] [I74.9]         Anticoagulation Episode Summary     INR check location     Preferred lab     Send INR reminders to [facility]    Comments       Anticoagulation Care Providers     Provider Role Specialty Phone number    William Smith MD [facility] Internal Medicine [phone]            See the Encounter Report to view Anticoagulation Flowsheet and Dosing Calendar (Go to Encounters tab in chart review, and find the Anticoagulation Therapy Visit)    Dosage adjustment made based on physician directed care plan.    Yasemin Page RN

## 2018-10-12 NOTE — MR AVS SNAPSHOT
Mily CERDA Yadi   10/12/2018 10:00 AM   Anticoagulation Therapy Visit    Description:  38 year old female   Provider:  SERINA ANTI COAG   Department:  Be Nurse           INR as of 10/12/2018     Today's INR 3.8!      Anticoagulation Summary as of 10/12/2018     INR goal 2.0-3.0   Today's INR 3.8!   Full warfarin instructions 10/13: 2.5 mg; 10/14: 2.5 mg; Otherwise 2.5 mg on Fri; 5 mg all other days   Next INR check 10/19/2018    Indications   Long-term (current) use of anticoagulants [Z79.01] [Z79.01]  Embolism and thrombosis (H) [I74.9] [I74.9]         Your next Anticoagulation Clinic appointment(s)     Oct 19, 2018  1:15 PM CDT   Anticoagulation Visit with BE ANTI COAG   [place] Mariya Fitch ([facility] [place])    [address]  [place] [id number] [phone]              Contact Numbers     [facility]  Please call [phone] with any problems or questions regarding your therapy, or to cancel or reschedule your appointment.          October 2018 Details    Sun Mon Tue Wed Thu Fri Sat      1               2               3               4               5               6                 7               8               9               10               11               12      2.5 mg   See details      13      2.5 mg           14      2.5 mg         15      5 mg         16      5 mg         17      5 mg         18      5 mg         19            20                 21               22               23               24               25               26               27                 28               29               30               31                   Date Details   10/12 This INR check       Date of next INR:  10/19/2018         How to take your warfarin dose     To take:  2.5 mg Take 0.5 of a 5 mg tablet.    To take:  5 mg Take 1 of the 5 mg tablets.

## 2018-10-19 ENCOUNTER — ANTICOAGULATION THERAPY VISIT (OUTPATIENT)
Dept: NURSING | Facility: CLINIC | Age: 38
End: 2018-10-19
Payer: COMMERCIAL

## 2018-10-19 DIAGNOSIS — I74.9 EMBOLISM AND THROMBOSIS (H): ICD-10-CM

## 2018-10-19 LAB — INR POINT OF CARE: 2.4 (ref 0.86–1.14)

## 2018-10-19 PROCEDURE — 99207 ZZC NO CHARGE NURSE ONLY: CPT

## 2018-10-19 PROCEDURE — 36416 COLLJ CAPILLARY BLOOD SPEC: CPT

## 2018-10-19 PROCEDURE — 85610 PROTHROMBIN TIME: CPT | Mod: QW

## 2018-10-19 NOTE — MR AVS SNAPSHOT
Mily CERDA Yadi   10/19/2018 1:15 PM   Anticoagulation Therapy Visit    Description:  38 year old female   Provider:  SERINA ANTI COAG   Department:  Be Nurse           INR as of 10/19/2018     Today's INR 2.4      Anticoagulation Summary as of 10/19/2018     INR goal 2.0-3.0   Today's INR 2.4   Full warfarin instructions 10/19: 2.5 mg; 10/20: 5 mg; 10/21: 2.5 mg; Otherwise 2.5 mg on Fri; 5 mg all other days   Next INR check 10/22/2018    Indications   Long-term (current) use of anticoagulants [Z79.01] [Z79.01]  Embolism and thrombosis (H) [I74.9] [I74.9]         Your next Anticoagulation Clinic appointment(s)     Oct 22, 2018  3:15 PM CDT   Anticoagulation Visit with BE ANTI COAG   [place] Mariya Fitch ([facility] [place])    [address]  [place] [address] [phone]              Contact Numbers     [facility]  Please call [phone] with any problems or questions regarding your therapy, or to cancel or reschedule your appointment.          October 2018 Details    Sun Mon Tue Wed Thu Fri Sat      1               2               3               4               5               6                 7               8               9               10               11               12               13                 14               15               16               17               18               19      2.5 mg   See details      20      5 mg           21      2.5 mg         22            23               24               25               26               27                 28               29               30               31                   Date Details   10/19 This INR check       Date of next INR:  10/22/2018         How to take your warfarin dose     To take:  2.5 mg Take 0.5 of a 5 mg tablet.    To take:  5 mg Take 1 of the 5 mg tablets.

## 2018-10-19 NOTE — PROGRESS NOTES
ANTICOAGULATION FOLLOW-UP CLINIC VISIT    Patient Name:  Mily Grullon  Date:  10/19/2018  Contact Type:  Face to Face    SUBJECTIVE:     Patient Findings     Positives Initiation of therapy    Comments INR is 2.4.    Will incorporate another 2.5 mg and bring her back on Monday.  (patient requested to have INR same day as depo shot with ancillary)           OBJECTIVE    INR Protime   Date Value Ref Range Status   10/19/2018 2.4 (A) 0.86 - 1.14 Final       ASSESSMENT / PLAN  INR assessment THER    Recheck INR In: 3 DAYS    INR Location Clinic      Anticoagulation Summary as of 10/19/2018     INR goal 2.0-3.0   Today's INR 2.4   Warfarin maintenance plan 2.5 mg (5 mg x 0.5) on Fri; 5 mg (5 mg x 1) all other days   Full warfarin instructions 10/19: 2.5 mg; 10/20: 5 mg; 10/21: 2.5 mg; Otherwise 2.5 mg on Fri; 5 mg all other days   Weekly warfarin total 32.5 mg   Plan last modified Sara Davsi RN (10/19/2018)   Next INR check 10/22/2018   Target end date 11/19/2018    Indications   Long-term (current) use of anticoagulants [Z79.01] [Z79.01]  Embolism and thrombosis (H) [I74.9] [I74.9]         Anticoagulation Episode Summary     INR check location     Preferred lab     Send INR reminders to [facility]    Comments       Anticoagulation Care Providers     Provider Role Specialty Phone number    William Smith MD [facility] Internal Medicine [phone]            See the Encounter Report to view Anticoagulation Flowsheet and Dosing Calendar (Go to Encounters tab in chart review, and find the Anticoagulation Therapy Visit)    Dosage adjustment made based on physician directed care plan.    Sara Davis RN

## 2018-10-22 ENCOUNTER — ANTICOAGULATION THERAPY VISIT (OUTPATIENT)
Dept: NURSING | Facility: CLINIC | Age: 38
End: 2018-10-22
Payer: COMMERCIAL

## 2018-10-22 ENCOUNTER — ALLIED HEALTH/NURSE VISIT (OUTPATIENT)
Dept: NURSING | Facility: CLINIC | Age: 38
End: 2018-10-22
Payer: COMMERCIAL

## 2018-10-22 ENCOUNTER — TELEPHONE (OUTPATIENT)
Dept: NURSING | Facility: CLINIC | Age: 38
End: 2018-10-22

## 2018-10-22 VITALS — SYSTOLIC BLOOD PRESSURE: 138 MMHG | DIASTOLIC BLOOD PRESSURE: 84 MMHG

## 2018-10-22 DIAGNOSIS — I74.9 EMBOLISM AND THROMBOSIS (H): ICD-10-CM

## 2018-10-22 LAB — INR POINT OF CARE: 1.8 (ref 0.86–1.14)

## 2018-10-22 PROCEDURE — 99207 ZZC NO CHARGE NURSE ONLY: CPT

## 2018-10-22 PROCEDURE — 85610 PROTHROMBIN TIME: CPT | Mod: QW

## 2018-10-22 PROCEDURE — 36416 COLLJ CAPILLARY BLOOD SPEC: CPT

## 2018-10-22 PROCEDURE — 96372 THER/PROPH/DIAG INJ SC/IM: CPT

## 2018-10-22 NOTE — MR AVS SNAPSHOT
Mily PRASHANT Grullon   10/22/2018 3:15 PM   Anticoagulation Therapy Visit    Description:  38 year old female   Provider:  SERINA ANTI COAG   Department:  [facility] Nurse           INR as of 10/22/2018     Today's INR 1.8!      Anticoagulation Summary as of 10/22/2018     INR goal 2.0-3.0   Today's INR 1.8!   Full warfarin instructions 10/22: 7.5 mg; Otherwise 2.5 mg on Fri; 5 mg all other days   Next INR check 10/29/2018    Indications   Long-term (current) use of anticoagulants [Z79.01] [Z79.01]  Embolism and thrombosis (H) [I74.9] [I74.9]         Your next Anticoagulation Clinic appointment(s)     Oct 29, 2018  3:00 PM CDT   Anticoagulation Visit with BE ANTI COAG   [facility] [place] ([facility] [place])    [address]  [place] [address] [phone]              Contact Numbers     [facility]  Please call [phone] with any problems or questions regarding your therapy, or to cancel or reschedule your appointment.          October 2018 Details    Sun Mon Tue Wed Thu Fri Sat      1               2               3               4               5               6                 7               8               9               10               11               12               13                 14               15               16               17               18               19               20                 21               22      7.5 mg   See details      23      5 mg         24      5 mg         25      5 mg         26      2.5 mg         27      5 mg           28      5 mg         29            30               31                   Date Details   10/22 This INR check       Date of next INR:  10/29/2018         How to take your warfarin dose     To take:  2.5 mg Take 0.5 of a 5 mg tablet.    To take:  5 mg Take 1 of the 5 mg tablets.    To take:  7.5 mg Take 1.5 of the 5 mg tablets.

## 2018-10-22 NOTE — PROGRESS NOTES
ANTICOAGULATION FOLLOW-UP CLINIC VISIT    Patient Name:  Mily Grullon  Date:  10/22/2018  Contact Type:  Face to Face    SUBJECTIVE:     Patient Findings     Positives Initiation of therapy, No Problem Findings    Comments INR 1.8, will increase today to 7.5 mg and continue same maintenance dosing, recheck in 1 week. If INR therapeutic at that time would consider 5 mg daily for weekly maintenance.             OBJECTIVE    INR Protime   Date Value Ref Range Status   10/22/2018 1.8 (A) 0.86 - 1.14 Final       ASSESSMENT / PLAN  INR assessment SUB    Recheck INR In: 1 WEEK    INR Location Clinic      Anticoagulation Summary as of 10/22/2018     INR goal 2.0-3.0   Today's INR 1.8!   Warfarin maintenance plan 2.5 mg (5 mg x 0.5) on Fri; 5 mg (5 mg x 1) all other days   Full warfarin instructions 10/22: 7.5 mg; Otherwise 2.5 mg on Fri; 5 mg all other days   Weekly warfarin total 32.5 mg   Plan last modified Yasemin Page RN (10/22/2018)   Next INR check 10/29/2018   Target end date 11/19/2018    Indications   Long-term (current) use of anticoagulants [Z79.01] [Z79.01]  Embolism and thrombosis (H) [I74.9] [I74.9]         Anticoagulation Episode Summary     INR check location     Preferred lab     Send INR reminders to [facility]    Comments       Anticoagulation Care Providers     Provider Role Specialty Phone number    William Smith MD [facility] Internal Medicine [phone]            See the Encounter Report to view Anticoagulation Flowsheet and Dosing Calendar (Go to Encounters tab in chart review, and find the Anticoagulation Therapy Visit)    Dosage adjustment made based on physician directed care plan.    Yasemin Page RN

## 2018-10-22 NOTE — TELEPHONE ENCOUNTER
Patient complaining of moderate 7/10 pain in left arm and shoulder. Pt believes it looks red on her upper arm, no redness noted. Pt moving arm around and cannot lift her shoulder beyond 90 degrees with significant pain and having difficulty moving her head to that side.    Pt wondering if she needs to be seen or if MD has any suggestions?    MD please advise.  Yasemin Page RN on 10/22/2018 at 3:45 PM

## 2018-10-23 NOTE — TELEPHONE ENCOUNTER
Spoke with patient and gave below information and she denies injury, but reports a little tingling down arm, no weakness, no numbness..  appt scheduled for 10/24/18 for evaluation.  Patient knows to go to UC if symptoms change or worsen.  Radha Hardy RN

## 2018-10-23 NOTE — TELEPHONE ENCOUNTER
Sounds acute, and most such conditions are relatively benign.  Patient may have impingement of shoulder or neck injury.  Should be seen non-urgently if no weakness, numbness, tingling, screened for injury, and likely referred to physical therapy.  Unsure if she'll require imaging.

## 2018-10-24 ENCOUNTER — RADIANT APPOINTMENT (OUTPATIENT)
Dept: GENERAL RADIOLOGY | Facility: CLINIC | Age: 38
End: 2018-10-24
Attending: INTERNAL MEDICINE
Payer: COMMERCIAL

## 2018-10-24 ENCOUNTER — OFFICE VISIT (OUTPATIENT)
Dept: INTERNAL MEDICINE | Facility: CLINIC | Age: 38
End: 2018-10-24
Payer: COMMERCIAL

## 2018-10-24 ENCOUNTER — RADIANT APPOINTMENT (OUTPATIENT)
Dept: CT IMAGING | Facility: CLINIC | Age: 38
End: 2018-10-24
Attending: INTERNAL MEDICINE
Payer: COMMERCIAL

## 2018-10-24 VITALS
RESPIRATION RATE: 16 BRPM | DIASTOLIC BLOOD PRESSURE: 84 MMHG | WEIGHT: 205 LBS | BODY MASS INDEX: 37.49 KG/M2 | TEMPERATURE: 97.2 F | HEART RATE: 142 BPM | SYSTOLIC BLOOD PRESSURE: 132 MMHG

## 2018-10-24 DIAGNOSIS — R07.81 PLEURODYNIA: ICD-10-CM

## 2018-10-24 DIAGNOSIS — M79.621 PAIN OF RIGHT UPPER ARM: ICD-10-CM

## 2018-10-24 DIAGNOSIS — R79.1 SUBTHERAPEUTIC INTERNATIONAL NORMALIZED RATIO (INR): ICD-10-CM

## 2018-10-24 DIAGNOSIS — S46.811A TRAPEZIUS STRAIN, RIGHT, INITIAL ENCOUNTER: Primary | ICD-10-CM

## 2018-10-24 DIAGNOSIS — K21.9 GASTROESOPHAGEAL REFLUX DISEASE, ESOPHAGITIS PRESENCE NOT SPECIFIED: ICD-10-CM

## 2018-10-24 DIAGNOSIS — E66.01 MORBID OBESITY (H): ICD-10-CM

## 2018-10-24 DIAGNOSIS — R00.0 TACHYCARDIA: ICD-10-CM

## 2018-10-24 DIAGNOSIS — I80.8 SUPERFICIAL THROMBOPHLEBITIS OF RIGHT UPPER EXTREMITY: ICD-10-CM

## 2018-10-24 PROCEDURE — 71260 CT THORAX DX C+: CPT | Mod: TC

## 2018-10-24 PROCEDURE — 99214 OFFICE O/P EST MOD 30 MIN: CPT | Performed by: INTERNAL MEDICINE

## 2018-10-24 RX ORDER — IOPAMIDOL 755 MG/ML
74 INJECTION, SOLUTION INTRAVASCULAR ONCE
Status: COMPLETED | OUTPATIENT
Start: 2018-10-24 | End: 2018-10-24

## 2018-10-24 RX ADMIN — IOPAMIDOL 74 ML: 755 INJECTION, SOLUTION INTRAVASCULAR at 11:00

## 2018-10-24 NOTE — PROGRESS NOTES
SUBJECTIVE:   Mily Grullon is a 38 year old female who presents to clinic today for the following health issues:      Musculoskeletal problem/pain      Duration: 2-3 days    Description  Location: right shoulder    Intensity:  7/10    Accompanying signs and symptoms: radiation of pain to neck and mid back, tingling and neck was red and itchy when her symptoms first began    History  Previous similar problem: no   Previous evaluation:  none    Precipitating or alleviating factors:  Trauma or overuse: no   Aggravating factors include: lifting and unable to lay in her right side and can hurt to take a deep breath    Therapies tried and outcome: heat, ice and acetaminophen    Patient awoke 3 days ago with pain in right arm -- in shoulder up to neck and into right flank.  Right forearm with superficial clot was doing well for a couple weeks beforehand.  Able to get through work with acetaminophen, ice, heat.  Reports unable to sleep a full hour due to pain.  7/10 pain when moving, 3/10 when not. No unusual activity, fever, chills, or night sweats, headache, weakness, numbness, tingling.  Reports it also hurts to take a breathe over the past few days.  Patient scared symptoms are from prior superficial RUE clot.  Requests T#3 again.  Open to physical therapy again.    1. Acute pain of left shoulder    2. Morbid obesity (H)    3. Gastroesophageal reflux disease, esophagitis presence not specified        PMH: Updated and/or reviewed in chart.    ROS:  Constitutional, neuro, pulmonary, cardiac, gastrointestinal, musculoskeletal, integument systems are otherwise negative.    OBJECTIVE:                                                    /84  Pulse 142  Temp 97.2  F (36.2  C) (Tympanic)  Resp 16  Wt 205 lb (93 kg)  BMI 37.49 kg/m2    GEN: No acute distress  EYES: No conjunctival injection or icterus, EOMI grossly intact  RESP: Unlabored, regular, clear  CV: Regular tachycardia  NEURO: Normal gait, MAEx4, light  touch sensation grossly intact  PSYCH: Normal mood and affect  MUSCULOSKELETAL: mild tenderness to palpation around right shoulder without point tenderness, intact active range of motion except extreme internal rotation, no SITS weakness, mild pain elicited with anterior shoulder flexion  PSYCH: anxious, other normal mood    Results for orders placed or performed in visit on 10/22/18   INR point of care   Result Value Ref Range    INR Protime 1.8 (A) 0.86 - 1.14      ASSESSMENT/PLAN:                                                    Clinically, trapezius strain, right shoulder pain -- need to rule-out pulmonary embolism   Significant regular tachycardia  Superficial thrombophlebitis of right upper extremity  Subtherapeutic international normalized ratio (INR)  Last INR 1.8.  Discussed with patient symptoms seem musculoskeletal in nature, acute, and benign overall except her significant tachycardia requires further evaluation, especially in context of breathing symptoms, recent clot, and INR <2 -- unlikely related to clot or mobile thrombus but must rule-out.  OK to defer cervical films today to rule-out other musculoskeletal clear abnormality not seen on exam ot chest CT today.  OK for #6 T#3 again (6 tabs).  Discussed safety in opioid use with muscle relaxant.  Continue anticoagulation.  - EKG  - CTA chest  - PHYSICAL THERAPY REFERRAL; Future  - acetaminophen-codeine (TYLENOL #3) 300-30 MG per tablet; Take 0.5-1 tablets by mouth daily as needed for severe pain  Dispense: 6 tablet; Refill: 0    Morbid obesity (H)  Gastroesophageal reflux disease, esophagitis presence not specified  Noted, ongoing -- unable to tolerate non-steroidal anti-inflammatory drugs -- encouraged to use total of 1g acetaminophen four times daily PRN including the T#3              William Smith MD

## 2018-10-24 NOTE — MR AVS SNAPSHOT
After Visit Summary   10/24/2018    Mily Grullon    MRN: [id number]           Patient Information     Date Of Birth          1980        Visit Information        Provider Department      10/24/2018 8:30 AM William Smith MD [facility]        Today's Diagnoses     Trapezius strain, right, initial encounter    -  1    Morbid obesity (H)        Gastroesophageal reflux disease, esophagitis presence not specified        Pain of right upper arm        Tachycardia        Superficial thrombophlebitis of right upper extremity        Subtherapeutic international normalized ratio (INR)        Pleurodynia           Follow-ups after your visit        Additional Services     PHYSICAL THERAPY REFERRAL       If you have not heard from the scheduling office within 2 business days, please call [phone] for all locations, with the exception of [place], please call [phone] and [facility], please call [phone].    Please be aware that coverage of these services is subject to the terms and limitations of your health insurance plan.  Call member services at your health plan with any benefit or coverage questions.                  Follow-up notes from your care team     Return if symptoms worsen or fail to improve.      Your next 10 appointments already scheduled     Oct 24, 2018 10:45 AM CDT   CT CHEST W CONTRAST with BECT1   [facility] [place] ([facility])    [address] [address] [phone]           How do I prepare for my exam? (Food and drink instructions) **You will have contrast for this exam.** Do not eat or drink for 2 hours before your exam. If you need to take medicine, you may take it with small sips of water. (We may ask you to take liquid medicine as well.)  The day before your exam, drink extra fluids at least six 8-ounce glasses (unless your doctor tells you to restrict your fluids).  How do I prepare for my exam? (Other  instructions) Patients over 70 or patients with diabetes or kidney problems: If you haven t had a blood test (creatinine test) within the last 30 days, the Cardiologist/Radiologist may require you to get this test prior to your exam.  What should I wear: Please wear loose clothing, such as a sweat suit or jogging clothes.  Avoid snaps, zippers and other metal. We may ask you to undress and put on a hospital gown.  How long does the exam take: Most scans take less than 20 minutes.  What should I bring: Please bring any scans or X-rays taken at other hospitals, if similar tests were done. Also bring a list of your medicines, including vitamins, minerals and over-the-counter drugs. It is safest to leave personal items at home.  Do I need a :  No  is needed.  What do I need to tell my doctor? Be sure to tell your doctor: * If you have any allergies. * If there s any chance you are pregnant. * If you are breastfeeding. * If you have diabetes as your medication may need to be adjusted for this exam.  What should I do after the exam: No restrictions, You may resume normal activities.  What is this test: A CT (computed tomography) scan is a series of pictures that allows us to look inside your body. The scanner creates images of the body in cross sections, much like slices of bread. This helps us see any problems more clearly. You may receive contrast (X-ray dye) before or during your scan. Contrast is given through an IV (small needle in your arm).  Who should I call with questions: If you have any questions, please call the Imaging Department where you will have your exam. Directions, parking instructions, and other information is available on our website, [email or web address].            Oct 29, 2018  3:00 PM CDT   Anticoagulation Visit with BE ANTI COAG   [facility] [place] ([facility] [place])    [address]  [place] [address]   [phone]            Nov 12, 2018 11:00 AM CST    Return Visit with Jose Alford MD   [facility] [place] ([facility] [place])    [address]  [place] [id number] [phone]              Future tests that were ordered for you today     Open Future Orders        Priority Expected Expires Ordered    CT Chest w Contrast Routine  10/24/2019 10/24/2018    PHYSICAL THERAPY REFERRAL Routine  10/24/2019 10/24/2018            Who to contact     Normal or non-critical lab and imaging results will be communicated to you by MyChart, letter or phone within 4 business days after the clinic has received the results. If you do not hear from us within 7 days, please contact the clinic through MyChart or phone. If you have a critical or abnormal lab result, we will notify you by phone as soon as possible.  Submit refill requests through [employer] or call your pharmacy and they will forward the refill request to us. Please allow 3 business days for your refill to be completed.          If you need to speak with a  for additional information , please call: [phone]             Additional Information About Your Visit        Care EveryWhere ID     This is your Care EveryWhere ID. This could be used by other organizations to access your [facility] medical records  [id number]        Your Vitals Were     Pulse Temperature Respirations BMI (Body Mass Index)          142 97.2  F (36.2  C) (Tympanic) 16 37.49 kg/m2         Blood Pressure from Last 3 Encounters:   10/24/18 132/84   10/22/18 138/84   09/27/18 148/84    Weight from Last 3 Encounters:   10/24/18 205 lb (93 kg)   09/27/18 204 lb (92.5 kg)   09/26/18 (P) 204 lb (92.5 kg)                 Where to get your medicines      Some of these will need a paper prescription and others can be bought over the counter.  Ask your nurse if you have questions.     Bring a paper prescription for each of these medications     acetaminophen-codeine 300-30 MG per  tablet         Information about OPIOIDS     PRESCRIPTION OPIOIDS: WHAT YOU NEED TO KNOW   We gave you an opioid (narcotic) pain medicine. It is important to manage your pain, but opioids are not always the best choice. You should first try all the other options your care team gave you. Take this medicine for as short a time (and as few doses) as possible.    Some activities can increase your pain, such as bandage changes or therapy sessions. It may help to take your pain medicine 30 to 60 minutes before these activities. Reduce your stress by getting enough sleep, working on hobbies you enjoy and practicing relaxation or meditation. Talk to your care team about ways to manage your pain beyond prescription opioids.    These medicines have risks:    DO NOT drive when on new or higher doses of pain medicine. These medicines can affect your alertness and reaction times, and you could be arrested for driving under the influence (DUI). If you need to use opioids long-term, talk to your care team about driving.    DO NOT operate heavy machinery    DO NOT do any other dangerous activities while taking these medicines.    DO NOT drink any alcohol while taking these medicines.     If the opioid prescribed includes acetaminophen, DO NOT take with any other medicines that contain acetaminophen. Read all labels carefully. Look for the word  acetaminophen  or  Tylenol.  Ask your pharmacist if you have questions or are unsure.    You can get addicted to pain medicines, especially if you have a history of addiction (chemical, alcohol or substance dependence). Talk to your care team about ways to reduce this risk.    All opioids tend to cause constipation. Drink plenty of water and eat foods that have a lot of fiber, such as fruits, vegetables, prune juice, apple juice and high-fiber cereal. Take a laxative (Miralax, milk of magnesia, Colace, Senna) if you don t move your bowels at least every other day. Other side effects include  upset stomach, sleepiness, dizziness, throwing up, tolerance (needing more of the medicine to have the same effect), physical dependence and slowed breathing.    Store your pills in a secure place, locked if possible. We will not replace any lost or stolen medicine. If you don t finish your medicine, please throw away (dispose) as directed by your pharmacist. The [place] Pollution Control Agency has more information about safe disposal: [email or web address]://[email or web address]/living-green/[email or web address]-[email or web address]-medications         Primary Care Provider Office Phone # Fax #    Kristen M Kehr, PA-C [phone] [address] [address]        Equal Access to Services     JASSON VIZCARRA : Norma landao Sorandolph, waaxda luqadaha, qaybta kaalmada adekeirayadeepa, abdifatah fitzgerald . So [facility] [phone].    ATENCIÓN: Si habla español, tiene a lentz disposición servicios gratuitos de asistencia lingüística. Llame al [phone].    We comply with applicable federal civil rights laws and [place] laws. We do not discriminate on the basis of race, color, national origin, age, disability, sex, sexual orientation, or gender identity.            Thank you!     Thank you for choosing [facility]  for your care. Our goal is always to provide you with excellent care. Hearing back from our patients is one way we can continue to improve our services. Please take a few minutes to complete the written survey that you may receive in the mail after your visit with us. Thank you!             Your Updated Medication List - Protect others around you: Learn how to safely use, store and throw away your medicines at www.disposemymeds.org.          This list is accurate as of 10/24/18  9:22 AM.  Always use your most recent med list.                   Brand Name Dispense Instructions for use Diagnosis    acetaminophen 325 MG tablet    TYLENOL    100 tablet    Take 2 tablets (650 mg) by mouth  every 4 hours as needed for other (mild pain)    Orthopedic aftercare       acetaminophen-codeine 300-30 MG per tablet    TYLENOL #3    6 tablet    Take 0.5-1 tablets by mouth daily as needed for severe pain    Pain of right upper arm       medroxyPROGESTERone 150 MG/ML injection    DEPO-PROVERA    1 mL    Inject 1 mL (150 mg) into the muscle every 3 months    Encounter for surveillance of injectable contraceptive       nortriptyline 50 MG capsule    PAMELOR     Take 100 mg by mouth At Bedtime    Vertigo, Other fatigue, Weight gain       pantoprazole 20 MG EC tablet    PROTONIX    90 tablet    Take by mouth 30-60 minutes before a meal.    Gastroesophageal reflux disease, esophagitis presence not specified       sucralfate 1 GM tablet    CARAFATE    60 tablet    Take 1 tablet (1 g) by mouth 4 times daily    Gastritis without bleeding, unspecified chronicity, unspecified gastritis type       tiZANidine 2 MG tablet    ZANAFLEX    90 tablet    Take 1 tablet (2 mg) by mouth 3 times daily as needed for muscle spasms    Achilles tendinitis of left lower extremity       traZODone 50 MG tablet    DESYREL          warfarin 5 MG tablet    COUMADIN    30 tablet    Take 1 tablet (5 mg) by mouth daily Then as directed by INR nurse    Acute cephalic vein thrombosis, right

## 2018-10-25 ENCOUNTER — TRANSFERRED RECORDS (OUTPATIENT)
Dept: HEALTH INFORMATION MANAGEMENT | Facility: CLINIC | Age: 38
End: 2018-10-25

## 2018-10-25 ENCOUNTER — TELEPHONE (OUTPATIENT)
Dept: FAMILY MEDICINE | Facility: CLINIC | Age: 38
End: 2018-10-25

## 2018-10-25 NOTE — TELEPHONE ENCOUNTER
"Spoke with patient, pt reports blood when wiping bottom. Upon further investigation pt reporting blood mixed in stool and when wiping. This started this AM around 0400. Pt also reporting upon further questioning that she is taking Ibuprofen, pt stating she took 2 last night and \"a few times\" since she has been in on 10/22/18. Advised patient to go to ER. After around 20 minutes of encouragement to go to ER despite patient's refusal of advice.    Yasemin Page, RN, BSN            "

## 2018-10-25 NOTE — TELEPHONE ENCOUNTER
Patient is calling has spotted blood when urinating and would like to discuss with INR RN. Please call to advise. Thank you.

## 2018-10-26 ENCOUNTER — TELEPHONE (OUTPATIENT)
Dept: FAMILY MEDICINE | Facility: CLINIC | Age: 38
End: 2018-10-26

## 2018-10-26 ENCOUNTER — TELEPHONE (OUTPATIENT)
Dept: PEDIATRICS | Facility: CLINIC | Age: 38
End: 2018-10-26

## 2018-10-26 DIAGNOSIS — R07.89 CHEST WALL PAIN: Primary | ICD-10-CM

## 2018-10-26 RX ORDER — CYCLOBENZAPRINE HCL 10 MG
10 TABLET ORAL
Qty: 14 TABLET | Refills: 1 | Status: SHIPPED | OUTPATIENT
Start: 2018-10-26 | End: 2018-11-30

## 2018-10-26 NOTE — TELEPHONE ENCOUNTER
"INR 1.9 in ER. Pt reporting that ER MD advised her based on the Chest CT PE study on 10/24/18 showed a rib fracture on her \"back side\".     Pt stating that the Tylenol #3 that they gave her in the ED is not helping her pain, stating \"I can't sleep\".     Pt wondering why this was not seen earlier and who should manage her fracture and pain?    Per patient Dr. Alford \"knows her bones\".     ED advised patient to have a colonoscopy, pt to notify INR clinic when happening for holding instructions on Coumadin.    Patient wanting to be seen in clinic by Dr. Smith for this, however schedule full until next Wednesday. Pt has INR appt on Monday.    MD please advise.    Yasemin Page, RN, BSN        "

## 2018-10-26 NOTE — TELEPHONE ENCOUNTER
Patient requesting information be sent to Dr. Alford as she is going to try the muscle relaxer Flexeril over the weekend. Will call Dr. Alford's clinic back if pain is not improving after the weekend. Patient wondering if Dr. Alford could see her earlier than scheduled appointment in November.    Routing to Dr. Alford's team.    Yasemin Page, RN, BSN

## 2018-10-26 NOTE — TELEPHONE ENCOUNTER
Spoke with patient.   Providers note read as written.   Patient verbalized understanding.   No further questions at this time.   Patient will be returning to work Monday.     Sara Mccain RN

## 2018-10-26 NOTE — TELEPHONE ENCOUNTER
Patient states she did go to ER last night at advise of RN and would like to discuss this with you.  Please call.    Thank you.

## 2018-10-26 NOTE — TELEPHONE ENCOUNTER
Joe states that patient will be having a colonoscopy and they would like hold orders for the patient's warfarin.  Also asking if they can hold for 4 days and wants to know about bridging too.  Please call.    Thank you.

## 2018-10-26 NOTE — TELEPHONE ENCOUNTER
My impression is she does not need to be seen for further pain management and should continue previously discussed plan and avoid use of narcotics as able.  I don't believe that the old rib fracture is a new contributor to current pain or will have benefits that outweigh the risks from stronger pain pills.  OK to follow-up with me next week if pain is unbearable, but I expect the pain should continue to improve over the days and weeks to come.    OK to attempt muscle relaxant at bedtime for sleep if she'd prefer (something like cyclobenzaprine).      Always OK to get second opinion from Dr. Alford.

## 2018-10-26 NOTE — TELEPHONE ENCOUNTER
Called patient and informed her of Dr. Smith's message below.  Advised patient that medication was sent to [facility] pharmacy.    Pt requesting to know if she can go back to work on Monday? Advised patient that she could, pt wanted MD approval.    MD please advise if patient can return to work on Monday.    Yasemin Page, RN, BSN

## 2018-10-29 ENCOUNTER — OFFICE VISIT (OUTPATIENT)
Dept: ORTHOPEDICS | Facility: CLINIC | Age: 38
End: 2018-10-29
Payer: COMMERCIAL

## 2018-10-29 ENCOUNTER — RADIANT APPOINTMENT (OUTPATIENT)
Dept: CT IMAGING | Facility: CLINIC | Age: 38
End: 2018-10-29
Attending: PHYSICIAN ASSISTANT
Payer: COMMERCIAL

## 2018-10-29 ENCOUNTER — ANTICOAGULATION THERAPY VISIT (OUTPATIENT)
Dept: NURSING | Facility: CLINIC | Age: 38
End: 2018-10-29
Payer: COMMERCIAL

## 2018-10-29 ENCOUNTER — RADIANT APPOINTMENT (OUTPATIENT)
Dept: GENERAL RADIOLOGY | Facility: CLINIC | Age: 38
End: 2018-10-29
Attending: PHYSICIAN ASSISTANT
Payer: COMMERCIAL

## 2018-10-29 ENCOUNTER — TELEPHONE (OUTPATIENT)
Dept: ORTHOPEDICS | Facility: CLINIC | Age: 38
End: 2018-10-29

## 2018-10-29 VITALS
HEART RATE: 129 BPM | SYSTOLIC BLOOD PRESSURE: 145 MMHG | WEIGHT: 204.7 LBS | OXYGEN SATURATION: 98 % | BODY MASS INDEX: 37.44 KG/M2 | DIASTOLIC BLOOD PRESSURE: 89 MMHG

## 2018-10-29 DIAGNOSIS — I74.9 EMBOLISM AND THROMBOSIS (H): ICD-10-CM

## 2018-10-29 DIAGNOSIS — M79.672 LEFT FOOT PAIN: ICD-10-CM

## 2018-10-29 DIAGNOSIS — S22.31XS CLOSED FRACTURE OF ONE RIB OF RIGHT SIDE, SEQUELA: ICD-10-CM

## 2018-10-29 DIAGNOSIS — R10.9 FLANK PAIN, ACUTE: Primary | ICD-10-CM

## 2018-10-29 LAB — INR POINT OF CARE: 2.3 (ref 0.86–1.14)

## 2018-10-29 PROCEDURE — 73630 X-RAY EXAM OF FOOT: CPT | Mod: LT

## 2018-10-29 PROCEDURE — 99213 OFFICE O/P EST LOW 20 MIN: CPT | Performed by: ORTHOPAEDIC SURGERY

## 2018-10-29 PROCEDURE — 85610 PROTHROMBIN TIME: CPT | Mod: QW

## 2018-10-29 PROCEDURE — 99207 ZZC NO CHARGE NURSE ONLY: CPT

## 2018-10-29 PROCEDURE — 36416 COLLJ CAPILLARY BLOOD SPEC: CPT

## 2018-10-29 PROCEDURE — 73700 CT LOWER EXTREMITY W/O DYE: CPT | Mod: TC

## 2018-10-29 ASSESSMENT — PAIN SCALES - GENERAL: PAINLEVEL: SEVERE PAIN (6)

## 2018-10-29 NOTE — MR AVS SNAPSHOT
Mily PRASHANT Grullon   10/29/2018 3:00 PM   Anticoagulation Therapy Visit    Description:  38 year old female   Provider:  SERINA ANTI COAG   Department:  Be Nurse           INR as of 10/29/2018     Today's INR 2.3      Anticoagulation Summary as of 10/29/2018     INR goal 2.0-3.0   Today's INR 2.3   Full warfarin instructions 2.5 mg on Fri; 5 mg all other days   Next INR check 11/5/2018    Indications   Long-term (current) use of anticoagulants [Z79.01] [Z79.01]  Embolism and thrombosis (H) [I74.9] [I74.9]         Your next Anticoagulation Clinic appointment(s)     Oct 29, 2018  3:00 PM CDT   Anticoagulation Visit with BE ANTI COAG   [facility] [place] ([facility])    [address]  [place] [address]   [phone]            Nov 05, 2018  3:15 PM CST   Anticoagulation Visit with BE ANTI COAG   [facility] ([facility]    [address]  [place] [address]   [phone]              Contact Numbers     [facility]  Please call [phone] with any problems or questions regarding your therapy, or to cancel or reschedule your appointment.          October 2018 Details    Sun Mon Tue Wed Thu Fri Sat      1               2               3               4               5               6                 7               8               9               10               11               12               13                 14               15               16               17               18               19               20                 21               22               23               24               25               26               27                 28               29      5 mg   See details      30      5 mg         31      5 mg             Date Details   10/29 This INR check               How to take your warfarin dose     To take:  5 mg Take 1 of the 5 mg tablets.           November 2018 Details    Sun Mon Tue Wed Thu Fri Sat         1       5 mg         2      2.5 mg         3      5 mg           4      5 mg         5            6               7               8               9               10                 11               12               13               14               15               16               17                 18               19               20               21               22               23               24                 25               26               27               28               29               30                 Date Details   No additional details    Date of next INR:  11/5/2018         How to take your warfarin dose     To take:  2.5 mg Take 0.5 of a 5 mg tablet.    To take:  5 mg Take 1 of the 5 mg tablets.

## 2018-10-29 NOTE — LETTER
[facility]  [address]  [place] [address]  [phone]      WORKABILITY     [facility], Dr. Jose Alford M.D.  [place]           10/29/2018        RE: Mily Grullon     [address] [address]           To whom it may concern:      Mily Grullon is under my care for   1. Achilles tendinitis, left leg    2. Achilles tendinitis of left lower extremity          Date of injury: 10/2017           Return to work date: 7/16/18      ** WITH RESTRICTIONS? Yes, with work restrictions: * Other: Limit 8 hours per shift. Please allow sitting 20 minutes every hour.  DURATION OF LIMITATIONS: 8 weeks              Next appointment: 8 weeks           Jose Daniels PA-C, CAQ (Ortho)  Supervising Physician: Jose Alford M.D., M.S.  Dept. of Orthopaedic Surgery  [facility]

## 2018-10-29 NOTE — TELEPHONE ENCOUNTER
This patient has established care with Internal Medicine William Smith and has been managing the anticoagulation. I will forward for instructions. I have not been managing the coumadin.   Kristen Kehr PA-C

## 2018-10-29 NOTE — TELEPHONE ENCOUNTER
Reason for Call:  Other call back    Detailed comments: Patient was instructed to call and let Dr. Alford know when CT was done so he can get results. Please advise.     Phone Number Patient can be reached at: Home number on file [phone] (home)    Best Time: any     Can we leave a detailed message on this number? YES    Call taken on 10/29/2018 at 12:46 PM by Cipriano Green

## 2018-10-29 NOTE — TELEPHONE ENCOUNTER
"Pt arrived at INR clinic stating she \"may have a broken foot\". Going for a CT scan later today per Dr. Alford. Pt informing RN that she is scheduled for a colonoscopy in November and needs bridging instructions, see below messages for more detail.    Pt wanting to know what she should do for her shoulder pain/back pain. Pt stating she has not been to PT yet.    Advised MD would likely want her to go to PT and then see how she is doing. Pt requesting RN send information to Dr. Smith.    Yasemin Page, RN, BSN        "

## 2018-10-29 NOTE — Clinical Note
Mily may need another workup for osteoporosis, treatment if she has another stress fracture in her foot. Obtaining CT scan of her foot. TDW

## 2018-10-29 NOTE — MR AVS SNAPSHOT
After Visit Summary   10/29/2018    Mily Grullon    MRN: [id number]           Patient Information     Date Of Birth          1980        Visit Information        Provider Department      10/29/2018 9:30 AM Jose Alford MD [facility] [place]        Today's Diagnoses     Flank pain, acute    -  1    Left foot pain        Closed fracture of one rib of right side, sequela          Care Instructions    Patient to follow up with Primary Care provider regarding elevated blood pressure.            Follow-ups after your visit        Follow-up notes from your care team     Return if symptoms worsen or fail to improve.      Your next 10 appointments already scheduled     Oct 29, 2018  3:00 PM CDT   Anticoagulation Visit with BE ANTI COAG   [facility] [place] ([facility] [place])    [address]  [place] [address]   [phone]            Nov 05, 2018  3:15 PM CST   Anticoagulation Visit with BE ANTI COAG   [facility] [place] ([facility] [place])    [address]  [place] [address]   [phone]            Nov 12, 2018 11:00 AM CST   Return Visit with Jose Alford MD   [facility] [place] ([facility] [place])    [address]  [place] [address]   [phone]              Who to contact     If you have questions or need follow up information about today's clinic visit or your schedule please contact [facility] [place] directly at [phone].  Normal or non-critical lab and imaging results will be communicated to you by MyChart, letter or phone within 4 business days after the clinic has received the results. If you do not hear from us within 7 days, please contact the clinic through [employer] or phone. If you have a critical or abnormal lab result, we will notify you by phone as soon as possible.  Submit refill requests through [employer] or  call your pharmacy and they will forward the refill request to us. Please allow 3 business days for your refill to be completed.          Additional Information About Your Visit        Care EveryWhere ID     This is your Care EveryWhere ID. This could be used by other organizations to access your [facility] medical records  [id number]        Your Vitals Were     Pulse Pulse Oximetry BMI (Body Mass Index)             129 98% 37.44 kg/m2          Blood Pressure from Last 3 Encounters:   10/29/18 145/89   10/24/18 132/84   10/22/18 138/84    Weight from Last 3 Encounters:   10/29/18 204 lb 11.2 oz (92.9 kg)   10/24/18 205 lb (93 kg)   09/27/18 204 lb (92.5 kg)               Primary Care Provider Fax #    Provider Not In System [phone]                Equal Access to Services     JASSON VIZCARRA : Hadjosee Bliss, waralph luqadaha, qaybta kaalmada nithin, abdifatah fitzgerald . So [facility] [phone].    ATENCIÓN: Si habla español, tiene a lentz disposición servicios gratuitos de asistencia lingüística. Llame al [phone].    We comply with applicable federal civil rights laws and [place] laws. We do not discriminate on the basis of race, color, national origin, age, disability, sex, sexual orientation, or gender identity.            Thank you!     Thank you for choosing [facility]  for your care. Our goal is always to provide you with excellent care. Hearing back from our patients is one way we can continue to improve our services. Please take a few minutes to complete the written survey that you may receive in the mail after your visit with us. Thank you!             Your Updated Medication List - Protect others around you: Learn how to safely use, store and throw away your medicines at www.disposemymeds.org.          This list is accurate as of 10/29/18  1:59 PM.  Always use your most recent med list.                   Brand Name Dispense  Instructions for use Diagnosis    acetaminophen 325 MG tablet    TYLENOL    100 tablet    Take 2 tablets (650 mg) by mouth every 4 hours as needed for other (mild pain)    Orthopedic aftercare       acetaminophen-codeine 300-30 MG per tablet    TYLENOL #3    6 tablet    Take 0.5-1 tablets by mouth daily as needed for severe pain    Pain of right upper arm       cyclobenzaprine 10 MG tablet    FLEXERIL    14 tablet    Take 1 tablet (10 mg) by mouth nightly as needed for muscle spasms (pain, sleep)    Chest wall pain       medroxyPROGESTERone 150 MG/ML injection    DEPO-PROVERA    1 mL    Inject 1 mL (150 mg) into the muscle every 3 months    Encounter for surveillance of injectable contraceptive       nortriptyline 50 MG capsule    PAMELOR     Take 100 mg by mouth At Bedtime    Vertigo, Other fatigue, Weight gain       pantoprazole 20 MG EC tablet    PROTONIX    90 tablet    Take by mouth 30-60 minutes before a meal.    Gastroesophageal reflux disease, esophagitis presence not specified       sucralfate 1 GM tablet    CARAFATE    60 tablet    Take 1 tablet (1 g) by mouth 4 times daily    Gastritis without bleeding, unspecified chronicity, unspecified gastritis type       tiZANidine 2 MG tablet    ZANAFLEX    90 tablet    Take 1 tablet (2 mg) by mouth 3 times daily as needed for muscle spasms    Achilles tendinitis of left lower extremity       traZODone 50 MG tablet    DESYREL          warfarin 5 MG tablet    COUMADIN    30 tablet    Take 1 tablet (5 mg) by mouth daily Then as directed by INR nurse    Acute cephalic vein thrombosis, right

## 2018-10-29 NOTE — PROGRESS NOTES
ANTICOAGULATION FOLLOW-UP CLINIC VISIT    Patient Name:  Mily Grullon  Date:  10/29/2018  Contact Type:  Face to Face    SUBJECTIVE:     Patient Findings     Positives Blood in stool, Initiation of therapy    Comments Pt went to ER on 10/25/18 for blood in stool per INR RN advice. Referred to have colonoscopy. Pt stating that colonoscopy people want 's approval and questioning hold for Colonoscopy.  INR 2.3, will continue same dosing and recheck in 1 week.           OBJECTIVE    INR Protime   Date Value Ref Range Status   10/29/2018 2.3 (A) 0.86 - 1.14 Final       ASSESSMENT / PLAN  INR assessment THER    Recheck INR In: 1 WEEK    INR Location Clinic      Anticoagulation Summary as of 10/29/2018     INR goal 2.0-3.0   Today's INR 2.3   Warfarin maintenance plan 2.5 mg (5 mg x 0.5) on Fri; 5 mg (5 mg x 1) all other days   Full warfarin instructions 2.5 mg on Fri; 5 mg all other days   Weekly warfarin total 32.5 mg   No change documented Yasemin Page RN   Plan last modified Yasemin Page RN (10/22/2018)   Next INR check 11/5/2018   Target end date 11/19/2018    Indications   Long-term (current) use of anticoagulants [Z79.01] [Z79.01]  Embolism and thrombosis (H) [I74.9] [I74.9]         Anticoagulation Episode Summary     INR check location     Preferred lab     Send INR reminders to BE ANTICOAG CLINIC    Comments       Anticoagulation Care Providers     Provider Role Specialty Phone number    William Smith MD [facility] Internal Medicine [phone]            See the Encounter Report to view Anticoagulation Flowsheet and Dosing Calendar (Go to Encounters tab in chart review, and find the Anticoagulation Therapy Visit)        Yasemin Page, KOBE

## 2018-10-29 NOTE — PROGRESS NOTES
CHIEF COMPLAINT:   Chief Complaint   Patient presents with     Chest - Pain     Pain     right back pain - was seen in ER on 10/24/18 for blood clot had a CT done and it showed subacute posterior right 11th rib fracture. She is experiencing pain but no injury.     HISTORY:  Mily Grullon is a 38 year old female, right -hand dominant, who is seen for right flank pain. Symptoms have been present for about 1 week. Was seen in ER on 10/24/2018 for potential blood clot. CT was done. Mid right back pain today, severe pain, 6/10. Pain with using the arm. Also has pain with any twisting, coughing or sneezing. Has not had any treatments other than Tylenol #3. Denies incontinence. History of 11th rib fracture at some time in the past, patient never recalls.     Also has continued left foot pain today. She has problems standing 8 hours a day. Known osteoporosis but currently on any medication.      Onset: no known injury.  Symptoms have been worsening since that time.  Aggravated by: with shoulder range of motion, twisting of the back  Relieved by: rest, Tylenol #3  Present symptoms: pain with activities of daily living,   Pain location: mid back, metatarsophalangeal joint  Pain severity: 6/10  Pain quality: aching and sharp  Frequency of symptoms: frequently  Associated symptoms: none    Treatment up to this point: rest, Tylenol #3  Has not tried: surgery, physical therapy   Prior history of related problems: history of calcaneal stress fracture in the left foot.     Significant Orthopedic past medical history: history of calcaneal stress fracture in the left foot.   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  Patient Active Problem List    Diagnosis Date Noted     Obesity (BMI  35.0-39.9) with comorbidity (H) 10/24/2018     Priority: Medium     Superficial phlebitis 09/27/2018     Priority: Medium     Long-term (current) use of anticoagulants [Z79.01] 09/19/2018     Priority: Medium     Embolism and thrombosis (H) [I74.9] 09/19/2018     Priority: Medium     Gastroesophageal reflux disease, esophagitis presence not specified 09/10/2018     Priority: Medium     Closed nondisplaced fracture of body of left calcaneus with delayed healing, subsequent encounter 03/09/2018     Priority: Medium     Heel pain, chronic, left 03/09/2018     Priority: Medium     Pain in joint, ankle and foot, left 11/15/2017     Priority: Medium     Abnormal gait 07/21/2016     Priority: Medium     Migraine without aura and without status migrainosus, not intractable 11/10/2015     Priority: Medium     Abnormality of gait 06/09/2014     Priority: Medium     Other postprocedural status(V45.89) 03/10/2014     Priority: Medium     Closed nondisplaced intertrochanteric fracture of left femur (H) 02/10/2014     Priority: Medium     Senile osteoporosis 12/10/2013     Priority: Medium     Headache 10/31/2011     Priority: Medium     Problem list name updated by automated process. Provider to review       Gastritis      Priority: Medium     dx 2010- sees Dr Glover in [facility]       CARDIOVASCULAR SCREENING; LDL GOAL LESS THAN 160 10/31/2010     Priority: Medium     Left elbow pain 01/08/2010     Priority: Medium     Narcotic agreement on file       Personal history of nicotine dependence 12/17/2009     Priority: Medium       Surgical Hx:  has a past surgical history that includes REPAIR TRIANGULAR CART,WRIST JT (2005); REPAIR TRIANGULAR CART,WRIST JT (2007 or so); arthroscopy of joint unlisted (4/2004); REMOVAL OF OVARY/TUBE(S) (6/2003); hysteroscopy; lithotripsy; and Arthroscopy hip, osteoplasty femur proximal, combined (Left, 6/29/2016).    Medications:   Current Outpatient Prescriptions:      acetaminophen  (TYLENOL) 325 MG tablet, Take 2 tablets (650 mg) by mouth every 4 hours as needed for other (mild pain), Disp: 100 tablet, Rfl: 0     cyclobenzaprine (FLEXERIL) 10 MG tablet, Take 1 tablet (10 mg) by mouth nightly as needed for muscle spasms (pain, sleep), Disp: 14 tablet, Rfl: 1     medroxyPROGESTERone (DEPO-PROVERA) 150 MG/ML injection, Inject 1 mL (150 mg) into the muscle every 3 months, Disp: 1 mL, Rfl: 3     nortriptyline (PAMELOR) 50 MG capsule, Take 100 mg by mouth At Bedtime, Disp: , Rfl:      pantoprazole (PROTONIX) 20 MG EC tablet, Take by mouth 30-60 minutes before a meal., Disp: 90 tablet, Rfl: 1     sucralfate (CARAFATE) 1 GM tablet, Take 1 tablet (1 g) by mouth 4 times daily, Disp: 60 tablet, Rfl: 1     tiZANidine (ZANAFLEX) 2 MG tablet, Take 1 tablet (2 mg) by mouth 3 times daily as needed for muscle spasms, Disp: 90 tablet, Rfl: 1     traZODone (DESYREL) 50 MG tablet, , Disp: , Rfl:      warfarin (COUMADIN) 5 MG tablet, Take 1 tablet (5 mg) by mouth daily Then as directed by INR nurse, Disp: 30 tablet, Rfl: 1     acetaminophen-codeine (TYLENOL #3) 300-30 MG per tablet, Take 0.5-1 tablets by mouth daily as needed for severe pain (Patient not taking: Reported on 10/29/2018), Disp: 6 tablet, Rfl: 0    Allergies:   Allergies   Allergen Reactions     Amitriptyline Hives     Amoxicillin Diarrhea     Asa [Dihydroxyaluminum Aminoacetate]      Cipro [Ciprofloxacin]      Dizziness, vomiting, shortness of breath     Ibuprofen [Aspartame-Ibuprofen]      GI distress       Lyrica      extrematies swell up      Morphine      ithcy     Naproxen      GI distress     Neurontin [Gabapentin]      rash     Paxil [Paroxetine] Anaphylaxis     anaphylaxis     Phenergan [Promethazine Hcl]      dystonia     Prilosec [Omeprazole]      Rash, dizziness     Gabapentin Rash       Social Hx: works at [employer].  reports that she quit smoking about 4 years ago. Her smoking use included Cigarettes. She has never used smokeless  tobacco. She reports that she drinks alcohol. She reports that she does not use illicit drugs.    Family Hx: family history includes Cancer in her paternal grandmother; Genitourinary Problems in her maternal grandmother; Hypertension in her father and maternal grandmother; Lipids in her father. There is no history of Asthma, C.A.D., Diabetes, Cerebrovascular Disease, Breast Cancer, Cancer - colorectal, Prostate Cancer, Alzheimer Disease, Arthritis, Blood Disease, Circulatory, Eye Disorder, GASTROINTESTINAL DISEASE, Musculoskeletal Disorder, Neurologic Disorder, Respiratory, or Thyroid Disease..    REVIEW OF SYSTEMS:   CONSTITUTIONAL:NEGATIVE for fever, chills, change in weight  INTEGUMENTARY/SKIN: NEGATIVE for worrisome rashes, moles or lesions  MUSCULOSKELETAL:See HPI above  NEURO: NEGATIVE for weakness, dizziness or paresthesias    This document serves as a record of the services and decisions personally performed and made by Jose Alford MD. It was created on his behalf by Ashlee Linn, a trained medical scribe. The creation of this document is based the provider's statements to the medical scribe.    Scribe Ashlee Linn 9:35 AM 10/29/2018    PHYSICAL EXAM:  /89 (BP Location: Left arm, Patient Position: Sitting, Cuff Size: Adult Large)  Pulse 129  Wt 204 lb 11.2 oz (92.9 kg)  SpO2 98%  BMI 37.44 kg/m2   GENERAL APPEARANCE: healthy, alert, no distress  SKIN: no suspicious lesions or rashes  NEURO: Normal strength and tone, mentation intact and speech normal  PSYCH:  mentation appears normal and affect normal, not anxious  RESPIRATORY: No increased work of breathing.  VASCULAR: Radial pulses 2+ and brisk cappillary refill   HANDS: no clubbing or nail pitting, no nodes    MUSCULOSKELETAL:    BILATERAL UPPER EXTREMITY:  Sensation intact to light touch in median, radial, ulnar and axillary nerve distributions  Palpable 2+ radial pulse, brisk capillary refill to all fingers, wwp  Intact epl fpl fdp edc wrist  flexion/extension biceps triceps deltoid    BACK:  Tenderness along the right low back, flank, lower right ribs  Nontender to palpation midline low back, buttock.    Left Foot  Inspection: skin intact. No erythema or ecchymosis.   Swelling: none   Tender: plantar medial aspect of the metatarsophalangeal joint of the great toe  Nontender to palpation: great toe TMT, IP joint, remainder of midfoot and forefoot.  Range of Motion:grossly intact  Strength: intact      X-RAY INTERPRETATION: 3 views left foot obtained 10/29/2018 were reviewed personally in clinic today with the patient. On my review, there is possible lucency of the tibial sesamoid with sclerosis of the great toe MTP joint    CT chest 10/24/2018: Subacute posterior right 11th rib fracture    CT chest / abdomen 7/28/2018 [facility]: similar subacute right 11th rib fractures noted at that time upon my review today, although not noted in official radiology report.    ASSESSMENT: Mily Grullon is a 38 year old female, with   1. Acute right flank/low back pain.  2. Acute left foot pain, possible tibial sesamoid fracture.    PLAN:   FLANK pain  * likely not an acute fracture, as on recent CT scan fracture appears healed and looks similar in appearance to the CT scan from 7/28/2018 obtained at [facility].  * likely more muscular in nature, low back, or possibly intra-abdominal/kidney.  * recommend f/up with primary care provider.  * continue with lidoderm patches, over the counter pain control, core stretching/exercises.  * does not appear to be orthopaedic in nature from my practice standpoint.    LEFT FOOT  * Reviewed imaging with patient. Also, clinical exam findings.  * Discussed with patient that based on physical exam findings, it is not possible to completely rule out stress fracture of the tibial sesamoid under the MTP joint of the big toe  * An CT of the left foot was ordered for further evaluation.     * treatment is protected weight bearing with postoperative  shoe if indeed a fracture, possible podiatry consultation.  * Rest  * Activity modification - avoid activities that aggravate symptoms.  * NSAIDS - regular use for inflammation, with food, as long as no contra-indications. Please discuss with pcp if needed.  * Ice twice daily to three times daily.  * Tylenol as needed for pain    * After having the CT, I would like the patient to call me so that we can discuss the results as well as how to proceed.        The information in this document, created by a scribe for me, accurately reflects the services I personally performed and the decisions made by me. I have reviewed and approved this document for accuracy.     Jose Alford M.D., M.S.  Dept. of Orthopaedic Surgery  [facility] [place]

## 2018-10-29 NOTE — LETTER
10/29/2018         RE: Mily Grullon  [address] [facility] [address]        Dear Colleague,    Thank you for referring your patient, Mily Grullon, to the [facility]. Please see a copy of my visit note below.    CHIEF COMPLAINT:   Chief Complaint   Patient presents with     Chest - Pain     Pain     right back pain - was seen in ER on 10/24/18 for blood clot had a CT done and it showed subacute posterior right 11th rib fracture. She is experiencing pain but no injury.     HISTORY:  Mily Grullon is a 38 year old female, right -hand dominant, who is seen for right flank pain. Symptoms have been present for about 1 week. Was seen in ER on 10/24/2018 for potential blood clot. CT was done. Mid right back pain today, severe pain, 6/10. Pain with using the arm. Also has pain with any twisting, coughing or sneezing. Has not had any treatments other than Tylenol #3. Denies incontinence. History of 11th rib fracture at some time in the past, patient never recalls.     Also has continued left foot pain today. She has problems standing 8 hours a day. Known osteoporosis but currently on any medication.      Onset: no known injury.  Symptoms have been worsening since that time.  Aggravated by: with shoulder range of motion, twisting of the back  Relieved by: rest, Tylenol #3  Present symptoms: pain with activities of daily living,   Pain location: mid back, metatarsophalangeal joint  Pain severity: 6/10  Pain quality: aching and sharp  Frequency of symptoms: frequently  Associated symptoms: none    Treatment up to this point: rest, Tylenol #3  Has not tried: surgery, physical therapy   Prior history of related problems: history of calcaneal stress fracture in the left foot.     Significant Orthopedic past medical history: history of calcaneal stress fracture in the left foot.   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  Patient Active Problem List    Diagnosis Date Noted     Obesity (BMI 35.0-39.9) with comorbidity (H) 10/24/2018     Priority: Medium     Superficial phlebitis 09/27/2018     Priority: Medium     Long-term (current) use of anticoagulants [Z79.01] 09/19/2018     Priority: Medium     Embolism and thrombosis (H) [I74.9] 09/19/2018     Priority: Medium     Gastroesophageal reflux disease, esophagitis presence not specified 09/10/2018     Priority: Medium     Closed nondisplaced fracture of body of left calcaneus with delayed healing, subsequent encounter 03/09/2018     Priority: Medium     Heel pain, chronic, left 03/09/2018     Priority: Medium     Pain in joint, ankle and foot, left 11/15/2017     Priority: Medium     Abnormal gait 07/21/2016     Priority: Medium     Migraine without aura and without status migrainosus, not intractable 11/10/2015     Priority: Medium     Abnormality of gait 06/09/2014     Priority: Medium     Other postprocedural status(V45.89) 03/10/2014     Priority: Medium     Closed nondisplaced intertrochanteric fracture of left femur (H) 02/10/2014     Priority: Medium     Senile osteoporosis 12/10/2013     Priority: Medium     Headache 10/31/2011     Priority: Medium     Problem list name updated by automated process. Provider to review       Gastritis      Priority: Medium     dx 2010- sees Dr Glover in [facility]       CARDIOVASCULAR SCREENING; LDL GOAL LESS THAN 160 10/31/2010     Priority: Medium     Left elbow pain 01/08/2010     Priority: Medium     Narcotic agreement on file       Personal history of nicotine dependence 12/17/2009     Priority: Medium       Surgical Hx:  has a past surgical history that includes REPAIR TRIANGULAR CART,WRIST JT (2005); REPAIR TRIANGULAR CART,WRIST JT (2007 or  so); arthroscopy of joint unlisted (4/2004); REMOVAL OF OVARY/TUBE(S) (6/2003); hysteroscopy; lithotripsy; and Arthroscopy hip, osteoplasty femur proximal, combined (Left, 6/29/2016).    Medications:   Current Outpatient Prescriptions:      acetaminophen (TYLENOL) 325 MG tablet, Take 2 tablets (650 mg) by mouth every 4 hours as needed for other (mild pain), Disp: 100 tablet, Rfl: 0     cyclobenzaprine (FLEXERIL) 10 MG tablet, Take 1 tablet (10 mg) by mouth nightly as needed for muscle spasms (pain, sleep), Disp: 14 tablet, Rfl: 1     medroxyPROGESTERone (DEPO-PROVERA) 150 MG/ML injection, Inject 1 mL (150 mg) into the muscle every 3 months, Disp: 1 mL, Rfl: 3     nortriptyline (PAMELOR) 50 MG capsule, Take 100 mg by mouth At Bedtime, Disp: , Rfl:      pantoprazole (PROTONIX) 20 MG EC tablet, Take by mouth 30-60 minutes before a meal., Disp: 90 tablet, Rfl: 1     sucralfate (CARAFATE) 1 GM tablet, Take 1 tablet (1 g) by mouth 4 times daily, Disp: 60 tablet, Rfl: 1     tiZANidine (ZANAFLEX) 2 MG tablet, Take 1 tablet (2 mg) by mouth 3 times daily as needed for muscle spasms, Disp: 90 tablet, Rfl: 1     traZODone (DESYREL) 50 MG tablet, , Disp: , Rfl:      warfarin (COUMADIN) 5 MG tablet, Take 1 tablet (5 mg) by mouth daily Then as directed by INR nurse, Disp: 30 tablet, Rfl: 1     acetaminophen-codeine (TYLENOL #3) 300-30 MG per tablet, Take 0.5-1 tablets by mouth daily as needed for severe pain (Patient not taking: Reported on 10/29/2018), Disp: 6 tablet, Rfl: 0    Allergies:   Allergies   Allergen Reactions     Amitriptyline Hives     Amoxicillin Diarrhea     Asa [Dihydroxyaluminum Aminoacetate]      Cipro [Ciprofloxacin]      Dizziness, vomiting, shortness of breath     Ibuprofen [Aspartame-Ibuprofen]      GI distress       Lyrica      extrematies swell up      Morphine      ithcy     Naproxen      GI distress     Neurontin [Gabapentin]      rash     Paxil [Paroxetine] Anaphylaxis     anaphylaxis     Phenergan  [Promethazine Hcl]      dystonia     Prilosec [Omeprazole]      Rash, dizziness     Gabapentin Rash       Social Hx: works at [employer].  reports that she quit smoking about 4 years ago. Her smoking use included Cigarettes. She has never used smokeless tobacco. She reports that she drinks alcohol. She reports that she does not use illicit drugs.    Family Hx: family history includes Cancer in her paternal grandmother; Genitourinary Problems in her maternal grandmother; Hypertension in her father and maternal grandmother; Lipids in her father. There is no history of Asthma, C.A.D., Diabetes, Cerebrovascular Disease, Breast Cancer, Cancer - colorectal, Prostate Cancer, Alzheimer Disease, Arthritis, Blood Disease, Circulatory, Eye Disorder, GASTROINTESTINAL DISEASE, Musculoskeletal Disorder, Neurologic Disorder, Respiratory, or Thyroid Disease..    REVIEW OF SYSTEMS:   CONSTITUTIONAL:NEGATIVE for fever, chills, change in weight  INTEGUMENTARY/SKIN: NEGATIVE for worrisome rashes, moles or lesions  MUSCULOSKELETAL:See HPI above  NEURO: NEGATIVE for weakness, dizziness or paresthesias    This document serves as a record of the services and decisions personally performed and made by Jose Alford MD. It was created on his behalf by Ashlee Linn, a trained medical scribe. The creation of this document is based the provider's statements to the medical scribe.    Scribe Ashlee Linn 9:35 AM 10/29/2018    PHYSICAL EXAM:  /89 (BP Location: Left arm, Patient Position: Sitting, Cuff Size: Adult Large)  Pulse 129  Wt 204 lb 11.2 oz (92.9 kg)  SpO2 98%  BMI 37.44 kg/m2   GENERAL APPEARANCE: healthy, alert, no distress  SKIN: no suspicious lesions or rashes  NEURO: Normal strength and tone, mentation intact and speech normal  PSYCH:  mentation appears normal and affect normal, not anxious  RESPIRATORY: No increased work of breathing.  VASCULAR: Radial pulses 2+ and brisk cappillary refill   HANDS: no clubbing or nail pitting,  no nodes    MUSCULOSKELETAL:    BILATERAL UPPER EXTREMITY:  Sensation intact to light touch in median, radial, ulnar and axillary nerve distributions  Palpable 2+ radial pulse, brisk capillary refill to all fingers, wwp  Intact epl fpl fdp edc wrist flexion/extension biceps triceps deltoid    BACK:  Tenderness along the right low back, flank, lower right ribs  Nontender to palpation midline low back, buttock.    Left Foot  Inspection: skin intact. No erythema or ecchymosis.   Swelling: none   Tender: plantar medial aspect of the metatarsophalangeal joint of the great toe  Nontender to palpation: great toe TMT, IP joint, remainder of midfoot and forefoot.  Range of Motion:grossly intact  Strength: intact      X-RAY INTERPRETATION: 3 views left foot obtained 10/29/2018 were reviewed personally in clinic today with the patient. On my review, there is possible lucency of the tibial sesamoid with sclerosis of the great toe MTP joint    CT chest 10/24/2018: Subacute posterior right 11th rib fracture    CT chest / abdomen 7/28/2018 [facility]: similar subacute right 11th rib fractures noted at that time upon my review today, although not noted in official radiology report.    ASSESSMENT: Mily Grullon is a 38 year old female, with   1. Acute right flank/low back pain.  2. Acute left foot pain, possible tibial sesamoid fracture.    PLAN:   FLANK pain  * likely not an acute fracture, as on recent CT scan fracture appears healed and looks similar in appearance to the CT scan from 7/28/2018 obtained at [facility].  * likely more muscular in nature, low back, or possibly intra-abdominal/kidney.  * recommend f/up with primary care provider.  * continue with lidoderm patches, over the counter pain control, core stretching/exercises.  * does not appear to be orthopaedic in nature from my practice standpoint.    LEFT FOOT  * Reviewed imaging with patient. Also, clinical exam findings.  * Discussed with patient that based on physical exam  findings, it is not possible to completely rule out stress fracture of the tibial sesamoid under the MTP joint of the big toe  * An CT of the left foot was ordered for further evaluation.     * treatment is protected weight bearing with postoperative shoe if indeed a fracture, possible podiatry consultation.  * Rest  * Activity modification - avoid activities that aggravate symptoms.  * NSAIDS - regular use for inflammation, with food, as long as no contra-indications. Please discuss with pcp if needed.  * Ice twice daily to three times daily.  * Tylenol as needed for pain    * After having the CT, I would like the patient to call me so that we can discuss the results as well as how to proceed.        The information in this document, created by a scribe for me, accurately reflects the services I personally performed and the decisions made by me. I have reviewed and approved this document for accuracy.     Jose Alford M.D., M.S.  Dept. of Orthopaedic Surgery  [facility]      Again, thank you for allowing me to participate in the care of your patient.        Sincerely,        Jose Alford MD

## 2018-10-30 ENCOUNTER — TELEPHONE (OUTPATIENT)
Dept: INTERNAL MEDICINE | Facility: CLINIC | Age: 38
End: 2018-10-30

## 2018-10-30 NOTE — TELEPHONE ENCOUNTER
Hold orders with no bridging given to [facility].  Radha [facility] RN  Left message on voice mail for patient to call clinic. [phone]

## 2018-10-30 NOTE — TELEPHONE ENCOUNTER
Musculoskeletal complaints taken under advisement  Patient should continue with physical therapy   OK to have 4 day hold of warfarin anticoagulation for colonoscopy without bridging required

## 2018-10-30 NOTE — TELEPHONE ENCOUNTER
Per Dr. Alford: continue with lidoderm patches, over the counter pain control (sky acetaminophen), and core stretching/exercises.    No other immediate recommendations for improvement  Should have therapy specifically for these complaints    OK to follow-up in 2-4 weeks to discuss

## 2018-10-30 NOTE — TELEPHONE ENCOUNTER
Patient tried working today and she is having a lot of back and arm pain. Please advise. 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 Ok to leave a message.

## 2018-10-30 NOTE — TELEPHONE ENCOUNTER
Left message on voice mail for patient to call clinic. [phone]  Radha [facility] RN

## 2018-10-31 ENCOUNTER — TELEPHONE (OUTPATIENT)
Dept: NURSING | Facility: CLINIC | Age: 38
End: 2018-10-31

## 2018-10-31 ENCOUNTER — TELEPHONE (OUTPATIENT)
Dept: ORTHOPEDICS | Facility: CLINIC | Age: 38
End: 2018-10-31

## 2018-10-31 ENCOUNTER — TELEPHONE (OUTPATIENT)
Dept: FAMILY MEDICINE | Facility: CLINIC | Age: 38
End: 2018-10-31

## 2018-10-31 ENCOUNTER — OFFICE VISIT (OUTPATIENT)
Dept: INTERNAL MEDICINE | Facility: CLINIC | Age: 38
End: 2018-10-31
Payer: COMMERCIAL

## 2018-10-31 VITALS
RESPIRATION RATE: 16 BRPM | DIASTOLIC BLOOD PRESSURE: 84 MMHG | TEMPERATURE: 98.7 F | HEART RATE: 105 BPM | SYSTOLIC BLOOD PRESSURE: 159 MMHG

## 2018-10-31 DIAGNOSIS — M80.00XA OSTEOPOROSIS WITH CURRENT PATHOLOGICAL FRACTURE, UNSPECIFIED OSTEOPOROSIS TYPE, INITIAL ENCOUNTER: Primary | ICD-10-CM

## 2018-10-31 DIAGNOSIS — M79.621 PAIN OF RIGHT UPPER ARM: ICD-10-CM

## 2018-10-31 DIAGNOSIS — M81.0 OSTEOPOROSIS OF MULTIPLE SITES: Primary | ICD-10-CM

## 2018-10-31 DIAGNOSIS — M25.572 PAIN IN JOINT, ANKLE AND FOOT, LEFT: ICD-10-CM

## 2018-10-31 PROCEDURE — 99214 OFFICE O/P EST MOD 30 MIN: CPT | Performed by: INTERNAL MEDICINE

## 2018-10-31 NOTE — TELEPHONE ENCOUNTER
I left a detailed VM for Mily to wear a post-op shoe or CAM boot. She should, I believe still have a boot from her prior ankle/foot problems. She can wear that if she has it, or we can fit her in clinic. She should follow up in 6 weeks with Dr. Jose Alford.    Jose Daniels PA-C, CAQ (Ortho)  Supervising Physician: Jose Alford M.D., M.S.  Dept. of Orthopaedic Surgery  [facility]

## 2018-10-31 NOTE — TELEPHONE ENCOUNTER
Reason for Call:  Other call back    Detailed comments: Patient is calling back saying she has left foot swelling and needs a boot. Please call back    Phone Number Patient can be reached at: Home number on file [phone] (home)    Best Time: any    Can we leave a detailed message on this number? YES    Call taken on 10/31/2018 at 10:50 AM by Cony George

## 2018-10-31 NOTE — TELEPHONE ENCOUNTER
Spoke with patient and provider message read below. Patient verbalized understanding.   No questions at this time.    Sara Mccain, RN, BSN, PHN

## 2018-10-31 NOTE — TELEPHONE ENCOUNTER
Spoke with patient and provider message read below. Patient verbalized understanding.   No questions at this time.

## 2018-10-31 NOTE — TELEPHONE ENCOUNTER
Patient is calling stating requesting medication for pain for foot fracture, states seen provider Dr Vargas but declined to prescribe any medication and would have to go through pcp. Please call to discuss. Thank you.

## 2018-10-31 NOTE — TELEPHONE ENCOUNTER
She has osteoporosis previously had consultation with Endocrine, but then that Endocrinologist left [place] and she was lost to follow up. We have also discussed that the depo provera can contribute and she should be on a different contraception. I am happy to have her come see me to discuss other contraceptives and will place the referral for Endocrinology. She can make an appointment. Kristen Kehr PA-C

## 2018-10-31 NOTE — TELEPHONE ENCOUNTER
Spoke with VENKAT Daniels PA-C, Orthopedist, discussed Kristen Kehr, PA-C is not treating foot fracture therefore will not provide pain medication.  Per [facility], spoke with Dr Nicholas pain provider who had advise no pain medication.   Patient is informed due to Kristen Kehr, PA-C not treating foot fracture will need to contact Ortho, however per the prior conversation they were not going to treat with pain medication.   Verbalized good understanding.     Per protocol, will route encounter to be cosigned by provider for Verbal Orders.  Mariza Rinaldi RN

## 2018-10-31 NOTE — TELEPHONE ENCOUNTER
I left another VM for Mily letting her know that she can swing into clinic this afternoon at  or tomorrow at [facility] in the afternoon to get fitted for a brace.    Jose Daniels PA-C, CASHARLENE (Ortho)  Supervising Physician: Jose Alford M.D., M.S.  Dept. of Orthopaedic Surgery  [facility]

## 2018-10-31 NOTE — MR AVS SNAPSHOT
After Visit Summary   10/31/2018    Mily Grullon    MRN: [id number]           Patient Information     Date Of Birth          1980        Visit Information        Provider Department      10/31/2018 3:30 PM William Smith MD [facility]        Today's Diagnoses     Osteoporosis of multiple sites    -  1    Pain of right upper arm           Follow-ups after your visit        Additional Services     ENDOCRINOLOGY ADULT REFERRAL       Your provider has referred you to: [facility]:  [place] [facility] [phone]  [email or web address]      Please be aware that coverage of these services is subject to the terms and limitations of your health insurance plan.  Call member services at your health plan with any benefit or coverage questions.      Please bring the following to your appointment:    >>   Any x-rays, CTs or MRIs which have been performed.  Contact the facility where they were done to arrange for  prior to your scheduled appointment.    >>   List of current medications   >>   This referral request   >>   Any documents/labs given to you for this referral                  Follow-up notes from your care team     Return in about 3 months (around 1/31/2019).      Your next 10 appointments already scheduled     Nov 05, 2018  3:15 PM CST   Anticoagulation Visit with [facility] ANTI COAG   [facility] [place] ([facility]    [address]  [place] [address]   [phone]            Nov 08, 2018  2:30 PM CST   (Arrive by 2:15 PM)   CLE[facility] Spine with Moris Moreno PT   [facility] [place] [facility] ([facility] [place])    [address]  [address]  [place] [address]   [phone]            Nov 12, 2018 11:00 AM CST   Return Visit with Jose Alford MD   [facility] [place] ([facility])    [address]  [address]  [place] [address]    [phone]              Who to contact     Normal or non-critical lab and imaging results will be communicated to you by MyChart, letter or phone within 4 business days after the clinic has received the results. If you do not hear from us within 7 days, please contact the clinic through MyChart or phone. If you have a critical or abnormal lab result, we will notify you by phone as soon as possible.  Submit refill requests through [employer] or call your pharmacy and they will forward the refill request to us. Please allow 3 business days for your refill to be completed.          If you need to speak with a  for additional information , please call: [phone]             Additional Information About Your Visit        Care EveryWhere ID     This is your Care EveryWhere ID. This could be used by other organizations to access your [place] medical records  [id number]        Your Vitals Were     Pulse Temperature Respirations             118 98.7  F (37.1  C) (Tympanic) 16          Blood Pressure from Last 3 Encounters:   10/31/18 (!) 160/96   10/29/18 145/89   10/24/18 132/84    Weight from Last 3 Encounters:   10/29/18 204 lb 11.2 oz (92.9 kg)   10/24/18 205 lb (93 kg)   09/27/18 204 lb (92.5 kg)              We Performed the Following     ENDOCRINOLOGY ADULT REFERRAL          Where to get your medicines      Some of these will need a paper prescription and others can be bought over the counter.  Ask your nurse if you have questions.     Bring a paper prescription for each of these medications     acetaminophen-codeine 300-30 MG per tablet         Information about OPIOIDS     PRESCRIPTION OPIOIDS: WHAT YOU NEED TO KNOW   We gave you an opioid (narcotic) pain medicine. It is important to manage your pain, but opioids are not always the best choice. You should first try all the other options your care team gave you. Take this medicine for as short a time (and as few doses) as possible.    Some  activities can increase your pain, such as bandage changes or therapy sessions. It may help to take your pain medicine 30 to 60 minutes before these activities. Reduce your stress by getting enough sleep, working on hobbies you enjoy and practicing relaxation or meditation. Talk to your care team about ways to manage your pain beyond prescription opioids.    These medicines have risks:    DO NOT drive when on new or higher doses of pain medicine. These medicines can affect your alertness and reaction times, and you could be arrested for driving under the influence (DUI). If you need to use opioids long-term, talk to your care team about driving.    DO NOT operate heavy machinery    DO NOT do any other dangerous activities while taking these medicines.    DO NOT drink any alcohol while taking these medicines.     If the opioid prescribed includes acetaminophen, DO NOT take with any other medicines that contain acetaminophen. Read all labels carefully. Look for the word  acetaminophen  or  Tylenol.  Ask your pharmacist if you have questions or are unsure.    You can get addicted to pain medicines, especially if you have a history of addiction (chemical, alcohol or substance dependence). Talk to your care team about ways to reduce this risk.    All opioids tend to cause constipation. Drink plenty of water and eat foods that have a lot of fiber, such as fruits, vegetables, prune juice, apple juice and high-fiber cereal. Take a laxative (Miralax, milk of magnesia, Colace, Senna) if you don t move your bowels at least every other day. Other side effects include upset stomach, sleepiness, dizziness, throwing up, tolerance (needing more of the medicine to have the same effect), physical dependence and slowed breathing.    Store your pills in a secure place, locked if possible. We will not replace any lost or stolen medicine. If you don t finish your medicine, please throw away (dispose) as directed by your pharmacist. The  [place] Pollution Control Agency has more information about safe disposal: [email or web address]         Primary Care Provider Office Phone # Fax #    William Smith [phone]       [facility] [address]  [facility] [address]        Equal Access to Services     [facility]: Hadii aad ku hadasho Soomaali, waaxda luqadaha, qaybta kaalmada adeegyada, waxay idiin hayaan adeeg kharash la'aan . So [facility] [phone].    ATENCIÓN: Si habla español, tiene a lentz disposición servicios gratuitos de asistencia lingüística. Rosa al [phone].    We comply with applicable federal civil rights laws and [place] laws. We do not discriminate on the basis of race, color, national origin, age, disability, sex, sexual orientation, or gender identity.            Thank you!     Thank you for choosing [facility]  for your care. Our goal is always to provide you with excellent care. Hearing back from our patients is one way we can continue to improve our services. Please take a few minutes to complete the written survey that you may receive in the mail after your visit with us. Thank you!             Your Updated Medication List - Protect others around you: Learn how to safely use, store and throw away your medicines at www.disposemymeds.org.          This list is accurate as of 10/31/18  4:26 PM.  Always use your most recent med list.                   Brand Name Dispense Instructions for use Diagnosis    acetaminophen 325 MG tablet    TYLENOL    100 tablet    Take 2 tablets (650 mg) by mouth every 4 hours as needed for other (mild pain)    Orthopedic aftercare       acetaminophen-codeine 300-30 MG per tablet    TYLENOL #3    20 tablet    Take 0.5-1 tablets by mouth daily as needed for severe pain    Pain of right upper arm       cyclobenzaprine 10 MG tablet    FLEXERIL    14 tablet    Take 1 tablet (10 mg) by mouth nightly as needed for  muscle spasms (pain, sleep)    Chest wall pain       medroxyPROGESTERone 150 MG/ML injection    DEPO-PROVERA    1 mL    Inject 1 mL (150 mg) into the muscle every 3 months    Encounter for surveillance of injectable contraceptive       nortriptyline 50 MG capsule    PAMELOR     Take 100 mg by mouth At Bedtime    Vertigo, Other fatigue, Weight gain       pantoprazole 20 MG EC tablet    PROTONIX    90 tablet    Take by mouth 30-60 minutes before a meal.    Gastroesophageal reflux disease, esophagitis presence not specified       sucralfate 1 GM tablet    CARAFATE    60 tablet    Take 1 tablet (1 g) by mouth 4 times daily    Gastritis without bleeding, unspecified chronicity, unspecified gastritis type       tiZANidine 2 MG tablet    ZANAFLEX    90 tablet    Take 1 tablet (2 mg) by mouth 3 times daily as needed for muscle spasms    Achilles tendinitis of left lower extremity       traZODone 50 MG tablet    DESYREL          warfarin 5 MG tablet    COUMADIN    30 tablet    Take 1 tablet (5 mg) by mouth daily Then as directed by INR nurse    Acute cephalic vein thrombosis, right

## 2018-10-31 NOTE — TELEPHONE ENCOUNTER
"Patient reports, should she see Kristen Kehr, PA-C or Endocrinologist or \"bone specialist\".   Patient reports 4 fractures in 4 years and 3 within the last year.  Fractured rib probably occurred in July and foot fractured was diagnosed today.  Patient aware Kristen Kehr, PA-C will respond tomorrow, due to not being here.  Please advise  Mariza Rinaldi RN     "

## 2018-10-31 NOTE — TELEPHONE ENCOUNTER
Spoke with patient while she was in clinic seeing provider and gave her warfarin hold orders for her colonoscopy and she knows to restart that evening after colonoscopy completed unless provider gives other instructions, will recheck INR after back on warfarin for 7 days, appt scheduled.  Radha Hardy RN

## 2018-10-31 NOTE — TELEPHONE ENCOUNTER
Information below is reviewed with  Kristen Kehr, PA-C, after consultation, patient was to be establishing with internal medicine provider due multiple complex medical issues.  Patient has seen Dr Smith.  Patient will need to contact Dr Smith to determine plan of care.  Patient/parent verbalized understanding of instructions provided and agreed with the plan of care    Per protocol, will route encounter to be cosigned by provider for Verbal Orders.  Mariza Rinaldi RN

## 2018-10-31 NOTE — TELEPHONE ENCOUNTER
Left message on voice mail for patient to call clinic. [phone]  Yasemin Page, RN, BSN

## 2018-10-31 NOTE — PROGRESS NOTES
SUBJECTIVE:   Mily Grullon is a 38 year old female who presents to clinic today for the following health issues:          Chief Complaint   Patient presents with     Establish Care     Musculoskeletal Problem     left foot pain     Medication Request       New Patient/Transfer of Care  Musculoskeletal problem/pain      Duration: 30-45 days    Description  Location: left foot    Intensity:  moderate, severe    Accompanying signs and symptoms: swelling    History  Previous similar problem: YES- broke left heel last year  Previous evaluation:  CT and FSOC evaluation    Precipitating or alleviating factors:  Trauma or overuse: unknown  Aggravating factors include: sitting, standing, walking and climbing stairs    Therapies tried and outcome: surgical shoes    Patient notes she would like to follow-up for primary care here.  I discussed my availability and cognitive limitations.    1. Osteoporosis of multiple sites    2. Pain of right upper arm    3. Pain in joint, ankle and foot, left        PMH: Updated and/or reviewed in chart.    PSH: Updated and/or reviewed in chart.    Family History: Updated and/or reviewed in chart.     ROS:  Constitutional, endocrine, cardiac, musculoskeletal, integument and psychiatric systems are otherwise negative.    OBJECTIVE:                                                    /84  Pulse 105  Temp 98.7  F (37.1  C) (Tympanic)  Resp 16    GEN: No acute distress  EYES: No conjunctival injection or icterus, EOMI grossly intact  RESP: Unlabored, regular  NEURO: Normal gait, MAEx4, light touch sensation grossly intact  PSYCH: Normal mood and mild anxious affect    Results for orders placed or performed in visit on 10/29/18   INR point of care   Result Value Ref Range    INR Protime 2.3 (A) 0.86 - 1.14      ASSESSMENT/PLAN:                                                    1. Osteoporosis of multiple sites  We discussed need to address higher level treatments and my preference she  have a consultation and review of options with a specialist given her risk factors and advanced disease at a young age.  - ENDOCRINOLOGY ADULT REFERRAL    2. Pain of right upper arm  3. Pain in joint, ankle and foot, left  We discussed physical activity tolerance and reasonable limits and expectations of minor to moderate pains that will have to be managed with acetaminophen alone given non-steroidal anti-inflammatory drug risk/intolerance.  We discussed my concerns about chronic opioid use.  Expectations set that though she does have a clear set of reasons for pain that her anxiety contributes to augmenting pain symptoms and narcotic relief/sedation.  Patient expressed understanding.  If any future narcotics, will need to sign CSA.  - acetaminophen-codeine (TYLENOL #3) 300-30 MG per tablet; Take 0.5-1 tablets by mouth daily as needed for severe pain  Dispense: 20 tablet; Refill: 0    Orders Placed This Encounter     ENDOCRINOLOGY ADULT REFERRAL     acetaminophen-codeine (TYLENOL #3) 300-30 MG per tablet      William Smith MD

## 2018-10-31 NOTE — TELEPHONE ENCOUNTER
Reason for call:  Patient reporting a symptom    Symptom or request: Fracture    Duration (how long have symptoms been present):     Have you been treated for this before? Yes    Additional comments: Per patient has another fracture and would like to know if she should see Kehr, Kristin PA-C or if provider would refer her to see an endocrinologist.    Phone Number patient can be reached at:  Home number on file [phone] (home)    Best Time:      Can we leave a detailed message on this number:  YES    Call taken on 10/31/2018 at 12:32 PM by Gabi Daniel

## 2018-11-01 PROBLEM — I74.9 EMBOLISM AND THROMBOSIS (H): Status: RESOLVED | Noted: 2018-09-19 | Resolved: 2018-11-01

## 2018-11-07 ENCOUNTER — TRANSFERRED RECORDS (OUTPATIENT)
Dept: HEALTH INFORMATION MANAGEMENT | Facility: CLINIC | Age: 38
End: 2018-11-07

## 2018-11-12 ENCOUNTER — TELEPHONE (OUTPATIENT)
Dept: INTERNAL MEDICINE | Facility: CLINIC | Age: 38
End: 2018-11-12

## 2018-11-12 NOTE — TELEPHONE ENCOUNTER
Insurance called to get the dates of tried/failed medications, these were provided. PA is being sent for further review. Will document when response is received.    unstageable

## 2018-11-13 ENCOUNTER — OFFICE VISIT (OUTPATIENT)
Dept: INTERNAL MEDICINE | Facility: CLINIC | Age: 38
End: 2018-11-13
Payer: COMMERCIAL

## 2018-11-13 ENCOUNTER — TELEPHONE (OUTPATIENT)
Dept: NURSING | Facility: CLINIC | Age: 38
End: 2018-11-13

## 2018-11-13 ENCOUNTER — ANTICOAGULATION THERAPY VISIT (OUTPATIENT)
Dept: NURSING | Facility: CLINIC | Age: 38
End: 2018-11-13
Payer: COMMERCIAL

## 2018-11-13 VITALS
RESPIRATION RATE: 16 BRPM | BODY MASS INDEX: 37.49 KG/M2 | SYSTOLIC BLOOD PRESSURE: 140 MMHG | TEMPERATURE: 98.7 F | WEIGHT: 205 LBS | HEART RATE: 112 BPM | DIASTOLIC BLOOD PRESSURE: 80 MMHG

## 2018-11-13 DIAGNOSIS — R79.89 LOW SERUM CORTISOL LEVEL: ICD-10-CM

## 2018-11-13 DIAGNOSIS — M80.00XD OSTEOPOROSIS WITH CURRENT PATHOLOGICAL FRACTURE WITH ROUTINE HEALING, UNSPECIFIED OSTEOPOROSIS TYPE, SUBSEQUENT ENCOUNTER: Primary | ICD-10-CM

## 2018-11-13 LAB — INR POINT OF CARE: 2.1 (ref 0.86–1.14)

## 2018-11-13 PROCEDURE — 36416 COLLJ CAPILLARY BLOOD SPEC: CPT

## 2018-11-13 PROCEDURE — 85610 PROTHROMBIN TIME: CPT | Mod: QW

## 2018-11-13 PROCEDURE — 99215 OFFICE O/P EST HI 40 MIN: CPT | Performed by: INTERNAL MEDICINE

## 2018-11-13 NOTE — MR AVS SNAPSHOT
Mily PRASHANT Grullon   11/13/2018 3:15 PM   Anticoagulation Therapy Visit    Description:  38 year old female   Provider:  [facility]   Department:  Be Nurse           INR as of 11/13/2018     Today's INR 2.1      Anticoagulation Summary as of 11/13/2018     INR goal 2.0-3.0   Today's INR 2.1   Full warfarin instructions 2.5 mg on Fri; 5 mg all other days   Next INR check 11/21/2018    Indications   Long-term (current) use of anticoagulants [Z79.01] [Z79.01]  Embolism and thrombosis (H) [I74.9] (Resolved) [I74.9]         Your next Anticoagulation Clinic appointment(s)     Nov 21, 2018 10:45 AM CST   Anticoagulation Visit with BE ANTI COAG   [place] [facility] ([facility] [place])    [address]  [place] [address] [phone]              Contact Numbers     [facility]  Please call [phone] with any problems or questions regarding your therapy, or to cancel or reschedule your appointment.          November 2018 Details    Sun Mon Tue Wed Thu Fri Sat         1               2               3                 4               5               6               7               8               9               10                 11               12               13      5 mg   See details      14      5 mg         15      5 mg         16      2.5 mg         17      5 mg           18      5 mg         19      5 mg         20      5 mg         21            22               23               24                 25               26               27               28               29               30                 Date Details   11/13 This INR check       Date of next INR:  11/21/2018         How to take your warfarin dose     To take:  2.5 mg Take 0.5 of a 5 mg tablet.    To take:  5 mg Take 1 of the 5 mg tablets.

## 2018-11-13 NOTE — PROGRESS NOTES
SUBJECTIVE:   Mily Grullon is a 38 year old female who presents to clinic today for the following health issues:      ED/UC Followup:    Facility:  [facility]  Date of visit: 11/10  Reason for visit: rib/chest pain  Current Status:      Patient has suffered what appear to be new, (relatively) spontaneous rib fracture of 2nd rib.  CT noted subacute healing of lateral 4th rib fracture and subacute healing of right posterior 11th rib fracture (both new since prior scan in [facility] system).  She presents with her mother today to discuss work-up and treatment of osteoporosis to prevent future fractures.      1. Osteoporosis with current pathological fracture with routine healing, unspecified osteoporosis type, subsequent encounter    2. Low serum cortisol level (H)        PMH: Updated and/or reviewed in chart.    ROS:  Denies chest pain, shortness of breath.    OBJECTIVE:                                                    /80  Pulse 112  Temp 98.7  F (37.1  C) (Tympanic)  Resp 16  Wt 205 lb (93 kg)  BMI 37.49 kg/m2  GEN: no acute distress with comfortable respirations and normal speech  Results for orders placed or performed in visit on 11/13/18   INR point of care   Result Value Ref Range    INR Protime 2.1 (A) 0.86 - 1.14      ASSESSMENT/PLAN:                                                    1. Osteoporosis with current pathological fracture with routine healing, unspecified osteoporosis type, subsequent encounter  2. Low serum cortisol level (H)  - ENDOCRINOLOGY ADULT REFERRAL  On limited review of her chart, it is unclear why she has not followed up with endocrinology despite her ongoing concerns.  She reports a discussion of risks, benefits, and alternatives of various treatment strategies for early osteoporosis and deferral of treatment years ago.  Her primary care provider has found a couple of lower cortisol levels as well, and the patient is uncertain of the clinical significance thereof.  She denies  "weight loss, anorexia, particularly low energy.  Rather, she remains overweight and considerably anxious generally, especially about her health.  She does not meet the stereotypical clinical presentation of Dave's.  Her most recent laboratory evaluation of significance demonstrated normal electrolytes and kidney function, liver function, and hemoglobin, white cells, and platelets but mild microcytosis with MCV 77.  She has had persistt, mild elevation of serum chloride and mildly decreased eGFR, rare lower blood sugar (68 once), and ennormal thyroid function.    I am unsure of the origin of her higher dose, monthly Depo-Provera (150 mg IM monthly rather than every 3 months), which certainly may be contributing in addition to PPI treatment for significant PUD likely contributing to reduced calcium absorption.      I agree she warrants further consultation and investigation, ideally with robust endocrinology support, given her unusually premature reduced bone mineral density (\"osteoporosis\" through I appreciate criteria do not cleanly fit with premenopausal women) and multiple pathological fractures.  Referrals were provided, including discussed of alternative referral to the [facility] for potential 2nd or 3rd opinions/evaluations.  DDx includes medication effect from large dose Depo-Provera.    We discussed that she has no apparent ongoing superficial thrombophlebitis and has tolerated warfarin anticoagulation long enough to have expedited resolution of mild thrombus formation and should cease anticoagulation immediately.    I spent a total of 40 minutes with the patient of which greater than 80% were devoted to counseling and coordination of care: low bone density, discontinue anticoagulation.      William Smith MD          "

## 2018-11-13 NOTE — TELEPHONE ENCOUNTER
Patient coming in today for INR check and to follow up with PCP.  Patient was referred to Anticoagulation Clinic for 1-2 months for a Acute cephalic vein thrombosis, right [I82.611]   Referral expires on 11-19-18.  Please address need for continuing or discontinuing anticoagulation therapy at OV today.

## 2018-11-13 NOTE — PROGRESS NOTES
ANTICOAGULATION FOLLOW-UP CLINIC VISIT    Patient Name:  Mily Grullon  Date:  11/13/2018  Contact Type:  Face to Face    SUBJECTIVE:     Patient Findings     Comments Patient had colonoscopy on 11-7-18 and resumed coumadin that evening.  Only 6 days of coumadin.  INR 2.1.  Will continue with maintenance and recheck in one week.  Patient has OV with PCP today and will be discussing need for further anticoagulation.           OBJECTIVE    INR Protime   Date Value Ref Range Status   11/13/2018 2.1 (A) 0.86 - 1.14 Final       ASSESSMENT / PLAN  INR assessment THER    Recheck INR In: 1 WEEK    INR Location Clinic      Anticoagulation Summary as of 11/13/2018     INR goal 2.0-3.0   Today's INR 2.1   Warfarin maintenance plan 2.5 mg (5 mg x 0.5) on Fri; 5 mg (5 mg x 1) all other days   Full warfarin instructions 2.5 mg on Fri; 5 mg all other days   Weekly warfarin total 32.5 mg   No change documented Sara Davis RN   Plan last modified Yasemin Page RN (10/22/2018)   Next INR check 11/21/2018   Target end date 11/19/2018    Indications   Long-term (current) use of anticoagulants [Z79.01] [Z79.01]  Embolism and thrombosis (H) [I74.9] (Resolved) [I74.9]         Anticoagulation Episode Summary     INR check location     Preferred lab     Send INR reminders to [facility]    Comments       Anticoagulation Care Providers     Provider Role Specialty Phone number    William Smith MD [facility] Internal Medicine [phone]            See the Encounter Report to view Anticoagulation Flowsheet and Dosing Calendar (Go to Encounters tab in chart review, and find the Anticoagulation Therapy Visit)    Dosage adjustment made based on physician directed care plan.    Sraa Davis, KOBE

## 2018-11-13 NOTE — LETTER
November 13, 2018      Mily Grullon  [address] [address]        To Whom It May Concern:    Mily Grullon  was seen on 11/13/2018.  Please excuse her  until 11/27/2018 due to illness.        Sincerely,        William Smith MD

## 2018-11-13 NOTE — TELEPHONE ENCOUNTER
Called patient will be seen today  
Dr Smith not in office today.  Spoke with patient and she is requesting to be seen sooner than Friday with Dr Smith.  No openings available.  Patient does have a note for work to cover her until seen by Dr Smith.  Patient stating she now has 3 broken ribs, one old fracture, and two new fractures, no injury.  Patient stated she also was informed by Dr Alford's office that her left foot also has a fracture.  Will send request to be seen sooner to Dr Smith and his MA Lynn.  
Patient calling was seen in [facility] this am has a broken rib, wants to see Dr Smith only for follow up would like to be worked in on Wednesday has an INR on Wednesday at 2:15. Please call to advise. Patient has scheduled first available for this Friday with Dr Smith but will need a note for work and be seen before then if possible.   
69yM with PMH of MM s/p chemo (>10yo), DM (A1c6) c/b peripheral neuropathy MRSA cellulitis, TIA x2 on ASA/Plx, HTN, HLD, BPH w/ urge incontinence, Chronic BP with DRG stimulator presented from home with c/p of worsening L LE swelling, redness, pain and generalized weakness a/w cellulitis, c/b severe sepsis (since resolved) currently on Vancomycin

## 2018-11-13 NOTE — MR AVS SNAPSHOT
After Visit Summary   11/13/2018    Mily Grullon    MRN: [id number]           Patient Information     Date Of Birth          1980        Visit Information        Provider Department      11/13/2018 3:30 PM William Smith MD [place] [facility]        Today's Diagnoses     Low serum cortisol level (H)    -  1    Osteoporosis with current pathological fracture with routine healing, unspecified osteoporosis type, subsequent encounter           Follow-ups after your visit        Additional Services     ENDOCRINOLOGY ADULT REFERRAL       Your provider has referred you to:   [facility]:  [facility] [phone]  [email or web address]    [facility]: [facility] [phone]   [email or web address]    FHN: [facility] [phone]   [email or web address]      Please be aware that coverage of these services is subject to the terms and limitations of your health insurance plan.  Call member services at your health plan with any benefit or coverage questions.      Please bring the following to your appointment:    >>   Any x-rays, CTs or MRIs which have been performed.  Contact the facility where they were done to arrange for  prior to your scheduled appointment.    >>   List of current medications   >>   This referral request   >>   Any documents/labs given to you for this referral                  Follow-up notes from your care team     Return if symptoms worsen or fail to improve.      Your next 10 appointments already scheduled     Nov 16, 2018  1:30 PM CST   (Arrive by 1:15 PM)   [facility] Spine with Natalio Andrade PT   [facility] [place] [facility] ([facility] [place])    [address]  [address]  [place] [address] [phone]            Nov 16, 2018  4:30 PM CST   Office Visit with William Smith MD   [place]  [facility] ([facility])    [address] [address]   [phone]           Bring a current list of meds and any records pertaining to this visit. For Physicals, please bring immunization records and any forms needing to be filled out. Please arrive 10 minutes early to complete paperwork.            Nov 21, 2018 10:45 AM CST   Anticoagulation Visit with BE ANTI COAG   [facility] ([facility])    [address] [address]   [phone]            Nov 30, 2018  2:00 PM CST   Office Visit with William Smith MD   [facility] ([facility])    [address] [address]   [phone]           Bring a current list of meds and any records pertaining to this visit. For Physicals, please bring immunization records and any forms needing to be filled out. Please arrive 10 minutes early to complete paperwork.            Angelito 15, 2019  2:00 PM CST   New Visit with Dallas Flores MD   [facility] ([facility]    [address] [place] [id number] [phone]              Who to contact     Normal or non-critical lab and imaging results will be communicated to you by MyChart, letter or phone within 4 business days after the clinic has received the results. If you do not hear from us within 7 days, please contact the clinic through MyChart or phone. If you have a critical or abnormal lab result, we will notify you by phone as soon as possible.  Submit refill requests through [employer] or call your pharmacy and they will forward the refill request to us. Please allow 3 business days for your refill to be completed.          If you need to speak with a  for additional information , please call: [phone]             Additional Information About Your Visit        Care EveryWhere ID     This is your Care EveryWhere ID. This could  be used by other organizations to access your [place] medical records  [id number]        Your Vitals Were     Pulse Temperature Respirations BMI (Body Mass Index)          112 98.7  F (37.1  C) (Tympanic) 16 37.49 kg/m2         Blood Pressure from Last 3 Encounters:   11/13/18 140/80   10/31/18 159/84   10/29/18 145/89    Weight from Last 3 Encounters:   11/13/18 205 lb (93 kg)   10/29/18 204 lb 11.2 oz (92.9 kg)   10/24/18 205 lb (93 kg)              We Performed the Following     ENDOCRINOLOGY ADULT REFERRAL          Today's Medication Changes          These changes are accurate as of 11/13/18  4:11 PM.  If you have any questions, ask your nurse or doctor.               Stop taking these medicines if you haven't already. Please contact your care team if you have questions.     warfarin 5 MG tablet   Commonly known as:  COUMADIN   Stopped by:  William Smith MD                    Primary Care Provider Office Phone # Fax #    William Smith [phone]       [facility] [address] [facility] [address]        Equal Access to Services     [facility]: Hadii aad ku hadasho Soomaali, waaxda luqadaha, qaybta kaalmada adeegyada, abdifatah fitzgerald . So [facility] [phone].    ATENCIÓN: Si habla español, tiene a lentz disposición servicios gratuitos de asistencia lingüística. [facility] [phone].    We comply with applicable federal civil rights laws and [place] laws. We do not discriminate on the basis of race, color, national origin, age, disability, sex, sexual orientation, or gender identity.            Thank you!     Thank you for choosing [facility]  for your care. Our goal is always to provide you with excellent care. Hearing back from our patients is one way we can continue to improve our services. Please take a few minutes to complete the written survey that you may receive in the mail after your visit with us. Thank you!             Your Updated  Medication List - Protect others around you: Learn how to safely use, store and throw away your medicines at www.disposemymeds.org.          This list is accurate as of 11/13/18  4:11 PM.  Always use your most recent med list.                   Brand Name Dispense Instructions for use Diagnosis    acetaminophen 325 MG tablet    TYLENOL    100 tablet    Take 2 tablets (650 mg) by mouth every 4 hours as needed for other (mild pain)    Orthopedic aftercare       acetaminophen-codeine 300-30 MG per tablet    TYLENOL #3    20 tablet    Take 0.5-1 tablets by mouth daily as needed for severe pain    Pain of right upper arm       cyclobenzaprine 10 MG tablet    FLEXERIL    14 tablet    Take 1 tablet (10 mg) by mouth nightly as needed for muscle spasms (pain, sleep)    Chest wall pain       medroxyPROGESTERone 150 MG/ML injection    DEPO-PROVERA    1 mL    Inject 1 mL (150 mg) into the muscle every 3 months    Encounter for surveillance of injectable contraceptive       nortriptyline 50 MG capsule    PAMELOR     Take 100 mg by mouth At Bedtime    Vertigo, Other fatigue, Weight gain       pantoprazole 20 MG EC tablet    PROTONIX    90 tablet    Take by mouth 30-60 minutes before a meal.    Gastroesophageal reflux disease, esophagitis presence not specified       sucralfate 1 GM tablet    CARAFATE    60 tablet    Take 1 tablet (1 g) by mouth 4 times daily    Gastritis without bleeding, unspecified chronicity, unspecified gastritis type       tiZANidine 2 MG tablet    ZANAFLEX    90 tablet    Take 1 tablet (2 mg) by mouth 3 times daily as needed for muscle spasms    Achilles tendinitis of left lower extremity       traZODone 50 MG tablet    DESYREL

## 2018-11-15 ENCOUNTER — TELEPHONE (OUTPATIENT)
Dept: FAMILY MEDICINE | Facility: CLINIC | Age: 38
End: 2018-11-15

## 2018-11-15 DIAGNOSIS — S22.49XD CLOSED FRACTURE OF MULTIPLE RIBS WITH ROUTINE HEALING, UNSPECIFIED LATERALITY, SUBSEQUENT ENCOUNTER: Primary | ICD-10-CM

## 2018-11-15 DIAGNOSIS — M76.62 ACHILLES TENDINITIS OF LEFT LOWER EXTREMITY: ICD-10-CM

## 2018-11-15 NOTE — TELEPHONE ENCOUNTER
Tizanidine prescribed by Dr Alford's and Percocet has not been prescribed by Dr Smith.  Patient seen by Dr Smith for fracture ribs.  Pain to Ribs and foot.  Please advise.            Disp Refills Start End RL     tiZANidine (ZANAFLEX) 2 MG tablet 90 tablet 1 9/12/2018  No     Sig - Route: Take 1 tablet (2 mg) by mouth 3 times daily as needed for muscle spasms - Oral     Class: E-Prescribe     Order: [id number]     E-Prescribing Status: Receipt confirmed by pharmacy (9/12/2018  4:02 PM CDT)       Printout Tracking      External Result Report       Pharmacy      [facility] [place] [place] - [address] [address]       Associated Diagnoses      Achilles tendinitis of left lower extremity [M76.62]           Source Order Set      Order Set Name Order ID      [id number]         Prescribing Provider's NPI: [id number]  Jose Alford

## 2018-11-16 RX ORDER — TIZANIDINE 2 MG/1
2 TABLET ORAL 3 TIMES DAILY PRN
Qty: 90 TABLET | Refills: 1 | Status: SHIPPED | OUTPATIENT
Start: 2018-11-16 | End: 2019-01-21

## 2018-11-16 RX ORDER — OXYCODONE AND ACETAMINOPHEN 5; 325 MG/1; MG/1
1-2 TABLET ORAL
COMMUNITY
Start: 2018-11-12 | End: 2018-11-16

## 2018-11-16 RX ORDER — OXYCODONE AND ACETAMINOPHEN 5; 325 MG/1; MG/1
.5-1 TABLET ORAL EVERY 6 HOURS PRN
Qty: 30 TABLET | Refills: 0 | Status: SHIPPED | OUTPATIENT
Start: 2018-11-16 | End: 2018-11-30

## 2018-11-16 NOTE — TELEPHONE ENCOUNTER
Percocet script walked over to the [facility].  Patient notified of scripts sent to the pharmacy and to call them to arrange for . Patient verbalized understanding and agrees with plan.     Yasemin Page, RN, BSN

## 2018-11-16 NOTE — TELEPHONE ENCOUNTER
"Spoke with patient, she \"is in so much pain and I can't hardly stand it\".    Provider in ER given her the Percocet for her new fractures.     Pt requesting Dr. Smith to give her Percocet and refill on the Tizanidine.    MD please advise.    Yasemin Page, RN, BSN    "

## 2018-11-20 ENCOUNTER — ANTICOAGULATION THERAPY VISIT (OUTPATIENT)
Dept: NURSING | Facility: CLINIC | Age: 38
End: 2018-11-20

## 2018-11-20 NOTE — TELEPHONE ENCOUNTER
OV 11-13-18 Dr Smith    We discussed that she has no apparent ongoing superficial thrombophlebitis and has tolerated warfarin anticoagulation long enough to have expedited resolution of mild thrombus formation and should cease anticoagulation immediately.

## 2018-11-23 ENCOUNTER — TRANSFERRED RECORDS (OUTPATIENT)
Dept: HEALTH INFORMATION MANAGEMENT | Facility: CLINIC | Age: 38
End: 2018-11-23

## 2018-11-23 LAB
CREAT SERPL-MCNC: 0.89 MG/DL (ref 0.5–1.1)
GFR SERPL CREATININE-BSD FRML MDRD: 82 ML/MIN/1.73M2
GLUCOSE SERPL-MCNC: 90 MG/DL (ref 65–99)
POTASSIUM SERPL-SCNC: 3.6 MMOL/L (ref 3.5–5.3)
TSH SERPL-ACNC: 0.68 UIU/ML (ref 0.3–5)

## 2018-11-26 ENCOUNTER — TELEPHONE (OUTPATIENT)
Dept: FAMILY MEDICINE | Facility: CLINIC | Age: 38
End: 2018-11-26

## 2018-11-26 NOTE — TELEPHONE ENCOUNTER
Appointment scheduled for Wednesday November 28th @ [place] for Dexa scan.  Patient notified of time and place.  Order faxed to Imaging scheduling then sent to scanning  Outside orders from Dr. Michelle at [facility]

## 2018-11-26 NOTE — TELEPHONE ENCOUNTER
Reason for Call:  Other call back    Detailed comments: Patient calling to schedule dexa, no orders. Outside order from Endo clinic in [place]. Call when received and ready to schedule.     Phone Number Patient can be reached at: Home number on file [phone] (home)    Best Time: any     Can we leave a detailed message on this number? YES    Call taken on 11/26/2018 at 8:34 AM by Cipriano Green

## 2018-11-28 ENCOUNTER — OFFICE VISIT (OUTPATIENT)
Dept: INTERNAL MEDICINE | Facility: CLINIC | Age: 38
End: 2018-11-28
Payer: COMMERCIAL

## 2018-11-28 ENCOUNTER — RADIANT APPOINTMENT (OUTPATIENT)
Dept: BONE DENSITY | Facility: CLINIC | Age: 38
End: 2018-11-28
Attending: INTERNAL MEDICINE
Payer: COMMERCIAL

## 2018-11-28 ENCOUNTER — TRANSFERRED RECORDS (OUTPATIENT)
Dept: HEALTH INFORMATION MANAGEMENT | Facility: CLINIC | Age: 38
End: 2018-11-28

## 2018-11-28 VITALS
BODY MASS INDEX: 37.73 KG/M2 | HEIGHT: 62 IN | TEMPERATURE: 98.8 F | WEIGHT: 205 LBS | HEART RATE: 111 BPM | OXYGEN SATURATION: 94 % | DIASTOLIC BLOOD PRESSURE: 101 MMHG | RESPIRATION RATE: 16 BRPM | SYSTOLIC BLOOD PRESSURE: 157 MMHG

## 2018-11-28 DIAGNOSIS — M81.0 OSTEOPOROSIS OF MULTIPLE SITES: ICD-10-CM

## 2018-11-28 DIAGNOSIS — R06.02 SOB (SHORTNESS OF BREATH): Primary | ICD-10-CM

## 2018-11-28 DIAGNOSIS — M81.0 OSTEOPOROSIS: ICD-10-CM

## 2018-11-28 PROCEDURE — 94640 AIRWAY INHALATION TREATMENT: CPT | Performed by: INTERNAL MEDICINE

## 2018-11-28 PROCEDURE — 77081 DXA BONE DENSITY APPENDICULR: CPT | Performed by: INTERNAL MEDICINE

## 2018-11-28 PROCEDURE — 99215 OFFICE O/P EST HI 40 MIN: CPT | Mod: 25 | Performed by: INTERNAL MEDICINE

## 2018-11-28 PROCEDURE — 77080 DXA BONE DENSITY AXIAL: CPT | Mod: 59 | Performed by: INTERNAL MEDICINE

## 2018-11-28 NOTE — MR AVS SNAPSHOT
After Visit Summary   11/28/2018    Mily Grullon    MRN: [id number]           Patient Information     Date Of Birth          1980        Visit Information        Provider Department      11/28/2018 6:30 PM William Smith MD [facility]        Today's Diagnoses     SOB (shortness of breath)    -  1       Follow-ups after your visit        Follow-up notes from your care team     Return in about 2 days (around 11/30/2018).      Your next 10 appointments already scheduled     Nov 30, 2018  2:00 PM CST   Office Visit with William Smith MD   [facility] ([facility])    [address] [address] [id number] [phone]           Bring a current list of meds and any records pertaining to this visit. For Physicals, please bring immunization records and any forms needing to be filled out. Please arrive 10 minutes early to complete paperwork.            Angelito 15, 2019  2:00 PM CST   [facility]    [address] [place] [id number] [phone]              Who to contact     Normal or non-critical lab and imaging results will be communicated to you by MyChart, letter or phone within 4 business days after the clinic has received the results. If you do not hear from us within 7 days, please contact the clinic through MyChart or phone. If you have a critical or abnormal lab result, we will notify you by phone as soon as possible.  Submit refill requests through [employer] or call your pharmacy and they will forward the refill request to us. Please allow 3 business days for your refill to be completed.          If you need to speak with a  for additional information , please call: [phone]             Additional Information About Your Visit        Care EveryWhere ID     This is your Care EveryWhere ID. This could be used by other organizations to  "access your [facility] medical records  [id number]        Your Vitals Were     Pulse Temperature Respirations Height Pulse Oximetry BMI (Body Mass Index)    111 98.8  F (37.1  C) (Tympanic) 16 5' 2\" (1.575 m) 94% 37.49 kg/m2       Blood Pressure from Last 3 Encounters:   11/28/18 (!) 157/101   11/13/18 140/80   10/31/18 159/84    Weight from Last 3 Encounters:   11/28/18 205 lb (93 kg)   11/13/18 205 lb (93 kg)   10/29/18 204 lb 11.2 oz (92.9 kg)              We Performed the Following     ALBUTEROL UNIT DOSE, 1 MG     INHALATION/NEBULIZER TREATMENT, INITIAL        Primary Care Provider Office Phone # Fax #    William Smith [phone]       [facility] [address]  [facility] [address]        Equal Access to Services     MINAL VIZCARRA : Hadii aad ku hadasho Soomaali, waaxda luqadaha, qaybta kaalmada adeegyada, waxay rockin haylisandron susan fitzgerald . So [facility] [phone].    ATENCIÓN: Si habla español, tiene a lentz disposición servicios gratuitos de asistencia lingüística. Llame al [phone].    We comply with applicable federal civil rights laws and [place] laws. We do not discriminate on the basis of race, color, national origin, age, disability, sex, sexual orientation, or gender identity.            Thank you!     Thank you for choosing [facility]  for your care. Our goal is always to provide you with excellent care. Hearing back from our patients is one way we can continue to improve our services. Please take a few minutes to complete the written survey that you may receive in the mail after your visit with us. Thank you!             Your Updated Medication List - Protect others around you: Learn how to safely use, store and throw away your medicines at www.disposemymeds.org.          This list is accurate as of 11/28/18  9:49 PM.  Always use your most recent med list.                   Brand Name Dispense Instructions for use Diagnosis    acetaminophen 325 MG tablet    " TYLENOL    100 tablet    Take 2 tablets (650 mg) by mouth every 4 hours as needed for other (mild pain)    Orthopedic aftercare       cyclobenzaprine 10 MG tablet    FLEXERIL    14 tablet    Take 1 tablet (10 mg) by mouth nightly as needed for muscle spasms (pain, sleep)    Chest wall pain       medroxyPROGESTERone 150 MG/ML injection    DEPO-PROVERA    1 mL    Inject 1 mL (150 mg) into the muscle every 3 months    Encounter for surveillance of injectable contraceptive       nortriptyline 50 MG capsule    PAMELOR     Take 100 mg by mouth At Bedtime    Vertigo, Other fatigue, Weight gain       oxyCODONE-acetaminophen 5-325 MG tablet    PERCOCET    30 tablet    Take 0.5-1 tablets by mouth every 6 hours as needed for severe pain    Closed fracture of multiple ribs with routine healing, unspecified laterality, subsequent encounter       pantoprazole 20 MG EC tablet    PROTONIX    90 tablet    Take by mouth 30-60 minutes before a meal.    Gastroesophageal reflux disease, esophagitis presence not specified       sucralfate 1 GM tablet    CARAFATE    60 tablet    Take 1 tablet (1 g) by mouth 4 times daily    Gastritis without bleeding, unspecified chronicity, unspecified gastritis type       tiZANidine 2 MG tablet    ZANAFLEX    90 tablet    Take 1 tablet (2 mg) by mouth 3 times daily as needed for muscle spasms    Achilles tendinitis of left lower extremity       traZODone 50 MG tablet    DESYREL

## 2018-11-29 NOTE — NURSING NOTE
The following nebulizer treatment was given:     MEDICATION: Albuterol Sulfate 2.5 mg  : Digiting  LOT #: [id number]  EXPIRATION DATE:  3/2019  NDC # 7898-0015-00

## 2018-11-29 NOTE — PROGRESS NOTES
"  SUBJECTIVE:   Mily Grullon is a 38 year old female who presents to clinic today for the following health issues:      SOB      Duration: 4 days    Description (location/character/radiation): right sided pain and tingling.  Dizziness and hoarse voice    Intensity:  moderate, severe    Accompanying signs and symptoms: when she walks tingling sensation in both hands and right arm    History (similar episodes/previous evaluation): right rib fx    Precipitating or alleviating factors: None    Therapies tried and outcome:          Patient presents complaining of pain with breathing and inability to comfortably take breaths, worsened over the past couple of days.  She endorses bilateral arm and chest pain with dyspnea on exertion she isn't sure is related to cardiovascular exertion or body position changes.  She has had multiple recent rib fractures, denies falls but has had mild cough recently that isn't currently active.  She endorses mild dizziness, lightheadedness as well as anxiety regarding her current status.  She initially requested we not contact her emergency contacts (her parents in [place]).    1. SOB (shortness of breath)        PMH: Updated and/or reviewed in chart.    PSH: Updated and/or reviewed in chart.    Family History: Updated and/or reviewed in chart.     ROS:  Limited by patient's condition -- inability to speak in whole sentences and tolerate prolonged conversation.  Apparently no fever, chills, or night sweats, chest pain, GI, or  issues, but unable to complete CROS.    OBJECTIVE:                                                    BP (!) 157/101  Pulse 111  Temp 98.8  F (37.1  C) (Tympanic)  Resp 16  Ht 5' 2\" (1.575 m)  Wt 205 lb (93 kg)  SpO2 94%  BMI 37.49 kg/m2    GEN: Moderate distress obese young woman  EYES: EOMI grossly, pupils equal, no conjunctival injection, icterus, or edema  MOUTH/THROAT: Posterior oropharynx not visualized due to patient unable to open fully for " examination, tongue not visibly enlarged; hoarse voice speaking in broken sentences.  NECK: Trachea midline, no thyromegaly   LYMPH: No anterior/posterior cervical, occipital, or clavicular lymphadenopathy appreciated   CARDIAC: Regular tachycardia, no murmurs, rubs, or gallops appreciated   RESP: Moderately labored shallow tachypnea without wheezing, clear to auscultation posteriorly  PSYCH: anxious mood and congruent affect  NEURO: during and after albuterol nebulizer, patient with stiff contraction / spasm of bilateral upper extremities with flexion of bilateral wrists, many fingers, and extension of forearms; gait normal     Results for orders placed or performed in visit on 11/13/18   INR point of care   Result Value Ref Range    INR Protime 2.1 (A) 0.86 - 1.14      ASSESSMENT/PLAN:                                                    1. SOB (shortness of breath)  2. Upper extremity spasm of uncertain etiology  Patient with history of significant anxiety in addition to pain related to multiple fractures presenting with acute dyspnea and dyspnea on exertion in mild to moderate respiratory distress with preserved oxygenation but with significant work of breathing.  After nebulizer treatment, her respiratory rate improved mildly but she remained largely distressed.  I granted to her that she may have symptoms entirely related to rib fractures and would benefit from improved pain control but that work-up and treatment cannot start at this point given her current clinical condition.  DDx includes new rib fractures, panic attack, pulmonary embolism, ACS, PTX, pneumonia, RAD, dissection; I recommended emergency department evaluation for potential repeat CT-PA scan and closer monitoring tonight and faster laboratory evaluation.  She is unstable for self-transport, especially now with arm spasms I can't clearly relate to albuterol nebulizer treatment in clinic, and with patient's consent we used her phone to call her  parents in [place] who agreed to transport her from clinic to the emergency department at [facility].  They refused ambulance transfer sooner which I deemed acceptable risk for her current condition.          - INHALATION/NEBULIZER TREATMENT, INITIAL  - ALBUTEROL UNIT DOSE, 1 MG     Orders Placed This Encounter     INHALATION/NEBULIZER TREATMENT, INITIAL     ALBUTEROL UNIT DOSE, 1 MG          William Smith MD

## 2018-11-30 ENCOUNTER — OFFICE VISIT (OUTPATIENT)
Dept: INTERNAL MEDICINE | Facility: CLINIC | Age: 38
End: 2018-11-30
Payer: COMMERCIAL

## 2018-11-30 ENCOUNTER — TRANSFERRED RECORDS (OUTPATIENT)
Dept: HEALTH INFORMATION MANAGEMENT | Facility: CLINIC | Age: 38
End: 2018-11-30

## 2018-11-30 VITALS
TEMPERATURE: 98.1 F | OXYGEN SATURATION: 96 % | SYSTOLIC BLOOD PRESSURE: 131 MMHG | WEIGHT: 205 LBS | BODY MASS INDEX: 37.49 KG/M2 | DIASTOLIC BLOOD PRESSURE: 75 MMHG | HEART RATE: 93 BPM | RESPIRATION RATE: 16 BRPM

## 2018-11-30 DIAGNOSIS — Z79.899 CONTROLLED SUBSTANCE AGREEMENT SIGNED: ICD-10-CM

## 2018-11-30 DIAGNOSIS — G89.29 OTHER CHRONIC PAIN: ICD-10-CM

## 2018-11-30 DIAGNOSIS — S22.49XD CLOSED FRACTURE OF MULTIPLE RIBS WITH ROUTINE HEALING, UNSPECIFIED LATERALITY, SUBSEQUENT ENCOUNTER: Primary | ICD-10-CM

## 2018-11-30 PROCEDURE — 99214 OFFICE O/P EST MOD 30 MIN: CPT | Performed by: INTERNAL MEDICINE

## 2018-11-30 RX ORDER — DULOXETIN HYDROCHLORIDE 20 MG/1
40 CAPSULE, DELAYED RELEASE ORAL EVERY EVENING
COMMUNITY
Start: 2018-11-20 | End: 2019-07-17

## 2018-11-30 RX ORDER — DULOXETIN HYDROCHLORIDE 60 MG/1
60 CAPSULE, DELAYED RELEASE ORAL 2 TIMES DAILY
COMMUNITY
Start: 2018-11-20 | End: 2022-07-12

## 2018-11-30 RX ORDER — OXYCODONE AND ACETAMINOPHEN 5; 325 MG/1; MG/1
.5-1 TABLET ORAL EVERY 6 HOURS PRN
Qty: 90 TABLET | Refills: 0 | Status: SHIPPED | OUTPATIENT
Start: 2018-11-30 | End: 2018-12-19

## 2018-11-30 NOTE — MR AVS SNAPSHOT
After Visit Summary   11/30/2018    Mily Grullon    MRN: [id number]           Patient Information     Date Of Birth          1980        Visit Information        Provider Department      11/30/2018 2:00 PM William Smith MD [facility]        Today's Diagnoses     Closed fracture of multiple ribs with routine healing, unspecified laterality, subsequent encounter    -  1    Other chronic pain        Controlled substance agreement signed           Follow-ups after your visit        Your next 10 appointments already scheduled     Angelito 15, 2019  2:00 PM CST   [facility]    [id number] [place] [id number] [phone]              Who to contact     Normal or non-critical lab and imaging results will be communicated to you by MyChart, letter or phone within 4 business days after the clinic has received the results. If you do not hear from us within 7 days, please contact the clinic through MyChart or phone. If you have a critical or abnormal lab result, we will notify you by phone as soon as possible.  Submit refill requests through [employer] or call your pharmacy and they will forward the refill request to us. Please allow 3 business days for your refill to be completed.          If you need to speak with a  for additional information , please call: [phone]             Additional Information About Your Visit        Care EveryWhere ID     This is your Care EveryWhere ID. This could be used by other organizations to access your [facility] medical records  [id number]        Your Vitals Were     Pulse Temperature Respirations Pulse Oximetry BMI (Body Mass Index)       93 98.1  F (36.7  C) (Tympanic) 16 96% 37.49 kg/m2        Blood Pressure from Last 3 Encounters:   11/30/18 131/75   11/28/18 (!) 157/101   11/13/18 140/80    Weight from Last 3 Encounters:   11/30/18 205 lb  (93 kg)   11/28/18 205 lb (93 kg)   11/13/18 205 lb (93 kg)              Today, you had the following     No orders found for display         Today's Medication Changes          These changes are accurate as of 11/30/18  2:51 PM.  If you have any questions, ask your nurse or doctor.               Stop taking these medicines if you haven't already. Please contact your care team if you have questions.     cyclobenzaprine 10 MG tablet   Commonly known as:  FLEXERIL   Stopped by:  William Smith MD                Where to get your medicines      Some of these will need a paper prescription and others can be bought over the counter.  Ask your nurse if you have questions.     Bring a paper prescription for each of these medications     oxyCODONE-acetaminophen 5-325 MG tablet               Information about OPIOIDS     PRESCRIPTION OPIOIDS: WHAT YOU NEED TO KNOW   We gave you an opioid (narcotic) pain medicine. It is important to manage your pain, but opioids are not always the best choice. You should first try all the other options your care team gave you. Take this medicine for as short a time (and as few doses) as possible.    Some activities can increase your pain, such as bandage changes or therapy sessions. It may help to take your pain medicine 30 to 60 minutes before these activities. Reduce your stress by getting enough sleep, working on hobbies you enjoy and practicing relaxation or meditation. Talk to your care team about ways to manage your pain beyond prescription opioids.    These medicines have risks:    DO NOT drive when on new or higher doses of pain medicine. These medicines can affect your alertness and reaction times, and you could be arrested for driving under the influence (DUI). If you need to use opioids long-term, talk to your care team about driving.    DO NOT operate heavy machinery    DO NOT do any other dangerous activities while taking these medicines.    DO NOT drink any alcohol while taking  these medicines.     If the opioid prescribed includes acetaminophen, DO NOT take with any other medicines that contain acetaminophen. Read all labels carefully. Look for the word  acetaminophen  or  Tylenol.  Ask your pharmacist if you have questions or are unsure.    You can get addicted to pain medicines, especially if you have a history of addiction (chemical, alcohol or substance dependence). Talk to your care team about ways to reduce this risk.    All opioids tend to cause constipation. Drink plenty of water and eat foods that have a lot of fiber, such as fruits, vegetables, prune juice, apple juice and high-fiber cereal. Take a laxative (Miralax, milk of magnesia, Colace, Senna) if you don t move your bowels at least every other day. Other side effects include upset stomach, sleepiness, dizziness, throwing up, tolerance (needing more of the medicine to have the same effect), physical dependence and slowed breathing.    Store your pills in a secure place, locked if possible. We will not replace any lost or stolen medicine. If you don t finish your medicine, please throw away (dispose) as directed by your pharmacist. The [place] [employer]ution [employer] has more information about safe disposal: https://[email or web address].[facility].mn.us/living-green/managing-unwanted-medications         Primary Care Provider Office Phone # Fax #    William Smith [phone]       [facility] [address]  [facility] [address]        Equal Access to Services     JASSON VIZCARRA : Hadii mallika reese hadasho Soomaali, waaxda luqadaha, qaybta kaalmada adeegyada, abdifatah fitzgerald . So [facility] [phone].    ATENCIÓN: Si habla español, tiene a lentz disposición servicios gratuitos de asistencia lingüística. Llame al [phone].    We comply with applicable federal civil rights laws and [place] laws. We do not discriminate on the basis of race, color, national origin, age, disability, sex, sexual orientation,  or gender identity.            Thank you!     Thank you for choosing [facility]  for your care. Our goal is always to provide you with excellent care. Hearing back from our patients is one way we can continue to improve our services. Please take a few minutes to complete the written survey that you may receive in the mail after your visit with us. Thank you!             Your Updated Medication List - Protect others around you: Learn how to safely use, store and throw away your medicines at www.disposemymeds.org.          This list is accurate as of 11/30/18  2:51 PM.  Always use your most recent med list.                   Brand Name Dispense Instructions for use Diagnosis    acetaminophen 325 MG tablet    TYLENOL    100 tablet    Take 2 tablets (650 mg) by mouth every 4 hours as needed for other (mild pain)    Orthopedic aftercare       * DULoxetine 60 MG capsule    CYMBALTA     Take 60 mg by mouth every morning        * DULoxetine 20 MG capsule    CYMBALTA     Take 20 mg by mouth every evening        medroxyPROGESTERone 150 MG/ML IM injection    DEPO-PROVERA    1 mL    Inject 1 mL (150 mg) into the muscle every 3 months    Encounter for surveillance of injectable contraceptive       nortriptyline 50 MG capsule    PAMELOR     Take 100 mg by mouth At Bedtime    Vertigo, Other fatigue, Weight gain       oxyCODONE-acetaminophen 5-325 MG tablet    PERCOCET    90 tablet    Take 0.5-1 tablets by mouth every 6 hours as needed for severe pain    Closed fracture of multiple ribs with routine healing, unspecified laterality, subsequent encounter       pantoprazole 20 MG EC tablet    PROTONIX    90 tablet    Take by mouth 30-60 minutes before a meal.    Gastroesophageal reflux disease, esophagitis presence not specified       sucralfate 1 GM tablet    CARAFATE    60 tablet    Take 1 tablet (1 g) by mouth 4 times daily    Gastritis without bleeding, unspecified chronicity, unspecified gastritis type        tiZANidine 2 MG tablet    ZANAFLEX    90 tablet    Take 1 tablet (2 mg) by mouth 3 times daily as needed for muscle spasms    Achilles tendinitis of left lower extremity       traZODone 50 MG tablet    DESYREL          * Notice:  This list has 2 medication(s) that are the same as other medications prescribed for you. Read the directions carefully, and ask your doctor or other care provider to review them with you.

## 2018-11-30 NOTE — PROGRESS NOTES
SUBJECTIVE:   Mily Grullon is a 38 year old female who presents to clinic today for the following health issues:      ED/UC Followup:    Facility:  [facility]  Date of visit: 11/28/2018  Reason for visit: SOB  Current Status: breathing better still experiencing right arm and finger tingling sensation  Very painful to cough.  Discuss use of steriods     Cleared with CT-PA at emergency department and other work-up negative.  Respiratory alkalosis along with mild hypokalemia with nebulizer likely cause of spasms which spontaneously resolved.  She denies sore throat but endorses hoarse voice ongoing without dysphagia or odynophagia or shortness of breath.  Notably, no new rib fracture seen since scan on 11/12/18.    Has been using 2.5 Percocet daily on average over the past 2 weeks.  Would like to discuss pain management/contract.    Concerned about return to work given near-constant use of her right arm/hand and current pain with such motion.  Apparently, her work has been very accommodating for her health needs.    She did see Dr. Michelle last week, apparently, and he has a work-up pending.      1. Closed fracture of multiple ribs with routine healing, unspecified laterality, subsequent encounter    2. Other chronic pain    3. Controlled substance agreement signed        PMH: Updated and/or reviewed in chart.    ROS:  Constitutional, HEENT, neuro, cardiovascular, pulmonary, gi and gu systems are otherwise negative.    OBJECTIVE:                                                    /75  Pulse 93  Temp 98.1  F (36.7  C) (Tympanic)  Resp 16  Wt 205 lb (93 kg)  SpO2 96%  BMI 37.49 kg/m2    GEN: No acute distress obese woman  EYES: No conjunctival injection or icterus, EOMI grossly intact  RESP: Unlabored, regular -- significant improved from 2 days ago  NEURO: Normal gait, MAEx4, light touch sensation grossly intact  PSYCH: Normal mood and affect    Results for orders placed or performed in visit on 11/28/18   DX  Wrist Heel Radius    Narrative    BONE DENSITOMETRY  [facility]  [address] [place] [address]  2018      PATIENT: Mily Grullon  CHART: [id number]   :  1980  AGE:  38 year old  SEX:  female   REFERRING PROVIDER:  William Michelle MD     PROCEDURE:  Bone density scanning was performed using DXA technology of   the lumbar spine and hip.  Scanning was performed on a Lunar Prodigy   scanner.  Reporting is completed in the form of a Z-score.  The Z-score   represents the standard deviation from average bone mass based on patient   of the same age.                 RISK FACTORS:  Fractures of hip, heel, ribs, foot Condition related to   bone loss: depo provera, low bone density    CURRENT TREATMENT:  none listed     FINDINGS:               Lumbar Spine (L1-L4) Z-score:  -1.7, degenerative changes   present               Right Femoral Neck Z-score:  -3.1               Forearm (radius 33%) Z-score:  -0.3  The left femur is not acceptable for evaluation due to previous surgical   repair        Lumbar (L1-L4) BMD: 1.078               Previous: 1.054                           Right Total Hip BMD: 0.751                Previous: 0.718    Forearm (radius 33%) BMD: 0.694     Previous: NA     Comparison is made to another DXA performed on the same Lunar Prodigy    machine on 2014.     IMPRESSION  Z-scores are below expected range for age. Use of T-score is not   recommended for assessment of premenopausal women and men <50.      There has been no significant change in bone density of the lumbar spine.   There has been a trend toward   significant increase in bone density of   the hip. The forearm was not included on the previous study so comparison   is not possible.      Recommendations include ensuring adequate Calcium and Vitamin D.    Treatment (in addition to calcium and vitamin D) is recommended for this   patient, after ruling out other causes of osteoporosis.  This is meant as an aid  to clinical decision making; one must still use   clinical judgement.      Follow up can be considered in 2 years.   ___________________  Daylin Bowie M.D.  Electronically signed           ASSESSMENT/PLAN:                                                    1. Closed fracture of multiple ribs with routine healing, unspecified laterality, subsequent encounter  2. Other chronic pain  3. Controlled substance agreement signed  We agreed to medium-term use and taper over weeks to months for pain.  We discussed risks, benefits, and alternatives of opioid pain management, including tolerance and dependence and CNS depression and constipation.  Patient agreed with plan and demonstrated understanding to contact us for help if not improving or sooner if worsening or if other questions or concerns arise.  I asked her to begin using half tabs as above and wean as quickly as reasonable to limit her ongoing risk.  - oxyCODONE-acetaminophen (PERCOCET) 5-325 MG tablet; Take 0.5-1 tablets by mouth every 6 hours as needed for severe pain  Dispense: 90 tablet; Refill: 0    Premenopausal osteoporosis, multiple fractures -- await endocrinology recommendations for treatment        Orders Placed This Encounter     oxyCODONE-acetaminophen (PERCOCET) 5-325 MG tablet     DULoxetine (CYMBALTA) 60 MG capsule     DULoxetine (CYMBALTA) 20 MG capsule          William Smith MD

## 2018-11-30 NOTE — LETTER
[facility]    11/30/18    Patient: Mily Grullon  YOB: 1980  Medical Record Number: [id number]                                                                  Controlled Substance Agreement  I understand that my care provider has prescribed controlled substances (narcotics, tranquilizers, and/or stimulants) to help manage my condition(s).  I am taking this medicine to help me function or work.  I know that this is strong medicine.  It could have serious side effects and even cause a dependency on the drug.  If I stop these medicines suddenly, I could have severe withdrawal symptoms.    The risks, benefits, and side effects of these medication(s) were explained to me.  I agree that:  1. I will take part in other treatments as advised by my provider.  This may be psychiatry or counseling, physical therapy, behavioral therapy, group treatment, or a referral to a pain clinic.  I will reduce or stop my medicine when my provider tells me to do so.   2. I will take my medicines as prescribed.  I will not change the dose or schedule unless my provider tells me to.  There will be no refills if I  run out early.   I may be contacted at any time without warning and asked to complete a drug test or pill count.   3. I will keep all my appointments at the clinic.  If I miss appointments or fail to follow instructions, my provider may stop my medicine.  4. I will not ask other providers to prescribe controlled substances. And I will not accept controlled substances from other people. If I need another prescribed controlled substance for a new reason, I will notify my provider within one business day.  5. If I enroll in the [place] Medical Marijuana program, I will tell my provider.  I will also sign an agreement to share my medical records with my provider.  6. I will use one pharmacy to fill all of my controlled substance prescriptions.  If my prescription is mailed to my pharmacy, it may take 5 to 7  days for my medicine to be ready.  7. I understand that my provider, clinic care team, and pharmacy can track controlled substance prescriptions from other providers through a central database (prescription monitoring program).  8. I will bring in my list of medications (or my medicine bottles) each time I come to the clinic.  [id number] REV-  07/2018                                                                                                                                   Page 1 of 2      [facility] [place]    11/30/18    Patient: Mily Grullon  YOB: 1980  Medical Record Number: [id number]    9. Refills of controlled substances will be made only during office hours.  It is up to me to make sure that I do not run out of my medicines on weekends or holidays.    10. I am responsible for my prescriptions.  If the medicine/prescription is lost or stolen, it will not be replaced.   I also agree not to share these medicines with anyone.  11. I agree to not use ANY illegal or recreational drugs.  This includes marijuana, cocaine, bath salts or other drugs.  I agree not to use alcohol unless my provider says I may.  I agree to give urine samples whenever asked.  If I fail to give a urine sample, the provider may stop my medicine.     12. I will tell my nurse or provider right away if I become pregnant or have a new medical problem treated outside of [facility].  13. I understand that this medicine can affect my thinking and judgment.  It may be unsafe for me to drive, use machinery and do dangerous tasks.  I will not do any of these things until I know how the medicine affects me.  If my dose changes, I will wait to see how it affects me.  I will contact my provider if I have concerns about medicine side effects.  I understand that if I do not follow any of the conditions above, my prescriptions or treatment may be stopped.    I agree that my provider, clinic care team, and pharmacy may work with any  city, state or federal law enforcement agency that investigates the misuse, sale, or other diversion of my controlled medicine. I will allow my provider to discuss my care with or share a copy of this agreement with any other treating provider, pharmacy or emergency room where I receive care.  I agree to give up (waive) any right of privacy or confidentiality with respect to these authorizations.   I have read this agreement and have asked questions about anything I did not understand.   ___________________________________    ___________________________  Patient Signature                                                           Date and Time  ___________________________________     ____________________________  Witness                                                                            Date and Time  ___________________________________  William Smith MD  [id number] REV-  07/2018                                                                                                                                                   Page 2 of 2  {Controlled Substance Agreement (CSA) Patient Instructions (Optional):845526}

## 2018-11-30 NOTE — LETTER
November 30, 2018      Mily Grullon  [address] [address]        To Whom It May Concern:    Mily Grullon was seen in our clinic. Please excuse patient from work until 12/10/2018 .      Sincerely,        William Smith MD

## 2018-12-04 ENCOUNTER — TELEPHONE (OUTPATIENT)
Dept: INTERNAL MEDICINE | Facility: CLINIC | Age: 38
End: 2018-12-04

## 2018-12-05 NOTE — TELEPHONE ENCOUNTER
Pt thinks she broke another rib.  She would like another scan.  She would like you to set it up for her.  Please call her back.  Caller informed that calls received after 3pm may not be returned same day.      Thank you

## 2018-12-05 NOTE — TELEPHONE ENCOUNTER
Yes, patient should be evaluated in clinic if any respiratory compromise  Unlikely we'd need another CT scan - Maybe another rib X-ray  Continue pain control as ordered otherwise

## 2018-12-06 ENCOUNTER — THERAPY VISIT (OUTPATIENT)
Dept: PHYSICAL THERAPY | Facility: CLINIC | Age: 38
End: 2018-12-06
Payer: COMMERCIAL

## 2018-12-06 DIAGNOSIS — M25.511 ACUTE PAIN OF RIGHT SHOULDER: Primary | ICD-10-CM

## 2018-12-06 PROCEDURE — 97110 THERAPEUTIC EXERCISES: CPT | Mod: GP | Performed by: PHYSICAL THERAPIST

## 2018-12-06 PROCEDURE — 97162 PT EVAL MOD COMPLEX 30 MIN: CPT | Mod: GP | Performed by: PHYSICAL THERAPIST

## 2018-12-06 NOTE — TELEPHONE ENCOUNTER
Spoke with patient and informed her per Dr Smith, see below read word for word.  Patient stated she thinks she may have just pulled a muscle and would like to give it a few days.  If no improvement patient stated she will call back and schedule an appointment.

## 2018-12-06 NOTE — MR AVS SNAPSHOT
After Visit Summary   12/6/2018    Mily Grullon    MRN: [id number]           Patient Information     Date Of Birth          1980        Visit Information        Provider Department      12/6/2018 11:20 AM Natalio Andrade, [facility] [place] [facility]        Today's Diagnoses     Acute pain of right shoulder    -  1       Follow-ups after your visit        Your next 10 appointments already scheduled     Dec 13, 2018  1:10 PM CST   [facility] Spine with Natalio Andrade PT   [facility] [place] [facility] ([facility] [place])    [address]  [address]  [place] [address]   [phone]            Dec 17, 2018 11:00 AM CST   Return Visit with Jose Alford MD   [facility] [place] ([facility] [place])    [address]  [place] [address]   [phone]            Dec 20, 2018  1:10 PM CST   CLEO Spine with Natalio Andrade PT   [facility] [place] [facility] ([facility] [place])    [address]  [address]  [place] [address]   [phone]            Angelito 15, 2019  2:00 PM CST   New Visit with Dallas Flores MD   [facility] ([facility])    [address] [place] [address] [phone]              Who to contact     If you have questions or need follow up information about today's clinic visit or your schedule please contact [facility] [place] [facility] directly at [phone].  Normal or non-critical lab and imaging results will be communicated to you by MyChart, letter or phone within 4 business days after the clinic has received the results. If you do not hear from us within 7 days, please contact the clinic through MyChart or phone. If you have a critical or abnormal lab result, we will notify you by phone as soon as possible.  Submit refill requests through [employer] or call your pharmacy and they will forward the refill  "request to us. Please allow 3 business days for your refill to be completed.          Additional Information About Your Visit        MyChart Information     [employer] lets you send messages to your doctor, view your test results, renew your prescriptions, schedule appointments and more. To sign up, go to [email or web address] . Click on \"Log in\" on the left side of the screen, which will take you to the Welcome page. Then click on \"Sign up Now\" on the right side of the page.     You will be asked to enter the access code listed below, as well as some personal information. Please follow the directions to create your username and password.     Your access code is: [email or web address]  Expires: 3/7/2019  9:37 AM     Your access code will  in 90 days. If you need help or a new code, please call your [facility] clinic or [phone].        Care EveryWhere ID     This is your [facility] EveryWhere ID. This could be used by other organizations to access your [facility] medical records  [id number]         Blood Pressure from Last 3 Encounters:   18 131/75   18 (!) 157/101   18 140/80    Weight from Last 3 Encounters:   18 93 kg (205 lb)   18 93 kg (205 lb)   18 93 kg (205 lb)              We Performed the Following     HC PT EVAL, MODERATE COMPLEXITY     CLEO INITIAL EVAL REPORT     THERAPEUTIC EXERCISES        Primary Care Provider Office Phone # Fax #    William Smith [phone]       [facility] [place] [address] [place] [address]        Equal Access to Services     [facility] Hospital[facility] : Hadii mallika reese hadhernán Bliss, waaxda luqadaha, qaybta kaallevon weller, abdifatah spivey. So [facility] [phone].    ATENCIÓN: Si habla español, tiene a lentz disposición servicios gratuitos de asistencia lingüística. Llame al [phone].    We comply with applicable federal civil rights laws and [place] laws. We do not discriminate on the basis of race, color, " national origin, age, disability, sex, sexual orientation, or gender identity.            Thank you!     Thank you for choosing [facility]  for your care. Our goal is always to provide you with excellent care. Hearing back from our patients is one way we can continue to improve our services. Please take a few minutes to complete the written survey that you may receive in the mail after your visit with us. Thank you!             Your Updated Medication List - Protect others around you: Learn how to safely use, store and throw away your medicines at www.disposemymeds.org.          This list is accurate as of 12/6/18 11:59 PM.  Always use your most recent med list.                   Brand Name Dispense Instructions for use Diagnosis    acetaminophen 325 MG tablet    TYLENOL    100 tablet    Take 2 tablets (650 mg) by mouth every 4 hours as needed for other (mild pain)    Orthopedic aftercare       * DULoxetine 60 MG capsule    CYMBALTA     Take 60 mg by mouth every morning        * DULoxetine 20 MG capsule    CYMBALTA     Take 20 mg by mouth every evening        medroxyPROGESTERone 150 MG/ML IM injection    DEPO-PROVERA    1 mL    Inject 1 mL (150 mg) into the muscle every 3 months    Encounter for surveillance of injectable contraceptive       nortriptyline 50 MG capsule    PAMELOR     Take 100 mg by mouth At Bedtime    Vertigo, Other fatigue, Weight gain       oxyCODONE-acetaminophen 5-325 MG tablet    PERCOCET    90 tablet    Take 0.5-1 tablets by mouth every 6 hours as needed for severe pain    Closed fracture of multiple ribs with routine healing, unspecified laterality, subsequent encounter       pantoprazole 20 MG EC tablet    PROTONIX    90 tablet    Take by mouth 30-60 minutes before a meal.    Gastroesophageal reflux disease, esophagitis presence not specified       sucralfate 1 GM tablet    CARAFATE    60 tablet    Take 1 tablet (1 g) by mouth 4 times daily    Gastritis without  bleeding, unspecified chronicity, unspecified gastritis type       tiZANidine 2 MG tablet    ZANAFLEX    90 tablet    Take 1 tablet (2 mg) by mouth 3 times daily as needed for muscle spasms    Achilles tendinitis of left lower extremity       traZODone 50 MG tablet    DESYREL          * Notice:  This list has 2 medication(s) that are the same as other medications prescribed for you. Read the directions carefully, and ask your doctor or other care provider to review them with you.

## 2018-12-06 NOTE — PROGRESS NOTES
"[facility] Initial Evaluation  Subjective:  Patient is a 38 year old female presenting with rehab right shoulder hpi. The history is provided by the patient.   Mily Grullon is a 38 year old female with a right shoulder condition.  Condition occurred with:  Other.  Condition occurred: other.  This is a new condition  1-2 months ago right shoulder pain started, numbness tingling began 2-3 weeks ago.  .    Patient reports pain:  Anterior, lateral and upper trap.  Radiates to:  Hand, elbow, upper arm and wrist (1st and 2nd finger of RT arm).  Pain is described as aching and sharp and is constant and reported as 5/10 (worst 9/10 due to rib pain).  Associated symptoms:  Tingling, loss of motion/stiffness and loss of strength. Pain is the same all the time.  Symptoms are exacerbated by carrying, certain positions, lifting, lying on extremity and using arm overhead (laying on shoulder) and relieved by rest.  Since onset symptoms are gradually worsening.        General health as reported by patient is poor.  Pertinent medical history includes:  Depression, history of fractures, migraines/headaches, numbness/tingling and overweight.  Medical allergies: yes (Meds).  Surgical history: hip and wrist surgeries, 2x each.  Current medications:  Anti-depressants, pain medication and muscle relaxants (acid reflux).  Current occupation is Crew worker  .  Patient is currently not working due to present treatment problem (light duty on 12/10/18).  Primary job tasks include:  Lifting, repetitive tasks, prolonged sitting and prolonged standing.        Red flags:  Pain at night/rest (shoulder pain).                      Patient notes physician said to try \"light\" physical therapy.       Objective:  Standing Alignment:    Cervical/Thoracic:  Forward head and thoracic kyphosis increased  Shoulder/UE:  Rounded shoulders  Lumbar:  Posterior pelvic tilt                                Cervical/Thoracic " Evaluation    AROM:  AROM Cervical:    Flexion:            60  Extension:       62  Rotation:         Left: 58     Right: 56  Side Bend:      Left: 40     Right:  35      Headaches: migraine  Cervical Myotomes:    C1-2 (Neck Flex): Left:  5    Right: 5  C3 (neck side bend): Left: 5    Right: 5    C5 (Deltoid):  Left: 4+    Right: 5-  C6 (Biceps):  Left: 5    Right: 5  C7 (Triceps):  Left: 5    Right: 5  C8 (Thumb Ext): Left: 5    Right: 5  T1 (Intrinsics): Left: 5    Right: 5  DTR's:    C5 (Biceps):  Left:  2  Right:  2     C6 (Brachioradialis):  Left:  2  Right:  2     C7 (Triceps):  Left:  2  Right:  2    Cervical Dermatomes:          C4 left:  Normal-light touch     C4 right:  Normal-light touch    C5 left:  Normal-light touch     C5 right:  Normal-light touch    C6 left:  Normal-light touch     C6 right:  Normal-light touch    C7 left:  Normal-light touch     C7 right:  Normal-light touch    C8 left:  Normal-light touch     C8 right:  Normal-light touch    T1 left:  Normal-light touch     T1 right:  Normal-light touch                 Shoulder Evaluation:  ROM:  AROM:    Flexion:  Left:  155    Right:  125  Extension: Left: wnlRight: wnl-min loss, pain  Abduction:  Left: 175   Right:  110      External Rotation:  Left:  90    Right:  75                  Pain: pain with RT Flexion, Extension, ER and Abduction                                                   General Evaluation:              Sensation:  normal                Functional Assessment:  Functional assessment wnl: Patient notes discomfort when sitting in correct upright ergonomic position due to pain from rib fractures, patient sits with moderate slouched position.                                                ROS    Assessment/Plan:    Patient is a 38 year old female with right side shoulder and arm complaints.    Patient has the following significant findings with corresponding treatment plan.                Diagnosis 1:  Right Shoulder and Arm pain     Pain -  hot/cold therapy and manual therapy  Decreased ROM/flexibility - manual therapy, therapeutic exercise and home program  Decreased strength - therapeutic exercise, therapeutic activities and home program  Impaired muscle performance - neuro re-education and home program  Decreased function - therapeutic activities and home program    Therapy Evaluation Codes:   1) History comprised of:   Personal factors that impact the plan of care:      None.    Comorbidity factors that impact the plan of care are:      Depression, history of fractures, migraines, numbness tingling in ar, osteoporosis, overweight, sleep apnea..     Medications impacting care: Anti-depressant, Muscle relaxant and Pain.  2) Examination of Body Systems comprised of:   Body structures and functions that impact the plan of care:      Shoulder and arm.   Activity limitations that impact the plan of care are:      Bathing, Cooking, Dressing, Laying down and reaching.  3) Clinical presentation characteristics are:   Evolving/Changing.  4) Decision-Making    Moderate complexity using standardized patient assessment instrument and/or measureable assessment of functional outcome.  Cumulative Therapy Evaluation is: Moderate complexity.    Previous and current functional limitations:  (See Goal Flow Sheet for this information)    Short term and Long term goals: (See Goal Flow Sheet for this information)     Communication ability:  Patient appears to be able to clearly communicate and understand verbal and written communication and follow directions correctly.  Treatment Explanation - The following has been discussed with the patient:   RX ordered/plan of care  Anticipated outcomes  Possible risks and side effects  This patient would benefit from PT intervention to resume normal activities.   Rehab potential is good.    Frequency:  1x per 1-2 weeks  Duration:  for 8 visits  Discharge Plan:  Achieve all LTG.  Independent in home treatment  program.  Reach maximal therapeutic benefit.    Please refer to the daily flowsheet for treatment today, total treatment time and time spent performing 1:1 timed codes.

## 2018-12-09 ENCOUNTER — TRANSFERRED RECORDS (OUTPATIENT)
Dept: HEALTH INFORMATION MANAGEMENT | Facility: CLINIC | Age: 38
End: 2018-12-09

## 2018-12-17 ENCOUNTER — TELEPHONE (OUTPATIENT)
Dept: INTERNAL MEDICINE | Facility: CLINIC | Age: 38
End: 2018-12-17

## 2018-12-17 ENCOUNTER — OFFICE VISIT (OUTPATIENT)
Dept: ORTHOPEDICS | Facility: CLINIC | Age: 38
End: 2018-12-17
Payer: COMMERCIAL

## 2018-12-17 VITALS
OXYGEN SATURATION: 97 % | HEART RATE: 113 BPM | WEIGHT: 205 LBS | BODY MASS INDEX: 37.73 KG/M2 | HEIGHT: 62 IN | DIASTOLIC BLOOD PRESSURE: 84 MMHG | SYSTOLIC BLOOD PRESSURE: 138 MMHG

## 2018-12-17 DIAGNOSIS — S22.49XD CLOSED FRACTURE OF MULTIPLE RIBS WITH ROUTINE HEALING, UNSPECIFIED LATERALITY, SUBSEQUENT ENCOUNTER: ICD-10-CM

## 2018-12-17 DIAGNOSIS — M25.552 LEFT HIP PAIN: ICD-10-CM

## 2018-12-17 DIAGNOSIS — S92.812D CLOSED FRACTURE OF SESAMOID BONE OF LEFT FOOT WITH ROUTINE HEALING, SUBSEQUENT ENCOUNTER: ICD-10-CM

## 2018-12-17 DIAGNOSIS — T07.XXXA MULTIPLE FRACTURES: Primary | ICD-10-CM

## 2018-12-17 DIAGNOSIS — M89.8X8 BONY PELVIC PAIN: Primary | ICD-10-CM

## 2018-12-17 PROCEDURE — 99213 OFFICE O/P EST LOW 20 MIN: CPT | Performed by: ORTHOPAEDIC SURGERY

## 2018-12-17 ASSESSMENT — MIFFLIN-ST. JEOR: SCORE: 1558.12

## 2018-12-17 ASSESSMENT — PAIN SCALES - GENERAL: PAINLEVEL: MILD PAIN (2)

## 2018-12-17 NOTE — TELEPHONE ENCOUNTER
Voicemail left on patients phone advising to call back clinic [phone] or [phone].    - Patient should have tablets left?  - If patient is taking as prescribed and has been taking for 17 days... Patient should have only taken 68 tablets.   1 tablet q6 hours = 4 tabs daily   Script written on 11/30, 17 days ago  4 tabs daily x 17 days = 68 tablets    - How is patient taking medication?    Sara Mccain, RN, BSN, PHN

## 2018-12-17 NOTE — TELEPHONE ENCOUNTER
Spoke with patient.     Patient states that she is taking 5 tablets a day.   Per last script max of 4 tablets daily.    Patient saw Ortho and believe she may have broke her pelvis or this may be muscle pain.   Patient states she is in pain and can barely sit.   RN advised ER evaluation if pain become unbearable.    Please advise on percocet refill.    Sara Mccain RN, BSN, PHN

## 2018-12-17 NOTE — PROGRESS NOTES
"CHIEF COMPLAINT:   Chief Complaint   Patient presents with     Left Foot - Pain     Left Hip - Pain     RECHECK     having left hip pain which is a 5 and her left foot is still painful and her pain is a 2 today     HISTORY:  Mily Grullon is a 38 year old female, right -hand dominant, who is seen for left hip and groin pain. Pain has been present for the last 2 weeks. No injury. She reports barely being able to sit or stand. Her pain is located over the medial thigh, lateral thigh with jerking, as well as pain in the posterior hip/low back. She has been resting for about 1 week now. Still feels that with any turning, something in the body is \"breaking\". Moderate hip pain, rated a 5/10. Currently is not working because of her significant hip and rib pain. Presents with mom today who feels an MRI may be helpful.     Also has continued left foot pain today. Known osteoporosis, but currently on any medication.  Returns today with continued pain, 2/10. Pain can increase, especially with palpation. Has not been wearing post op shoe for about 1 week now. Notes it could be the reason her hip has been hurting more, with changing how she walks.     Onset: no known injury.  Symptoms have been worsening since that time.  Aggravated by: with hip and lumbar range of motion, twisting of the back  Relieved by: rest  Present symptoms: pain with activities of daily living,   Pain location: thigh and low back, metatarsophalangeal joint  Pain severity: 5/10 left hip, 2/10 left foot.   Pain quality: aching and sharp  Frequency of symptoms: frequently  Associated symptoms: none    Treatment up to this point: rest  Has not tried: surgery, physical therapy   Prior history of related problems: history of calcaneal stress fracture in the left foot.     Significant Orthopedic past medical history: history of calcaneal stress fracture in the left foot.   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  Patient Active Problem List    Diagnosis Date Noted     Other chronic pain 11/30/2018     Priority: Medium     Related to multiple fractures related to premature osteoporosis  As of 11/30/2018, limit of 3 Percocet 5/325 daily with intention to taper in months ahead       Controlled substance agreement signed 11/30/2018     Priority: Medium     Prescriber of controlled substances: William Smith        Obesity (BMI 35.0-39.9) with comorbidity (H) 10/24/2018     Priority: Medium     Superficial phlebitis 09/27/2018     Priority: Medium     Long-term (current) use of anticoagulants [Z79.01] 09/19/2018     Priority: Medium     Gastroesophageal reflux disease, esophagitis presence not specified 09/10/2018     Priority: Medium     Closed nondisplaced fracture of body of left calcaneus with delayed healing, subsequent encounter 03/09/2018     Priority: Medium     Heel pain, chronic, left 03/09/2018     Priority: Medium     Pain in joint, ankle and foot, left 11/15/2017     Priority: Medium     Migraine without aura and without status migrainosus, not intractable 11/10/2015     Priority: Medium     Closed nondisplaced intertrochanteric fracture of left femur (H) 02/10/2014     Priority: Medium     Osteoporosis of multiple sites 12/10/2013     Priority: Medium     Headache 10/31/2011     Priority: Medium     Problem list name updated by automated process. Provider to review       Gastritis      Priority: Medium     dx 2010- sees Dr Glover in [facility]       CARDIOVASCULAR SCREENING; LDL GOAL LESS THAN 160 10/31/2010     Priority: Medium     Left elbow pain 01/08/2010     Priority: Medium     Narcotic agreement on file       Personal history of nicotine dependence 12/17/2009     Priority: Medium       Surgical Hx:  has  a past surgical history that includes REPAIR TRIANGULAR CART,WRIST JT (2005); REPAIR TRIANGULAR CART,WRIST JT (2007 or so); arthroscopy of joint unlisted (4/2004); REMOVAL OF OVARY/TUBE(S) (6/2003); hysteroscopy; lithotripsy; and Arthroscopy hip, osteoplasty femur proximal, combined (Left, 6/29/2016).    Medications:   Current Outpatient Medications:      acetaminophen (TYLENOL) 325 MG tablet, Take 2 tablets (650 mg) by mouth every 4 hours as needed for other (mild pain), Disp: 100 tablet, Rfl: 0     DULoxetine (CYMBALTA) 20 MG capsule, Take 20 mg by mouth every evening, Disp: , Rfl:      DULoxetine (CYMBALTA) 60 MG capsule, Take 60 mg by mouth every morning, Disp: , Rfl:      medroxyPROGESTERone (DEPO-PROVERA) 150 MG/ML injection, Inject 1 mL (150 mg) into the muscle every 3 months, Disp: 1 mL, Rfl: 3     medroxyPROGESTERone (DEPO-PROVERA) 150 MG/ML injection, Inject 1 mL (150 mg) into the muscle every 3 months, Disp: 1 mL, Rfl: 3     nortriptyline (PAMELOR) 50 MG capsule, Take 100 mg by mouth At Bedtime, Disp: , Rfl:      oxyCODONE-acetaminophen (PERCOCET) 5-325 MG tablet, Take 0.5-1 tablets by mouth every 6 hours as needed for severe pain, Disp: 90 tablet, Rfl: 0     pantoprazole (PROTONIX) 20 MG EC tablet, Take by mouth 30-60 minutes before a meal., Disp: 90 tablet, Rfl: 1     sucralfate (CARAFATE) 1 GM tablet, Take 1 tablet (1 g) by mouth 4 times daily, Disp: 60 tablet, Rfl: 1     tiZANidine (ZANAFLEX) 2 MG tablet, Take 1 tablet (2 mg) by mouth 3 times daily as needed for muscle spasms, Disp: 90 tablet, Rfl: 1     traZODone (DESYREL) 50 MG tablet, , Disp: , Rfl:     Allergies:   Allergies   Allergen Reactions     Amitriptyline Hives     Amoxicillin Diarrhea     Asa [Dihydroxyaluminum Aminoacetate]      Cipro [Ciprofloxacin]      Dizziness, vomiting, shortness of breath     Ibuprofen [Aspartame-Ibuprofen]      GI distress       Lyrica      extrematies swell up      Morphine      ithcy     Naproxen      GI  "distress     Neurontin [Gabapentin]      rash     Paxil [Paroxetine] Anaphylaxis     anaphylaxis     Phenergan [Promethazine Hcl]      dystonia     Prilosec [Omeprazole]      Rash, dizziness     Gabapentin Rash       Social Hx: works at [email or web address].  reports that she quit smoking about 4 years ago. Her smoking use included cigarettes. she has never used smokeless tobacco. She reports that she drinks alcohol. She reports that she does not use drugs.    Family Hx: family history includes Cancer in her paternal grandmother; Genitourinary Problems in her maternal grandmother; Hypertension in her father and maternal grandmother; Lipids in her father..    REVIEW OF SYSTEMS:   CONSTITUTIONAL:NEGATIVE for fever, chills, change in weight  INTEGUMENTARY/SKIN: NEGATIVE for worrisome rashes, moles or lesions  MUSCULOSKELETAL:See HPI above  NEURO: NEGATIVE for weakness, dizziness or paresthesias    This document serves as a record of the services and decisions personally performed and made by Jose Alford MD. It was created on his behalf by Ashlee Linn, a trained medical scribe. The creation of this document is based the provider's statements to the medical scribe.    Scribe Ashlee Linn 9:35 AM 10/29/2018    PHYSICAL EXAM:  /84 (BP Location: Left arm, Patient Position: Sitting, Cuff Size: Adult Regular)   Pulse 113   Ht 1.575 m (5' 2\")   Wt 93 kg (205 lb)   SpO2 97%   BMI 37.49 kg/m     GENERAL APPEARANCE: healthy, alert, no distress  SKIN: no suspicious lesions or rashes  NEURO: Normal strength and tone, mentation intact and speech normal  PSYCH:  mentation appears normal and affect normal, not anxious  RESPIRATORY: No increased work of breathing.  VASCULAR: Radial pulses 2+ and brisk cappillary refill     MUSCULOSKELETAL:    BILATERAL LOWER EXTREMITIES:  Gait: favors the left.  No gross deformities or masses.  No Quad atrophy, strength normal.  Intact sensation deep peroneal nerve, superficial peroneal nerve, med/lat " tibial nerve, sural nerve, saphenous nerve  Intact EHL, EDL, TA, FHL, GS, quadriceps hamstrings and hip flexors  Toes warm and well perfused, brisk capillary refill. Palpable 2+ dp pulses.  Bilateral calf soft and nttp or squeeze.  Edema: none      LEFT HIP EXAM:    Palpation: Tender:  Pubic brim, adductors, posterior over the Sacrum and sacroiliac joint.   Minimal tender to palpation along the greater trochanter, iliotibial band.  Strength:  Grossly intact  Special tests:  Irritability (flexion/adduction/internal rotation) mild  Hip range of motion: full range of motion      Left Foot  Inspection: skin intact. No erythema or ecchymosis.   Swelling: none   Tender: plantar medial aspect of the metatarsophalangeal joint of the great toe  Nontender to palpation: great toe TMT, IP joint, remainder of midfoot and forefoot.  Range of Motion:grossly intact  Strength: intact    X-RAY INTERPRETATION: no new images today.  CT foot, left, 10/29/2018  Left medial hallux sesamoid transverse lucency. Although  this could represent a bipartite sesamoid, fracture is not excluded,  particularly a subacute fracture. Adjacent soft tissue swelling is  also suspected.    3 views left foot obtained 10/29/2018 were reviewed personally in clinic today with the patient. On my review, there is possible lucency of the tibial sesamoid with sclerosis of the great toe MTP joint      ASSESSMENT: Mily Grullon is a 38 year old female, with acute hip and pelvis pain, cannot rule out insufficiency fracture. Left foot sesamoid fracture.    PLAN:   * Reviewed imaging with patient. Also, clinical exam findings.  * Discussed with patient that based on physical exam findings, it is not possible to completely rule out insufficiency fracture of the pelvis, given her history of numerous non-injury type fractures.  * An MRI of the pelvis was ordered for further evaluation.     * continue with cushioned stiff shoe for left foot sesamoid fracture.    * Rest  *  Activity modification - avoid activities that aggravate symptoms.  * NSAIDS - regular use for inflammation, with food, as long as no contra-indications. Please discuss with pcp if needed.  * Ice twice daily to three times daily.  * Compression wrap  * Elevation of extremity to reduce swelling  * Tylenol as needed for pain    * After having the MRI, I would like the patient to call me so that we can discuss the results as well as how to proceed.    * return to clinic in 6 weeks.       The information in this document, created by a scribe for me, accurately reflects the services I personally performed and the decisions made by me. I have reviewed and approved this document for accuracy.     Jose Alford M.D., M.S.  Dept. of Orthopaedic Surgery  [facility]

## 2018-12-18 ENCOUNTER — ANCILLARY PROCEDURE (OUTPATIENT)
Dept: MRI IMAGING | Facility: CLINIC | Age: 38
End: 2018-12-18
Attending: PHYSICIAN ASSISTANT
Payer: COMMERCIAL

## 2018-12-18 DIAGNOSIS — M25.552 LEFT HIP PAIN: ICD-10-CM

## 2018-12-18 DIAGNOSIS — M89.8X8 BONY PELVIC PAIN: ICD-10-CM

## 2018-12-18 PROCEDURE — 72195 MRI PELVIS W/O DYE: CPT | Mod: TC

## 2018-12-19 RX ORDER — OXYCODONE AND ACETAMINOPHEN 5; 325 MG/1; MG/1
.5-1 TABLET ORAL EVERY 6 HOURS PRN
Qty: 120 TABLET | Refills: 0 | Status: SHIPPED | OUTPATIENT
Start: 2018-12-19 | End: 2019-01-15

## 2018-12-19 NOTE — TELEPHONE ENCOUNTER
Patient has 10 tablets left.    Patient is taking 4 everyday and sometimes is taking 5 if the pain gets worse.  Patient tried to work but due to pain is unable to work right now.     - does patient need to schedule appointment with PCP?  - PCP schedule booked this week, patient will be out of medication this week.    Sara Mccain RN, BSN, PHN

## 2018-12-19 NOTE — TELEPHONE ENCOUNTER
Please check on any tablets left -- if she has none left she has been using more than 5 per day on average.  Patient should not be taking five daily unless status change as suggested, at which patient she needs to follow-up with me to discuss.  No refill until clarified, but may consider 12/19/18.

## 2018-12-20 ENCOUNTER — THERAPY VISIT (OUTPATIENT)
Dept: PHYSICAL THERAPY | Facility: CLINIC | Age: 38
End: 2018-12-20
Payer: COMMERCIAL

## 2018-12-20 DIAGNOSIS — M25.511 ACUTE PAIN OF RIGHT SHOULDER: Primary | ICD-10-CM

## 2018-12-20 PROCEDURE — 97110 THERAPEUTIC EXERCISES: CPT | Mod: GP | Performed by: PHYSICAL THERAPIST

## 2018-12-20 PROCEDURE — 97140 MANUAL THERAPY 1/> REGIONS: CPT | Mod: GP | Performed by: PHYSICAL THERAPIST

## 2018-12-21 NOTE — PROGRESS NOTES
Subjective:  HPI                    Objective:  System    Physical Exam    General     ROS    Assessment/Plan:    SUBJECTIVE  Subjective: Patient returns to therapy after two weeks on her own and reports that the pain in her arm is about the same since last session. Patient notes that sitting and driving increase the tingling sensation down her arm and into her first and second finger. She has been doing her HEP about twice a day and notices a slight increase in pain in her shoulder.    Current Pain level: 4/10   Changes in function:  Yes (See Goal flowsheet attached for changes in current functional level)     Adverse reaction to treatment or activity:  None    OBJECTIVE  Changes in objective findings:  Yes  Objective: Flexion/abduction min loss and pain at end range. ER wnl, IR mod loss. Slight improvement in AROM after STM and JM.     ASSESSMENT  Mily continues to require intervention to meet STG and LTG's: PT  Patient is progressing slowly.  Response to therapy has shown an improvement in  pain level  Progress made towards STG/LTG?  Yes (See Goal flowsheet attached for updates on achievement of STG and LTG)    PLAN  Continue current treatment plan until patient demonstrates readiness to progress to higher level exercises.    PTA/ATC plan:  N/A    Please refer to the daily flowsheet for treatment today, total treatment time and time spent performing 1:1 timed codes.

## 2018-12-31 ENCOUNTER — TRANSFERRED RECORDS (OUTPATIENT)
Dept: HEALTH INFORMATION MANAGEMENT | Facility: CLINIC | Age: 38
End: 2018-12-31

## 2019-01-02 ENCOUNTER — OFFICE VISIT (OUTPATIENT)
Dept: INTERNAL MEDICINE | Facility: CLINIC | Age: 39
End: 2019-01-02
Payer: COMMERCIAL

## 2019-01-02 ENCOUNTER — ANCILLARY PROCEDURE (OUTPATIENT)
Dept: GENERAL RADIOLOGY | Facility: CLINIC | Age: 39
End: 2019-01-02
Payer: COMMERCIAL

## 2019-01-02 VITALS
HEART RATE: 112 BPM | DIASTOLIC BLOOD PRESSURE: 90 MMHG | WEIGHT: 205 LBS | BODY MASS INDEX: 37.49 KG/M2 | TEMPERATURE: 97.6 F | SYSTOLIC BLOOD PRESSURE: 118 MMHG | RESPIRATION RATE: 16 BRPM

## 2019-01-02 DIAGNOSIS — R07.81 RIB PAIN ON RIGHT SIDE: ICD-10-CM

## 2019-01-02 DIAGNOSIS — J06.9 UPPER RESPIRATORY TRACT INFECTION, UNSPECIFIED TYPE: ICD-10-CM

## 2019-01-02 DIAGNOSIS — N80.9 ENDOMETRIOSIS: ICD-10-CM

## 2019-01-02 DIAGNOSIS — J06.9 UPPER RESPIRATORY TRACT INFECTION, UNSPECIFIED TYPE: Primary | ICD-10-CM

## 2019-01-02 PROCEDURE — 71046 X-RAY EXAM CHEST 2 VIEWS: CPT

## 2019-01-02 PROCEDURE — 71101 X-RAY EXAM UNILAT RIBS/CHEST: CPT | Mod: RT

## 2019-01-02 PROCEDURE — 99214 OFFICE O/P EST MOD 30 MIN: CPT | Performed by: INTERNAL MEDICINE

## 2019-01-02 NOTE — PROGRESS NOTES
SUBJECTIVE:   Mily Grullon is a 38 year old female who presents to clinic today for the following health issues:      RESPIRATORY SYMPTOMS      Duration: 1 week    Description  nasal congestion, rhinorrhea, sore throat, wheezing, fever, chills, fatigue/malaise, hoarse voice and sweats    Severity: moderate    Accompanying signs and symptoms: None    History (predisposing factors):  family    Precipitating or alleviating factors: None    Therapies tried and outcome:  Tylenol,Emergen-C, cough drops,gargle with salt water        Discuss Endocrinology consult    Recheck on rib FX's  Has had cough and right-sided ribs hurt more again, worried about another rib fracture.  No other shortness of breath, chest pain, and no fever, chills, or night sweats.      Reviewed and updated as needed this visit by clinical staff  Tobacco  Allergies  Med Hx  Surg Hx  Fam Hx  Soc Hx      Reviewed and updated as needed this visit by Provider         1. Upper respiratory tract infection, unspecified type    2. Endometriosis    3. Rib pain on right side        PMH: Updated and/or reviewed in chart.    ROS:  Constitutional, HEENT, cardiovascular, pulmonary, gi and gu systems are otherwise negative.    OBJECTIVE:                                                    /90   Pulse 112   Temp 97.6  F (36.4  C) (Tympanic)   Resp 16   Wt 93 kg (205 lb)   BMI 37.49 kg/m      GEN: No acute distress   EYES: EOMI grossly, pupils equal, no conjunctival injection, icterus, or edema  MOUTH/THROAT: Posterior oropharynx clear, tongue normal, no parotid tenderness/enlargement; dentition adequate  NECK: Trachea midline, no thyromegaly   LYMPH: No anterior/posterior cervical, occipital, or clavicular lymphadenopathy  CARDIAC: Regular tachycardia, no murmurs, rubs, or gallops appreciated   RESP: Unlabored breathing, clear to auscultation bilaterally without rales, rhonchi, or wheezes  PSYCH: anxious mood and congruent affect          ASSESSMENT/PLAN:                                                    1. Upper respiratory tract infection, unspecified type  3. Rib pain on right side  No new fractures consistent with areas of pain -- right 2nd fracture notable, however.  We discussed continued pain control as planned.  I recommended staying out of work for at least another month, perhaps two.  - XR Chest 2 Views; Future  - XR Ribs & Chest Right G/E 3 Views; Future    2. Endometriosis, osteoporosis  We discussed Dr. Michelle's recommendations and agreed to stop Depo-Provera and have her consult with OB/GYN regarding her historical diagnosis of endometriosis.  She agreed to trial taking a day or two off of her PPI per week to gauge tolerance to weaning.  - OB/GYN REFERRAL     Orders Placed This Encounter     XR Ribs & Chest Right G/E 3 Views     XR Chest 2 Views     OB/GYN REFERRAL              William Smith MD

## 2019-01-10 ENCOUNTER — TELEPHONE (OUTPATIENT)
Dept: INTERNAL MEDICINE | Facility: CLINIC | Age: 39
End: 2019-01-10

## 2019-01-10 ENCOUNTER — OFFICE VISIT (OUTPATIENT)
Dept: OBGYN | Facility: CLINIC | Age: 39
End: 2019-01-10
Payer: COMMERCIAL

## 2019-01-10 VITALS
SYSTOLIC BLOOD PRESSURE: 130 MMHG | HEIGHT: 62 IN | HEART RATE: 114 BPM | WEIGHT: 193.4 LBS | BODY MASS INDEX: 35.59 KG/M2 | DIASTOLIC BLOOD PRESSURE: 79 MMHG | TEMPERATURE: 98.2 F

## 2019-01-10 DIAGNOSIS — N80.9 ENDOMETRIOSIS: Primary | ICD-10-CM

## 2019-01-10 PROCEDURE — 99242 OFF/OP CONSLTJ NEW/EST SF 20: CPT | Performed by: OBSTETRICS & GYNECOLOGY

## 2019-01-10 RX ORDER — LEVONORGESTREL / ETHINYL ESTRADIOL AND ETHINYL ESTRADIOL 150-30(84)
0.15 KIT ORAL DAILY
Qty: 91 TABLET | Refills: 3 | Status: SHIPPED | OUTPATIENT
Start: 2019-01-10 | End: 2019-01-10

## 2019-01-10 SDOH — HEALTH STABILITY: MENTAL HEALTH: HOW OFTEN DO YOU HAVE A DRINK CONTAINING ALCOHOL?: NEVER

## 2019-01-10 ASSESSMENT — MIFFLIN-ST. JEOR: SCORE: 1505.51

## 2019-01-10 NOTE — PROGRESS NOTES
Mily is a 38 year old  referred here by Dr. Smith for consultation regarding Endometriosis and discontinuing depo provera due to osteoporosis.. She was diagnosed with Endometriosis with  With RSO 16 years ago. Was placed on Depo-Provera. She was on lupron prior to surgery.An IUD failed.She is not sexually active. About 5 years ago she had a hip fracture and was diagnosed with osteoporosis..She developed a blood clot in her arm last year and has been off coumadin for a month.    Her main symptoms for the endometriosis was pain with menses. Has not had any symptoms since being on the depo provera.  She is wondering if she still has endometriosis.  ROS: Ten point review of systems was reviewed and negative except the above.    Gyne: - abn pap (last pap ), - STD's    Past Medical History:   Diagnosis Date     Endometriosis, site unspecified      Gastritis     dx - sees Dr Glover in [facility]     Urinary calculus, unspecified     kidney stones, age 19-20     Wrist injury 2003    Workman's Comp- left wrist- narc agreement on file     Past Surgical History:   Procedure Laterality Date     ARTHROSCOPY HIP, OSTEOPLASTY FEMUR PROXIMAL, COMBINED Left 2016    Procedure: COMBINED ARTHROSCOPY HIP, OSTEOPLASTY FEMUR PROXIMAL;  Surgeon: Godwin Deleon MD;  Location: [facility] OR     ARTHROSCOPY OF JOINT UNLISTED  2004    Left wrist, with debridement and repair of tear.     HC REMOVAL OF OVARY/TUBE(S)  2003    right with fallopian tube     HC REPAIR TRIANGULAR CART,WRIST JT      Dr Eloy Sosa     HC REPAIR TRIANGULAR CART,WRIST JT   or so    ulnar excision- Dr Eloy Sosa     HYSTEROSCOPY      laproscopy for endometriosis x2     LITHOTRIPSY       Patient Active Problem List   Diagnosis     Personal history of nicotine dependence     Left elbow pain     CARDIOVASCULAR SCREENING; LDL GOAL LESS THAN 160     Gastritis     Headache     Osteoporosis of multiple sites     Closed  "nondisplaced intertrochanteric fracture of left femur (H)     Migraine without aura and without status migrainosus, not intractable     Pain in joint, ankle and foot, left     Closed nondisplaced fracture of body of left calcaneus with delayed healing, subsequent encounter     Heel pain, chronic, left     Gastroesophageal reflux disease, esophagitis presence not specified     Long-term (current) use of anticoagulants [Z79.01]     Superficial phlebitis     Obesity (BMI 35.0-39.9) with comorbidity (H)     Other chronic pain     Controlled substance agreement signed       ALL/Meds: Her medication and allergy histories were reviewed and are documented in their appropriate chart areas.    SH: - tob, - EtOH,     FH: Her family history was reviewed and documented in its appropriate chart area.    PE: /79   Pulse 114   Temp 98.2  F (36.8  C) (Tympanic)   Ht 1.575 m (5' 2\")   Wt 87.7 kg (193 lb 6.4 oz)   Breastfeeding? No   BMI 35.37 kg/m    Body mass index is 35.37 kg/m .    General Appearance:  healthy, alert, active, no distress  HEENT: NCAT  Abdomen: Soft, nontender.  Normal bowel sounds.  No masses  Pelvic:      deferred  A/P    ICD-10-CM    1. Endometriosis N80.9 DISCONTINUED: levonorgest-eth estrad 91-Day (SEASONIQUE) 0.15-0.03 &0.01 MG tablet     Discussed the pathophysiology and chronic nature of endometriosis.  Printed information  was  given to the patient.  Discussed expectant management as well as use of hormonal manipulation with oral contraceptive pills, patch, ring, Depo-provera, and Depo-lupron.  Discussed that Lupron is a short term therapy of 6 months due to side effects and affect on bone density.  Discussed possible use of Lupron with estrogen  addback therapy.     Discussed association of endometriosis with infertility, but that amount of disease does not tend to correlate with who will have difficulty conceiving or severity of symptoms.  Discussed the potential for surgical intervention " although symptom reduction is often temporary after fulguration.  Discussed hysterectomy with BSO as definitive surgery, but that leads to surgical menopause with permanent infertility and that a portion of women may continue to experience pelvic pain post-surgically.    With her recent Upper extremity blood clot, reluctant for estrogen OCP.   We discussed the Nexplanon, but still a small risk of DVT.  She wants to be off the Depo Provera for now.  If her symptoms return, she will consider the Lupron.  We also discussed endometrial ablation for bleeding  ACOG pamphlet was provided on the above topics    30 minutes was spent face to face with the patient today discussing her history, diagnosis, and follow-up plan as noted above.  Over 50% of the visit was spent in counseling and coordination of care.    .    CEPHAS AGBEH, MD.

## 2019-01-11 NOTE — TELEPHONE ENCOUNTER
Form to MA prior to  for completion, more information needed regarding osteoporosis, fractures, anxiety & depression. Will fax to [phone] once complete.

## 2019-01-14 ENCOUNTER — TELEPHONE (OUTPATIENT)
Dept: INTERNAL MEDICINE | Facility: CLINIC | Age: 39
End: 2019-01-14

## 2019-01-14 DIAGNOSIS — S22.49XD CLOSED FRACTURE OF MULTIPLE RIBS WITH ROUTINE HEALING, UNSPECIFIED LATERALITY, SUBSEQUENT ENCOUNTER: ICD-10-CM

## 2019-01-14 NOTE — TELEPHONE ENCOUNTER
Routing refill request to provider for review/approval because:  Drug not on the [facility] refill protocol   Last filled on 12/19/18 for #120    Sara Mccain, RN, BSN, PHN

## 2019-01-15 ENCOUNTER — OFFICE VISIT (OUTPATIENT)
Dept: ENDOCRINOLOGY | Facility: CLINIC | Age: 39
End: 2019-01-15
Attending: INTERNAL MEDICINE
Payer: COMMERCIAL

## 2019-01-15 VITALS
HEIGHT: 61 IN | WEIGHT: 194.22 LBS | HEART RATE: 114 BPM | SYSTOLIC BLOOD PRESSURE: 117 MMHG | BODY MASS INDEX: 36.67 KG/M2 | OXYGEN SATURATION: 97 % | DIASTOLIC BLOOD PRESSURE: 80 MMHG

## 2019-01-15 DIAGNOSIS — M81.0 OSTEOPOROSIS OF MULTIPLE SITES: Primary | ICD-10-CM

## 2019-01-15 DIAGNOSIS — E55.9 VITAMIN D DEFICIENCY: ICD-10-CM

## 2019-01-15 DIAGNOSIS — R79.89 ELEVATED PARATHYROID HORMONE: ICD-10-CM

## 2019-01-15 LAB
ALBUMIN SERPL-MCNC: 3.8 G/DL (ref 3.4–5)
ALP SERPL-CCNC: 134 U/L (ref 40–150)
CALCIUM SERPL-MCNC: 9.1 MG/DL (ref 8.5–10.1)
PHOSPHATE SERPL-MCNC: 2.9 MG/DL (ref 2.5–4.5)
PTH-INTACT SERPL-MCNC: 52 PG/ML (ref 18–80)

## 2019-01-15 PROCEDURE — 99205 OFFICE O/P NEW HI 60 MIN: CPT | Performed by: INTERNAL MEDICINE

## 2019-01-15 PROCEDURE — 82040 ASSAY OF SERUM ALBUMIN: CPT | Performed by: INTERNAL MEDICINE

## 2019-01-15 PROCEDURE — 84075 ASSAY ALKALINE PHOSPHATASE: CPT | Performed by: INTERNAL MEDICINE

## 2019-01-15 PROCEDURE — 83970 ASSAY OF PARATHORMONE: CPT | Performed by: INTERNAL MEDICINE

## 2019-01-15 PROCEDURE — 99000 SPECIMEN HANDLING OFFICE-LAB: CPT | Performed by: INTERNAL MEDICINE

## 2019-01-15 PROCEDURE — 82310 ASSAY OF CALCIUM: CPT | Performed by: INTERNAL MEDICINE

## 2019-01-15 PROCEDURE — 82306 VITAMIN D 25 HYDROXY: CPT | Performed by: INTERNAL MEDICINE

## 2019-01-15 PROCEDURE — 36415 COLL VENOUS BLD VENIPUNCTURE: CPT | Performed by: INTERNAL MEDICINE

## 2019-01-15 PROCEDURE — 84080 ASSAY ALKALINE PHOSPHATASES: CPT | Mod: 90 | Performed by: INTERNAL MEDICINE

## 2019-01-15 PROCEDURE — 84100 ASSAY OF PHOSPHORUS: CPT | Performed by: INTERNAL MEDICINE

## 2019-01-15 RX ORDER — OXYCODONE AND ACETAMINOPHEN 5; 325 MG/1; MG/1
.5-1 TABLET ORAL EVERY 6 HOURS PRN
Qty: 120 TABLET | Refills: 0 | Status: SHIPPED | OUTPATIENT
Start: 2019-01-15 | End: 2019-02-06

## 2019-01-15 ASSESSMENT — MIFFLIN-ST. JEOR: SCORE: 1489.99

## 2019-01-15 NOTE — NURSING NOTE
"Mily Grullon's goals for this visit include: Osteoporosis   She requests these members of her care team be copied on today's visit information: Yes    PCP: William Smith    Referring Provider:  William Smith MD  [facility]  [address] [address] [place] [id number]    /80 (BP Location: Left arm, Patient Position: Chair, Cuff Size: Adult Regular)   Pulse 114   Ht 1.544 m (5' 0.79\")   Wt 88.1 kg (194 lb 3.6 oz)   SpO2 97%   BMI 36.96 kg/m      Do you need any medication refills at today's visit? No    "

## 2019-01-15 NOTE — PATIENT INSTRUCTIONS
Weight bearing Exercises; walking carefully. Avoid lifting more than 5 pounds.     Fall prevention    Adequate Calcium and vitamin D intake: for maintenance calcium 1200 mg daily and vitamin D 1000 IU daily.      Cessation of tobacco use    Avoidance of excessive alcohol intake.     Dietary sources: These also contain vitamin D  Milk                            8 oz            300 mg  Yogurt                          1 cup           400 mg  Hard cheese                     1.5 oz          300 mg  Cottage cheese                  2 cup           300 mg  Orange juice with Calcium       8 oz            300 mg  Low fat dairy sources are recommended

## 2019-01-15 NOTE — LETTER
1/15/2019         RE: Mily Grullon  [address] [facility] [address]        Dear Colleague,    Thank you for referring your patient, Mily Grullon, to the [facility]. Please see a copy of my visit note below.        Again, ashlynPlease see documentation from 1/17/19.      Endocrinology Clinic Visit    Chief Complaint: Osteoporosis     Information obtained from:Patient    Subjective:         HPI: Mily Grullon is a 38 year old female with history of Osteoporosis and fragility fracture who is seen in consultation at William Smith's request for the same.     She was evaluated endocrinology clinic of [place] for the same issue and she is coming back to us for a second opinion.    Patient has history of endometriosis and per report has underwent right oophorectomy at the age of 22.  She was started on Depo-Provera at the  age of 22.  She has been on this medication for more than 16 years and that she was taken off of it a week ago.     She was evaluated for osteoporosis at an outside facility; with endocrinology clinic of [place] and extensive workup done at that clinic was reviewed today.  She was a screen for celiac disease which was within the normal limits.  She was screened for Cushing's syndrome which was normal.  She was screened for hyper-calciuria again which came back within the normal limits.  Thyroid lab results were within the normal limits.  GFR is within the normal limits and electrolytes including calcium.  He has had elevated PTH level in the setting of low vitamin D level and based on that result she was started on ergocalciferol 50,000 international unit twice weekly and she has been on this medication for the last 6 weeks at least.        DEXA scan 2018   FINDINGS:               Lumbar Spine (L1-L4) Z-score:  -1.7, degenerative changes present               Right Femoral Neck Z-score:  -3.1               Forearm (radius 33%) Z-score:  -0.3  The left femur is not  acceptable for evaluation due to previous surgical repair                                Lumbar (L1-L4) BMD: 1.078               Previous: 1.054                                                 Right Total Hip BMD: 0.751                Previous: 0.718                            Forearm (radius 33%) BMD: 0.694     Previous: NA  DEXA scan from 2014:   Femoral BMD 0.531 Z score -3.5     She is not currently on steroids. Multiple fractures over the last five years:  Hip fracture, wrist fracture, multiple ankle fractures, now foot and rib fractures. All of these fractures are fragility fracture without any trauma. She feels just pain. Hip # was following lifting.    She was taken off of Depo-Provera 1 week ago.  She is not currently using any contraceptives.  She met with her gynecologist to discuss other options of contraception.  IUD could not be placed due to uterus anatomy.  Oral contraceptives were not used due to history of blood clot.  Therefore, currently she is not on any kind of reliable contraception which makes treatment with bone specific measures an issue.     Allergies   Allergen Reactions     Amitriptyline Hives     Amoxicillin Diarrhea     Asa [Dihydroxyaluminum Aminoacetate]      Cipro [Ciprofloxacin]      Dizziness, vomiting, shortness of breath     Ibuprofen [Aspartame-Ibuprofen]      GI distress       Lyrica      extrematies swell up      Morphine      ithcy     Naproxen      GI distress     Neurontin [Gabapentin]      rash     Paxil [Paroxetine] Anaphylaxis     anaphylaxis     Phenergan [Promethazine Hcl]      dystonia     Prilosec [Omeprazole]      Rash, dizziness     Gabapentin Rash       Current Outpatient Medications   Medication Sig Dispense Refill     calcium carbonate 600 mg-vitamin D 400 units (CALCIUM 600 + D) 600-400 MG-UNIT per tablet Take 1 tablet by mouth 2 times daily 180 tablet 1     DULoxetine (CYMBALTA) 20 MG capsule Take 20 mg by mouth every evening       DULoxetine (CYMBALTA) 60 MG  capsule Take 60 mg by mouth every morning       nortriptyline (PAMELOR) 50 MG capsule Take 100 mg by mouth At Bedtime       pantoprazole (PROTONIX) 20 MG EC tablet Take by mouth 30-60 minutes before a meal. 90 tablet 1     sucralfate (CARAFATE) 1 GM tablet Take 1 tablet (1 g) by mouth 4 times daily 60 tablet 1     tiZANidine (ZANAFLEX) 2 MG tablet Take 1 tablet (2 mg) by mouth 3 times daily as needed for muscle spasms 90 tablet 1     traZODone (DESYREL) 50 MG tablet        acetaminophen (TYLENOL) 325 MG tablet Take 2 tablets (650 mg) by mouth every 4 hours as needed for other (mild pain) (Patient not taking: Reported on 1/15/2019) 100 tablet 0     medroxyPROGESTERone (DEPO-PROVERA) 150 MG/ML injection Inject 1 mL (150 mg) into the muscle every 3 months (Patient not taking: Reported on 1/15/2019) 1 mL 3     medroxyPROGESTERone (DEPO-PROVERA) 150 MG/ML injection Inject 1 mL (150 mg) into the muscle every 3 months 1 mL 3     oxyCODONE-acetaminophen (PERCOCET) 5-325 MG tablet Take 0.5-1 tablets by mouth every 6 hours as needed for severe pain 120 tablet 0       Review of Systems     11 point review system (Constitutional, HENT, Eyes, Respiratory, Cardiovascular, Gastrointestinal, Genitourinary, Musculoskeletal,Neurological, Psychiatric/Behavioural, Endocrine) is negative or is as per HPI above and multiple pains involving the back and joints.     Past Medical History:   Diagnosis Date     Endometriosis, site unspecified      Gastritis 2010    dx 2010- sees Dr Glover in [facility]     Osteoporosis      Urinary calculus, unspecified     kidney stones, age 19-20     Wrist injury Nov 19, 2003    Workman's Comp- left wrist- narc agreement on file       Past Surgical History:   Procedure Laterality Date     ARTHROSCOPY HIP, OSTEOPLASTY FEMUR PROXIMAL, COMBINED Left 6/29/2016    Procedure: COMBINED ARTHROSCOPY HIP, OSTEOPLASTY FEMUR PROXIMAL;  Surgeon: Godwin Deleon MD;  Location: [facility] OR     ARTHROSCOPY OF  JOINT UNLISTED  2004    Left wrist, with debridement and repair of tear.     HC REMOVAL OF OVARY/TUBE(S)  2003    right with fallopian tube     HC REPAIR TRIANGULAR CART,WRIST JT      Dr Eloy Sosa     HC REPAIR TRIANGULAR CART,WRIST JT   or so    ulnar excision- Dr Eloy Sosa     HYSTEROSCOPY      laproscopy for endometriosis x2     LITHOTRIPSY         Family History   Problem Relation Age of Onset     Hypertension Father      Lipids Father      Hypertension Maternal Grandmother      Genitourinary Problems Maternal Grandmother         kidney disease     Cancer Paternal Grandmother         leukemia     Asthma No family hx of      C.A.D. No family hx of      Diabetes No family hx of      Cerebrovascular Disease No family hx of      Breast Cancer No family hx of      Cancer - colorectal No family hx of      Prostate Cancer No family hx of      Alzheimer Disease No family hx of      Arthritis No family hx of      Blood Disease No family hx of      Circulatory No family hx of      Eye Disorder No family hx of      Gastrointestinal Disease No family hx of      Musculoskeletal Disorder No family hx of      Neurologic Disorder No family hx of      Respiratory No family hx of      Thyroid Disease No family hx of        Social History     Socioeconomic History     Marital status: Single     Spouse name: None     Number of children: None     Years of education: None     Highest education level: None   Social Needs     Financial resource strain: None     Food insecurity - worry: None     Food insecurity - inability: None     Transportation needs - medical: None     Transportation needs - non-medical: None   Occupational History     None   Tobacco Use     Smoking status: Former Smoker     Types: Cigarettes     Last attempt to quit: 2014     Years since quittin.9     Smokeless tobacco: Never Used   Substance and Sexual Activity     Alcohol use: No     Frequency: Never     Drug use: No     Sexual  "activity: Not Currently     Birth control/protection: Injection     Comment: depo provera   Other Topics Concern     Parent/sibling w/ CABG, MI or angioplasty before 65F 55M? Not Asked   Social History Narrative    Lives alone in [place]    Works at [employer]    Enjoying spending time with dog and nieces       Objective:   /80 (BP Location: Left arm, Patient Position: Chair, Cuff Size: Adult Regular)   Pulse 114   Ht 1.544 m (5' 0.79\")   Wt 88.1 kg (194 lb 3.6 oz)   SpO2 97%   BMI 36.96 kg/m     Constitutional: Pleasant no acute cardiopulmonary distress.   EYES: anicteric, normal extra-ocular movements, no lid lag or retraction   HEENT: Mouth/Throat: Mucous membrane is moist. Oropharynx is clear.   Cardiovascular: RRR, S1, S2 normal.   Pulmonary/Chest: CTAB. No wheezing or rales   Abdominal: +BS. Non tender to palpation.   Neurological: Alert and oriented.   Extremities: No edema.   No spine or lumbar spine tenderness.   Psychological: appropriate mood and affect     In House Labs:     TSH   Date Value Ref Range Status   09/11/2017 1.36 0.40 - 4.00 mU/L Final   12/10/2013 2.37 0.4 - 5.0 mU/L Final   01/07/2013 0.70 mcU/mL Final     T4 Free   Date Value Ref Range Status   12/10/2013 1.08 0.70 - 1.85 ng/dL Final       Creatinine   Date Value Ref Range Status   08/09/2018 0.90 0.52 - 1.04 mg/dL Final     24-hour urine for calcium  Sodium to creatinine ratio was 150 reference range   Sodium 24-hour 184 difference for   Creatinine 24-hour was 1.59 (0.5-2.15  Calcium to creatinine ratio was 68 reference range 30- 275  Calcium 24-hour urine was 108 reference range   Creatinine 24-hour 1.59  Total volume 2700  Urine free cortisol 3.2    TSH was 0.68 and free T4 0.74 tissue transglutaminase IgG and IgA was undetectable creatinine within the normal limits BMD in 2014 was 0.718 at the total hip in 2018 was 0.751.  Z score -3.1 at the neck, trochanteric -2.7 and total " -2.4    Assessment/Treatment Plan:      Osteoporosis with current pathological fracture/patient has had multiple fragility fractures over the last 5 years: Risk factors for osteoporosis Depo-Provera use for 16 years since the age of 22. She was taken off of Depo-Provera one week ago.      She has had workup for other possible secondary causes of osteoporosis including Cushing syndrome, celiac disease, hypercalciuria and primary hyperparathyroidism which was unremarkable.  She was noted to have vitamin D deficiency and she is currently on high-dose vitamin D replacement.  We will going to check a low vitamin D today with other calcium labs including parathyroid hormone which was slightly off when it was checked last time..  Checking her gonadal axis for possible estrogen deficiency would be reasonable once the Depo-Provera effect weans off because it is hard to attribute this degree of osteoporosis and fragility fractures to just Depo-Provera use.     Patient might eventually need bone specific treatment.  However, the first step is optimizing her calcium and vitamin D intake; calcium prescription was provided today.  Of note patient has remote history of kidney stones and discussed that preferentially in her case; it is better if the calcium comes from her diet.  dietary sources of calcium including the amount in a serving was discussed with the patient and written information was provided.     She is not currently using any contraceptives.  She met with her gynecologist to discuss other options of contraception.  IUD could not be placed due to uterus anatomy.  Oral contraceptives were not used due to history of blood clot.  Therefore, currently she is not on any kind of reliable contraception which makes treatment with bone specific measures an issue.     In summary: Osteoporosis and fragility fracture in this patient seems to be secondary to long-standing Depo-Provera use.  Workup for other secondary causes has  been negative so far with the exception of low vitamin D.  She is currently on high vitamin D replacement and once she completes that I recommend vitamin D 2000 international units daily in addition to the combination she gets with calcium.    Longitudinal studies and cross-sectional studies have shown that Depo-Provera use can lower BMD compared to nonusers.  There is data which states about 5.2-5.4% loss after 5 years of use.  Studies have also shown use of Depo-Provera with increased fragility fracture risk.  There is date which is showed that there is recovery of bone mineral density after discontinuation of Depo-Provera.  In some cases the bone mineral density recovery could be substantial and fully reversible; which is encouraging.  There is no data showed that treatment with bisphosphonate or other bone specific treatments improve risk of fracture or gain in BMD in this group of patients.  In a patient who is older and has completed childbearing; might be reasonable to use bone specific treatments like bisphosphonate's, denosumab or Forteo.  Of note Forteo is not the preferred medication in this patient's as there is no data in terms of preventing hip fracture.  In any case, all 3 medications are contraindicated in patients who are pregnant or planning to be pregnant.  In this patient with no reliable method of contraception use of these medications is very controversial.  She has stopped the Depo-Provera; and monitoring her with follow-up BMD in the near future would be reasonable without bone specific treatments.  Again, there is data which showed improvement in BMD after stopping Depo-Provera.  With follow-up study; if we observe loss of further BMD or further fracture off the Depo-Provera; that would be a strong indication to consider bone specific treatments.    After to 3 months of discontinuation of Depo-Provera; will check her gonadal axis with estrogen,FSH and LH.   Plan:    Weight bearing Exercises;  walking carefully. Avoid lifting more than 5 pounds.     Fall prevention    Adequate Calcium and vitamin D intake: for maintenance calcium 1200 mg daily and vitamin D 2000 IU daily.      Cessation of tobacco use    Avoidance of excessive alcohol intake.     Check gonadal axis and Vit d labs in 2-3 months.     Follow up in 3 months assess gonadal axis.     Repeat DEXA scan in six months from the last one.    Called patient on 1/17/19 to discuss lab results and the above overall plan. In detail discussed rationale. Patient verbalized understanding.      Ref. Range 11/23/2018 00:00 1/15/2019 14:59   Potassium Latest Ref Range: 3.5 - 5.3 mmol/L 3.6    Creatinine Latest Ref Range: 0.50 - 1.10 mg/dL 0.89    GFR Estimate Latest Ref Range: >OR=60 mL/min/1.73m2 82    GFR Estimate If Black Latest Ref Range: >OR=60 mL/min/1.73m2 95    Bone Spec Alk Phosphatase Latest Units: ug/L  23.3   TSH Latest Ref Range: 0.30 - 5.00 uIU/mL 0.68    Vitamin D Deficiency screening Latest Ref Range: 20 - 75 ug/L  22   Glucose Latest Ref Range: 65 - 99 mg/dL 90      Patient Instructions     Weight bearing Exercises; walking carefully. Avoid lifting more than 5 pounds.     Fall prevention    Adequate Calcium and vitamin D intake: for maintenance calcium 1200 mg daily and vitamin D 1000 IU daily.      Cessation of tobacco use    Avoidance of excessive alcohol intake.     Dietary sources: These also contain vitamin D  Milk                            8 oz            300 mg  Yogurt                          1 cup           400 mg  Hard cheese                     1.5 oz          300 mg  Cottage cheese                  2 cup           300 mg  Orange juice with Calcium       8 oz            300 mg  Low fat dairy sources are recommended        I will contact the patient with the test results.  Return to clinic in 3 months.    Test and/or medications prescribed today:  Orders Placed This Encounter   Procedures     Vitamin D Deficiency (D3 Only)      Albumin level     Alkaline phosphatase     Bone specific alk phosphatase     Calcium     Phosphorus     Parathyroid Hormone Intact         Dallas Flores MD  Staff Endocrinologist    [phone]  Division of Endocrinology and Diabetes        nk you for allowing me to participate in the care of your patient.        Sincerely,        Dallas Flores MD

## 2019-01-16 LAB — DEPRECATED CALCIDIOL+CALCIFEROL SERPL-MC: 22 UG/L (ref 20–75)

## 2019-01-17 LAB — ALP BONE SERPL-MCNC: 23.3 UG/L

## 2019-01-17 NOTE — PROGRESS NOTES
Endocrinology Clinic Visit    Chief Complaint: Osteoporosis     Information obtained from:Patient    Subjective:         HPI: Mily Grullon is a 38 year old female with history of Osteoporosis and fragility fracture who is seen in consultation at William Smith's request for the same.     She was evaluated endocrinology clinic of [place] for the same issue and she is coming back to us for a second opinion.    Patient has history of endometriosis and per report has underwent right oophorectomy at the age of 22.  She was started on Depo-Provera at the  age of 22.  She has been on this medication for more than 16 years and that she was taken off of it a week ago.     She was evaluated for osteoporosis at an outside facility; with endocrinology clinic of [place] and extensive workup done at that clinic was reviewed today.  She was a screen for celiac disease which was within the normal limits.  She was screened for Cushing's syndrome which was normal.  She was screened for hyper-calciuria again which came back within the normal limits.  Thyroid lab results were within the normal limits.  GFR is within the normal limits and electrolytes including calcium.  He has had elevated PTH level in the setting of low vitamin D level and based on that result she was started on ergocalciferol 50,000 international unit twice weekly and she has been on this medication for the last 6 weeks at least.        DEXA scan 2018   FINDINGS:               Lumbar Spine (L1-L4) Z-score:  -1.7, degenerative changes present               Right Femoral Neck Z-score:  -3.1               Forearm (radius 33%) Z-score:  -0.3  The left femur is not acceptable for evaluation due to previous surgical repair                                Lumbar (L1-L4) BMD: 1.078               Previous: 1.054                                                 Right Total Hip BMD: 0.751                Previous: 0.718                            Forearm (radius 33%) BMD:  0.694     Previous: NA  DEXA scan from 2014:   Femoral BMD 0.531 Z score -3.5     She is not currently on steroids. Multiple fractures over the last five years:  Hip fracture, wrist fracture, multiple ankle fractures, now foot and rib fractures. All of these fractures are fragility fracture without any trauma. She feels just pain. Hip # was following lifting.    She was taken off of Depo-Provera 1 week ago.  She is not currently using any contraceptives.  She met with her gynecologist to discuss other options of contraception.  IUD could not be placed due to uterus anatomy.  Oral contraceptives were not used due to history of blood clot.  Therefore, currently she is not on any kind of reliable contraception which makes treatment with bone specific measures an issue.     Allergies   Allergen Reactions     Amitriptyline Hives     Amoxicillin Diarrhea     Asa [Dihydroxyaluminum Aminoacetate]      Cipro [Ciprofloxacin]      Dizziness, vomiting, shortness of breath     Ibuprofen [Aspartame-Ibuprofen]      GI distress       Lyrica      extrematies swell up      Morphine      ithcy     Naproxen      GI distress     Neurontin [Gabapentin]      rash     Paxil [Paroxetine] Anaphylaxis     anaphylaxis     Phenergan [Promethazine Hcl]      dystonia     Prilosec [Omeprazole]      Rash, dizziness     Gabapentin Rash       Current Outpatient Medications   Medication Sig Dispense Refill     calcium carbonate 600 mg-vitamin D 400 units (CALCIUM 600 + D) 600-400 MG-UNIT per tablet Take 1 tablet by mouth 2 times daily 180 tablet 1     DULoxetine (CYMBALTA) 20 MG capsule Take 20 mg by mouth every evening       DULoxetine (CYMBALTA) 60 MG capsule Take 60 mg by mouth every morning       nortriptyline (PAMELOR) 50 MG capsule Take 100 mg by mouth At Bedtime       pantoprazole (PROTONIX) 20 MG EC tablet Take by mouth 30-60 minutes before a meal. 90 tablet 1     sucralfate (CARAFATE) 1 GM tablet Take 1 tablet (1 g) by mouth 4 times daily 60  tablet 1     tiZANidine (ZANAFLEX) 2 MG tablet Take 1 tablet (2 mg) by mouth 3 times daily as needed for muscle spasms 90 tablet 1     traZODone (DESYREL) 50 MG tablet        acetaminophen (TYLENOL) 325 MG tablet Take 2 tablets (650 mg) by mouth every 4 hours as needed for other (mild pain) (Patient not taking: Reported on 1/15/2019) 100 tablet 0     medroxyPROGESTERone (DEPO-PROVERA) 150 MG/ML injection Inject 1 mL (150 mg) into the muscle every 3 months (Patient not taking: Reported on 1/15/2019) 1 mL 3     medroxyPROGESTERone (DEPO-PROVERA) 150 MG/ML injection Inject 1 mL (150 mg) into the muscle every 3 months 1 mL 3     oxyCODONE-acetaminophen (PERCOCET) 5-325 MG tablet Take 0.5-1 tablets by mouth every 6 hours as needed for severe pain 120 tablet 0       Review of Systems     11 point review system (Constitutional, HENT, Eyes, Respiratory, Cardiovascular, Gastrointestinal, Genitourinary, Musculoskeletal,Neurological, Psychiatric/Behavioural, Endocrine) is negative or is as per HPI above and multiple pains involving the back and joints.     Past Medical History:   Diagnosis Date     Endometriosis, site unspecified      Gastritis 2010    dx 2010- sees Dr Glover in [facility]     Osteoporosis      Urinary calculus, unspecified     kidney stones, age 19-20     Wrist injury Nov 19, 2003    Workman's Comp- left wrist- narc agreement on file       Past Surgical History:   Procedure Laterality Date     ARTHROSCOPY HIP, OSTEOPLASTY FEMUR PROXIMAL, COMBINED Left 6/29/2016    Procedure: COMBINED ARTHROSCOPY HIP, OSTEOPLASTY FEMUR PROXIMAL;  Surgeon: Godwin Deleon MD;  Location: [facility] OR     ARTHROSCOPY OF JOINT UNLISTED  4/2004    Left wrist, with debridement and repair of tear.     HC REMOVAL OF OVARY/TUBE(S)  6/2003    right with fallopian tube     HC REPAIR TRIANGULAR CART,WRIST JT  2005    Dr Eloy Sosa     HC REPAIR TRIANGULAR CART,WRIST JT  2007 or so    ulnar excision- Dr Eloy Sosa      HYSTEROSCOPY      laproscopy for endometriosis x2     LITHOTRIPSY         Family History   Problem Relation Age of Onset     Hypertension Father      Lipids Father      Hypertension Maternal Grandmother      Genitourinary Problems Maternal Grandmother         kidney disease     Cancer Paternal Grandmother         leukemia     Asthma No family hx of      C.A.D. No family hx of      Diabetes No family hx of      Cerebrovascular Disease No family hx of      Breast Cancer No family hx of      Cancer - colorectal No family hx of      Prostate Cancer No family hx of      Alzheimer Disease No family hx of      Arthritis No family hx of      Blood Disease No family hx of      Circulatory No family hx of      Eye Disorder No family hx of      Gastrointestinal Disease No family hx of      Musculoskeletal Disorder No family hx of      Neurologic Disorder No family hx of      Respiratory No family hx of      Thyroid Disease No family hx of        Social History     Socioeconomic History     Marital status: Single     Spouse name: None     Number of children: None     Years of education: None     Highest education level: None   Social Needs     Financial resource strain: None     Food insecurity - worry: None     Food insecurity - inability: None     Transportation needs - medical: None     Transportation needs - non-medical: None   Occupational History     None   Tobacco Use     Smoking status: Former Smoker     Types: Cigarettes     Last attempt to quit: 2014     Years since quittin.9     Smokeless tobacco: Never Used   Substance and Sexual Activity     Alcohol use: No     Frequency: Never     Drug use: No     Sexual activity: Not Currently     Birth control/protection: Injection     Comment: depo provera   Other Topics Concern     Parent/sibling w/ CABG, MI or angioplasty before 65F 55M? Not Asked   Social History Narrative    Lives alone in [place]    Works at [employer]    Enjoying spending time with dog and  "nirobert       Objective:   /80 (BP Location: Left arm, Patient Position: Chair, Cuff Size: Adult Regular)   Pulse 114   Ht 1.544 m (5' 0.79\")   Wt 88.1 kg (194 lb 3.6 oz)   SpO2 97%   BMI 36.96 kg/m    Constitutional: Pleasant no acute cardiopulmonary distress.   EYES: anicteric, normal extra-ocular movements, no lid lag or retraction   HEENT: Mouth/Throat: Mucous membrane is moist. Oropharynx is clear.   Cardiovascular: RRR, S1, S2 normal.   Pulmonary/Chest: CTAB. No wheezing or rales   Abdominal: +BS. Non tender to palpation.   Neurological: Alert and oriented.   Extremities: No edema.   No spine or lumbar spine tenderness.   Psychological: appropriate mood and affect     In House Labs:     TSH   Date Value Ref Range Status   09/11/2017 1.36 0.40 - 4.00 mU/L Final   12/10/2013 2.37 0.4 - 5.0 mU/L Final   01/07/2013 0.70 mcU/mL Final     T4 Free   Date Value Ref Range Status   12/10/2013 1.08 0.70 - 1.85 ng/dL Final       Creatinine   Date Value Ref Range Status   08/09/2018 0.90 0.52 - 1.04 mg/dL Final     24-hour urine for calcium  Sodium to creatinine ratio was 150 reference range   Sodium 24-hour 184 difference for   Creatinine 24-hour was 1.59 (0.5-2.15  Calcium to creatinine ratio was 68 reference range 30- 275  Calcium 24-hour urine was 108 reference range   Creatinine 24-hour 1.59  Total volume 2700  Urine free cortisol 3.2    TSH was 0.68 and free T4 0.74 tissue transglutaminase IgG and IgA was undetectable creatinine within the normal limits BMD in 2014 was 0.718 at the total hip in 2018 was 0.751.  Z score -3.1 at the neck, trochanteric -2.7 and total -2.4    Assessment/Treatment Plan:      Osteoporosis with current pathological fracture/patient has had multiple fragility fractures over the last 5 years: Risk factors for osteoporosis Depo-Provera use for 16 years since the age of 22. She was taken off of Depo-Provera one week ago.      She has had workup for other possible " secondary causes of osteoporosis including Cushing syndrome, celiac disease, hypercalciuria and primary hyperparathyroidism which was unremarkable.  She was noted to have vitamin D deficiency and she is currently on high-dose vitamin D replacement.  We will going to check a low vitamin D today with other calcium labs including parathyroid hormone which was slightly off when it was checked last time..  Checking her gonadal axis for possible estrogen deficiency would be reasonable once the Depo-Provera effect weans off because it is hard to attribute this degree of osteoporosis and fragility fractures to just Depo-Provera use.     Patient might eventually need bone specific treatment.  However, the first step is optimizing her calcium and vitamin D intake; calcium prescription was provided today.  Of note patient has remote history of kidney stones and discussed that preferentially in her case; it is better if the calcium comes from her diet.  dietary sources of calcium including the amount in a serving was discussed with the patient and written information was provided.     She is not currently using any contraceptives.  She met with her gynecologist to discuss other options of contraception.  IUD could not be placed due to uterus anatomy.  Oral contraceptives were not used due to history of blood clot.  Therefore, currently she is not on any kind of reliable contraception which makes treatment with bone specific measures an issue.     In summary: Osteoporosis and fragility fracture in this patient seems to be secondary to long-standing Depo-Provera use.  Workup for other secondary causes has been negative so far with the exception of low vitamin D.  She is currently on high vitamin D replacement and once she completes that I recommend vitamin D 2000 international units daily in addition to the combination she gets with calcium.    Longitudinal studies and cross-sectional studies have shown that Depo-Provera use can  lower BMD compared to nonusers.  There is data which states about 5.2-5.4% loss after 5 years of use.  Studies have also shown use of Depo-Provera with increased fragility fracture risk.  There is date which is showed that there is recovery of bone mineral density after discontinuation of Depo-Provera.  In some cases the bone mineral density recovery could be substantial and fully reversible; which is encouraging.  There is no data showed that treatment with bisphosphonate or other bone specific treatments improve risk of fracture or gain in BMD in this group of patients.  In a patient who is older and has completed childbearing; might be reasonable to use bone specific treatments like bisphosphonate's, denosumab or Forteo.  Of note Forteo is not the preferred medication in this patient's as there is no data in terms of preventing hip fracture.  In any case, all 3 medications are contraindicated in patients who are pregnant or planning to be pregnant.  In this patient with no reliable method of contraception use of these medications is very controversial.  She has stopped the Depo-Provera; and monitoring her with follow-up BMD in the near future would be reasonable without bone specific treatments.  Again, there is data which showed improvement in BMD after stopping Depo-Provera.  With follow-up study; if we observe loss of further BMD or further fracture off the Depo-Provera; that would be a strong indication to consider bone specific treatments.    After to 3 months of discontinuation of Depo-Provera; will check her gonadal axis with estrogen,FSH and LH.   Plan:    Weight bearing Exercises; walking carefully. Avoid lifting more than 5 pounds.     Fall prevention    Adequate Calcium and vitamin D intake: for maintenance calcium 1200 mg daily and vitamin D 2000 IU daily.      Cessation of tobacco use    Avoidance of excessive alcohol intake.     Check gonadal axis and Vit d labs in 2-3 months.     Follow up in 3  months assess gonadal axis.     Repeat DEXA scan in six months from the last one.    Called patient on 1/17/19 to discuss lab results and the above overall plan. In detail discussed rationale. Patient verbalized understanding.      Ref. Range 11/23/2018 00:00 1/15/2019 14:59   Potassium Latest Ref Range: 3.5 - 5.3 mmol/L 3.6    Creatinine Latest Ref Range: 0.50 - 1.10 mg/dL 0.89    GFR Estimate Latest Ref Range: >OR=60 mL/min/1.73m2 82    GFR Estimate If Black Latest Ref Range: >OR=60 mL/min/1.73m2 95    Bone Spec Alk Phosphatase Latest Units: ug/L  23.3   TSH Latest Ref Range: 0.30 - 5.00 uIU/mL 0.68    Vitamin D Deficiency screening Latest Ref Range: 20 - 75 ug/L  22   Glucose Latest Ref Range: 65 - 99 mg/dL 90      Patient Instructions     Weight bearing Exercises; walking carefully. Avoid lifting more than 5 pounds.     Fall prevention    Adequate Calcium and vitamin D intake: for maintenance calcium 1200 mg daily and vitamin D 1000 IU daily.      Cessation of tobacco use    Avoidance of excessive alcohol intake.     Dietary sources: These also contain vitamin D  Milk                            8 oz            300 mg  Yogurt                          1 cup           400 mg  Hard cheese                     1.5 oz          300 mg  Cottage cheese                  2 cup           300 mg  Orange juice with Calcium       8 oz            300 mg  Low fat dairy sources are recommended        I will contact the patient with the test results.  Return to clinic in 3 months.    Test and/or medications prescribed today:  Orders Placed This Encounter   Procedures     Vitamin D Deficiency (D3 Only)     Albumin level     Alkaline phosphatase     Bone specific alk phosphatase     Calcium     Phosphorus     Parathyroid Hormone Intact         Dallas Flores MD  Staff Endocrinologist    [phone]  Division of Endocrinology and Diabetes

## 2019-01-18 ENCOUNTER — TELEPHONE (OUTPATIENT)
Dept: ENDOCRINOLOGY | Facility: CLINIC | Age: 39
End: 2019-01-18

## 2019-01-18 DIAGNOSIS — M76.62 ACHILLES TENDINITIS OF LEFT LOWER EXTREMITY: ICD-10-CM

## 2019-01-18 NOTE — TELEPHONE ENCOUNTER
Called patient to reschedule, rescheduled for 4/22/19 @ 11 am.     Patient had a question about what the rating scale of 3 on her dexa scan really meant. Writer is unsure and told patient we will ask Dr. Danny Flores and call her back. Please advise.    Zainab Zhang, [facility]  Adult Endocrinology  [facility]

## 2019-01-18 NOTE — TELEPHONE ENCOUNTER
Routing refill request to provider for review/approval because:  Drug not on the [facility] refill protocol     Last filled: 11/16/18    Last OV with Dr. Smith: 1/2/19 for acute concerns.    Yasemin Page, RN, BSN

## 2019-01-18 NOTE — TELEPHONE ENCOUNTER
Requested Prescriptions   Pending Prescriptions Disp Refills     tiZANidine (ZANAFLEX) 2 MG tablet  Last Written Prescription Date:  12/26/18  Last Fill Quantity: 90,  # refills: 0   Last office visit: 1/2/2019 with prescribing provider:  MICHELLE Smith   Future Office Visit:   Next 5 appointments (look out 90 days)    Feb 04, 2019 10:00 AM CST  Return Visit with Jose Alford MD  [facility] [place] ([facility] [place]) [address]  [place] [address]  [phone]   Feb 06, 2019 11:30 AM CST  Office Visit with William Smith MD  [facility] [place] ([facility] [place]) [address]  [place] [address]  [phone]          90 tablet 1     Sig: Take 1 tablet (2 mg) by mouth 3 times daily as needed for muscle spasms    There is no refill protocol information for this order

## 2019-01-18 NOTE — TELEPHONE ENCOUNTER
----- Message from Kristina Sheppard LPN sent at 1/17/2019  1:22 PM CST -----      ----- Message -----  From: Dallas Flores MD  Sent: 1/17/2019  12:16 PM  To: [facility] Endocrinology Adult [place]    Can we please change the follow up appointment from current in 2 weeks to 3 months. Patient aware of change.   ThanksAv

## 2019-01-21 RX ORDER — TIZANIDINE 2 MG/1
2 TABLET ORAL 3 TIMES DAILY PRN
Qty: 90 TABLET | Refills: 1 | Status: SHIPPED | OUTPATIENT
Start: 2019-01-21 | End: 2019-02-06

## 2019-01-22 ENCOUNTER — OFFICE VISIT (OUTPATIENT)
Dept: FAMILY MEDICINE | Facility: CLINIC | Age: 39
End: 2019-01-22
Payer: COMMERCIAL

## 2019-01-22 ENCOUNTER — ANCILLARY PROCEDURE (OUTPATIENT)
Dept: CT IMAGING | Facility: CLINIC | Age: 39
End: 2019-01-22
Payer: COMMERCIAL

## 2019-01-22 VITALS
TEMPERATURE: 98.8 F | WEIGHT: 197.2 LBS | BODY MASS INDEX: 37.23 KG/M2 | OXYGEN SATURATION: 95 % | SYSTOLIC BLOOD PRESSURE: 131 MMHG | HEIGHT: 61 IN | RESPIRATION RATE: 16 BRPM | HEART RATE: 129 BPM | DIASTOLIC BLOOD PRESSURE: 74 MMHG

## 2019-01-22 DIAGNOSIS — Z86.718 PERSONAL HISTORY OF DVT (DEEP VEIN THROMBOSIS): ICD-10-CM

## 2019-01-22 DIAGNOSIS — R07.89 CHEST WALL PAIN: ICD-10-CM

## 2019-01-22 DIAGNOSIS — Z87.81 HX OF FRACTURE OF RIB: ICD-10-CM

## 2019-01-22 DIAGNOSIS — M81.0 OSTEOPOROSIS OF MULTIPLE SITES: ICD-10-CM

## 2019-01-22 DIAGNOSIS — Z09 FOLLOW-UP EXAM: Primary | ICD-10-CM

## 2019-01-22 LAB
BASOPHILS # BLD AUTO: 0 10E9/L (ref 0–0.2)
BASOPHILS NFR BLD AUTO: 0.3 %
CREAT SERPL-MCNC: 0.79 MG/DL (ref 0.52–1.04)
D DIMER PPP FEU-MCNC: 0.5 UG/ML FEU (ref 0–0.5)
DIFFERENTIAL METHOD BLD: ABNORMAL
EOSINOPHIL # BLD AUTO: 0.2 10E9/L (ref 0–0.7)
EOSINOPHIL NFR BLD AUTO: 3.4 %
ERYTHROCYTE [DISTWIDTH] IN BLOOD BY AUTOMATED COUNT: 13.4 % (ref 10–15)
GFR SERPL CREATININE-BSD FRML MDRD: >90 ML/MIN/{1.73_M2}
HCT VFR BLD AUTO: 39 % (ref 35–47)
HGB BLD-MCNC: 12.4 G/DL (ref 11.7–15.7)
LYMPHOCYTES # BLD AUTO: 2.3 10E9/L (ref 0.8–5.3)
LYMPHOCYTES NFR BLD AUTO: 37.1 %
MCH RBC QN AUTO: 24.9 PG (ref 26.5–33)
MCHC RBC AUTO-ENTMCNC: 31.8 G/DL (ref 31.5–36.5)
MCV RBC AUTO: 79 FL (ref 78–100)
MONOCYTES # BLD AUTO: 0.5 10E9/L (ref 0–1.3)
MONOCYTES NFR BLD AUTO: 7.5 %
NEUTROPHILS # BLD AUTO: 3.2 10E9/L (ref 1.6–8.3)
NEUTROPHILS NFR BLD AUTO: 51.7 %
PLATELET # BLD AUTO: 330 10E9/L (ref 150–450)
RBC # BLD AUTO: 4.97 10E12/L (ref 3.8–5.2)
WBC # BLD AUTO: 6.2 10E9/L (ref 4–11)

## 2019-01-22 PROCEDURE — 99214 OFFICE O/P EST MOD 30 MIN: CPT | Performed by: NURSE PRACTITIONER

## 2019-01-22 PROCEDURE — 85025 COMPLETE CBC W/AUTO DIFF WBC: CPT | Performed by: NURSE PRACTITIONER

## 2019-01-22 PROCEDURE — 82565 ASSAY OF CREATININE: CPT | Performed by: NURSE PRACTITIONER

## 2019-01-22 PROCEDURE — 36415 COLL VENOUS BLD VENIPUNCTURE: CPT | Performed by: NURSE PRACTITIONER

## 2019-01-22 PROCEDURE — 85379 FIBRIN DEGRADATION QUANT: CPT | Performed by: NURSE PRACTITIONER

## 2019-01-22 PROCEDURE — 71260 CT THORAX DX C+: CPT | Mod: TC

## 2019-01-22 RX ORDER — METHOCARBAMOL 750 MG/1
750 TABLET, FILM COATED ORAL 4 TIMES DAILY PRN
Qty: 90 TABLET | Refills: 0 | Status: SHIPPED | OUTPATIENT
Start: 2019-01-22 | End: 2019-04-03

## 2019-01-22 RX ORDER — IOPAMIDOL 755 MG/ML
80 INJECTION, SOLUTION INTRAVASCULAR ONCE
Status: COMPLETED | OUTPATIENT
Start: 2019-01-22 | End: 2019-01-22

## 2019-01-22 RX ADMIN — IOPAMIDOL 80 ML: 755 INJECTION, SOLUTION INTRAVASCULAR at 10:58

## 2019-01-22 ASSESSMENT — MIFFLIN-ST. JEOR: SCORE: 1513.48

## 2019-01-22 NOTE — LETTER
January 23, 2019      Mily Grullon  [address] [address]         Dear Mily,    Your chest CT did show:     Old healed posterior right eleventh rib fracture is again noted.     2 new subacute fractures involving the second and fourth ribs- as you thought were present related to your osteoporosis.     Please follow up with primary provider/ specialist.     Resulted Orders   CT Chest w Contrast    Narrative    CT CHEST WITH CONTRAST 1/22/2019 11:00 AM     HISTORY: Chest wall pain. Right-sided rib pain/shoulder blade pain,  history osteoporosis and rib fractures, as well as DVT history. Chest  wall pain. Personal history of DVT (deep vein thrombosis).  Osteoporosis of multiple sites. History of fracture of rib.     COMPARISON: CT chest 10/24/2018.    TECHNIQUE: Axial images from the thoracic inlet to the lung bases are  performed with additional coronal reformatted images. 80 mL of Isovue  370 are given intravenously. Radiation dose for this scan was reduced  using automated exposure control, adjustment of the mA and/or kV  according to patient size, or iterative reconstruction technique.    FINDINGS:     Chest: Lungs are clear. No pleural or pericardial fluid. Heart is  normal in size. Esophagus is unremarkable. No enlarged lymph nodes.  Thyroid gland appears normal in size where imaged. Limited images  upper abdomen are unremarkable. Bone window examination again  demonstrates the posterior right eleventh rib fracture with  surrounding callus formation as seen on prior CT. A subacute right  second rib fracture is also noted which is new since prior exam.  Callus formation is also noted about this fracture. Subacute to  chronic lateral right fourth rib fracture is also noted. This is deep  to the right scapula possibly accounting for patient's symptoms. This  is also new in the interval since prior CT. Question whether patient  has had any interval traumatic injury to account for these  new  fractures.      Impression    IMPRESSION:  1. Lungs are clear. No pneumothorax or pleural effusions. No enlarged  lymph nodes.  2. Old healed posterior right eleventh rib fracture is again noted.  However, there are 2 new subacute fractures involving the second and  fourth ribs. Correlate with any interval traumatic injury to account  for these fractures. No obvious associated destructive bone changes to  suggest metastatic disease. Follow-up bone scan could be performed for  further assessment if clinically warranted.    JORDYN KIRKPATRICK MD       If you have any questions or concerns, please call the clinic at the number listed above.       Sincerely,    Xenia WHITMAN/yary

## 2019-01-22 NOTE — LETTER
January 22, 2019      Mily Grullon  [address] [address]        Dear ,    We are writing to inform you of your test results.    Normal lab results.     Resulted Orders   Creatinine   Result Value Ref Range    Creatinine 0.79 0.52 - 1.04 mg/dL    GFR Estimate >90 >60 mL/min/[1.73_m2]      Comment:      Non  GFR Calc  Starting 12/18/2018, serum creatinine based estimated GFR (eGFR) will be   calculated using the Chronic Kidney Disease Epidemiology Collaboration   (CKD-EPI) equation.      GFR Estimate If Black >90 >60 mL/min/[1.73_m2]      Comment:       GFR Calc  Starting 12/18/2018, serum creatinine based estimated GFR (eGFR) will be   calculated using the Chronic Kidney Disease Epidemiology Collaboration   (CKD-EPI) equation.     CBC with platelets and differential   Result Value Ref Range    WBC 6.2 4.0 - 11.0 10e9/L    RBC Count 4.97 3.8 - 5.2 10e12/L    Hemoglobin 12.4 11.7 - 15.7 g/dL    Hematocrit 39.0 35.0 - 47.0 %    MCV 79 78 - 100 fl    MCH 24.9 (L) 26.5 - 33.0 pg    MCHC 31.8 31.5 - 36.5 g/dL    RDW 13.4 10.0 - 15.0 %    Platelet Count 330 150 - 450 10e9/L    % Neutrophils 51.7 %    % Lymphocytes 37.1 %    % Monocytes 7.5 %    % Eosinophils 3.4 %    % Basophils 0.3 %    Absolute Neutrophil 3.2 1.6 - 8.3 10e9/L    Absolute Lymphocytes 2.3 0.8 - 5.3 10e9/L    Absolute Monocytes 0.5 0.0 - 1.3 10e9/L    Absolute Eosinophils 0.2 0.0 - 0.7 10e9/L    Absolute Basophils 0.0 0.0 - 0.2 10e9/L    Diff Method Automated Method    D dimer, quantitative   Result Value Ref Range    D Dimer 0.5 0.0 - 0.50 ug/ml FEU      Comment:      This D-dimer assay is intended for use in conjunction with a clinical pretest   probability assessment model to exclude pulmonary embolism (PE) and deep   venous thrombosis (DVT) in outpatients suspected of PE or DVT. The cut-off   value is 0.5 ug/mL FEU.         If you have any questions or concerns, please call the clinic at the  number listed above.       Sincerely,        Xenia Gaviria, NP/mp

## 2019-01-22 NOTE — RESULT ENCOUNTER NOTE
Please call the patient with the results.  Chest CT did show:     Old healed posterior right eleventh rib fracture is again noted.    2 new subacute fractures involving the second and fourth ribs- as you thought were present related to your osteoporosis.     Please follow up with primary provider/ specialist.         J CARLOS Montero

## 2019-01-22 NOTE — PATIENT INSTRUCTIONS
Patient Education     Chest Wall Pain: Costochondritis    The chest pain that you have had today is caused by costochondritis. This condition is caused by an inflammation of the cartilage joining your ribs to your breastbone. It is not caused by heart or lung problems. Your healthcare team has made sure that the chest pain you feel is not from a life threatening cause of chest pain such as heart attack, collapsed lung, blood clot in the lung, tear in the aorta, or esophageal rupture. The inflammation may have been brought on by a blow to the chest, lifting heavy objects, intense exercise, or an illness that made you cough and sneeze a lot. It often occurs during times of emotional stress. It can be painful, but it is not dangerous. It usually goes away in 1 to 2 weeks. But it may happen again. Rarely, a more serious condition may cause symptoms similar to costochondritis. That s why it s important to watch for the warning signs listed below.  Home care  Follow these guidelines when caring for yourself at home:    If you feel that emotional stress is a cause of your condition, try to figure out the sources of that stress. It may not be obvious. Learn ways to deal with the stress in your life. This can include regular exercise, muscle relaxation, meditation, or simply taking time out for yourself.    You may use acetaminophen, ibuprofen, or naproxen to control pain, unless another pain medicine was prescribed. If you have liver or kidney disease or ever had a stomach ulcer, talk with your healthcare provider before using these medicines.    You can also help ease pain by using a hot, wet compress or heating pad. Use this with or without a medicated skin cream that helps relieves pain.    Do stretching exercise as advised by your provider.    Take any prescribed medicines as directed.  Follow-up care  Follow up with your healthcare provider, or as advised, if you do not start to get better in the next 2 days.  When  to seek medical advice  Call your healthcare provider right away if any of these occur:    A change in the type of pain. Call if it feels different, becomes more serious, lasts longer, or spreads into your shoulder, arm, neck, jaw, or back.    Shortness of breath or pain gets worse when you breathe    Weakness, dizziness, or fainting    Cough with dark-colored sputum (phlegm) or blood    Abdominal pain    Dark red or black stools    Fever of 100.4 F (38 C) or higher, or as directed by your healthcare provider  Date Last Reviewed: 12/1/2016 2000-2018 [employer]. [address] [place] [address], [place] [address]. All rights reserved. This information is not intended as a substitute for professional medical care. Always follow your healthcare professional's instructions.           Patient Education     Rib Fracture    You broke one or more ribs. This is called a rib fracture. Rib fractures don't need a cast like other bones. They will heal by themselves in about 4 to 6 weeks. The first 3 to 4 weeks will be the most painful. During this time deep breathing, coughing, or changing position from sitting to lying down, may cause the broken ends to move slightly.  Home care    Rest. You should not be doing any heavy lifting or strenuous exertion until the pain goes away.    It hurts to breathe when you have a broken rib. This puts you at risk of getting pneumonia from poor airflow through your lungs. To prevent this:  ? Take several very deep breaths once an hour while you're awake. Breathe out through pursed lips as if you are blowing up a balloon. If possible, actually blow up a balloon or a rubber glove. This exercise builds up pressure inside the lung and prevents collapse of the small air sacs of the lung. This exercise may cause some pain at the site of injury. This is normal.  ? You may have gotten a breathing exercise device called an incentive spirometer. Use it at least 4 times a day, or as  directed.    Apply an ice pack over the injured area for 15 to 20 minutes every 1 to 2 hours. You should do this for the first 24 to 48 hours. To make an ice pack, put ice cubes in a plastic bag that seals at the top. Wrap the bag in a clean, thin towel or cloth. Never put ice or an ice pack directly on the skin. Keep using ice packs as needed for the relief of pain and swelling.    You may use over-the-counter pain medicine to control pain, unless another pain medicine was prescribed. If you have chronic liver or kidney disease or ever had a stomach ulcer or GI (gastrointestinal) bleeding, talk with your healthcare provider before using these medicines.    If your pain is not controlled, contact your healthcare provider. Sometimes a stronger pain medicine may be needed. A nerve block can be done in case of severe pain. It will numb the nerve between the ribs.  Follow-up care  Follow up with your healthcare provider, or as advised. In rare cases, a broken rib will cause complications in the first few days that may not be clearly seen during your initial exam. This can include collapsed lung, bleeding around the lung or into the belly (abdomen), or pneumonia. So watch for the signs below.  If X-rays were taken, you will be told of any new findings that may affect your care.  Call 911  Call 911 if you have:    Dizziness, weakness or fainting    Shortness of breath with or without chest discomfort    New or worsening abdominal pain    Discomfort in other areas of your upper body such as your shoulders, jaw, neck, or arms  When to seek medical advice  Call your healthcare provider right away if any of these occur:    Increasing chest pain with breathing    Fever of 100.4 F (38 C) or above, or as directed by your healthcare provider    Congested cough, nausea, or vomiting  Date Last Reviewed: 4/1/2018 2000-2018 [employer]. [address] [facility], [place] [address]. All rights reserved. This information  is not intended as a substitute for professional medical care. Always follow your healthcare professional's instructions.

## 2019-01-22 NOTE — PROGRESS NOTES
"  SUBJECTIVE:   Mily Grullon is a 38 year old female who presents to clinic today for the following health issues:      Rib Pain     Onset: Saturday    Description:   Location: right rib pain  Character: Sharp    Progression of Symptoms: worse    Accompanying Signs & Symptoms:  Other symptoms: pain with deep breath, pain when moving right arm    Precipitating factors:   Trauma or overuse: YES- has multiple rib fracture-last one was end of November  Therapies Tried and outcome: percocet, ice, heat, ibu, muscle relaxer, \"pt states that she feels taking another one of her pain medicines for a total of 2 every 6 hours rather than one\"     Pt reports she has had a blood clot in her right arm before. She is concerned about having more fractures as last time she was seen by Dr. Smith, she was told she has 4 fractures in her spine from osteoporosis. She denies smoking. Denies fever or chills. She denies pain in the legs or rash anywhere.     Problem list and histories reviewed & adjusted, as indicated.  Additional history: as documented    Patient Active Problem List   Diagnosis     Personal history of nicotine dependence     CARDIOVASCULAR SCREENING; LDL GOAL LESS THAN 160     Gastritis     Osteoporosis of multiple sites     Closed nondisplaced intertrochanteric fracture of left femur (H)     Migraine without aura and without status migrainosus, not intractable     Pain in joint, ankle and foot, left     Closed nondisplaced fracture of body of left calcaneus with delayed healing, subsequent encounter     Heel pain, chronic, left     Gastroesophageal reflux disease, esophagitis presence not specified     Long-term (current) use of anticoagulants [Z79.01]     Superficial phlebitis     Obesity (BMI 35.0-39.9) with comorbidity (H)     Other chronic pain     Controlled substance agreement signed     Past Surgical History:   Procedure Laterality Date     ARTHROSCOPY HIP, OSTEOPLASTY FEMUR PROXIMAL, COMBINED Left 6/29/2016    " Procedure: COMBINED ARTHROSCOPY HIP, OSTEOPLASTY FEMUR PROXIMAL;  Surgeon: Godwin Deleon MD;  Location: [facility] OR     ARTHROSCOPY OF JOINT UNLISTED  2004    Left wrist, with debridement and repair of tear.     HC REMOVAL OF OVARY/TUBE(S)  2003    right with fallopian tube     HC REPAIR TRIANGULAR CART,WRIST JT      Dr Eloy Sosa     HC REPAIR TRIANGULAR CART,WRIST JT   or so    ulnar excision- Dr Eloy Sosa     HYSTEROSCOPY      laproscopy for endometriosis x2     LITHOTRIPSY         Social History     Tobacco Use     Smoking status: Former Smoker     Types: Cigarettes     Last attempt to quit: 2014     Years since quittin.9     Smokeless tobacco: Never Used   Substance Use Topics     Alcohol use: No     Frequency: Never     Family History   Problem Relation Age of Onset     Hypertension Father      Lipids Father      Hypertension Maternal Grandmother      Genitourinary Problems Maternal Grandmother         kidney disease     Cancer Paternal Grandmother         leukemia     Asthma No family hx of      C.A.D. No family hx of      Diabetes No family hx of      Cerebrovascular Disease No family hx of      Breast Cancer No family hx of      Cancer - colorectal No family hx of      Prostate Cancer No family hx of      Alzheimer Disease No family hx of      Arthritis No family hx of      Blood Disease No family hx of      Circulatory No family hx of      Eye Disorder No family hx of      Gastrointestinal Disease No family hx of      Musculoskeletal Disorder No family hx of      Neurologic Disorder No family hx of      Respiratory No family hx of      Thyroid Disease No family hx of          Current Outpatient Medications   Medication Sig Dispense Refill     calcium carbonate 600 mg-vitamin D 400 units (CALCIUM 600 + D) 600-400 MG-UNIT per tablet Take 1 tablet by mouth 2 times daily 180 tablet 1     DULoxetine (CYMBALTA) 20 MG capsule Take 20 mg by mouth every evening       DULoxetine  "(CYMBALTA) 60 MG capsule Take 60 mg by mouth every morning       methocarbamol (ROBAXIN) 750 MG tablet Take 1 tablet (750 mg) by mouth 4 times daily as needed for muscle spasms 90 tablet 0     nortriptyline (PAMELOR) 50 MG capsule Take 100 mg by mouth At Bedtime       oxyCODONE-acetaminophen (PERCOCET) 5-325 MG tablet Take 0.5-1 tablets by mouth every 6 hours as needed for severe pain 120 tablet 0     pantoprazole (PROTONIX) 20 MG EC tablet Take by mouth 30-60 minutes before a meal. 90 tablet 1     sucralfate (CARAFATE) 1 GM tablet Take 1 tablet (1 g) by mouth 4 times daily 60 tablet 1     tiZANidine (ZANAFLEX) 2 MG tablet Take 1 tablet (2 mg) by mouth 3 times daily as needed for muscle spasms 90 tablet 1     traZODone (DESYREL) 50 MG tablet        Allergies   Allergen Reactions     Amitriptyline Hives     Amoxicillin Diarrhea     Asa [Dihydroxyaluminum Aminoacetate]      Cipro [Ciprofloxacin]      Dizziness, vomiting, shortness of breath     Ibuprofen [Aspartame-Ibuprofen]      GI distress       Lyrica      extrematies swell up      Morphine      ithcy     Naproxen      GI distress     Neurontin [Gabapentin]      rash     Paxil [Paroxetine] Anaphylaxis     anaphylaxis     Phenergan [Promethazine Hcl]      dystonia     Prilosec [Omeprazole]      Rash, dizziness     Gabapentin Rash       Reviewed and updated as needed this visit by clinical staff  Tobacco  Allergies  Meds  Problems  Med Hx  Surg Hx  Fam Hx  Soc Hx        Reviewed and updated as needed this visit by Provider  Tobacco  Allergies  Meds  Problems  Med Hx  Surg Hx  Fam Hx         ROS:  REVIEW OF SYSTEMS:    General: negative for, fever, chills, headaches, dizziness, fatigue  Skin: negative, rash  Resp: Shortness of breath on inspiration, Dyspnea on exertion, Cough she reports as \"off and on\", which exacerbates her pain, and No hemoptysis  CV: negative, palpitations and chest pain  Musculoskeletal: positive for fracture, back pain and foot " "pain. Pt describes back pain as sharp stabbing and aching.   GI: negative for abdominal pain  BREAST: pt reports no differences in breast size. She states that she cannot wear a bra due to pain and feels her right side under the underarm area is tender and swollen      OBJECTIVE:     /74   Pulse 129   Temp 98.8  F (37.1  C) (Oral)   Resp 16   Ht 1.56 m (5' 1.42\")   Wt 89.4 kg (197 lb 3.2 oz)   LMP  (Approximate)   SpO2 95%   Breastfeeding? No   BMI 36.76 kg/m    Body mass index is 36.76 kg/m .  GENERAL: healthy, alert and no distress  RESP: lungs clear to auscultation - no rales, rhonchi or wheezes  BREAST: right side of breast tenderness to palpation  CV: regular rate and rhythm, normal S1 S2, no S3 or S4, no murmur, click or rub,   ABDOMEN: soft, nontender, no hepatosplenomegaly, no masses and bowel sounds normal  MS: decreased range of motion in right arm, edema to right breast area, right sided upper back pain that radiates to R side  SKIN: no suspicious rashes  NEURO: Normal strength and tone, mentation intact and speech normal  PSYCH: mentation appears normal, affect normal/bright.    Diagnostic Test Results:  See orders    ASSESSMENT/PLAN:           ICD-10-CM    1. Follow-up exam Z09    2. Chest wall pain R07.89 Creatinine     CBC with platelets and differential     D dimer, quantitative     methocarbamol (ROBAXIN) 750 MG tablet     CANCELED: CT Chest w/o & w Contrast   3. Personal history of DVT (deep vein thrombosis) Z86.718 Creatinine     CBC with platelets and differential     D dimer, quantitative     CANCELED: CT Chest w/o & w Contrast   4. Osteoporosis of multiple sites M81.0 CANCELED: CT Chest w/o & w Contrast   5. Hx of fracture of rib Z87.81 methocarbamol (ROBAXIN) 750 MG tablet     CANCELED: CT Chest w/o & w Contrast    patient is requesting chest CT, discussed radiation risks       FURTHER TESTING:       - CT of chest with contrast- PE protocol       When given the choice between an " xray and a CT scan, the patient prefers a CT scan as she is concerned about a blood clot. The risks and benefits of a CT scan were explained to the patient.     Note written by KATHY Rodriguez.    Student exam observed as well as completed by provider.  Agree with above documentation. TARAS Verma, P-[facility]

## 2019-01-23 ENCOUNTER — TELEPHONE (OUTPATIENT)
Dept: INTERNAL MEDICINE | Facility: CLINIC | Age: 39
End: 2019-01-23

## 2019-01-23 NOTE — TELEPHONE ENCOUNTER
Patient given CT results from in basket. She is asking if she can increase her Percocet from 1 every 6 hours due to additional fractures.

## 2019-01-24 NOTE — TELEPHONE ENCOUNTER
Please advise on rib fractures 2nd and 4th rib where already broken is this new fractures in different area of ribs?

## 2019-01-24 NOTE — TELEPHONE ENCOUNTER
Patient informed of percocet dosing per Dr Smith, see below read word for word.  Patient verbalized understanding.  Patient stated her 2nd and 4th rib where already broken and wants to know if these are two new fractions in different locations?  Patient also asking if you need her to follow up before scheduled appt on 2-6-19?          2. Old healed posterior right eleventh rib fracture is again noted.  However, there are 2 new subacute fractures involving the second and  fourth ribs. Correlate with any interval traumatic injury to account  for these fractures. No obvious associated destructive bone changes to  suggest metastatic disease. Follow-up bone scan could be performed for  further assessment if clinically warranted.

## 2019-01-24 NOTE — TELEPHONE ENCOUNTER
Osteoporosis with current pathological fracture/patient has had multiple fragility fractures over the last 5 years: Risk factors for osteoporosis Depo-Provera use for 16 years since the age of 22. She was taken off of Depo-Provera one week ago.       Osteoporosis and fragility fracture in this patient seems to be secondary to long-standing Depo-Provera use.  Workup for other secondary causes has been negative so far with the exception of low vitamin D.  She is currently on high vitamin D replacement and once she completes that I recommend vitamin D 2000 international units daily in addition to the combination she gets with calcium.     Longitudinal studies and cross-sectional studies have shown that Depo-Provera use can lower BMD compared to nonusers.  There is data which states about 5.2-5.4% loss after 5 years of use.  Studies have also shown use of Depo-Provera with increased fragility fracture risk.  There is date which is showed that there is recovery of bone mineral density after discontinuation of Depo-Provera.  In some cases the bone mineral density recovery could be substantial and fully reversible; which is encouraging.  There is no data showed that treatment with bisphosphonate or other bone specific treatments improve risk of fracture or gain in BMD in this group of patients.  In a patient who is older and has completed childbearing; might be reasonable to use bone specific treatments like bisphosphonate's, denosumab or Forteo.  Of note Forteo is not the preferred medication in this patient's as there is no data in terms of preventing hip fracture.  In any case, all 3 medications are contraindicated in patients who are pregnant or planning to be pregnant.  In this patient with no reliable method of contraception use of these medications is very controversial.  She has stopped the Depo-Provera; and monitoring her with follow-up BMD in the near future would be reasonable without bone specific treatments.   Again, there is data which showed improvement in BMD after stopping Depo-Provera.  With follow-up study; if we observe loss of further BMD or further fracture off the Depo-Provera; that would be a strong indication to consider bone specific treatments.     After to 3 months of discontinuation of Depo-Provera; will check her gonadal axis with estrogen,FSH and LH.   Plan:    Weight bearing Exercises; walking carefully. Avoid lifting more than 5 pounds.     Fall prevention    Adequate Calcium and vitamin D intake: for maintenance calcium 1200 mg daily and vitamin D 2000 IU daily.      Cessation of tobacco use    Avoidance of excessive alcohol intake.     Check gonadal axis and Vit d labs in 2-3 months.     Follow up in 3 months assess gonadal axis.     Repeat DEXA scan in six months from the last one.      The lab results and the above overall plan and rationale was discussed with patient.

## 2019-01-24 NOTE — TELEPHONE ENCOUNTER
The 4th rib is probably new, not sure about the 2nd rib as I think it was seen on X-ray 3 weeks ago

## 2019-01-24 NOTE — TELEPHONE ENCOUNTER
Patient informed per Dr Smith see below, read word for word.  Patient had no further concerns at this time.

## 2019-02-06 ENCOUNTER — OFFICE VISIT (OUTPATIENT)
Dept: INTERNAL MEDICINE | Facility: CLINIC | Age: 39
End: 2019-02-06
Payer: COMMERCIAL

## 2019-02-06 VITALS
DIASTOLIC BLOOD PRESSURE: 79 MMHG | OXYGEN SATURATION: 100 % | TEMPERATURE: 98.5 F | WEIGHT: 191 LBS | RESPIRATION RATE: 16 BRPM | BODY MASS INDEX: 35.6 KG/M2 | SYSTOLIC BLOOD PRESSURE: 148 MMHG | HEART RATE: 105 BPM

## 2019-02-06 DIAGNOSIS — S22.49XD CLOSED FRACTURE OF MULTIPLE RIBS WITH ROUTINE HEALING, UNSPECIFIED LATERALITY, SUBSEQUENT ENCOUNTER: ICD-10-CM

## 2019-02-06 DIAGNOSIS — G89.29 OTHER CHRONIC PAIN: ICD-10-CM

## 2019-02-06 DIAGNOSIS — S22.43XD MULTIPLE CLOSED FRACTURES OF RIBS OF BOTH SIDES WITH ROUTINE HEALING, SUBSEQUENT ENCOUNTER: Primary | ICD-10-CM

## 2019-02-06 DIAGNOSIS — Z79.899 CONTROLLED SUBSTANCE AGREEMENT SIGNED: ICD-10-CM

## 2019-02-06 DIAGNOSIS — M81.0 OSTEOPOROSIS OF MULTIPLE SITES: ICD-10-CM

## 2019-02-06 PROCEDURE — 99215 OFFICE O/P EST HI 40 MIN: CPT | Performed by: INTERNAL MEDICINE

## 2019-02-06 RX ORDER — OXYCODONE AND ACETAMINOPHEN 5; 325 MG/1; MG/1
.5-1 TABLET ORAL EVERY 6 HOURS PRN
Qty: 120 TABLET | Refills: 0 | Status: SHIPPED | OUTPATIENT
Start: 2019-02-06 | End: 2019-03-06

## 2019-02-06 RX ORDER — OXYCODONE HYDROCHLORIDE 5 MG/1
2.5-5 TABLET ORAL EVERY 4 HOURS PRN
Qty: 30 TABLET | Refills: 0 | Status: SHIPPED | OUTPATIENT
Start: 2019-02-06 | End: 2019-03-06

## 2019-02-06 ASSESSMENT — PAIN SCALES - GENERAL: PAINLEVEL: MODERATE PAIN (5)

## 2019-02-06 NOTE — PROGRESS NOTES
SUBJECTIVE:   Mily Grullon is a 38 year old female who presents to clinic today for the following health issues:      Ongoing rib pain      Duration: Has been ongoing since first break, was seen about 4 months ago, patient had a CT 1/22/19    Description (location/character/radiation): Right sided pain, due to rib fracture- 2nd and 4th ribs    Intensity:  moderate    Accompanying signs and symptoms: Hard to breathe    History (similar episodes/previous evaluation): Patient has reoccurring breaks     Precipitating or alleviating factors: None    Therapies tried and outcome: Muscle relaxer, and pain medication- will help     Had additional rib fractures - see previous phone call encounters after she saw Xenia.  She denies any specific history or triggering event or trauma.  She did go out to go shopping once and try to respect a 10 pound weight lift limit.  She has since been afraid to leave the house or be active for fear of further fractures.    Patient reports taking 5 Percocet daily after her additional fractures.  Reviewed  and discussed patient's fill patterns and behavior in pharmacy with Orlando Daniel PharmD.  Previous prescription intended at max 4 tabs per day.  She reports significant pain especially when lying down at night.  When pain wakes her up, she has taken 2 tablets at once.  She reports having about 20 tablets left today after her last prescription from the middle of January.  She has also stopped taking tizanidine in favor of methocarbamol and notes not a significant increase in her pain control with a notable decrease in her drowsiness.    She does feel isolated and stir crazy, not working.    She continues to follow with psychiatry for mood issues, continuing the duloxetine, trazodone, and nortriptyline.      1. Multiple closed fractures of ribs of both sides with routine healing, subsequent encounter    2. Other chronic pain    3. Controlled substance agreement signed    4. Osteoporosis of  multiple sites    5. Closed fracture of multiple ribs with routine healing, unspecified laterality, subsequent encounter        PMH: Updated and/or reviewed in chart.    ROS:  Constitutional, neuro, EMT, endocrine, pulmonary, cardiac, gastrointestinal, genitourinary, musculoskeletal, integument and psychiatric systems are otherwise negative.    OBJECTIVE:                                                    /79   Pulse 105   Temp 98.5  F (36.9  C) (Tympanic)   Resp 16   Wt 86.6 kg (191 lb)   SpO2 100%   BMI 35.60 kg/m      GEN: No acute distress  EYES: No conjunctival injection or icterus, EOMI grossly intact  RESP: Unlabored, regular  NEURO: Normal gait, MAEx4, light touch sensation grossly intact  PSYCH: Normal mood and moderately anxious but pleasant affect    Results for orders placed or performed in visit on 01/22/19   CT Chest w Contrast    Narrative    CT CHEST WITH CONTRAST 1/22/2019 11:00 AM     HISTORY: Chest wall pain. Right-sided rib pain/shoulder blade pain,  history osteoporosis and rib fractures, as well as DVT history. Chest  wall pain. Personal history of DVT (deep vein thrombosis).  Osteoporosis of multiple sites. History of fracture of rib.     COMPARISON: CT chest 10/24/2018.    TECHNIQUE: Axial images from the thoracic inlet to the lung bases are  performed with additional coronal reformatted images. 80 mL of Isovue  370 are given intravenously. Radiation dose for this scan was reduced  using automated exposure control, adjustment of the mA and/or kV  according to patient size, or iterative reconstruction technique.    FINDINGS:     Chest: Lungs are clear. No pleural or pericardial fluid. Heart is  normal in size. Esophagus is unremarkable. No enlarged lymph nodes.  Thyroid gland appears normal in size where imaged. Limited images  upper abdomen are unremarkable. Bone window examination again  demonstrates the posterior right eleventh rib fracture with  surrounding callus formation as  seen on prior CT. A subacute right  second rib fracture is also noted which is new since prior exam.  Callus formation is also noted about this fracture. Subacute to  chronic lateral right fourth rib fracture is also noted. This is deep  to the right scapula possibly accounting for patient's symptoms. This  is also new in the interval since prior CT. Question whether patient  has had any interval traumatic injury to account for these new  fractures.      Impression    IMPRESSION:  1. Lungs are clear. No pneumothorax or pleural effusions. No enlarged  lymph nodes.  2. Old healed posterior right eleventh rib fracture is again noted.  However, there are 2 new subacute fractures involving the second and  fourth ribs. Correlate with any interval traumatic injury to account  for these fractures. No obvious associated destructive bone changes to  suggest metastatic disease. Follow-up bone scan could be performed for  further assessment if clinically warranted.    JORDYN KIRKPATRICK MD      ASSESSMENT/PLAN:                                                    1. Multiple closed fractures of ribs of both sides with routine healing, subsequent encounter  2. Other chronic pain  3. Controlled substance agreement signed  4. Osteoporosis of multiple sites  5. Closed fracture of multiple ribs with routine healing, unspecified laterality, subsequent encounter  We have a long discussion regarding appropriate use of opioids for pain control.  She has well-documented rib fractures and has had unfortunately further fractures without clear inciting events.  I am concerned that her normal activities of daily living and activity and working restrictions are important in reducing her risk of further fractures but do not sufficiently reduce the risk for either her or my liking.  We discussed reasonable limits to opioid use with the acute pain of a fracture, and I set the expectation that 2-3 weeks out her pain should be stable to improving.  I think  it is acceptable that she take up to 5 tablets of 5 mg oxycodone containing medication in the acute weeks following the addition of fractures, but we agreed today to limit her to 4 tablets of 5 mg oxycodone for a total daily dose of no more than 20 mg oxycodone this month.  I would like her to challenge herself to take 17.5 mg or 3.5 tablets/day whenever possible, and I would ideally like to titrate her down by 1/2 tablet/day/month moving forward as she hopefully continues to improve.  She does have endocrinology follow-up in a couple of months to help us evaluate the role of her osteoporosis and changes of therapy-most notably her Depo-Provera discontinuation.    There are 2 oxycodone prescriptions relating from this encounter today, the first with a small amount of additional liberalization of dosing intended for only a few days to cover her past additional use after the rib fractures.  I do intend that she stick strictly to the limit of 4 tablets/day as above this month.  Discussed with PharmD as above.    We directly discussed the risk of increasing tolerance, dependence, and addiction.  - oxyCODONE (ROXICODONE) 5 MG tablet; Take 0.5-1 tablets (2.5-5 mg) by mouth every 4 hours as needed for severe pain  Dispense: 30 tablet; Refill: 0  - Calcium-Phosphorus-Vitamin D 250-100-500 MG-MG-UNIT CHEW; Take 2 tablets by mouth daily  Dispense: 60 tablet; Refill: 11  - oxyCODONE-acetaminophen (PERCOCET) 5-325 MG tablet; Take 0.5-1 tablets by mouth every 6 hours as needed for severe pain  Dispense: 120 tablet; Refill: 0         Orders Placed This Encounter     oxyCODONE-acetaminophen (PERCOCET) 5-325 MG tablet     Calcium-Phosphorus-Vitamin D 250-100-500 MG-MG-UNIT CHEW     oxyCODONE (ROXICODONE) 5 MG tablet              William Smith MD

## 2019-02-21 ENCOUNTER — OFFICE VISIT (OUTPATIENT)
Dept: FAMILY MEDICINE | Facility: CLINIC | Age: 39
End: 2019-02-21
Payer: COMMERCIAL

## 2019-02-21 VITALS
DIASTOLIC BLOOD PRESSURE: 79 MMHG | WEIGHT: 192.6 LBS | OXYGEN SATURATION: 97 % | TEMPERATURE: 97.1 F | BODY MASS INDEX: 35.9 KG/M2 | HEART RATE: 100 BPM | SYSTOLIC BLOOD PRESSURE: 124 MMHG | RESPIRATION RATE: 24 BRPM

## 2019-02-21 DIAGNOSIS — J02.0 STREPTOCOCCAL PHARYNGITIS: ICD-10-CM

## 2019-02-21 DIAGNOSIS — R07.0 THROAT PAIN: Primary | ICD-10-CM

## 2019-02-21 LAB
DEPRECATED S PYO AG THROAT QL EIA: ABNORMAL
SPECIMEN SOURCE: ABNORMAL

## 2019-02-21 PROCEDURE — 99213 OFFICE O/P EST LOW 20 MIN: CPT | Performed by: PHYSICIAN ASSISTANT

## 2019-02-21 PROCEDURE — 87880 STREP A ASSAY W/OPTIC: CPT | Performed by: PHYSICIAN ASSISTANT

## 2019-02-21 RX ORDER — CEFDINIR 300 MG/1
300 CAPSULE ORAL 2 TIMES DAILY
Qty: 20 CAPSULE | Refills: 0 | Status: SHIPPED | OUTPATIENT
Start: 2019-02-21 | End: 2019-03-06

## 2019-02-21 NOTE — PROGRESS NOTES
SUBJECTIVE:  Mily Grullon is a 38 year old female who presents with the following concerns;              Symptoms: cc Present Absent Comment   Fever/Chills  x     Fatigue  x     Muscle Aches  x     Eye Irritation   x    Sneezing   x    Nasal Anthony/Drg   x    Sinus Pressure/Pain   x    Loss of smell   x    Dental pain   x    Sore Throat  x     Swollen Glands   x    Ear Pain/Fullness   x    Cough  x     Wheeze  x     Chest Pain   x    Shortness of breath  x     Rash   x    Other   x      Symptom duration:  x2wks   Sympom severity:  worse   Treatments tried:  OTC   Contacts:  none       Medications updated and reviewed.  Past, family and surgical history is updated and reviewed in the record.    ROS:  Other than noted above, general, HEENT, respiratory, cardiac and gastrointestinal systems are negative.    OBJECTIVE:  /79   Pulse 100   Temp 97.1  F (36.2  C) (Oral)   Resp 24   Wt 87.4 kg (192 lb 9.6 oz)   SpO2 97%   BMI 35.90 kg/m     GENERAL: Pleasant and interactive. No acute distress.  HEENT: Conjunctivae normal. Diffuse pharyngeal erythema. Tonsils 2/4.  Sclera, lids and conjunctiva are normal.  Ears clear.   NECK: supple, no adenopathy, the thyroid is normal without enlargement or nodules.  CHEST:  clear, no wheezing or rales. Normal symmetric air entry throughout both lung fields. No chest wall deformities or tenderness.  HEART:  S1 and S2 normal, no murmurs, clicks, gallops or rubs. Regular rate and rhythm.  SKIN:  Only benign skin findings. No unusual rashes or suspicious skin lesions noted. Nails appear normal.    Rapid Strep Test: Positive    Assessment:    Encounter Diagnoses   Name Primary?     Throat pain Yes     Streptococcal pharyngitis      Plan:   Orders Placed This Encounter     cefdinir (OMNICEF) 300 MG capsule       Supportive therapy also discussed. Follow up if symptoms fail to improve or worsen.      The patient was in agreement with the plan today and had no questions or concerns  prior to leaving the clinic.     Sabrina Payne PA-C

## 2019-02-21 NOTE — PATIENT INSTRUCTIONS
You need to take a minimum of 7 days (14 doses) of cefdinir to be treated appropriately.   Replace your toothbrush 1 week into your antibiotic course.   Increase your water intake in order to keep the secretions/mucous in your upper respiratory tract thin. Get plenty of rest and wash your hands well. Follow up if symptoms fail to improve or worsen.

## 2019-02-27 ENCOUNTER — OFFICE VISIT (OUTPATIENT)
Dept: FAMILY MEDICINE | Facility: CLINIC | Age: 39
End: 2019-02-27
Payer: COMMERCIAL

## 2019-02-27 ENCOUNTER — ANCILLARY PROCEDURE (OUTPATIENT)
Dept: GENERAL RADIOLOGY | Facility: CLINIC | Age: 39
End: 2019-02-27
Attending: INTERNAL MEDICINE
Payer: COMMERCIAL

## 2019-02-27 VITALS
HEART RATE: 106 BPM | OXYGEN SATURATION: 100 % | RESPIRATION RATE: 16 BRPM | SYSTOLIC BLOOD PRESSURE: 141 MMHG | BODY MASS INDEX: 35.23 KG/M2 | TEMPERATURE: 98 F | DIASTOLIC BLOOD PRESSURE: 83 MMHG | WEIGHT: 189 LBS

## 2019-02-27 DIAGNOSIS — S22.32XA CLOSED FRACTURE OF ONE RIB OF LEFT SIDE, INITIAL ENCOUNTER: Primary | ICD-10-CM

## 2019-02-27 DIAGNOSIS — R05.9 COUGH: ICD-10-CM

## 2019-02-27 DIAGNOSIS — J02.0 STREP THROAT: ICD-10-CM

## 2019-02-27 DIAGNOSIS — R07.81 RIB PAIN ON LEFT SIDE: ICD-10-CM

## 2019-02-27 DIAGNOSIS — M81.0 OSTEOPOROSIS OF MULTIPLE SITES: ICD-10-CM

## 2019-02-27 PROCEDURE — 71101 X-RAY EXAM UNILAT RIBS/CHEST: CPT | Mod: LT

## 2019-02-27 PROCEDURE — 99214 OFFICE O/P EST MOD 30 MIN: CPT | Performed by: INTERNAL MEDICINE

## 2019-02-27 RX ORDER — CODEINE PHOSPHATE AND GUAIFENESIN 10; 100 MG/5ML; MG/5ML
1-2 SOLUTION ORAL
Qty: 420 ML | Refills: 0 | Status: SHIPPED | OUTPATIENT
Start: 2019-02-27 | End: 2019-03-06

## 2019-02-27 RX ORDER — HYDROCODONE BITARTRATE AND ACETAMINOPHEN 5; 325 MG/1; MG/1
1 TABLET ORAL EVERY 6 HOURS PRN
Qty: 18 TABLET | Refills: 0 | Status: SHIPPED | OUTPATIENT
Start: 2019-02-27 | End: 2019-03-06

## 2019-02-27 RX ORDER — ALBUTEROL SULFATE 90 UG/1
2 AEROSOL, METERED RESPIRATORY (INHALATION) EVERY 4 HOURS PRN
Qty: 1 INHALER | Refills: 0 | Status: SHIPPED | OUTPATIENT
Start: 2019-02-27 | End: 2019-12-04

## 2019-02-27 NOTE — PROGRESS NOTES
"SUBJECTIVE:  Mily Grullon is an 38 year old female who presents for concern about broken rib.  Has had a cough and has \"coughing fits\" and thinks she broke a rib.  Cough has been going on for about 3 weeks on and off.  Currently on cefdinir for strep.  Reports that she has had five rib fractures over the past three months with no inciting injuries or trauma.  No fevers, but gets sweaty from the coughing.  Mild runny nose.     PMH:   has a past medical history of Endometriosis, site unspecified, Gastritis (2010), Osteoporosis, Urinary calculus, unspecified, and Wrist injury (Nov 19, 2003).  Patient Active Problem List   Diagnosis     Personal history of nicotine dependence     CARDIOVASCULAR SCREENING; LDL GOAL LESS THAN 160     Gastritis     Osteoporosis of multiple sites     Closed nondisplaced intertrochanteric fracture of left femur (H)     Migraine without aura and without status migrainosus, not intractable     Pain in joint, ankle and foot, left     Closed nondisplaced fracture of body of left calcaneus with delayed healing, subsequent encounter     Heel pain, chronic, left     Gastroesophageal reflux disease, esophagitis presence not specified     Long-term (current) use of anticoagulants [Z79.01]     Superficial phlebitis     Obesity (BMI 35.0-39.9) with comorbidity (H)     Other chronic pain     Controlled substance agreement signed     Social History     Socioeconomic History     Marital status: Single     Spouse name: Not on file     Number of children: Not on file     Years of education: Not on file     Highest education level: Not on file   Occupational History     Not on file   Social Needs     Financial resource strain: Not on file     Food insecurity:     Worry: Not on file     Inability: Not on file     Transportation needs:     Medical: Not on file     Non-medical: Not on file   Tobacco Use     Smoking status: Former Smoker     Types: Cigarettes     Last attempt to quit: 2/2/2014     Years since " quittin.0     Smokeless tobacco: Never Used   Substance and Sexual Activity     Alcohol use: No     Frequency: Never     Drug use: No     Sexual activity: No     Comment: depo provera   Lifestyle     Physical activity:     Days per week: Not on file     Minutes per session: Not on file     Stress: Not on file   Relationships     Social connections:     Talks on phone: Not on file     Gets together: Not on file     Attends Restoration service: Not on file     Active member of club or organization: Not on file     Attends meetings of clubs or organizations: Not on file     Relationship status: Not on file     Intimate partner violence:     Fear of current or ex partner: Not on file     Emotionally abused: Not on file     Physically abused: Not on file     Forced sexual activity: Not on file   Other Topics Concern     Parent/sibling w/ CABG, MI or angioplasty before 65F 55M? Not Asked   Social History Narrative    Lives alone in [place]    Works at [employer]    Enjoying spending time with dog and nieces     Family History   Problem Relation Age of Onset     Hypertension Father      Lipids Father      Hypertension Maternal Grandmother      Genitourinary Problems Maternal Grandmother         kidney disease     Cancer Paternal Grandmother         leukemia     Asthma No family hx of      C.A.D. No family hx of      Diabetes No family hx of      Cerebrovascular Disease No family hx of      Breast Cancer No family hx of      Cancer - colorectal No family hx of      Prostate Cancer No family hx of      Alzheimer Disease No family hx of      Arthritis No family hx of      Blood Disease No family hx of      Circulatory No family hx of      Eye Disorder No family hx of      Gastrointestinal Disease No family hx of      Musculoskeletal Disorder No family hx of      Neurologic Disorder No family hx of      Respiratory No family hx of      Thyroid Disease No family hx of        ALLERGIES:  Amitriptyline; Amoxicillin; Asa  [dihydroxyaluminum aminoacetate]; Cipro [ciprofloxacin]; Ibuprofen [aspartame-ibuprofen]; Lyrica; Morphine; Naproxen; Neurontin [gabapentin]; Paxil [paroxetine]; Phenergan [promethazine hcl]; Prilosec [omeprazole]; and Gabapentin    Current Outpatient Medications   Medication         calcium carbonate 600 mg-vitamin D 400 units (CALCIUM 600 + D) 600-400 MG-UNIT per tablet     Calcium-Phosphorus-Vitamin D 250-100-500 MG-MG-UNIT CHEW     cefdinir (OMNICEF) 300 MG capsule     DULoxetine (CYMBALTA) 20 MG capsule     DULoxetine (CYMBALTA) 60 MG capsule               nortriptyline (PAMELOR) 50 MG capsule     pantoprazole (PROTONIX) 20 MG EC tablet     sucralfate (CARAFATE) 1 GM tablet     traZODone (DESYREL) 50 MG tablet     oxyCODONE-acetaminophen (PERCOCET) 5-325 MG tablet     No current facility-administered medications for this visit.          ROS:  ROS is done and is negative for general/constitutional, eye, ENT, Respiratory, cardiovascular, GI, , Skin, musculoskeletal except as noted elsewhere.  All other review of systems negative except as noted elsewhere.    OBJECTIVE:  /83   Pulse 106   Temp 98  F (36.7  C) (Oral)   Resp 16   Wt 85.7 kg (189 lb)   SpO2 100%   BMI 35.23 kg/m    GENERAL APPEARANCE: Alert, in no acute distress  EYES: normal  EARS: External ears normal. Canals clear. TM's normal.  NOSE:mild clear discharge and mildly inflamed mucosa  OROPHARYNX:normal  NECK:No adenopathy,masses or thyromegaly  RESP: normal and clear to auscultation  CV:regular rate and rhythm and no murmurs, clicks, or gallops  ABDOMEN: Abdomen soft, non-tender. BS normal. No masses, organomegaly  SKIN: no ulcers, lesions or rash  MUSCULOSKELETAL:localized tenderness over left posterior/lateral rib area, no bruising or erythema      RESULTS  Results for orders placed or performed in visit on 02/21/19   Rapid strep screen   Result Value Ref Range    Specimen Description Throat     Rapid Strep A Screen (A)      POSITIVE:  Group A Streptococcal antigen detected by immunoassay.   .  Recent Results (from the past 48 hour(s))   XR Ribs & Chest Left G/E 3 Views    Narrative    LEFT RIBS AND CHEST THREE VIEWS  2/27/2019 10:30 AM     HISTORY:  Cough.  Three prior rib fractures without injury or trauma.  Rib pain on left side.    COMPARISON: None.    FINDINGS: Heart size is normal. Lungs are clear. No pneumothorax or  pleural effusion. Nondisplaced left eighth rib fracture noted.      Impression    IMPRESSION: Nondisplaced left eighth rib fracture without  complication.    ADENIKE FORD MD       ASSESSMENT/PLAN:    ASSESSMENT / PLAN:  (S22.32XA) Closed fracture of one rib of left side, initial encounter  (primary encounter diagnosis)  Comment: from coughing.  Pt has had multiple fractures, including several rib fractures, due to her osteoporosis.  New fracture of left 8th rib noted on xray today.  Pt has percocet at home but would like to stop that and try something else for her pain.  Discussed options and will have her trial norco, even though it generally is considered less strong than percocet she wants to try something different as she feels the percocet isn't work as well for her after being on it since November for rib fractures.   Plan: XR Ribs & Chest Left G/E 3 Views,         HYDROcodone-acetaminophen (NORCO) 5-325 MG         tablet        Reviewed medication instructions and side effects. Follow up if experiences side effects.. Advised that norco can make drowsy or altered and is not to drive, operate machinery or consume alcohol or street drugs when taking.  She is to stop taking percocet.  If after 2-3 days, she feels the norco does not work as well as the percocet, she can stop the norco and re-start percocet.  Emphasized with pt that she is not to take norco and percocet together or within 8 hours of each other, and is to choose either one or the other to use.  I reviewed supportive care, expected course, and signs of  concern.  Follow up with pcp next week as scheduled or sooner if worsens in any way.  Reviewed red flag symptoms and is to go to the ER if experiences any of these.    (M81.0) Osteoporosis of multiple sites  Comment: pt has has multiple fractures, including several rib fractures with no h/o trauma.  Today has a new rib fracture which seems to have occurred from coughing.    Plan: pt is to f/u with her pcp and endocrinology for ongoing management.    (R05) Cough  Comment: currently most c/w viral etiology.  She is on omnicef for strep which would cover most respiratory bacteria as well, and is to complete her course of that.  Will rx albuterol to use prn for cough.  Also rx for robitussin ac at bedtime for cough.  Plan: albuterol (PROAIR HFA/PROVENTIL HFA/VENTOLIN         HFA) 108 (90 Base) MCG/ACT inhaler,         guaiFENesin-codeine (ROBITUSSIN AC) 100-10         MG/5ML solution        Reviewed medication instructions and side effects. Follow up if experiences side effects.. Advised that robitussin with codeine can make drowsy or altered and is not to drive, operate machinery or consume alcohol or street drugs when taking.  Also advised that is not to take robitussin ac and other narcotics including norco or percocet within 2 hours of each other and that taking more than one medication with a narcotic increases risk of complication, and pt demonstrates understanding of this.  I reviewed supportive care, otc meds to use if needed, expected course, and signs of concern.  Follow up with pcp next week as scheduled or sooner if worsens in any way.  Reviewed red flag symptoms and is to go to the ER if experiences any of these.    (J02.0) Strep throat  Comment: recently dx with strep   Plan: continue omnicef course.  F/u if sxs not improve.      See [facility] for orders, medications, letters, patient instructions    Jenifer Quispe M.D.

## 2019-03-06 ENCOUNTER — OFFICE VISIT (OUTPATIENT)
Dept: INTERNAL MEDICINE | Facility: CLINIC | Age: 39
End: 2019-03-06
Payer: COMMERCIAL

## 2019-03-06 VITALS
TEMPERATURE: 98.9 F | RESPIRATION RATE: 16 BRPM | DIASTOLIC BLOOD PRESSURE: 83 MMHG | HEART RATE: 102 BPM | SYSTOLIC BLOOD PRESSURE: 134 MMHG

## 2019-03-06 DIAGNOSIS — Z79.899 CONTROLLED SUBSTANCE AGREEMENT SIGNED: ICD-10-CM

## 2019-03-06 DIAGNOSIS — M81.0 OSTEOPOROSIS OF MULTIPLE SITES: ICD-10-CM

## 2019-03-06 DIAGNOSIS — S22.32XA CLOSED FRACTURE OF ONE RIB OF LEFT SIDE, INITIAL ENCOUNTER: ICD-10-CM

## 2019-03-06 DIAGNOSIS — G89.29 OTHER CHRONIC PAIN: Primary | ICD-10-CM

## 2019-03-06 DIAGNOSIS — S22.32XD CLOSED FRACTURE OF ONE RIB OF LEFT SIDE WITH ROUTINE HEALING, SUBSEQUENT ENCOUNTER: ICD-10-CM

## 2019-03-06 PROCEDURE — 99214 OFFICE O/P EST MOD 30 MIN: CPT | Performed by: INTERNAL MEDICINE

## 2019-03-06 RX ORDER — HYDROCODONE BITARTRATE AND ACETAMINOPHEN 5; 325 MG/1; MG/1
1 TABLET ORAL EVERY 6 HOURS PRN
Qty: 120 TABLET | Refills: 0 | Status: SHIPPED | OUTPATIENT
Start: 2019-03-06 | End: 2019-04-03

## 2019-03-06 RX ORDER — LAMOTRIGINE 100 MG/1
TABLET ORAL DAILY
COMMUNITY
Start: 2019-03-06

## 2019-03-06 NOTE — PROGRESS NOTES
"  SUBJECTIVE:   Mily Grullon is a 38 year old female who presents to clinic today for the following health issues:      Recheck on left sided rib fx  Coughing due to strep    Has endocrinology in April    Discuss Norco prescription     Staying at home for fear of further fractures    Psychiatrist transitioning her from trazodone to lamotrigine.    Cough is resolved  Has found Norco more helpful     reviewed and no significant additional risk.      Has been able to miss about one in three days on the PPI  Appetite has been lower so hasn't needed PPI as much    1. Other chronic pain    2. Controlled substance agreement signed    3. Osteoporosis of multiple sites    4. Closed fracture of one rib of left side with routine healing, subsequent encounter    5. Closed fracture of one rib of left side, initial encounter      noticing a lot of posterior thoracic \"clicking\" when sitting and lying down  some chest muscle soreness      PMH: Updated and/or reviewed in chart.    ROS:  Constitutional, neuro, EMT, endocrine, pulmonary, cardiac, gastrointestinal, genitourinary, musculoskeletal, integument and psychiatric systems are otherwise negative.    OBJECTIVE:                                                    /88   Pulse 75   Temp 98.9  F (37.2  C) (Tympanic)   Resp 16     GEN: No acute distress  EYES: No conjunctival injection or icterus, EOMI grossly intact  RESP: Unlabored, regular  NEURO: Normal gait, MAEx4, light touch sensation grossly intact  PSYCH: Normal mood and affect    Results for orders placed or performed in visit on 02/27/19   XR Ribs & Chest Left G/E 3 Views    Narrative    LEFT RIBS AND CHEST THREE VIEWS  2/27/2019 10:30 AM     HISTORY:  Cough.  Three prior rib fractures without injury or trauma.  Rib pain on left side.    COMPARISON: None.    FINDINGS: Heart size is normal. Lungs are clear. No pneumothorax or  pleural effusion. Nondisplaced left eighth rib fracture noted.      Impression    " IMPRESSION: Nondisplaced left eighth rib fracture without  complication.    ADENIKE FORD MD      ASSESSMENT/PLAN:                                                    1. Other chronic pain  2. Controlled substance agreement signed  3. Osteoporosis of multiple sites  4. Closed fracture of one rib of left side with routine healing, subsequent encounter  Another fracture but stabilizing.  Applying for disability.  We will transition her from oxycodone to Norco to max of four times daily as needed.  Discussed goal of slow tapering of 0.5-1 tab/day per month moving forward.  She is continuing to taper down on the PPI and will continue.  Unsure if marginal benefit of switch to H2b but could be considered.  Discussed [id number] titration.  Discussed getting out and walking in safe environment with small extra marginal risk but likely benefits to outweigh it.  Follow-up with Dr. QUINN in April for further evaluation.  With opioids, she will have a small excess of oxycodone from last month which is OK.  - HYDROcodone-acetaminophen (NORCO) 5-325 MG tablet; Take 1 tablet by mouth every 6 hours as needed for pain  Dispense: 120 tablet; Refill: 0     Orders Placed This Encounter     HYDROcodone-acetaminophen (NORCO) 5-325 MG tablet     lamoTRIgine (LAMICTAL) 100 MG tablet            William Smith MD

## 2019-03-11 ENCOUNTER — TELEPHONE (OUTPATIENT)
Dept: INTERNAL MEDICINE | Facility: CLINIC | Age: 39
End: 2019-03-11

## 2019-03-11 NOTE — TELEPHONE ENCOUNTER
Patient calling believes she has broken another rib, wants to see Dr Smith only for this need. Would like to be worked in as soon as possible. Please call to advise.

## 2019-03-11 NOTE — TELEPHONE ENCOUNTER
PCP is booked out for the next two weeks.   Please advise if patient needs to schedule with another provider.    Sara Mccain RN, BSN, PHN

## 2019-03-12 NOTE — TELEPHONE ENCOUNTER
Only SD is Friday this week at 4:30? Is this OK or would you want to try to work in as a double book on Wednesday?  Please advise,   Radha Hardy RN

## 2019-03-12 NOTE — TELEPHONE ENCOUNTER
Patient is okay with appointment on Friday 3-15-19.  If there is a cancellation on Wednesday, please contact patient to come in earlier.  Will send to PCP and MA,

## 2019-03-15 ENCOUNTER — OFFICE VISIT (OUTPATIENT)
Dept: INTERNAL MEDICINE | Facility: CLINIC | Age: 39
End: 2019-03-15
Payer: COMMERCIAL

## 2019-03-15 ENCOUNTER — ANCILLARY PROCEDURE (OUTPATIENT)
Dept: GENERAL RADIOLOGY | Facility: CLINIC | Age: 39
End: 2019-03-15
Attending: INTERNAL MEDICINE
Payer: COMMERCIAL

## 2019-03-15 VITALS
TEMPERATURE: 98.8 F | BODY MASS INDEX: 35.23 KG/M2 | SYSTOLIC BLOOD PRESSURE: 128 MMHG | RESPIRATION RATE: 16 BRPM | WEIGHT: 189 LBS | DIASTOLIC BLOOD PRESSURE: 82 MMHG | HEART RATE: 111 BPM

## 2019-03-15 DIAGNOSIS — S22.41XD CLOSED FRACTURE OF MULTIPLE RIBS OF RIGHT SIDE WITH ROUTINE HEALING, SUBSEQUENT ENCOUNTER: Primary | ICD-10-CM

## 2019-03-15 DIAGNOSIS — E66.01 MORBID OBESITY (H): ICD-10-CM

## 2019-03-15 DIAGNOSIS — R79.89 LOW SERUM CORTISOL LEVEL: ICD-10-CM

## 2019-03-15 DIAGNOSIS — S22.41XD CLOSED FRACTURE OF MULTIPLE RIBS OF RIGHT SIDE WITH ROUTINE HEALING, SUBSEQUENT ENCOUNTER: ICD-10-CM

## 2019-03-15 DIAGNOSIS — M81.0 OSTEOPOROSIS OF MULTIPLE SITES: ICD-10-CM

## 2019-03-15 LAB
ESTRADIOL SERPL-MCNC: 27 PG/ML
FSH SERPL-ACNC: 9.4 IU/L
LH SERPL-ACNC: 4.2 IU/L

## 2019-03-15 PROCEDURE — 83002 ASSAY OF GONADOTROPIN (LH): CPT | Performed by: INTERNAL MEDICINE

## 2019-03-15 PROCEDURE — 83001 ASSAY OF GONADOTROPIN (FSH): CPT | Performed by: INTERNAL MEDICINE

## 2019-03-15 PROCEDURE — 71101 X-RAY EXAM UNILAT RIBS/CHEST: CPT | Mod: RT

## 2019-03-15 PROCEDURE — 36415 COLL VENOUS BLD VENIPUNCTURE: CPT | Performed by: INTERNAL MEDICINE

## 2019-03-15 PROCEDURE — 99214 OFFICE O/P EST MOD 30 MIN: CPT | Performed by: INTERNAL MEDICINE

## 2019-03-15 PROCEDURE — 82670 ASSAY OF TOTAL ESTRADIOL: CPT | Performed by: INTERNAL MEDICINE

## 2019-03-15 RX ORDER — BENZONATATE 200 MG/1
200 CAPSULE ORAL 2 TIMES DAILY PRN
Qty: 60 CAPSULE | Refills: 1 | Status: SHIPPED | OUTPATIENT
Start: 2019-03-15 | End: 2019-04-03

## 2019-03-15 NOTE — PROGRESS NOTES
"  SUBJECTIVE:   Mily Grullon is a 38 year old female who presents to clinic today for the following health issues:      Possible Rib FX  Felt the break while coughing on Sunday  Right sided pain but feels different this time when she coughs or blows her nose there is   A sharp stinging pain that radiates through right breast that lasts about 10 minutes    Last period \"16 years ago\" but she endorses intermittent spotting since discontinuing Depo-Provera 3+ months ago.  Hasn't had a pattern and hasn't required a pad or tampon.    Denies significant increase in intensity or duration of cough, positional nature, or lower extremity edema, fever, chills, or night sweats.    1. Closed fracture of multiple ribs of right side with routine healing, subsequent encounter    2. Osteoporosis of multiple sites    3. Morbid obesity (H)    4. Low serum cortisol level (H)        PMH: Updated and/or reviewed in chart.    ROS:  Constitutional, neuro, EMT, endocrine, pulmonary, cardiac, gastrointestinal, genitourinary, musculoskeletal systems are otherwise negative.    OBJECTIVE:                                                    /82   Pulse 111   Temp 98.8  F (37.1  C) (Tympanic)   Resp 16   Wt 85.7 kg (189 lb)   BMI 35.23 kg/m      GEN: No acute distress  EYES: No conjunctival injection or icterus, EOMI grossly intact  RESP: Unlabored, regular  CHEST: exquisitely tender to palpation of right axilla / chest wall -- sufficient adiposity to prevent clearer examination  NEURO: Normal gait, MAEx4, light touch sensation grossly intact  PSYCH: Normal mood and affect      Results for orders placed or performed in visit on 03/15/19   Estradiol   Result Value Ref Range    Estradiol 27 pg/mL   Follicle stimulating hormone   Result Value Ref Range    FSH 9.4 IU/L   Lutropin   Result Value Ref Range    Lutropin 4.2 IU/L      ASSESSMENT/PLAN:                                                    1. Closed fracture of multiple ribs of right " side with routine healing, subsequent encounter  Another fracture - perhaps her 8th over the last several months.  Other rib fractures are healing.  We discussed cough suppression as able, though she reports over-the-counters and other strategies haven't worked well.  Cough appears grossly benign otherwise, but she remains very frail.   - XR Ribs & Chest Right G/E 3 Views; Future  - benzonatate (TESSALON) 200 MG capsule; Take 1 capsule (200 mg) by mouth 2 times daily as needed for cough  Dispense: 60 capsule; Refill: 1    2. Osteoporosis of multiple sites  Has follow-up with endocrinology next month, will reach out to Dr. QUINN to see if any anticipated treatment given multiple fractures and perhaps relative (?) but no absolute estrogen deficiency.  - Estradiol  - Follicle stimulating hormone  - Lutropin    3. Morbid obesity (H)  noted    4. Low serum cortisol level (H)  Noted in past, though apparently she passed dexamethasone challenge thereafter, so lower chance of [place]'s.  I elected not to repeat.       Orders Placed This Encounter     XR Ribs & Chest Right G/E 3 Views     Estradiol     Follicle stimulating hormone     Lutropin     benzonatate (TESSALON) 200 MG capsule              William Smith MD

## 2019-03-15 NOTE — LETTER
March 18, 2019      Mily Grullon  [address] [address]        Dear ,    We are writing to inform you of your test results.    These appear normal -- I have sent them to Dr. DOUGHERTY as well for review.     Resulted Orders   Estradiol   Result Value Ref Range    Estradiol 27 pg/mL      Comment:      Estradiol reference ranges for pre-menopausal  Follicular     pg/mL  Mid-cycle    pg/mL  Luteal          pg/mL     Follicle stimulating hormone   Result Value Ref Range    FSH 9.4 IU/L      Comment:      FSH Reference Range  Female: Follicular      2.5-10.2          Mid-cycle       3.4-33.4          Luteal          1.5-9.1          Postmenopausal  23.0-116.3     Lutropin   Result Value Ref Range    Lutropin 4.2 IU/L      Comment:      LH Reference Range  Female: Follicular      1.9-12.5          Mid-cycle       8.7-76.3          Luteal          0.5-16.9          Postmenopausal  15.9-54.0         If you have any questions or concerns, please call the clinic at the number listed above.       Sincerely,    William Smith MD/yary

## 2019-03-16 ENCOUNTER — TELEPHONE (OUTPATIENT)
Dept: INTERNAL MEDICINE | Facility: CLINIC | Age: 39
End: 2019-03-16

## 2019-03-16 PROBLEM — R79.89 LOW SERUM CORTISOL LEVEL: Status: ACTIVE | Noted: 2019-03-16

## 2019-03-19 ENCOUNTER — TELEPHONE (OUTPATIENT)
Dept: ENDOCRINOLOGY | Facility: CLINIC | Age: 39
End: 2019-03-19

## 2019-03-19 ENCOUNTER — TELEPHONE (OUTPATIENT)
Dept: ONCOLOGY | Facility: CLINIC | Age: 39
End: 2019-03-19

## 2019-03-19 DIAGNOSIS — M81.0 OSTEOPOROSIS OF MULTIPLE SITES: Primary | ICD-10-CM

## 2019-03-19 DIAGNOSIS — Z86.718 PERSONAL HISTORY OF DVT (DEEP VEIN THROMBOSIS): ICD-10-CM

## 2019-03-19 DIAGNOSIS — T07.XXXA MULTIPLE FRACTURES: ICD-10-CM

## 2019-03-19 DIAGNOSIS — Z30.41 ORAL CONTRACEPTIVE USE: ICD-10-CM

## 2019-03-19 RX ORDER — ALENDRONATE SODIUM 70 MG/1
70 TABLET ORAL
Qty: 12 TABLET | Refills: 3 | Status: SHIPPED | OUTPATIENT
Start: 2019-03-19 | End: 2019-04-22

## 2019-03-19 RX ORDER — CHOLECALCIFEROL (VITAMIN D3) 50 MCG
2000 TABLET ORAL DAILY
Qty: 90 TABLET | Refills: 3 | Status: SHIPPED | OUTPATIENT
Start: 2019-03-19 | End: 2020-01-29 | Stop reason: DRUGHIGH

## 2019-03-19 NOTE — PROGRESS NOTES
"Osteoporosis and fragility fracture in this patient seems to be secondary to long-standing Depo-Provera use.  Workup for other secondary causes has been negative so far with the exception of low vitamin D.       \"Longitudinal studies and cross-sectional studies have shown that Depo-Provera use can lower BMD compared to nonusers.  There is data which states about 5.2-5.4% loss after 5 years of use.  Studies have also shown use of Depo-Provera with increased fragility fracture risk.  There is date which is showed that there is recovery of bone mineral density after discontinuation of Depo-Provera.  In some cases the bone mineral density recovery could be substantial and fully reversible; which is encouraging.  There is no data showed that treatment with bisphosphonate or other bone specific treatments improve risk of fracture or gain in BMD in this group of patients.  In a patient who is older and has completed childbearing; might be reasonable to use bone specific treatments like bisphosphonate's, denosumab or Forteo.  Of note Forteo is not the preferred medication in this patient's as there is no data in terms of preventing hip fracture.  In any case, all 3 medications are contraindicated in patients who are pregnant or planning to be pregnant.  In this patient with no reliable method of contraception use of these medications is very controversial.  She has stopped the Depo-Provera; and monitoring her with follow-up BMD in the near future would be reasonable without bone specific treatments.  Again, there is data which showed improvement in BMD after stopping Depo-Provera.  With follow-up study; if we observe loss of further BMD or further fracture off the Depo-Provera; that would be a strong indication to consider bone specific treatments.\"    Patient sustained additional rib fractures since I saw her in clinic.  Given the new fractures; will start patient on Fosamax.  She benefits from stronger injectable " bisphosphonates; once she is on reliable birth control method.    Patient is not currently using any birth control.  Reports that she is not sexually active.  IUD cannot be applicable in her case be due to uterus abnormally.  She has had history of DVT in September 2018 and to clarify whether oral contraceptive use is okay or not will make referral to hematology oncology.      Plan:    Start vitamin D 2000 international units daily.    Start Fosamax 70 mg weekly.  Prescription sent to pharmacy.  Patient advised on side effects and also how to take this medication.    Referral to hematology oncology; to assess safety of use of oral contraceptive in this patient.  Once we start her on oral contraceptive; we will plan on Reclast infusion in this patient.     Called Patient and discussed the above plan and she is agreeable.

## 2019-03-19 NOTE — TELEPHONE ENCOUNTER
----- Message from Dallas Flores MD sent at 3/19/2019 10:39 AM CDT -----  Hematology referral placed for this patient.  Please assist patient in scheduling an appointment with hematology clinic.  Thank you.   Dallas Flores

## 2019-03-19 NOTE — TELEPHONE ENCOUNTER
ONCOLOGY INTAKE: Records Information      APPT INFORMATION: 04/08  Referring provider:  Dallas Flores MD  Referring provider s clinic:  [facility]  Reason for visit/diagnosis:  Personal history of DVT (deep vein thrombosis) [Z86.718];Oral contraceptive use [Z30.41]    Were the records received with the referral (via [employer])? Complete    Has patient been seen for any external appt for this diagnosis (enter clinic/location)? No    ADDITIONAL INFORMATION:  Personal history of DVT (deep vein thrombosis) [Z86.718];Oral contraceptive use [Z30.41]: Caller intake: Ref by:Dallas Flores MD- Med Diabetes Endoc: Records in Epic

## 2019-04-03 ENCOUNTER — OFFICE VISIT (OUTPATIENT)
Dept: INTERNAL MEDICINE | Facility: CLINIC | Age: 39
End: 2019-04-03
Payer: COMMERCIAL

## 2019-04-03 VITALS
RESPIRATION RATE: 16 BRPM | WEIGHT: 189 LBS | SYSTOLIC BLOOD PRESSURE: 125 MMHG | DIASTOLIC BLOOD PRESSURE: 70 MMHG | HEIGHT: 61 IN | TEMPERATURE: 97.5 F | HEART RATE: 120 BPM | BODY MASS INDEX: 35.68 KG/M2

## 2019-04-03 DIAGNOSIS — K21.9 GASTROESOPHAGEAL REFLUX DISEASE, ESOPHAGITIS PRESENCE NOT SPECIFIED: Primary | ICD-10-CM

## 2019-04-03 DIAGNOSIS — Z87.81 HX OF FRACTURE OF RIB: ICD-10-CM

## 2019-04-03 DIAGNOSIS — S22.32XA CLOSED FRACTURE OF ONE RIB OF LEFT SIDE, INITIAL ENCOUNTER: ICD-10-CM

## 2019-04-03 DIAGNOSIS — R07.89 CHEST WALL PAIN: ICD-10-CM

## 2019-04-03 PROCEDURE — 99214 OFFICE O/P EST MOD 30 MIN: CPT | Performed by: INTERNAL MEDICINE

## 2019-04-03 RX ORDER — PANTOPRAZOLE SODIUM 20 MG/1
TABLET, DELAYED RELEASE ORAL
Qty: 90 TABLET | Refills: 1 | Status: SHIPPED | OUTPATIENT
Start: 2019-04-03 | End: 2019-10-02

## 2019-04-03 RX ORDER — METHOCARBAMOL 750 MG/1
750 TABLET, FILM COATED ORAL 3 TIMES DAILY PRN
Qty: 90 TABLET | Refills: 0 | Status: SHIPPED | OUTPATIENT
Start: 2019-04-03 | End: 2019-05-03

## 2019-04-03 RX ORDER — HYDROCODONE BITARTRATE AND ACETAMINOPHEN 5; 325 MG/1; MG/1
.5-1 TABLET ORAL 3 TIMES DAILY PRN
Qty: 90 TABLET | Refills: 0 | Status: SHIPPED | OUTPATIENT
Start: 2019-04-03 | End: 2019-05-03

## 2019-04-03 RX ORDER — FAMOTIDINE 20 MG/1
20 TABLET, FILM COATED ORAL DAILY PRN
Qty: 90 TABLET | Refills: 1 | Status: SHIPPED | OUTPATIENT
Start: 2019-04-03 | End: 2019-10-02

## 2019-04-03 ASSESSMENT — MIFFLIN-ST. JEOR: SCORE: 1476.03

## 2019-04-03 NOTE — Clinical Note
Low priority for Dr. BARCLAY:   I presume Mily will be able to better tolerate alendronate with better GERD treatment, but maximal acid suppression reduces treatment benefit -- let me know if other thoughts.  I'm having her follow-up with GI to discuss risks, benefits, and alternatives of current GERD treatment.TS: please copy office visit note to Dr. Santana of [facility]

## 2019-04-03 NOTE — PROGRESS NOTES
"  SUBJECTIVE:   iMly Grullon is a 38 year old female who presents to clinic today for the following health issues:      Medication Followup of protonix and norco    Taking Medication as prescribed: yes    Side Effects:  None    Medication Helping Symptoms:  Yes    Requesting increased dose of protonix       Has seen Josiah Santana MD of [facility] for essentially lifelong GERD and Dr. Flores ([facility]) for osteoporosis now treated with Fosamax but she complains of significantly worsened reflux symptoms.  Would like to continue the bisphosphonate if able.  Reports omeprazole and ranitidine didn't help her previously.      Ongoing but improving rib pain especially on left and mid back pain, chronic since her activity tolerance has decreased with the multiple fractures over the winter.    1. Gastroesophageal reflux disease, esophagitis presence not specified    2. Closed fracture of one rib of left side, initial encounter    3. Chest wall pain    4. Hx of fracture of rib        PMH: Updated and/or reviewed in chart.    ROS:  Constitutional, HEENT, cardiovascular, pulmonary, gi and gu systems are otherwise negative.    OBJECTIVE:                                                    /70   Pulse 120   Temp 97.5  F (36.4  C) (Tympanic)   Resp 16   Ht 1.56 m (5' 1.4\")   Wt 85.7 kg (189 lb)   BMI 35.25 kg/m      GEN: No acute distress  EYES: No conjunctival injection or icterus, EOMI grossly intact  RESP: Unlabored, regular  CV: Non-tachycardic on my exam  NEURO: Normal gait, MAEx4, light touch sensation grossly intact  PSYCH: Normal mood and affect     ASSESSMENT/PLAN:                                                    1. Gastroesophageal reflux disease, esophagitis presence not specified  4. Hx of fracture of rib & osteoporosis  Patient is engaged and wishes to continue alendronate but is having significant GERD she attributes to the medication.  I asked her to follow-up with Dr. Santana of [facility] to discuss whether acid " monitoring or EGD for rule-out of other contributors to her reflux symptoms or alternative pharmacotherapy is warranted.  The risks and benefits of PPI for osteoporosis was briefly discussed today.  When she follows with Dr. Flores next, I asked her to inquire: if she is able to better tolerate alendronate with closer to maximal acid suppression, does the treatment benefit still outweigh the risk to lower 'net' efficacy of the bone density treatment?  If not or she is unable to tolerate an alternative bisphosphonate, would she qualify for other osteoporosis treatment?   (I'll copy chart to both providers for their thoughts as well.)  - pantoprazole (PROTONIX) 20 MG EC tablet; Take by mouth 30-60 minutes before a meal.  Dispense: 90 tablet; Refill: 1  - famotidine (PEPCID) 20 MG tablet; Take 1 tablet (20 mg) by mouth daily as needed (heartburn/reflux)  Dispense: 90 tablet; Refill: 1    2. Closed fracture of one rib of left side, initial encounter  Now about 2 weeks without a new fracture; reduce opioids, patient should be able to titrate off fairly rapidly.  - HYDROcodone-acetaminophen (NORCO) 5-325 MG tablet; Take 0.5-1 tablets by mouth 3 times daily as needed for moderate to severe pain  Dispense: 90 tablet; Refill: 0    3. Chest wall pain  May have some benefit from muscle relaxant -- unclear if tizanidine was helpful, re-trial:  - methocarbamol (ROBAXIN) 750 MG tablet; Take 1 tablet (750 mg) by mouth 3 times daily as needed for muscle spasms  Dispense: 90 tablet; Refill: 0           Orders Placed This Encounter     pantoprazole (PROTONIX) 20 MG EC tablet     HYDROcodone-acetaminophen (NORCO) 5-325 MG tablet     methocarbamol (ROBAXIN) 750 MG tablet     famotidine (PEPCID) 20 MG tablet              William Smith MD

## 2019-04-05 ENCOUNTER — PRE VISIT (OUTPATIENT)
Dept: ONCOLOGY | Facility: CLINIC | Age: 39
End: 2019-04-05

## 2019-04-05 NOTE — TELEPHONE ENCOUNTER
RECORDS STATUS - ALL OTHER DIAGNOSIS      RECORDS RECEIVED FROM: [facility]   DATE RECEIVED: 4/5/2019   NOTES STATUS DETAILS   OFFICE NOTE from referring provider Complete Epic Orders Only Note on 3.19.19   OFFICE NOTE from medical oncologist NA    DISCHARGE SUMMARY from hospital NA    DISCHARGE REPORT from the ER NA    OPERATIVE REPORT NA    MEDICATION LIST Complete [facility]   CLINICAL TRIAL TREATMENTS TO DATE NA    LABS     PATHOLOGY REPORTS NA    ANYTHING RELATED TO DIAGNOSIS Complete Epic   GENONOMIC TESTING     TYPE: NA    IMAGING (NEED IMAGES & REPORT)     CT SCANS Requested [facility]   MRI Complete PACS   MAMMO NA    ULTRASOUND Complete PACS   XR Complete PACS   PET NA

## 2019-04-08 ENCOUNTER — ONCOLOGY VISIT (OUTPATIENT)
Dept: ONCOLOGY | Facility: CLINIC | Age: 39
End: 2019-04-08
Attending: INTERNAL MEDICINE
Payer: COMMERCIAL

## 2019-04-08 VITALS
RESPIRATION RATE: 16 BRPM | TEMPERATURE: 98.1 F | OXYGEN SATURATION: 96 % | WEIGHT: 189 LBS | HEART RATE: 97 BPM | BODY MASS INDEX: 35.68 KG/M2 | SYSTOLIC BLOOD PRESSURE: 123 MMHG | HEIGHT: 61 IN | DIASTOLIC BLOOD PRESSURE: 77 MMHG

## 2019-04-08 DIAGNOSIS — I82.890 SUPERFICIAL VEIN THROMBOSIS: Primary | ICD-10-CM

## 2019-04-08 PROCEDURE — 99204 OFFICE O/P NEW MOD 45 MIN: CPT | Performed by: INTERNAL MEDICINE

## 2019-04-08 ASSESSMENT — MIFFLIN-ST. JEOR: SCORE: 1476.29

## 2019-04-08 ASSESSMENT — PAIN SCALES - GENERAL: PAINLEVEL: MODERATE PAIN (5)

## 2019-04-08 NOTE — PROGRESS NOTES
DATE OF VISIT: Apr 8, 2019    REASON FOR REFERRAL:   History of superficial vein thrombosis    HISTORY OF PRESENT ILLNESS:     38year-old female with past medical history significant for osteoporosis, endometriosis Was referred to hematology clinic for further recommendations on use of oral contraceptive medication for  recent history of superficial vein thrombosis    09/18/18 She was diagnosed with superficial vein thrombosis of the right cephalic vein It was attributed to an IV placement and associated infection Patient was on Coumadin for 2 months and subsequently discontinued      Recently seen by endocrinology for further evaluation of osteoporosis. Clinical impression was consistent with a being secondary to long-standing Depo-Provera use.Recommendation was for the patient to get started on Fosamax weekly while she is on a reliable contraceptive.    Persistent the hematology clinic today to discuss safety of oral contraceptive pills given history of superficial vein thrombosis. Denies any active complaints currently. Denies any other history of venous thrombosis besides the one discussed above. Denies dyspnea, Chest pain, extremity swelling/pain, nausea/vomiting or any other complaints. Denies family history of  blood clots.        REVIEW OF SYSTEMS:      ROS: 14 point ROS neg other than the symptoms noted above in the HPI.    PAST MEDICAL HISTORY:   Past Medical History:   Diagnosis Date     Endometriosis, site unspecified      Gastritis 2010    dx 2010- sees Dr Glover in [facility]     Osteoporosis      Urinary calculus, unspecified     kidney stones, age 19-20     Wrist injury Nov 19, 2003    Workman's Comp- left wrist- narc agreement on file       PAST SURGICAL HISTORY:   Past Surgical History:   Procedure Laterality Date     ARTHROSCOPY HIP, OSTEOPLASTY FEMUR PROXIMAL, COMBINED Left 6/29/2016    Procedure: COMBINED ARTHROSCOPY HIP, OSTEOPLASTY FEMUR PROXIMAL;  Surgeon: Godwin Deleon MD;   Location: [facility] OR     ARTHROSCOPY OF JOINT UNLISTED  4/2004    Left wrist, with debridement and repair of tear.     HC REMOVAL OF OVARY/TUBE(S)  6/2003    right with fallopian tube     HC REPAIR TRIANGULAR CART,WRIST JT  2005    Dr Eloy Sosa     HC REPAIR TRIANGULAR CART,WRIST JT  2007 or so    ulnar excision- Dr Eloy Sosa     HYSTEROSCOPY      laproscopy for endometriosis x2     LITHOTRIPSY         ALLERGIES:   Allergies as of 04/08/2019 - Reviewed 04/08/2019   Allergen Reaction Noted     Amitriptyline Hives 05/06/2014     Amoxicillin Diarrhea 09/21/2015     Asa [dihydroxyaluminum aminoacetate]  03/07/2011     Cipro [ciprofloxacin]  12/10/2009     Ibuprofen [aspartame-ibuprofen]  03/07/2011     Lyrica  10/28/2013     Morphine  12/10/2009     Naproxen  03/07/2011     Neurontin [gabapentin]  12/10/2009     Paxil [paroxetine] Anaphylaxis 12/10/2009     Phenergan [promethazine hcl]  12/10/2009     Prilosec [omeprazole]  12/10/2009     Gabapentin Rash 05/06/2014       MEDICATIONS:   Current Outpatient Medications   Medication Sig Dispense Refill     albuterol (PROAIR HFA/PROVENTIL HFA/VENTOLIN HFA) 108 (90 Base) MCG/ACT inhaler Inhale 2 puffs into the lungs every 4 hours as needed for other (cough) 1 Inhaler 0     alendronate (FOSAMAX) 70 MG tablet Take 1 tablet (70 mg) by mouth every 7 days Take 60 minutes before am meal with 8 oz. water. Remain upright for 30 minutes. 12 tablet 3     calcium carbonate 600 mg-vitamin D 400 units (CALCIUM 600 + D) 600-400 MG-UNIT per tablet Take 1 tablet by mouth 2 times daily 180 tablet 1     Calcium-Phosphorus-Vitamin D 250-100-500 MG-MG-UNIT CHEW Take 2 tablets by mouth daily 60 tablet 11     DULoxetine (CYMBALTA) 20 MG capsule Take 40 mg by mouth every evening        DULoxetine (CYMBALTA) 60 MG capsule Take 60 mg by mouth every morning       famotidine (PEPCID) 20 MG tablet Take 1 tablet (20 mg) by mouth daily as needed (heartburn/reflux) 90 tablet 1      HYDROcodone-acetaminophen (NORCO) 5-325 MG tablet Take 0.5-1 tablets by mouth 3 times daily as needed for moderate to severe pain 90 tablet 0     lamoTRIgine (LAMICTAL) 100 MG tablet Take 1 tablet (100 mg) by mouth daily       methocarbamol (ROBAXIN) 750 MG tablet Take 1 tablet (750 mg) by mouth 3 times daily as needed for muscle spasms 90 tablet 0     nortriptyline (PAMELOR) 50 MG capsule Take 100 mg by mouth At Bedtime       pantoprazole (PROTONIX) 20 MG EC tablet Take by mouth 30-60 minutes before a meal. 90 tablet 1     sucralfate (CARAFATE) 1 GM tablet Take 1 tablet (1 g) by mouth 4 times daily (Patient not taking: Reported on 4/3/2019) 60 tablet 1     vitamin D3 (CHOLECALCIFEROL) 2000 units tablet Take 1 tablet by mouth daily 90 tablet 3        FAMILY HISTORY:   Family History   Problem Relation Age of Onset     Hypertension Father      Lipids Father      Hypertension Maternal Grandmother      Genitourinary Problems Maternal Grandmother         kidney disease     Cancer Paternal Grandmother         leukemia     Asthma No family hx of      C.A.D. No family hx of      Diabetes No family hx of      Cerebrovascular Disease No family hx of      Breast Cancer No family hx of      Cancer - colorectal No family hx of      Prostate Cancer No family hx of      Alzheimer Disease No family hx of      Arthritis No family hx of      Blood Disease No family hx of      Circulatory No family hx of      Eye Disorder No family hx of      Gastrointestinal Disease No family hx of      Musculoskeletal Disorder No family hx of      Neurologic Disorder No family hx of      Respiratory No family hx of      Thyroid Disease No family hx of        SOCIAL HISTORY:   Social History     Socioeconomic History     Marital status: Single     Spouse name: None     Number of children: None     Years of education: None     Highest education level: None   Occupational History     None   Social Needs     Financial resource strain: None     Food  "insecurity:     Worry: None     Inability: None     Transportation needs:     Medical: None     Non-medical: None   Tobacco Use     Smoking status: Former Smoker     Types: Cigarettes     Last attempt to quit: 2014     Years since quittin.1     Smokeless tobacco: Never Used   Substance and Sexual Activity     Alcohol use: No     Frequency: Never     Drug use: No     Sexual activity: Never     Comment: depo provera   Lifestyle     Physical activity:     Days per week: None     Minutes per session: None     Stress: None   Relationships     Social connections:     Talks on phone: None     Gets together: None     Attends [employer] service: None     Active member of club or organization: None     Attends meetings of clubs or organizations: None     Relationship status: None     Intimate partner violence:     Fear of current or ex partner: None     Emotionally abused: None     Physically abused: None     Forced sexual activity: None   Other Topics Concern     Parent/sibling w/ CABG, MI or angioplasty before 65F 55M? Not Asked   Social History Narrative    Lives alone in [place]    Works at [employer]    Enjoying spending time with dog and nieces       PHYSICAL EXAMINATION:   /77 (BP Location: Left arm)   Pulse 97   Temp 98.1  F (36.7  C) (Oral)   Resp 16   Ht 1.56 m (5' 1.42\")   Wt 85.7 kg (189 lb)   LMP 10/01/2018 (Approximate)   SpO2 96%   BMI 35.23 kg/m    Wt Readings from Last 10 Encounters:   19 85.7 kg (189 lb)   19 85.7 kg (189 lb)   03/15/19 85.7 kg (189 lb)   19 85.7 kg (189 lb)   19 87.4 kg (192 lb 9.6 oz)   19 86.6 kg (191 lb)   19 89.4 kg (197 lb 3.2 oz)   01/15/19 88.1 kg (194 lb 3.6 oz)   01/10/19 87.7 kg (193 lb 6.4 oz)   19 93 kg (205 lb)      Exam:  Constitutional: healthy, alert and no distress  Head: Normocephalic.   Neck: Neck supple.  ENT: ENT exam normal,  Cardiovascular: RRR.   Respiratory:Lungs clear  Gastrointestinal: Abdomen soft, " non-tender.  Musculoskeletal: extremities normal  Skin: no suspicious lesions or rashes  Neurologic: Gait normal. Sensation grossly WNL.  Psychiatric: mentation appears normal and affect normal/bright  Hematologic/Lymphatic/Immunologic: Normal cervical lymph nodes        LABORATORY RESULTS:    Recent Labs   Lab Test 01/22/19  0923 01/15/19  1459 11/23/18 08/09/18  0801 09/11/17  1433   NA  --   --   --  141 142   POTASSIUM  --   --  3.6 4.0 4.0   CHLORIDE  --   --   --  110* 113*   BUN  --   --   --  7 14   CR 0.79  --  0.89 0.90 1.03   GLC  --   --  90 94 68*   ROWAN  --  9.1  --  9.4 8.8   PHOS  --  2.9  --   --  3.1     Recent Labs   Lab Test 01/22/19  0923 08/09/18  0801 05/16/18  1233 09/11/17  1433   WBC 6.2 7.3 8.6 7.0   HGB 12.4 13.6 13.0 12.8    360 347 304   MCV 79 77* 77* 78   NEUTROPHIL 51.7 75.3  --  52.2     Recent Labs   Lab Test 01/15/19  1459 08/09/18  0801 09/11/17  1433 12/10/13  1209  07/19/13 07/16/13   BILITOTAL  --  0.3  --  0.3  --   --   --    ALKPHOS 134 135  --  103  --   --   --    ALT  --  30  --  26  --   --  12   AST  --  20  --  22  --  16  --    ALBUMIN 3.8 3.9 3.9 4.1   < >  --   --     < > = values in this interval not displayed.     TSH   Date Value Ref Range Status   11/23/2018 0.68 0.30 - 5.00 uIU/mL Final   ]    IMAGING RESULTS:    US UPPER EXTREMITY VENOUS DUPLEX RIGHT  9/18/2018 8:25 PM      HISTORY: acute lymphangitis after IV not responding to Keflex and  Doxy.; Acute lymphangitis of forearm     COMPARISON: None.     TECHNIQUE: Examination of the upper extremity veins was performed with  graded compression and 2-D ultrasound and color doppler spectral  waveform analysis.      FINDINGS:  There is thrombus in the right cephalic vein extending from  the elbow to within 5 cm of the wrist. This is a superficial vein.   The veins in the upper arm are negative for DVT                                                                      IMPRESSION:  1. Study is positive for  thrombus in the cephalic vein in the forearm.  This is a superficial vein.  2. The veins in the upper arm are negative for DVT.    ULTRASOUND UPPER EXTREMITY VENOUS DUPLEX RIGHT 9/27/2018 2:40 PM     HISTORY: Pain of right upper arm.     TECHNIQUE: Venous Doppler US.?Color flow and spectral Doppler with  waveform analysis performed.     Comparison 9/18/2018                                                                      IMPRESSION: Again there is cephalic vein thrombosis, at the proximal  forearm and elbow levels, similar to the previous exam. No DVT  otherwise is seen in the upper extremity.     LEXA ABRAMS MD      Recent Results (from the past 744 hour(s))   XR Ribs & Chest Right G/E 3 Views    Narrative    RIBS AND CHEST RIGHT THREE VIEWS    3/15/2019 5:13 PM     HISTORY: Closed fracture of multiple ribs of right side with routine  healing, subsequent encounter.    COMPARISON: 2/27/2019      Impression    IMPRESSION: Lungs are well-inflated and clear. No evidence of pleural  effusion or pneumothorax. A healed or healing right 11th posterior rib  fracture is present. An acute/subacute fracture is present involving  the right lateral 5th rib. Interval healing of left rib fracture  noted. No other fractures are identified. Heart size is within normal  limits.    BANDAR MOROCHO MD       ASSESSMENT AND PLAN:    38 year-old female with past medical history significant for osteoporosis, endometriosis who was referred to hematology clinic for further recommendations on use of oral contraceptive medication given history of superficial vein thrombosis    Superficial vein thrombosis    Likely provoked secondary to IV placement. No clinical concern for underlying hypercoagulable disorder given no personal or family history of venous thrombosis.  Discussed in detail with patient today about the risk of thrombosis associated with use of oral contraceptive pills as well as the impact of estrogen dose and  the type of  progesterone used.  Even though it appears to be provoked venous thrombosis, Risk of recurrence is present and would suggest progesterone only contraceptive measures particularly  second-generation progestin such as levonorgestrel.    If estrogen has to be used with understanding of the increased risk of thrombosis, would suggest using less than 50 mcg dose.      RTC as needed.    The patient is ready to learn, no apparent learning barriers were identified, Diagnosis and treatment plans were explained to the patient. The patient expressed understanding of the content. The patient questions were answered to her satisfaction.    Chart documentation with Dragon Voice recognition Software. Although reviewed after completion, some words and grammatical errors may remain.    Andres Antonio MD  Attending Physician   Hematology/Medical Oncology

## 2019-04-08 NOTE — LETTER
4/8/2019         RE: Mily Grullon  [address] [address]        Dear Colleague,    Thank you for referring your patient, Mily Grullon, to the [facility]. Please see a copy of my visit note below.    DATE OF VISIT: Apr 8, 2019    REASON FOR REFERRAL:   History of superficial vein thrombosis    HISTORY OF PRESENT ILLNESS:     38year-old female with past medical history significant for osteoporosis, endometriosis Was referred to hematology clinic for further recommendations on use of oral contraceptive medication for  recent history of superficial vein thrombosis    09/18/18 She was diagnosed with superficial vein thrombosis of the right cephalic vein It was attributed to an IV placement and associated infection Patient was on Coumadin for 2 months and subsequently discontinued      Recently seen by endocrinology for further evaluation of osteoporosis. Clinical impression was consistent with a being secondary to long-standing Depo-Provera use.Recommendation was for the patient to get started on Fosamax weekly while she is on a reliable contraceptive.    Persistent the hematology clinic today to discuss safety of oral contraceptive pills given history of superficial vein thrombosis. Denies any active complaints currently. Denies any other history of venous thrombosis besides the one discussed above. Denies dyspnea, Chest pain, extremity swelling/pain, nausea/vomiting or any other complaints. Denies family history of  blood clots.        REVIEW OF SYSTEMS:      ROS: 14 point ROS neg other than the symptoms noted above in the HPI.    PAST MEDICAL HISTORY:   Past Medical History:   Diagnosis Date     Endometriosis, site unspecified      Gastritis 2010    dx 2010- sees Dr Glover in [facility]     Osteoporosis      Urinary calculus, unspecified     kidney stones, age 18-20     Wrist injury Nov 19, 2003    Workman's Comp- left wrist- narc agreement on file       PAST  SURGICAL HISTORY:   Past Surgical History:   Procedure Laterality Date     ARTHROSCOPY HIP, OSTEOPLASTY FEMUR PROXIMAL, COMBINED Left 6/29/2016    Procedure: COMBINED ARTHROSCOPY HIP, OSTEOPLASTY FEMUR PROXIMAL;  Surgeon: Godwin Deleon MD;  Location: [facility] OR     ARTHROSCOPY OF JOINT UNLISTED  4/2004    Left wrist, with debridement and repair of tear.     HC REMOVAL OF OVARY/TUBE(S)  6/2003    right with fallopian tube     HC REPAIR TRIANGULAR CART,WRIST JT  2005    Dr Eloy Sosa     HC REPAIR TRIANGULAR CART,WRIST JT  2007 or so    ulnar excision- Dr Eloy Sosa     HYSTEROSCOPY      laproscopy for endometriosis x2     LITHOTRIPSY         ALLERGIES:   Allergies as of 04/08/2019 - Reviewed 04/08/2019   Allergen Reaction Noted     Amitriptyline Hives 05/06/2014     Amoxicillin Diarrhea 09/21/2015     Asa [dihydroxyaluminum aminoacetate]  03/07/2011     Cipro [ciprofloxacin]  12/10/2009     Ibuprofen [aspartame-ibuprofen]  03/07/2011     Lyrica  10/28/2013     Morphine  12/10/2009     Naproxen  03/07/2011     Neurontin [gabapentin]  12/10/2009     Paxil [paroxetine] Anaphylaxis 12/10/2009     Phenergan [promethazine hcl]  12/10/2009     Prilosec [omeprazole]  12/10/2009     Gabapentin Rash 05/06/2014       MEDICATIONS:   Current Outpatient Medications   Medication Sig Dispense Refill     albuterol (PROAIR HFA/PROVENTIL HFA/VENTOLIN HFA) 108 (90 Base) MCG/ACT inhaler Inhale 2 puffs into the lungs every 4 hours as needed for other (cough) 1 Inhaler 0     alendronate (FOSAMAX) 70 MG tablet Take 1 tablet (70 mg) by mouth every 7 days Take 60 minutes before am meal with 8 oz. water. Remain upright for 30 minutes. 12 tablet 3     calcium carbonate 600 mg-vitamin D 400 units (CALCIUM 600 + D) 600-400 MG-UNIT per tablet Take 1 tablet by mouth 2 times daily 180 tablet 1     Calcium-Phosphorus-Vitamin D 250-100-500 MG-MG-UNIT CHEW Take 2 tablets by mouth daily 60 tablet 11     DULoxetine (CYMBALTA) 20 MG capsule Take  40 mg by mouth every evening        DULoxetine (CYMBALTA) 60 MG capsule Take 60 mg by mouth every morning       famotidine (PEPCID) 20 MG tablet Take 1 tablet (20 mg) by mouth daily as needed (heartburn/reflux) 90 tablet 1     HYDROcodone-acetaminophen (NORCO) 5-325 MG tablet Take 0.5-1 tablets by mouth 3 times daily as needed for moderate to severe pain 90 tablet 0     lamoTRIgine (LAMICTAL) 100 MG tablet Take 1 tablet (100 mg) by mouth daily       methocarbamol (ROBAXIN) 750 MG tablet Take 1 tablet (750 mg) by mouth 3 times daily as needed for muscle spasms 90 tablet 0     nortriptyline (PAMELOR) 50 MG capsule Take 100 mg by mouth At Bedtime       pantoprazole (PROTONIX) 20 MG EC tablet Take by mouth 30-60 minutes before a meal. 90 tablet 1     sucralfate (CARAFATE) 1 GM tablet Take 1 tablet (1 g) by mouth 4 times daily (Patient not taking: Reported on 4/3/2019) 60 tablet 1     vitamin D3 (CHOLECALCIFEROL) 2000 units tablet Take 1 tablet by mouth daily 90 tablet 3        FAMILY HISTORY:   Family History   Problem Relation Age of Onset     Hypertension Father      Lipids Father      Hypertension Maternal Grandmother      Genitourinary Problems Maternal Grandmother         kidney disease     Cancer Paternal Grandmother         leukemia     Asthma No family hx of      C.A.D. No family hx of      Diabetes No family hx of      Cerebrovascular Disease No family hx of      Breast Cancer No family hx of      Cancer - colorectal No family hx of      Prostate Cancer No family hx of      Alzheimer Disease No family hx of      Arthritis No family hx of      Blood Disease No family hx of      Circulatory No family hx of      Eye Disorder No family hx of      Gastrointestinal Disease No family hx of      Musculoskeletal Disorder No family hx of      Neurologic Disorder No family hx of      Respiratory No family hx of      Thyroid Disease No family hx of        SOCIAL HISTORY:   Social History     Socioeconomic History      "Marital status: Single     Spouse name: None     Number of children: None     Years of education: None     Highest education level: None   Occupational History     None   Social Needs     Financial resource strain: None     Food insecurity:     Worry: None     Inability: None     Transportation needs:     Medical: None     Non-medical: None   Tobacco Use     Smoking status: Former Smoker     Types: Cigarettes     Last attempt to quit: 2014     Years since quittin.1     Smokeless tobacco: Never Used   Substance and Sexual Activity     Alcohol use: No     Frequency: Never     Drug use: No     Sexual activity: Never     Comment: depo provera   Lifestyle     Physical activity:     Days per week: None     Minutes per session: None     Stress: None   Relationships     Social connections:     Talks on phone: None     Gets together: None     Attends Gnosticist service: None     Active member of club or organization: None     Attends meetings of clubs or organizations: None     Relationship status: None     Intimate partner violence:     Fear of current or ex partner: None     Emotionally abused: None     Physically abused: None     Forced sexual activity: None   Other Topics Concern     Parent/sibling w/ CABG, MI or angioplasty before 65F 55M? Not Asked   Social History Narrative    Lives alone in [place]    Works at [employer]    Enjoying spending time with dog and nieces       PHYSICAL EXAMINATION:   /77 (BP Location: Left arm)   Pulse 97   Temp 98.1  F (36.7  C) (Oral)   Resp 16   Ht 1.56 m (5' 1.42\")   Wt 85.7 kg (189 lb)   LMP 10/01/2018 (Approximate)   SpO2 96%   BMI 35.23 kg/m     Wt Readings from Last 10 Encounters:   19 85.7 kg (189 lb)   19 85.7 kg (189 lb)   03/15/19 85.7 kg (189 lb)   19 85.7 kg (189 lb)   19 87.4 kg (192 lb 9.6 oz)   19 86.6 kg (191 lb)   19 89.4 kg (197 lb 3.2 oz)   01/15/19 88.1 kg (194 lb 3.6 oz)   01/10/19 87.7 kg (193 lb 6.4 oz) "   01/02/19 93 kg (205 lb)      Exam:  Constitutional: healthy, alert and no distress  Head: Normocephalic.   Neck: Neck supple.  ENT: ENT exam normal,  Cardiovascular: RRR.   Respiratory:Lungs clear  Gastrointestinal: Abdomen soft, non-tender.  Musculoskeletal: extremities normal  Skin: no suspicious lesions or rashes  Neurologic: Gait normal. Sensation grossly WNL.  Psychiatric: mentation appears normal and affect normal/bright  Hematologic/Lymphatic/Immunologic: Normal cervical lymph nodes        LABORATORY RESULTS:    Recent Labs   Lab Test 01/22/19  0923 01/15/19  1459 11/23/18 08/09/18  0801 09/11/17  1433   NA  --   --   --  141 142   POTASSIUM  --   --  3.6 4.0 4.0   CHLORIDE  --   --   --  110* 113*   BUN  --   --   --  7 14   CR 0.79  --  0.89 0.90 1.03   GLC  --   --  90 94 68*   ROWAN  --  9.1  --  9.4 8.8   PHOS  --  2.9  --   --  3.1     Recent Labs   Lab Test 01/22/19  0923 08/09/18  0801 05/16/18  1233 09/11/17  1433   WBC 6.2 7.3 8.6 7.0   HGB 12.4 13.6 13.0 12.8    360 347 304   MCV 79 77* 77* 78   NEUTROPHIL 51.7 75.3  --  52.2     Recent Labs   Lab Test 01/15/19  1459 08/09/18  0801 09/11/17  1433 12/10/13  1209  07/19/13 07/16/13   BILITOTAL  --  0.3  --  0.3  --   --   --    ALKPHOS 134 135  --  103  --   --   --    ALT  --  30  --  26  --   --  12   AST  --  20  --  22  --  16  --    ALBUMIN 3.8 3.9 3.9 4.1   < >  --   --     < > = values in this interval not displayed.     TSH   Date Value Ref Range Status   11/23/2018 0.68 0.30 - 5.00 uIU/mL Final   ]    IMAGING RESULTS:    US UPPER EXTREMITY VENOUS DUPLEX RIGHT  9/18/2018 8:25 PM      HISTORY: acute lymphangitis after IV not responding to Keflex and  Doxy.; Acute lymphangitis of forearm     COMPARISON: None.     TECHNIQUE: Examination of the upper extremity veins was performed with  graded compression and 2-D ultrasound and color doppler spectral  waveform analysis.      FINDINGS:  There is thrombus in the right cephalic vein extending  from  the elbow to within 5 cm of the wrist. This is a superficial vein.   The veins in the upper arm are negative for DVT                                                                      IMPRESSION:  1. Study is positive for thrombus in the cephalic vein in the forearm.  This is a superficial vein.  2. The veins in the upper arm are negative for DVT.    ULTRASOUND UPPER EXTREMITY VENOUS DUPLEX RIGHT 9/27/2018 2:40 PM     HISTORY: Pain of right upper arm.     TECHNIQUE: Venous Doppler US.?Color flow and spectral Doppler with  waveform analysis performed.     Comparison 9/18/2018                                                                      IMPRESSION: Again there is cephalic vein thrombosis, at the proximal  forearm and elbow levels, similar to the previous exam. No DVT  otherwise is seen in the upper extremity.     LEXA ABRAMS MD      Recent Results (from the past 744 hour(s))   XR Ribs & Chest Right G/E 3 Views    Narrative    RIBS AND CHEST RIGHT THREE VIEWS    3/15/2019 5:13 PM     HISTORY: Closed fracture of multiple ribs of right side with routine  healing, subsequent encounter.    COMPARISON: 2/27/2019      Impression    IMPRESSION: Lungs are well-inflated and clear. No evidence of pleural  effusion or pneumothorax. A healed or healing right 11th posterior rib  fracture is present. An acute/subacute fracture is present involving  the right lateral 5th rib. Interval healing of left rib fracture  noted. No other fractures are identified. Heart size is within normal  limits.    BANDAR MOROCHO MD       ASSESSMENT AND PLAN:    38 year-old female with past medical history significant for osteoporosis, endometriosis who was referred to hematology clinic for further recommendations on use of oral contraceptive medication given history of superficial vein thrombosis    Superficial vein thrombosis    Likely provoked secondary to IV placement. No clinical concern for underlying hypercoagulable disorder given  no personal or family history of venous thrombosis.  Discussed in detail with patient today about the risk of thrombosis associated with use of oral contraceptive pills as well as the impact of estrogen dose and  the type of progesterone used.  Even though it appears to be provoked venous thrombosis, Risk of recurrence is present and would suggest progesterone only contraceptive measures particularly  second-generation progestin such as levonorgestrel.    If estrogen has to be used with understanding of the increased risk of thrombosis, would suggest using less than 50 mcg dose.      RTC as needed.    The patient is ready to learn, no apparent learning barriers were identified, Diagnosis and treatment plans were explained to the patient. The patient expressed understanding of the content. The patient questions were answered to her satisfaction.    Chart documentation with Dragon Voice recognition Software. Although reviewed after completion, some words and grammatical errors may remain.    Andres Antonio MD  Attending Physician   Hematology/Medical Oncology        Again, thank you for allowing me to participate in the care of your patient.        Sincerely,        Andres Antonio MD

## 2019-04-08 NOTE — NURSING NOTE
"Oncology Rooming Note    April 8, 2019 12:58 PM   Mily Grullon is a 38 year old female who presents for:    Chief Complaint   Patient presents with     Oncology Clinic Visit     New Patient     Initial Vitals: /77 (BP Location: Left arm)   Pulse 97   Temp 98.1  F (36.7  C) (Oral)   Resp 16   Ht 1.56 m (5' 1.42\")   Wt 85.7 kg (189 lb)   LMP 10/01/2018 (Approximate)   SpO2 96%   BMI 35.23 kg/m   Estimated body mass index is 35.23 kg/m  as calculated from the following:    Height as of this encounter: 1.56 m (5' 1.42\").    Weight as of this encounter: 85.7 kg (189 lb). Body surface area is 1.93 meters squared.  Moderate Pain (5) Comment: Data Unavailable   Patient's last menstrual period was 10/01/2018 (approximate).  Allergies reviewed: Yes  Medications reviewed: Yes    Medications: Medication refills not needed today.  Pharmacy name entered into [facility]: [facility] - [id number] [facility]    Roshni Rice LPN              "

## 2019-04-10 ENCOUNTER — OFFICE VISIT (OUTPATIENT)
Dept: FAMILY MEDICINE | Facility: CLINIC | Age: 39
End: 2019-04-10
Payer: COMMERCIAL

## 2019-04-10 VITALS
SYSTOLIC BLOOD PRESSURE: 116 MMHG | TEMPERATURE: 98.9 F | WEIGHT: 187 LBS | DIASTOLIC BLOOD PRESSURE: 80 MMHG | BODY MASS INDEX: 34.86 KG/M2 | RESPIRATION RATE: 16 BRPM | OXYGEN SATURATION: 97 % | HEART RATE: 100 BPM

## 2019-04-10 DIAGNOSIS — R05.9 COUGH: ICD-10-CM

## 2019-04-10 DIAGNOSIS — J02.9 SORETHROAT: Primary | ICD-10-CM

## 2019-04-10 LAB
DEPRECATED S PYO AG THROAT QL EIA: NORMAL
SPECIMEN SOURCE: NORMAL

## 2019-04-10 PROCEDURE — 87081 CULTURE SCREEN ONLY: CPT | Performed by: FAMILY MEDICINE

## 2019-04-10 PROCEDURE — 99213 OFFICE O/P EST LOW 20 MIN: CPT | Performed by: FAMILY MEDICINE

## 2019-04-10 PROCEDURE — 87880 STREP A ASSAY W/OPTIC: CPT | Performed by: FAMILY MEDICINE

## 2019-04-10 RX ORDER — CODEINE PHOSPHATE/GUAIFENESIN 10-100MG/5
5 LIQUID (ML) ORAL EVERY 8 HOURS PRN
Qty: 150 ML | Refills: 0 | Status: SHIPPED | OUTPATIENT
Start: 2019-04-10 | End: 2019-05-03

## 2019-04-10 ASSESSMENT — PAIN SCALES - GENERAL: PAINLEVEL: MODERATE PAIN (5)

## 2019-04-10 NOTE — PROGRESS NOTES
SUBJECTIVE:   Mily Grullon is a 38 year old female who presents to clinic today for the following   health issues:      RESPIRATORY SYMPTOMS      Duration: 3 days    Description  sore throat , hoarse voiceand cough    Severity: moderate    Accompanying signs and symptoms: None    History (predisposing factors):  none    Precipitating or alleviating factors: None    Therapies tried and outcome:  none    Denies any possibility of pregnancy declined a pregnancy test    Had strep a month ago got better with the antibiotics  3 days ago started with a sore throat again hoarse voice  Also having a coughing fits  No wheezing  No asthma  Not a smoker   No thoughts of harming self or others  fever  - No  cough  -Yes  ill contacts - No  able to swallow liquids and solids -YES  other symptoms above  Rash: No  Has tried over the counter medications no relief  because of persistence, patient came in to be seen.    ROS:  denies any exertional chest pain or shortness of breath  denies any unusual rash or joint swelling  denies post-tussive emesis or pertussis like symptoms  Negative for constitutional, eye, ear, nose, throat, skin, respiratory, cardiac, and gastrointestinal other than those outlined in the HPI.    PMH: chart reviewed  FH: chart reviewed    SH: chart reviewed and as above   Physical Exam:   /80   Pulse 100   Temp 98.9  F (37.2  C) (Tympanic)   Resp 16   Wt 84.8 kg (187 lb)   SpO2 97%   Breastfeeding? No   BMI 34.86 kg/m    General : Awake Alert not in any acute cardiorespiratory distress  Head:       Normocephalic Atraumatic  Eyes:    Pupils equally reactive to light and accomodation. Sclera not icteric.   ENT:   midline nasal septum, mild nasal congestion, sinuses non-tender  left ear: no tragal tenderness, no mastoid tenderness, normal EAC, normal TM  right ear: left ear: no tragal tenderness, no mastoid tenderness, normal EAC, normal TM  mouth moist buccal mucosa, Yes hyperemic posterior  pharyngeal wall, no trismus  tonsils: bilateral tonsil abnormal with mild erythema grade 1 no exudate  anterior cervical nodes: No tender  posterior cervical nodes: No  palpable  Heart:  Regular in rate and rhythm, no murmurs rubs or gallops  Lungs: Symmetrical Chest Expansion, no retractions, clear breath sounds  Abdomen: soft, no hepatosplenomegally  Psych: Appropriate mood and affect. Pleasant  Skin: patient undressed to level of his/her comfort. No visible concerning lesions.    Labs: Strep negative    ICD-10-CM    1. Sorethroat J02.9 Strep, Rapid Screen     guaiFENesin-codeine (GUAIFENESIN AC) 100-10 MG/5ML syrup   2. Cough R05 guaiFENesin-codeine (GUAIFENESIN AC) 100-10 MG/5ML syrup     Suspect viral at this time.   Supportive treatment   Throat culture pending     Prescribed with guaifenesin with codeine as needed cough  Warned sedating and habit forming  Do not take with other sedating meds or with alcohol  No thoughts of harming self or others  Sedating medications given. Aware not to drive or operate machinery while on these medications. Caution with .    reviewed no concerns    supportive treatment: advised supportive treatment, Advised to come back in if with any worsening symptoms or if not better despite supportive measures. Especially if with any worsening sore throat, inability to eat or drink or swallow, or trismus. Symptoms of peritonsillar abscess discussed. Patient voiced understanding.adverse reactions of medication discussed  OTC medications discussed  advised to come back in right away if with any worsening symptoms or if with no relief despite treatment plan  patient voiced understanding and had no further questions at this time.

## 2019-04-10 NOTE — LETTER
April 11, 2019    Mily Grullon  [address] [facility] [address]        Dear Mily,    The results of your recent tests were normal.  Below is a copy of the results.  It was a pleasure to see you at your last appointment.    If you have any questions or concerns, please call myself or my nurse at [phone].    Sincerely,    Vianca Bell MD/jesika    Results for orders placed or performed in visit on 04/10/19   Strep, Rapid Screen   Result Value Ref Range    Specimen Description Throat     Rapid Strep A Screen       NEGATIVE: No Group A streptococcal antigen detected by immunoassay, await culture report.   Beta strep group A culture   Result Value Ref Range    Specimen Description Throat     Culture Micro No beta hemolytic Streptococcus Group A isolated

## 2019-04-11 LAB
BACTERIA SPEC CULT: NORMAL
SPECIMEN SOURCE: NORMAL

## 2019-04-22 ENCOUNTER — TRANSFERRED RECORDS (OUTPATIENT)
Dept: HEALTH INFORMATION MANAGEMENT | Facility: CLINIC | Age: 39
End: 2019-04-22

## 2019-04-22 ENCOUNTER — TELEPHONE (OUTPATIENT)
Dept: ENDOCRINOLOGY | Facility: CLINIC | Age: 39
End: 2019-04-22

## 2019-04-22 ENCOUNTER — OFFICE VISIT (OUTPATIENT)
Dept: ENDOCRINOLOGY | Facility: CLINIC | Age: 39
End: 2019-04-22
Payer: COMMERCIAL

## 2019-04-22 ENCOUNTER — ANCILLARY PROCEDURE (OUTPATIENT)
Dept: GENERAL RADIOLOGY | Facility: CLINIC | Age: 39
End: 2019-04-22
Attending: INTERNAL MEDICINE
Payer: COMMERCIAL

## 2019-04-22 VITALS
SYSTOLIC BLOOD PRESSURE: 147 MMHG | DIASTOLIC BLOOD PRESSURE: 90 MMHG | HEART RATE: 108 BPM | BODY MASS INDEX: 34.67 KG/M2 | OXYGEN SATURATION: 97 % | WEIGHT: 186 LBS

## 2019-04-22 DIAGNOSIS — M81.0 OSTEOPOROSIS OF MULTIPLE SITES: ICD-10-CM

## 2019-04-22 DIAGNOSIS — M81.0 OSTEOPOROSIS OF MULTIPLE SITES: Primary | ICD-10-CM

## 2019-04-22 DIAGNOSIS — K21.9 GASTROESOPHAGEAL REFLUX DISEASE, ESOPHAGITIS PRESENCE NOT SPECIFIED: ICD-10-CM

## 2019-04-22 DIAGNOSIS — Z30.011 ENCOUNTER FOR INITIAL PRESCRIPTION OF CONTRACEPTIVE PILLS: ICD-10-CM

## 2019-04-22 DIAGNOSIS — M48.50XS PATHOLOGIC COMPRESSION FRACTURE OF SPINE, SEQUELA: ICD-10-CM

## 2019-04-22 DIAGNOSIS — T07.XXXA MULTIPLE FRACTURES: ICD-10-CM

## 2019-04-22 DIAGNOSIS — S72.145S CLOSED NONDISPLACED INTERTROCHANTERIC FRACTURE OF LEFT FEMUR, SEQUELA: ICD-10-CM

## 2019-04-22 LAB
ALBUMIN SERPL-MCNC: 4.3 G/DL (ref 3.4–5)
ALP SERPL-CCNC: 147 U/L (ref 40–150)
BUN SERPL-MCNC: 12 MG/DL (ref 7–30)
CALCIUM SERPL-MCNC: 9.8 MG/DL (ref 8.5–10.1)
CREAT SERPL-MCNC: 0.77 MG/DL (ref 0.52–1.04)
GFR SERPL CREATININE-BSD FRML MDRD: >90 ML/MIN/{1.73_M2}
PHOSPHATE SERPL-MCNC: 2.8 MG/DL (ref 2.5–4.5)

## 2019-04-22 PROCEDURE — 99214 OFFICE O/P EST MOD 30 MIN: CPT | Performed by: INTERNAL MEDICINE

## 2019-04-22 PROCEDURE — 36415 COLL VENOUS BLD VENIPUNCTURE: CPT | Performed by: INTERNAL MEDICINE

## 2019-04-22 PROCEDURE — 72070 X-RAY EXAM THORAC SPINE 2VWS: CPT | Performed by: RADIOLOGY

## 2019-04-22 PROCEDURE — 84075 ASSAY ALKALINE PHOSPHATASE: CPT | Performed by: INTERNAL MEDICINE

## 2019-04-22 PROCEDURE — 84520 ASSAY OF UREA NITROGEN: CPT | Performed by: INTERNAL MEDICINE

## 2019-04-22 PROCEDURE — 82310 ASSAY OF CALCIUM: CPT | Performed by: INTERNAL MEDICINE

## 2019-04-22 PROCEDURE — 82040 ASSAY OF SERUM ALBUMIN: CPT | Performed by: INTERNAL MEDICINE

## 2019-04-22 PROCEDURE — 72100 X-RAY EXAM L-S SPINE 2/3 VWS: CPT | Performed by: RADIOLOGY

## 2019-04-22 PROCEDURE — 82565 ASSAY OF CREATININE: CPT | Performed by: INTERNAL MEDICINE

## 2019-04-22 PROCEDURE — 99000 SPECIMEN HANDLING OFFICE-LAB: CPT | Performed by: INTERNAL MEDICINE

## 2019-04-22 PROCEDURE — 82306 VITAMIN D 25 HYDROXY: CPT | Performed by: INTERNAL MEDICINE

## 2019-04-22 PROCEDURE — 84080 ASSAY ALKALINE PHOSPHATASES: CPT | Mod: 90 | Performed by: INTERNAL MEDICINE

## 2019-04-22 PROCEDURE — 84100 ASSAY OF PHOSPHORUS: CPT | Performed by: INTERNAL MEDICINE

## 2019-04-22 RX ORDER — LEVONORGESTREL AND ETHINYL ESTRADIOL 0.15-0.03
1 KIT ORAL DAILY
Qty: 90 TABLET | Refills: 3 | Status: SHIPPED | OUTPATIENT
Start: 2019-04-22 | End: 2019-04-29

## 2019-04-22 NOTE — TELEPHONE ENCOUNTER
PA Initiation    Medication: Forteo  Insurance Company: [employer] - Phone [phone] Fax [phone]  Pharmacy Filling the Rx: [facility] - [place], [place] - [facility] [address]  Filling Pharmacy Phone:    Filling Pharmacy Fax:    Start Date: 4/22/2019

## 2019-04-22 NOTE — TELEPHONE ENCOUNTER
Will review with financial liaison to determine coverage for Forteo.      Marielle Suarez, RN  Endocrine Care Coordinator  [facility]

## 2019-04-22 NOTE — PATIENT INSTRUCTIONS
Weight bearing Exercises; walking.  No lifting.     Fall prevention    Adequate Calcium and vitamin D intake: for maintenance calcium 1200 mg daily and vitamin D 2000 IU daily.      Cessation of tobacco use    Avoidance of excessive alcohol intake.     Start FORTEO injection, once you start the FORTEO please stop the weekly FOSAMAX (ALENDRONATE).     Bone medications are contraindicated during pregnancy; therefore we have prescribed oral contraceptives. Please take it as prescribed.  As we have discussed, oral  Contraceptives increase risk of blood clots.

## 2019-04-22 NOTE — LETTER
Patient:  Mily Grullon  :   1980  MRN:     [id number]        Ms.Angela PRASHANT Grullon  [address] [address]      To whom it may concern    2019    Dear ,    Patient is diagnosed with severe osteoporosis with multiple fragility fractures in the past.  Therefore, until further notice patient is recommended not to do any lifting or twisting.    Dallas Flores MD

## 2019-04-22 NOTE — PROGRESS NOTES
Endocrinology Clinic Visit    Chief Complaint: Osteoporosis     Information obtained from:Patient here with her dad.    Subjective:         HPI: Mily Grullon is a 38 year old female with history of Osteoporosis and fragility fracture who is here for a follow up.     Patient has history of endometriosis and per report has underwent right oophorectomy at the age of 22.  She was started on Depo-Provera at the  age of 22.  She has been on this medication until she was taken off of it in January of 2019.     She was evaluated for osteoporosis at an outside facility; with endocrinology clinic of [facility];  She had a screening test for celiac disease which was within the normal limits.  She was screened for Cushing's syndrome which was normal.  She was screened for hyper-calciuria again which came back within the normal limits.  Thyroid lab results were within the normal limits.  GFR is within the normal limits and electrolytes including calcium.  He has had elevated PTH level in the setting of low vitamin D level and based on that result she was started on ergocalciferol 50,000 international unit twice weekly and she has been on this medication for the last 6 weeks at least. PTH elevation resolved after correct low vitamin D level.         DEXA scan 2018   FINDINGS:               Lumbar Spine (L1-L4) Z-score:  -1.7, degenerative changes present               Right Femoral Neck Z-score:  -3.1               Forearm (radius 33%) Z-score:  -0.3  The left femur is not acceptable for evaluation due to previous surgical repair                                Lumbar (L1-L4) BMD: 1.078               Previous: 1.054                                                 Right Total Hip BMD: 0.751                Previous: 0.718                            Forearm (radius 33%) BMD: 0.694     Previous: NA  DEXA scan from 2014:   Femoral BMD 0.531 Z score -3.5     She is not currently on steroids. Multiple fractures over the last five  "years:  Hip fracture, wrist fracture, multiple ankle fractures, now foot and rib fractures. All of these fractures are fragility fracture without any trauma. She feels just pain. Hip # was following lifting.    She is not currently using any contraceptives.  She met with her gynecologist to discuss other options of contraception.  IUD could not be placed due to uterus anatomy.  Oral contraceptives were not used due to history of blood clot.  We send patient for a consultation to hematology regarding the use of oral contraceptives in the setting of past history of superficial vein  thrombosis.  The recommendation given which is copied below\"  \"Even though it appears to be provoked venous thrombosis, Risk of recurrence is present and would suggest progesterone only contraceptive measures particularly  second-generation progestin such as levonorgestrel.    If estrogen has to be used with understanding of the increased risk of thrombosis, would suggest using less than 50 mcg dose.\"  Appreciate input.     Allergies   Allergen Reactions     Amitriptyline Hives     Amoxicillin Diarrhea     Asa [Dihydroxyaluminum Aminoacetate]      Cipro [Ciprofloxacin]      Dizziness, vomiting, shortness of breath     Ibuprofen [Aspartame-Ibuprofen]      GI distress       Lyrica      extrematies swell up      Morphine      ithcy     Naproxen      GI distress     Neurontin [Gabapentin]      rash     Paxil [Paroxetine] Anaphylaxis     anaphylaxis     Phenergan [Promethazine Hcl]      dystonia     Prilosec [Omeprazole]      Rash, dizziness     Gabapentin Rash       Current Outpatient Medications   Medication Sig Dispense Refill     albuterol (PROAIR HFA/PROVENTIL HFA/VENTOLIN HFA) 108 (90 Base) MCG/ACT inhaler Inhale 2 puffs into the lungs every 4 hours as needed for other (cough) 1 Inhaler 0     Cholecalciferol (VITAMIN D PO) Take 1,000 Units by mouth       DULoxetine (CYMBALTA) 20 MG capsule Take 40 mg by mouth every evening        " DULoxetine (CYMBALTA) 60 MG capsule Take 60 mg by mouth every morning       famotidine (PEPCID) 20 MG tablet Take 1 tablet (20 mg) by mouth daily as needed (heartburn/reflux) 90 tablet 1     HYDROcodone-acetaminophen (NORCO) 5-325 MG tablet Take 0.5-1 tablets by mouth 3 times daily as needed for moderate to severe pain 90 tablet 0     lamoTRIgine (LAMICTAL) 100 MG tablet Take 1 tablet (100 mg) by mouth daily       levonorgestrel-ethinyl estradiol (NORDETTE) 0.15-30 MG-MCG tablet Take 1 tablet by mouth daily 90 tablet 3     methocarbamol (ROBAXIN) 750 MG tablet Take 1 tablet (750 mg) by mouth 3 times daily as needed for muscle spasms 90 tablet 0     nortriptyline (PAMELOR) 50 MG capsule Take 100 mg by mouth At Bedtime       pantoprazole (PROTONIX) 20 MG EC tablet Take by mouth 30-60 minutes before a meal. 90 tablet 1     teriparatide, recombinant, (FORTEO) 600 MCG/2.4ML SOLN injection Inject 0.08 mLs (20 mcg) Subcutaneous daily 7.2 mL 1     vitamin D3 (CHOLECALCIFEROL) 2000 units tablet Take 1 tablet by mouth daily 90 tablet 3     calcium carbonate 600 mg-vitamin D 400 units (CALCIUM 600 + D) 600-400 MG-UNIT per tablet Take 1 tablet by mouth 2 times daily (Patient not taking: Reported on 4/22/2019) 180 tablet 1     Calcium-Phosphorus-Vitamin D 250-100-500 MG-MG-UNIT CHEW Take 2 tablets by mouth daily (Patient not taking: Reported on 4/22/2019) 60 tablet 11     sucralfate (CARAFATE) 1 GM tablet Take 1 tablet (1 g) by mouth 4 times daily (Patient not taking: Reported on 4/22/2019) 60 tablet 1       Review of Systems     8 point review system (Constitutional, HENT, Eyes, Respiratory, Cardiovascular, Gastrointestinal, Genitourinary, Musculoskeletal,Neurological, Psychiatric/Behavioural, Endocrine) is negative or is as per HPI above and multiple pains involving the back and joints.     Past Medical History:   Diagnosis Date     Endometriosis, site unspecified      Gastritis 2010    dx 2010- sees Dr Glover in [facility]  [facility]     Osteoporosis      Urinary calculus, unspecified     kidney stones, age 19-20     Wrist injury Nov 19, 2003    Workman's Comp- left wrist- narc agreement on file       Past Surgical History:   Procedure Laterality Date     ARTHROSCOPY HIP, OSTEOPLASTY FEMUR PROXIMAL, COMBINED Left 6/29/2016    Procedure: COMBINED ARTHROSCOPY HIP, OSTEOPLASTY FEMUR PROXIMAL;  Surgeon: Godwin Deleon MD;  Location: [facility] OR     ARTHROSCOPY OF JOINT UNLISTED  4/2004    Left wrist, with debridement and repair of tear.     HC REMOVAL OF OVARY/TUBE(S)  6/2003    right with fallopian tube     HC REPAIR TRIANGULAR CART,WRIST JT  2005    Dr Eloy Sosa     HC REPAIR TRIANGULAR CART,WRIST JT  2007 or so    ulnar excision- Dr Eloy Sosa     HYSTEROSCOPY      laproscopy for endometriosis x2     LITHOTRIPSY         Family History   Problem Relation Age of Onset     Hypertension Father      Lipids Father      Hypertension Maternal Grandmother      Genitourinary Problems Maternal Grandmother         kidney disease     Cancer Paternal Grandmother         leukemia     Asthma No family hx of      C.A.D. No family hx of      Diabetes No family hx of      Cerebrovascular Disease No family hx of      Breast Cancer No family hx of      Cancer - colorectal No family hx of      Prostate Cancer No family hx of      Alzheimer Disease No family hx of      Arthritis No family hx of      Blood Disease No family hx of      Circulatory No family hx of      Eye Disorder No family hx of      Gastrointestinal Disease No family hx of      Musculoskeletal Disorder No family hx of      Neurologic Disorder No family hx of      Respiratory No family hx of      Thyroid Disease No family hx of        Social History     Socioeconomic History     Marital status: Single     Spouse name: None     Number of children: None     Years of education: None     Highest education level: None   Social Needs     Financial resource strain: None     Food insecurity -  worry: None     Food insecurity - inability: None     Transportation needs - medical: None     Transportation needs - non-medical: None   Occupational History     None   Tobacco Use     Smoking status: Former Smoker     Types: Cigarettes     Last attempt to quit: 2014     Years since quittin.9     Smokeless tobacco: Never Used   Substance and Sexual Activity     Alcohol use: No     Frequency: Never     Drug use: No     Sexual activity: Not Currently     Birth control/protection: Injection     Comment: depo provera   Other Topics Concern     Parent/sibling w/ CABG, MI or angioplasty before 65F 55M? Not Asked   Social History Narrative    Lives alone in [place]    Works at [employer]    Enjoying spending time with dog and nieces       Objective:   /90 (BP Location: Left arm, Patient Position: Chair, Cuff Size: Adult Regular)   Pulse 108   Wt 84.4 kg (186 lb)   SpO2 97%   BMI 34.67 kg/m    Constitutional: Pleasant no acute cardiopulmonary distress.   EYES: anicteric, normal extra-ocular movements, no lid lag or retraction   HEENT: Mouth/Throat: Mucous membrane is moist. Oropharynx is clear.   Cardiovascular: RRR, S1, S2 normal.   Pulmonary/Chest: CTAB. No wheezing or rales   Abdominal: +BS. Non tender to palpation.   Neurological: Alert and oriented.   Extremities: No edema.   No spine or lumbar spine tenderness.   Psychological: appropriate mood and affect     In House Labs:     TSH   Date Value Ref Range Status   2018 0.68 0.30 - 5.00 uIU/mL Final   2017 1.36 0.40 - 4.00 mU/L Final   12/10/2013 2.37 0.4 - 5.0 mU/L Final   2013 0.70 mcU/mL Final     T4 Free   Date Value Ref Range Status   12/10/2013 1.08 0.70 - 1.85 ng/dL Final       Creatinine   Date Value Ref Range Status   2019 0.79 0.52 - 1.04 mg/dL Final     24-hour urine for calcium  Sodium to creatinine ratio was 150 reference range   Sodium 24-hour 184 difference for   Creatinine 24-hour was 1.59  (0.5-2.15  Calcium to creatinine ratio was 68 reference range 30- 275  Calcium 24-hour urine was 108 reference range   Creatinine 24-hour 1.59  Total volume 2700  Urine free cortisol 3.2    TSH was 0.68 and free T4 0.74 tissue transglutaminase IgG and IgA was undetectable creatinine within the normal limits BMD in 2014 was 0.718 at the total hip in 2018 was 0.751.  Z score -3.1 at the neck, trochanteric -2.7 and total -2.4    ENDO CALCIUM LABS-[facility] Latest Ref Rng & Units 4/22/2019   CALCIUM 8.5 - 10.1 mg/dL 9.8   PHOSPHOROUS 2.5 - 4.5 mg/dL 2.8   ALBUMIN 3.4 - 5.0 g/dL 4.3   BUN 7 - 30 mg/dL 12   CREATININE 0.52 - 1.04 mg/dL 0.77   PARATHYROID HORMONE INTACT 18 - 80 pg/mL    ALKPHOS 40 - 150 U/L 147     Assessment/Treatment Plan:      Osteoporosis with current pathological fracture/patient has had multiple fragility fractures over the last 5 years: Risk factors for osteoporosis Depo-Provera use for 16 years since the age of 22. She was taken off of Depo-Provera in Jan 2019.     She has had workup for other possible secondary causes of osteoporosis including Cushing syndrome, celiac disease, hypercalciuria and primary hyperparathyroidism which was unremarkable.  She was noted to have vitamin D deficiency and she is currently on high-dose vitamin D replacement.  We will going to check a low vitamin D today with other calcium labs including parathyroid hormone which was slightly off when it was checked last time.. We checked follow-up parathyroid hormone after correcting vitamin D which was within the normal limits.  The hypothalamus-pituitary-gonadal axis was also checked to assess her endogenous estrogen production; estradiol level was low at 27.    In summary: Osteoporosis and fragility fracture in this patient seems to be secondary to long-standing Depo-Provera use.  Workup for other secondary causes has been negative so far with the exception of low vitamin D.  From vitamin D standpoint she was treated with  high-dose replacement and currently she is taking 2000 international units daily with calcium.      Longitudinal studies and cross-sectional studies have shown that Depo-Provera use can lower BMD compared to nonusers.  There is data which states about 5.2-5.4% loss after 5 years of use.  Studies have also shown use of Depo-Provera with increased fragility fracture risk.  There is data which is showed that there is recovery of bone mineral density after discontinuation of Depo-Provera.  In some cases the bone mineral density recovery could be substantial and fully reversible; which is encouraging.  There is no data showed that treatment with bisphosphonate or other bone specific treatments improve risk of fracture or gain in BMD in this group of patients.     Given the ongoing fragility fractures patient was started on alendronate 70 mg weekly.  She has been on this medication for the last 5 weeks.  However, patient has gastroesophageal reflux disease which has been exacerbated by this medication.  In addition, for patient with multiple fractures in the setting of osteoporosis injectable medications are preferred pleasant older age group based on the data we have.  Discussed with him today in detail regarding the injectable Bisphosphonate's, denosumab or Forteo.  Decision was made to go with Forteo and this was prescribed.  Discussed risk of this medication including osteosarcoma of the bone.  While she is on this medication she needs the use of contraception's this is a pregnancy C consequently medication not recommended to be used during pregnancy.  See below.    Plan:    Start Forteo; PA would be started.  Once starting Forteo; stop alendronate.    Weight bearing Exercises; walking carefully. Avoid lifting more than 3-5 pounds.  Letter provided to the patient regarding lifting restriction.    Fall prevention     Adequate Calcium and vitamin D intake: for maintenance calcium 1200 mg daily and vitamin D 2000 IU  daily.      Cessation of tobacco use; counseling provided.     Avoidance of excessive alcohol intake.     Follow up scheduled in 3 months.      Needs to be on contraception: From the current bone specific therapy standpoint patient gets contraception to prevent pregnancy.  Discussed in detail with patient and that were agreeable.  IUD cannot be used in this patient due to his stress anomaly per report.  Oral contraceptive prescribed with low-dose of estrogen notes 30 mcg daily.  Patient had a consultation with hematology to assess risk of thrombosis while on oral contraceptive and appreciate input given.  Patient and dad understand; higher risk of blood clots while susanne on oral contraceptives.      Patient Instructions     Weight bearing Exercises; walking.  No lifting.     Fall prevention    Adequate Calcium and vitamin D intake: for maintenance calcium 1200 mg daily and vitamin D 2000 IU daily.      Cessation of tobacco use    Avoidance of excessive alcohol intake.     Start FORTEO injection, once you start the FORTEO please stop the weekly FOSAMAX (ALENDRONATE).     Bone medications are contraindicated during pregnancy; therefore we have prescribed oral contraceptives. Please take it as prescribed.  As we have discussed, oral  Contraceptives increase risk of blood clots.             I will contact the patient with the test results.  Return to clinic in 3 months.    Test and/or medications prescribed today:  Orders Placed This Encounter   Procedures     X-ray lumbar spine 2-3 views     XR Thoracic Spine 2 Views     Vitamin D Deficiency (D3 Only)     Albumin level     Alkaline phosphatase     Bone specific alk phosphatase     Calcium     Phosphorus     Urea nitrogen     Creatinine         Dallas Flores MD  Staff Endocrinologist    [phone]  Division of Endocrinology and Diabetes

## 2019-04-22 NOTE — NURSING NOTE
Mily Grullon's goals for this visit include: Osteoporosis follow up  She requests these members of her care team be copied on today's visit information: Yes    PCP: William Smith    Referring Provider:  No referring provider defined for this encounter.    /90 (BP Location: Left arm, Patient Position: Chair, Cuff Size: Adult Regular)   Pulse 108   Wt 84.4 kg (186 lb)   SpO2 97%   BMI 34.67 kg/m      Do you need any medication refills at today's visit? No

## 2019-04-22 NOTE — LETTER
4/22/2019         RE: Mily Grullon  [address] [facility] [address]        Dear Colleague,    Thank you for referring your patient, Mily Grullon, to the [facility], [place]. Please see a copy of my visit note below.    Endocrinology Clinic Visit    Chief Complaint: Osteoporosis     Information obtained from:Patient here with her dad.    Subjective:         HPI: Mily Grullon is a 38 year old female with history of Osteoporosis and fragility fracture who is here for a follow up.     Patient has history of endometriosis and per report has underwent right oophorectomy at the age of 22.  She was started on Depo-Provera at the  age of 22.  She has been on this medication until she was taken off of it in January of 2019.     She was evaluated for osteoporosis at an outside facility; with endocrinology clinic of [facility];  She had a screening test for celiac disease which was within the normal limits.  She was screened for Cushing's syndrome which was normal.  She was screened for hyper-calciuria again which came back within the normal limits.  Thyroid lab results were within the normal limits.  GFR is within the normal limits and electrolytes including calcium.  He has had elevated PTH level in the setting of low vitamin D level and based on that result she was started on ergocalciferol 50,000 international unit twice weekly and she has been on this medication for the last 6 weeks at least. PTH elevation resolved after correct low vitamin D level.         DEXA scan 2018   FINDINGS:               Lumbar Spine (L1-L4) Z-score:  -1.7, degenerative changes present               Right Femoral Neck Z-score:  -3.1               Forearm (radius 33%) Z-score:  -0.3  The left femur is not acceptable for evaluation due to previous surgical repair                                Lumbar (L1-L4) BMD: 1.078               Previous: 1.054                                                 Right Total Hip BMD:  "0.751                Previous: 0.718                            Forearm (radius 33%) BMD: 0.694     Previous: NA  DEXA scan from 2014:   Femoral BMD 0.531 Z score -3.5     She is not currently on steroids. Multiple fractures over the last five years:  Hip fracture, wrist fracture, multiple ankle fractures, now foot and rib fractures. All of these fractures are fragility fracture without any trauma. She feels just pain. Hip # was following lifting.    She is not currently using any contraceptives.  She met with her gynecologist to discuss other options of contraception.  IUD could not be placed due to uterus anatomy.  Oral contraceptives were not used due to history of blood clot.  We send patient for a consultation to hematology regarding the use of oral contraceptives in the setting of past history of superficial vein  thrombosis.  The recommendation given which is copied below\"  \"Even though it appears to be provoked venous thrombosis, Risk of recurrence is present and would suggest progesterone only contraceptive measures particularly  second-generation progestin such as levonorgestrel.    If estrogen has to be used with understanding of the increased risk of thrombosis, would suggest using less than 50 mcg dose.\"  Appreciate input.     Allergies   Allergen Reactions     Amitriptyline Hives     Amoxicillin Diarrhea     Asa [Dihydroxyaluminum Aminoacetate]      Cipro [Ciprofloxacin]      Dizziness, vomiting, shortness of breath     Ibuprofen [Aspartame-Ibuprofen]      GI distress       Lyrica      extrematies swell up      Morphine      ithcy     Naproxen      GI distress     Neurontin [Gabapentin]      rash     Paxil [Paroxetine] Anaphylaxis     anaphylaxis     Phenergan [Promethazine Hcl]      dystonia     Prilosec [Omeprazole]      Rash, dizziness     Gabapentin Rash       Current Outpatient Medications   Medication Sig Dispense Refill     albuterol (PROAIR HFA/PROVENTIL HFA/VENTOLIN HFA) 108 (90 Base) MCG/ACT " inhaler Inhale 2 puffs into the lungs every 4 hours as needed for other (cough) 1 Inhaler 0     Cholecalciferol (VITAMIN D PO) Take 1,000 Units by mouth       DULoxetine (CYMBALTA) 20 MG capsule Take 40 mg by mouth every evening        DULoxetine (CYMBALTA) 60 MG capsule Take 60 mg by mouth every morning       famotidine (PEPCID) 20 MG tablet Take 1 tablet (20 mg) by mouth daily as needed (heartburn/reflux) 90 tablet 1     HYDROcodone-acetaminophen (NORCO) 5-325 MG tablet Take 0.5-1 tablets by mouth 3 times daily as needed for moderate to severe pain 90 tablet 0     lamoTRIgine (LAMICTAL) 100 MG tablet Take 1 tablet (100 mg) by mouth daily       levonorgestrel-ethinyl estradiol (NORDETTE) 0.15-30 MG-MCG tablet Take 1 tablet by mouth daily 90 tablet 3     methocarbamol (ROBAXIN) 750 MG tablet Take 1 tablet (750 mg) by mouth 3 times daily as needed for muscle spasms 90 tablet 0     nortriptyline (PAMELOR) 50 MG capsule Take 100 mg by mouth At Bedtime       pantoprazole (PROTONIX) 20 MG EC tablet Take by mouth 30-60 minutes before a meal. 90 tablet 1     teriparatide, recombinant, (FORTEO) 600 MCG/2.4ML SOLN injection Inject 0.08 mLs (20 mcg) Subcutaneous daily 7.2 mL 1     vitamin D3 (CHOLECALCIFEROL) 2000 units tablet Take 1 tablet by mouth daily 90 tablet 3     calcium carbonate 600 mg-vitamin D 400 units (CALCIUM 600 + D) 600-400 MG-UNIT per tablet Take 1 tablet by mouth 2 times daily (Patient not taking: Reported on 4/22/2019) 180 tablet 1     Calcium-Phosphorus-Vitamin D 250-100-500 MG-MG-UNIT CHEW Take 2 tablets by mouth daily (Patient not taking: Reported on 4/22/2019) 60 tablet 11     sucralfate (CARAFATE) 1 GM tablet Take 1 tablet (1 g) by mouth 4 times daily (Patient not taking: Reported on 4/22/2019) 60 tablet 1       Review of Systems     8 point review system (Constitutional, HENT, Eyes, Respiratory, Cardiovascular, Gastrointestinal, Genitourinary, Musculoskeletal,Neurological, Psychiatric/Behavioural,  Endocrine) is negative or is as per HPI above and multiple pains involving the back and joints.     Past Medical History:   Diagnosis Date     Endometriosis, site unspecified      Gastritis 2010    dx 2010- sees Dr Glover in [facility]     Osteoporosis      Urinary calculus, unspecified     kidney stones, age 19-20     Wrist injury Nov 19, 2003    Workman's Comp- left wrist- narc agreement on file       Past Surgical History:   Procedure Laterality Date     ARTHROSCOPY HIP, OSTEOPLASTY FEMUR PROXIMAL, COMBINED Left 6/29/2016    Procedure: COMBINED ARTHROSCOPY HIP, OSTEOPLASTY FEMUR PROXIMAL;  Surgeon: Godwin Deleon MD;  Location: [facility] OR     ARTHROSCOPY OF JOINT UNLISTED  4/2004    Left wrist, with debridement and repair of tear.     HC REMOVAL OF OVARY/TUBE(S)  6/2003    right with fallopian tube     HC REPAIR TRIANGULAR CART,WRIST JT  2005    Dr Eloy Sosa     HC REPAIR TRIANGULAR CART,WRIST JT  2007 or so    ulnar excision- Dr Eloy Sosa     HYSTEROSCOPY      laproscopy for endometriosis x2     LITHOTRIPSY         Family History   Problem Relation Age of Onset     Hypertension Father      Lipids Father      Hypertension Maternal Grandmother      Genitourinary Problems Maternal Grandmother         kidney disease     Cancer Paternal Grandmother         leukemia     Asthma No family hx of      C.A.D. No family hx of      Diabetes No family hx of      Cerebrovascular Disease No family hx of      Breast Cancer No family hx of      Cancer - colorectal No family hx of      Prostate Cancer No family hx of      Alzheimer Disease No family hx of      Arthritis No family hx of      Blood Disease No family hx of      Circulatory No family hx of      Eye Disorder No family hx of      Gastrointestinal Disease No family hx of      Musculoskeletal Disorder No family hx of      Neurologic Disorder No family hx of      Respiratory No family hx of      Thyroid Disease No family hx of        Social History      Socioeconomic History     Marital status: Single     Spouse name: None     Number of children: None     Years of education: None     Highest education level: None   Social Needs     Financial resource strain: None     Food insecurity - worry: None     Food insecurity - inability: None     Transportation needs - medical: None     Transportation needs - non-medical: None   Occupational History     None   Tobacco Use     Smoking status: Former Smoker     Types: Cigarettes     Last attempt to quit: 2014     Years since quittin.9     Smokeless tobacco: Never Used   Substance and Sexual Activity     Alcohol use: No     Frequency: Never     Drug use: No     Sexual activity: Not Currently     Birth control/protection: Injection     Comment: depo provera   Other Topics Concern     Parent/sibling w/ CABG, MI or angioplasty before 65F 55M? Not Asked   Social History Narrative    Lives alone in [place]    Works at [employer]    Enjoying spending time with dog and nieces       Objective:   /90 (BP Location: Left arm, Patient Position: Chair, Cuff Size: Adult Regular)   Pulse 108   Wt 84.4 kg (186 lb)   SpO2 97%   BMI 34.67 kg/m     Constitutional: Pleasant no acute cardiopulmonary distress.   EYES: anicteric, normal extra-ocular movements, no lid lag or retraction   HEENT: Mouth/Throat: Mucous membrane is moist. Oropharynx is clear.   Cardiovascular: RRR, S1, S2 normal.   Pulmonary/Chest: CTAB. No wheezing or rales   Abdominal: +BS. Non tender to palpation.   Neurological: Alert and oriented.   Extremities: No edema.   No spine or lumbar spine tenderness.   Psychological: appropriate mood and affect     In House Labs:     TSH   Date Value Ref Range Status   2018 0.68 0.30 - 5.00 uIU/mL Final   2017 1.36 0.40 - 4.00 mU/L Final   12/10/2013 2.37 0.4 - 5.0 mU/L Final   2013 0.70 mcU/mL Final     T4 Free   Date Value Ref Range Status   12/10/2013 1.08 0.70 - 1.85 ng/dL Final        Creatinine   Date Value Ref Range Status   01/22/2019 0.79 0.52 - 1.04 mg/dL Final     24-hour urine for calcium  Sodium to creatinine ratio was 150 reference range   Sodium 24-hour 184 difference for   Creatinine 24-hour was 1.59 (0.5-2.15  Calcium to creatinine ratio was 68 reference range 30- 275  Calcium 24-hour urine was 108 reference range   Creatinine 24-hour 1.59  Total volume 2700  Urine free cortisol 3.2    TSH was 0.68 and free T4 0.74 tissue transglutaminase IgG and IgA was undetectable creatinine within the normal limits BMD in 2014 was 0.718 at the total hip in 2018 was 0.751.  Z score -3.1 at the neck, trochanteric -2.7 and total -2.4    ENDO CALCIUM LABS-UM Latest Ref Rng & Units 4/22/2019   CALCIUM 8.5 - 10.1 mg/dL 9.8   PHOSPHOROUS 2.5 - 4.5 mg/dL 2.8   ALBUMIN 3.4 - 5.0 g/dL 4.3   BUN 7 - 30 mg/dL 12   CREATININE 0.52 - 1.04 mg/dL 0.77   PARATHYROID HORMONE INTACT 18 - 80 pg/mL    ALKPHOS 40 - 150 U/L 147     Assessment/Treatment Plan:      Osteoporosis with current pathological fracture/patient has had multiple fragility fractures over the last 5 years: Risk factors for osteoporosis Depo-Provera use for 16 years since the age of 22. She was taken off of Depo-Provera in Jan 2019.     She has had workup for other possible secondary causes of osteoporosis including Cushing syndrome, celiac disease, hypercalciuria and primary hyperparathyroidism which was unremarkable.  She was noted to have vitamin D deficiency and she is currently on high-dose vitamin D replacement.  We will going to check a low vitamin D today with other calcium labs including parathyroid hormone which was slightly off when it was checked last time.. We checked follow-up parathyroid hormone after correcting vitamin D which was within the normal limits.  The hypothalamus-pituitary-gonadal axis was also checked to assess her endogenous estrogen production; estradiol level was low at 27.    In summary:  Osteoporosis and fragility fracture in this patient seems to be secondary to long-standing Depo-Provera use.  Workup for other secondary causes has been negative so far with the exception of low vitamin D.  From vitamin D standpoint she was treated with high-dose replacement and currently she is taking 2000 international units daily with calcium.      Longitudinal studies and cross-sectional studies have shown that Depo-Provera use can lower BMD compared to nonusers.  There is data which states about 5.2-5.4% loss after 5 years of use.  Studies have also shown use of Depo-Provera with increased fragility fracture risk.  There is data which is showed that there is recovery of bone mineral density after discontinuation of Depo-Provera.  In some cases the bone mineral density recovery could be substantial and fully reversible; which is encouraging.  There is no data showed that treatment with bisphosphonate or other bone specific treatments improve risk of fracture or gain in BMD in this group of patients.     Given the ongoing fragility fractures patient was started on alendronate 70 mg weekly.  She has been on this medication for the last 5 weeks.  However, patient has gastroesophageal reflux disease which has been exacerbated by this medication.  In addition, for patient with multiple fractures in the setting of osteoporosis injectable medications are preferred pleasant older age group based on the data we have.  Discussed with him today in detail regarding the injectable Bisphosphonate's, denosumab or Forteo.  Decision was made to go with Forteo and this was prescribed.  Discussed risk of this medication including osteosarcoma of the bone.  While she is on this medication she needs the use of contraception's this is a pregnancy C consequently medication not recommended to be used during pregnancy.  See below.    Plan:    Start Forteo; PA would be started.  Once starting Forteo; stop alendronate.    Weight bearing  Exercises; walking carefully. Avoid lifting more than 3-5 pounds.  Letter provided to the patient regarding lifting restriction.    Fall prevention     Adequate Calcium and vitamin D intake: for maintenance calcium 1200 mg daily and vitamin D 2000 IU daily.      Cessation of tobacco use; counseling provided.     Avoidance of excessive alcohol intake.     Follow up scheduled in 3 months.      Needs to be on contraception: From the current bone specific therapy standpoint patient gets contraception to prevent pregnancy.  Discussed in detail with patient and that were agreeable.  IUD cannot be used in this patient due to his stress anomaly per report.  Oral contraceptive prescribed with low-dose of estrogen notes 30 mcg daily.  Patient had a consultation with hematology to assess risk of thrombosis while on oral contraceptive and appreciate input given.  Patient and dad understand; higher risk of blood clots while susanne on oral contraceptives.      Patient Instructions     Weight bearing Exercises; walking.  No lifting.     Fall prevention    Adequate Calcium and vitamin D intake: for maintenance calcium 1200 mg daily and vitamin D 2000 IU daily.      Cessation of tobacco use    Avoidance of excessive alcohol intake.     Start FORTEO injection, once you start the FORTEO please stop the weekly FOSAMAX (ALENDRONATE).     Bone medications are contraindicated during pregnancy; therefore we have prescribed oral contraceptives. Please take it as prescribed.  As we have discussed, oral  Contraceptives increase risk of blood clots.             I will contact the patient with the test results.  Return to clinic in 3 months.    Test and/or medications prescribed today:  Orders Placed This Encounter   Procedures     X-ray lumbar spine 2-3 views     XR Thoracic Spine 2 Views     Vitamin D Deficiency (D3 Only)     Albumin level     Alkaline phosphatase     Bone specific alk phosphatase     Calcium     Phosphorus     Urea nitrogen      Creatinine         Dallas Flores MD  Staff Endocrinologist    [phone]  Division of Endocrinology and Diabetes          Again, thank you for allowing me to participate in the care of your patient.        Sincerely,        Dallas Flores MD

## 2019-04-22 NOTE — LETTER
April 24, 2019      Mily Grullon                                                                [address] [facility] [address]          Dear Mily,    Your vitamin D level, calcium and other related labs are all within the normal limits.  Please continue current dose of vitamin D and calcium supplements.  We will proceed with the Forteo as previously discussed.    Component      Latest Ref Rng & Units 4/22/2019   Creatinine      0.52 - 1.04 mg/dL 0.77   GFR Estimate      >60 mL/min/1.73:m2 >90   GFR Estimate If Black      >60 mL/min/1.73:m2 >90   Vitamin D Deficiency screening      20 - 75 ug/L 40   Albumin      3.4 - 5.0 g/dL 4.3   Alkaline Phosphatase      40 - 150 U/L 147   Bone Spec Alk Phosphatase      ug/L 31.1   Calcium      8.5 - 10.1 mg/dL 9.8   Phosphorus      2.5 - 4.5 mg/dL 2.8   Urea Nitrogen      7 - 30 mg/dL 12     Sincerely,       Dallas Flores MD

## 2019-04-23 LAB
ALP BONE SERPL-MCNC: 31.1 UG/L
DEPRECATED CALCIDIOL+CALCIFEROL SERPL-MC: 40 UG/L (ref 20–75)

## 2019-04-24 NOTE — TELEPHONE ENCOUNTER
Contacted patient to review. Patient reports that she received a voicemail stating that the Forteo was covered but she does not know the cost yet. Patient is going to call [facility] to confirm co-pay cost and call clinic back. Will then schedule a time for Forteo teaching.       Will await call back from patient.       Marielle Suarez RN  Endocrine Care Coordinator  [facility]

## 2019-04-24 NOTE — TELEPHONE ENCOUNTER
Prior Authorization Approval    Authorization Effective Date: 3/25/2019  Authorization Expiration Date: 4/23/2021  Medication: Forteo - Approved   Approved Dose/Quantity:  One month at a time   Reference #:     Insurance Company: [employer] - Phone [phone] Fax [phone]  Expected CoPay: $0.00     CoPay Card Available:      Foundation Assistance Needed:    Which Pharmacy is filling the prescription (Not needed for infusion/clinic administered): [facility] - [place], [place] - [address]  Pharmacy Notified:  Yes - rx released  Patient Notified:   Yes - via pharmacy

## 2019-04-24 NOTE — TELEPHONE ENCOUNTER
Pt requesting a call back to discuss medication below. Pt states ins has approved this medication and looking to follow up on next steps.

## 2019-04-26 ENCOUNTER — TRANSFERRED RECORDS (OUTPATIENT)
Dept: HEALTH INFORMATION MANAGEMENT | Facility: CLINIC | Age: 39
End: 2019-04-26

## 2019-04-26 ENCOUNTER — ALLIED HEALTH/NURSE VISIT (OUTPATIENT)
Dept: NURSING | Facility: CLINIC | Age: 39
End: 2019-04-26
Payer: COMMERCIAL

## 2019-04-26 DIAGNOSIS — M81.0 OSTEOPOROSIS OF MULTIPLE SITES: Primary | ICD-10-CM

## 2019-04-26 PROCEDURE — 99207 ZZC NO CHARGE NURSE ONLY: CPT

## 2019-04-26 NOTE — NURSING NOTE
"Mily Grullon comes into clinic today at the request of Dr. Flores Ordering Provider for Forteo injection teaching.    This service provided today was under the supervising provider of the day Dr. Fajardo, who was available if needed.          Patient presented to clinic today for injection training on Forteo.    Reviewed with patient the parts of the Forteo delivery device and pen needle. Instructed patient on delivery technique for the subcutaneous injection. Reviewed that it is to be injected in the abdomen or thigh only.  Demonstrated to the patient how to give injection with Forteo teaching pen. Patient demonstrated back how to give injection. Patient gave injection into practice medication pad. Patient had no difficulty or concern.     Reviewed with patient troubleshooting concerns with pen device.     Reviewed with patient how to store Forteo pen daily. Emphasized the importance of pen always being refrigerated, other than for administration.  Also reviewed with patient travel storage. Reviewed with patient pen needle and pen device disposal. Reviewed with patient each pen device is good for 28 days/injections. Patient understands injections are daily for 24 months.     Also reviewed traveling with Forteo.      Reviewed with patient most common side effects. Patient verbalizes understanding. Patient will ensure she is sitting and able to lay down after injection if necessary. Patient will call with any symptoms, side effects, etc.      Provided patient with \"Forteo starter kit\". Patient also has Internet access and provided her with Forteo web-site. Patient verbalizes understanding.    Patient is scheduled for 7/22/19 follow-up office visit with Dr. Flores.      Patient notes that she took her Fosamax on Wednesday and wanted to know if she needed to wait the full week to start the Forteo.    Patient also notes that her pharmacy needs a new prescription for her birth control stating that she is not taking " the 7 day placebo pills each month.    Advised patient will send to Dr. Flores to review and writer will contact patient with recommendations. Patient verbalizes understanding and agrees to plan.           Marielle Suarez RN  Endocrine Care Coordinator  [facility]

## 2019-04-26 NOTE — Clinical Note
Andrew Flores,Please see nurse note and see bold for patient question and request. Please let me know and I can contact patient. Thank you,Marielle Suarez, RNEndocrine Care CoordinatorUnCameron Regional [facility] Center

## 2019-04-29 NOTE — PROGRESS NOTES
Ok to start FORTEO now. Discussed with the patient.    Called patient to discuss her concerns.   #currently not sexually active.   # doesn't want to get her periods and has also history of endometriosis.     # reproductive hx complicated due to history of DVT, smoking status and over the age of 34.  Plan: Advised to stop OCP which was prescribed last week with forteo (she is not sexually active right now). Looking at risk -benefit; estrogen containing pills - risk outweighs particularly given she is not sexually active. If desire for contraception from endometriosis stand point and avoiding menses; advised follow up with Dr. Agbeh (she saw him in 1/2019 for the same issue) and at that visit there was a consideration of Nexplanon (which addresses both of her concerns).

## 2019-05-03 ENCOUNTER — OFFICE VISIT (OUTPATIENT)
Dept: INTERNAL MEDICINE | Facility: CLINIC | Age: 39
End: 2019-05-03
Payer: COMMERCIAL

## 2019-05-03 VITALS
DIASTOLIC BLOOD PRESSURE: 87 MMHG | RESPIRATION RATE: 16 BRPM | WEIGHT: 186 LBS | HEART RATE: 116 BPM | SYSTOLIC BLOOD PRESSURE: 129 MMHG | BODY MASS INDEX: 34.67 KG/M2 | TEMPERATURE: 98.6 F

## 2019-05-03 DIAGNOSIS — K21.9 GASTROESOPHAGEAL REFLUX DISEASE, ESOPHAGITIS PRESENCE NOT SPECIFIED: ICD-10-CM

## 2019-05-03 DIAGNOSIS — S22.32XD CLOSED FRACTURE OF ONE RIB OF LEFT SIDE WITH ROUTINE HEALING, SUBSEQUENT ENCOUNTER: ICD-10-CM

## 2019-05-03 DIAGNOSIS — M81.0 OSTEOPOROSIS OF MULTIPLE SITES: ICD-10-CM

## 2019-05-03 DIAGNOSIS — G89.29 OTHER CHRONIC PAIN: Primary | ICD-10-CM

## 2019-05-03 PROCEDURE — 99214 OFFICE O/P EST MOD 30 MIN: CPT | Performed by: INTERNAL MEDICINE

## 2019-05-03 RX ORDER — HYDROCODONE BITARTRATE AND ACETAMINOPHEN 5; 325 MG/1; MG/1
.5-1 TABLET ORAL 3 TIMES DAILY PRN
Qty: 70 TABLET | Refills: 0 | Status: SHIPPED | OUTPATIENT
Start: 2019-05-03 | End: 2019-05-29

## 2019-05-03 NOTE — PROGRESS NOTES
SUBJECTIVE:   Mily Grullon is a 38 year old female who presents to clinic today for the following   health issues:      RIB pain  1 month f/u      Mily returns after having recently started Forteo injections which she is doing well with so far.  She denies any intolerances or adverse effects.  She notes mildly painful crepitus in her left posterior ribs at the site of her eighth rib fracture but endorses much improved acute pain.  She has been taking 3 tablets of Norco 5/325 daily.    She underwent EGD which showed no significant abnormalities in plan to continue her acid suppression with Protonix and Pepcid.    She is hopeful she may be able to return to work as soon as she can demonstrate clinically that she will be safer to engage in lifting more than 3 pounds, bending, twisting, etc. without excessive risk of spontaneous or minimally triggered fracture.    1. Closed fracture of one rib of left side with routine healing, subsequent encounter        PMH: Updated and/or reviewed in chart.    ROS:  Constitutional, neuro, endocrine, pulmonary, cardiac, gastrointestinal, genitourinary, musculoskeletal systems are otherwise negative.    OBJECTIVE:                                                    /87   Pulse 116   Temp 98.6  F (37  C) (Tympanic)   Resp 16   Wt 84.4 kg (186 lb)   BMI 34.67 kg/m      GEN: No acute distress  EYES: No conjunctival injection or icterus, EOMI grossly intact  RESP: Unlabored, regular  NEURO: Normal gait, MAEx4, light touch sensation grossly intact  PSYCH: Normal mood and affect  MUSCULOSKELETAL: no significant tenderness to palpation over left posterior ribs, no drop-off appreciated      ASSESSMENT/PLAN:                                                    Closed fracture of one rib of left side with routine healing, subsequent encounter, with chronic narcotic abuse  GERD  Osteoporosis    We agreed to continue to give her more time to heal and for the Forteo to contribute to  her healing before rechecking left rib x-ray which could be considered sooner if worsening symptoms otherwise we discussed again in 1 month.    The bulk of our time today was spent in discussing the relationship between acute pain and acute pain control with opioid therapy.  She had frequent and serial fractures this winter and has grown accustomed to using the Norco for pain control.  She was reluctant to further taper the Norco due to fear of fracture and new pain.  We had a zully discussion about opioid tolerance and dependence and appropriate reduction of exposure to reduce her long-term harm, especially related to addiction and opioid-induced hyperalgesia.  She agreed to reduce her daily use as possible with a goal of 10 mg total daily dose hydrocodone over the next month.  Supplied a small excess this month of 10 pills but appeared motivated to reduce doses to either twice a day or, more realistically, half tablets 3-4 times daily.  We agreed to meet in another month for a check in and to continue her narcotic taper.  - HYDROcodone-acetaminophen (NORCO) 5-325 MG tablet; Take 0.5-1 tablets by mouth 3 times daily as needed for moderate to severe pain  Dispense: 70 tablet; Refill: 0    Orders Placed This Encounter     HYDROcodone-acetaminophen (NORCO) 5-325 MG tablet    William Smith MD

## 2019-05-20 DIAGNOSIS — M76.62 ACHILLES TENDINITIS OF LEFT LOWER EXTREMITY: ICD-10-CM

## 2019-05-20 NOTE — TELEPHONE ENCOUNTER
Spoke with patent and per her she is still on med.  Per her Dr Smith is aware that she is taking med.  Will send to provider to advise.

## 2019-05-20 NOTE — TELEPHONE ENCOUNTER
Tizanidine      Last Written Prescription Date:  04/09/19  Last Fill Quantity: 90,   # refills: 1  Last Office Visit: 05/03/19  MICHELLE Smith  Future Office visit:    Next 5 appointments (look out 90 days)    Jun 04, 2019  1:00 PM CDT  Office Visit with William Smith MD  [facility] [address] [facility] [id number]  [phone]   Jul 22, 2019 11:30 AM CDT  Return Visit with Dallas Flores MD  [facility] [address] [place] [id number] [phone]           Routing refill request to provider for review/approval because:  Drug not active on patient's medication list  Medication is reported/historical

## 2019-05-21 RX ORDER — TIZANIDINE 2 MG/1
2 TABLET ORAL 3 TIMES DAILY PRN
Qty: 90 TABLET | Refills: 1 | Status: SHIPPED | OUTPATIENT
Start: 2019-05-21 | End: 2019-07-17

## 2019-05-29 ENCOUNTER — TELEPHONE (OUTPATIENT)
Dept: INTERNAL MEDICINE | Facility: CLINIC | Age: 39
End: 2019-05-29

## 2019-05-29 DIAGNOSIS — S22.32XD CLOSED FRACTURE OF ONE RIB OF LEFT SIDE WITH ROUTINE HEALING, SUBSEQUENT ENCOUNTER: ICD-10-CM

## 2019-05-29 RX ORDER — HYDROCODONE BITARTRATE AND ACETAMINOPHEN 5; 325 MG/1; MG/1
.5-1 TABLET ORAL 3 TIMES DAILY PRN
Qty: 50 TABLET | Refills: 0 | Status: SHIPPED | OUTPATIENT
Start: 2019-05-29 | End: 2019-05-31

## 2019-05-29 NOTE — TELEPHONE ENCOUNTER
Patient is calling to request refill for RX: HYDROcodone-acetaminophen (NORCO) 5-325 MG tablet. Thank you.

## 2019-05-29 NOTE — TELEPHONE ENCOUNTER
Routing refill request to provider for review/approval because:  Drug not on the [facility] refill protocol   Last script 5/3 for 70 tabs  Last OV 5/3, follow up scheduled 6/4

## 2019-05-31 DIAGNOSIS — S22.32XD CLOSED FRACTURE OF ONE RIB OF LEFT SIDE WITH ROUTINE HEALING, SUBSEQUENT ENCOUNTER: ICD-10-CM

## 2019-05-31 RX ORDER — HYDROCODONE BITARTRATE AND ACETAMINOPHEN 5; 325 MG/1; MG/1
.5-1 TABLET ORAL 3 TIMES DAILY PRN
Qty: 50 TABLET | Refills: 0 | Status: SHIPPED | OUTPATIENT
Start: 2019-05-31 | End: 2019-06-04

## 2019-05-31 NOTE — TELEPHONE ENCOUNTER
Patient is calling for this Rx-it shows a local print on 05/29/19 but it has not been taken to the pharmacy.   Of note this is a day that Shelby was having issues and had to move to a temporary office. Can patient's Rx be re-printed?

## 2019-06-04 ENCOUNTER — OFFICE VISIT (OUTPATIENT)
Dept: INTERNAL MEDICINE | Facility: CLINIC | Age: 39
End: 2019-06-04
Payer: COMMERCIAL

## 2019-06-04 VITALS
WEIGHT: 186 LBS | SYSTOLIC BLOOD PRESSURE: 132 MMHG | RESPIRATION RATE: 16 BRPM | HEART RATE: 101 BPM | DIASTOLIC BLOOD PRESSURE: 88 MMHG | BODY MASS INDEX: 34.67 KG/M2

## 2019-06-04 DIAGNOSIS — M81.0 OSTEOPOROSIS OF MULTIPLE SITES: ICD-10-CM

## 2019-06-04 DIAGNOSIS — S22.32XD CLOSED FRACTURE OF ONE RIB OF LEFT SIDE WITH ROUTINE HEALING, SUBSEQUENT ENCOUNTER: Primary | ICD-10-CM

## 2019-06-04 DIAGNOSIS — Z79.899 CONTROLLED SUBSTANCE AGREEMENT SIGNED: ICD-10-CM

## 2019-06-04 PROCEDURE — 99214 OFFICE O/P EST MOD 30 MIN: CPT | Performed by: INTERNAL MEDICINE

## 2019-06-04 RX ORDER — ESZOPICLONE 1 MG/1
1 TABLET, FILM COATED ORAL
COMMUNITY
End: 2022-05-27

## 2019-06-04 RX ORDER — HYDROCODONE BITARTRATE AND ACETAMINOPHEN 5; 325 MG/1; MG/1
.5-1 TABLET ORAL 3 TIMES DAILY PRN
Qty: 70 TABLET | Refills: 0 | Status: SHIPPED | OUTPATIENT
Start: 2019-06-18 | End: 2019-07-17

## 2019-06-04 NOTE — PROGRESS NOTES
Subjective     Mily Grullon is a 38 year old female who presents to clinic today for the following health issues:    HPI   ED/UC Followup:    Facility:  [facility]  Date of visit: 5/12  Reason for visit: possibly cracked rib  Current Status: same     She reports stable to improving pain.  Has been using 3 tablets daily.  Gets some but less relief from 0.5 tab so usually takes the other half anyway.  Has had improved physical activity.    Tolerating other treatments well.  Notes her psychiatrist has been working to improve her mental health regimen.  She has started on Lunesta 1 mg up to thrice weekly.    Is interested in using a different muscle relaxant as well.    1. Closed fracture of one rib of left side with routine healing, subsequent encounter    2. Controlled substance agreement signed    3. Osteoporosis of multiple sites        PMH: Updated and/or reviewed in chart.    ROS:  Constitutional, neuro, psych, musculoskeletal systems are otherwise negative.    Objective   OBJECTIVE:                                                    /88   Pulse 101   Resp 16   Wt 84.4 kg (186 lb)   BMI 34.67 kg/m      GEN: No acute distress  EYES: No conjunctival injection or icterus, EOMI grossly intact  RESP: Unlabored, regular  NEURO: Normal gait, MAEx4, light touch sensation grossly intact  PSYCH: Normal mood and affect     Assessment & Plan   ASSESSMENT/PLAN:                                                    1. Closed fracture of one rib of left side with routine healing, subsequent encounter  2. Controlled substance agreement signed  3. Osteoporosis of multiple sites  We again discussed ongoing bony fragility and need for tapering of opioid in the absence of clear new source of pain.  We agreed to a 0.5-1 tab/month taper, discussing psychosocial stressors' influence on pain processing.  Prescription date 6/18.  We also discussed the risk of significant CNS depression given multiple potential sedating medications  including the Lunesta.  - HYDROcodone-acetaminophen (NORCO) 5-325 MG tablet; Take 0.5-1 tablets by mouth 3 times daily as needed for moderate to severe pain Supply should last 30 days.  Dispense: 70 tablet; Refill: 0      Orders Placed This Encounter     eszopiclone (LUNESTA) 1 MG tablet     HYDROcodone-acetaminophen (NORCO) 5-325 MG tablet        Return in about 6 weeks (around 7/16/2019) for Follow-up.    William Smith MD

## 2019-06-17 PROBLEM — Z79.01 LONG TERM CURRENT USE OF ANTICOAGULANT THERAPY: Status: ACTIVE | Noted: 2018-09-19

## 2019-07-05 ENCOUNTER — TELEPHONE (OUTPATIENT)
Dept: INTERNAL MEDICINE | Facility: CLINIC | Age: 39
End: 2019-07-05

## 2019-07-05 DIAGNOSIS — S22.32XD CLOSED FRACTURE OF ONE RIB OF LEFT SIDE WITH ROUTINE HEALING, SUBSEQUENT ENCOUNTER: ICD-10-CM

## 2019-07-05 NOTE — TELEPHONE ENCOUNTER
Routing refill request to provider for review/approval because:Last filled   70 tablet 0 6/18/2019     Drug not on the [facility] refill protocol

## 2019-07-05 NOTE — TELEPHONE ENCOUNTER
Patient needs refill on [place]. To be filled at [facility]. Can she get it today?  Please call to notify. Ok to leave a detailed message.

## 2019-07-06 RX ORDER — HYDROCODONE BITARTRATE AND ACETAMINOPHEN 5; 325 MG/1; MG/1
.5-1 TABLET ORAL 3 TIMES DAILY PRN
Qty: 60 TABLET | Refills: 0 | OUTPATIENT
Start: 2019-07-06

## 2019-07-08 NOTE — TELEPHONE ENCOUNTER
Ok to rtw Monday, yes [Lunar] : lunar [L1 - L4] : L1 - L4 [BMD ___ g/cm2] : BMD: [unfilled] g/cm2 [T-Score ___] : T-score: [unfilled] [FreeTextEntry2] : 07/08/2019

## 2019-07-13 NOTE — PROGRESS NOTES
Discharge Note    Progress reporting period is from initial eval to Dec 20, 2018.     Mily failed to return for next follow up visit and current status is unknown.  Please see information below for last relevant information on current status.  Patient seen for Rxs Used: 2 visits.    SUBJECTIVE  Subjective changes noted by patient:  Subjective: Patient returns to therapy after two weeks on her own and reports that the pain in her arm is about the same since last session. Patient notes that sitting and driving increase the tingling sensation down her arm and into her first and second finger. She has been doing her HEP about twice a day and notices a slight increase in pain in her shoulder. .  Current pain level is Current Pain level: 4/10.     Previous pain level was  Initial Pain level: 5/10.   Changes in function:  Yes (See Goal flowsheet attached for changes in current functional level)  Adverse reaction to treatment or activity: None    OBJECTIVE  Changes noted in objective findings: Objective: Flexion/abduction min loss and pain at end range. ER wnl, IR mod loss. Slight improvement in AROM after STM and JM..    ASSESSMENT/PLAN  Diagnosis: Right Shoulder Pain   DIAGP:  The encounter diagnosis was Acute pain of right shoulder.  Updated problem list and treatment plan:       STG/LTGs have been met or progress has been made towards goals:  Yes, please see goal flowsheet for most current information.    Assessment of Progress: current status is unknown.  Last current status:  .  Self Management Plans:  HEP    I have re-evaluated this patient and find that the nature, scope, duration and intensity of the therapy is appropriate for the medical condition of the patient.  Mily continues to require the following intervention to meet STG and LTG's:  HEP.    Recommendations:  Discharge with current home program.  Patient to follow up with MD as needed. Episode to be closed at this time and patient formally discharged  from therapy.    Natalio Andrade D.P.T., CSCS

## 2019-07-17 ENCOUNTER — TRANSFERRED RECORDS (OUTPATIENT)
Dept: HEALTH INFORMATION MANAGEMENT | Facility: CLINIC | Age: 39
End: 2019-07-17

## 2019-07-17 ENCOUNTER — OFFICE VISIT (OUTPATIENT)
Dept: INTERNAL MEDICINE | Facility: CLINIC | Age: 39
End: 2019-07-17
Payer: COMMERCIAL

## 2019-07-17 VITALS
WEIGHT: 186 LBS | HEART RATE: 123 BPM | SYSTOLIC BLOOD PRESSURE: 129 MMHG | DIASTOLIC BLOOD PRESSURE: 89 MMHG | TEMPERATURE: 98 F | BODY MASS INDEX: 34.67 KG/M2 | RESPIRATION RATE: 16 BRPM

## 2019-07-17 DIAGNOSIS — S22.32XD CLOSED FRACTURE OF ONE RIB OF LEFT SIDE WITH ROUTINE HEALING, SUBSEQUENT ENCOUNTER: Primary | ICD-10-CM

## 2019-07-17 DIAGNOSIS — G89.29 OTHER CHRONIC PAIN: ICD-10-CM

## 2019-07-17 DIAGNOSIS — Z11.4 ENCOUNTER FOR SCREENING FOR HIV: ICD-10-CM

## 2019-07-17 DIAGNOSIS — M76.62 ACHILLES TENDINITIS OF LEFT LOWER EXTREMITY: ICD-10-CM

## 2019-07-17 DIAGNOSIS — F41.9 ANXIETY: ICD-10-CM

## 2019-07-17 PROBLEM — N80.9 ENDOMETRIOSIS: Status: ACTIVE | Noted: 2019-07-17

## 2019-07-17 LAB

## 2019-07-17 PROCEDURE — 99214 OFFICE O/P EST MOD 30 MIN: CPT | Performed by: INTERNAL MEDICINE

## 2019-07-17 PROCEDURE — 87389 HIV-1 AG W/HIV-1&-2 AB AG IA: CPT | Performed by: INTERNAL MEDICINE

## 2019-07-17 PROCEDURE — 80306 DRUG TEST PRSMV INSTRMNT: CPT | Performed by: INTERNAL MEDICINE

## 2019-07-17 PROCEDURE — 36415 COLL VENOUS BLD VENIPUNCTURE: CPT | Performed by: INTERNAL MEDICINE

## 2019-07-17 RX ORDER — TIZANIDINE 2 MG/1
2 TABLET ORAL 3 TIMES DAILY PRN
Qty: 90 TABLET | Refills: 1 | Status: SHIPPED | OUTPATIENT
Start: 2019-07-17 | End: 2019-10-02

## 2019-07-17 RX ORDER — HYDROCODONE BITARTRATE AND ACETAMINOPHEN 5; 325 MG/1; MG/1
.5-1 TABLET ORAL 2 TIMES DAILY PRN
Qty: 60 TABLET | Refills: 0 | Status: SHIPPED | OUTPATIENT
Start: 2019-07-17 | End: 2019-08-16

## 2019-07-17 ASSESSMENT — ANXIETY QUESTIONNAIRES
5. BEING SO RESTLESS THAT IT IS HARD TO SIT STILL: SEVERAL DAYS
GAD7 TOTAL SCORE: 17
IF YOU CHECKED OFF ANY PROBLEMS ON THIS QUESTIONNAIRE, HOW DIFFICULT HAVE THESE PROBLEMS MADE IT FOR YOU TO DO YOUR WORK, TAKE CARE OF THINGS AT HOME, OR GET ALONG WITH OTHER PEOPLE: EXTREMELY DIFFICULT
6. BECOMING EASILY ANNOYED OR IRRITABLE: MORE THAN HALF THE DAYS
1. FEELING NERVOUS, ANXIOUS, OR ON EDGE: NEARLY EVERY DAY
2. NOT BEING ABLE TO STOP OR CONTROL WORRYING: NEARLY EVERY DAY
7. FEELING AFRAID AS IF SOMETHING AWFUL MIGHT HAPPEN: NEARLY EVERY DAY
3. WORRYING TOO MUCH ABOUT DIFFERENT THINGS: NEARLY EVERY DAY

## 2019-07-17 ASSESSMENT — PATIENT HEALTH QUESTIONNAIRE - PHQ9
SUM OF ALL RESPONSES TO PHQ QUESTIONS 1-9: 16
5. POOR APPETITE OR OVEREATING: MORE THAN HALF THE DAYS

## 2019-07-17 NOTE — PROGRESS NOTES
Subjective     Mily Grullon is a 38 year old female who presents to clinic today for the following health issues:    HPI   Medication Followup of Norco    Taking Medication as prescribed: yes    Side Effects:  None    Medication Helping Symptoms:  yes     Mily comes in today reporting having fired her [employer] worker because she had called the clinic here to request a refill of her pain medication earlier than patient desired.  Mily reports this call was made without her authorization.    She has follow-up at [facility]'s later this month to discuss anxiety, depression, and insomnia.  She has recently started on Lunesta taking 3 times a week.    She continues to have pain mostly fear of pain.  She is still not lifting more than a couple of pounds and not working.  She is concerned about her permission to return to working just a couple of hours per day on a very limited basis as a beginning to work hardening.  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    She sees endocrinology in follow-up next week.    She has been tolerating taking only 2 occasionally 2-1/2 tablets of Norco daily and expresses understanding at the need for ongoing taper.    1. Closed fracture of one rib of left side with routine healing, subsequent encounter    2. Achilles tendinitis of left lower extremity    3. Encounter for screening for HIV    4. Other chronic pain    5. Anxiety        PMH: Updated and/or reviewed in chart.    ROS:  Constitutional, musculoskeletal systems are otherwise negative.    Objective   OBJECTIVE:                                                    /89   Pulse 123   Temp 98  F (36.7  C) (Tympanic)   Resp 16   Wt 84.4 kg (186 lb)   BMI 34.67 kg/m      GEN: No acute distress, obese woman  EYES: No conjunctival injection or icterus, EOMI grossly intact  RESP: Unlabored,  regular  NEURO: Normal gait, MAEx4, light touch sensation grossly intact  PSYCH: Normal mood and affect       Assessment & Plan   ASSESSMENT/PLAN:                                                    1. Closed fracture of one rib of left side with routine healing, subsequent encounter  2. Achilles tendinitis of left lower extremity  4. Other chronic pain    I would prefer she uses a muscle relaxant preferentially over her opioid.  Avoiding further fractures will be instrumental in minimizing opioid exposure.    We discussed the ongoing plan to reduce her opioid and eventually discontinue.  We agreed that a minimum of 10 tablets of fewer per month for 6 months will result in cessation.  I challenged her to extend each prescription and use the minimum amount she feels necessary, hoping that 60 tablets will last her 6 weeks (less than 2 tablets daily) until her next follow-up.  She seemed motivated to adhere to this plan.  - Drug Abuse Screen Panel 13, Urine (Pain Care Package)  - HYDROcodone-acetaminophen (NORCO) 5-325 MG tablet; Take 0.5-1 tablets by mouth 2 times daily as needed for moderate to severe pain Supply should last 30 days.  Dispense: 60 tablet; Refill: 0      - tiZANidine (ZANAFLEX) 2 MG tablet; Take 1 tablet (2 mg) by mouth 3 times daily as needed for muscle spasms  Dispense: 90 tablet; Refill: 1    3. Encounter for screening for HIV  Low risk  - HIV Antigen Antibody Combo    5. Anxiety  Follows with abdirahman Cloud new [facility] worker.      Orders Placed This Encounter     Drug Abuse Screen Panel 13, Urine (Pain Care Package)     HIV Antigen Antibody Combo     HYDROcodone-acetaminophen (NORCO) 5-325 MG tablet     tiZANidine (ZANAFLEX) 2 MG tablet        Return in about 6 weeks (around 8/28/2019) for Follow-up.    William Smith MD

## 2019-07-18 LAB — HIV 1+2 AB+HIV1 P24 AG SERPL QL IA: NONREACTIVE

## 2019-07-18 ASSESSMENT — ANXIETY QUESTIONNAIRES: GAD7 TOTAL SCORE: 17

## 2019-07-22 ENCOUNTER — OFFICE VISIT (OUTPATIENT)
Dept: ENDOCRINOLOGY | Facility: CLINIC | Age: 39
End: 2019-07-22
Payer: COMMERCIAL

## 2019-07-22 VITALS
OXYGEN SATURATION: 98 % | WEIGHT: 180.9 LBS | SYSTOLIC BLOOD PRESSURE: 135 MMHG | BODY MASS INDEX: 34.15 KG/M2 | HEIGHT: 61 IN | HEART RATE: 103 BPM | DIASTOLIC BLOOD PRESSURE: 89 MMHG

## 2019-07-22 DIAGNOSIS — M80.80XD OTHER OSTEOPOROSIS WITH CURRENT PATHOLOGICAL FRACTURE WITH ROUTINE HEALING, SUBSEQUENT ENCOUNTER: Primary | ICD-10-CM

## 2019-07-22 LAB
ALBUMIN SERPL-MCNC: 4.1 G/DL (ref 3.4–5)
CALCIUM SERPL-MCNC: 9.7 MG/DL (ref 8.5–10.1)

## 2019-07-22 PROCEDURE — 82040 ASSAY OF SERUM ALBUMIN: CPT | Performed by: INTERNAL MEDICINE

## 2019-07-22 PROCEDURE — 82310 ASSAY OF CALCIUM: CPT | Performed by: INTERNAL MEDICINE

## 2019-07-22 PROCEDURE — 36415 COLL VENOUS BLD VENIPUNCTURE: CPT | Performed by: INTERNAL MEDICINE

## 2019-07-22 PROCEDURE — 99214 OFFICE O/P EST MOD 30 MIN: CPT | Performed by: INTERNAL MEDICINE

## 2019-07-22 ASSESSMENT — MIFFLIN-ST. JEOR: SCORE: 1432.68

## 2019-07-22 NOTE — PROGRESS NOTES
Endocrinology Clinic Visit    Chief Complaint: RECHECK     Information obtained from:Patient here with her dad.    Subjective:         HPI: Mily Grullon is a 38 year old female with history of Osteoporosis and fragility fracture who is here for a follow up.     Patient has history of endometriosis and per report has underwent right oophorectomy at the age of 22.  She was started on Depo-Provera at the  age of 22.  She has been on this medication until she was taken off of it in January of 2019.   Osteoporosis and fragility fracture  She was evaluated for osteoporosis at an outside facility; with endocrinology clinic of [place];  She had a screening test for celiac disease which was within the normal limits.  She was screened for Cushing's syndrome which was normal.  She was screened for hyper-calciuria again which came back within the normal limits.  Thyroid lab results were within the normal limits.  GFR is within the normal limits and electrolytes including calcium.  He has had elevated PTH level in the setting of low vitamin D level and based on that result she was started on ergocalciferol 50,000 international unit twice weekly and she has been on this medication for the last 6 weeks at least. PTH elevation resolved after correct low vitamin D level.         DEXA scan 2018   FINDINGS:               Lumbar Spine (L1-L4) Z-score:  -1.7, degenerative changes present               Right Femoral Neck Z-score:  -3.1               Forearm (radius 33%) Z-score:  -0.3  The left femur is not acceptable for evaluation due to previous surgical repair                                Lumbar (L1-L4) BMD: 1.078               Previous: 1.054                                                 Right Total Hip BMD: 0.751                Previous: 0.718                            Forearm (radius 33%) BMD: 0.694     Previous: NA  DEXA scan from 2014:   Femoral BMD 0.531 Z score -3.5     She is not currently on steroids. Multiple  "fractures over the last five years:  Hip fracture, wrist fracture, multiple ankle fractures, now foot and rib fractures. All of these fractures are fragility fracture without any trauma. She feels just pain. Hip # was following lifting.  After discussing risk benefits of each options and discussing treatment options; patient was started on Forteo for the treatment of osteoporosis and fragility fracture; 4/2019.  Patient reports that she does not have any side effects from the Forteo injection.  She was able to manage a daily injection without any problem.    She is not currently using any contraceptives.  She met with her gynecologist to discuss other options of contraception.  IUD could not be placed due to uterus anatomy.  Oral contraceptives were not used due to history of blood clot.  We send patient for a consultation to hematology regarding the use of oral contraceptives in the setting of past history of superficial vein  thrombosis.  The recommendation given which is copied below\"  \"Even though it appears to be provoked venous thrombosis, Risk of recurrence is present and would suggest progesterone only contraceptive measures particularly  second-generation progestin such as levonorgestrel.    If estrogen has to be used with understanding of the increased risk of thrombosis, would suggest using less than 50 mcg dose.\"  Appreciate input.     Have advised follow-up with her gynecologist to discuss on the above issue.  In the meantime she reports that her menses has been regular every month with some spotting between cycles.    Takes calcium supplement and vitamin D supplements as described below.    Allergies   Allergen Reactions     Amitriptyline Hives     Amoxicillin Diarrhea     Asa [Dihydroxyaluminum Aminoacetate]      Cipro [Ciprofloxacin]      Dizziness, vomiting, shortness of breath     Ibuprofen [Aspartame-Ibuprofen]      GI distress       Lyrica      extrematies swell up      Morphine      ithcy     " Naproxen      GI distress     Neurontin [Gabapentin]      rash     Paxil [Paroxetine] Anaphylaxis     anaphylaxis     Phenergan [Promethazine Hcl]      dystonia     Prilosec [Omeprazole]      Rash, dizziness     Gabapentin Rash       Current Outpatient Medications   Medication Sig Dispense Refill     Calcium-Phosphorus-Vitamin D 250-100-500 MG-MG-UNIT CHEW Take 2 tablets by mouth daily 60 tablet 11     Cholecalciferol (VITAMIN D PO) Take 1,000 Units by mouth       DULoxetine (CYMBALTA) 60 MG capsule Take 60 mg by mouth 2 times daily        eszopiclone (LUNESTA) 1 MG tablet Take 1 mg by mouth nightly as needed       famotidine (PEPCID) 20 MG tablet Take 1 tablet (20 mg) by mouth daily as needed (heartburn/reflux) 90 tablet 1     lamoTRIgine (LAMICTAL) 100 MG tablet Take 150 mg by mouth daily        nortriptyline (PAMELOR) 50 MG capsule Take 100 mg by mouth At Bedtime       pantoprazole (PROTONIX) 20 MG EC tablet Take by mouth 30-60 minutes before a meal. 90 tablet 1     teriparatide, recombinant, (FORTEO) 600 MCG/2.4ML SOLN injection Inject 0.08 mLs (20 mcg) Subcutaneous daily 7.2 mL 1     tiZANidine (ZANAFLEX) 2 MG tablet Take 1 tablet (2 mg) by mouth 3 times daily as needed for muscle spasms 90 tablet 1     vitamin D3 (CHOLECALCIFEROL) 2000 units tablet Take 1 tablet by mouth daily 90 tablet 3     albuterol (PROAIR HFA/PROVENTIL HFA/VENTOLIN HFA) 108 (90 Base) MCG/ACT inhaler Inhale 2 puffs into the lungs every 4 hours as needed for other (cough) (Patient not taking: Reported on 6/4/2019) 1 Inhaler 0     HYDROcodone-acetaminophen (NORCO) 5-325 MG tablet Take 0.5-1 tablets by mouth 2 times daily as needed for moderate to severe pain Supply should last 30 days. (Patient not taking: Reported on 7/22/2019) 60 tablet 0       Review of Systems     8 point review system (Constitutional, HENT, Eyes, Respiratory, Cardiovascular, Gastrointestinal, Genitourinary, Musculoskeletal,Neurological, Psychiatric/Behavioural,  Endocrine) is negative or is as per HPI above and multiple pains involving the back and joints.     Past Medical History:   Diagnosis Date     Endometriosis, site unspecified      Gastritis 2010    dx 2010- sees Dr Gl[facility]     Osteoporosis      Urinary calculus, unspecified     kidney stones, age 19-20     Wrist injury Nov 19, 2003    Workman's Comp- left wrist- narc agreement on file       Past Surgical History:   Procedure Laterality Date     ARTHROSCOPY HIP, OSTEOPLASTY FEMUR PROXIMAL, COMBINED Left 6/29/2016    Procedure: COMBINED ARTHROSCOPY HIP, OSTEOPLASTY FEMUR PROXIMAL;  Surgeon: Godwin Deleon MD;  Location: [facility] OR     ARTHROSCOPY OF JOINT UNLISTED  4/2004    Left wrist, with debridement and repair of tear.     HC REMOVAL OF OVARY/TUBE(S)  6/2003    right with fallopian tube     HC REPAIR TRIANGULAR CART,WRIST JT  2005    Dr Eloy Sosa     HC REPAIR TRIANGULAR CART,WRIST JT  2007 or so    ulnar excision- Dr Eloy Sosa     HYSTEROSCOPY      laproscopy for endometriosis x2     LITHOTRIPSY         Family History   Problem Relation Age of Onset     Hypertension Father      Lipids Father      Hypertension Maternal Grandmother      Genitourinary Problems Maternal Grandmother         kidney disease     Cancer Paternal Grandmother         leukemia     Asthma No family hx of      C.A.D. No family hx of      Diabetes No family hx of      Cerebrovascular Disease No family hx of      Breast Cancer No family hx of      Cancer - colorectal No family hx of      Prostate Cancer No family hx of      Alzheimer Disease No family hx of      Arthritis No family hx of      Blood Disease No family hx of      Circulatory No family hx of      Eye Disorder No family hx of      Gastrointestinal Disease No family hx of      Musculoskeletal Disorder No family hx of      Neurologic Disorder No family hx of      Respiratory No family hx of      Thyroid Disease No family hx of        Social History  "    Socioeconomic History     Marital status: Single     Spouse name: None     Number of children: None     Years of education: None     Highest education level: None   Social Needs     Financial resource strain: None     Food insecurity - worry: None     Food insecurity - inability: None     Transportation needs - medical: None     Transportation needs - non-medical: None   Occupational History     None   Tobacco Use     Smoking status: Former Smoker     Types: Cigarettes     Last attempt to quit: 2014     Years since quittin.9     Smokeless tobacco: Never Used   Substance and Sexual Activity     Alcohol use: No     Frequency: Never     Drug use: No     Sexual activity: Not Currently     Birth control/protection: Injection     Comment: depo provera   Other Topics Concern     Parent/sibling w/ CABG, MI or angioplasty before 65F 55M? Not Asked   Social History Narrative    Lives alone in [place]    Works at [employer]    Enjoying spending time with dog and nieces       Objective:   /89 (BP Location: Left arm, Patient Position: Sitting, Cuff Size: Adult Regular)   Pulse 103   Ht 1.549 m (5' 0.98\")   Wt 82.1 kg (180 lb 14.4 oz)   SpO2 98%   BMI 34.20 kg/m    Constitutional: Pleasant no acute cardiopulmonary distress.   Psychological: appropriate mood and affect     In House Labs:     TSH   Date Value Ref Range Status   2018 0.68 0.30 - 5.00 uIU/mL Final   2017 1.36 0.40 - 4.00 mU/L Final   12/10/2013 2.37 0.4 - 5.0 mU/L Final   2013 0.70 mcU/mL Final     T4 Free   Date Value Ref Range Status   12/10/2013 1.08 0.70 - 1.85 ng/dL Final       Creatinine   Date Value Ref Range Status   2019 0.77 0.52 - 1.04 mg/dL Final     24-hour urine for calcium  Sodium to creatinine ratio was 150 reference range   Sodium 24-hour 184 difference for   Creatinine 24-hour was 1.59 (0.5-2.15  Calcium to creatinine ratio was 68 reference range 30- 275  Calcium 24-hour urine was 108 " reference range   Creatinine 24-hour 1.59  Total volume 2700  Urine free cortisol 3.2    TSH was 0.68 and free T4 0.74 tissue transglutaminase IgG and IgA was undetectable creatinine within the normal limits BMD in 2014 was 0.718 at the total hip in 2018 was 0.751.  Z score -3.1 at the neck, trochanteric -2.7 and total -2.4    ENDO CALCIUM LABS-[facility] Latest Ref Rng & Units 4/22/2019   CALCIUM 8.5 - 10.1 mg/dL 9.8   PHOSPHOROUS 2.5 - 4.5 mg/dL 2.8   ALBUMIN 3.4 - 5.0 g/dL 4.3   BUN 7 - 30 mg/dL 12   CREATININE 0.52 - 1.04 mg/dL 0.77   PARATHYROID HORMONE INTACT 18 - 80 pg/mL    ALKPHOS 40 - 150 U/L 147     Assessment/Treatment Plan:      Osteoporosis with current pathological fracture/patient has had multiple fragility fractures over the last 5 years: Risk factors for osteoporosis Depo-Provera use for 16 years since the age of 22. She was taken off of Depo-Provera in Jan 2019.     She has had workup for other possible secondary causes of osteoporosis including Cushing syndrome, celiac disease, hypercalciuria and primary hyperparathyroidism which was unremarkable.  She was noted to have vitamin D deficiency and she is currently on high-dose vitamin D replacement.  We will going to check a low vitamin D today with other calcium labs including parathyroid hormone which was slightly off when it was checked last time.. We checked follow-up parathyroid hormone after correcting vitamin D which was within the normal limits.  The hypothalamus-pituitary-gonadal axis was also checked to assess her endogenous estrogen production; estradiol level was low at 27.    In summary: Osteoporosis and fragility fracture in this patient seems to be secondary to long-standing Depo-Provera use.  Workup for other secondary causes has been negative so far with the exception of low vitamin D.  From vitamin D standpoint she was treated with high-dose replacement and currently she is taking 2000 international units daily with calcium.  Last  vitamin D to check was within normal limits.    Longitudinal studies and cross-sectional studies have shown that Depo-Provera use can lower BMD compared to nonusers.  There is data which states about 5.2-5.4% loss after 5 years of use.  Studies have also shown use of Depo-Provera with increased fragility fracture risk.  There is data which is showed that there is recovery of bone mineral density after discontinuation of Depo-Provera.  In some cases the bone mineral density recovery could be substantial and fully reversible; which is encouraging.  There is no data showed that treatment with bisphosphonate or other bone specific treatments improve risk of fracture or gain in BMD in this group of patients.      Patient was started on Forteo for the treatment of osteoporosis and fragility fracture; 4/2019.  Patient reports that she does not have any side effects from the Forteo injection.  She was able to manage a daily injection without any problem.   Discussed risk of this medication including osteosarcoma of the bone.  While she is on this medication she needs the use of contraception's this is a pregnancy C consequently medication not recommended to be used during pregnancy.  She reports that she is not currently sexually active.  Encouraged to see her gynecologist for discussion of contraception use.  Plan:    Continue Forteo at the same dose.      Weight bearing Exercises; walking carefully. Avoid lifting more than 3-5 pounds.      Physical therapy referral placed.      Fall prevention     Adequate Calcium and vitamin D intake: for maintenance calcium 1200 mg daily and vitamin D 2000 IU daily.      Cessation of tobacco use; counseling provided.     Avoidance of excessive alcohol intake.     Follow up scheduled in 6 months; with DEXA scan.      Calcium level checked today and results were noted which are within the normal limits.     Ref. Range 7/22/2019 12:02   Calcium Latest Ref Range: 8.5 - 10.1 mg/dL 9.7  "  Albumin Latest Ref Range: 3.4 - 5.0 g/dL 4.1     Needs to be on contraception: From the current bone specific therapy standpoint patient gets contraception to prevent pregnancy.  Discussed in detail with patient and that were agreeable.  IUD cannot be used in this patient due to his stress anomaly per report.  Patient has history of blood clots and hematology consultation was obtained and the recommendation was as follows: \"Even though it appears to be provoked venous thrombosis, Risk of recurrence is present and would suggest progesterone only contraceptive measures particularly  second-generation progestin such as levonorgestrel.    If estrogen has to be used with understanding of the increased risk of thrombosis, would suggest using less than 50 mcg dose.\"  Appreciate input.     I have advised to follow-up with her gynecologist regarding the use of contraception.  In the meantime the fact that her menses is regular indicates that she has working hypothalamus-pituitary-gonadal axis/.        Patient Instructions   Blood work today.       I will contact the patient with the test results.  Return to clinic in 6 months.    Test and/or medications prescribed today:  Orders Placed This Encounter   Procedures     Dexa hip/pelvis/spine     Dexa Wrist/Heel     Albumin level     Calcium     Bone specific alk phosphatase     PHYSICAL THERAPY REFERRAL         Dallas Flores MD  Staff Endocrinologist    [phone]  Division of Endocrinology and Diabetes      "

## 2019-07-22 NOTE — LETTER
7/22/2019         RE: Mily Grullon  [address] [facility] [id number]        Dear Colleague,    Thank you for referring your patient, Mily Grullon, to the [facility]. Please see a copy of my visit note below.    Endocrinology Clinic Visit    Chief Complaint: RECHECK     Information obtained from:Patient here with her dad.    Subjective:         HPI: Mily Grullon is a 38 year old female with history of Osteoporosis and fragility fracture who is here for a follow up.     Patient has history of endometriosis and per report has underwent right oophorectomy at the age of 22.  She was started on Depo-Provera at the  age of 22.  She has been on this medication until she was taken off of it in January of 2019.   Osteoporosis and fragility fracture  She was evaluated for osteoporosis at an outside facility; with endocrinology clinic of [place];  She had a screening test for celiac disease which was within the normal limits.  She was screened for Cushing's syndrome which was normal.  She was screened for hyper-calciuria again which came back within the normal limits.  Thyroid lab results were within the normal limits.  GFR is within the normal limits and electrolytes including calcium.  He has had elevated PTH level in the setting of low vitamin D level and based on that result she was started on ergocalciferol 50,000 international unit twice weekly and she has been on this medication for the last 6 weeks at least. PTH elevation resolved after correct low vitamin D level.         DEXA scan 2018   FINDINGS:               Lumbar Spine (L1-L4) Z-score:  -1.7, degenerative changes present               Right Femoral Neck Z-score:  -3.1               Forearm (radius 33%) Z-score:  -0.3  The left femur is not acceptable for evaluation due to previous surgical repair                                Lumbar (L1-L4) BMD: 1.078               Previous: 1.054                                              "    Right Total Hip BMD: 0.751                Previous: 0.718                            Forearm (radius 33%) BMD: 0.694     Previous: NA  DEXA scan from 2014:   Femoral BMD 0.531 Z score -3.5     She is not currently on steroids. Multiple fractures over the last five years:  Hip fracture, wrist fracture, multiple ankle fractures, now foot and rib fractures. All of these fractures are fragility fracture without any trauma. She feels just pain. Hip # was following lifting.  After discussing risk benefits of each options and discussing treatment options; patient was started on Forteo for the treatment of osteoporosis and fragility fracture; 4/2019.  Patient reports that she does not have any side effects from the Forteo injection.  She was able to manage a daily injection without any problem.    She is not currently using any contraceptives.  She met with her gynecologist to discuss other options of contraception.  IUD could not be placed due to uterus anatomy.  Oral contraceptives were not used due to history of blood clot.  We send patient for a consultation to hematology regarding the use of oral contraceptives in the setting of past history of superficial vein  thrombosis.  The recommendation given which is copied below\"  \"Even though it appears to be provoked venous thrombosis, Risk of recurrence is present and would suggest progesterone only contraceptive measures particularly  second-generation progestin such as levonorgestrel.    If estrogen has to be used with understanding of the increased risk of thrombosis, would suggest using less than 50 mcg dose.\"  Appreciate input.     Have advised follow-up with her gynecologist to discuss on the above issue.  In the meantime she reports that her menses has been regular every month with some spotting between cycles.    Takes calcium supplement and vitamin D supplements as described below.    Allergies   Allergen Reactions     Amitriptyline Hives     Amoxicillin " Diarrhea     Asa [Dihydroxyaluminum Aminoacetate]      Cipro [Ciprofloxacin]      Dizziness, vomiting, shortness of breath     Ibuprofen [Aspartame-Ibuprofen]      GI distress       Lyrica      extrematies swell up      Morphine      ithcy     Naproxen      GI distress     Neurontin [Gabapentin]      rash     Paxil [Paroxetine] Anaphylaxis     anaphylaxis     Phenergan [Promethazine Hcl]      dystonia     Prilosec [Omeprazole]      Rash, dizziness     Gabapentin Rash       Current Outpatient Medications   Medication Sig Dispense Refill     Calcium-Phosphorus-Vitamin D 250-100-500 MG-MG-UNIT CHEW Take 2 tablets by mouth daily 60 tablet 11     Cholecalciferol (VITAMIN D PO) Take 1,000 Units by mouth       DULoxetine (CYMBALTA) 60 MG capsule Take 60 mg by mouth 2 times daily        eszopiclone (LUNESTA) 1 MG tablet Take 1 mg by mouth nightly as needed       famotidine (PEPCID) 20 MG tablet Take 1 tablet (20 mg) by mouth daily as needed (heartburn/reflux) 90 tablet 1     lamoTRIgine (LAMICTAL) 100 MG tablet Take 150 mg by mouth daily        nortriptyline (PAMELOR) 50 MG capsule Take 100 mg by mouth At Bedtime       pantoprazole (PROTONIX) 20 MG EC tablet Take by mouth 30-60 minutes before a meal. 90 tablet 1     teriparatide, recombinant, (FORTEO) 600 MCG/2.4ML SOLN injection Inject 0.08 mLs (20 mcg) Subcutaneous daily 7.2 mL 1     tiZANidine (ZANAFLEX) 2 MG tablet Take 1 tablet (2 mg) by mouth 3 times daily as needed for muscle spasms 90 tablet 1     vitamin D3 (CHOLECALCIFEROL) 2000 units tablet Take 1 tablet by mouth daily 90 tablet 3     albuterol (PROAIR HFA/PROVENTIL HFA/VENTOLIN HFA) 108 (90 Base) MCG/ACT inhaler Inhale 2 puffs into the lungs every 4 hours as needed for other (cough) (Patient not taking: Reported on 6/4/2019) 1 Inhaler 0     HYDROcodone-acetaminophen (NORCO) 5-325 MG tablet Take 0.5-1 tablets by mouth 2 times daily as needed for moderate to severe pain Supply should last 30 days. (Patient not  taking: Reported on 7/22/2019) 60 tablet 0       Review of Systems     8 point review system (Constitutional, HENT, Eyes, Respiratory, Cardiovascular, Gastrointestinal, Genitourinary, Musculoskeletal,Neurological, Psychiatric/Behavioural, Endocrine) is negative or is as per HPI above and multiple pains involving the back and joints.     Past Medical History:   Diagnosis Date     Endometriosis, site unspecified      Gastritis 2010    dx 2010- sees Dr Glover in [facility]     Osteoporosis      Urinary calculus, unspecified     kidney stones, age 18-20     Wrist injury Nov 19, 2003    Workman's Comp- left wrist- narc agreement on file       Past Surgical History:   Procedure Laterality Date     ARTHROSCOPY HIP, OSTEOPLASTY FEMUR PROXIMAL, COMBINED Left 6/29/2016    Procedure: COMBINED ARTHROSCOPY HIP, OSTEOPLASTY FEMUR PROXIMAL;  Surgeon: Godwin Deleon MD;  Location: [facility] OR     ARTHROSCOPY OF JOINT UNLISTED  4/2004    Left wrist, with debridement and repair of tear.     HC REMOVAL OF OVARY/TUBE(S)  6/2003    right with fallopian tube     HC REPAIR TRIANGULAR CART,WRIST JT  2005    Dr Eloy Sosa     HC REPAIR TRIANGULAR CART,WRIST JT  2007 or so    ulnar excision- Dr Eloy Sosa     HYSTEROSCOPY      laproscopy for endometriosis x2     LITHOTRIPSY         Family History   Problem Relation Age of Onset     Hypertension Father      Lipids Father      Hypertension Maternal Grandmother      Genitourinary Problems Maternal Grandmother         kidney disease     Cancer Paternal Grandmother         leukemia     Asthma No family hx of      C.A.D. No family hx of      Diabetes No family hx of      Cerebrovascular Disease No family hx of      Breast Cancer No family hx of      Cancer - colorectal No family hx of      Prostate Cancer No family hx of      Alzheimer Disease No family hx of      Arthritis No family hx of      Blood Disease No family hx of      Circulatory No family hx of      Eye Disorder No  "family hx of      Gastrointestinal Disease No family hx of      Musculoskeletal Disorder No family hx of      Neurologic Disorder No family hx of      Respiratory No family hx of      Thyroid Disease No family hx of        Social History     Socioeconomic History     Marital status: Single     Spouse name: None     Number of children: None     Years of education: None     Highest education level: None   Social Needs     Financial resource strain: None     Food insecurity - worry: None     Food insecurity - inability: None     Transportation needs - medical: None     Transportation needs - non-medical: None   Occupational History     None   Tobacco Use     Smoking status: Former Smoker     Types: Cigarettes     Last attempt to quit: 2014     Years since quittin.9     Smokeless tobacco: Never Used   Substance and Sexual Activity     Alcohol use: No     Frequency: Never     Drug use: No     Sexual activity: Not Currently     Birth control/protection: Injection     Comment: depo provera   Other Topics Concern     Parent/sibling w/ CABG, MI or angioplasty before 65F 55M? Not Asked   Social History Narrative    Lives alone in [place]    Works at [employer]    Enjoying spending time with dog and nieces       Objective:   /89 (BP Location: Left arm, Patient Position: Sitting, Cuff Size: Adult Regular)   Pulse 103   Ht 1.549 m (5' 0.98\")   Wt 82.1 kg (180 lb 14.4 oz)   SpO2 98%   BMI 34.20 kg/m     Constitutional: Pleasant no acute cardiopulmonary distress.   Psychological: appropriate mood and affect     In House Labs:     TSH   Date Value Ref Range Status   2018 0.68 0.30 - 5.00 uIU/mL Final   2017 1.36 0.40 - 4.00 mU/L Final   12/10/2013 2.37 0.4 - 5.0 mU/L Final   2013 0.70 mcU/mL Final     T4 Free   Date Value Ref Range Status   12/10/2013 1.08 0.70 - 1.85 ng/dL Final       Creatinine   Date Value Ref Range Status   2019 0.77 0.52 - 1.04 mg/dL Final     24-hour urine for " calcium  Sodium to creatinine ratio was 150 reference range   Sodium 24-hour 184 difference for   Creatinine 24-hour was 1.59 (0.5-2.15  Calcium to creatinine ratio was 68 reference range 30- 275  Calcium 24-hour urine was 108 reference range   Creatinine 24-hour 1.59  Total volume 2700  Urine free cortisol 3.2    TSH was 0.68 and free T4 0.74 tissue transglutaminase IgG and IgA was undetectable creatinine within the normal limits BMD in 2014 was 0.718 at the total hip in 2018 was 0.751.  Z score -3.1 at the neck, trochanteric -2.7 and total -2.4    ENDO CALCIUM LABS-UM Latest Ref Rng & Units 4/22/2019   CALCIUM 8.5 - 10.1 mg/dL 9.8   PHOSPHOROUS 2.5 - 4.5 mg/dL 2.8   ALBUMIN 3.4 - 5.0 g/dL 4.3   BUN 7 - 30 mg/dL 12   CREATININE 0.52 - 1.04 mg/dL 0.77   PARATHYROID HORMONE INTACT 18 - 80 pg/mL    ALKPHOS 40 - 150 U/L 147     Assessment/Treatment Plan:      Osteoporosis with current pathological fracture/patient has had multiple fragility fractures over the last 5 years: Risk factors for osteoporosis Depo-Provera use for 16 years since the age of 22. She was taken off of Depo-Provera in Jan 2019.     She has had workup for other possible secondary causes of osteoporosis including Cushing syndrome, celiac disease, hypercalciuria and primary hyperparathyroidism which was unremarkable.  She was noted to have vitamin D deficiency and she is currently on high-dose vitamin D replacement.  We will going to check a low vitamin D today with other calcium labs including parathyroid hormone which was slightly off when it was checked last time.. We checked follow-up parathyroid hormone after correcting vitamin D which was within the normal limits.  The hypothalamus-pituitary-gonadal axis was also checked to assess her endogenous estrogen production; estradiol level was low at 27.    In summary: Osteoporosis and fragility fracture in this patient seems to be secondary to long-standing Depo-Provera use.  Workup  for other secondary causes has been negative so far with the exception of low vitamin D.  From vitamin D standpoint she was treated with high-dose replacement and currently she is taking 2000 international units daily with calcium.  Last vitamin D to check was within normal limits.    Longitudinal studies and cross-sectional studies have shown that Depo-Provera use can lower BMD compared to nonusers.  There is data which states about 5.2-5.4% loss after 5 years of use.  Studies have also shown use of Depo-Provera with increased fragility fracture risk.  There is data which is showed that there is recovery of bone mineral density after discontinuation of Depo-Provera.  In some cases the bone mineral density recovery could be substantial and fully reversible; which is encouraging.  There is no data showed that treatment with bisphosphonate or other bone specific treatments improve risk of fracture or gain in BMD in this group of patients.      Patient was started on Forteo for the treatment of osteoporosis and fragility fracture; 4/2019.  Patient reports that she does not have any side effects from the Forteo injection.  She was able to manage a daily injection without any problem.   Discussed risk of this medication including osteosarcoma of the bone.  While she is on this medication she needs the use of contraception's this is a pregnancy C consequently medication not recommended to be used during pregnancy.  She reports that she is not currently sexually active.  Encouraged to see her gynecologist for discussion of contraception use.  Plan:    Continue Forteo at the same dose.      Weight bearing Exercises; walking carefully. Avoid lifting more than 3-5 pounds.      Physical therapy referral placed.      Fall prevention     Adequate Calcium and vitamin D intake: for maintenance calcium 1200 mg daily and vitamin D 2000 IU daily.      Cessation of tobacco use; counseling provided.     Avoidance of excessive alcohol  "intake.     Follow up scheduled in 6 months; with DEXA scan.      Calcium level checked today and results were noted which are within the normal limits.     Ref. Range 7/22/2019 12:02   Calcium Latest Ref Range: 8.5 - 10.1 mg/dL 9.7   Albumin Latest Ref Range: 3.4 - 5.0 g/dL 4.1     Needs to be on contraception: From the current bone specific therapy standpoint patient gets contraception to prevent pregnancy.  Discussed in detail with patient and that were agreeable.  IUD cannot be used in this patient due to his stress anomaly per report.  Patient has history of blood clots and hematology consultation was obtained and the recommendation was as follows: \"Even though it appears to be provoked venous thrombosis, Risk of recurrence is present and would suggest progesterone only contraceptive measures particularly  second-generation progestin such as levonorgestrel.    If estrogen has to be used with understanding of the increased risk of thrombosis, would suggest using less than 50 mcg dose.\"  Appreciate input.     I have advised to follow-up with her gynecologist regarding the use of contraception.  In the meantime the fact that her menses is regular indicates that she has working hypothalamus-pituitary-gonadal axis/.        Patient Instructions   Blood work today.       I will contact the patient with the test results.  Return to clinic in 6 months.    Test and/or medications prescribed today:  Orders Placed This Encounter   Procedures     Dexa hip/pelvis/spine     Dexa Wrist/Heel     Albumin level     Calcium     Bone specific alk phosphatase     PHYSICAL THERAPY REFERRAL         Dallas Flores MD  Staff Endocrinologist    [phone]  Division of Endocrinology and Diabetes        Again, thank you for allowing me to participate in the care of your patient.        Sincerely,        Dallas Flores MD    "

## 2019-07-22 NOTE — NURSING NOTE
"Mily Grullon's goals for this visit include: follow up osteoporosis  She requests these members of her care team be copied on today's visit information: Yes    PCP: William Smith    Referring Provider:  No referring provider defined for this encounter.    /89 (BP Location: Left arm, Patient Position: Sitting, Cuff Size: Adult Regular)   Pulse 103   Ht 1.549 m (5' 0.98\")   Wt 82.1 kg (180 lb 14.4 oz)   SpO2 98%   BMI 34.20 kg/m      Do you need any medication refills at today's visit? Yes-Forteo    "

## 2019-07-26 ENCOUNTER — TELEPHONE (OUTPATIENT)
Dept: ENDOCRINOLOGY | Facility: CLINIC | Age: 39
End: 2019-07-26

## 2019-07-26 NOTE — TELEPHONE ENCOUNTER
----- Message from Dallas Flores MD sent at 7/25/2019  4:22 PM CDT -----  Please call and inform patient that the calcium level is within the normal limits.

## 2019-08-13 ENCOUNTER — THERAPY VISIT (OUTPATIENT)
Dept: PHYSICAL THERAPY | Facility: CLINIC | Age: 39
End: 2019-08-13
Attending: INTERNAL MEDICINE
Payer: COMMERCIAL

## 2019-08-13 ENCOUNTER — TELEPHONE (OUTPATIENT)
Dept: ENDOCRINOLOGY | Facility: CLINIC | Age: 39
End: 2019-08-13

## 2019-08-13 DIAGNOSIS — M80.80XD OTHER OSTEOPOROSIS WITH CURRENT PATHOLOGICAL FRACTURE WITH ROUTINE HEALING, SUBSEQUENT ENCOUNTER: ICD-10-CM

## 2019-08-13 DIAGNOSIS — M54.6 BILATERAL THORACIC BACK PAIN: ICD-10-CM

## 2019-08-13 DIAGNOSIS — M81.0 OSTEOPOROSIS: Primary | ICD-10-CM

## 2019-08-13 PROCEDURE — 97162 PT EVAL MOD COMPLEX 30 MIN: CPT | Mod: GP | Performed by: PHYSICAL THERAPIST

## 2019-08-13 PROCEDURE — 97110 THERAPEUTIC EXERCISES: CPT | Mod: GP | Performed by: PHYSICAL THERAPIST

## 2019-08-13 NOTE — PROGRESS NOTES
[facility] Initial Evaluation  Subjective:  The history is provided by the patient. No  was used.   Mily Grullon being seen for thoracic pain/osteoporosis.   Problem occurred: insidious  and reported as 4/10 (at worst 7-8/10) on pain scale. General health as reported by patient is poor. Pertinent medical history includes:  Depression, history of fractures, osteoporosis, overweight and sleep disorder/apnea.  Medical allergies: see epic(meds)  Surgeries include:  Orthopedic surgery (L hip 2x and L wrist 2x).  Current medications:  Anti-depressants, bone density, muscle relaxants, sleep medication and pain medication.   Primary job tasks include:  Repetitive tasks.  Pain is described as sharp and aching and is intermittent. Pain is worse during the day. Since onset symptoms are gradually improving.      Patient is [employer]-currently not working since Dec. Work activity restrictions: advised not to work at this time by MD.    Barriers include:  Lives alone (1 step into house).  Red flags:  None as reported by patient.  Type of problem:  Thoracic (also has severe osteoporosis)   Condition occurred with:  Other reason. This is a chronic condition   Problem details: Date of MD order for this episode was 7-22-19. Taryn has a long history of fractures and this has increased over the past 5 years, with the most recent year being the worst with 9 fractures in 6 months(ribs and L foot). Taryn was dx with osteoporosis in about 2015, she did not have treatment specifically for osteoporosis at that time. She saw an endocrinologist earlier this winter and started on Forteo April 2019 for treatment.  Taryn is here today for pain relief (thoracic)and safe exercises she can do.  Restrictions are no lifting>3-5 lbs(and then only intermittently), no twisting/bending.   Taryn states she has mid back and states she feels like she wants to crack it all the time, she knows she is not to do  this.  States she walks for exercise, 1-3x/day for 15 min, but recently has not as she lost her dog 2 weeks ago..   Patient reports pain:  Thoracic spine left and thoracic spine right. Radiates to: can radiate to shoulder blades. Associated symptoms:  Loss of strength and loss of motion. Symptoms are exacerbated by sitting, standing, walking and certain positions and relieved by ice, heat and analgesics.                      Objective:  Standing Alignment:    Cervical/Thoracic:  Forward head  Shoulder/UE:  Rounded shoulders            General deviations alignment: slouched sitting and standing.  Gait:  With poor posture  Gait Type:  Normal         Flexibility/Screens:     Upper Extremity:    Decreased left upper extremity flexibility at:  Pectoralis Major    Decreased right upper extremity flexibility present at:  Pectoralis Major    Spine:  Decreased left spine flexibility:  Upper Trap and Levator    Decreased right spine flexibility:  Upper Trap and Levator             Lumbar/SI Evaluation  ROM:    AROM Lumbar:   Flexion:            Not tested  Ext:                    75%   Side Bend:        Left:  75%    Right:  50%  Rotation:           Left:  Not tested    Right:  Not tested  Side Glide:        Left:     Right:         Strength: R shoulder IR/ER 4/5, L shoulder ER 4/5, B other u/e strength WFL, fair to poor ability to activate TrA and poor breathing patterns  Lumbar Myotomes:    T12-L3 (Hip Flex):  Left: 5    Right: 5  L2-4 (Quads):  Left:  5    Right:  5  L4 (Ankle DF):  Left:  5    Right:  5  L5 (Great Toe Ext): Left: 3+    Right: 5     Lumbar DTR's:  not assessed        Lumbar Dermtomes:  normal                               Cervical/Thoracic Evaluation            Cervical Dermatomes:  normal                                                                      General     ROS    Assessment/Plan:    Patient is a 38 year old female with thoracic complaints.    Patient has the following significant findings with  corresponding treatment plan.                Diagnosis 1:  Thoracic pain/osteoporosis  Pain -  hot/cold therapy, self management, education and home program  Decreased ROM/flexibility - therapeutic exercise and home program  Decreased strength - therapeutic exercise, therapeutic activities and home program  Impaired balance(yet to assess) - neuro re-education, therapeutic activities and home program  Decreased proprioception - neuro re-education, therapeutic activities and home program  Impaired muscle performance - neuro re-education and home program  Decreased function - therapeutic activities and home program  Impaired posture - neuro re-education, therapeutic activities and home program  Body mechanics instruction and education related to osteoporosis    Therapy Evaluation Codes:   1) History comprised of:   Personal factors that impact the plan of care:      Age, Past/current experiences and Time since onset of symptoms.    Comorbidity factors that impact the plan of care are:      Depression, Overweight, Sleep disorder/apnea and hx of fractures, osteoporosis.     Medications impacting care: Anti-depressant, Bone density, Muscle relaxant, Pain and Sleep.  2) Examination of Body Systems comprised of:   Body structures and functions that impact the plan of care:      Thoracic Spine.   Activity limitations that impact the plan of care are:      Bathing, Bending, Cooking, Driving, Dressing, Lifting, Sitting, Squatting/kneeling, Stairs, Standing, Walking, Working, Sleeping and Laying down.  3) Clinical presentation characteristics are:   Evolving/changing  4) Decision-Making    Low complexity using standardized patient assessment instrument and/or measureable assessment of functional outcome.  Cumulative Therapy Evaluation is: Moderate complexity.    Previous and current functional limitations:  (See Goal Flow Sheet for this information)    Short term and Long term goals: (See Goal Flow Sheet for this information)      Communication ability:  Patient appears to be able to clearly communicate and understand verbal and written communication and follow directions correctly.  Treatment Explanation - The following has been discussed with the patient:   RX ordered/plan of care  Anticipated outcomes  Possible risks and side effects  This patient would benefit from PT intervention to resume normal activities.   Rehab potential is good.    Frequency:  1 X week, once daily  Duration:  for 6 weeks  Discharge Plan:  Achieve all LTG.  Independent in home treatment program.  Reach maximal therapeutic benefit.    Please refer to the daily flowsheet for treatment today, total treatment time and time spent performing 1:1 timed codes.

## 2019-08-13 NOTE — TELEPHONE ENCOUNTER
[facility] Call Center    Phone Message    May a detailed message be left on voicemail: yes    Reason for Call: Other: Patient calling with questions for provider. She states she started physical therapy and PT wondering if patient needs to be put in a brace for her back, and patient states she added more rules to her care plan. Please advise     Action Taken: Message routed to:  Adult Clinics: Endocrinology p [phone]

## 2019-08-14 NOTE — TELEPHONE ENCOUNTER
Detailed message left on patient cell phone vociemail. Encouraged to return clinic call with any questions or concerns.    Kristina Sheppard LPN  Diabetes Clinic Coordinator   Adult Endocrinology and Pediatric Specialty Clinics  [facility]

## 2019-08-16 ENCOUNTER — TELEPHONE (OUTPATIENT)
Dept: INTERNAL MEDICINE | Facility: CLINIC | Age: 39
End: 2019-08-16

## 2019-08-16 NOTE — TELEPHONE ENCOUNTER
Reason for Call:  Medication or medication refill:    Do you use a [facility]?  Name of the pharmacy and phone number for the current request:  [facility] [phone]    Name of the medication requested:  hydrocodone    Other request: patient stated she has called for 4 days now would like a call back regarding this     Can we leave a detailed message on this number? YES    Phone number patient can be reached at: Other phone number:  [phone]    Best Time:     Call taken on 8/16/2019 at 10:01 AM by Fartun Riley

## 2019-08-19 NOTE — TELEPHONE ENCOUNTER
Reason for Call:  Other     Detailed comments: patient is going to  the script at the      Phone Number Patient can be reached at:  [phone]     Best Time:     Can we leave a detailed message on this number? YES    Call taken on 8/19/2019 at 8:18 AM by Fartun Riley

## 2019-08-21 ENCOUNTER — THERAPY VISIT (OUTPATIENT)
Dept: PHYSICAL THERAPY | Facility: CLINIC | Age: 39
End: 2019-08-21
Payer: COMMERCIAL

## 2019-08-21 DIAGNOSIS — M54.6 BILATERAL THORACIC BACK PAIN: ICD-10-CM

## 2019-08-21 DIAGNOSIS — M81.0 OSTEOPOROSIS: ICD-10-CM

## 2019-08-21 PROCEDURE — 97110 THERAPEUTIC EXERCISES: CPT | Mod: GP | Performed by: PHYSICAL THERAPIST

## 2019-08-21 PROCEDURE — 97112 NEUROMUSCULAR REEDUCATION: CPT | Mod: GP | Performed by: PHYSICAL THERAPIST

## 2019-08-30 ENCOUNTER — OFFICE VISIT (OUTPATIENT)
Dept: URGENT CARE | Facility: URGENT CARE | Age: 39
End: 2019-08-30
Payer: COMMERCIAL

## 2019-08-30 ENCOUNTER — ANCILLARY PROCEDURE (OUTPATIENT)
Dept: GENERAL RADIOLOGY | Facility: CLINIC | Age: 39
End: 2019-08-30
Attending: FAMILY MEDICINE
Payer: COMMERCIAL

## 2019-08-30 ENCOUNTER — ANCILLARY PROCEDURE (OUTPATIENT)
Dept: GENERAL RADIOLOGY | Facility: CLINIC | Age: 39
End: 2019-08-30
Attending: INTERNAL MEDICINE
Payer: COMMERCIAL

## 2019-08-30 ENCOUNTER — OFFICE VISIT (OUTPATIENT)
Dept: INTERNAL MEDICINE | Facility: CLINIC | Age: 39
End: 2019-08-30
Payer: COMMERCIAL

## 2019-08-30 VITALS
BODY MASS INDEX: 35.26 KG/M2 | TEMPERATURE: 98.1 F | RESPIRATION RATE: 18 BRPM | HEART RATE: 79 BPM | WEIGHT: 186 LBS | SYSTOLIC BLOOD PRESSURE: 159 MMHG | OXYGEN SATURATION: 97 % | DIASTOLIC BLOOD PRESSURE: 89 MMHG

## 2019-08-30 VITALS
OXYGEN SATURATION: 93 % | BODY MASS INDEX: 34.93 KG/M2 | DIASTOLIC BLOOD PRESSURE: 66 MMHG | TEMPERATURE: 97.8 F | HEART RATE: 76 BPM | SYSTOLIC BLOOD PRESSURE: 120 MMHG | HEIGHT: 61 IN | RESPIRATION RATE: 16 BRPM | WEIGHT: 185 LBS

## 2019-08-30 DIAGNOSIS — S99.911A INJURY OF RIGHT ANKLE, INITIAL ENCOUNTER: Primary | ICD-10-CM

## 2019-08-30 DIAGNOSIS — S99.911A INJURY OF RIGHT ANKLE, INITIAL ENCOUNTER: ICD-10-CM

## 2019-08-30 DIAGNOSIS — R07.89 LEFT-SIDED CHEST WALL PAIN: ICD-10-CM

## 2019-08-30 DIAGNOSIS — S22.32XD CLOSED FRACTURE OF ONE RIB OF LEFT SIDE WITH ROUTINE HEALING, SUBSEQUENT ENCOUNTER: ICD-10-CM

## 2019-08-30 DIAGNOSIS — S22.32XD CLOSED FRACTURE OF ONE RIB OF LEFT SIDE WITH ROUTINE HEALING, SUBSEQUENT ENCOUNTER: Primary | ICD-10-CM

## 2019-08-30 PROCEDURE — 99213 OFFICE O/P EST LOW 20 MIN: CPT | Performed by: FAMILY MEDICINE

## 2019-08-30 PROCEDURE — 71101 X-RAY EXAM UNILAT RIBS/CHEST: CPT | Mod: LT

## 2019-08-30 PROCEDURE — 73610 X-RAY EXAM OF ANKLE: CPT | Mod: RT

## 2019-08-30 PROCEDURE — 99215 OFFICE O/P EST HI 40 MIN: CPT | Performed by: INTERNAL MEDICINE

## 2019-08-30 RX ORDER — ALPRAZOLAM 0.5 MG
0.5 TABLET ORAL DAILY
COMMUNITY
End: 2022-05-27

## 2019-08-30 RX ORDER — HYDROCODONE BITARTRATE AND ACETAMINOPHEN 5; 325 MG/1; MG/1
.5-1 TABLET ORAL 2 TIMES DAILY PRN
Qty: 50 TABLET | Refills: 0 | Status: SHIPPED | OUTPATIENT
Start: 2019-08-30 | End: 2019-09-10

## 2019-08-30 RX ORDER — PROPRANOLOL HYDROCHLORIDE 10 MG/1
10 TABLET ORAL 3 TIMES DAILY
COMMUNITY
End: 2021-06-01

## 2019-08-30 ASSESSMENT — ANXIETY QUESTIONNAIRES
5. BEING SO RESTLESS THAT IT IS HARD TO SIT STILL: MORE THAN HALF THE DAYS
GAD7 TOTAL SCORE: 16
3. WORRYING TOO MUCH ABOUT DIFFERENT THINGS: MORE THAN HALF THE DAYS
2. NOT BEING ABLE TO STOP OR CONTROL WORRYING: NEARLY EVERY DAY
7. FEELING AFRAID AS IF SOMETHING AWFUL MIGHT HAPPEN: MORE THAN HALF THE DAYS
1. FEELING NERVOUS, ANXIOUS, OR ON EDGE: NEARLY EVERY DAY
IF YOU CHECKED OFF ANY PROBLEMS ON THIS QUESTIONNAIRE, HOW DIFFICULT HAVE THESE PROBLEMS MADE IT FOR YOU TO DO YOUR WORK, TAKE CARE OF THINGS AT HOME, OR GET ALONG WITH OTHER PEOPLE: SOMEWHAT DIFFICULT
6. BECOMING EASILY ANNOYED OR IRRITABLE: MORE THAN HALF THE DAYS

## 2019-08-30 ASSESSMENT — MIFFLIN-ST. JEOR: SCORE: 1449.94

## 2019-08-30 ASSESSMENT — PATIENT HEALTH QUESTIONNAIRE - PHQ9
SUM OF ALL RESPONSES TO PHQ QUESTIONS 1-9: 22
5. POOR APPETITE OR OVEREATING: MORE THAN HALF THE DAYS

## 2019-08-30 NOTE — PROGRESS NOTES
Subjective     Mily Grullon is a 38 year old female who presents to clinic today for the following health issues:    HPI   Musculoskeletal problem/pain      Duration:  Today     Description  Location: right ankle     Intensity:  moderate    Accompanying signs and symptoms: none    History  Previous similar problem: history of osteoporosis    Previous evaluation:  none    Precipitating or alleviating factors:  Trauma or overuse: YES, fell in a trench a couple hours ago, hurt right ankle  Aggravating factors include: none    Therapies tried and outcome: rest/inactivity, ice and Ibuprofen      Patient Active Problem List   Diagnosis     Personal history of nicotine dependence     CARDIOVASCULAR SCREENING; LDL GOAL LESS THAN 160     Gastritis     Osteoporosis of multiple sites     Closed nondisplaced intertrochanteric fracture of left femur (H)     Migraine without aura and without status migrainosus, not intractable     Pain in joint, ankle and foot, left     Closed nondisplaced fracture of body of left calcaneus with delayed healing, subsequent encounter     Heel pain, chronic, left     Gastroesophageal reflux disease, esophagitis presence not specified     Long-term (current) use of anticoagulants [Z79.01]     Superficial phlebitis     Obesity (BMI 35.0-39.9) with comorbidity (H)     Other chronic pain     Controlled substance agreement signed     Low serum cortisol level (H)     Anxiety     Endometriosis     Eosinophilic gastroenteritis     Chronic wrist pain     Bilateral thoracic back pain     Osteoporosis     Past Surgical History:   Procedure Laterality Date     ARTHROSCOPY HIP, OSTEOPLASTY FEMUR PROXIMAL, COMBINED Left 6/29/2016    Procedure: COMBINED ARTHROSCOPY HIP, OSTEOPLASTY FEMUR PROXIMAL;  Surgeon: Godwin Deleon MD;  Location: [facility]     ARTHROSCOPY OF JOINT UNLISTED  4/2004    Left wrist, with debridement and repair of tear.     HC REMOVAL OF OVARY/TUBE(S)  6/2003    right with fallopian tube      HC REPAIR TRIANGULAR CART,WRIST JT  2005    Dr Eloy Sosa     HC REPAIR TRIANGULAR CART,WRIST JT  2007 or so    ulnar excision- Dr Eloy Sosa     HYSTEROSCOPY      laproscopy for endometriosis x2     LITHOTRIPSY         Social History     Tobacco Use     Smoking status: Former Smoker     Types: Cigarettes     Last attempt to quit: 2014     Years since quittin.5     Smokeless tobacco: Never Used     Tobacco comment: Ecig   Substance Use Topics     Alcohol use: No     Frequency: Never     Family History   Problem Relation Age of Onset     Hypertension Father      Lipids Father      Hypertension Maternal Grandmother      Genitourinary Problems Maternal Grandmother         kidney disease     Cancer Paternal Grandmother         leukemia     Asthma No family hx of      C.A.D. No family hx of      Diabetes No family hx of      Cerebrovascular Disease No family hx of      Breast Cancer No family hx of      Cancer - colorectal No family hx of      Prostate Cancer No family hx of      Alzheimer Disease No family hx of      Arthritis No family hx of      Blood Disease No family hx of      Circulatory No family hx of      Eye Disorder No family hx of      Gastrointestinal Disease No family hx of      Musculoskeletal Disorder No family hx of      Neurologic Disorder No family hx of      Respiratory No family hx of      Thyroid Disease No family hx of          Current Outpatient Medications   Medication Sig Dispense Refill     albuterol (PROAIR HFA/PROVENTIL HFA/VENTOLIN HFA) 108 (90 Base) MCG/ACT inhaler Inhale 2 puffs into the lungs every 4 hours as needed for other (cough) 1 Inhaler 0     ALPRAZolam (XANAX) 0.5 MG tablet Take 0.5 mg by mouth daily       Calcium-Phosphorus-Vitamin D 250-100-500 MG-MG-UNIT CHEW Take 2 tablets by mouth daily 60 tablet 11     Cholecalciferol (VITAMIN D PO) Take 1,000 Units by mouth       DULoxetine (CYMBALTA) 60 MG capsule Take 60 mg by mouth 2 times daily        eszopiclone  (LUNESTA) 1 MG tablet Take 1 mg by mouth nightly as needed       famotidine (PEPCID) 20 MG tablet Take 1 tablet (20 mg) by mouth daily as needed (heartburn/reflux) 90 tablet 1     HYDROcodone-acetaminophen (NORCO) 5-325 MG tablet Take 0.5-1 tablets by mouth 2 times daily as needed for moderate to severe pain Supply should last 30 days. 50 tablet 0     lamoTRIgine (LAMICTAL) 100 MG tablet Take 150 mg by mouth daily        nortriptyline (PAMELOR) 50 MG capsule Take 100 mg by mouth At Bedtime       pantoprazole (PROTONIX) 20 MG EC tablet Take by mouth 30-60 minutes before a meal. 90 tablet 1     propranolol (INDERAL) 10 MG tablet Take 10 mg by mouth 3 times daily       teriparatide, recombinant, (FORTEO) 600 MCG/2.4ML SOLN injection Inject 0.08 mLs (20 mcg) Subcutaneous daily 7.2 mL 1     tiZANidine (ZANAFLEX) 2 MG tablet Take 1 tablet (2 mg) by mouth 3 times daily as needed for muscle spasms 90 tablet 1     vitamin D3 (CHOLECALCIFEROL) 2000 units tablet Take 1 tablet by mouth daily 90 tablet 3     Allergies   Allergen Reactions     Amitriptyline Hives     Amoxicillin Diarrhea     Asa [Dihydroxyaluminum Aminoacetate]      Cipro [Ciprofloxacin]      Dizziness, vomiting, shortness of breath     Ibuprofen [Aspartame-Ibuprofen]      GI distress       Lyrica      extrematies swell up      Morphine      ithcy     Naproxen      GI distress     Neurontin [Gabapentin]      rash     Paxil [Paroxetine] Anaphylaxis     anaphylaxis     Phenergan [Promethazine Hcl]      dystonia     Prilosec [Omeprazole]      Rash, dizziness     Gabapentin Rash     Recent Labs   Lab Test 04/22/19  1214 01/22/19  0923 11/23/18 08/09/18  0801 09/11/17  1433 12/10/13  1209  07/16/13 01/07/13   LDL  --   --   --   --   --   --   --   --  133   HDL  --   --   --   --   --   --   --   --  33   TRIG  --   --   --   --   --   --   --   --  144   ALT  --   --   --  30  --  26  --  12  --    CR 0.77 0.79 0.89 0.90 1.03 0.73   < >  --   --    GFRESTIMATED >90  >90 82 70 60* >90   < >  --   --    GFRESTBLACK >90 >90 95 84 73 >90   < >  --   --    POTASSIUM  --   --  3.6 4.0 4.0 3.5   < >  --   --    TSH  --   --  0.68  --  1.36 2.37  --   --  0.70    < > = values in this interval not displayed.      BP Readings from Last 3 Encounters:   08/30/19 (!) 159/89   08/30/19 120/66   07/22/19 135/89    Wt Readings from Last 3 Encounters:   08/30/19 84.4 kg (186 lb)   08/30/19 83.9 kg (185 lb)   07/22/19 82.1 kg (180 lb 14.4 oz)                  Reviewed and updated as needed this visit by Provider         Review of Systems   ROS COMP: Constitutional, HEENT, cardiovascular, pulmonary, gi and gu systems are negative, except as otherwise noted.      Objective    BP (!) 159/89   Pulse 79   Temp 98.1  F (36.7  C) (Oral)   Resp 18   Wt 84.4 kg (186 lb)   SpO2 97%   BMI 35.26 kg/m    Body mass index is 35.26 kg/m .  Physical Exam   GENERAL: alert and no distress  NECK: no adenopathy, no asymmetry, masses, or scars and thyroid normal to palpation  RESP: lungs clear to auscultation - no rales, rhonchi or wheezes  CV: regular rates and rhythm, normal S1 S2, no S3 or S4 and no murmur, click or rub  ABDOMEN: soft, nontender  MS: right ankle moderately tender laterally with some swelling, able to weight-bear although with some difficulty, Álvarez sign negative, no pedal edema, pedal pulses 3+, sensation to touch and pressure intact  SKIN: no suspicious lesions or rashes        XR RIGHT ANKLE THREE OR MORE VIEWS   8/30/2019 7:28 PM      HISTORY: Injury of right ankle, initial encounter.     COMPARISON: Right ankle injury.                                                                      IMPRESSION: There is a 0.6 cm ossicle adjacent to the tip of the  lateral malleolus that could represent a fracture of uncertain age  either acute or chronic. There is little soft tissue swelling here so  I suspect this is chronic. The mortise is intact. No other  Abnormalities.      Assessment & Plan      (Z45.748U) Injury of right ankle, initial encounter  (primary encounter diagnosis)  Comment: Suspect symptoms secondary to right ankle sprain.  X-ray findings explained.  Suggested rest, icing, compression, elevation and over-the-counter analgesia.  CAM Walker boot provided and suggested to follow-up with orthopedic if symptoms persist or worsen.  Patient understood and in agreement with above plan.  All questions answered.  Plan: XR Ankle Right G/E 3 Views        Tre Chatterjee MD  [facility]

## 2019-08-30 NOTE — PROGRESS NOTES
"Subjective     Mily Grullon is a 38 year old female who presents to clinic today for the following health issues:    HPI   Rib fx      Duration: last week    Description (location/character/radiation): last week felt and heard a pop while doing her PT exercises    Intensity:  moderate    Accompanying signs and symptoms: having muscle pain not sure if it is a fx    History (similar episodes/previous evaluation): hx of multiple fx'x    Precipitating or alleviating factors: None    Therapies tried and outcome: ibuprofen and hydrocodone     Using 1.5 tabs Norco daily.  Hurts to cough, sometimes harder to breath.  She recognizes this as a \"mild\" rib fracture.    Got started on Xanax short-term for depression surrounding recent death of her dog.  Around people all the time before attempted suicide after last time lost a dog.  Says she's better now, not drinking (was drunk on the day she attempted suicide 2.5+ years ago).        1. Closed fracture of one rib of left side with routine healing, subsequent encounter    2. Left-sided chest wall pain        PMH: Updated and/or reviewed in chart.    ROS:  Constitutional, cardiovascular, pulmonary, gi and gu systems are otherwise negative.    Objective   OBJECTIVE:                                                    /66   Pulse 76   Temp 97.8  F (36.6  C) (Tympanic)   Resp 16   Ht 1.547 m (5' 0.9\")   Wt 83.9 kg (185 lb)   SpO2 93%   BMI 35.07 kg/m      GEN: No acute distress  EYES: No conjunctival injection or icterus, EOMI grossly intact  RESP: Unlabored, regular  NEURO: Normal gait, MAEx4, light touch sensation grossly intact  PSYCH: Normal mood and affect  MUSCULOSKELETAL: exquisite tenderness to palpation to left posterior rib      Assessment & Plan   ASSESSMENT/PLAN:                                                    1. Closed fracture of one rib of left side with routine healing, subsequent encounter  2. Left-sided chest wall pain  Doing well with physical " therapy but may have overdone it mildly despite slow regimen.  OK to temporarily increase Norco to 2 full tabs daily for a 1-2 weeks then return to prior taper.  On my review of rib XRs, equivocal abnormality on superior margin of left posterior 7th rib not clearly a fracture on highlighted area of maximal tenderness.  Can't refill Norco sooner due to insurance issue -- should be reasonable with existing supply.  - XR Ribs & Chest Left G/E 3 Views; Future    Severe depression -- not drinking now.  Very depressed but not higher risk for suicide by her admission despite suicidal ideation reports without intent or plan.  Continue mental health cares and supervision.  Significant caution with new benzodiazepine in higher dose opioid given acute psychosocial stressors.  We verbalized safety plan. She otherwise appears quite well overall today.    Orders Placed This Encounter     XR Ribs & Chest Left G/E 3 Views     ALPRAZolam (XANAX) 0.5 MG tablet     propranolol (INDERAL) 10 MG tablet        Return in about 4 weeks (around 9/27/2019) for Follow-up.    William Smith MD

## 2019-08-31 ASSESSMENT — ANXIETY QUESTIONNAIRES: GAD7 TOTAL SCORE: 16

## 2019-09-03 ENCOUNTER — TELEPHONE (OUTPATIENT)
Dept: INTERNAL MEDICINE | Facility: CLINIC | Age: 39
End: 2019-09-03

## 2019-09-03 DIAGNOSIS — S22.32XD CLOSED FRACTURE OF ONE RIB OF LEFT SIDE WITH ROUTINE HEALING, SUBSEQUENT ENCOUNTER: ICD-10-CM

## 2019-09-03 NOTE — TELEPHONE ENCOUNTER
Patient informed per Dr Smith, see below.  Patient stated she will call ortho.  Will send message back to provider to advise later on pain med.

## 2019-09-03 NOTE — TELEPHONE ENCOUNTER
Per Dr Smith  I'd prefer she get ortho opinion then could return here, and I may be able to have her pain med refill this week or next.

## 2019-09-03 NOTE — TELEPHONE ENCOUNTER
Reason for Call:  Other     Detailed comments: patient was seen at urgent care broke her ankle would like provider to order a pair of crutches for her and she would also like some pain medication to be sent to pharmacy      Phone Number Patient can be reached at: Other phone number:  [phone]    Best Time:     Can we leave a detailed message on this number? YES    Call taken on 9/3/2019 at 8:09 AM by Fartun Riley

## 2019-09-03 NOTE — TELEPHONE ENCOUNTER
Spoke with patient and she injured her right ankle and was seen at urgent care on 8-30-19.  See impression below.  Patient was referred to ortho.  Patient asking for PCP to take over care.  Informed her that ortho is the specialist when it comes to her injury.  Patient also requesting pain med and crutches.  Per pharmacy unable to refill current pain med, not do until 9-16-19.  Only way to get pain med would be to change pain med and make directions seem more aggressive.  Please advise.            IMPRESSION: There is a 0.6 cm ossicle adjacent to the tip of the  lateral malleolus that could represent a fracture of uncertain age  either acute or chronic. There is little soft tissue swelling here so  I suspect this is chronic. The mortise is intact. No other  Abnormalities.

## 2019-09-04 ENCOUNTER — OFFICE VISIT (OUTPATIENT)
Dept: ORTHOPEDICS | Facility: CLINIC | Age: 39
End: 2019-09-04
Payer: COMMERCIAL

## 2019-09-04 VITALS
SYSTOLIC BLOOD PRESSURE: 141 MMHG | DIASTOLIC BLOOD PRESSURE: 73 MMHG | HEIGHT: 61 IN | HEART RATE: 87 BPM | BODY MASS INDEX: 35.12 KG/M2 | WEIGHT: 186 LBS

## 2019-09-04 DIAGNOSIS — S93.491A SPRAIN OF ANTERIOR TALOFIBULAR LIGAMENT OF RIGHT ANKLE, INITIAL ENCOUNTER: Primary | ICD-10-CM

## 2019-09-04 PROCEDURE — 99214 OFFICE O/P EST MOD 30 MIN: CPT | Performed by: ORTHOPAEDIC SURGERY

## 2019-09-04 ASSESSMENT — PAIN SCALES - GENERAL: PAINLEVEL: MODERATE PAIN (5)

## 2019-09-04 ASSESSMENT — MIFFLIN-ST. JEOR: SCORE: 1449.48

## 2019-09-04 NOTE — LETTER
9/4/2019         RE: Mily Grullon  [address] [address]        Dear Colleague,    Thank you for referring your patient, Mily Grullon, to the [facility]. Please see a copy of my visit note below.    chief complaint:   Chief Complaint   Patient presents with     Right Ankle - Injury     DOI 8/30/19, 5 day s/p. Patient notes she tripped in a hole in her yard. She was seen and given a boot and crutches. Pain is on the lateral ankle. Patient presents using one crutch in boot.      Mily Grullon is seen today in the [facility] for evaluation of right ankle injury at the request of Dr. Tre Chatterjee MD      HISTORY OF PRESENT ILLNESS    Mily Grullon is a 39 year old female seen for evaluation of a right ankle injury that occurred 5 days ago. The injury occurred on 8/30/2019 when she stepped in a trench in her yard her dad was making, rolling her right ankle. Onset of pain. Presented to urgent care 8/30/2019 with xrays obtained. Has been in a boot, using a crutch. Using crutches difficult given rib fractures from prior. Locates pain along the outer aspect of her ankle. Pain with weight bearing. Not much swelling or bruising. History of numerous stress fractures, osteoporosis.    Had right ankle injury as a child.    Present symptoms: moderate pain.    Symptoms occur walking.    The frequency of symptoms: are constant.  Denies associated numbness or tingling.   Pain severity: 5/10  Pain quality: dull, aching and shooting    Treatment up to this point:ice and boot, crutch      Orthopedic PMH: numerous stress fractures in the past.    Other PMH:  has a past medical history of Endometriosis, site unspecified, Gastritis (2010), Osteoporosis, Urinary calculus, unspecified, and Wrist injury (Nov 19, 2003).  Patient Active Problem List    Diagnosis Date Noted     Bilateral thoracic back pain 08/13/2019     Priority: Medium     Osteoporosis 08/13/2019      Priority: Medium     Anxiety 07/17/2019     Priority: Medium     Follows with Pedro Luis (Lashell Murray)  Has [facility] worker       Endometriosis 07/17/2019     Priority: Medium     Low serum cortisol level (H) 03/16/2019     Priority: Medium     Other chronic pain 11/30/2018     Priority: Medium     Related to multiple fractures related to premature osteoporosis  As of 11/30/2018, limit of 3 Percocet 5/325 daily with intention to taper in months ahead       Controlled substance agreement signed 11/30/2018     Priority: Medium     Prescriber of controlled substances: William Smith        Obesity (BMI 35.0-39.9) with comorbidity (H) 10/24/2018     Priority: Medium     Superficial phlebitis 09/27/2018     Priority: Medium     Long-term (current) use of anticoagulants [Z79.01] 09/19/2018     Priority: Medium     Gastroesophageal reflux disease, esophagitis presence not specified 09/10/2018     Priority: Medium     Closed nondisplaced fracture of body of left calcaneus with delayed healing, subsequent encounter 03/09/2018     Priority: Medium     Heel pain, chronic, left 03/09/2018     Priority: Medium     Pain in joint, ankle and foot, left 11/15/2017     Priority: Medium     Migraine without aura and without status migrainosus, not intractable 11/10/2015     Priority: Medium     Closed nondisplaced intertrochanteric fracture of left femur (H) 02/10/2014     Priority: Medium     Osteoporosis of multiple sites 12/10/2013     Priority: Medium     Chronic wrist pain 11/14/2012     Priority: Medium     Overview:   Due to TFCC tears and subsequent distal ulna resection and carpal fusion       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 Glover in [place]       Gastritis      Priority: Medium     dx 2010- sees Dr Glover in [facility]       CARDIOVASCULAR SCREENING; LDL GOAL LESS THAN 160 10/31/2010     Priority: Medium     Personal history of nicotine dependence  12/17/2009     Priority: Medium       Surgical Hx:  has a past surgical history that includes REPAIR TRIANGULAR CART,WRIST JT (2005); REPAIR TRIANGULAR CART,WRIST JT (2007 or so); arthroscopy of joint unlisted (4/2004); REMOVAL OF OVARY/TUBE(S) (6/2003); hysteroscopy; lithotripsy; and Arthroscopy hip, osteoplasty femur proximal, combined (Left, 6/29/2016).    Medications:   Current Outpatient Medications:      albuterol (PROAIR HFA/PROVENTIL HFA/VENTOLIN HFA) 108 (90 Base) MCG/ACT inhaler, Inhale 2 puffs into the lungs every 4 hours as needed for other (cough), Disp: 1 Inhaler, Rfl: 0     ALPRAZolam (XANAX) 0.5 MG tablet, Take 0.5 mg by mouth daily, Disp: , Rfl:      Calcium-Phosphorus-Vitamin D 250-100-500 MG-MG-UNIT CHEW, Take 2 tablets by mouth daily, Disp: 60 tablet, Rfl: 11     Cholecalciferol (VITAMIN D PO), Take 1,000 Units by mouth, Disp: , Rfl:      DULoxetine (CYMBALTA) 60 MG capsule, Take 60 mg by mouth 2 times daily , Disp: , Rfl:      eszopiclone (LUNESTA) 1 MG tablet, Take 1 mg by mouth nightly as needed, Disp: , Rfl:      famotidine (PEPCID) 20 MG tablet, Take 1 tablet (20 mg) by mouth daily as needed (heartburn/reflux), Disp: 90 tablet, Rfl: 1     HYDROcodone-acetaminophen (NORCO) 5-325 MG tablet, Take 0.5-1 tablets by mouth 2 times daily as needed for moderate to severe pain Supply should last 30 days., Disp: 50 tablet, Rfl: 0     lamoTRIgine (LAMICTAL) 100 MG tablet, Take 150 mg by mouth daily , Disp: , Rfl:      nortriptyline (PAMELOR) 50 MG capsule, Take 100 mg by mouth At Bedtime, Disp: , Rfl:      pantoprazole (PROTONIX) 20 MG EC tablet, Take by mouth 30-60 minutes before a meal., Disp: 90 tablet, Rfl: 1     propranolol (INDERAL) 10 MG tablet, Take 10 mg by mouth 3 times daily, Disp: , Rfl:      teriparatide, recombinant, (FORTEO) 600 MCG/2.4ML SOLN injection, Inject 0.08 mLs (20 mcg) Subcutaneous daily, Disp: 7.2 mL, Rfl: 1     tiZANidine (ZANAFLEX) 2 MG tablet, Take 1 tablet (2 mg) by mouth 3  "times daily as needed for muscle spasms, Disp: 90 tablet, Rfl: 1     vitamin D3 (CHOLECALCIFEROL) 2000 units tablet, Take 1 tablet by mouth daily, Disp: 90 tablet, Rfl: 3    Allergies:   Allergies   Allergen Reactions     Amitriptyline Hives     Amoxicillin Diarrhea     Asa [Dihydroxyaluminum Aminoacetate]      Cipro [Ciprofloxacin]      Dizziness, vomiting, shortness of breath     Ibuprofen [Aspartame-Ibuprofen]      GI distress       Lyrica      extrematies swell up      Morphine      ithcy     Naproxen      GI distress     Neurontin [Gabapentin]      rash     Paxil [Paroxetine] Anaphylaxis     anaphylaxis     Phenergan [Promethazine Hcl]      dystonia     Prilosec [Omeprazole]      Rash, dizziness     Gabapentin Rash       Social Hx:  reports that she quit smoking about 5 years ago. Her smoking use included cigarettes. She has never used smokeless tobacco. She reports that she does not drink alcohol or use drugs.    Family Hx: family history includes Cancer in her paternal grandmother; Genitourinary Problems in her maternal grandmother; Hypertension in her father and maternal grandmother; Lipids in her father.    REVIEW OF SYSTEMS:    CONSTITUTIONAL:NEGATIVE for fever, chills, change in weight  INTEGUMENTARY/SKIN: NEGATIVE for worrisome rashes, moles or lesions  MUSCULOSKELETAL:See HPI above  NEURO: NEGATIVE for weakness, dizziness or paresthesias    PHYSICAL EXAM:  BP (!) 141/73   Pulse 87   Ht 1.547 m (5' 0.9\")   Wt 84.4 kg (186 lb)   BMI 35.26 kg/m      GENERAL APPEARANCE: healthy, alert, no distress; accompanied by her father.  SKIN: no suspicious lesions or rashes  NEURO: Normal strength and tone, mentation intact and speech normal  PSYCH:  mentation appears normal and affect normal  RESPIRATORY: No increased work of breathing.    BILATERAL LOWER EXTREMITIES:  Gait: using a single right sided crutch for support, favors the right.  No Quad atrophy, strength normal.  Intact sensation deep peroneal nerve, " superficial peroneal nerve, med/lat tibial nerve, sural nerve, saphenous nerve  Intact EHL, EDL, TA, FHL, GS, quadriceps hamstrings and hip flexors  Toes warm and well perfused, brisk capillary refill. Palpable 2+ dp pulses.  Bilateral calf soft and nttp or squeeze.  Edema: none    right ANKLE  Inspection: skin intact. No ecchymosis. Minimal swelling. No gross deformity.  Tender:ATFL, CFL, lateral malleolus, deltoid ligament  Non-tender:medial malleolus, distal 3rd fibula shaft, mid-fibula shaft, proximal fibula, distal tibia, 5th metatarsal base  Range of Motion: within normal limits, discomfort with inversion.  Strength: within normal limits.  Special tests:negative anterior drawer, negative talar tilt, negative forced external rotation/eversion, negative Álvarez sign      X-RAY:  3 views right ankle from 8/30/2019 were reviewed in clinic today. No obvious fracture or dislocation.   There is a 0.6 cm , corticated smooth, rounded edges ossicle adjacent to the tip of the  lateral malleolus that could represent a fracture of uncertain age  either acute or chronic. There is little soft tissue swelling here so  I suspect this is chronic. The mortise is intact and symmetric. No other  Abnormalities.      Impression: 38yo female with right ankle injury, consistent with sprain    Plan:  * images reviewed. Don't suspect acute fracture based on xray. Suspect the finding is related to prior, old injury given smooth, round corticated edges.  * rest  * ice  * elevation  * compression  * activity modification - avoid aggravating activities  * boot, wean to brace in shoe.  * ankle brace/support in lace-up, supportive shoes  * ankle range of motion exercises  * Physical Therapy, strengthening and proprioception training once symptoms improve  * NSAIDS  * return to clinic as needed.    Mily to follow up with Primary Care provider regarding elevated blood pressure.      Jose Alford M.D., M.S.  Dept. of Orthopaedic  Surgery  [facility]          Again, thank you for allowing me to participate in the care of your patient.        Sincerely,        Jose Alford MD

## 2019-09-04 NOTE — PROGRESS NOTES
chief complaint:   Chief Complaint   Patient presents with     Right Ankle - Injury     DOI 8/30/19, 5 day s/p. Patient notes she tripped in a hole in her yard. She was seen and given a boot and crutches. Pain is on the lateral ankle. Patient presents using one crutch in boot.      Mily Grullon is seen today in the [facility] for evaluation of right ankle injury at the request of Dr. Tre Chatterjee MD      HISTORY OF PRESENT ILLNESS    Mily Grullon is a 39 year old female seen for evaluation of a right ankle injury that occurred 5 days ago. The injury occurred on 8/30/2019 when she stepped in a trench in her yard her dad was making, rolling her right ankle. Onset of pain. Presented to urgent care 8/30/2019 with xrays obtained. Has been in a boot, using a crutch. Using crutches difficult given rib fractures from prior. Locates pain along the outer aspect of her ankle. Pain with weight bearing. Not much swelling or bruising. History of numerous stress fractures, osteoporosis.    Had right ankle injury as a child.    Present symptoms: moderate pain.    Symptoms occur walking.    The frequency of symptoms: are constant.  Denies associated numbness or tingling.   Pain severity: 5/10  Pain quality: dull, aching and shooting    Treatment up to this point:ice and boot, crutch      Orthopedic PMH: numerous stress fractures in the past.    Other PMH:  has a past medical history of Endometriosis, site unspecified, Gastritis (2010), Osteoporosis, Urinary calculus, unspecified, and Wrist injury (Nov 19, 2003).  Patient Active Problem List    Diagnosis Date Noted     Bilateral thoracic back pain 08/13/2019     Priority: Medium     Osteoporosis 08/13/2019     Priority: Medium     Anxiety 07/17/2019     Priority: Medium     Follows with Pedro Luis (Lashell Murray)  Has [employer] worker       Endometriosis 07/17/2019     Priority: Medium     Low serum cortisol level (H) 03/16/2019     Priority: Medium      Other chronic pain 11/30/2018     Priority: Medium     Related to multiple fractures related to premature osteoporosis  As of 11/30/2018, limit of 3 Percocet 5/325 daily with intention to taper in months ahead       Controlled substance agreement signed 11/30/2018     Priority: Medium     Prescriber of controlled substances: William Smith        Obesity (BMI 35.0-39.9) with comorbidity (H) 10/24/2018     Priority: Medium     Superficial phlebitis 09/27/2018     Priority: Medium     Long-term (current) use of anticoagulants [Z79.01] 09/19/2018     Priority: Medium     Gastroesophageal reflux disease, esophagitis presence not specified 09/10/2018     Priority: Medium     Closed nondisplaced fracture of body of left calcaneus with delayed healing, subsequent encounter 03/09/2018     Priority: Medium     Heel pain, chronic, left 03/09/2018     Priority: Medium     Pain in joint, ankle and foot, left 11/15/2017     Priority: Medium     Migraine without aura and without status migrainosus, not intractable 11/10/2015     Priority: Medium     Closed nondisplaced intertrochanteric fracture of left femur (H) 02/10/2014     Priority: Medium     Osteoporosis of multiple sites 12/10/2013     Priority: Medium     Chronic wrist pain 11/14/2012     Priority: Medium     Overview:   Due to TFCC tears and subsequent distal ulna resection and carpal fusion       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 Glover in [facility]      Priority: Medium     dx 2010- sees Dr Glover in [facility]       CARDIOVASCULAR SCREENING; LDL GOAL LESS THAN 160 10/31/2010     Priority: Medium     Personal history of nicotine dependence 12/17/2009     Priority: Medium       Surgical Hx:  has a past surgical history that includes REPAIR TRIANGULAR CART,WRIST JT (2005); REPAIR TRIANGULAR CART,WRIST JT (2007 or so); arthroscopy of joint unlisted (4/2004); REMOVAL OF OVARY/TUBE(S)  (6/2003); hysteroscopy; lithotripsy; and Arthroscopy hip, osteoplasty femur proximal, combined (Left, 6/29/2016).    Medications:   Current Outpatient Medications:      albuterol (PROAIR HFA/PROVENTIL HFA/VENTOLIN HFA) 108 (90 Base) MCG/ACT inhaler, Inhale 2 puffs into the lungs every 4 hours as needed for other (cough), Disp: 1 Inhaler, Rfl: 0     ALPRAZolam (XANAX) 0.5 MG tablet, Take 0.5 mg by mouth daily, Disp: , Rfl:      Calcium-Phosphorus-Vitamin D 250-100-500 MG-MG-UNIT CHEW, Take 2 tablets by mouth daily, Disp: 60 tablet, Rfl: 11     Cholecalciferol (VITAMIN D PO), Take 1,000 Units by mouth, Disp: , Rfl:      DULoxetine (CYMBALTA) 60 MG capsule, Take 60 mg by mouth 2 times daily , Disp: , Rfl:      eszopiclone (LUNESTA) 1 MG tablet, Take 1 mg by mouth nightly as needed, Disp: , Rfl:      famotidine (PEPCID) 20 MG tablet, Take 1 tablet (20 mg) by mouth daily as needed (heartburn/reflux), Disp: 90 tablet, Rfl: 1     HYDROcodone-acetaminophen (NORCO) 5-325 MG tablet, Take 0.5-1 tablets by mouth 2 times daily as needed for moderate to severe pain Supply should last 30 days., Disp: 50 tablet, Rfl: 0     lamoTRIgine (LAMICTAL) 100 MG tablet, Take 150 mg by mouth daily , Disp: , Rfl:      nortriptyline (PAMELOR) 50 MG capsule, Take 100 mg by mouth At Bedtime, Disp: , Rfl:      pantoprazole (PROTONIX) 20 MG EC tablet, Take by mouth 30-60 minutes before a meal., Disp: 90 tablet, Rfl: 1     propranolol (INDERAL) 10 MG tablet, Take 10 mg by mouth 3 times daily, Disp: , Rfl:      teriparatide, recombinant, (FORTEO) 600 MCG/2.4ML SOLN injection, Inject 0.08 mLs (20 mcg) Subcutaneous daily, Disp: 7.2 mL, Rfl: 1     tiZANidine (ZANAFLEX) 2 MG tablet, Take 1 tablet (2 mg) by mouth 3 times daily as needed for muscle spasms, Disp: 90 tablet, Rfl: 1     vitamin D3 (CHOLECALCIFEROL) 2000 units tablet, Take 1 tablet by mouth daily, Disp: 90 tablet, Rfl: 3    Allergies:   Allergies   Allergen Reactions     Amitriptyline Hives  "    Amoxicillin Diarrhea     Asa [Dihydroxyaluminum Aminoacetate]      Cipro [Ciprofloxacin]      Dizziness, vomiting, shortness of breath     Ibuprofen [Aspartame-Ibuprofen]      GI distress       Lyrica      extrematies swell up      Morphine      ithcy     Naproxen      GI distress     Neurontin [Gabapentin]      rash     Paxil [Paroxetine] Anaphylaxis     anaphylaxis     Phenergan [Promethazine Hcl]      dystonia     Prilosec [Omeprazole]      Rash, dizziness     Gabapentin Rash       Social Hx:  reports that she quit smoking about 5 years ago. Her smoking use included cigarettes. She has never used smokeless tobacco. She reports that she does not drink alcohol or use drugs.    Family Hx: family history includes Cancer in her paternal grandmother; Genitourinary Problems in her maternal grandmother; Hypertension in her father and maternal grandmother; Lipids in her father.    REVIEW OF SYSTEMS:    CONSTITUTIONAL:NEGATIVE for fever, chills, change in weight  INTEGUMENTARY/SKIN: NEGATIVE for worrisome rashes, moles or lesions  MUSCULOSKELETAL:See HPI above  NEURO: NEGATIVE for weakness, dizziness or paresthesias    PHYSICAL EXAM:  BP (!) 141/73   Pulse 87   Ht 1.547 m (5' 0.9\")   Wt 84.4 kg (186 lb)   BMI 35.26 kg/m     GENERAL APPEARANCE: healthy, alert, no distress; accompanied by her father.  SKIN: no suspicious lesions or rashes  NEURO: Normal strength and tone, mentation intact and speech normal  PSYCH:  mentation appears normal and affect normal  RESPIRATORY: No increased work of breathing.    BILATERAL LOWER EXTREMITIES:  Gait: using a single right sided crutch for support, favors the right.  No Quad atrophy, strength normal.  Intact sensation deep peroneal nerve, superficial peroneal nerve, med/lat tibial nerve, sural nerve, saphenous nerve  Intact EHL, EDL, TA, FHL, GS, quadriceps hamstrings and hip flexors  Toes warm and well perfused, brisk capillary refill. Palpable 2+ dp pulses.  Bilateral calf " soft and nttp or squeeze.  Edema: none    right ANKLE  Inspection: skin intact. No ecchymosis. Minimal swelling. No gross deformity.  Tender:ATFL, CFL, lateral malleolus, deltoid ligament  Non-tender:medial malleolus, distal 3rd fibula shaft, mid-fibula shaft, proximal fibula, distal tibia, 5th metatarsal base  Range of Motion: within normal limits, discomfort with inversion.  Strength: within normal limits.  Special tests:negative anterior drawer, negative talar tilt, negative forced external rotation/eversion, negative Álvarez sign      X-RAY:  3 views right ankle from 8/30/2019 were reviewed in clinic today. No obvious fracture or dislocation.   There is a 0.6 cm , corticated smooth, rounded edges ossicle adjacent to the tip of the  lateral malleolus that could represent a fracture of uncertain age  either acute or chronic. There is little soft tissue swelling here so  I suspect this is chronic. The mortise is intact and symmetric. No other  Abnormalities.      Impression: 38yo female with right ankle injury, consistent with sprain    Plan:  * images reviewed. Don't suspect acute fracture based on xray. Suspect the finding is related to prior, old injury given smooth, round corticated edges.  * rest  * ice  * elevation  * compression  * activity modification - avoid aggravating activities  * boot, wean to brace in shoe.  * ankle brace/support in lace-up, supportive shoes  * ankle range of motion exercises  * Physical Therapy, strengthening and proprioception training once symptoms improve  * NSAIDS  * return to clinic as needed.    Mily to follow up with Primary Care provider regarding elevated blood pressure.      Jose Alford M.D., M.S.  Dept. of Orthopaedic Surgery  [facility]

## 2019-09-05 NOTE — TELEPHONE ENCOUNTER
Patient states that she went to ortho and they said it was and old fracture and a bad sprain so she is wondering if she can get a prescription for pain meds now.

## 2019-09-09 NOTE — TELEPHONE ENCOUNTER
Spoke with pharmacy regarding the hydrocodone script that was sent on 8/30 with note ok to fill early after 9/3. They said insurance will not cover the medication even with the note of ok to fill early because it is the exact same prescription as before and will not be ok to fill until 9/16.   Pharmacy said you need to change the dose or quantity in order for insurance to cover it at this time.

## 2019-09-10 DIAGNOSIS — S22.32XD CLOSED FRACTURE OF ONE RIB OF LEFT SIDE WITH ROUTINE HEALING, SUBSEQUENT ENCOUNTER: ICD-10-CM

## 2019-09-10 RX ORDER — HYDROCODONE BITARTRATE AND ACETAMINOPHEN 5; 325 MG/1; MG/1
.5-1 TABLET ORAL 3 TIMES DAILY PRN
Qty: 60 TABLET | Refills: 0 | Status: SHIPPED | OUTPATIENT
Start: 2019-09-10 | End: 2019-10-02

## 2019-09-10 RX ORDER — HYDROCODONE BITARTRATE AND ACETAMINOPHEN 5; 325 MG/1; MG/1
.5-1 TABLET ORAL 2 TIMES DAILY PRN
Qty: 60 TABLET | Refills: 0 | Status: SHIPPED | OUTPATIENT
Start: 2019-09-10 | End: 2019-09-10

## 2019-09-10 NOTE — TELEPHONE ENCOUNTER
Spoke with patient and advised mediation was sent to pharmacy today.   No further questions at this time.     Sara Louis, RN, BSN, PHN

## 2019-09-10 NOTE — TELEPHONE ENCOUNTER
Patient is still waiting for a correct prescription as well as the pharmacy is.  Can someone please call her with the status?

## 2019-09-10 NOTE — TELEPHONE ENCOUNTER
Discussed with Douglas PerryD.  Intention is for total daily dose not to exceed 2 tabs for 30 days.

## 2019-09-16 ENCOUNTER — OFFICE VISIT (OUTPATIENT)
Dept: ORTHOPEDICS | Facility: CLINIC | Age: 39
End: 2019-09-16
Payer: COMMERCIAL

## 2019-09-16 VITALS
DIASTOLIC BLOOD PRESSURE: 84 MMHG | RESPIRATION RATE: 16 BRPM | SYSTOLIC BLOOD PRESSURE: 127 MMHG | WEIGHT: 180 LBS | HEIGHT: 61 IN | BODY MASS INDEX: 33.99 KG/M2

## 2019-09-16 PROCEDURE — 99213 OFFICE O/P EST LOW 20 MIN: CPT | Performed by: ORTHOPAEDIC SURGERY

## 2019-09-16 ASSESSMENT — MIFFLIN-ST. JEOR: SCORE: 1422.26

## 2019-09-16 ASSESSMENT — PAIN SCALES - GENERAL: PAINLEVEL: MILD PAIN (3)

## 2019-09-16 NOTE — LETTER
9/16/2019         RE: Mily Grullon  [address] [address]        Dear Colleague,    Thank you for referring your patient, Mily Grullon, to the [facility]. Please see a copy of my visit note below.    chief complaint:   Chief Complaint   Patient presents with     Right Ankle - Pain     Lateral ankle pain. Wearing ankle brace and tennis show. Improved from 2.5 weeks ago.        HISTORY OF PRESENT ILLNESS    Mily Grullon is a 39 year old female seen for evaluation of a right ankle injury that occurred just over 2 weeks ago. The injury occurred on 8/30/2019 when she stepped in a trench in her yard her dad was making, rolling her right ankle. Onset of pain. Presented to urgent care 8/30/2019 with xrays obtained, negative for fracture. Was placed into a boot and using a crutch.. Using crutches difficult given rib fractures from prior. Locates pain along the outer aspect of her ankle. Pain with weight bearing. Not much swelling or bruising. History of numerous stress fractures, osteoporosis.    Has now been in ankle brace, tennis shoe. Slowly improving since last visit.    Had right ankle injury as a child.    Present symptoms: mild pain.    Symptoms occur walking.    The frequency of symptoms: are constant.  Denies associated numbness or tingling.   Pain severity: 3/10  Pain quality: dull, aching and shooting    Treatment up to this point:ice and boot, crutch      Orthopedic PMH: numerous stress fractures in the past.    Other PMH:  has a past medical history of Endometriosis, site unspecified, Gastritis (2010), Osteoporosis, Urinary calculus, unspecified, and Wrist injury (Nov 19, 2003).  Patient Active Problem List    Diagnosis Date Noted     Bilateral thoracic back pain 08/13/2019     Priority: Medium     Osteoporosis 08/13/2019     Priority: Medium     Anxiety 07/17/2019     Priority: Medium     Follows with Pedro Luis (Lashell Murray)  Has [employer] worker        Endometriosis 07/17/2019     Priority: Medium     Low serum cortisol level (H) 03/16/2019     Priority: Medium     Other chronic pain 11/30/2018     Priority: Medium     Related to multiple fractures related to premature osteoporosis  As of 11/30/2018, limit of 3 Percocet 5/325 daily with intention to taper in months ahead       Controlled substance agreement signed 11/30/2018     Priority: Medium     Prescriber of controlled substances: William Smith        Obesity (BMI 35.0-39.9) with comorbidity (H) 10/24/2018     Priority: Medium     Superficial phlebitis 09/27/2018     Priority: Medium     Long-term (current) use of anticoagulants [Z79.01] 09/19/2018     Priority: Medium     Gastroesophageal reflux disease, esophagitis presence not specified 09/10/2018     Priority: Medium     Closed nondisplaced fracture of body of left calcaneus with delayed healing, subsequent encounter 03/09/2018     Priority: Medium     Heel pain, chronic, left 03/09/2018     Priority: Medium     Pain in joint, ankle and foot, left 11/15/2017     Priority: Medium     Migraine without aura and without status migrainosus, not intractable 11/10/2015     Priority: Medium     Closed nondisplaced intertrochanteric fracture of left femur (H) 02/10/2014     Priority: Medium     Osteoporosis of multiple sites 12/10/2013     Priority: Medium     Chronic wrist pain 11/14/2012     Priority: Medium     Overview:   Due to TFCC tears and subsequent distal ulna resection and carpal fusion       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 Glover in [place]       Gastritis      Priority: Medium     dx 2010- sees Dr Glover in [facility]       CARDIOVASCULAR SCREENING; LDL GOAL LESS THAN 160 10/31/2010     Priority: Medium     Personal history of nicotine dependence 12/17/2009     Priority: Medium       Surgical Hx:  has a past surgical history that includes REPAIR TRIANGULAR CART,WRIST JT (2005);  REPAIR TRIANGULAR CART,WRIST JT (2007 or so); arthroscopy of joint unlisted (4/2004); REMOVAL OF OVARY/TUBE(S) (6/2003); hysteroscopy; lithotripsy; and Arthroscopy hip, osteoplasty femur proximal, combined (Left, 6/29/2016).    Medications:   Current Outpatient Medications:      albuterol (PROAIR HFA/PROVENTIL HFA/VENTOLIN HFA) 108 (90 Base) MCG/ACT inhaler, Inhale 2 puffs into the lungs every 4 hours as needed for other (cough), Disp: 1 Inhaler, Rfl: 0     ALPRAZolam (XANAX) 0.5 MG tablet, Take 0.5 mg by mouth daily, Disp: , Rfl:      Calcium-Phosphorus-Vitamin D 250-100-500 MG-MG-UNIT CHEW, Take 2 tablets by mouth daily, Disp: 60 tablet, Rfl: 11     Cholecalciferol (VITAMIN D PO), Take 1,000 Units by mouth, Disp: , Rfl:      DULoxetine (CYMBALTA) 60 MG capsule, Take 60 mg by mouth 2 times daily , Disp: , Rfl:      eszopiclone (LUNESTA) 1 MG tablet, Take 1 mg by mouth nightly as needed, Disp: , Rfl:      famotidine (PEPCID) 20 MG tablet, Take 1 tablet (20 mg) by mouth daily as needed (heartburn/reflux), Disp: 90 tablet, Rfl: 1     HYDROcodone-acetaminophen (NORCO) 5-325 MG tablet, Take 0.5-1 tablets by mouth 3 times daily as needed for moderate to severe pain Supply should last 30 days., Disp: 60 tablet, Rfl: 0     lamoTRIgine (LAMICTAL) 100 MG tablet, Take 150 mg by mouth daily , Disp: , Rfl:      nortriptyline (PAMELOR) 50 MG capsule, Take 100 mg by mouth At Bedtime, Disp: , Rfl:      pantoprazole (PROTONIX) 20 MG EC tablet, Take by mouth 30-60 minutes before a meal., Disp: 90 tablet, Rfl: 1     propranolol (INDERAL) 10 MG tablet, Take 10 mg by mouth 3 times daily, Disp: , Rfl:      teriparatide, recombinant, (FORTEO) 600 MCG/2.4ML SOLN injection, Inject 0.08 mLs (20 mcg) Subcutaneous daily, Disp: 7.2 mL, Rfl: 1     tiZANidine (ZANAFLEX) 2 MG tablet, Take 1 tablet (2 mg) by mouth 3 times daily as needed for muscle spasms, Disp: 90 tablet, Rfl: 1     vitamin D3 (CHOLECALCIFEROL) 2000 units tablet, Take 1 tablet by  "mouth daily, Disp: 90 tablet, Rfl: 3    Allergies:   Allergies   Allergen Reactions     Amitriptyline Hives     Amoxicillin Diarrhea     Asa [Dihydroxyaluminum Aminoacetate]      Cipro [Ciprofloxacin]      Dizziness, vomiting, shortness of breath     Ibuprofen [Aspartame-Ibuprofen]      GI distress       Lyrica      extrematies swell up      Morphine      ithcy     Naproxen      GI distress     Neurontin [Gabapentin]      rash     Paxil [Paroxetine] Anaphylaxis     anaphylaxis     Phenergan [Promethazine Hcl]      dystonia     Prilosec [Omeprazole]      Rash, dizziness     Gabapentin Rash       Social Hx:  reports that she quit smoking about 5 years ago. Her smoking use included cigarettes. She has never used smokeless tobacco. She reports that she does not drink alcohol or use drugs.    Family Hx: family history includes Cancer in her paternal grandmother; Genitourinary Problems in her maternal grandmother; Hypertension in her father and maternal grandmother; Lipids in her father.    REVIEW OF SYSTEMS:    CONSTITUTIONAL:NEGATIVE for fever, chills, change in weight  INTEGUMENTARY/SKIN: NEGATIVE for worrisome rashes, moles or lesions  MUSCULOSKELETAL:See HPI above  NEURO: NEGATIVE for weakness, dizziness or paresthesias    PHYSICAL EXAM:  /84   Resp 16   Ht 1.547 m (5' 0.9\")   Wt 81.6 kg (180 lb)   BMI 34.12 kg/m      GENERAL APPEARANCE: healthy, alert, no distress; accompanied by her father.  SKIN: no suspicious lesions or rashes  NEURO: Normal strength and tone, mentation intact and speech normal  PSYCH:  mentation appears normal and affect normal  RESPIRATORY: No increased work of breathing.    BILATERAL LOWER EXTREMITIES:  Gait: slight favor right in tennis shoe/brace.  No Quad atrophy, strength normal.  Intact sensation deep peroneal nerve, superficial peroneal nerve, med/lat tibial nerve, sural nerve, saphenous nerve  Intact EHL, EDL, TA, FHL, GS, quadriceps hamstrings and hip flexors  Toes warm and " well perfused, brisk capillary refill. Palpable 2+ dp pulses.  Bilateral calf soft and nttp or squeeze.  Edema: none    right ANKLE  Inspection: skin intact. No ecchymosis. Minimal swelling. No gross deformity.  Tender:ATFL, CFL, lateral malleolus  Non-tender:medial malleolus, deltoid ligament, distal 3rd fibula shaft, mid-fibula shaft, proximal fibula, distal tibia, 5th metatarsal base  Range of Motion: within normal limits, discomfort with inversion.  Strength: within normal limits.  Special tests:negative anterior drawer, negative talar tilt, negative forced external rotation/eversion, negative Álvarez sign      X-RAY: no new images today.  3 views right ankle from 8/30/2019 were reviewed in clinic today. No obvious fracture or dislocation.   There is a 0.6 cm , corticated smooth, rounded edges ossicle adjacent to the tip of the lateral malleolus that could represent a fracture of uncertain age either acute or chronic. There is little soft tissue swelling here so I suspect this is chronic. The mortise is intact and symmetric. No other Abnormalities.      Impression: 38yo female with right ankle injury, consistent with sprain    Plan:  * glad to hear improving some since last visit.    * rest  * ice  * elevation  * compression  * activity modification - avoid aggravating activities  * ankle brace/support in lace-up, supportive shoes  * ankle range of motion exercises  * Physical Therapy, strengthening and proprioception training once symptoms improve  * NSAIDS  * return to clinic 3 weeks.      Jose Alford M.D., M.S.  Dept. of Orthopaedic Surgery  [facility]          Again, thank you for allowing me to participate in the care of your patient.        Sincerely,        Jose Alford MD

## 2019-09-16 NOTE — PROGRESS NOTES
chief complaint:   Chief Complaint   Patient presents with     Right Ankle - Pain     Lateral ankle pain. Wearing ankle brace and tennis show. Improved from 2.5 weeks ago.        HISTORY OF PRESENT ILLNESS    Mily Grullon is a 39 year old female seen for evaluation of a right ankle injury that occurred just over 2 weeks ago. The injury occurred on 8/30/2019 when she stepped in a trench in her yard her dad was making, rolling her right ankle. Onset of pain. Presented to urgent care 8/30/2019 with xrays obtained, negative for fracture. Was placed into a boot and using a crutch.. Using crutches difficult given rib fractures from prior. Locates pain along the outer aspect of her ankle. Pain with weight bearing. Not much swelling or bruising. History of numerous stress fractures, osteoporosis.    Has now been in ankle brace, tennis shoe. Slowly improving since last visit.    Had right ankle injury as a child.    Present symptoms: mild pain.    Symptoms occur walking.    The frequency of symptoms: are constant.  Denies associated numbness or tingling.   Pain severity: 3/10  Pain quality: dull, aching and shooting    Treatment up to this point:ice and boot, crutch      Orthopedic PMH: numerous stress fractures in the past.    Other PMH:  has a past medical history of Endometriosis, site unspecified, Gastritis (2010), Osteoporosis, Urinary calculus, unspecified, and Wrist injury (Nov 19, 2003).  Patient Active Problem List    Diagnosis Date Noted     Bilateral thoracic back pain 08/13/2019     Priority: Medium     Osteoporosis 08/13/2019     Priority: Medium     Anxiety 07/17/2019     Priority: Medium     Follows with Pedro Luis (Lashell Murray)  Has [facility] worker       Endometriosis 07/17/2019     Priority: Medium     Low serum cortisol level (H) 03/16/2019     Priority: Medium     Other chronic pain 11/30/2018     Priority: Medium     Related to multiple fractures related to premature osteoporosis  As of 11/30/2018,  limit of 3 Percocet 5/325 daily with intention to taper in months ahead       Controlled substance agreement signed 11/30/2018     Priority: Medium     Prescriber of controlled substances: William Smith        Obesity (BMI 35.0-39.9) with comorbidity (H) 10/24/2018     Priority: Medium     Superficial phlebitis 09/27/2018     Priority: Medium     Long-term (current) use of anticoagulants [Z79.01] 09/19/2018     Priority: Medium     Gastroesophageal reflux disease, esophagitis presence not specified 09/10/2018     Priority: Medium     Closed nondisplaced fracture of body of left calcaneus with delayed healing, subsequent encounter 03/09/2018     Priority: Medium     Heel pain, chronic, left 03/09/2018     Priority: Medium     Pain in joint, ankle and foot, left 11/15/2017     Priority: Medium     Migraine without aura and without status migrainosus, not intractable 11/10/2015     Priority: Medium     Closed nondisplaced intertrochanteric fracture of left femur (H) 02/10/2014     Priority: Medium     Osteoporosis of multiple sites 12/10/2013     Priority: Medium     Chronic wrist pain 11/14/2012     Priority: Medium     Overview:   Due to TFCC tears and subsequent distal ulna resection and carpal fusion       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 Glover in [facility]      Priority: Medium     dx 2010- sees Dr Glover in [facility]       CARDIOVASCULAR SCREENING; LDL GOAL LESS THAN 160 10/31/2010     Priority: Medium     Personal history of nicotine dependence 12/17/2009     Priority: Medium       Surgical Hx:  has a past surgical history that includes REPAIR TRIANGULAR CART,WRIST JT (2005); REPAIR TRIANGULAR CART,WRIST JT (2007 or so); arthroscopy of joint unlisted (4/2004); REMOVAL OF OVARY/TUBE(S) (6/2003); hysteroscopy; lithotripsy; and Arthroscopy hip, osteoplasty femur proximal, combined (Left, 6/29/2016).    Medications:   Current  Outpatient Medications:      albuterol (PROAIR HFA/PROVENTIL HFA/VENTOLIN HFA) 108 (90 Base) MCG/ACT inhaler, Inhale 2 puffs into the lungs every 4 hours as needed for other (cough), Disp: 1 Inhaler, Rfl: 0     ALPRAZolam (XANAX) 0.5 MG tablet, Take 0.5 mg by mouth daily, Disp: , Rfl:      Calcium-Phosphorus-Vitamin D 250-100-500 MG-MG-UNIT CHEW, Take 2 tablets by mouth daily, Disp: 60 tablet, Rfl: 11     Cholecalciferol (VITAMIN D PO), Take 1,000 Units by mouth, Disp: , Rfl:      DULoxetine (CYMBALTA) 60 MG capsule, Take 60 mg by mouth 2 times daily , Disp: , Rfl:      eszopiclone (LUNESTA) 1 MG tablet, Take 1 mg by mouth nightly as needed, Disp: , Rfl:      famotidine (PEPCID) 20 MG tablet, Take 1 tablet (20 mg) by mouth daily as needed (heartburn/reflux), Disp: 90 tablet, Rfl: 1     HYDROcodone-acetaminophen (NORCO) 5-325 MG tablet, Take 0.5-1 tablets by mouth 3 times daily as needed for moderate to severe pain Supply should last 30 days., Disp: 60 tablet, Rfl: 0     lamoTRIgine (LAMICTAL) 100 MG tablet, Take 150 mg by mouth daily , Disp: , Rfl:      nortriptyline (PAMELOR) 50 MG capsule, Take 100 mg by mouth At Bedtime, Disp: , Rfl:      pantoprazole (PROTONIX) 20 MG EC tablet, Take by mouth 30-60 minutes before a meal., Disp: 90 tablet, Rfl: 1     propranolol (INDERAL) 10 MG tablet, Take 10 mg by mouth 3 times daily, Disp: , Rfl:      teriparatide, recombinant, (FORTEO) 600 MCG/2.4ML SOLN injection, Inject 0.08 mLs (20 mcg) Subcutaneous daily, Disp: 7.2 mL, Rfl: 1     tiZANidine (ZANAFLEX) 2 MG tablet, Take 1 tablet (2 mg) by mouth 3 times daily as needed for muscle spasms, Disp: 90 tablet, Rfl: 1     vitamin D3 (CHOLECALCIFEROL) 2000 units tablet, Take 1 tablet by mouth daily, Disp: 90 tablet, Rfl: 3    Allergies:   Allergies   Allergen Reactions     Amitriptyline Hives     Amoxicillin Diarrhea     Asa [Dihydroxyaluminum Aminoacetate]      Cipro [Ciprofloxacin]      Dizziness, vomiting, shortness of breath      "Ibuprofen [Aspartame-Ibuprofen]      GI distress       Lyrica      extrematies swell up      Morphine      ithcy     Naproxen      GI distress     Neurontin [Gabapentin]      rash     Paxil [Paroxetine] Anaphylaxis     anaphylaxis     Phenergan [Promethazine Hcl]      dystonia     Prilosec [Omeprazole]      Rash, dizziness     Gabapentin Rash       Social Hx:  reports that she quit smoking about 5 years ago. Her smoking use included cigarettes. She has never used smokeless tobacco. She reports that she does not drink alcohol or use drugs.    Family Hx: family history includes Cancer in her paternal grandmother; Genitourinary Problems in her maternal grandmother; Hypertension in her father and maternal grandmother; Lipids in her father.    REVIEW OF SYSTEMS:    CONSTITUTIONAL:NEGATIVE for fever, chills, change in weight  INTEGUMENTARY/SKIN: NEGATIVE for worrisome rashes, moles or lesions  MUSCULOSKELETAL:See HPI above  NEURO: NEGATIVE for weakness, dizziness or paresthesias    PHYSICAL EXAM:  /84   Resp 16   Ht 1.547 m (5' 0.9\")   Wt 81.6 kg (180 lb)   BMI 34.12 kg/m     GENERAL APPEARANCE: healthy, alert, no distress; accompanied by her father.  SKIN: no suspicious lesions or rashes  NEURO: Normal strength and tone, mentation intact and speech normal  PSYCH:  mentation appears normal and affect normal  RESPIRATORY: No increased work of breathing.    BILATERAL LOWER EXTREMITIES:  Gait: slight favor right in tennis shoe/brace.  No Quad atrophy, strength normal.  Intact sensation deep peroneal nerve, superficial peroneal nerve, med/lat tibial nerve, sural nerve, saphenous nerve  Intact EHL, EDL, TA, FHL, GS, quadriceps hamstrings and hip flexors  Toes warm and well perfused, brisk capillary refill. Palpable 2+ dp pulses.  Bilateral calf soft and nttp or squeeze.  Edema: none    right ANKLE  Inspection: skin intact. No ecchymosis. Minimal swelling. No gross deformity.  Tender:ATFL, CFL, lateral " malleolus  Non-tender:medial malleolus, deltoid ligament, distal 3rd fibula shaft, mid-fibula shaft, proximal fibula, distal tibia, 5th metatarsal base  Range of Motion: within normal limits, discomfort with inversion.  Strength: within normal limits.  Special tests:negative anterior drawer, negative talar tilt, negative forced external rotation/eversion, negative Álvarez sign      X-RAY: no new images today.  3 views right ankle from 8/30/2019 were reviewed in clinic today. No obvious fracture or dislocation.   There is a 0.6 cm , corticated smooth, rounded edges ossicle adjacent to the tip of the lateral malleolus that could represent a fracture of uncertain age either acute or chronic. There is little soft tissue swelling here so I suspect this is chronic. The mortise is intact and symmetric. No other Abnormalities.      Impression: 38yo female with right ankle injury, consistent with sprain    Plan:  * glad to hear improving some since last visit.    * rest  * ice  * elevation  * compression  * activity modification - avoid aggravating activities  * ankle brace/support in lace-up, supportive shoes  * ankle range of motion exercises  * Physical Therapy, strengthening and proprioception training once symptoms improve  * NSAIDS  * return to clinic 3 weeks.      Jose Alford M.D., M.S.  Dept. of Orthopaedic Surgery  [facility]

## 2019-09-18 ENCOUNTER — THERAPY VISIT (OUTPATIENT)
Dept: PHYSICAL THERAPY | Facility: CLINIC | Age: 39
End: 2019-09-18
Payer: COMMERCIAL

## 2019-09-18 DIAGNOSIS — M54.6 BILATERAL THORACIC BACK PAIN: ICD-10-CM

## 2019-09-18 DIAGNOSIS — M81.0 OSTEOPOROSIS: ICD-10-CM

## 2019-09-18 PROCEDURE — 97110 THERAPEUTIC EXERCISES: CPT | Mod: GP | Performed by: PHYSICAL THERAPIST

## 2019-09-18 PROCEDURE — 97112 NEUROMUSCULAR REEDUCATION: CPT | Mod: GP | Performed by: PHYSICAL THERAPIST

## 2019-09-20 DIAGNOSIS — S72.145S CLOSED NONDISPLACED INTERTROCHANTERIC FRACTURE OF LEFT FEMUR, SEQUELA: ICD-10-CM

## 2019-09-20 DIAGNOSIS — M81.0 OSTEOPOROSIS OF MULTIPLE SITES: ICD-10-CM

## 2019-09-20 DIAGNOSIS — M48.50XS PATHOLOGIC COMPRESSION FRACTURE OF SPINE, SEQUELA: ICD-10-CM

## 2019-09-20 DIAGNOSIS — T07.XXXA MULTIPLE FRACTURES: ICD-10-CM

## 2019-09-20 NOTE — TELEPHONE ENCOUNTER
Faxed refill request received from [facility].   Medication Requested: Forteo 600mcg  Directions: Inject 20mcg under the skin daily (discard pen 28 days after first use)  Quantity: 7.2  Last Office Visit: 7/22/19  Next Appointment Scheduled for: 1/27/20

## 2019-09-30 DIAGNOSIS — M76.62 ACHILLES TENDINITIS OF LEFT LOWER EXTREMITY: ICD-10-CM

## 2019-09-30 DIAGNOSIS — K21.9 GASTROESOPHAGEAL REFLUX DISEASE, ESOPHAGITIS PRESENCE NOT SPECIFIED: ICD-10-CM

## 2019-09-30 RX ORDER — TIZANIDINE 2 MG/1
2 TABLET ORAL 3 TIMES DAILY PRN
Qty: 90 TABLET | Refills: 1 | Status: CANCELLED | OUTPATIENT
Start: 2019-09-30

## 2019-09-30 RX ORDER — PANTOPRAZOLE SODIUM 20 MG/1
TABLET, DELAYED RELEASE ORAL
Qty: 90 TABLET | Refills: 1 | Status: CANCELLED | OUTPATIENT
Start: 2019-09-30

## 2019-09-30 RX ORDER — FAMOTIDINE 20 MG/1
20 TABLET, FILM COATED ORAL DAILY PRN
Qty: 90 TABLET | Refills: 1 | Status: CANCELLED | OUTPATIENT
Start: 2019-09-30

## 2019-09-30 NOTE — TELEPHONE ENCOUNTER
Requested Prescriptions   Pending Prescriptions Disp Refills     tiZANidine (ZANAFLEX) 2 MG tablet  Last Written Prescription Date:  08/12/19  Last Fill Quantity: 90,  # refills: 1   Last office visit: 8/30/2019 with prescribing provider:  MICHELLE Dixon Office Visit:   Next 5 appointments (look out 90 days)    Oct 02, 2019 10:30 AM CDT  Office Visit with William Smith MD  [facility] ([facility] [address] [place] [address]  [phone]          90 tablet 1     Sig: Take 1 tablet (2 mg) by mouth 3 times daily as needed for muscle spasms       There is no refill protocol information for this order        pantoprazole (PROTONIX) 20 MG EC tablet  Last Written Prescription Date:  09/03/*19  Last Fill Quantity: 90,  # refills: 1   Last office visit: 8/30/2019 with prescribing provider:  MICHELLE Dixon Office Visit:   Next 5 appointments (look out 90 days)    Oct 02, 2019 10:30 AM CDT  Office Visit with William Smith MD  [facility] ([facility] [address] [address]  [phone]          90 tablet 1     Sig: Take by mouth 30-60 minutes before a meal.       There is no refill protocol information for this order        famotidine (PEPCID) 20 MG tablet  Last Written Prescription Date:  09/03/19  Last Fill Quantity: 90,  # refills: 1   Last office visit: 8/30/2019 with prescribing provider:  MICHELLE Dixon Office Visit:   Next 5 appointments (look out 90 days)    Oct 02, 2019 10:30 AM CDT  Office Visit with William Smith MD  [facility] ([facility] [address] [address]  [phone]          90 tablet 1     Sig: Take 1 tablet (20 mg) by mouth daily as needed (heartburn/reflux)       There is no refill protocol information for this order

## 2019-10-01 ENCOUNTER — THERAPY VISIT (OUTPATIENT)
Dept: PHYSICAL THERAPY | Facility: CLINIC | Age: 39
End: 2019-10-01
Payer: COMMERCIAL

## 2019-10-01 DIAGNOSIS — M81.0 OSTEOPOROSIS: ICD-10-CM

## 2019-10-01 DIAGNOSIS — M54.6 BILATERAL THORACIC BACK PAIN: ICD-10-CM

## 2019-10-01 PROCEDURE — 97112 NEUROMUSCULAR REEDUCATION: CPT | Mod: GP | Performed by: PHYSICAL THERAPIST

## 2019-10-01 PROCEDURE — 97110 THERAPEUTIC EXERCISES: CPT | Mod: GP | Performed by: PHYSICAL THERAPIST

## 2019-10-01 NOTE — TELEPHONE ENCOUNTER
Patient has appointment with PCP tomorrow.   Routing to PCP to advise on refills.     Sara Louis RN, BSN, PHN

## 2019-10-02 ENCOUNTER — TELEPHONE (OUTPATIENT)
Dept: INTERNAL MEDICINE | Facility: CLINIC | Age: 39
End: 2019-10-02

## 2019-10-02 ENCOUNTER — OFFICE VISIT (OUTPATIENT)
Dept: INTERNAL MEDICINE | Facility: CLINIC | Age: 39
End: 2019-10-02
Payer: COMMERCIAL

## 2019-10-02 VITALS
SYSTOLIC BLOOD PRESSURE: 118 MMHG | HEART RATE: 91 BPM | TEMPERATURE: 98.4 F | RESPIRATION RATE: 16 BRPM | HEIGHT: 60 IN | DIASTOLIC BLOOD PRESSURE: 77 MMHG | WEIGHT: 180 LBS | OXYGEN SATURATION: 94 % | BODY MASS INDEX: 35.34 KG/M2

## 2019-10-02 DIAGNOSIS — M76.62 ACHILLES TENDINITIS OF LEFT LOWER EXTREMITY: ICD-10-CM

## 2019-10-02 DIAGNOSIS — F33.1 MODERATE EPISODE OF RECURRENT MAJOR DEPRESSIVE DISORDER (H): ICD-10-CM

## 2019-10-02 DIAGNOSIS — M81.0 OSTEOPOROSIS OF MULTIPLE SITES: Primary | ICD-10-CM

## 2019-10-02 DIAGNOSIS — K21.9 GASTROESOPHAGEAL REFLUX DISEASE, ESOPHAGITIS PRESENCE NOT SPECIFIED: ICD-10-CM

## 2019-10-02 DIAGNOSIS — S22.32XD CLOSED FRACTURE OF ONE RIB OF LEFT SIDE WITH ROUTINE HEALING, SUBSEQUENT ENCOUNTER: Primary | ICD-10-CM

## 2019-10-02 PROCEDURE — 90471 IMMUNIZATION ADMIN: CPT | Performed by: INTERNAL MEDICINE

## 2019-10-02 PROCEDURE — 99214 OFFICE O/P EST MOD 30 MIN: CPT | Mod: 25 | Performed by: INTERNAL MEDICINE

## 2019-10-02 PROCEDURE — 90686 IIV4 VACC NO PRSV 0.5 ML IM: CPT | Performed by: INTERNAL MEDICINE

## 2019-10-02 RX ORDER — HYDROCODONE BITARTRATE AND ACETAMINOPHEN 5; 325 MG/1; MG/1
.5-1 TABLET ORAL 2 TIMES DAILY PRN
Qty: 45 TABLET | Refills: 0 | Status: SHIPPED | OUTPATIENT
Start: 2019-10-02 | End: 2019-10-29

## 2019-10-02 RX ORDER — TIZANIDINE 2 MG/1
2 TABLET ORAL 3 TIMES DAILY PRN
Qty: 90 TABLET | Refills: 1 | Status: SHIPPED | OUTPATIENT
Start: 2019-10-02 | End: 2019-11-20

## 2019-10-02 RX ORDER — PANTOPRAZOLE SODIUM 20 MG/1
TABLET, DELAYED RELEASE ORAL
Qty: 90 TABLET | Refills: 1 | Status: SHIPPED | OUTPATIENT
Start: 2019-10-02 | End: 2020-03-12

## 2019-10-02 RX ORDER — FAMOTIDINE 20 MG/1
20 TABLET, FILM COATED ORAL DAILY PRN
Qty: 90 TABLET | Refills: 1 | Status: SHIPPED | OUTPATIENT
Start: 2019-10-02 | End: 2020-03-12

## 2019-10-02 ASSESSMENT — MIFFLIN-ST. JEOR: SCORE: 1407.97

## 2019-10-02 NOTE — PROGRESS NOTES
Subjective     Mily Grullon is a 39 year old female who presents to clinic today for the following health issues:    HPI   Depression Followup    How are you doing with your depression since your last visit? Worsened     Are you having other symptoms that might be associated with depression? No    Have you had a significant life event?  Grief or Loss     Are you feeling anxious or having panic attacks?   Yes:  0    Do you have any concerns with your use of alcohol or other drugs? No    Social History     Tobacco Use     Smoking status: Former Smoker     Types: Cigarettes     Last attempt to quit: 2014     Years since quittin.6     Smokeless tobacco: Never Used     Tobacco comment: Ecig   Substance Use Topics     Alcohol use: No     Frequency: Never     Drug use: No     PHQ 2018   PHQ-9 Total Score 12 16 22   Q9: Thoughts of better off dead/self-harm past 2 weeks Not at all Not at all Several days     DEJON-7 SCORE 2018   Total Score 11 17 16     Short-term benzodiazepine still ongoing and she's dealing with anxiety / depression and learning coping management strategies.  Has outpatient program at [facility].      Suicide Assessment Five-step Evaluation and Treatment (SAFE-T)      How many servings of fruits and vegetables do you eat daily?  2-3    On average, how many sweetened beverages do you drink each day (soda, juice, sweet tea, etc)?   1    How many days per week do you miss taking your medication? 0      Medication Followup of Norco and tizanidine    Taking Medication as prescribed: yes    Side Effects:  None    Medication Helping Symptoms:  yes   Still taking twice daily.  Agreed to 0.5 tab/mo taper moving forward (presuming no breaks or new acute causes of pain).    1. Closed fracture of one rib of left side with routine healing, subsequent encounter    2. Gastroesophageal reflux disease, esophagitis presence not specified    3. Achilles tendinitis  of left lower extremity        PMH: Updated and/or reviewed in chart.    ROS:  Constitutional, psychiatric, cardiovascular, pulmonary, gi and gu systems are otherwise negative.    Objective   OBJECTIVE:                                                    /77   Pulse 91   Temp 98.4  F (36.9  C) (Tympanic)   Resp 16   Ht 1.524 m (5')   Wt 81.6 kg (180 lb)   SpO2 94%   BMI 35.15 kg/m      GEN: No acute distress  EYES: No conjunctival injection or icterus, EOMI grossly intact  RESP: Unlabored, regular  NEURO: Normal gait, MAEx4, light touch sensation grossly intact  PSYCH: Normal mood and affect  MUSCULOSKELETAL: right ankle in brace, no ecchymosis    Results for orders placed or performed in visit on 08/30/19   XR Ankle Right G/E 3 Views    Narrative    XR RIGHT ANKLE THREE OR MORE VIEWS   8/30/2019 7:28 PM     HISTORY: Injury of right ankle, initial encounter.    COMPARISON: Right ankle injury.      Impression    IMPRESSION: There is a 0.6 cm ossicle adjacent to the tip of the  lateral malleolus that could represent a fracture of uncertain age  either acute or chronic. There is little soft tissue swelling here so  I suspect this is chronic. The mortise is intact. No other  abnormalities.    ASHLYN TOBAR MD      Assessment & Plan   ASSESSMENT/PLAN:                                                    1. Closed fracture of one rib of left side with routine healing, subsequent encounter  Osteoporosis  Patient agreed to 0.5 tab/mo taper.  We discussed possibly getting ambulatory assist device (cane vs walker) for winter weather to prevent falls and fractures.  - HYDROcodone-acetaminophen (NORCO) 5-325 MG tablet; Take 0.5-1 tablets by mouth 2 times daily as needed for moderate to severe pain Supply should last 30 days.  Dispense: 45 tablet; Refill: 0    2. Gastroesophageal reflux disease, esophagitis presence not specified  PPI/H2b dependent but ongoing lower doses.  - famotidine (PEPCID) 20 MG tablet; Take 1  tablet (20 mg) by mouth daily as needed (heartburn/reflux)  Dispense: 90 tablet; Refill: 1  - pantoprazole (PROTONIX) 20 MG EC tablet; Take by mouth 30-60 minutes before a meal.  Dispense: 90 tablet; Refill: 1    3. Achilles tendinitis of lower extremity  OK to continue   - tiZANidine (ZANAFLEX) 2 MG tablet; Take 1 tablet (2 mg) by mouth 3 times daily as needed for muscle spasms  Dispense: 90 tablet; Refill: 1    4.  Major depression -- continues to follow-up with psychiatry, psychotherapy, [facility] worker, and planning outpatient therapy -- making progress with improved insight.    Orders Placed This Encounter     FLU VAC PRESRV FREE QUAD SPLIT VIR 3+YRS IM     famotidine (PEPCID) 20 MG tablet     pantoprazole (PROTONIX) 20 MG EC tablet     HYDROcodone-acetaminophen (NORCO) 5-325 MG tablet     tiZANidine (ZANAFLEX) 2 MG tablet        Return in about 1 month (around 11/2/2019) for Follow-up (1 or 2 months OK).    William Smith MD

## 2019-10-02 NOTE — TELEPHONE ENCOUNTER
Reason for Call: Request for an order or referral:    Order or referral being requested: Would like to know if an order needs to be written for a walker and where she could get a walker    Date needed: as soon as possible    Has the patient been seen by the PCP for this problem? YES    Additional comments: N/A    Phone number Patient can be reached at:  Cell number on file:    Telephone Information:   Mobile [phone]       Best Time:  Any    Can we leave a detailed message on this number?  YES    Call taken on 10/2/2019 at 4:23 PM by Shadia Stanford

## 2019-10-03 NOTE — TELEPHONE ENCOUNTER
Per are, a PA will be needed in order for any walker to be covered. Will complete form online and send OV notes and a DME. Provider line if needed [phone].

## 2019-10-09 ENCOUNTER — MEDICAL CORRESPONDENCE (OUTPATIENT)
Dept: HEALTH INFORMATION MANAGEMENT | Facility: CLINIC | Age: 39
End: 2019-10-09

## 2019-10-09 ENCOUNTER — TELEPHONE (OUTPATIENT)
Dept: ORTHOPEDICS | Facility: CLINIC | Age: 39
End: 2019-10-09

## 2019-10-09 NOTE — TELEPHONE ENCOUNTER
Called and spoke to patient letting her know that she would have to get the surgery report from [facility]. She understood and thanked me for calling.  Melinda Richardson Certified Medical Assistant

## 2019-10-09 NOTE — TELEPHONE ENCOUNTER
Reason for Call:  Other call back    Detailed comments: Wants to know if Dr. Alford has access to medical records of surgery in 2014, Will be coming in to fill a release of information form.    Phone Number Patient can be reached at: Home number on file [phone] (home)    Best Time: any    Can we leave a detailed message on this number? YES    Call taken on 10/9/2019 at 9:26 AM by Eddie Daniel

## 2019-10-09 NOTE — TELEPHONE ENCOUNTER
Form completed and in the basket for [employer], DME order pended (additional Dx codes may be needed)  Please route back to Med Sec pool for completion/faxing.

## 2019-10-15 ENCOUNTER — THERAPY VISIT (OUTPATIENT)
Dept: PHYSICAL THERAPY | Facility: CLINIC | Age: 39
End: 2019-10-15
Payer: COMMERCIAL

## 2019-10-15 DIAGNOSIS — M54.6 BILATERAL THORACIC BACK PAIN: ICD-10-CM

## 2019-10-15 DIAGNOSIS — M81.0 OSTEOPOROSIS: ICD-10-CM

## 2019-10-15 PROCEDURE — 97112 NEUROMUSCULAR REEDUCATION: CPT | Mod: GP | Performed by: PHYSICAL THERAPIST

## 2019-10-15 PROCEDURE — 97110 THERAPEUTIC EXERCISES: CPT | Mod: GP | Performed by: PHYSICAL THERAPIST

## 2019-10-15 NOTE — PROGRESS NOTES
"Subjective:  HPI                    Objective:  System    Physical Exam    General     ROS    Assessment/Plan:    PROGRESS  REPORT    Progress reporting period is from 8-13-19 to 10-15-19, 5 visits    SUBJECTIVE  Subjective changes noted by patient:  .  Subjective: Still some clicking in the thoracic area L, not painful with the click. Still reports almost constant L thoracic pain. R ankle is better. so is able to start doing some short duration walking as exercise. Also still using home stationary bike  States she has missed some days of her exercises as \"just forgot\", discussed strategies to help her remember such as sticky note by bed.      Current Pain level: 4/10.      Initial Pain level: 4/10(increases to 7-8/10).   Changes in function:  Yes (See Goal flowsheet attached for changes in current functional level)  Adverse reaction to treatment or activity: None    OBJECTIVE  Changes noted in objective findings:  Yes,   Objective: Noted to use better overall posture with fewer cues needed during PT session. Also using safer mechanics moving from supine to sit to stand with very few cues. U/e strength 5/5 except L IR/ER 4/5. TAROM flx not tested, ext wfl, R SB 50%, L SB 75% with pain and rotn wnl B  Progressing ex program to build thoracic strength, abdominal/glute strength and balance     ASSESSMENT/PLAN  Updated problem list and treatment plan: Diagnosis 1:  Thoracic pain/osteoporosis  Pain -  manual therapy, self management, education and home program  Decreased ROM/flexibility - manual therapy, therapeutic exercise and home program  Decreased strength - therapeutic exercise, therapeutic activities and home program  Impaired balance - neuro re-education, therapeutic activities and home program  Impaired muscle performance - neuro re-education and home program  Decreased function - therapeutic activities and home program  STG/LTGs have been met or progress has been made towards goals:  Yes (See Goal flow sheet " completed today.)  Assessment of Progress: The patient's condition is improving.  Self Management Plans:  Patient has been instructed in a home treatment program.  Patient  has been instructed in self management of symptoms.  I have re-evaluated this patient and find that the nature, scope, duration and intensity of the therapy is appropriate for the medical condition of the patient.  Mily continues to require the following intervention to meet STG and LTG's:  PT    Recommendations:  This patient would benefit from continued therapy.     Frequency:  2 X a month, once daily  Duration:  for 2.5 months        Please refer to the daily flowsheet for treatment today, total treatment time and time spent performing 1:1 timed codes.

## 2019-10-29 ENCOUNTER — TELEPHONE (OUTPATIENT)
Dept: INTERNAL MEDICINE | Facility: CLINIC | Age: 39
End: 2019-10-29

## 2019-10-29 DIAGNOSIS — S22.32XD CLOSED FRACTURE OF ONE RIB OF LEFT SIDE WITH ROUTINE HEALING, SUBSEQUENT ENCOUNTER: ICD-10-CM

## 2019-10-29 RX ORDER — HYDROCODONE BITARTRATE AND ACETAMINOPHEN 5; 325 MG/1; MG/1
.5-1 TABLET ORAL 2 TIMES DAILY PRN
Qty: 30 TABLET | Refills: 0 | Status: SHIPPED | OUTPATIENT
Start: 2019-10-29 | End: 2019-11-26

## 2019-10-29 NOTE — TELEPHONE ENCOUNTER
Reason for Call:  Medication or medication refill:    Do you use a [facility]?  Name of the pharmacy and phone number for the current request:  [facility] [phone]    Name of the medication requested: Norco    Other request: Would like to  RX on Friday since [facility] pharmacy is closed on weekends    Can we leave a detailed message on this number? YES    Phone number patient can be reached at: Home number on file [phone] (home)    Best Time: Any    Call taken on 10/29/2019 at 9:45 AM by Shadia Stanford

## 2019-11-07 NOTE — PROGRESS NOTES
ANTICOAGULATION FOLLOW-UP CLINIC VISIT    Patient Name:  Mily Grullon  Date:  10/5/2018  Contact Type:  Face to Face    SUBJECTIVE:     Patient Findings     Positives Initiation of therapy, No Problem Findings    Comments Lab recheck was 3.2, today in goal with goal ranges from 35-30 mg weekly doses, so will do 32.5 mg in 7 day and recheck.           OBJECTIVE    INR Protime   Date Value Ref Range Status   10/05/2018 2.4 (A) 0.86 - 1.14 Final       ASSESSMENT / PLAN  INR assessment THER    Recheck INR In: 1 WEEK    INR Location Clinic      Anticoagulation Summary as of 10/5/2018     INR goal 2.0-3.0   Today's INR 2.4   Warfarin maintenance plan 2.5 mg (5 mg x 0.5) on Fri; 5 mg (5 mg x 1) all other days   Full warfarin instructions 2.5 mg on Fri; 5 mg all other days   Weekly warfarin total 32.5 mg   Plan last modified Radha Ling (10/5/2018)   Next INR check 10/12/2018   Target end date 11/19/2018    Indications   Long-term (current) use of anticoagulants [Z79.01] [Z79.01]  Embolism and thrombosis (H) [I74.9] [I74.9]         Anticoagulation Episode Summary     INR check location     Preferred lab     Send INR reminders to [facility]    Comments       Anticoagulation Care Providers     Provider Role Specialty Phone number    William Smith MD [facility] Internal Medicine [phone]            See the Encounter Report to view Anticoagulation Flowsheet and Dosing Calendar (Go to Encounters tab in chart review, and find the Anticoagulation Therapy Visit)        RADHA LING                no

## 2019-11-18 DIAGNOSIS — M76.62 ACHILLES TENDINITIS OF LEFT LOWER EXTREMITY: ICD-10-CM

## 2019-11-19 NOTE — TELEPHONE ENCOUNTER
Requested Prescriptions   Pending Prescriptions Disp Refills     tiZANidine (ZANAFLEX) 2 MG tablet 90 tablet 1     Sig: Take 1 tablet (2 mg) by mouth 3 times daily as needed for muscle spasms  Last Written Prescription Date:  10/29/19  Last Fill Quantity: 90,  # refills: 1   Last office visit: 10/2/2019 with prescribing provider:  Luis   Future Office Visit:   Next 5 appointments (look out 90 days)    Dec 04, 2019 10:30 AM CST  Office Visit with William Smith MD  [facility] ([facility]) [address] [facility] [id number] [phone]   Jan 27, 2020 10:30 AM CST  Return Visit with Dallas Flores MD  [facility] ([facility]) [address] [place] [id number] [phone]              There is no refill protocol information for this order

## 2019-11-20 RX ORDER — TIZANIDINE 2 MG/1
2 TABLET ORAL 3 TIMES DAILY PRN
Qty: 90 TABLET | Refills: 1 | Status: SHIPPED | OUTPATIENT
Start: 2019-11-20 | End: 2020-01-15

## 2019-11-20 NOTE — TELEPHONE ENCOUNTER
Routing refill request to provider for review/approval because:  Drug not on the [facility] refill protocol   Patient has appointment on PCP at 12/4/19.    Sara Louis, RN, BSN, PHN

## 2019-11-25 ENCOUNTER — TELEPHONE (OUTPATIENT)
Dept: INTERNAL MEDICINE | Facility: CLINIC | Age: 39
End: 2019-11-25

## 2019-11-25 DIAGNOSIS — S22.32XD CLOSED FRACTURE OF ONE RIB OF LEFT SIDE WITH ROUTINE HEALING, SUBSEQUENT ENCOUNTER: ICD-10-CM

## 2019-11-25 NOTE — TELEPHONE ENCOUNTER
Patient calling is running low on her norco, would like a refill sent to pharmacy would like to  before thanksgiving please.

## 2019-11-25 NOTE — TELEPHONE ENCOUNTER
Routing refill request to provider for review/approval because:  Drug not on the [facility] refill protocol     Last seen 10-2-19  Last filled 10-29-19 #30

## 2019-11-26 RX ORDER — HYDROCODONE BITARTRATE AND ACETAMINOPHEN 5; 325 MG/1; MG/1
.5-1 TABLET ORAL 2 TIMES DAILY PRN
Qty: 25 TABLET | Refills: 0 | Status: SHIPPED | OUTPATIENT
Start: 2019-11-26 | End: 2019-12-23

## 2019-12-04 ENCOUNTER — ANCILLARY PROCEDURE (OUTPATIENT)
Dept: GENERAL RADIOLOGY | Facility: CLINIC | Age: 39
End: 2019-12-04
Attending: INTERNAL MEDICINE
Payer: COMMERCIAL

## 2019-12-04 ENCOUNTER — OFFICE VISIT (OUTPATIENT)
Dept: INTERNAL MEDICINE | Facility: CLINIC | Age: 39
End: 2019-12-04
Payer: COMMERCIAL

## 2019-12-04 VITALS
OXYGEN SATURATION: 100 % | DIASTOLIC BLOOD PRESSURE: 69 MMHG | SYSTOLIC BLOOD PRESSURE: 118 MMHG | HEIGHT: 60 IN | HEART RATE: 99 BPM | RESPIRATION RATE: 16 BRPM | TEMPERATURE: 98.5 F | WEIGHT: 180 LBS | BODY MASS INDEX: 35.34 KG/M2

## 2019-12-04 DIAGNOSIS — M25.532 LEFT WRIST PAIN: Primary | ICD-10-CM

## 2019-12-04 DIAGNOSIS — M81.0 OSTEOPOROSIS OF MULTIPLE SITES: ICD-10-CM

## 2019-12-04 DIAGNOSIS — R05.9 COUGH: ICD-10-CM

## 2019-12-04 DIAGNOSIS — M25.532 LEFT WRIST PAIN: ICD-10-CM

## 2019-12-04 DIAGNOSIS — M25.532 CHRONIC PAIN OF LEFT WRIST: ICD-10-CM

## 2019-12-04 DIAGNOSIS — G89.29 CHRONIC PAIN OF LEFT WRIST: ICD-10-CM

## 2019-12-04 PROCEDURE — 71101 X-RAY EXAM UNILAT RIBS/CHEST: CPT | Mod: LT

## 2019-12-04 PROCEDURE — 73110 X-RAY EXAM OF WRIST: CPT | Mod: LT

## 2019-12-04 PROCEDURE — 99214 OFFICE O/P EST MOD 30 MIN: CPT | Performed by: INTERNAL MEDICINE

## 2019-12-04 RX ORDER — FLUTICASONE PROPIONATE 50 MCG
1-2 SPRAY, SUSPENSION (ML) NASAL DAILY
Qty: 16 G | Refills: 2 | Status: SHIPPED | OUTPATIENT
Start: 2019-12-04 | End: 2022-05-27

## 2019-12-04 RX ORDER — ALBUTEROL SULFATE 90 UG/1
2 AEROSOL, METERED RESPIRATORY (INHALATION) EVERY 4 HOURS PRN
Qty: 1 INHALER | Refills: 0 | Status: SHIPPED | OUTPATIENT
Start: 2019-12-04 | End: 2021-05-25

## 2019-12-04 ASSESSMENT — MIFFLIN-ST. JEOR: SCORE: 1407.97

## 2019-12-04 NOTE — PROGRESS NOTES
Subjective     Mily Grullon is a 39 year old female who presents to clinic today for the following health issues:      Chief Complaint   Patient presents with     Wrist Pain     Back Pain     Cough     please RX for cough        HPI   Back Pain       Duration: since back FX        Specific cause:     Description:   Location of pain: middle of back middle to left sided  Character of pain: sharp, dull ache, clicking sensation and intermittent  Pain radiation:none and radiates to left side  New numbness or weakness in legs, not attributed to pain:  no     Intensity: Currently 4/10, At its worst 8/10    History:   Pain interferes with job: Not applicable  History of back problems: no prior back problems  Any previous MRI or X-rays: one x ray of spine through endocrinology  Sees a specialist for back pain:  No  Therapies tried without relief: PT makes it worse    Alleviating factors:   Improved by: rest      Precipitating factors:  Worsened by: Coughing    Accompanying Signs & Symptoms:  Risk of Fracture:  None  Risk of Cauda Equina:  None  Risk of Infection:  None  Risk of Cancer:  None  Risk of Ankylosing Spondylitis:  Onset at age <35, male, AND morning back stiffness. no     Reports a degree of paranoia/anxiety about falling and coughing this time of year.  Had URI a few weeks ago and continues to cough at night, occasionally waking her up.    Musculoskeletal problem/pain      Duration: today    Description  Location: left wrist    Intensity:  moderate    Accompanying signs and symptoms: none    History  Previous similar problem: YES- has screws in this wrist from a work injury  Previous evaluation:  none    Precipitating or alleviating factors:  Trauma or overuse: no   Aggravating factors include: lifting    Therapies tried and outcome: pain pill    When drinking 1/2 gal milk, felt sharp pain in left wrist with chronic pain and screws from 15 or so years back per patient.    1. Left wrist pain    2. Cough    3.  Chronic pain of left wrist    4. Osteoporosis of multiple sites        PMH: Updated and/or reviewed in chart.    ROS:  Constitutional, psych, cardiovascular, pulmonary, gi and gu systems are otherwise negative.    Objective   OBJECTIVE:                                                    /69   Pulse 99   Temp 98.5  F (36.9  C) (Tympanic)   Resp 16   Ht 1.524 m (5')   Wt 81.6 kg (180 lb)   SpO2 100%   BMI 35.15 kg/m      GEN: No acute distress  EYES: No conjunctival injection or icterus, EOMI grossly intact  RESP: Unlabored, regular  NEURO: Normal gait, MAEx4, light touch sensation grossly intact  PSYCH: Normal/baseline anxious mood and normal affect    No results found for any visits on 12/04/19.   Assessment & Plan   ASSESSMENT/PLAN:                                                    1. Left wrist pain  3. Chronic pain of left wrist  Making progress taper her Norco and I expect she'll be able to continue 5 tabs/month plan to off.  - XR Wrist Left G/E 3 Views; Future    2. Cough  OK to trial intranasal corticosteroid and codeine-guaifenesin cough syrup PRN given high risk of fracture and further narcotic use fracture may trigger.  - fluticasone (FLONASE) 50 MCG/ACT nasal spray; Spray 1-2 sprays into both nostrils daily Use 1 spray per nostril per day for 2 weeks.  May increase to 2 sprays per nostril per day after.  Dispense: 16 g; Refill: 2  - albuterol (PROAIR HFA/PROVENTIL HFA/VENTOLIN HFA) 108 (90 Base) MCG/ACT inhaler; Inhale 2 puffs into the lungs every 4 hours as needed for other (cough)  Dispense: 1 Inhaler; Refill: 0  - codeine-guaiFENesin 200-10 MG/5ML LIQD; Take 5-10 mLs by mouth nightly as needed (cough)  Dispense: 150 mL; Refill: 0  - XR Ribs & Chest Lt 3v; Future    4. Osteoporosis of multiple sites  Disability paperwork completed.  Given very limited physical ability and intolerance of work prior, I hope she will be able to pursue part-time employment in a sedentary position in the future  once minimal lifting restrictions may be lifted.  Likewise, I want her to work with physical therapy on safest use of her walker.  She may need a lighterweight aluminum device if unable to safely command her current 11-lb walker.      Orders Placed This Encounter     XR Wrist Left G/E 3 Views     XR Ribs & Chest Lt 3v     fluticasone (FLONASE) 50 MCG/ACT nasal spray     albuterol (PROAIR HFA/PROVENTIL HFA/VENTOLIN HFA) 108 (90 Base) MCG/ACT inhaler     codeine-guaiFENesin 200-10 MG/5ML LIQD        Return in about 3 months (around 3/4/2020) for Follow-up.    William Smith MD

## 2019-12-19 ENCOUNTER — TELEPHONE (OUTPATIENT)
Dept: INTERNAL MEDICINE | Facility: CLINIC | Age: 39
End: 2019-12-19

## 2019-12-19 DIAGNOSIS — S22.32XD CLOSED FRACTURE OF ONE RIB OF LEFT SIDE WITH ROUTINE HEALING, SUBSEQUENT ENCOUNTER: ICD-10-CM

## 2019-12-19 NOTE — TELEPHONE ENCOUNTER
Hydrocodone-acetaminophine  Last Written Prescription Date:  11/26/2019  Last Fill Quantity: 25,  # refills: 0   Last office visit: 12/4/2019 with prescribing provider:  ping   Future Office Visit:   Next 5 appointments (look out 90 days)    Jan 27, 2020 10:30 AM CST  Return Visit with Dallas Flores MD  [facility] ([facility]) [address] [place] [id number] [phone]   Mar 04, 2020 10:00 AM CST  Office Visit with William Smith MD  [facility] ([facility]) [address] [place] [id number] [phone]         There are no medications in this encounter.     Requested Prescriptions   Pending Prescriptions Disp Refills     HYDROcodone-acetaminophen (NORCO) 5-325 MG tablet 25 tablet 0     Sig: Take 0.5-1 tablets by mouth 2 times daily as needed for moderate to severe pain Supply should last 30 days.       There is no refill protocol information for this order        Controlled Substance Refill Request for -  Problem List Complete:  Yes   checked in past 3 months?  No, route to RN

## 2019-12-20 NOTE — TELEPHONE ENCOUNTER
Per :    HYDROcodone-acetaminophen (NORCO) 5-325 MG tablet    1.  Written: 11/26/19  Filled: 11/27/19  Quantity: 25  Provider: Dr. Luis Louis, RN, BSN, PHN

## 2019-12-23 ENCOUNTER — ANCILLARY PROCEDURE (OUTPATIENT)
Dept: BONE DENSITY | Facility: CLINIC | Age: 39
End: 2019-12-23
Attending: INTERNAL MEDICINE
Payer: COMMERCIAL

## 2019-12-23 ENCOUNTER — ANCILLARY PROCEDURE (OUTPATIENT)
Dept: BONE DENSITY | Facility: CLINIC | Age: 39
End: 2019-12-23
Payer: COMMERCIAL

## 2019-12-23 DIAGNOSIS — M80.80XD OTHER OSTEOPOROSIS WITH CURRENT PATHOLOGICAL FRACTURE WITH ROUTINE HEALING, SUBSEQUENT ENCOUNTER: ICD-10-CM

## 2019-12-23 DIAGNOSIS — S22.32XD CLOSED FRACTURE OF ONE RIB OF LEFT SIDE WITH ROUTINE HEALING, SUBSEQUENT ENCOUNTER: ICD-10-CM

## 2019-12-23 PROCEDURE — 77081 DXA BONE DENSITY APPENDICULR: CPT | Performed by: INTERNAL MEDICINE

## 2019-12-23 PROCEDURE — 77080 DXA BONE DENSITY AXIAL: CPT | Mod: XU | Performed by: INTERNAL MEDICINE

## 2019-12-23 RX ORDER — HYDROCODONE BITARTRATE AND ACETAMINOPHEN 5; 325 MG/1; MG/1
.5-1 TABLET ORAL 2 TIMES DAILY PRN
Qty: 20 TABLET | Refills: 0 | Status: CANCELLED | OUTPATIENT
Start: 2019-12-23

## 2019-12-23 RX ORDER — HYDROCODONE BITARTRATE AND ACETAMINOPHEN 5; 325 MG/1; MG/1
.5-1 TABLET ORAL 2 TIMES DAILY PRN
Qty: 20 TABLET | Refills: 0 | Status: SHIPPED | OUTPATIENT
Start: 2019-12-23 | End: 2020-01-21

## 2019-12-23 NOTE — TELEPHONE ENCOUNTER
Requested Prescriptions   Pending Prescriptions Disp Refills     HYDROcodone-acetaminophen (NORCO) 5-325 MG tablet 20 tablet 0     Sig: Take 0.5-1 tablets by mouth 2 times daily as needed for moderate to severe pain Supply should last 30 days.  norco      Last Written Prescription Date:  11/27/19  Last Fill Quantity: 25,   # refills: 0  Last Office Visit: 12/4/19 Luis  Future Office visit:    Next 5 appointments (look out 90 days)    Jan 27, 2020 10:30 AM CST  Return Visit with Dallas Flores MD  [facility] ([facility]) [address] [place] [id number]  [phone]   Mar 04, 2020 10:00 AM CST  Office Visit with William Smith MD  [facility] ([facility]) [address] [place] [phone]  [phone]           Routing refill request to provider for review/approval because:  Drug not on the [facility], P or [facility] refill protocol or controlled substance       There is no refill protocol information for this order

## 2020-01-02 ENCOUNTER — TELEPHONE (OUTPATIENT)
Dept: ENDOCRINOLOGY | Facility: CLINIC | Age: 40
End: 2020-01-02

## 2020-01-02 NOTE — TELEPHONE ENCOUNTER
Per Dr. Flores Result Note Message:  Please inform patient that the DEXA scan has shown interval improvement in the bone density compared to the previously done DEXA scan.  We will be discussing further regarding the findings at the time of her 1/27/20 follow-up appointment.    Patient notified of results and recommendations. Mily is agreeable with plan and has no further questions at this time.    Kristina Sheppard LPN  Diabetes Clinic Coordinator   [facility]in[facility]  [facility]

## 2020-01-15 DIAGNOSIS — M76.62 ACHILLES TENDINITIS OF LEFT LOWER EXTREMITY: ICD-10-CM

## 2020-01-15 RX ORDER — TIZANIDINE 2 MG/1
2 TABLET ORAL 3 TIMES DAILY PRN
Qty: 90 TABLET | Refills: 1 | Status: SHIPPED | OUTPATIENT
Start: 2020-01-15 | End: 2020-03-04

## 2020-01-15 NOTE — TELEPHONE ENCOUNTER
Requested Prescriptions   Pending Prescriptions Disp Refills     tiZANidine (ZANAFLEX) 2 MG tablet 90 tablet 1     Sig: Take 1 tablet (2 mg) by mouth 3 times daily as needed for muscle spasms   Last Written Prescription Date:  12-19-19  Last Fill Quantity: 90,  # refills: 1   Last office visit: 12/4/2019 with prescribing provider:  12-4-19   Future Office Visit:   Next 5 appointments (look out 90 days)    Jan 27, 2020 10:30 AM CST  Return Visit with Dallas Flores MD  [facility] ([facility]) [address] [place] [phone]  [phone]   Mar 04, 2020 10:00 AM CST  Office Visit with William Smith MD  [facility] ([facility]) [address] [place] [id number] [phone]           There is no refill protocol information for this order

## 2020-01-21 ENCOUNTER — TELEPHONE (OUTPATIENT)
Dept: INTERNAL MEDICINE | Facility: CLINIC | Age: 40
End: 2020-01-21

## 2020-01-21 DIAGNOSIS — S22.32XD CLOSED FRACTURE OF ONE RIB OF LEFT SIDE WITH ROUTINE HEALING, SUBSEQUENT ENCOUNTER: ICD-10-CM

## 2020-01-21 RX ORDER — HYDROCODONE BITARTRATE AND ACETAMINOPHEN 5; 325 MG/1; MG/1
.5-1 TABLET ORAL 2 TIMES DAILY PRN
Qty: 15 TABLET | Refills: 0 | Status: SHIPPED | OUTPATIENT
Start: 2020-01-21 | End: 2020-02-19

## 2020-01-21 NOTE — TELEPHONE ENCOUNTER
Per :    Hydrocodone-Acetamin 5-325 Mg     1.  Written: 12/23/19  Filled: 12/26/19  Quantity: 20  Provider: Dr. Luis Louis, RN, BSN, PHN

## 2020-01-27 ENCOUNTER — TELEPHONE (OUTPATIENT)
Dept: ENDOCRINOLOGY | Facility: CLINIC | Age: 40
End: 2020-01-27

## 2020-01-27 ENCOUNTER — OFFICE VISIT (OUTPATIENT)
Dept: ENDOCRINOLOGY | Facility: CLINIC | Age: 40
End: 2020-01-27
Payer: COMMERCIAL

## 2020-01-27 VITALS
DIASTOLIC BLOOD PRESSURE: 83 MMHG | SYSTOLIC BLOOD PRESSURE: 123 MMHG | BODY MASS INDEX: 34.46 KG/M2 | TEMPERATURE: 98 F | HEART RATE: 85 BPM | OXYGEN SATURATION: 98 % | RESPIRATION RATE: 18 BRPM | WEIGHT: 182.5 LBS | HEIGHT: 61 IN

## 2020-01-27 DIAGNOSIS — Z51.81 MEDICATION MONITORING ENCOUNTER: ICD-10-CM

## 2020-01-27 DIAGNOSIS — S72.145S CLOSED NONDISPLACED INTERTROCHANTERIC FRACTURE OF LEFT FEMUR, SEQUELA: ICD-10-CM

## 2020-01-27 DIAGNOSIS — M81.0 OSTEOPOROSIS OF MULTIPLE SITES: Primary | ICD-10-CM

## 2020-01-27 DIAGNOSIS — M48.50XS PATHOLOGIC COMPRESSION FRACTURE OF SPINE, SEQUELA: ICD-10-CM

## 2020-01-27 DIAGNOSIS — M80.80XD OTHER OSTEOPOROSIS WITH CURRENT PATHOLOGICAL FRACTURE WITH ROUTINE HEALING, SUBSEQUENT ENCOUNTER: ICD-10-CM

## 2020-01-27 LAB
ALBUMIN SERPL-MCNC: 3.8 G/DL (ref 3.4–5)
ALP SERPL-CCNC: 103 U/L (ref 40–150)
BUN SERPL-MCNC: 12 MG/DL (ref 7–30)
CALCIUM SERPL-MCNC: 9.4 MG/DL (ref 8.5–10.1)
CREAT SERPL-MCNC: 0.81 MG/DL (ref 0.52–1.04)
GFR SERPL CREATININE-BSD FRML MDRD: >90 ML/MIN/{1.73_M2}
TSH SERPL DL<=0.005 MIU/L-ACNC: 0.62 MU/L (ref 0.4–4)

## 2020-01-27 PROCEDURE — 82310 ASSAY OF CALCIUM: CPT | Performed by: INTERNAL MEDICINE

## 2020-01-27 PROCEDURE — 82040 ASSAY OF SERUM ALBUMIN: CPT | Performed by: INTERNAL MEDICINE

## 2020-01-27 PROCEDURE — 84520 ASSAY OF UREA NITROGEN: CPT | Performed by: INTERNAL MEDICINE

## 2020-01-27 PROCEDURE — 36415 COLL VENOUS BLD VENIPUNCTURE: CPT | Performed by: INTERNAL MEDICINE

## 2020-01-27 PROCEDURE — 84443 ASSAY THYROID STIM HORMONE: CPT | Performed by: INTERNAL MEDICINE

## 2020-01-27 PROCEDURE — 82565 ASSAY OF CREATININE: CPT | Performed by: INTERNAL MEDICINE

## 2020-01-27 PROCEDURE — 99000 SPECIMEN HANDLING OFFICE-LAB: CPT | Performed by: INTERNAL MEDICINE

## 2020-01-27 PROCEDURE — 82306 VITAMIN D 25 HYDROXY: CPT | Performed by: INTERNAL MEDICINE

## 2020-01-27 PROCEDURE — 99214 OFFICE O/P EST MOD 30 MIN: CPT | Performed by: INTERNAL MEDICINE

## 2020-01-27 PROCEDURE — 84080 ASSAY ALKALINE PHOSPHATASES: CPT | Mod: 90 | Performed by: INTERNAL MEDICINE

## 2020-01-27 PROCEDURE — 84075 ASSAY ALKALINE PHOSPHATASE: CPT | Performed by: INTERNAL MEDICINE

## 2020-01-27 ASSESSMENT — MIFFLIN-ST. JEOR: SCORE: 1429.31

## 2020-01-27 ASSESSMENT — PAIN SCALES - GENERAL: PAINLEVEL: NO PAIN (0)

## 2020-01-27 NOTE — TELEPHONE ENCOUNTER
"Contacted patient to further review details of request. Patient reports that she needs a letter explaining DEXA scan. Patient notes that report says significant improvement but patient still has severe Osteoporosis. Patient states that the people who request her medical records for her disability will take \"significant improvement\" as she is \"all better.\" Patient notes that she talked to Dr. Flores about DEXA at office visit and just forgot to ask for letter.     Will review with Dr. Flores.      Marielle Suarez RN  Endocrine Care Coordinator  [facility]    "

## 2020-01-27 NOTE — LETTER
1/27/2020         RE: Mily Grullon  [address]  [place] [address]        Dear Colleague,    Thank you for referring your patient, Mily Grullon, to the [facility]. Please see a copy of my visit note below.    Endocrinology Clinic Visit    Chief Complaint: RECHECK (6 month follow up for Osteoporosis)     Information obtained from:Patient here with her dad.    Subjective:         HPI: Mily Grullon is a 39 year old female with history of Osteoporosis and fragility fracture who is here for a follow up.     Patient has history of endometriosis and per report has underwent right oophorectomy at the age of 22.  She was started on Depo-Provera at the  age of 22.  She has been on this medication until she was taken off of it in January of 2019.   Osteoporosis and fragility fracture presumed to be secondary to Depo-Provera  She was evaluated for osteoporosis at an outside facility; with [facility] [place];  She had a screening test for celiac disease which was within the normal limits.  She was screened for Cushing's syndrome which was normal.  She was screened for hyper-calciuria again which came back within the normal limits.  Thyroid lab results were within the normal limits.  GFR is within the normal limits and electrolytes including calcium.  He has had elevated PTH level in the setting of low vitamin D level and based on that result she was started on ergocalciferol 50,000 international unit twice weekly and she has been on this medication for the last 6 weeks at least. PTH elevation resolved after correct low vitamin D level.         DEXA scan 2018   FINDINGS:               Lumbar Spine (L1-L4) Z-score:  -1.7, degenerative changes present               Right Femoral Neck Z-score:  -3.1               Forearm (radius 33%) Z-score:  -0.3  The left femur is not acceptable for evaluation due to previous surgical repair                                Lumbar (L1-L4) BMD:  1.078               Previous: 1.054                                                 Right Total Hip BMD: 0.751                Previous: 0.718                            Forearm (radius 33%) BMD: 0.694     Previous: NA  DEXA scan from 2014:   Femoral BMD 0.531 Z score -3.5     She is not currently on steroids. Multiple fractures over the last five years:  Hip fracture, wrist fracture, multiple ankle fractures, now foot and rib fractures. All of these fractures are fragility fracture without any trauma. She feels just pain. Hip # was following lifting.  After discussing risk benefits of each options and discussing treatment options; patient was started on Forteo for the treatment of osteoporosis and fragility fracture; 4/2019.  Patient reports that she does not have any side effects from the Forteo injection.  She was able to manage a daily injection without any problem.    Patient was started on Forteo therapy in April 2019 and she has been tolerating that very well.  She is taking calcium and vitamin D supplements in addition.  She has not had any fractures since we saw her.  She is in the process of applying follow-up disability right now.  We have had her on limitations particularly with lifting due to her frequent fractures mentioned above.          Allergies   Allergen Reactions     Amitriptyline Hives     Amoxicillin Diarrhea     Asa [Dihydroxyaluminum Aminoacetate]      Cipro [Ciprofloxacin]      Dizziness, vomiting, shortness of breath     Ibuprofen [Aspartame-Ibuprofen]      GI distress       Lyrica      extrematies swell up      Morphine      ithcy     Naproxen      GI distress     Neurontin [Gabapentin]      rash     Paxil [Paroxetine] Anaphylaxis     anaphylaxis     Phenergan [Promethazine Hcl]      dystonia     Prilosec [Omeprazole]      Rash, dizziness     Gabapentin Rash       Current Outpatient Medications   Medication Sig Dispense Refill     albuterol (PROAIR HFA/PROVENTIL HFA/VENTOLIN HFA) 108 (90  Base) MCG/ACT inhaler Inhale 2 puffs into the lungs every 4 hours as needed for other (cough) 1 Inhaler 0     ALPRAZolam (XANAX) 0.5 MG tablet Take 0.5 mg by mouth daily       Calcium-Phosphorus-Vitamin D 250-100-500 MG-MG-UNIT CHEW Take 2 tablets by mouth daily 60 tablet 11     Cholecalciferol (VITAMIN D PO) Take 1,000 Units by mouth       codeine-guaiFENesin 200-10 MG/5ML LIQD Take 5-10 mLs by mouth nightly as needed (cough) 150 mL 0     DULoxetine (CYMBALTA) 60 MG capsule Take 60 mg by mouth 2 times daily        eszopiclone (LUNESTA) 1 MG tablet Take 1 mg by mouth nightly as needed       famotidine (PEPCID) 20 MG tablet Take 1 tablet (20 mg) by mouth daily as needed (heartburn/reflux) 90 tablet 1     fluticasone (FLONASE) 50 MCG/ACT nasal spray Spray 1-2 sprays into both nostrils daily Use 1 spray per nostril per day for 2 weeks.  May increase to 2 sprays per nostril per day after. 16 g 2     HYDROcodone-acetaminophen (NORCO) 5-325 MG tablet Take 0.5-1 tablets by mouth 2 times daily as needed for moderate to severe pain Supply should last 30 days. 15 tablet 0     lamoTRIgine (LAMICTAL) 100 MG tablet Take 150 mg by mouth daily        nortriptyline (PAMELOR) 50 MG capsule Take 100 mg by mouth At Bedtime       order for DME Equipment being ordered: 4 wheeled walker with wheels and basket. Color to be determined. 1 each 0     pantoprazole (PROTONIX) 20 MG EC tablet Take by mouth 30-60 minutes before a meal. 90 tablet 1     propranolol (INDERAL) 10 MG tablet Take 10 mg by mouth 3 times daily       teriparatide, recombinant, (FORTEO) 600 MCG/2.4ML SOLN injection Inject 0.08 mLs (20 mcg) Subcutaneous daily 7.2 mL 1     tiZANidine (ZANAFLEX) 2 MG tablet Take 1 tablet (2 mg) by mouth 3 times daily as needed for muscle spasms 90 tablet 1     vitamin D3 (CHOLECALCIFEROL) 2000 units tablet Take 1 tablet by mouth daily 90 tablet 3       Review of Systems     8 point review system (Constitutional, HENT, Eyes, Respiratory,  Cardiovascular, Gastrointestinal, Genitourinary, Musculoskeletal,Neurological, Psychiatric/Behavioural, Endocrine) is negative or is as per HPI above and multiple pains involving the back and joints.     Past Medical History:   Diagnosis Date     Endometriosis, site unspecified      Gastritis 2010    dx 2010- sees Dr Glover in [facility]     Osteoporosis      Urinary calculus, unspecified     kidney stones, age 19-20     Wrist injury Nov 19, 2003    Workman's Comp- left wrist- narc agreement on file       Past Surgical History:   Procedure Laterality Date     ARTHROSCOPY HIP, OSTEOPLASTY FEMUR PROXIMAL, COMBINED Left 6/29/2016    Procedure: COMBINED ARTHROSCOPY HIP, OSTEOPLASTY FEMUR PROXIMAL;  Surgeon: Godwin Deleon MD;  Location: [facility] OR     ARTHROSCOPY OF JOINT UNLISTED  4/2004    Left wrist, with debridement and repair of tear.     HC REMOVAL OF OVARY/TUBE(S)  6/2003    right with fallopian tube     HC REPAIR TRIANGULAR CART,WRIST JT  2005    Dr Eloy Sosa     HC REPAIR TRIANGULAR CART,WRIST JT  2007 or so    ulnar excision- Dr Eloy Sosa     HYSTEROSCOPY      laproscopy for endometriosis x2     LITHOTRIPSY         Family History   Problem Relation Age of Onset     Hypertension Father      Lipids Father      Hypertension Maternal Grandmother      Genitourinary Problems Maternal Grandmother         kidney disease     Cancer Paternal Grandmother         leukemia     Asthma No family hx of      C.A.D. No family hx of      Diabetes No family hx of      Cerebrovascular Disease No family hx of      Breast Cancer No family hx of      Cancer - colorectal No family hx of      Prostate Cancer No family hx of      Alzheimer Disease No family hx of      Arthritis No family hx of      Blood Disease No family hx of      Circulatory No family hx of      Eye Disorder No family hx of      Gastrointestinal Disease No family hx of      Musculoskeletal Disorder No family hx of      Neurologic Disorder No  "family hx of      Respiratory No family hx of      Thyroid Disease No family hx of        Social History     Socioeconomic History     Marital status: Single     Spouse name: None     Number of children: None     Years of education: None     Highest education level: None   Social Needs     Financial resource strain: None     Food insecurity - worry: None     Food insecurity - inability: None     Transportation needs - medical: None     Transportation needs - non-medical: None   Occupational History     None   Tobacco Use     Smoking status: Former Smoker     Types: Cigarettes     Last attempt to quit: 2014     Years since quittin.9     Smokeless tobacco: Never Used   Substance and Sexual Activity     Alcohol use: No     Frequency: Never     Drug use: No     Sexual activity: Not Currently     Birth control/protection: Injection     Comment: depo provera   Other Topics Concern     Parent/sibling w/ CABG, MI or angioplasty before 65F 55M? Not Asked   Social History Narrative    [place]    Works at [employer]    Enjoying spending time with dog and nieces       Objective:   /83 (BP Location: Left arm, Patient Position: Sitting, Cuff Size: Adult Large)   Pulse 85   Temp 98  F (36.7  C) (Oral)   Resp 18   Ht 1.54 m (5' 0.63\")   Wt 82.8 kg (182 lb 8 oz)   SpO2 98%   BMI 34.91 kg/m     Constitutional: Pleasant no acute cardiopulmonary distress.   Psychological: appropriate mood and affect     In House Labs:     TSH   Date Value Ref Range Status   2020 0.62 0.40 - 4.00 mU/L Final   2018 0.68 0.30 - 5.00 uIU/mL Final   2017 1.36 0.40 - 4.00 mU/L Final   12/10/2013 2.37 0.4 - 5.0 mU/L Final   2013 0.70 mcU/mL Final     T4 Free   Date Value Ref Range Status   12/10/2013 1.08 0.70 - 1.85 ng/dL Final       Creatinine   Date Value Ref Range Status   2020 0.81 0.52 - 1.04 mg/dL Final     24-hour urine for calcium  Sodium to creatinine ratio was 150 reference range "   Sodium 24-hour 184 difference for   Creatinine 24-hour was 1.59 (0.5-2.15  Calcium to creatinine ratio was 68 reference range 30- 275  Calcium 24-hour urine was 108 reference range   Creatinine 24-hour 1.59  Total volume 2700  Urine free cortisol 3.2    TSH was 0.68 and free T4 0.74 tissue transglutaminase IgG and IgA was undetectable creatinine within the normal limits BMD in 2014 was 0.718 at the total hip in 2018 was 0.751.  Z score -3.1 at the neck, trochanteric -2.7 and total -2.4        ENDO CALCIUM LABS-[facility] Latest Ref Rng & Units 1/27/2020   CALCIUM 8.5 - 10.1 mg/dL 9.4   PHOSPHOROUS 2.5 - 4.5 mg/dL    ALBUMIN 3.4 - 5.0 g/dL 3.8   BUN 7 - 30 mg/dL 12   CREATININE 0.52 - 1.04 mg/dL 0.81   PARATHYROID HORMONE INTACT 18 - 80 pg/mL    ALKPHOS 40 - 150 U/L 103   OSTEOCALCIN ng/mL    VITAMIN D DEFICIENCY SCREENING 20 - 75 ug/L    PROTEIN, TOTAL 6.8 - 8.8 g/dL      Assessment/Treatment Plan:      Osteoporosis with current pathological fracture/patient has had multiple fragility fractures over the last 5 years: Risk factors for osteoporosis Depo-Provera use for 16 years since the age of 22. She was taken off of Depo-Provera in Jan 2019.     She has had workup for other possible secondary causes of osteoporosis including Cushing syndrome, celiac disease, hypercalciuria and primary hyperparathyroidism which was unremarkable.      Patient was started on Forteo 4/19 after discussing risks benefits and side effects and after addressing the fact that there is no data in the young age group.  She is tolerating therapy without significant side effects.  Reports that she has been having sweating intermittently and she attributes that to parathyroid hormone.  We will plan on completing Forteo therapy for maximum of 2 years and have her on consolidative therapy with Fosamax.  Will check calcium labs today; as documented above which are stable.      Discussed risk of this medication including osteosarcoma of  the bone.  While she is on this medication she needs the use of contraception's this is a pregnancy C consequently medication not recommended to be used during pregnancy.  She reports that she is not currently sexually active.  Encouraged to see her gynecologist for discussion of contraception use.    There is interval improvement of DEXA scan findings however the improvement could be also from healed fractures(false positive).    Plan:    Continue Forteo at the same dose.      Weight bearing Exercises; advised to work with physical therapy particularly exercises that focus on strengthening the lower extremities.  Shoulder walking for the spine.  At this point we can increase the limit for weight lifting up to 10 pounds.    physical therapy referral placed.      Fall prevention     Adequate Calcium and vitamin D intake: for maintenance calcium 1200 mg daily and vitamin D 2000 IU daily.      Cessation of tobacco use; counseling provided.     Avoidance of excessive alcohol intake.     Follow up scheduled in 6 months.     Calcium level checked today and results were noted which are within the normal limits.      I have advised to follow-up with her gynecologist regarding the use of contraception.        Patient Instructions     Weight bearing Exercises; walking.  Please do leg strengthening exercises with physical therapy; squats and other exercises as recommended by your physical therapist.    Increase weight lifting restrictions to 5-10 pounds.         Fall prevention    Adequate Calcium and vitamin D intake:  Increase calcium gummies to 2 tablets twice per day.     Cessation of tobacco use    Avoidance of excessive alcohol intake.     Continue FORTEO injection.    Bone medications are contraindicated during pregnancy; you should let your provider rightaway if you are planning to get pregnant or get pregnant.   Please follow up with your gynecologist regarding birth control pills.         I will contact the patient  with the test results.  Return to clinic in 6 months.    Test and/or medications prescribed today:  Orders Placed This Encounter   Procedures     Vitamin D Deficiency (D3 Only)     Albumin level     Alkaline phosphatase     Calcium     Creatinine     Urea nitrogen     TSH with free T4 reflex         Dallas Flores MD  Staff Endocrinologist    [phone]  Division of Endocrinology and Diabetes      Again, thank you for allowing me to participate in the care of your patient.        Sincerely,        Dallas Flores MD

## 2020-01-27 NOTE — PROGRESS NOTES
Endocrinology Clinic Visit    Chief Complaint: RECHECK (6 month follow up for Osteoporosis)     Information obtained from:Patient here with her dad.    Subjective:         HPI: Mily Grullon is a 39 year old female with history of Osteoporosis and fragility fracture who is here for a follow up.     Patient has history of endometriosis and per report has underwent right oophorectomy at the age of 22.  She was started on Depo-Provera at the  age of 22.  She has been on this medication until she was taken off of it in January of 2019.   Osteoporosis and fragility fracture presumed to be secondary to Depo-Provera  She was evaluated for osteoporosis at an outside facility; with endocrinology clinic of [facility];  She had a screening test for celiac disease which was within the normal limits.  She was screened for Cushing's syndrome which was normal.  She was screened for hyper-calciuria again which came back within the normal limits.  Thyroid lab results were within the normal limits.  GFR is within the normal limits and electrolytes including calcium.  He has had elevated PTH level in the setting of low vitamin D level and based on that result she was started on ergocalciferol 50,000 international unit twice weekly and she has been on this medication for the last 6 weeks at least. PTH elevation resolved after correct low vitamin D level.         DEXA scan 2018   FINDINGS:               Lumbar Spine (L1-L4) Z-score:  -1.7, degenerative changes present               Right Femoral Neck Z-score:  -3.1               Forearm (radius 33%) Z-score:  -0.3  The left femur is not acceptable for evaluation due to previous surgical repair                                Lumbar (L1-L4) BMD: 1.078               Previous: 1.054                                                 Right Total Hip BMD: 0.751                Previous: 0.718                            Forearm (radius 33%) BMD: 0.694     Previous: NA  DEXA scan from 2014:    Femoral BMD 0.531 Z score -3.5     She is not currently on steroids. Multiple fractures over the last five years:  Hip fracture, wrist fracture, multiple ankle fractures, now foot and rib fractures. All of these fractures are fragility fracture without any trauma. She feels just pain. Hip # was following lifting.  After discussing risk benefits of each options and discussing treatment options; patient was started on Forteo for the treatment of osteoporosis and fragility fracture; 4/2019.  Patient reports that she does not have any side effects from the Forteo injection.  She was able to manage a daily injection without any problem.    Patient was started on Forteo therapy in April 2019 and she has been tolerating that very well.  She is taking calcium and vitamin D supplements in addition.  She has not had any fractures since we saw her.  She is in the process of applying follow-up disability right now.  We have had her on limitations particularly with lifting due to her frequent fractures mentioned above.          Allergies   Allergen Reactions     Amitriptyline Hives     Amoxicillin Diarrhea     Asa [Dihydroxyaluminum Aminoacetate]      Cipro [Ciprofloxacin]      Dizziness, vomiting, shortness of breath     Ibuprofen [Aspartame-Ibuprofen]      GI distress       Lyrica      extrematies swell up      Morphine      ithcy     Naproxen      GI distress     Neurontin [Gabapentin]      rash     Paxil [Paroxetine] Anaphylaxis     anaphylaxis     Phenergan [Promethazine Hcl]      dystonia     Prilosec [Omeprazole]      Rash, dizziness     Gabapentin Rash       Current Outpatient Medications   Medication Sig Dispense Refill     albuterol (PROAIR HFA/PROVENTIL HFA/VENTOLIN HFA) 108 (90 Base) MCG/ACT inhaler Inhale 2 puffs into the lungs every 4 hours as needed for other (cough) 1 Inhaler 0     ALPRAZolam (XANAX) 0.5 MG tablet Take 0.5 mg by mouth daily       Calcium-Phosphorus-Vitamin D 250-100-500 MG-MG-UNIT CHEW Take 2  tablets by mouth daily 60 tablet 11     Cholecalciferol (VITAMIN D PO) Take 1,000 Units by mouth       codeine-guaiFENesin 200-10 MG/5ML LIQD Take 5-10 mLs by mouth nightly as needed (cough) 150 mL 0     DULoxetine (CYMBALTA) 60 MG capsule Take 60 mg by mouth 2 times daily        eszopiclone (LUNESTA) 1 MG tablet Take 1 mg by mouth nightly as needed       famotidine (PEPCID) 20 MG tablet Take 1 tablet (20 mg) by mouth daily as needed (heartburn/reflux) 90 tablet 1     fluticasone (FLONASE) 50 MCG/ACT nasal spray Spray 1-2 sprays into both nostrils daily Use 1 spray per nostril per day for 2 weeks.  May increase to 2 sprays per nostril per day after. 16 g 2     HYDROcodone-acetaminophen (NORCO) 5-325 MG tablet Take 0.5-1 tablets by mouth 2 times daily as needed for moderate to severe pain Supply should last 30 days. 15 tablet 0     lamoTRIgine (LAMICTAL) 100 MG tablet Take 150 mg by mouth daily        nortriptyline (PAMELOR) 50 MG capsule Take 100 mg by mouth At Bedtime       order for DME Equipment being ordered: 4 wheeled walker with wheels and basket. Color to be determined. 1 each 0     pantoprazole (PROTONIX) 20 MG EC tablet Take by mouth 30-60 minutes before a meal. 90 tablet 1     propranolol (INDERAL) 10 MG tablet Take 10 mg by mouth 3 times daily       teriparatide, recombinant, (FORTEO) 600 MCG/2.4ML SOLN injection Inject 0.08 mLs (20 mcg) Subcutaneous daily 7.2 mL 1     tiZANidine (ZANAFLEX) 2 MG tablet Take 1 tablet (2 mg) by mouth 3 times daily as needed for muscle spasms 90 tablet 1     vitamin D3 (CHOLECALCIFEROL) 2000 units tablet Take 1 tablet by mouth daily 90 tablet 3       Review of Systems     8 point review system (Constitutional, HENT, Eyes, Respiratory, Cardiovascular, Gastrointestinal, Genitourinary, Musculoskeletal,Neurological, Psychiatric/Behavioural, Endocrine) is negative or is as per HPI above and multiple pains involving the back and joints.     Past Medical History:   Diagnosis Date      Endometriosis, site unspecified      Gastritis 2010    dx 2010- sees Dr Glover in [facility]     Osteoporosis      Urinary calculus, unspecified     kidney stones, age [age]     Wrist injury Nov 19, 2003    Workman's Comp- left wrist- narc agreement on file       Past Surgical History:   Procedure Laterality Date     ARTHROSCOPY HIP, OSTEOPLASTY FEMUR PROXIMAL, COMBINED Left 6/29/2016    Procedure: COMBINED ARTHROSCOPY HIP, OSTEOPLASTY FEMUR PROXIMAL;  Surgeon: Godwin Deleon MD;  Location: [facility] OR     ARTHROSCOPY OF JOINT UNLISTED  4/2004    Left wrist, with debridement and repair of tear.     HC REMOVAL OF OVARY/TUBE(S)  6/2003    right with fallopian tube     HC REPAIR TRIANGULAR CART,WRIST JT  2005    Dr Eloy Sosa     HC REPAIR TRIANGULAR CART,WRIST JT  2007 or so    ulnar excision- Dr Eloy Sosa     HYSTEROSCOPY      laproscopy for endometriosis x2     LITHOTRIPSY         Family History   Problem Relation Age of Onset     Hypertension Father      Lipids Father      Hypertension Maternal Grandmother      Genitourinary Problems Maternal Grandmother         kidney disease     Cancer Paternal Grandmother         leukemia     Asthma No family hx of      C.A.D. No family hx of      Diabetes No family hx of      Cerebrovascular Disease No family hx of      Breast Cancer No family hx of      Cancer - colorectal No family hx of      Prostate Cancer No family hx of      Alzheimer Disease No family hx of      Arthritis No family hx of      Blood Disease No family hx of      Circulatory No family hx of      Eye Disorder No family hx of      Gastrointestinal Disease No family hx of      Musculoskeletal Disorder No family hx of      Neurologic Disorder No family hx of      Respiratory No family hx of      Thyroid Disease No family hx of        Social History     Socioeconomic History     Marital status: Single     Spouse name: None     Number of children: None     Years of education: None      "Highest education level: None   Social Needs     Financial resource strain: None     Food insecurity - worry: None     Food insecurity - inability: None     Transportation needs - medical: None     Transportation needs - non-medical: None   Occupational History     None   Tobacco Use     Smoking status: Former Smoker     Types: Cigarettes     Last attempt to quit: 2014     Years since quittin.9     Smokeless tobacco: Never Used   Substance and Sexual Activity     Alcohol use: No     Frequency: Never     Drug use: No     Sexual activity: Not Currently     Birth control/protection: Injection     Comment: depo provera   Other Topics Concern     Parent/sibling w/ CABG, MI or angioplasty before 65F 55M? Not Asked   Social History Narrative    Lives alone in [place]    Works at [employer]    Enjoying spending time with dog and nieces       Objective:   /83 (BP Location: Left arm, Patient Position: Sitting, Cuff Size: Adult Large)   Pulse 85   Temp 98  F (36.7  C) (Oral)   Resp 18   Ht 1.54 m (5' 0.63\")   Wt 82.8 kg (182 lb 8 oz)   SpO2 98%   BMI 34.91 kg/m    Constitutional: Pleasant no acute cardiopulmonary distress.   Psychological: appropriate mood and affect     In House Labs:     TSH   Date Value Ref Range Status   2020 0.62 0.40 - 4.00 mU/L Final   2018 0.68 0.30 - 5.00 uIU/mL Final   2017 1.36 0.40 - 4.00 mU/L Final   12/10/2013 2.37 0.4 - 5.0 mU/L Final   2013 0.70 mcU/mL Final     T4 Free   Date Value Ref Range Status   12/10/2013 1.08 0.70 - 1.85 ng/dL Final       Creatinine   Date Value Ref Range Status   2020 0.81 0.52 - 1.04 mg/dL Final     24-hour urine for calcium  Sodium to creatinine ratio was 150 reference range   Sodium 24-hour 184 difference for   Creatinine 24-hour was 1.59 (0.5-2.15  Calcium to creatinine ratio was 68 reference range 30- 275  Calcium 24-hour urine was 108 reference range   Creatinine 24-hour 1.59  Total volume " 2700  Urine free cortisol 3.2    TSH was 0.68 and free T4 0.74 tissue transglutaminase IgG and IgA was undetectable creatinine within the normal limits BMD in 2014 was 0.718 at the total hip in 2018 was 0.751.  Z score -3.1 at the neck, trochanteric -2.7 and total -2.4        ENDO CALCIUM LABS-[facility] Latest Ref Rng & Units 1/27/2020   CALCIUM 8.5 - 10.1 mg/dL 9.4   PHOSPHOROUS 2.5 - 4.5 mg/dL    ALBUMIN 3.4 - 5.0 g/dL 3.8   BUN 7 - 30 mg/dL 12   CREATININE 0.52 - 1.04 mg/dL 0.81   PARATHYROID HORMONE INTACT 18 - 80 pg/mL    ALKPHOS 40 - 150 U/L 103   OSTEOCALCIN ng/mL    VITAMIN D DEFICIENCY SCREENING 20 - 75 ug/L    PROTEIN, TOTAL 6.8 - 8.8 g/dL      Assessment/Treatment Plan:      Osteoporosis with current pathological fracture/patient has had multiple fragility fractures over the last 5 years: Risk factors for osteoporosis Depo-Provera use for 16 years since the age of 22. She was taken off of Depo-Provera in Jan 2019.     She has had workup for other possible secondary causes of osteoporosis including Cushing syndrome, celiac disease, hypercalciuria and primary hyperparathyroidism which was unremarkable.      Patient was started on Forteo 4/19 after discussing risks benefits and side effects and after addressing the fact that there is no data in the young age group.  She is tolerating therapy without significant side effects.  Reports that she has been having sweating intermittently and she attributes that to parathyroid hormone.  We will plan on completing Forteo therapy for maximum of 2 years and have her on consolidative therapy with Fosamax.  Will check calcium labs today; as documented above which are stable.      Discussed risk of this medication including osteosarcoma of the bone.  While she is on this medication she needs the use of contraception's this is a pregnancy C consequently medication not recommended to be used during pregnancy.  She reports that she is not currently sexually active.  Encouraged to  see her gynecologist for discussion of contraception use.    There is interval improvement of DEXA scan findings however the improvement could be also from healed fractures(false positive).    Plan:    Continue Forteo at the same dose.      Weight bearing Exercises; advised to work with physical therapy particularly exercises that focus on strengthening the lower extremities.  Shoulder walking for the spine.  At this point we can increase the limit for weight lifting up to 10 pounds.    physical therapy referral placed.      Fall prevention     Adequate Calcium and vitamin D intake: for maintenance calcium 1200 mg daily and vitamin D 2000 IU daily.      Cessation of tobacco use; counseling provided.     Avoidance of excessive alcohol intake.     Follow up scheduled in 6 months.     Calcium level checked today and results were noted which are within the normal limits.      I have advised to follow-up with her gynecologist regarding the use of contraception.        Patient Instructions     Weight bearing Exercises; walking.  Please do leg strengthening exercises with physical therapy; squats and other exercises as recommended by your physical therapist.    Increase weight lifting restrictions to 5-10 pounds.         Fall prevention    Adequate Calcium and vitamin D intake:  Increase calcium gummies to 2 tablets twice per day.     Cessation of tobacco use    Avoidance of excessive alcohol intake.     Continue FORTEO injection.    Bone medications are contraindicated during pregnancy; you should let your provider rightaway if you are planning to get pregnant or get pregnant.   Please follow up with your gynecologist regarding birth control pills.         I will contact the patient with the test results.  Return to clinic in 6 months.    Test and/or medications prescribed today:  Orders Placed This Encounter   Procedures     Vitamin D Deficiency (D3 Only)     Albumin level     Alkaline phosphatase     Calcium      Creatinine     Urea nitrogen     TSH with free T4 reflex         Dallas Flores MD  Staff Endocrinologist    [phone]  Division of Endocrinology and Diabetes

## 2020-01-27 NOTE — PATIENT INSTRUCTIONS
Weight bearing Exercises; walking.  Please do leg strengthening exercises with physical therapy; squats and other exercises as recommended by your physical therapist.    Increase weight lifting restrictions to 5-10 pounds.         Fall prevention    Adequate Calcium and vitamin D intake:  Increase calcium gummies to 2 tablets twice per day.     Cessation of tobacco use    Avoidance of excessive alcohol intake.     Continue FORTEO injection.    Bone medications are contraindicated during pregnancy; you should let your provider rightaway if you are planning to get pregnant or get pregnant.   Please follow up with your gynecologist regarding birth control pills.

## 2020-01-27 NOTE — NURSING NOTE
"Mily Grullon's goals for this visit include:   Chief Complaint   Patient presents with     RECHECK     6 month follow up for Osteoporosis       She requests these members of her care team be copied on today's visit information: Yes    PCP: William Smith    Referring Provider:  No referring provider defined for this encounter.    /83 (BP Location: Left arm, Patient Position: Sitting, Cuff Size: Adult Large)   Pulse 85   Temp 98  F (36.7  C) (Oral)   Resp 18   Ht 1.54 m (5' 0.63\")   Wt 82.8 kg (182 lb 8 oz)   SpO2 98%   BMI 34.91 kg/m      Do you need any medication refills at today's visit? No    Jorge Lusi Mariscal CMA       "

## 2020-01-27 NOTE — TELEPHONE ENCOUNTER
[facility] Call Center    Phone Message    May a detailed message be left on voicemail: yes    Reason for Call: Other: patient would like a letter for  to help explain the examination of her dx from today's visit. patient would like letter mailed out to new address on file. please advise     Action Taken: Message routed to:  Adult Clinics: Endocrinology p [phone]

## 2020-01-27 NOTE — LETTER
January 29, 2020      Mily Grullon  [id number]  [place] [address]          To Whom It May Concern,    This is to confirm that patient has a diagnosis of osteoporosis with history of fractures in the past secondary to osteoporosis.  She is currently on treatment regimen for osteoporosis treatment.  Please contact us if additional information is needed regarding this diagnosis.Thank you.      Sincerely,      Dallas Flores MD

## 2020-01-28 LAB
ALP BONE SERPL-MCNC: 14.1 UG/L
DEPRECATED CALCIDIOL+CALCIFEROL SERPL-MC: 65 UG/L (ref 20–75)

## 2020-01-28 NOTE — TELEPHONE ENCOUNTER
"Please send the following in a letter with 'to whom it may concern'.    \"This is to confirm that patient has a diagnosis of osteoporosis with history of fractures in the past secondary to osteoporosis.  She is currently on treatment regimen for osteoporosis treatment.  Please contact us if additional information is needed regarding this diagnosis.  Thank you\"    "

## 2020-01-29 ENCOUNTER — TELEPHONE (OUTPATIENT)
Dept: INTERNAL MEDICINE | Facility: CLINIC | Age: 40
End: 2020-01-29

## 2020-01-29 RX ORDER — FAMOTIDINE 20 MG/1
20 TABLET, FILM COATED ORAL 2 TIMES DAILY
Qty: 60 TABLET | Status: CANCELLED | OUTPATIENT
Start: 2020-01-29

## 2020-01-29 RX ORDER — FAMOTIDINE 20 MG
1000 TABLET ORAL DAILY
Qty: 100 CAPSULE | Refills: 3 | Status: SHIPPED | OUTPATIENT
Start: 2020-01-29 | End: 2021-03-02

## 2020-01-29 NOTE — TELEPHONE ENCOUNTER
Patient advised of results and recommendations from Dr. Flores. Patient verbalizes understanding. Patient notes that she is taking vitamin d 2,000 international units extra on top of her calcium plus d.     Will confirm with Dr. Flores if patient is to stop the 2,000 international units daily of vitamin d or decrease to 1,000 international units daily. If she is to go to 1,000 international units daily, patient would like prescription sent to pharmacy.    Patient is ok with nurse leaving detailed message with recommendations.     Letter will be mailed to patient. She verbalizes understanding.    Marielle Suarez, RN  Endocrine Care Coordinator  [facility]

## 2020-01-29 NOTE — TELEPHONE ENCOUNTER
Detailed voicemail with with recommendations from Dr. Flores left for patient as per her request.       Marielle Suarez RN  Endocrine Care Coordinator  [facility]

## 2020-01-29 NOTE — TELEPHONE ENCOUNTER
Mily will try OTC first to see if this works.  If it helps, she would like RX for famotidine to replace the ranitidine RX that was sent in on 1/29/2020.  Ranitidine is still not available.    Thank You,  Lynsey Luna, Pharm.D.  [facility]

## 2020-01-29 NOTE — TELEPHONE ENCOUNTER
Also received result note from Dr. Flores as follows:    Please inform patient that the blood work results are within the desired range except slightly high vitamin D level.  Please stop the additional vitamin D supplement 1000 international units daily.  Please continue vitamin D plus calcium combination supplement.      Left voicemail for patient to contact our office.     Will also mail letter to patient.       Marielle Suarez RN  Endocrine Care Coordinator  [facility]

## 2020-01-29 NOTE — TELEPHONE ENCOUNTER
Prescription sent to pharmacy for Vid D 1000 international unit(s) daily. Agree with reducing to 1000 from 2000 international unit(s) daily.

## 2020-02-05 LAB — PHQ9 SCORE: 16

## 2020-02-10 ENCOUNTER — OFFICE VISIT (OUTPATIENT)
Dept: URGENT CARE | Facility: URGENT CARE | Age: 40
End: 2020-02-10
Payer: COMMERCIAL

## 2020-02-10 VITALS
OXYGEN SATURATION: 98 % | TEMPERATURE: 97.8 F | HEART RATE: 80 BPM | SYSTOLIC BLOOD PRESSURE: 112 MMHG | DIASTOLIC BLOOD PRESSURE: 76 MMHG | BODY MASS INDEX: 34.43 KG/M2 | WEIGHT: 180 LBS

## 2020-02-10 DIAGNOSIS — G43.009 MIGRAINE WITHOUT AURA AND WITHOUT STATUS MIGRAINOSUS, NOT INTRACTABLE: Primary | ICD-10-CM

## 2020-02-10 PROCEDURE — 99213 OFFICE O/P EST LOW 20 MIN: CPT | Performed by: NURSE PRACTITIONER

## 2020-02-10 RX ORDER — BUTALBITAL, ACETAMINOPHEN AND CAFFEINE 50; 325; 40 MG/1; MG/1; MG/1
TABLET ORAL
Qty: 20 TABLET | Refills: 0 | Status: SHIPPED | OUTPATIENT
Start: 2020-02-10 | End: 2020-02-14

## 2020-02-10 NOTE — PROGRESS NOTES
S:    Mily Grullon is a 39 year old year old female who presents with a 1 days history of headaches occuring in the occipital area. The headaches are described as throbbing and pounding. They are associated with nausea.  Has hx of migraines, has not had one in a long time. Petert worked well in the past         Past Medical History:   Diagnosis Date     Endometriosis, site unspecified      Gastritis 2010    dx 2010- sees Dr Glover in [facility]     Osteoporosis      Urinary calculus, unspecified     kidney stones, age 19-19     Wrist injury Nov 19, 2003    Workman's Comp- left wrist- narc agreement on file     Current Outpatient Medications   Medication     albuterol (PROAIR HFA/PROVENTIL HFA/VENTOLIN HFA) 108 (90 Base) MCG/ACT inhaler     ALPRAZolam (XANAX) 0.5 MG tablet     Calcium-Phosphorus-Vitamin D 250-100-500 MG-MG-UNIT CHEW     codeine-guaiFENesin 200-10 MG/5ML LIQD     DULoxetine (CYMBALTA) 60 MG capsule     eszopiclone (LUNESTA) 1 MG tablet     famotidine (PEPCID) 20 MG tablet     fluticasone (FLONASE) 50 MCG/ACT nasal spray     HYDROcodone-acetaminophen (NORCO) 5-325 MG tablet     lamoTRIgine (LAMICTAL) 100 MG tablet     nortriptyline (PAMELOR) 50 MG capsule     order for DME     pantoprazole (PROTONIX) 20 MG EC tablet     propranolol (INDERAL) 10 MG tablet     teriparatide, recombinant, (FORTEO) 600 MCG/2.4ML SOLN injection     tiZANidine (ZANAFLEX) 2 MG tablet     Vitamin D, Cholecalciferol, 25 MCG (1000 UT) CAPS     No current facility-administered medications for this visit.           ROS:       CONSTITUTIONAL: no constitutional symptoms.       EYES:  no eye complaints      .       ENT: no ENT symptoms .       NEUROLOGICAL:  no neuro symptoms  .       PSYCHIATRIC: no psychiatric symptoms .       ENDOCRINE: no endocrine symptoms        .       ALLERGY/IMMUNOLOGIC- COMPLAINS of no allergy/immunologic symptoms.              O:       GENERAL:  Well developed, well nourished female in  NAD.       HEENT: Normocephalic, atraumatic; Ears-TMs and external auditory        canals normal; Eyes- PERRL, EOMI,  fundi benign; Nose- clear;        Mouth- normal; Pharynx- clear       NECK: Supple without adenopathy or thyromegaly       CHEST: Normal       LUNGS: CTA       HEART:  RRR, S1S2 without murmur.       ABDOMEN: Soft, nontender, normal bowel sounds, no masses, no        hepatosplenomegaly.       NEUROLOGICAL: Cranial nerves II-XII intact. Reflexes symmetrical.        No focal motor or sensory deficits. Cerebellar normal.       LYMPH: No lymphadenopathy.       SKIN:  No rashes.              Assessment and Plan:(G43.809)   (G43.009) Migraine without aura and without status migrainosus, not intractable  (primary encounter diagnosis)    Plan: butalbital-acetaminophen-caffeine (FIORICET/ESGIC) -40 MG tablet   Home treat and monitor symptoms call or rtc if new or worsening         TARAS Babb CNP

## 2020-02-11 ENCOUNTER — TELEPHONE (OUTPATIENT)
Dept: INTERNAL MEDICINE | Facility: CLINIC | Age: 40
End: 2020-02-11

## 2020-02-11 NOTE — TELEPHONE ENCOUNTER
Unlikely a new fracture but if headache worsens, emergency room is appropriate if unable to be seen acutely here or elsewhere

## 2020-02-11 NOTE — TELEPHONE ENCOUNTER
"Spoke with patient she reports was seen in  yesterday for headache.  Was given Fioricet and \"it is not really helping\"  She reports headache continues the same, occipital area down to base of skull top of neck.  She reports she has been taking 2 tabs every 4-6 hours (discussed this more than directed).  She is also using ibuprofen and zanaflex as directed.  She denies any other symptoms.  She is asking what else she should do?, she denies any injury, but asking could she have broken something?  Please advise, Radha Hardy RN        "

## 2020-02-12 NOTE — TELEPHONE ENCOUNTER
Spoke with patient and gave below instructions she voiced understanding and agreement.  She reports headache a little better today, she will continue medications and did schedule appt on 2/14/20 to be evaluated if headache still present on Friday.  Patient asking if can increase the Fioricet? she is already taking 2 tabs at a time?  Radha Hardy RN

## 2020-02-13 NOTE — PATIENT INSTRUCTIONS
Reminders:     Please remember to arrive 5-10 minutes early for your appointments. If you are late you may need to reschedule your appointment.    If you have mychart please be aware your results and communications will be sent within your mychart. Unless results are critical and/or urgent value.       Patient Education     Preventing Tension Headaches  Lifestyle changes are the key to preventing tension-type headaches. Learn what changes in your environment and daily activities can prevent the strain and tension that lead to headaches. If emotional stress is a factor, stress reduction may bring relief. Other lifestyle changes can help keep you healthier and better able to cope with pain.  What can cause tension headaches?  Causes of tension-type headaches include:    Posture and movement. Your posture while you sit, work, drive, and even sleep can put stress on your shoulders and neck. This can tighten muscles in the back of your head, causing headaches.    Lifting and carrying. These can cause strain on your back and neck, especially if you carry too much weight, carry weight unevenly, or use poor lifting technique.    Certain sports. Activities that involve jumping, running, and sudden starts, stops, or changes of direction can jar your neck and head. This may lead to tight muscles and pain. Weightlifting and other activities that require upper body strength can lead to tight neck and shoulder muscles.    Jaw tension. Clenching your jaw or grinding your teeth can result in tension and pain. This may happen while you sleep without your knowing it.    Eye problems. Eyestrain can cause tension in the muscles around the eyes. Or a problem with your eyeglass prescription can make you hold your head at an awkward angle. This can cause neck strain and headaches.    Emotional stress. Many factors lead to emotional stress: overwork, family problems, financial difficulties, or sudden life changes. This may cause muscle  tension.  What you can do  Once you know what s causing your headaches, you can work to prevent them. You may need to seek professional help to make some of the needed changes.    Posture and movement changes. Change the setup of your workspace and car. Learn and maintain good posture. Avoid positions that strain your neck or shoulders. Make sure your bedding and pillows provide good support. Avoid sleeping on couches, chairs, or floors when a bed is available.     Lifting and carrying. Learn good lifting technique. Make sure to use the proper tools and equipment for lifting.    Change your sport. Switch to a low-impact sport. To help relieve stress on your neck and head, cut back on activities that depend on upper body strength or that put a lot of twisting motion on the back, such as golf.     Dental work. See your dentist if you think your headaches are caused by jaw tension or teeth grinding.    New eyewear. Buy an extra pair of glasses adjusted for reading or working at a computer. This helps to reduce eyestrain and keep your neck at a comfortable angle.    Stress management. Learn techniques for relaxing and reducing emotional stress, like deep breathing, visualization, progressive relaxation, and biofeedback. Regular sleep habits can also help to control stress.    Regular sleep and meals. It is important to have a regular sleep cycle and to avoid skipping meals.  Exercise can help  Exercise can improve overall health and help you to relax.    Neck exercises help keep your neck muscles relaxed during the day. Try the neck exercises shown on this sheet. Start in a neutral (relaxed, centered) position. Do 3 repetitions every 2 to 4 hours throughout the day.    Low-impact aerobic exercise helps keep your muscles strong and flexible. This helps prevent tension and the pain it causes.    Stretching, melita chi, and yoga help keep your muscles flexible. They may also help relieve emotional stress.    Lower your left  ear toward your left shoulder. Return to neutral. Repeat on the right side.   Lower your chin to your chest and slowly return to neutral.     Look to the left. Return to neutral. Then look to the right.    Move your head forward and back while keeping the top of your head level.   Date Last Reviewed: 4/1/2018 2000-2019 [employer]. [address] [place] [address]. All rights reserved. This information is not intended as a substitute for professional medical care. Always follow your healthcare professional's instructions.           Patient Education     Managing Tension-type Headache Symptoms  A tension-type headache can develop slowly. Being aware of the symptoms helps you recognize a headache early. Then you can act to reduce pain and relieve tension. Methods for relieving your symptoms include self-care and medicine.    Tension-type symptoms  The earlier you recognize the symptoms of a tension-type headache, the easier it is to treat. Tension-type headaches may:    Start with fatigue, tension, or pain in the neck and shoulders    Feel like a band around the head    Be concentrated in the temple, the back of the head, behind the eyes, or in the face    Come and go, or last for days, weeks, or even longer    Involve referred pain -- this means that the area that hurts may not be where the problem starts  Self-care during a tension-type headache  When you have a tension-type headache, there are things you can do to relax, loosen muscles, and reduce the pain:    Brush your scalp lightly with a soft hairbrush.    Give yourself a massage. Knead the muscles running from your shoulders up the back of your skull. Or ask a friend to gently massage your neck and shoulders.    Use an ice pack. Wrap a thin cloth around a cold pack, a cold can of soda, or a bag of frozen vegetables. Apply this directly to the place where you feel pain (such as your neck or temples).    Use moist heat to relax your  muscles. Take a warm shower or bath. Or drape a warm, moist towel around your neck and shoulders.  Relieving pain and tension  Over-the-counter or prescription medicine can help relieve pain. Another way to reduce your pain is to use relaxation techniques to loosen tight muscles.  Medicine  Medicine used for tension-type headaches include the following:    NSAIDs (nonsteroidal anti-inflammatories), such as aspirin and ibuprofen, relieve inflammation and help block pain signals.    Acetaminophen treats pain, and some formulations contain caffeine.     Muscle relaxants can reduce painful muscle contractions.  Taking medicine safely  Be aware that:    Taking analgesics (pain relievers) or drinking too much coffee may lead to rebound headaches (frequent or severe headaches that can happen if you miss a dose of medication), so take pain medicines only as needed. If you think you have these headaches, contact your healthcare provider.    Taking too much medicine can cause sleep problems or stomach upset. Some over-the-counter headache medications may contain caffeine. These may disrupt sleep and worsen pain.  Relaxation techniques  A , class, book, or tape may help you learn these techniques. One or more of these methods may work for you:    Deep breathing. Slow, calm, deep breathing can help you relax. Breathe in for a count of 5 or more. Then slowly let the breath out.    Visualization. Imagining a peaceful, secure scene can give you a sense of control over your body and surroundings.    Progressive relaxation. This is done by tightening and then releasing muscle groups. Start at the top of your head and work your way down your body. Tighten each muscle group for 5 to 10 seconds. Then release the muscle group for the same amount of time.    Biofeedback. This is a type of training in which you learn to control certain physical functions and responses. This helps you learn to reduce muscle tension.  Date Last  Reviewed: 4/1/2018 2000-2019 [employer]. [address] [place] [address], [place] [address]. All rights reserved. This information is not intended as a substitute for professional medical care. Always follow your healthcare professional's instructions.

## 2020-02-13 NOTE — PROGRESS NOTES
Subjective     Mily Grullon is a 39 year old female who presents to clinic today for the following health issues:    HPI   ED/UC Followup:    Facility:  [facility]  Date of visit: 2/10/2020  Reason for visit: migraine  Current Status: discharged- patient states pain medication helps her headaches but it comes back when it wares off. Taking Fioricet for the pain.  This usually works to get rid of her headaches, but headaches return as medication wears off.  Pt heard from her support group that fioricet with codeine may help better. Headaches similar to previous headaches- feels like head is in a vice- squeezing pain on sides of head, neck tension.  Is on propranolol for headaches, and had not had a headache for 3-4 years prior to 2/10/20         Patient Active Problem List   Diagnosis     Personal history of nicotine dependence     CARDIOVASCULAR SCREENING; LDL GOAL LESS THAN 160     Gastritis     Osteoporosis of multiple sites     Closed nondisplaced intertrochanteric fracture of left femur (H)     Migraine without aura and without status migrainosus, not intractable     Pain in joint, ankle and foot, left     Closed nondisplaced fracture of body of left calcaneus with delayed healing, subsequent encounter     Heel pain, chronic, left     Gastroesophageal reflux disease, esophagitis presence not specified     Long-term (current) use of anticoagulants [Z79.01]     Superficial phlebitis     Obesity (BMI 35.0-39.9) with comorbidity (H)     Other chronic pain     Controlled substance agreement signed     Low serum cortisol level (H)     Anxiety     Endometriosis     Eosinophilic gastroenteritis     Chronic wrist pain     Bilateral thoracic back pain     Osteoporosis     Moderate episode of recurrent major depressive disorder (H)     Past Surgical History:   Procedure Laterality Date     ARTHROSCOPY HIP, OSTEOPLASTY FEMUR PROXIMAL, COMBINED Left 6/29/2016    Procedure: COMBINED ARTHROSCOPY HIP, OSTEOPLASTY FEMUR PROXIMAL;   Surgeon: Godwin Deleon MD;  Location: [facility]     ARTHROSCOPY OF JOINT UNLISTED  2004    Left wrist, with debridement and repair of tear.     HC REMOVAL OF OVARY/TUBE(S)  2003    right with fallopian tube     HC REPAIR TRIANGULAR CART,WRIST JT      Dr Eloy Sosa     HC REPAIR TRIANGULAR CART,WRIST JT   or so    ulnar excision- Dr Eloy Sosa     HYSTEROSCOPY      laproscopy for endometriosis x2     LITHOTRIPSY         Social History     Tobacco Use     Smoking status: Former Smoker     Types: Cigarettes     Last attempt to quit: 2014     Years since quittin.0     Smokeless tobacco: Never Used     Tobacco comment: Ecig   Substance Use Topics     Alcohol use: No     Frequency: Never     Family History   Problem Relation Age of Onset     Hypertension Father      Lipids Father      Hypertension Maternal Grandmother      Genitourinary Problems Maternal Grandmother         kidney disease     Cancer Paternal Grandmother         leukemia     Asthma No family hx of      C.A.D. No family hx of      Diabetes No family hx of      Cerebrovascular Disease No family hx of      Breast Cancer No family hx of      Cancer - colorectal No family hx of      Prostate Cancer No family hx of      Alzheimer Disease No family hx of      Arthritis No family hx of      Blood Disease No family hx of      Circulatory No family hx of      Eye Disorder No family hx of      Gastrointestinal Disease No family hx of      Musculoskeletal Disorder No family hx of      Neurologic Disorder No family hx of      Respiratory No family hx of      Thyroid Disease No family hx of          Current Outpatient Medications   Medication Sig Dispense Refill     albuterol (PROAIR HFA/PROVENTIL HFA/VENTOLIN HFA) 108 (90 Base) MCG/ACT inhaler Inhale 2 puffs into the lungs every 4 hours as needed for other (cough) 1 Inhaler 0     ALPRAZolam (XANAX) 0.5 MG tablet Take 0.5 mg by mouth daily       butalbital-acetaminophen-caffeine (ESGIC)  -40 MG tablet Si tablet at onset of headache.  May take every 1 tablet every 4-6 hours if needed 20 tablet 0     Butalbital-APAP-Caff-Cod -14-30 MG CAPS Take 1-2 capsules by mouth 3 times daily as needed (headache) 60 capsule 1     Calcium-Phosphorus-Vitamin D 250-100-500 MG-MG-UNIT CHEW Take 2 tablets by mouth daily 60 tablet 11     DULoxetine (CYMBALTA) 60 MG capsule Take 60 mg by mouth 2 times daily        eszopiclone (LUNESTA) 1 MG tablet Take 1 mg by mouth nightly as needed       famotidine (PEPCID) 20 MG tablet Take 1 tablet (20 mg) by mouth daily as needed (heartburn/reflux) 90 tablet 1     fluticasone (FLONASE) 50 MCG/ACT nasal spray Spray 1-2 sprays into both nostrils daily Use 1 spray per nostril per day for 2 weeks.  May increase to 2 sprays per nostril per day after. 16 g 2     HYDROcodone-acetaminophen (NORCO) 5-325 MG tablet Take 0.5-1 tablets by mouth 2 times daily as needed for moderate to severe pain Supply should last 30 days. 15 tablet 0     lamoTRIgine (LAMICTAL) 100 MG tablet Take 150 mg by mouth daily        nortriptyline (PAMELOR) 50 MG capsule Take 100 mg by mouth At Bedtime       order for DME Equipment being ordered: 4 wheeled walker with wheels and basket. Color to be determined. 1 each 0     pantoprazole (PROTONIX) 20 MG EC tablet Take by mouth 30-60 minutes before a meal. 90 tablet 1     propranolol (INDERAL) 10 MG tablet Take 10 mg by mouth 3 times daily       teriparatide, recombinant, (FORTEO) 600 MCG/2.4ML SOLN injection Inject 0.08 mLs (20 mcg) Subcutaneous daily 7.2 mL 1     tiZANidine (ZANAFLEX) 2 MG tablet Take 1 tablet (2 mg) by mouth 3 times daily as needed for muscle spasms 90 tablet 1     Vitamin D, Cholecalciferol, 25 MCG (1000 UT) CAPS Take 1,000 Units by mouth daily 100 capsule 3     Allergies   Allergen Reactions     Amitriptyline Hives     Amoxicillin Diarrhea     Asa [Dihydroxyaluminum Aminoacetate]      Cipro [Ciprofloxacin]      Dizziness, vomiting,  shortness of breath     Ibuprofen [Aspartame-Ibuprofen]      GI distress       Lyrica      extrematies swell up      Morphine      ithcy     Naproxen      GI distress     Neurontin [Gabapentin]      rash     Paxil [Paroxetine] Anaphylaxis     anaphylaxis     Phenergan [Promethazine Hcl]      dystonia     Prilosec [Omeprazole]      Rash, dizziness     Gabapentin Rash     Recent Labs   Lab Test 01/27/20  1128 04/22/19  1214  11/23/18 08/09/18  0801  12/10/13  1209 07/16/13 01/07/13   LDL  --   --   --   --   --   --   --   --  133   HDL  --   --   --   --   --   --   --   --  33   TRIG  --   --   --   --   --   --   --   --  144   ALT  --   --   --   --  30  --  26 12  --    CR 0.81 0.77   < > 0.89 0.90   < > 0.73  --   --    GFRESTIMATED >90 >90   < > 82 70   < > >90  --   --    GFRESTBLACK >90 >90   < > 95 84   < > >90  --   --    POTASSIUM  --   --   --  3.6 4.0   < > 3.5  --   --    TSH 0.62  --   --  0.68  --    < > 2.37  --  0.70    < > = values in this interval not displayed.      BP Readings from Last 3 Encounters:   02/14/20 133/86   02/10/20 112/76   01/27/20 123/83    Wt Readings from Last 3 Encounters:   02/14/20 85.6 kg (188 lb 12.8 oz)   02/10/20 81.6 kg (180 lb)   01/27/20 82.8 kg (182 lb 8 oz)                    Reviewed and updated as needed this visit by Provider  Tobacco  Allergies  Meds  Problems  Med Hx  Surg Hx  Fam Hx         Review of Systems   ROS COMP: Constitutional, HEENT, cardiovascular, pulmonary, GI, , musculoskeletal, neuro, skin, endocrine and psych systems are negative, except as otherwise noted.      Objective    /86   Pulse 99   Temp 98.2  F (36.8  C) (Oral)   Resp 16   Wt 85.6 kg (188 lb 12.8 oz)   SpO2 100%   BMI 36.11 kg/m    Body mass index is 36.11 kg/m .     Physical Exam   GENERAL: healthy, alert and no distress  EYES: Eyes grossly normal to inspection, PERRL and conjunctivae and sclerae normal  HENT: ear canals and TM's normal, nose and mouth without  "ulcers or lesions  NECK: no adenopathy, no asymmetry, masses, or scars and thyroid normal to palpation  RESP: lungs clear to auscultation - no rales, rhonchi or wheezes  CV: regular rate and rhythm, normal S1 S2, no S3 or S4, no murmur, click or rub, no peripheral edema and peripheral pulses strong  NEURO: Normal strength and tone, cranial nerves intact, mentation intact and speech normal  PSYCH: mentation appears normal, affect normal/bright    Diagnostic Test Results:  Labs reviewed in Epic  See orders        Assessment & Plan       ICD-10-CM    1. Episodic tension-type headache, not intractable G44.219 Butalbital-APAP-Caff-Cod -21-30 MG CAPS     NEUROLOGY ADULT REFERRAL     butalbital-acetaminophen-caffeine (ESGIC) -40 MG tablet   2. Follow-up examination Z09         BMI:   Estimated body mass index is 36.11 kg/m  as calculated from the following:    Height as of 1/27/20: 1.54 m (5' 0.63\").    Weight as of this encounter: 85.6 kg (188 lb 12.8 oz).   Weight management plan: Discussed healthy diet and exercise guidelines        See Patient Instructions    Return in about 4 weeks (around 3/13/2020), or if symptoms worsen or fail to improve.    Xenia Gaviria, DOV  [facility] [place]    "

## 2020-02-14 ENCOUNTER — OFFICE VISIT (OUTPATIENT)
Dept: FAMILY MEDICINE | Facility: CLINIC | Age: 40
End: 2020-02-14
Payer: COMMERCIAL

## 2020-02-14 ENCOUNTER — TELEPHONE (OUTPATIENT)
Dept: FAMILY MEDICINE | Facility: CLINIC | Age: 40
End: 2020-02-14

## 2020-02-14 VITALS
TEMPERATURE: 98.2 F | WEIGHT: 188.8 LBS | HEART RATE: 99 BPM | RESPIRATION RATE: 16 BRPM | OXYGEN SATURATION: 100 % | BODY MASS INDEX: 36.11 KG/M2 | DIASTOLIC BLOOD PRESSURE: 86 MMHG | SYSTOLIC BLOOD PRESSURE: 133 MMHG

## 2020-02-14 DIAGNOSIS — G44.219 EPISODIC TENSION-TYPE HEADACHE, NOT INTRACTABLE: Primary | ICD-10-CM

## 2020-02-14 DIAGNOSIS — Z09 FOLLOW-UP EXAMINATION: ICD-10-CM

## 2020-02-14 PROCEDURE — 99214 OFFICE O/P EST MOD 30 MIN: CPT | Performed by: NURSE PRACTITIONER

## 2020-02-14 RX ORDER — BUTALBITAL, ACETAMINOPHEN, CAFFEINE AND CODEINE PHOSPHATE 300; 50; 40; 30 MG/1; MG/1; MG/1; MG/1
1-2 CAPSULE ORAL 3 TIMES DAILY PRN
Qty: 60 CAPSULE | Refills: 1 | Status: SHIPPED | OUTPATIENT
Start: 2020-02-14 | End: 2020-03-04

## 2020-02-14 RX ORDER — BUTALBITAL, ACETAMINOPHEN AND CAFFEINE 50; 325; 40 MG/1; MG/1; MG/1
TABLET ORAL
Qty: 20 TABLET | Refills: 0 | Status: SHIPPED | OUTPATIENT
Start: 2020-02-14 | End: 2020-03-04

## 2020-02-14 NOTE — LETTER
My Depression Action Plan  Name: Mily Grullon   Date of Birth 1980  Date: 2/14/2020    My doctor: William Smith   My clinic: [facility] [id number] [facility]  [place] [id number] [phone]          GREEN    ZONE   Good Control    What it looks like:     Things are going generally well. You have normal ups and downs. You may even feel depressed from time to time, but bad moods usually last less than a day.   What you need to do:  1. Continue to care for yourself (see self care plan)  2. Check your depression survival kit and update it as needed  3. Follow your physician s recommendations including any medication.  4. Do not stop taking medication unless you consult with your physician first.           YELLOW         ZONE Getting Worse    What it looks like:     Depression is starting to interfere with your life.     It may be hard to get out of bed; you may be starting to isolate yourself from others.    Symptoms of depression are starting to last most all day and this has happened for several days.     You may have suicidal thoughts but they are not constant.   What you need to do:     1. Call your care team. Your response to treatment will improve if you keep your care team informed of your progress. Yellow periods are signs an adjustment may need to be made.     2. Continue your self-care.  Just get dressed and ready for the day.  Don't give yourself time to talk yourself out of it.    3. Talk to someone in your support network.    4. Open up your Depression Self-Care Plan/Wellness Kit.           RED    ZONE Medical Alert - Get Help    What it looks like:     Depression is seriously interfering with your life.     You may experience these or other symptoms: You can t get out of bed most days, can t work or engage in other necessary activities, you have trouble taking care of basic hygiene, or basic responsibilities, thoughts of suicide or death that will not go away,  self-injurious behavior.     What you need to do:  1. Call your care team and request a same-day appointment. If they are not available (weekends or after hours) call your local crisis line, emergency room or 911.            Depression Self-Care Plan / Wellness Kit    Self-Care for Depression  Here s the deal. Your body and mind are really not as separate as most people think.  What you do and think affects how you feel and how you feel influences what you do and think. This means if you do things that people who feel good do, it will help you feel better.  Sometimes this is all it takes.  There is also a place for medication and therapy depending on how severe your depression is, so be sure to consult with your medical provider and/ or Behavioral Health Consultant if your symptoms are worsening or not improving.     In order to better manage my stress, I will:    Exercise  Get some form of exercise, every day. This will help reduce pain and release endorphins, the  feel good  chemicals in your brain. This is almost as good as taking antidepressants!  This is not the same as joining a gym and then never going! (they count on that by the way ) It can be as simple as just going for a walk or doing some gardening, anything that will get you moving.      Hygiene   Maintain good hygiene (get out of bed in the morning, make your bed, brush your teeth, take a shower, and get dressed like you were going to work, even if you are unemployed).  If your clothes don't fit try to get ones that do.    Diet  Strive to eat foods that are good for me, drink plenty of water, and avoid excessive sugar, caffeine, alcohol, and other mood-altering substances.  Some foods that are helpful in depression are: complex carbohydrates, B vitamins, flaxseed, fish or fish oil, fresh fruits and vegetables.    Psychotherapy  Agree to participate in Individual Therapy (if recommended).    Medication  If prescribed medications, I agree to take them.   Missing doses can result in serious side effects.  I understand that drinking alcohol, or other illicit drug use, may cause potential side effects.  I will not stop my medication abruptly without first discussing it with my provider.    Staying Connected With Others  Stay in touch with my friends, family members, and my primary care provider/team.    Use your imagination  Be creative.  We all have a creative side; it doesn t matter if it s oil painting, sand castles, or mud pies! This will also kick up the endorphins.    Witness Beauty  (AKA stop and smell the roses) Take a look outside, even in mid-winter. Notice colors, textures. Watch the squirrels and birds.     Service to others  Be of service to others.  There is always someone else in need.  By helping others we can  get out of ourselves  and remember the really important things.  This also provides opportunities for practicing all the other parts of the program.    Humor  Laugh and be silly!  Adjust your TV habits for less news and crime-drama and more comedy.    Control your stress  Try breathing deep, massage therapy, biofeedback, and meditation. Find time to relax each day.     Crisis Text Line  http://www.crisistextline.org    The Crisis Text Line serves anyone, in any type of crisis, providing access to free, 24/7 support and information via the medium people already use and trust:    Here's how it works:  1.  Text 752-782 from anywhere in the [place], anytime, about any type of crisis.  2.  A live, trained Crisis Counselor receives the text and responds quickly.  3.  The volunteer Crisis Counselor will help you move from a 'hot moment to a cool moment'.    My support system    Clinic Contact:  Phone number:    Contact 1:  Phone number:    Contact 2:  Phone number:    Voodoo/:  Phone number:    Therapist:  Phone number:    Local crisis center:    Phone number:    Other community support:  Phone number:

## 2020-02-14 NOTE — TELEPHONE ENCOUNTER
Central Prior Authorization Team  Phone: [phone]    [place] Initiation    Medication: Butalbital-APAP-Caff-Cod -34-30 MG CAPS  Insurance Company: [employer] - Phone [phone] Fax [phone]  Pharmacy Filling the Rx: [facility] - [id number] [facility]  Filling Pharmacy Phone: [phone]  Filling Pharmacy Fax:    Start Date: 2/14/2020

## 2020-02-14 NOTE — TELEPHONE ENCOUNTER
Prior Authorization Approval    Authorization Effective Date: 1/15/2020  Authorization Expiration Date: 2/13/2021  Medication: Butalbital-APAP-Caff-Cod -14-30 MG CAPS- APPROVED   Approved Dose/Quantity:   Reference #:     Insurance Company: [employer] - Phone [phone] Fax [phone]  Expected CoPay:       CoPay Card Available:      Foundation Assistance Needed:    Which Pharmacy is filling the prescription (Not needed for infusion/clinic administered): [facility] - [id number] [facility]  Pharmacy Notified: Yes  Patient Notified: Comment:  **Instructed pharmacy to notify patient when script is ready to /ship.**

## 2020-02-14 NOTE — TELEPHONE ENCOUNTER
Prior Authorization Retail Medication Request    Medication/Dose: Butalbital-APAP-Caff-Cod -07-30 MG CAPS  ICD code (if different than what is on RX):  G44.219  Previously Tried and Failed:    Rationale:      Insurance Name:  Leslie DOMÍNGUEZ  Insurance ID:  [id number]      Pharmacy Information (if different than what is on RX)  Name:  [facility]  Phone:  [phone]

## 2020-02-14 NOTE — TELEPHONE ENCOUNTER
Hello,    This drug Butalbital-APAP-Caff-Cod -94-30 MG CAPS requires prior authorization. Please contact insurance at [phone] .   Patient's ID [id number] .    Please contact pharmacy when prior authorization has been approved. We will contact patient when order has been filled.     RxBin: [id number]  RxGroup: [id number]  RxPCN:  HIREN Sandy,   Amina Lazo, Pharmacy Tech  [place]

## 2020-02-19 ENCOUNTER — TELEPHONE (OUTPATIENT)
Dept: INTERNAL MEDICINE | Facility: CLINIC | Age: 40
End: 2020-02-19

## 2020-02-19 DIAGNOSIS — S22.32XD CLOSED FRACTURE OF ONE RIB OF LEFT SIDE WITH ROUTINE HEALING, SUBSEQUENT ENCOUNTER: ICD-10-CM

## 2020-02-19 RX ORDER — HYDROCODONE BITARTRATE AND ACETAMINOPHEN 5; 325 MG/1; MG/1
.5-1 TABLET ORAL DAILY PRN
Qty: 10 TABLET | Refills: 0 | Status: SHIPPED | OUTPATIENT
Start: 2020-02-19 | End: 2020-03-04

## 2020-02-19 NOTE — TELEPHONE ENCOUNTER
Per :    Hydrocodone-Acetaminophen 5-325 mg  1.  Written: 1/21/20  Filled: 1/23/20  Quantity: 15  Provider: Dr. William Smith    2.  Written: 12/23//19  Filled: 12/26/19  Quantity: 20  Provider: Dr. William Smith    3.  Written: 11/26//19  Filled: 11/27/19  Quantity: 25  Provider: Dr. William Pabon RN BSN

## 2020-03-04 ENCOUNTER — TELEPHONE (OUTPATIENT)
Dept: INTERNAL MEDICINE | Facility: CLINIC | Age: 40
End: 2020-03-04

## 2020-03-04 ENCOUNTER — OFFICE VISIT (OUTPATIENT)
Dept: INTERNAL MEDICINE | Facility: CLINIC | Age: 40
End: 2020-03-04
Payer: COMMERCIAL

## 2020-03-04 ENCOUNTER — ANCILLARY PROCEDURE (OUTPATIENT)
Dept: GENERAL RADIOLOGY | Facility: CLINIC | Age: 40
End: 2020-03-04
Attending: INTERNAL MEDICINE
Payer: COMMERCIAL

## 2020-03-04 VITALS
DIASTOLIC BLOOD PRESSURE: 79 MMHG | HEIGHT: 61 IN | BODY MASS INDEX: 34.74 KG/M2 | HEART RATE: 82 BPM | SYSTOLIC BLOOD PRESSURE: 132 MMHG | WEIGHT: 184 LBS | TEMPERATURE: 97.8 F

## 2020-03-04 DIAGNOSIS — G89.29 OTHER CHRONIC PAIN: Primary | ICD-10-CM

## 2020-03-04 DIAGNOSIS — R79.89 LOW SERUM CORTISOL LEVEL: ICD-10-CM

## 2020-03-04 DIAGNOSIS — M81.0 OSTEOPOROSIS OF MULTIPLE SITES: ICD-10-CM

## 2020-03-04 DIAGNOSIS — E66.01 MORBID OBESITY (H): ICD-10-CM

## 2020-03-04 DIAGNOSIS — G44.219 EPISODIC TENSION-TYPE HEADACHE, NOT INTRACTABLE: ICD-10-CM

## 2020-03-04 DIAGNOSIS — F33.1 MODERATE EPISODE OF RECURRENT MAJOR DEPRESSIVE DISORDER (H): ICD-10-CM

## 2020-03-04 DIAGNOSIS — G43.009 MIGRAINE WITHOUT AURA AND WITHOUT STATUS MIGRAINOSUS, NOT INTRACTABLE: ICD-10-CM

## 2020-03-04 PROCEDURE — 72040 X-RAY EXAM NECK SPINE 2-3 VW: CPT

## 2020-03-04 PROCEDURE — 99214 OFFICE O/P EST MOD 30 MIN: CPT | Performed by: INTERNAL MEDICINE

## 2020-03-04 RX ORDER — TIZANIDINE 2 MG/1
2-4 TABLET ORAL 3 TIMES DAILY PRN
Qty: 90 TABLET | Refills: 1 | Status: SHIPPED | OUTPATIENT
Start: 2020-03-04 | End: 2020-04-07

## 2020-03-04 RX ORDER — BUTALBITAL, ACETAMINOPHEN AND CAFFEINE 50; 325; 40 MG/1; MG/1; MG/1
TABLET ORAL
Qty: 20 TABLET | Refills: 0 | Status: SHIPPED | OUTPATIENT
Start: 2020-03-04 | End: 2020-03-13

## 2020-03-04 RX ORDER — HYDROCODONE BITARTRATE AND ACETAMINOPHEN 5; 325 MG/1; MG/1
.5-1 TABLET ORAL DAILY PRN
Qty: 20 TABLET | Refills: 0 | Status: SHIPPED | OUTPATIENT
Start: 2020-03-04 | End: 2020-03-31

## 2020-03-04 ASSESSMENT — PAIN SCALES - GENERAL: PAINLEVEL: SEVERE PAIN (7)

## 2020-03-04 ASSESSMENT — MIFFLIN-ST. JEOR: SCORE: 1434.06

## 2020-03-04 NOTE — TELEPHONE ENCOUNTER
Reason for Call:  Other call back    Detailed comments: patient is calling stating still in a lot of pain, neck is very uncomfortable and would like to discuss. Caller informed that calls received after 3pm may not be returned same day.  Thank you.      Phone Number Patient can be reached at: Home number on file [phone] (home)    Best Time:     Can we leave a detailed message on this number? YES    Call taken on 3/4/2020 at 4:43 PM by Khushi Daniel

## 2020-03-04 NOTE — PROGRESS NOTES
"Subjective     Mily Grullon is a 39 year old female who presents to clinic today for the following health issues:    HPI   Chief Complaint   Patient presents with     RECHECK     Patient has been having trouble with neck pain currently, patient states she has to wrap a towel around her head, pain is increasing, concerned about a fracture.         Neck Pain      Duration: Started about 2/14/2020    Description:  Location: middle of her neck  Radiation: into the right shoulder and nto the left shoulder    Intensity:  moderate    Accompanying signs and symptoms: headache    History (similar episodes/previous evaluation): ongoing problems with osteoporosis    Precipitating or alleviating factors: None    Therapies tried and outcome: None        Concerned about a rough sneeze and constant posterior neck pain since.  No radiation down arms.  Years since last neck/headache physical therapy  Has upcoming neurology appointment  Got better with butalbital and especially with codeine  Has otherwise been tapering down very well on Norco    Concerned about recommended increase in physical activity / reduction of limitations with small lifting.      1. Achilles tendinitis of left lower extremity    2. Closed fracture of one rib of left side with routine healing, subsequent encounter    3. Episodic tension-type headache, not intractable        PMH: Updated and/or reviewed in chart.    ROS:  Constitutional, HEENT, cardiovascular, pulmonary, gi and gu systems are otherwise negative.    Objective   OBJECTIVE:                                                    /79   Pulse 82   Temp 97.8  F (36.6  C) (Tympanic)   Ht 1.537 m (5' 0.5\")   Wt 83.5 kg (184 lb)   BMI 35.34 kg/m      GEN: No acute distress  EYES: No conjunctival injection or icterus, EOMI grossly intact  RESP: Unlabored, regular  NEURO: Normal gait, MAEx4, light touch sensation grossly intact, limited passive ROM   PSYCH: Normal mood and affect    No results found " for any visits on 20.   Assessment & Plan   ASSESSMENT/PLAN:                                                    Chronic pain syndrome related to multiple fractures  Acute neck pain / strain  Episodic tension-type headache, not intractable  - tiZANidine (ZANAFLEX) 2 MG tablet; Take 1-2 tablets (2-4 mg) by mouth 3 times daily as needed for muscle spasms  Dispense: 90 tablet; Refill: 1  - HYDROcodone-acetaminophen (NORCO) 5-325 MG tablet; Take 0.5-1 tablets by mouth daily as needed for moderate to severe pain  Dispense: 20 tablet; Refill: 0  - XR Cervical Spine 2/3 Views; Future  - PHYSICAL THERAPY REFERRAL; Future  - butalbital-acetaminophen-caffeine (ESGIC) -40 MG tablet; Si tablet at onset of headache.  May take every 1 tablet every 4-6 hours if needed  Dispense: 20 tablet; Refill: 0    The primary encounter diagnosis was Other chronic pain. Diagnoses of Episodic tension-type headache, not intractable, Moderate episode of recurrent major depressive disorder (H), Morbid obesity (H), Hx low serum cortisol level noted, and Migraine without aura and without status migrainosus, not intractable were also pertinent to this visit.    Orders Placed This Encounter     XR Cervical Spine 2/3 Views     PHYSICAL THERAPY REFERRAL     tiZANidine (ZANAFLEX) 2 MG tablet     HYDROcodone-acetaminophen (NORCO) 5-325 MG tablet     butalbital-acetaminophen-caffeine (ESGIC) -40 MG tablet        Return in about 4 weeks (around 2020) for Follow-up.    William Smith MD

## 2020-03-05 NOTE — TELEPHONE ENCOUNTER
Spoke with patient.  Informed her of x-ray result and recommendation for PT.  Initially patient stated she did not want PT.  After discussing she agreed to PT.  Will send to provider to order PT.            Notes recorded by William Smith MD on 3/4/2020 at 6:12 PM CST  Mily,    Neck X-ray shows physical therapy might be useful and no new fracture.    JTE

## 2020-03-09 DIAGNOSIS — G44.219 EPISODIC TENSION-TYPE HEADACHE, NOT INTRACTABLE: ICD-10-CM

## 2020-03-09 PROBLEM — M54.6 BILATERAL THORACIC BACK PAIN: Status: RESOLVED | Noted: 2019-08-13 | Resolved: 2020-03-09

## 2020-03-09 NOTE — TELEPHONE ENCOUNTER
Requested Prescriptions   Pending Prescriptions Disp Refills     butalbital-acetaminophen-caffeine (ESGIC) -40 MG tablet 20 tablet 0     Sig: Si tablet at onset of headache.  May take every 1 tablet every 4-6 hours if needed     Last Written Prescription Date:  20  Last Fill Quantity: 20,  # refills: 0   Last office visit: 3/4/2020 with prescribing provider:  MICHELLE Smith   Future Office Visit:   Next 5 appointments (look out 90 days)    Apr 15, 2020 10:00 AM CDT  SHORT with William Smith MD  [facility] [place] ([facility]) [address] [facility] [address]  [phone]             There is no refill protocol information for this order

## 2020-03-11 DIAGNOSIS — K21.9 GASTROESOPHAGEAL REFLUX DISEASE, ESOPHAGITIS PRESENCE NOT SPECIFIED: ICD-10-CM

## 2020-03-11 NOTE — TELEPHONE ENCOUNTER
famotidine (PEPCID) 20mg   Last Written Prescription Date:  10/02/2019  Last Fill Quantity: 90,   # refills: 1  Last Office Visit: 03/04/2020  Future Office visit:    Next 5 appointments (look out 90 days)    Apr 15, 2020 10:00 AM CDT  [facility] [address] [address] [facility]  [place] [address]  [phone]            pantoprazole (PROTONIX) 20mg      Last Written Prescription Date:  10/02/2019  Last Fill Quantity: 90,   # refills: 1  Last Office Visit: 03/04/2020  Future Office visit:    Next 5 appointments (look out 90 days)    Apr 15, 2020 10:00 AM CDT  SHORT with William Smith MD  [facility] [address] [address] [facility] [address] [facility]  [place] [address]  [phone]           Thank You,  Amina Lazo, Pharmacy Tech  [place]

## 2020-03-11 NOTE — TELEPHONE ENCOUNTER
Routing refill request to provider for review/approval because:  Drug not on the [facility] refill protocol. Last filled 3/4/20      Spoke with patient and she has been taking the the medication every 4-6 hours daily.  She reports she does have a headache every day, she reports the medication helps some , but headache always comes back.  (She reports Dr. Smith aware of this).  Radha Hardy RN

## 2020-03-12 NOTE — TELEPHONE ENCOUNTER
"Routing refill request to provider for review/approval because:   Failed - No diagnosis of osteoporosis on record   Pt has follow up appt 4/15/20          Requested Prescriptions   Pending Prescriptions Disp Refills     pantoprazole (PROTONIX) 20 MG EC tablet 90 tablet 1     Sig: Take by mouth 30-60 minutes before a meal.       PPI Protocol Failed - 3/11/2020  9:48 AM        Failed - No diagnosis of osteoporosis on record        Passed - Not on Clopidogrel (unless Pantoprazole ordered)        Passed - Recent (12 mo) or future (30 days) visit within the authorizing provider's specialty     Patient has had an office visit with the authorizing provider or a provider within the authorizing providers department within the previous 12 mos or has a future within next 30 days. See \"Patient Info\" tab in inbasket, or \"Choose Columns\" in Meds & Orders section of the refill encounter.              Passed - Medication is active on med list        Passed - Patient is age 18 or older        Passed - No active pregnacy on record        Passed - No positive pregnancy test in past 12 months           famotidine (PEPCID) 20 MG tablet 90 tablet 1     Sig: Take 1 tablet (20 mg) by mouth daily as needed (heartburn/reflux)       H2 Blockers Protocol Passed - 3/11/2020  9:48 AM        Passed - Patient is age 12 or older        Passed - Recent (12 mo) or future (30 days) visit within the authorizing provider's specialty     Patient has had an office visit with the authorizing provider or a provider within the authorizing providers department within the previous 12 mos or has a future within next 30 days. See \"Patient Info\" tab in inbasket, or \"Choose Columns\" in Meds & Orders section of the refill encounter.              Passed - Medication is active on med list             "

## 2020-03-13 RX ORDER — BUTALBITAL, ACETAMINOPHEN AND CAFFEINE 50; 325; 40 MG/1; MG/1; MG/1
TABLET ORAL
Qty: 20 TABLET | Refills: 0 | Status: SHIPPED | OUTPATIENT
Start: 2020-03-13 | End: 2020-04-15

## 2020-03-13 RX ORDER — PANTOPRAZOLE SODIUM 20 MG/1
TABLET, DELAYED RELEASE ORAL
Qty: 90 TABLET | Refills: 1 | Status: SHIPPED | OUTPATIENT
Start: 2020-03-13 | End: 2020-09-01

## 2020-03-13 RX ORDER — FAMOTIDINE 20 MG/1
20 TABLET, FILM COATED ORAL DAILY PRN
Qty: 90 TABLET | Refills: 1 | Status: SHIPPED | OUTPATIENT
Start: 2020-03-13 | End: 2020-08-27

## 2020-03-18 DIAGNOSIS — M81.0 OSTEOPOROSIS OF MULTIPLE SITES: ICD-10-CM

## 2020-03-18 DIAGNOSIS — M48.50XS PATHOLOGIC COMPRESSION FRACTURE OF SPINE, SEQUELA: ICD-10-CM

## 2020-03-18 DIAGNOSIS — T07.XXXA MULTIPLE FRACTURES: ICD-10-CM

## 2020-03-18 DIAGNOSIS — S72.145S CLOSED NONDISPLACED INTERTROCHANTERIC FRACTURE OF LEFT FEMUR, SEQUELA: ICD-10-CM

## 2020-03-30 DIAGNOSIS — M25.532 CHRONIC PAIN OF LEFT WRIST: Primary | ICD-10-CM

## 2020-03-30 DIAGNOSIS — G89.29 CHRONIC PAIN OF LEFT WRIST: Primary | ICD-10-CM

## 2020-03-30 NOTE — TELEPHONE ENCOUNTER
Routing refill request to provider for review/approval because:  Drug not on the [facility] refill protocol      information below:    Written: 3/13/2020, Filled: 3/13/2020 Butab/APAP, # 20 by Dr. Smith    Written: 3/3/2020, Filled: 3/11/2020 Alprazolam, # 20 by Lashell Garrison    Written: 3/3/2020, Filled: 3/11/2020 Eszopiclone, # 15 by Lashell Garrison    Written: 3/4/2020, Filled: 3/4/2020 Butab/APAP, # 20 by Dr. Smith    Written: 3/4/2020, Filled: 3/4/2020 Hydrocodone APAP #20 by Dr. Smith    Last OV with Dr. Smith: 3/4/2020 with advised F/U in 4 weeks.    Yasemin Page, RN, BSN

## 2020-03-30 NOTE — TELEPHONE ENCOUNTER
Requested Prescriptions   Pending Prescriptions Disp Refills     HYDROcodone-acetaminophen (NORCO) 5-325 MG tablet 20 tablet 0     Sig: Take 0.5-1 tablets by mouth daily as needed for moderate to severe pain   Last Written Prescription Date:  3-4-20  Last Fill Quantity: 20,  # refills: 0   Last office visit: 3/4/2020 with prescribing provider:  3-4-20   Future Office Visit:   Next 5 appointments (look out 90 days)    Apr 15, 2020 10:00 AM CDT  SHORT with William Smith MD  [facility] [place] ([facility] [place]) [address] [address]  [place] [address]  [phone]           There is no refill protocol information for this order

## 2020-03-31 RX ORDER — HYDROCODONE BITARTRATE AND ACETAMINOPHEN 5; 325 MG/1; MG/1
.5-1 TABLET ORAL DAILY PRN
Qty: 15 TABLET | Refills: 0 | Status: SHIPPED | OUTPATIENT
Start: 2020-03-31 | End: 2020-04-15

## 2020-04-07 DIAGNOSIS — G89.29 OTHER CHRONIC PAIN: Primary | ICD-10-CM

## 2020-04-07 RX ORDER — TIZANIDINE 2 MG/1
2-4 TABLET ORAL 3 TIMES DAILY PRN
Qty: 90 TABLET | Refills: 1 | Status: SHIPPED | OUTPATIENT
Start: 2020-04-07 | End: 2020-04-15

## 2020-04-07 NOTE — TELEPHONE ENCOUNTER
Requested Prescriptions   Pending Prescriptions Disp Refills     tiZANidine (ZANAFLEX) 2 MG tablet 90 tablet 1     Sig: Take 1-2 tablets (2-4 mg) by mouth 3 times daily as needed for muscle spasms  Last Written Prescription Date:  3/19/20  Last Fill Quantity: 90,  # refills: 1   Last office visit: 3/4/2020 with prescribing provider:  Luis   Future Office Visit:   Next 5 appointments (look out 90 days)    Apr 15, 2020 10:00 AM CDT  SHORT with William Smith MD  [facility] [place] ([facility]) [address] [address]  [place] [address]  [phone]              There is no refill protocol information for this order

## 2020-04-07 NOTE — TELEPHONE ENCOUNTER
Routing refill request to provider for review/approval because:  Drug not on the [facility] refill protocol       information below:    Written: 3/31/2020, Filled: 3/31/2020 Hydrocodone #15 by Dr. Smith     Written: 3/13/2020, Filled: 3/13/2020 Butab/APAP, # 20 by Dr. Smith     Written: 3/3/2020, Filled: 3/11/2020 Alprazolam, # 20 by Lashell Garrison     Written: 3/3/2020, Filled: 3/11/2020 Eszopiclone, # 15 by Lashell Garrison     Written: 3/4/2020, Filled: 3/4/2020 Butab/APAP, # 20 by Dr. Smith     Written: 3/4/2020, Filled: 3/4/2020 Hydrocodone APAP #20 by Dr. Smith     Last OV with Dr. Smith: 3/4/2020 with advised F/U in 4 weeks.     Yasemin Page, RN, BSN

## 2020-04-14 NOTE — PROGRESS NOTES
"Mily Grullon is a 39 year old female who is being evaluated via a billable telephone visit.      The patient has been notified of following:     \"This telephone visit will be conducted via a call between you and your physician/provider. We have found that certain health care needs can be provided without the need for a physical exam.  This service lets us provide the care you need with a short phone conversation.  If a prescription is necessary we can send it directly to your pharmacy.  If lab work is needed we can place an order for that and you can then stop by our lab to have the test done at a later time.    Telephone visits are billed at different rates depending on your insurance coverage. During this emergency period, for some insurers they may be billed the same as an in-person visit.  Please reach out to your insurance provider with any questions.    If during the course of the call the physician/provider feels a telephone visit is not appropriate, you will not be charged for this service.\"    Patient has given verbal consent for Telephone visit?  Yes    How would you like to obtain your AVS? Mail a copy    Subjective     Mily Grullon is a 39 year old female who presents to clinic today for the following health issues:    Chronic Pain Follow-Up    Where in your body do you have pain? Back, hip, some headaches but not as bed  How has your pain affected your ability to work? Unable to work  Which of these pain treatments have you tried since your last clinic visit? Heat  How well are you sleeping? Poor  How has your mood been since your last visit? Slightly worse  Have you had a significant life event? Other: Covid-19  Other aggravating factors: none  Taking medication as directed? Yes    PHQ-9 SCORE 5/16/2018 7/17/2019 8/30/2019   PHQ-9 Total Score - - -   PHQ-9 Total Score 12 16 22     DEJON-7 SCORE 5/16/2018 7/17/2019 8/30/2019   Total Score 11 17 16     No flowsheet data found.  Encounter-Level CSA - " 11/30/2018:    Controlled Substance Agreement - Scan on 12/11/2018  9:24 AM: CONTROLLED SUBSTANCE AGREEMENT     Encounter-Level CSA - 06/15/2016:    Controlled Substance Agreement - Scan on 6/22/2016  1:18 PM: CONTROLLED SUBSTANCE AGREEMENT     Encounter-Level CSA - 06/02/2015:    Controlled Substance Agreement - Scan on 8/4/2015 10:40 AM: CONTROLLED MEDICATION AGREEMENT 06/02/15     Patient-Level CSA:    There are no patient-level csa.         How many servings of fruits and vegetables do you eat daily?  4 or more    On average, how many sweetened beverages do you drink each day (Examples: soda, juice, sweet tea, etc.  Do NOT count diet or artificially sweetened beverages)?   0    How many days per week do you exercise enough to make your heart beat faster? 5    How many minutes a day do you exercise enough to make your heart beat faster? 30 - 60    How many days per week do you miss taking your medication? 0    Walking her sister's weiner dog puppy whenever she can.  Free of respiratory symptoms.  Video visits with her therapists.  Her dad is helping her with groceries and medications so she can stay isolated.  Using the butalbital roughly every other day for bad headaches, continues to taper down on her Norco well -- endorsing again just the fear of the pain of fractures -- down now to #15 tab/mo, and is relying fairly heavily on the tizanidine for management of chronic neck pain.  She's taking reliably 4 mg twice daily and often a total of 5 tabs daily (total daily dose 10 mg).  She otherwise reports doing well but stuck at home.      Reviewed and updated as needed this visit by Provider         Review of Systems   ROS COMP: Constitutional, psychiatric, pulmonary, musculoskeletal systems are negative, except as otherwise noted.       Objective   Reported vitals:  There were no vitals taken for this visit.     PSYCH: Alert and oriented times 3; coherent speech, normal   rate and volume, able to articulate logical  thoughts, able   to abstract reason, no tangential thoughts, no hallucinations   or delusions  Her affect is normal  RESP: No cough, no audible wheezing, able to talk in full sentences  Remainder of exam unable to be completed due to telephone visits          Assessment/Plan:  1. Episodic tension-type headache, not intractable  Chronic intermittent likely cervicogenic vs other tension headaches, using appropriately at this time.  Continue to wean down on frequency as able.  Focusing on opioid wean more at this point.  - butalbital-acetaminophen-caffeine (ESGIC) -40 MG tablet; Si tablet at onset of headache.  Limit one per day.  Limit 20 tabs per month.  Dispense: 20 tablet; Refill: 2    2. Other chronic pain  OK to continue muscle relaxant as below with liberalized supply up to total daily dose 10 mg.  - tiZANidine (ZANAFLEX) 2 MG tablet; Take 1-2 tablets (2-4 mg) by mouth 3 times daily as needed for muscle spasms  Dispense: 150 tablet; Refill: 2    3. Chronic pain of left wrist  Weaning well without withdrawal symptoms or misuse/abuse concerns.  She may not be needing to use at all at this time, and with a #5 tab/mo wean, her supply at home would remain reasonable (up to 30 tabs) in reserve for any future fracture -- and she remains at high risk for fracture and lower risk for misuse/diversion.  - HYDROcodone-acetaminophen (NORCO) 5-325 MG tablet; Take 0.5-1 tablets by mouth daily as needed for moderate to severe pain  Dispense: 10 tablet; Refill: 0    Return in about 2 months (around 6/15/2020) for pain follow-up.      Phone call duration:  13 minutes    William Smith MD

## 2020-04-15 ENCOUNTER — VIRTUAL VISIT (OUTPATIENT)
Dept: INTERNAL MEDICINE | Facility: CLINIC | Age: 40
End: 2020-04-15
Payer: COMMERCIAL

## 2020-04-15 DIAGNOSIS — G89.29 OTHER CHRONIC PAIN: ICD-10-CM

## 2020-04-15 DIAGNOSIS — M25.532 CHRONIC PAIN OF LEFT WRIST: ICD-10-CM

## 2020-04-15 DIAGNOSIS — G89.29 CHRONIC PAIN OF LEFT WRIST: ICD-10-CM

## 2020-04-15 DIAGNOSIS — G44.219 EPISODIC TENSION-TYPE HEADACHE, NOT INTRACTABLE: ICD-10-CM

## 2020-04-15 PROCEDURE — 99214 OFFICE O/P EST MOD 30 MIN: CPT | Mod: 95 | Performed by: INTERNAL MEDICINE

## 2020-04-15 RX ORDER — HYDROCODONE BITARTRATE AND ACETAMINOPHEN 5; 325 MG/1; MG/1
.5-1 TABLET ORAL DAILY PRN
Qty: 10 TABLET | Refills: 0 | Status: SHIPPED | OUTPATIENT
Start: 2020-04-30 | End: 2020-05-18

## 2020-04-15 RX ORDER — TIZANIDINE 2 MG/1
2-4 TABLET ORAL 3 TIMES DAILY PRN
Qty: 150 TABLET | Refills: 2 | Status: SHIPPED | OUTPATIENT
Start: 2020-04-15 | End: 2020-06-23

## 2020-04-15 RX ORDER — BUTALBITAL, ACETAMINOPHEN AND CAFFEINE 50; 325; 40 MG/1; MG/1; MG/1
TABLET ORAL
Qty: 20 TABLET | Refills: 2 | Status: SHIPPED | OUTPATIENT
Start: 2020-04-15 | End: 2020-07-08

## 2020-05-13 ENCOUNTER — TELEPHONE (OUTPATIENT)
Dept: INTERNAL MEDICINE | Facility: CLINIC | Age: 40
End: 2020-05-13

## 2020-05-13 DIAGNOSIS — M81.0 OSTEOPOROSIS OF MULTIPLE SITES: Primary | ICD-10-CM

## 2020-05-13 NOTE — TELEPHONE ENCOUNTER
Patient is wondering if Dr Smith would place an order for a cane, states has a walker but would like to use a cane at times.

## 2020-05-13 NOTE — TELEPHONE ENCOUNTER
Please review and advise.     Pended for Single Tip Cane.     Please review and advise.     Carola Pabon RN BSN

## 2020-05-14 ENCOUNTER — TELEPHONE (OUTPATIENT)
Dept: INTERNAL MEDICINE | Facility: CLINIC | Age: 40
End: 2020-05-14

## 2020-05-14 DIAGNOSIS — M81.0 OSTEOPOROSIS OF MULTIPLE SITES: Primary | ICD-10-CM
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
## 2020-05-14 DIAGNOSIS — M25.532 CHRONIC PAIN OF LEFT WRIST: ICD-10-CM

## 2020-05-14 DIAGNOSIS — G89.29 CHRONIC PAIN OF LEFT WRIST: ICD-10-CM

## 2020-05-14 NOTE — TELEPHONE ENCOUNTER
Patient request refill of HYDROcodone-acetaminophen (NORCO) 5-325 MG tablet . She uses [facility].

## 2020-05-14 NOTE — TELEPHONE ENCOUNTER
Routing refill request to provider for review/approval because:  Drug not on the [facility] refill protocol     Last Written Prescription Date:  4/30/20  Last Fill Quantity: 10,  # refills: 0     Last VIRTUAL visit: 4/15/20 with prescribing provider:  Dr. Smith     Future Office Visit:   Next 5 appointments (look out 90 days)    Jul 28, 2020 10:30 AM CDT  Return Visit with Dallas Flores MD  [facility] [address] [place] [address]  [phone]         Carola Pabon RN BSN

## 2020-05-14 NOTE — TELEPHONE ENCOUNTER
Patient states she is having muscle weakness and balance issues, she has fallen twice in the last week. Patient would like to talk to Dr. Smith.

## 2020-05-14 NOTE — TELEPHONE ENCOUNTER
"Patient requested a cane yesterday. This order has been completed and the pharmacy has contacted her. She adds that it's hard to get around with her walker because it's too heavy for her to lift.     Mily reports that she fell last Thursday (5/7) & Sunday (5/10). She did not injure herself or hit her head because she was able to grab onto something to catch herself. She states that she has been having more left hip pain lately. Her left leg goes numb and her leg just collapses.      Patient states \"My balance if off and I just feel weak.\"     She wonders if she needs another visit with Dr. Smith.   She would be willing to do a Telephone visit next week if appropriate.     Please review and advise.     Carola Pabon RN BSN    "

## 2020-05-18 RX ORDER — HYDROCODONE BITARTRATE AND ACETAMINOPHEN 5; 325 MG/1; MG/1
.5-1 TABLET ORAL DAILY PRN
Qty: 5 TABLET | Refills: 0 | Status: SHIPPED | OUTPATIENT
Start: 2020-05-18 | End: 2020-05-26

## 2020-05-26 ENCOUNTER — VIRTUAL VISIT (OUTPATIENT)
Dept: INTERNAL MEDICINE | Facility: CLINIC | Age: 40
End: 2020-05-26
Payer: COMMERCIAL

## 2020-05-26 DIAGNOSIS — R29.6 FALLS FREQUENTLY: Primary | ICD-10-CM

## 2020-05-26 DIAGNOSIS — M79.652 PAIN OF LEFT THIGH: ICD-10-CM

## 2020-05-26 DIAGNOSIS — M25.532 CHRONIC PAIN OF LEFT WRIST: ICD-10-CM

## 2020-05-26 DIAGNOSIS — F41.9 ANXIETY: ICD-10-CM

## 2020-05-26 DIAGNOSIS — M81.0 OSTEOPOROSIS OF MULTIPLE SITES: ICD-10-CM

## 2020-05-26 DIAGNOSIS — G89.29 CHRONIC PAIN OF LEFT WRIST: ICD-10-CM

## 2020-05-26 PROCEDURE — 99214 OFFICE O/P EST MOD 30 MIN: CPT | Mod: 95 | Performed by: INTERNAL MEDICINE

## 2020-05-26 RX ORDER — OXYCODONE AND ACETAMINOPHEN 5; 325 MG/1; MG/1
.5-1 TABLET ORAL DAILY PRN
Qty: 15 TABLET | Refills: 0 | Status: SHIPPED | OUTPATIENT
Start: 2020-05-26 | End: 2020-06-11

## 2020-05-26 NOTE — PROGRESS NOTES
"Mily Grullon is a 39 year old female who is being evaluated via a billable telephone visit.      The patient has been notified of following:     \"This telephone visit will be conducted via a call between you and your physician/provider. We have found that certain health care needs can be provided without the need for a physical exam.  This service lets us provide the care you need with a short phone conversation.  If a prescription is necessary we can send it directly to your pharmacy.  If lab work is needed we can place an order for that and you can then stop by our lab to have the test done at a later time.    Telephone visits are billed at different rates depending on your insurance coverage. During this emergency period, for some insurers they may be billed the same as an in-person visit.  Please reach out to your insurance provider with any questions.    If during the course of the call the physician/provider feels a telephone visit is not appropriate, you will not be charged for this service.\"    Patient has given verbal consent for Telephone visit?  Yes    What phone number would you like to be contacted at? [phone]    How would you like to obtain your AVS? Mail a copy    Subjective     Mily Grullon is a 39 year old female who presents via phone visit today for the following health issues:    HPI     Falls      Duration: 5/7/20    Description (location/character/radiation): falling maybe once a week; 3 times total; leg is weird and falls; loses balance    Intensity:  moderate    Accompanying signs and symptoms: burning sensation (gets hot for no reason); pain in pelvis (hip area)    History (similar episodes/previous evaluation): None    Precipitating or alleviating factors: no    Therapies tried and outcome: using a cane; Tylenol, Ibu, Norco; heating pad; somewhat helped     Fell 5/10 and on 5/22 -- last two falls caught herself on railings or in a door.  Had been training her dog.  Complains of left " "leg (\"hip\") pain.  Pain hasn't improved over the last week.  Very nervous about falls.  Has felt significant weakness before falls.  Having difficulty walking a full block without pain.  Requested cane for this reason.  Pain with walking to mailbox and is being less physically active as a result but able to move about OK.  Would like to trial Percocet over Norco for pain control.  Has therapy scheduled first week in June.  Very anxious about no clear mechanical reason for falls.  No loss of consciousness or prodrome.        Reviewed and updated as needed this visit by Provider         Review of Systems   Constitutional, musculoskeletal, psychiatric, cardiovascular, pulmonary, gi and gu systems are negative, except as otherwise noted.       Objective   Reported vitals:  There were no vitals taken for this visit.     PSYCH: Alert and oriented times 3; coherent speech, normal   rate and volume, able to articulate logical thoughts, able   to abstract reason, no tangential thoughts, no hallucinations   or delusions  Her affect is anxious  RESP: No cough, no audible wheezing, able to talk in full sentences  Remainder of exam unable to be completed due to telephone visits          Assessment/Plan:  5. Pain of left thigh  1. Falls frequently  2. Anxiety  3. Osteoporosis of multiple sites  Unclear etiology, though likely mechanical or secondary to pain.  Discussed that if undifferentiated weakness in left lower extremity is only signal, we may need additional work-up, including ortho exam, possibly CT scan (expecting significant artifact).  I think she'll benefit from therapy for conditioning and confidence (reduced anxiety).  No clear contribution from osteoporosis by itself, but possible medications could elicit some weakness as side effects, though benefits far outweigh risks at this point in time.  - oxyCODONE-acetaminophen (PERCOCET) 5-325 MG tablet; Take 0.5-1 tablets by mouth daily as needed for pain  Dispense: 15 " tablet; Refill: 0    Return in about 3 weeks (around 6/16/2020) for Follow-up.      Phone call duration:  22 minutes    William Smith MD

## 2020-05-27 ENCOUNTER — TRANSFERRED RECORDS (OUTPATIENT)
Dept: HEALTH INFORMATION MANAGEMENT | Facility: CLINIC | Age: 40
End: 2020-05-27

## 2020-06-01 ENCOUNTER — HOSPITAL ENCOUNTER (OUTPATIENT)
Dept: PHYSICAL THERAPY | Facility: CLINIC | Age: 40
Setting detail: THERAPIES SERIES
End: 2020-06-01
Attending: INTERNAL MEDICINE
Payer: COMMERCIAL

## 2020-06-01 DIAGNOSIS — M81.0 OSTEOPOROSIS OF MULTIPLE SITES: ICD-10-CM
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
## 2020-06-01 PROCEDURE — 97161 PT EVAL LOW COMPLEX 20 MIN: CPT | Mod: GP,GQ,GT | Performed by: PHYSICAL THERAPIST

## 2020-06-01 PROCEDURE — 97110 THERAPEUTIC EXERCISES: CPT | Mod: GP,GQ,GT | Performed by: PHYSICAL THERAPIST

## 2020-06-02 NOTE — PROGRESS NOTES
06/01/20 0900   General Information   Start of Care Date 06/01/20   Referring Physician Dr Smith  (primary care)   Orders Evaluate and Treat as Indicated   Order Date 05/18/20   Medical Diagnosis osteoporosis, falls   Onset of illness/injury or Date of Surgery   (May 2020 for falls, osteoporosis is chronic issue)   Special Instructions pathological fracture of left femur (intertrochanteric) 10/31/2013 then ORIF on left 2/18/2014 due to nonhealing fx. Per pt the labrum was removed so its bone on bone at hip.    Surgical/Medical history reviewed Yes  (depression, anxiety, migraines, osteoporosis, )   Pertinent history of current problem (include personal factors and/or comorbidities that impact the POC) Subjecive info: I cant even walk a block now. Now using cane all the time. Uses the cane inside the home, depending on the pain. Worse: walking, trying to do something. Pain pills (oxy) help but only one a day. Limited use of OTCS (sideeffects). Ices 3-4 times a day. WEakness. Scared of falling.  I dont want to break no more, it hurts. I avoid stairs , pretty much since I did my hip.   Prior level of function comment was walking 1-3 times a day for a mile (each time??) prior to falls and COVID. Goes with her dad to grocery store, he helps her (cannot go alone per MDs??).  She drives   Current Community Support Family/friend caregiver   Patient role/Employment history Disabled;Other/comments  (hasnt worked in > a year)   Living environment House/townhome  (rents the basement of a townhouse)   Current Assistive Devices Four Wheeled Walker;Standard Cane   Patient/Family Goals Statement hip pain better   General Information Comments  Airfryer and microwave no stove top in her living quarters.    Fall Risk Screen   Fall screen completed by PT   Have you fallen 2 or more times in the past year? Yes  (5/7, 5/10 and 5/22)   Have you fallen and had an injury in the past year? Yes   Is patient a fall risk? Yes   Fall screen  comments first fall 5/10?: walking my sisters puppy, I was trying to tie up the poop bag and the dog got away, she was moving/trying to catch the puppy. left hip hurt right away after this fall.    Pain   Patient currently in pain Yes   Pain location left hip/groin   Pain description comment pain got worse during our evaluation, pain pills help, rest helps.   Pain comments ruthie feels like when I broke it at first, but I have all that metal in it so it cannto be broken.   Gait   Gait Comments she was walking during the appt from room to room in the house. I could only see her upper half of body (telehealth) so not sure if using cane. Speed seemed normal not slow. She avoids the steps.    Balance Special Tests Sit to Stand Reps in 30 Seconds   Reps in 30 seconds 5   Height 18  (I think this is correct, 18 inch, It was a white desk chair)   Comments pain in left hip/groin gets worse, no hands.    Sensory Examination   Sensory Perception other (describe)   Sensory Perception Comments sometimes has tingling/numbness front of thigh. Not sure how long it lasts. I never had that until the fall (first).    Clinical Impression   Criteria for Skilled Therapeutic Interventions Met yes, treatment indicated   PT Diagnosis falls and left hip/groin pain (chronic osteoporosis)   Influenced by the following impairments left hip/groin pain, falls/dynamic postural control, strength in legs, activity tolerance, fatigue, fear of falling, obesity and anxiety.    Functional limitations due to impairments affects ADLs/IADLS, household/community mobility and recreational activity   Clinical Presentation Stable/Uncomplicated   Clinical Presentation Rationale medical history, impairments, sit to stand test, and clinical judgement   Clinical Decision Making (Complexity) Low complexity   Therapy Frequency 1 time/week   Predicted Duration of Therapy Intervention (days/wks) 2-3 months   Risk & Benefits of therapy have been explained Yes    Patient, Family & other staff in agreement with plan of care Yes   Clinical Impression Comments Telehealth eval: Left hip pain that is worse with wt bearing. AFter she comes into our clinic for further exam of her hip and xray may be needed. Last xray was 2018. She has had PT in the past few years for various issues but she has limited activity level and is deconditioned. There is also fear of falling with her osteoporosis.    Goal 1   Goal Identifier gait   Goal Description She will be able to complete a 6MWT at 1.0 m/s consistently with hip pain < a 3 on scale of 0-10   Target Date 20   Goal 2   Goal Identifier leg strength   Goal Description Sit to stand test to improve from 6reps to 10 reps in 30 seconds without hands with hip pain < 3 on scale of 0-10.   Target Date 20   Goal 3   Goal Identifier recreational walking program   Goal Description She will be able to walk a mile daily with minimal hip pain (< 3 on scale of 0-10)   Target Date 20   Total Evaluation Time   PT Eval, Low Complexity Minutes (98276) 30   Olivia Rahman DPT, MPT, NCS  Physical Therapist   Board Certified Neurologic Clinical Specialist     [facility]   [address] [place] [address]   [email or web address]  [email or web address]   [phone]   Clinic: [phone] //   Fax: [phone]

## 2020-06-02 NOTE — PROGRESS NOTES
Video-Visit Details    Type of service:  Video Visit    Video Start Time (time video started): 9:16    Video End Time (time video stopped): 9:55    Originating Location (pt. Location): Home    Distant Location (provider location):  [place] [facility]     Mode of Communication:  Video Conference via [facility]    Physician has received verbal consent for a Video Visit from the patient? Yes      Miri Rahman, PT

## 2020-06-02 NOTE — PROGRESS NOTES
[facility]      Outpatient Physical Therapy Evaluation  PLAN OF TREATMENT FOR OUTPATIENT REHABILITATION  (COMPLETE FOR INITIAL CLAIMS ONLY)  Patient's Last Name, First Name, M.I.  YOB: 1980  Mily Grullon                        Provider's Name  [facility] Medical Record No.  [id number]                               Onset Date:  Order date 5/18/2020   Start of Care Date: 6/1/2020     Type: Physical Therapy Medical Diagnosis: osteoporosis, falls                        Therapy Diagnosis:  Left hip pain, deconditioned/weakness, falls, osteoporosis, fear of falling   Visits from SOC:  1   _________________________________________________________________________________  Plan of Treatment:      Frequency/Duration: See weekly for PT in person. Eval was via telehealth. Up to 3 months    Goals: Improve leg strength, gait speed/tolerance improved and decrease pain left hip  _________________________________________________________________________________    I CERTIFY THE NEED FOR THESE SERVICES FURNISHED UNDER        THIS PLAN OF TREATMENT AND WHILE UNDER MY CARE     (Physician co-signature of this document indicates review and certification of the therapy plan).              Certification date from: 6/1/2020  Certification date to: 9/1/2020    Referring Provider: Dr William Smith

## 2020-06-05 ENCOUNTER — TELEPHONE (OUTPATIENT)
Dept: INTERNAL MEDICINE | Facility: CLINIC | Age: 40
End: 2020-06-05

## 2020-06-05 NOTE — TELEPHONE ENCOUNTER
Yes, popping sound in joint should be OK to continue to monitor with physical therapy before we image/scan with CT.

## 2020-06-05 NOTE — TELEPHONE ENCOUNTER
"Patient had a virtual visit with Dr. Smith on 5/26/20.   She states that her left hip pain is getting worse the past few days. She reports there was a painful pop in the joint which happened several times last night.    She rates her left hip pain a \"7\" on a scale of 0-10. She is taking Percocet once a day which helps at first, however it wears off after a few hours.    Mily states that she has started Physical Therapy.   She wonders if Dr. Smith would be willing to order a CT scan at this time. She states \"Better Safe than sorry, right?\"    I told patient that Dr. Smith may want to see if the PT is helping before he orders the CT.     Patient verbalized a good understanding, she still wanted to check with Dr. Smith to see if it would be possible to order a left hip CT at this time.     Please review and advise.     Carola Pabon RN BSN    "

## 2020-06-05 NOTE — TELEPHONE ENCOUNTER
Patient states she was to call to let Dr Smith know if she was having more hip pain states she is and heard a pop. Would like a scan ordered. Please call to advise.

## 2020-06-08 ENCOUNTER — HOSPITAL ENCOUNTER (OUTPATIENT)
Dept: PHYSICAL THERAPY | Facility: CLINIC | Age: 40
Setting detail: THERAPIES SERIES
End: 2020-06-08
Attending: INTERNAL MEDICINE
Payer: COMMERCIAL

## 2020-06-08 DIAGNOSIS — M79.652 PAIN OF LEFT THIGH: ICD-10-CM

## 2020-06-08 PROCEDURE — 97116 GAIT TRAINING THERAPY: CPT | Mod: GP | Performed by: PHYSICAL THERAPIST

## 2020-06-08 PROCEDURE — 97110 THERAPEUTIC EXERCISES: CPT | Mod: GP | Performed by: PHYSICAL THERAPIST

## 2020-06-08 NOTE — TELEPHONE ENCOUNTER
Requested Prescriptions   Pending Prescriptions Disp Refills     oxyCODONE-acetaminophen (PERCOCET) 5-325 MG tablet 15 tablet 0     Sig: Take 0.5-1 tablets by mouth daily as needed for pain  Percocet      Last Written Prescription Date:  5/26/20  Last Fill Quantity: 15,   # refills: 0  Last Office Visit: 3/4/20 Luis  Future Office visit:    Next 5 appointments (look out 90 days)    Jun 23, 2020  2:30 PM CDT  [facility] [address] [facility] [id number] [phone]   Jul 28, 2020 10:30 AM CDT  Return Visit with Dallas Flores MD  [facility] ([facility]) [address] [place] [id number] [phone]           Routing refill request to provider for review/approval because:  Drug not on the [facility], [facility] or [facility] refill protocol or controlled substance       There is no refill protocol information for this order

## 2020-06-10 NOTE — TELEPHONE ENCOUNTER
Routing refill request to provider for review/approval because:  Drug not on the [facility] refill protocol   Last Written Prescription Date:  5/26/20  Last Fill Quantity: 15,  # refills: 0   Last office visit: 3/4/2020 with prescribing provider:  Dr. Smith   Future Office Visit:   Next 5 appointments (look out 90 days)    Jun 23, 2020  2:30 PM CDT  Telephone Visit with William Smith MD  [facility] ([facility]) [id number] [place] [id number] [phone]   Jul 28, 2020 10:30 AM CDT  Return Visit with Dallas Flores MD  [facility] [address] [place] [id number]  [phone]         Carola Pabon RN BSN

## 2020-06-11 RX ORDER — OXYCODONE AND ACETAMINOPHEN 5; 325 MG/1; MG/1
.5-1 TABLET ORAL DAILY PRN
Qty: 12 TABLET | Refills: 0 | Status: SHIPPED | OUTPATIENT
Start: 2020-06-11 | End: 2020-06-23

## 2020-06-17 ENCOUNTER — HOSPITAL ENCOUNTER (OUTPATIENT)
Dept: PHYSICAL THERAPY | Facility: CLINIC | Age: 40
Setting detail: THERAPIES SERIES
End: 2020-06-17
Attending: INTERNAL MEDICINE
Payer: COMMERCIAL

## 2020-06-17 PROCEDURE — 97140 MANUAL THERAPY 1/> REGIONS: CPT | Mod: GP | Performed by: PHYSICAL THERAPIST

## 2020-06-17 PROCEDURE — 97110 THERAPEUTIC EXERCISES: CPT | Mod: GP | Performed by: PHYSICAL THERAPIST

## 2020-06-17 PROCEDURE — 97116 GAIT TRAINING THERAPY: CPT | Mod: GP | Performed by: PHYSICAL THERAPIST

## 2020-06-23 ENCOUNTER — VIRTUAL VISIT (OUTPATIENT)
Dept: INTERNAL MEDICINE | Facility: CLINIC | Age: 40
End: 2020-06-23
Payer: COMMERCIAL

## 2020-06-23 DIAGNOSIS — S72.145S CLOSED NONDISPLACED INTERTROCHANTERIC FRACTURE OF LEFT FEMUR, SEQUELA: ICD-10-CM

## 2020-06-23 DIAGNOSIS — M81.0 OSTEOPOROSIS OF MULTIPLE SITES: Primary | ICD-10-CM

## 2020-06-23 DIAGNOSIS — G89.29 OTHER CHRONIC PAIN: ICD-10-CM

## 2020-06-23 DIAGNOSIS — R29.6 FALLS FREQUENTLY: ICD-10-CM

## 2020-06-23 PROCEDURE — 99214 OFFICE O/P EST MOD 30 MIN: CPT | Mod: 95 | Performed by: INTERNAL MEDICINE

## 2020-06-23 RX ORDER — TIZANIDINE 2 MG/1
2-6 TABLET ORAL 3 TIMES DAILY PRN
Qty: 180 TABLET | Refills: 5 | Status: SHIPPED | OUTPATIENT
Start: 2020-06-23 | End: 2020-10-28

## 2020-06-23 RX ORDER — OXYCODONE AND ACETAMINOPHEN 5; 325 MG/1; MG/1
.5-1 TABLET ORAL 2 TIMES DAILY PRN
Qty: 60 TABLET | Refills: 0 | Status: SHIPPED | OUTPATIENT
Start: 2020-06-23 | End: 2020-07-21

## 2020-06-23 NOTE — PROGRESS NOTES
"Mily Grullon is a 39 year old female who is being evaluated via a billable telephone visit.      The patient has been notified of following:     \"This telephone visit will be conducted via a call between you and your physician/provider. We have found that certain health care needs can be provided without the need for a physical exam.  This service lets us provide the care you need with a short phone conversation.  If a prescription is necessary we can send it directly to your pharmacy.  If lab work is needed we can place an order for that and you can then stop by our lab to have the test done at a later time.    Telephone visits are billed at different rates depending on your insurance coverage. During this emergency period, for some insurers they may be billed the same as an in-person visit.  Please reach out to your insurance provider with any questions.    If during the course of the call the physician/provider feels a telephone visit is not appropriate, you will not be charged for this service.\"    Patient has given verbal consent for Telephone visit?  Yes    What phone number would you like to be contacted at? [phone]    How would you like to obtain your AVS? Mail a copy    Subjective     Mily rGullon is a 39 year old female who presents via phone visit today for the following health issues:    HPI  Following up on: frequent falls    Last visit this was discussed: 6/1/2020 with Jose    Progression of Symptoms:  worsening    Accompanying Signs & Symptoms: pain has worsened-butt and femur     Taking Medication as prescribed: yes    Side Effects:  None    Medication Helping Symptoms:  yes     No new falls.  Groin and butt pain stable from prior.  Attending physical therapy well.    Mily has been using Percocet prior to physical therapy but really struggling with pain later in the day.  She also had been focused on wanting to know if she had another fracture.  She's been reluctant to utilize her 4WW in " many appropriate scenarios due to its bulk and her discomfort mobilizing with it to have with her when out and about.    Corresponded with Olivia, her physical therapist who is concerned about pain limiting her progress with therapeutic exercises.        Reviewed and updated as needed this visit by Provider         Review of Systems   Constitutional, musculoskeletal, cardiovascular, pulmonary, gi and gu systems are negative, except as otherwise noted.       Objective   Reported vitals:  There were no vitals taken for this visit.     PSYCH: Alert and oriented times 3; coherent speech, normal   rate and volume, able to articulate logical thoughts, able   to abstract reason, no tangential thoughts, no hallucinations   or delusions  Her affect is normal  RESP: No cough, no audible wheezing, able to talk in full sentences  Remainder of exam unable to be completed due to telephone visits          Assessment/Plan:  1. Pain of left hip, groin s/p hardware and more distant fracture   Frequency of falls  Osteoporosis  2. Other chronic pain  Encouraged her to work on continued physical therapy.  We agreed that improved pain control should reduce a barrier to her improvement.  Short-term liberalization to twice daily PRN intended.  We discussed the opioid increase in the context of multiple other CNS depressing medications and to operate with high caution, as a sedation-related fall would only further exacerbate pains.  She demonstrated understanding.  Discussed bowel movement regimen as well.  Patient not to increase both muscle relaxant and opioid at the same time.  Unsure if higher dose muscle relaxant will provide additional benefit.    Ongoing physical therapy appreciated.      - oxyCODONE-acetaminophen (PERCOCET) 5-325 MG tablet; Take 0.5-1 tablets by mouth daily as needed for pain  Dispense: 12 tablet; Refill: 0  - tiZANidine (ZANAFLEX) 2 MG tablet; Take 1-2 tablets (2-4 mg) by mouth 3 times daily as needed for muscle  spasms  Dispense: 150 tablet; Refill: 2    Return in about 4 weeks (around 7/21/2020) for Pain follow-up 4-6 weeks .      Phone call duration:  24 minutes    William Smith MD

## 2020-06-24 ENCOUNTER — HOSPITAL ENCOUNTER (OUTPATIENT)
Dept: PHYSICAL THERAPY | Facility: CLINIC | Age: 40
Setting detail: THERAPIES SERIES
End: 2020-06-24
Attending: INTERNAL MEDICINE
Payer: COMMERCIAL

## 2020-06-24 PROCEDURE — 97110 THERAPEUTIC EXERCISES: CPT | Mod: GP | Performed by: PHYSICAL THERAPIST

## 2020-07-01 ENCOUNTER — HOSPITAL ENCOUNTER (OUTPATIENT)
Dept: PHYSICAL THERAPY | Facility: CLINIC | Age: 40
Setting detail: THERAPIES SERIES
End: 2020-07-01
Attending: INTERNAL MEDICINE
Payer: COMMERCIAL

## 2020-07-01 PROCEDURE — 97110 THERAPEUTIC EXERCISES: CPT | Mod: GP | Performed by: PHYSICAL THERAPIST

## 2020-07-01 PROCEDURE — 97116 GAIT TRAINING THERAPY: CPT | Mod: GP | Performed by: PHYSICAL THERAPIST

## 2020-07-06 DIAGNOSIS — G44.219 EPISODIC TENSION-TYPE HEADACHE, NOT INTRACTABLE: ICD-10-CM

## 2020-07-06 NOTE — TELEPHONE ENCOUNTER
Requested Prescriptions   Pending Prescriptions Disp Refills     butalbital-acetaminophen-caffeine (ESGIC) -40 MG tablet 20 tablet 2     Sig: Si tablet at onset of headache.  Limit one per day.  Limit 20 tabs per month.   Last Written Prescription Date:  06/10/20  Last Fill Quantity: 20,  # refills: 2   Last office visit: 2020 with prescribing provider:  MICHELLE Smith   Future Office Visit:   Next 5 appointments (look out 90 days)    2020 10:30 AM CDT  Return Visit with Dallas Flores MD  [facility]) [address] [place] [id number] [phone]                   There is no refill protocol information for this order

## 2020-07-08 ENCOUNTER — TELEPHONE (OUTPATIENT)
Dept: INTERNAL MEDICINE | Facility: CLINIC | Age: 40
End: 2020-07-08

## 2020-07-08 ENCOUNTER — HOSPITAL ENCOUNTER (OUTPATIENT)
Dept: PHYSICAL THERAPY | Facility: CLINIC | Age: 40
Setting detail: THERAPIES SERIES
End: 2020-07-08
Attending: INTERNAL MEDICINE
Payer: COMMERCIAL

## 2020-07-08 PROCEDURE — 97116 GAIT TRAINING THERAPY: CPT | Mod: GP | Performed by: PHYSICAL THERAPIST

## 2020-07-08 NOTE — TELEPHONE ENCOUNTER
Reason for Call:  Other call back    Detailed comments: patient is calling for status on medication refill on previous encounter, please call to advise. Thank you.    Phone Number Patient can be reached at: Home number on file [phone] (home)    Best Time:     Can we leave a detailed message on this number? YES    Call taken on 7/8/2020 at 1:54 PM by Khushi Daniel

## 2020-07-09 ENCOUNTER — TELEPHONE (OUTPATIENT)
Dept: INTERNAL MEDICINE | Facility: CLINIC | Age: 40
End: 2020-07-09

## 2020-07-09 DIAGNOSIS — G44.219 EPISODIC TENSION-TYPE HEADACHE, NOT INTRACTABLE: ICD-10-CM

## 2020-07-09 RX ORDER — BUTALBITAL, ACETAMINOPHEN AND CAFFEINE 50; 325; 40 MG/1; MG/1; MG/1
TABLET ORAL
Qty: 20 TABLET | Refills: 0 | Status: SHIPPED | OUTPATIENT
Start: 2020-07-09 | End: 2020-07-14

## 2020-07-09 NOTE — TELEPHONE ENCOUNTER
Reason for Call:  Medication or medication refill:    Do you use a [facility]?  Name of the pharmacy and phone number for the current request:  [facility] [phone]    Name of the medication requested: butalbital     Other request: stated pharmacy has no more refills for this     Can we leave a detailed message on this number? YES    Phone number patient can be reached at: Home number on file [phone] (home)    Best Time: any    Call taken on 7/9/2020 at 11:10 AM by Fartun Riley

## 2020-07-09 NOTE — TELEPHONE ENCOUNTER
Patient calling again to check on status of refill.  Please see refill request from 7-6-20.  Please advise.

## 2020-07-14 RX ORDER — BUTALBITAL, ACETAMINOPHEN AND CAFFEINE 50; 325; 40 MG/1; MG/1; MG/1
TABLET ORAL
Qty: 20 TABLET | Refills: 0 | Status: SHIPPED | OUTPATIENT
Start: 2020-07-14 | End: 2020-08-19

## 2020-07-15 ENCOUNTER — HOSPITAL ENCOUNTER (OUTPATIENT)
Dept: PHYSICAL THERAPY | Facility: CLINIC | Age: 40
Setting detail: THERAPIES SERIES
End: 2020-07-15
Attending: INTERNAL MEDICINE
Payer: COMMERCIAL

## 2020-07-15 PROCEDURE — 97110 THERAPEUTIC EXERCISES: CPT | Mod: GP | Performed by: PHYSICAL THERAPIST

## 2020-07-15 PROCEDURE — 97116 GAIT TRAINING THERAPY: CPT | Mod: GP | Performed by: PHYSICAL THERAPIST

## 2020-07-15 NOTE — TELEPHONE ENCOUNTER
Spoke with patient, informed Rx sent to [facility]. Patient verbalized understanding, had no further questions or concerns.

## 2020-07-15 NOTE — TELEPHONE ENCOUNTER
JACOB ONLY    Spoke with patient, she [place], her brother drove her. She brought her medications all in her dispenser container and the bottle of Esgic, since it is prn, she does not put the pills in the dispenser, she takes the bottle.   She has contacted her family at the cabin, but they did not find the bottle. She will also call her brother and have him check his car.  She promised to be more careful and apologized for losing her medication.    Instructed patient to call pharmacy to arrange  of new script. Dr. Smith gave ok to fill early due to lost prescription.  Radha Hardy RN

## 2020-07-20 ENCOUNTER — TELEPHONE (OUTPATIENT)
Dept: INTERNAL MEDICINE | Facility: CLINIC | Age: 40
End: 2020-07-20

## 2020-07-20 DIAGNOSIS — G89.29 OTHER CHRONIC PAIN: ICD-10-CM

## 2020-07-20 DIAGNOSIS — S72.145S CLOSED NONDISPLACED INTERTROCHANTERIC FRACTURE OF LEFT FEMUR, SEQUELA: ICD-10-CM

## 2020-07-20 NOTE — TELEPHONE ENCOUNTER
Reason for Call:  Medication or medication refill:    Do you use a [facility]?  Name of the pharmacy and phone number for the current request:  [facility]     Name of the medication requested: Percocet    Other request: this to be sent to a different pharmacy for this one time only    Can we leave a detailed message on this number? YES    Phone number patient can be reached at: Home number on file [phone] (home)    Best Time: any    Call taken on 7/20/2020 at 8:02 AM by Fartun Riley

## 2020-07-20 NOTE — TELEPHONE ENCOUNTER
Routing refill request to provider for review/approval because:  Drug not on the [facility] refill protocol     Last Written Prescription Date:  6-23-20  Last Fill Quantity: 60,  # refills: 0   Last office visit: 3/4/2020, virtual 6-23-20 with prescribing provider:  Dr Smith   Future Office Visit:   Next 5 appointments (look out 90 days)    Aug 03, 2020  9:30 AM CDT  Office Visit with Nadira Guerrero DO  [facility] ([facility]) [address] [address] [place] [id number] [phone]

## 2020-07-21 RX ORDER — OXYCODONE AND ACETAMINOPHEN 5; 325 MG/1; MG/1
.5-1 TABLET ORAL 2 TIMES DAILY PRN
Qty: 50 TABLET | Refills: 0 | Status: SHIPPED | OUTPATIENT
Start: 2020-07-21 | End: 2020-08-10

## 2020-07-23 ENCOUNTER — TRANSFERRED RECORDS (OUTPATIENT)
Dept: HEALTH INFORMATION MANAGEMENT | Facility: CLINIC | Age: 40
End: 2020-07-23

## 2020-07-28 ENCOUNTER — VIRTUAL VISIT (OUTPATIENT)
Dept: ENDOCRINOLOGY | Facility: CLINIC | Age: 40
End: 2020-07-28
Payer: COMMERCIAL

## 2020-07-28 ENCOUNTER — DOCUMENTATION ONLY (OUTPATIENT)
Dept: ENDOCRINOLOGY | Facility: CLINIC | Age: 40
End: 2020-07-28

## 2020-07-28 DIAGNOSIS — R61 GENERALIZED HYPERHIDROSIS: ICD-10-CM

## 2020-07-28 DIAGNOSIS — M80.80XD OTHER OSTEOPOROSIS WITH CURRENT PATHOLOGICAL FRACTURE WITH ROUTINE HEALING, SUBSEQUENT ENCOUNTER: Primary | ICD-10-CM

## 2020-07-28 DIAGNOSIS — Z51.81 ENCOUNTER FOR THERAPEUTIC DRUG MONITORING: ICD-10-CM

## 2020-07-28 PROCEDURE — 99213 OFFICE O/P EST LOW 20 MIN: CPT | Mod: 95 | Performed by: INTERNAL MEDICINE

## 2020-07-28 NOTE — LETTER
"    7/28/2020         RE: Mily Grullon  [address]  [facility] [address]        Dear Colleague,    Thank you for referring your patient, Mily Grullon, to the [facility]. Please see a copy of my visit note below.    Mily Grullon is a 39 year old female who is being evaluated via a billable telephone visit.      The patient has been notified of following:     \"This telephone visit will be conducted via a call between you and your physician/provider. We have found that certain health care needs can be provided without the need for a physical exam.  This service lets us provide the care you need with a short phone conversation.  If a prescription is necessary we can send it directly to your pharmacy.  If lab work is needed we can place an order for that and you can then stop by our lab to have the test done at a later time.    Telephone visits are billed at different rates depending on your insurance coverage. During this emergency period, for some insurers they may be billed the same as an in-person visit.  Please reach out to your insurance provider with any questions.    If during the course of the call the physician/provider feels a telephone visit is not appropriate, you will not be charged for this service.\"    Patient has given verbal consent for Telephone visit?  Yes    What phone number would you like to be contacted at? [phone]    How would you like to obtain your AVS? Mail a copy  Endocrinology telephone Visit    Chief Complaint: Endocrine Problem (6 months follow up Osteoporosis)     Information obtained from:Patient       Subjective:         HPI: Mily Grullon is a 39 year old female with history of Osteoporosis and fragility fracture who is here for a follow up.     Patient has history of endometriosis and per report has underwent right oophorectomy at the age of 22.  She was started on Depo-Provera at the  age of 22.  She has been on this medication until she was taken off of " it in January of 2019.   Osteoporosis and fragility fracture presumed to be secondary to Depo-Provera  She had a screening test for celiac disease which was within the normal limits.  She was screened for Cushing's syndrome which was normal.  She was screened for hyper-calciuria again which came back within the normal limits.  Thyroid lab results were within the normal limits.  GFR is within the normal limits and electrolytes including calcium.  He has had elevated PTH level in the setting of low vitamin D level and based on that result she was started on ergocalciferol 50,000 international unit twice weekly and she has been on this medication for the last 6 weeks at least. PTH elevation resolved after correct low vitamin D level.         DEXA scan 2018   FINDINGS:               Lumbar Spine (L1-L4) Z-score:  -1.7, degenerative changes present               Right Femoral Neck Z-score:  -3.1               Forearm (radius 33%) Z-score:  -0.3  The left femur is not acceptable for evaluation due to previous surgical repair                                Lumbar (L1-L4) BMD: 1.078               Previous: 1.054                                                 Right Total Hip BMD: 0.751                Previous: 0.718                            Forearm (radius 33%) BMD: 0.694     Previous: NA  DEXA scan from 2014:   Femoral BMD 0.531 Z score -3.5     She has never been on steroids. Multiple fractures over the last five years:  Hip fracture, wrist fracture, multiple ankle fractures, now foot and rib fractures. All of these fractures are fragility fracture without any trauma. She feels just pain. Hip # was following lifting.  After discussing risk benefits of each options and discussing treatment options; patient was started on Forteo for the treatment of osteoporosis and fragility fracture; 4/2019.  Patient reports that she does not have any side effects from the Forteo injection.  She was able to manage a daily injection  without any problem.    Patient was started on Forteo therapy in 2019 and she has been tolerating that very well.  She is taking calcium and vitamin D supplements in addition.  She has not had any fractures since we saw her.  She is in the process of applying follow-up disability right now.  We have had her on limitations particularly with lifting due to her frequent fractures mentioned above.          Allergies   Allergen Reactions     Amitriptyline Hives     Amoxicillin Diarrhea     Asa [Dihydroxyaluminum Aminoacetate]      Cipro [Ciprofloxacin]      Dizziness, vomiting, shortness of breath     Ibuprofen [Aspartame-Ibuprofen]      GI distress       Lyrica      extrematies swell up      Morphine      ithcy     Naproxen      GI distress     Neurontin [Gabapentin]      rash     Paxil [Paroxetine] Anaphylaxis     anaphylaxis     Phenergan [Promethazine Hcl]      dystonia     Prilosec [Omeprazole]      Rash, dizziness     Gabapentin Rash       Current Outpatient Medications   Medication Sig Dispense Refill     albuterol (PROAIR HFA/PROVENTIL HFA/VENTOLIN HFA) 108 (90 Base) MCG/ACT inhaler Inhale 2 puffs into the lungs every 4 hours as needed for other (cough) 1 Inhaler 0     ALPRAZolam (XANAX) 0.5 MG tablet Take 0.5 mg by mouth daily       butalbital-acetaminophen-caffeine (ESGIC) -40 MG tablet Si tablet at onset of headache.  Limit one per day.  Limit 20 tabs per month. 20 tablet 0     Calcium-Phosphorus-Vitamin D 250-100-500 MG-MG-UNIT CHEW Take 2 tablets by mouth daily 60 tablet 11     DULoxetine (CYMBALTA) 60 MG capsule Take 60 mg by mouth 2 times daily        eszopiclone (LUNESTA) 1 MG tablet Take 1 mg by mouth nightly as needed       famotidine (PEPCID) 20 MG tablet Take 1 tablet (20 mg) by mouth daily as needed (heartburn/reflux) 90 tablet 1     fluticasone (FLONASE) 50 MCG/ACT nasal spray Spray 1-2 sprays into both nostrils daily Use 1 spray per nostril per day for 2 weeks.  May increase to 2  sprays per nostril per day after. 16 g 2     lamoTRIgine (LAMICTAL) 100 MG tablet Take 150 mg by mouth daily        nortriptyline (PAMELOR) 50 MG capsule Take 100 mg by mouth At Bedtime       oxyCODONE-acetaminophen (PERCOCET) 5-325 MG tablet Take 0.5-1 tablets by mouth 2 times daily as needed for pain 50 tablet 0     pantoprazole (PROTONIX) 20 MG EC tablet Take by mouth 30-60 minutes before a meal. 90 tablet 1     propranolol (INDERAL) 10 MG tablet Take 10 mg by mouth 3 times daily       teriparatide, recombinant, (FORTEO) 600 MCG/2.4ML SOLN injection Inject 0.08 mLs (20 mcg) Subcutaneous daily 7.2 mL 1     tiZANidine (ZANAFLEX) 2 MG tablet Take 1-3 tablets (2-6 mg) by mouth 3 times daily as needed for muscle spasms 180 tablet 5     Vitamin D, Cholecalciferol, 25 MCG (1000 UT) CAPS Take 1,000 Units by mouth daily 100 capsule 3     order for DME Equipment being ordered: 4 wheeled walker with wheels and basket. Color to be determined. 1 each 0       Review of Systems     8 point review system (Constitutional, HENT, Eyes, Respiratory, Cardiovascular, Gastrointestinal, Genitourinary, Musculoskeletal,Neurological, Psychiatric/Behavioural, Endocrine) is negative or is as per HPI above and multiple pains involving the back and joints.   Reports problem with balance and coordination for which she is working with physical therapy.     Past Medical History:   Diagnosis Date     Endometriosis, site unspecified      Gastritis 2010    dx 2010- sees Dr Glover in [facility]     Osteoporosis      Urinary calculus, unspecified     kidney stones, age 19-20     Wrist injury Nov 19, 2003    Workman's Comp- left wrist- narc agreement on file       Past Surgical History:   Procedure Laterality Date     ARTHROSCOPY HIP, OSTEOPLASTY FEMUR PROXIMAL, COMBINED Left 6/29/2016    Procedure: COMBINED ARTHROSCOPY HIP, OSTEOPLASTY FEMUR PROXIMAL;  Surgeon: Godwin Deleon MD;  Location: [facility] OR     ARTHROSCOPY OF JOINT UNLISTED   2004    Left wrist, with debridement and repair of tear.     HC REMOVAL OF OVARY/TUBE(S)  2003    right with fallopian tube     HC REPAIR TRIANGULAR CART,WRIST JT      Dr Eloy Sosa     HC REPAIR TRIANGULAR CART,WRIST JT   or so    ulnar excision- Dr Eloy Sosa     HYSTEROSCOPY      laproscopy for endometriosis x2     LITHOTRIPSY         Family History   Problem Relation Age of Onset     Hypertension Father      Lipids Father      Hypertension Maternal Grandmother      Genitourinary Problems Maternal Grandmother         kidney disease     Cancer Paternal Grandmother         leukemia     Asthma No family hx of      C.A.D. No family hx of      Diabetes No family hx of      Cerebrovascular Disease No family hx of      Breast Cancer No family hx of      Cancer - colorectal No family hx of      Prostate Cancer No family hx of      Alzheimer Disease No family hx of      Arthritis No family hx of      Blood Disease No family hx of      Circulatory No family hx of      Eye Disorder No family hx of      Gastrointestinal Disease No family hx of      Musculoskeletal Disorder No family hx of      Neurologic Disorder No family hx of      Respiratory No family hx of      Thyroid Disease No family hx of        Social History     Socioeconomic History     Marital status: Single     Spouse name: None     Number of children: None     Years of education: None     Highest education level: None   Social Needs     Financial resource strain: None     Food insecurity - worry: None     Food insecurity - inability: None     Transportation needs - medical: None     Transportation needs - non-medical: None   Occupational History     None   Tobacco Use     Smoking status: Former Smoker     Types: Cigarettes     Last attempt to quit: 2014     Years since quittin.9     Smokeless tobacco: Never Used   Substance and Sexual Activity     Alcohol use: No     Frequency: Never     Drug use: No     Sexual activity: Not  Currently     Birth control/protection: Injection     Comment: depo provera   Other Topics Concern     Parent/sibling w/ CABG, MI or angioplasty before 65F 55M? Not Asked   Social History Narrative    L[place]    Works at [employer]    Enjoying spending time with dog and nieces       Objective:   There were no vitals taken for this visit.  Constitutional: Pleasant no acute cardiopulmonary distress.   Psychological: appropriate mood and affect     In House Labs:     TSH   Date Value Ref Range Status   01/27/2020 0.62 0.40 - 4.00 mU/L Final   11/23/2018 0.68 0.30 - 5.00 uIU/mL Final   09/11/2017 1.36 0.40 - 4.00 mU/L Final   12/10/2013 2.37 0.4 - 5.0 mU/L Final   01/07/2013 0.70 mcU/mL Final     T4 Free   Date Value Ref Range Status   12/10/2013 1.08 0.70 - 1.85 ng/dL Final       Creatinine   Date Value Ref Range Status   01/27/2020 0.81 0.52 - 1.04 mg/dL Final     24-hour urine for calcium  Sodium to creatinine ratio was 150 reference range   Sodium 24-hour 184 difference for   Creatinine 24-hour was 1.59 (0.5-2.15  Calcium to creatinine ratio was 68 reference range 30- 275  Calcium 24-hour urine was 108 reference range   Creatinine 24-hour 1.59  Total volume 2700  Urine free cortisol 3.2    TSH was 0.68 and free T4 0.74 tissue transglutaminase IgG and IgA was undetectable creatinine within the normal limits BMD in 2014 was 0.718 at the total hip in 2018 was 0.751.  Z score -3.1 at the neck, trochanteric -2.7 and total -2.4        ENDO CALCIUM LABS-UMP Latest Ref Rng & Units 1/27/2020   CALCIUM 8.5 - 10.1 mg/dL 9.4   PHOSPHOROUS 2.5 - 4.5 mg/dL    ALBUMIN 3.4 - 5.0 g/dL 3.8   BUN 7 - 30 mg/dL 12   CREATININE 0.52 - 1.04 mg/dL 0.81   PARATHYROID HORMONE INTACT 18 - 80 pg/mL    ALKPHOS 40 - 150 U/L 103   OSTEOCALCIN ng/mL    VITAMIN D DEFICIENCY SCREENING 20 - 75 ug/L    PROTEIN, TOTAL 6.8 - 8.8 g/dL       Ref. Range 1/27/2020 11:28   Urea Nitrogen Latest Ref Range: 7 - 30 mg/dL 12    Creatinine Latest Ref Range: 0.52 - 1.04 mg/dL 0.81   GFR Estimate Latest Ref Range: >60 mL/min/1.73_m2 >90   GFR Estimate If Black Latest Ref Range: >60 mL/min/1.73_m2 >90   Calcium Latest Ref Range: 8.5 - 10.1 mg/dL 9.4   Albumin Latest Ref Range: 3.4 - 5.0 g/dL 3.8   Alkaline Phosphatase Latest Ref Range: 40 - 150 U/L 103   Bone Spec Alk Phosphatase Latest Units: ug/L 14.1   TSH Latest Ref Range: 0.40 - 4.00 mU/L 0.62   Vitamin D Deficiency screening Latest Ref Range: 20 - 75 ug/L 65     Assessment/Treatment Plan:      Osteoporosis with recurrent pathological fracture/patient has had multiple fragility fractures over the last 5 years: Risk factors for osteoporosis - Depo-Provera use for 16 years since the age of 22. She was taken off of Depo-Provera in Jan 2019.     She has had workup for other possible secondary causes of osteoporosis including Cushing syndrome, celiac disease, hypercalciuria and primary hyperparathyroidism which was unremarkable.      Patient was started on Forteo 4/19 after discussing risks benefits and side effects and after addressing the fact that there is no data in the young age group.  She is tolerating therapy without significant side effects.  Reports that she has been having sweating intermittently and she attributes that to parathyroid hormone (adding IGF1 level check today).  We will plan on completing Forteo therapy for maximum of 2 years and have her on consolidative therapy with Reclast (has issues with Fosamax).  Will check calcium labs today; as documented above which are stable.      Discussed risk of this medication including osteosarcoma of the bone.  While she is on this medication she needs the use of contraception. Seeing Gyn on 8/3 for this and due to bleeding issues. She reports that she is not currently sexually active.  Encouraged to see her gynecologist for discussion of contraception use.    There is interval improvement of DEXA scan findings however the  improvement could be also from healed fractures(false positive).    Plan:    Continue Forteo at the same dose.      Weight bearing Exercises; advised to work with physical therapy particularly exercises that focus on strengthening the lower extremities.  Shoulder walking for the spine.  At this point we can increase the limit for weight lifting up to 10 pounds.    physical therapy.      Fall prevention     Adequate Calcium and vitamin D intake: for maintenance calcium 1200 mg daily and vitamin D 2000 IU daily.      Cessation of tobacco use; counseling provided.     Avoidance of excessive alcohol intake.     Follow up scheduled in 6 months.     Labs ordered today.       I have advised to follow-up with her gynecologist regarding the use of contraception.      Hyperhydrosis: check IGF1. Reports no increase in shoe or ring size.     Patient Instructions   Mily,    Weight bearing Exercises; walking.  Please do leg strengthening exercises with physical therapy; squats and other exercises as recommended by your physical therapist.    There is no weight lifting restrictions         Fall prevention    Adequate Calcium and vitamin D intake: continue current therapy    Cessation of tobacco use    Avoidance of excessive alcohol intake.     Continue FORTEO injection.    Bone medications are contraindicated during pregnancy; you should let your provider rightaway if you are planning to get pregnant or get pregnant.   Please follow up with your gynecologist regarding birth control pills/.         I will contact the patient with the test results.  Return to clinic in 6 months.    Test and/or medications prescribed today:  Orders Placed This Encounter   Procedures     Comprehensive metabolic panel     Insulin Growth Factor 1 by Immunoassay         Dallas Flores MD  Staff Endocrinologist    [phone]  Division of Endocrinology and Diabetes    Phone call duration: 15 minutes      Again, thank you for allowing me to participate  in the care of your patient.        Sincerely,        Dallas Flores MD

## 2020-07-28 NOTE — PATIENT INSTRUCTIONS
Mily,    Weight bearing Exercises; walking.  Please do leg strengthening exercises with physical therapy; squats and other exercises as recommended by your physical therapist.    There is no weight lifting restrictions         Fall prevention    Adequate Calcium and vitamin D intake: continue current therapy    Cessation of tobacco use    Avoidance of excessive alcohol intake.     Continue FORTEO injection.    Bone medications are contraindicated during pregnancy; you should let your provider rightaway if you are planning to get pregnant or get pregnant.   Please follow up with your gynecologist regarding birth control pills/.

## 2020-07-28 NOTE — PROGRESS NOTES
Left message for patient to schedule 6 months follow up and lab appointment as per Dr. Flores.    Beulah Arenas MA  Adult Endocrinology  [facility]

## 2020-07-28 NOTE — PROGRESS NOTES
"Mily Grullon is a 39 year old female who is being evaluated via a billable telephone visit.      The patient has been notified of following:     \"This telephone visit will be conducted via a call between you and your physician/provider. We have found that certain health care needs can be provided without the need for a physical exam.  This service lets us provide the care you need with a short phone conversation.  If a prescription is necessary we can send it directly to your pharmacy.  If lab work is needed we can place an order for that and you can then stop by our lab to have the test done at a later time.    Telephone visits are billed at different rates depending on your insurance coverage. During this emergency period, for some insurers they may be billed the same as an in-person visit.  Please reach out to your insurance provider with any questions.    If during the course of the call the physician/provider feels a telephone visit is not appropriate, you will not be charged for this service.\"    Patient has given verbal consent for Telephone visit?  Yes    What phone number would you like to be contacted at? [phone]    How would you like to obtain your AVS? Mail a copy  Endocrinology telephone Visit    Chief Complaint: Endocrine Problem (6 months follow up Osteoporosis)     Information obtained from:Patient       Subjective:         HPI: Mily Grullon is a 39 year old female with history of Osteoporosis and fragility fracture who is here for a follow up.     Patient has history of endometriosis and per report has underwent right oophorectomy at the age of 22.  She was started on Depo-Provera at the  age of 22.  She has been on this medication until she was taken off of it in January of 2019.   Osteoporosis and fragility fracture presumed to be secondary to Depo-Provera  She had a screening test for celiac disease which was within the normal limits.  She was screened for Cushing's syndrome which was " normal.  She was screened for hyper-calciuria again which came back within the normal limits.  Thyroid lab results were within the normal limits.  GFR is within the normal limits and electrolytes including calcium.  He has had elevated PTH level in the setting of low vitamin D level and based on that result she was started on ergocalciferol 50,000 international unit twice weekly and she has been on this medication for the last 6 weeks at least. PTH elevation resolved after correct low vitamin D level.         DEXA scan 2018   FINDINGS:               Lumbar Spine (L1-L4) Z-score:  -1.7, degenerative changes present               Right Femoral Neck Z-score:  -3.1               Forearm (radius 33%) Z-score:  -0.3  The left femur is not acceptable for evaluation due to previous surgical repair                                Lumbar (L1-L4) BMD: 1.078               Previous: 1.054                                                 Right Total Hip BMD: 0.751                Previous: 0.718                            Forearm (radius 33%) BMD: 0.694     Previous: NA  DEXA scan from 2014:   Femoral BMD 0.531 Z score -3.5     She has never been on steroids. Multiple fractures over the last five years:  Hip fracture, wrist fracture, multiple ankle fractures, now foot and rib fractures. All of these fractures are fragility fracture without any trauma. She feels just pain. Hip # was following lifting.  After discussing risk benefits of each options and discussing treatment options; patient was started on Forteo for the treatment of osteoporosis and fragility fracture; 4/2019.  Patient reports that she does not have any side effects from the Forteo injection.  She was able to manage a daily injection without any problem.    Patient was started on Forteo therapy in April 2019 and she has been tolerating that very well.  She is taking calcium and vitamin D supplements in addition.  She has not had any fractures since we saw her.  She is  in the process of applying follow-up disability right now.  We have had her on limitations particularly with lifting due to her frequent fractures mentioned above.          Allergies   Allergen Reactions     Amitriptyline Hives     Amoxicillin Diarrhea     Asa [Dihydroxyaluminum Aminoacetate]      Cipro [Ciprofloxacin]      Dizziness, vomiting, shortness of breath     Ibuprofen [Aspartame-Ibuprofen]      GI distress       Lyrica      extrematies swell up      Morphine      ithcy     Naproxen      GI distress     Neurontin [Gabapentin]      rash     Paxil [Paroxetine] Anaphylaxis     anaphylaxis     Phenergan [Promethazine Hcl]      dystonia     Prilosec [Omeprazole]      Rash, dizziness     Gabapentin Rash       Current Outpatient Medications   Medication Sig Dispense Refill     albuterol (PROAIR HFA/PROVENTIL HFA/VENTOLIN HFA) 108 (90 Base) MCG/ACT inhaler Inhale 2 puffs into the lungs every 4 hours as needed for other (cough) 1 Inhaler 0     ALPRAZolam (XANAX) 0.5 MG tablet Take 0.5 mg by mouth daily       butalbital-acetaminophen-caffeine (ESGIC) -40 MG tablet Si tablet at onset of headache.  Limit one per day.  Limit 20 tabs per month. 20 tablet 0     Calcium-Phosphorus-Vitamin D 250-100-500 MG-MG-UNIT CHEW Take 2 tablets by mouth daily 60 tablet 11     DULoxetine (CYMBALTA) 60 MG capsule Take 60 mg by mouth 2 times daily        eszopiclone (LUNESTA) 1 MG tablet Take 1 mg by mouth nightly as needed       famotidine (PEPCID) 20 MG tablet Take 1 tablet (20 mg) by mouth daily as needed (heartburn/reflux) 90 tablet 1     fluticasone (FLONASE) 50 MCG/ACT nasal spray Spray 1-2 sprays into both nostrils daily Use 1 spray per nostril per day for 2 weeks.  May increase to 2 sprays per nostril per day after. 16 g 2     lamoTRIgine (LAMICTAL) 100 MG tablet Take 150 mg by mouth daily        nortriptyline (PAMELOR) 50 MG capsule Take 100 mg by mouth At Bedtime       oxyCODONE-acetaminophen (PERCOCET) 5-325 MG  tablet Take 0.5-1 tablets by mouth 2 times daily as needed for pain 50 tablet 0     pantoprazole (PROTONIX) 20 MG EC tablet Take by mouth 30-60 minutes before a meal. 90 tablet 1     propranolol (INDERAL) 10 MG tablet Take 10 mg by mouth 3 times daily       teriparatide, recombinant, (FORTEO) 600 MCG/2.4ML SOLN injection Inject 0.08 mLs (20 mcg) Subcutaneous daily 7.2 mL 1     tiZANidine (ZANAFLEX) 2 MG tablet Take 1-3 tablets (2-6 mg) by mouth 3 times daily as needed for muscle spasms 180 tablet 5     Vitamin D, Cholecalciferol, 25 MCG (1000 UT) CAPS Take 1,000 Units by mouth daily 100 capsule 3     order for DME Equipment being ordered: 4 wheeled walker with wheels and basket. Color to be determined. 1 each 0       Review of Systems     8 point review system (Constitutional, HENT, Eyes, Respiratory, Cardiovascular, Gastrointestinal, Genitourinary, Musculoskeletal,Neurological, Psychiatric/Behavioural, Endocrine) is negative or is as per HPI above and multiple pains involving the back and joints.   Reports problem with balance and coordination for which she is working with physical therapy.     Past Medical History:   Diagnosis Date     Endometriosis, site unspecified      Gastritis 2010    dx 2010- sees Dr Glover in [facility]     Osteoporosis      Urinary calculus, unspecified     kidney stones, age 19-20     Wrist injury Nov 19, 2003    Workman's Comp- left wrist- narc agreement on file       Past Surgical History:   Procedure Laterality Date     ARTHROSCOPY HIP, OSTEOPLASTY FEMUR PROXIMAL, COMBINED Left 6/29/2016    Procedure: COMBINED ARTHROSCOPY HIP, OSTEOPLASTY FEMUR PROXIMAL;  Surgeon: Godwin Deleon MD;  Location: [facility] OR     ARTHROSCOPY OF JOINT UNLISTED  4/2004    Left wrist, with debridement and repair of tear.     HC REMOVAL OF OVARY/TUBE(S)  6/2003    right with fallopian tube     HC REPAIR TRIANGULAR CART,WRIST JT  2005    Dr Eloy Sosa     HC REPAIR TRIANGULAR CART,WRIST JT  2007  or so    ulnar excision- Dr Eloy Sosa     HYSTEROSCOPY      laproscopy for endometriosis x2     LITHOTRIPSY         Family History   Problem Relation Age of Onset     Hypertension Father      Lipids Father      Hypertension Maternal Grandmother      Genitourinary Problems Maternal Grandmother         kidney disease     Cancer Paternal Grandmother         leukemia     Asthma No family hx of      C.A.D. No family hx of      Diabetes No family hx of      Cerebrovascular Disease No family hx of      Breast Cancer No family hx of      Cancer - colorectal No family hx of      Prostate Cancer No family hx of      Alzheimer Disease No family hx of      Arthritis No family hx of      Blood Disease No family hx of      Circulatory No family hx of      Eye Disorder No family hx of      Gastrointestinal Disease No family hx of      Musculoskeletal Disorder No family hx of      Neurologic Disorder No family hx of      Respiratory No family hx of      Thyroid Disease No family hx of        Social History     Socioeconomic History     Marital status: Single     Spouse name: None     Number of children: None     Years of education: None     Highest education level: None   Social Needs     Financial resource strain: None     Food insecurity - worry: None     Food insecurity - inability: None     Transportation needs - medical: None     Transportation needs - non-medical: None   Occupational History     None   Tobacco Use     Smoking status: Former Smoker     Types: Cigarettes     Last attempt to quit: 2014     Years since quittin.9     Smokeless tobacco: Never Used   Substance and Sexual Activity     Alcohol use: No     Frequency: Never     Drug use: No     Sexual activity: Not Currently     Birth control/protection: Injection     Comment: depo provera   Other Topics Concern     Parent/sibling w/ CABG, MI or angioplasty before 65F 55M? Not Asked   Social History Narrative    Lives alone in [place] at  [address]    Enjoying spending time with dog and nieces       Objective:   There were no vitals taken for this visit.  Constitutional: Pleasant no acute cardiopulmonary distress.   Psychological: appropriate mood and affect     In House Labs:     TSH   Date Value Ref Range Status   01/27/2020 0.62 0.40 - 4.00 mU/L Final   11/23/2018 0.68 0.30 - 5.00 uIU/mL Final   09/11/2017 1.36 0.40 - 4.00 mU/L Final   12/10/2013 2.37 0.4 - 5.0 mU/L Final   01/07/2013 0.70 mcU/mL Final     T4 Free   Date Value Ref Range Status   12/10/2013 1.08 0.70 - 1.85 ng/dL Final       Creatinine   Date Value Ref Range Status   01/27/2020 0.81 0.52 - 1.04 mg/dL Final     24-hour urine for calcium  Sodium to creatinine ratio was 150 reference range   Sodium 24-hour 184 difference for   Creatinine 24-hour was 1.59 (0.5-2.15  Calcium to creatinine ratio was 68 reference range 30- 275  Calcium 24-hour urine was 108 reference range   Creatinine 24-hour 1.59  Total volume 2700  Urine free cortisol 3.2    TSH was 0.68 and free T4 0.74 tissue transglutaminase IgG and IgA was undetectable creatinine within the normal limits BMD in 2014 was 0.718 at the total hip in 2018 was 0.751.  Z score -3.1 at the neck, trochanteric -2.7 and total -2.4        ENDO CALCIUM LABS-UMP Latest Ref Rng & Units 1/27/2020   CALCIUM 8.5 - 10.1 mg/dL 9.4   PHOSPHOROUS 2.5 - 4.5 mg/dL    ALBUMIN 3.4 - 5.0 g/dL 3.8   BUN 7 - 30 mg/dL 12   CREATININE 0.52 - 1.04 mg/dL 0.81   PARATHYROID HORMONE INTACT 18 - 80 pg/mL    ALKPHOS 40 - 150 U/L 103   OSTEOCALCIN ng/mL    VITAMIN D DEFICIENCY SCREENING 20 - 75 ug/L    PROTEIN, TOTAL 6.8 - 8.8 g/dL       Ref. Range 1/27/2020 11:28   Urea Nitrogen Latest Ref Range: 7 - 30 mg/dL 12   Creatinine Latest Ref Range: 0.52 - 1.04 mg/dL 0.81   GFR Estimate Latest Ref Range: >60 mL/min/1.73_m2 >90   GFR Estimate If Black Latest Ref Range: >60 mL/min/1.73_m2 >90   Calcium Latest Ref Range: 8.5 - 10.1 mg/dL 9.4   Albumin Latest  Ref Range: 3.4 - 5.0 g/dL 3.8   Alkaline Phosphatase Latest Ref Range: 40 - 150 U/L 103   Bone Spec Alk Phosphatase Latest Units: ug/L 14.1   TSH Latest Ref Range: 0.40 - 4.00 mU/L 0.62   Vitamin D Deficiency screening Latest Ref Range: 20 - 75 ug/L 65     Assessment/Treatment Plan:      Osteoporosis with recurrent pathological fracture/patient has had multiple fragility fractures over the last 5 years: Risk factors for osteoporosis - Depo-Provera use for 16 years since the age of 22. She was taken off of Depo-Provera in Jan 2019.     She has had workup for other possible secondary causes of osteoporosis including Cushing syndrome, celiac disease, hypercalciuria and primary hyperparathyroidism which was unremarkable.      Patient was started on Forteo 4/19 after discussing risks benefits and side effects and after addressing the fact that there is no data in the young age group.  She is tolerating therapy without significant side effects.  Reports that she has been having sweating intermittently and she attributes that to parathyroid hormone (adding IGF1 level check today).  We will plan on completing Forteo therapy for maximum of 2 years and have her on consolidative therapy with Reclast (has issues with Fosamax).  Will check calcium labs today; as documented above which are stable.      Discussed risk of this medication including osteosarcoma of the bone.  While she is on this medication she needs the use of contraception. Seeing Gyn on 8/3 for this and due to bleeding issues. She reports that she is not currently sexually active.  Encouraged to see her gynecologist for discussion of contraception use.    There is interval improvement of DEXA scan findings however the improvement could be also from healed fractures(false positive).    Plan:    Continue Forteo at the same dose.      Weight bearing Exercises; advised to work with physical therapy particularly exercises that focus on strengthening the lower  extremities.  Shoulder walking for the spine.  At this point we can increase the limit for weight lifting up to 10 pounds.    physical therapy.      Fall prevention     Adequate Calcium and vitamin D intake: for maintenance calcium 1200 mg daily and vitamin D 2000 IU daily.      Cessation of tobacco use; counseling provided.     Avoidance of excessive alcohol intake.     Follow up scheduled in 6 months.     Labs ordered today.       I have advised to follow-up with her gynecologist regarding the use of contraception.      Hyperhydrosis: check IGF1. Reports no increase in shoe or ring size.     Patient Instructions   Mily,    Weight bearing Exercises; walking.  Please do leg strengthening exercises with physical therapy; squats and other exercises as recommended by your physical therapist.    There is no weight lifting restrictions         Fall prevention    Adequate Calcium and vitamin D intake: continue current therapy    Cessation of tobacco use    Avoidance of excessive alcohol intake.     Continue FORTEO injection.    Bone medications are contraindicated during pregnancy; you should let your provider rightaway if you are planning to get pregnant or get pregnant.   Please follow up with your gynecologist regarding birth control pills/.         I will contact the patient with the test results.  Return to clinic in 6 months.    Test and/or medications prescribed today:  Orders Placed This Encounter   Procedures     Comprehensive metabolic panel     Insulin Growth Factor 1 by Immunoassay         Dallas Flores MD  Staff Endocrinologist    [phone]  Division of Endocrinology and Diabetes    Phone call duration: 15 minutes

## 2020-07-29 ENCOUNTER — HOSPITAL ENCOUNTER (OUTPATIENT)
Dept: PHYSICAL THERAPY | Facility: CLINIC | Age: 40
Setting detail: THERAPIES SERIES
End: 2020-07-29
Attending: INTERNAL MEDICINE
Payer: COMMERCIAL

## 2020-07-29 DIAGNOSIS — Z51.81 ENCOUNTER FOR THERAPEUTIC DRUG MONITORING: ICD-10-CM

## 2020-07-29 DIAGNOSIS — R61 GENERALIZED HYPERHIDROSIS: ICD-10-CM

## 2020-07-29 LAB
ALBUMIN SERPL-MCNC: 3.7 G/DL (ref 3.4–5)
ALP SERPL-CCNC: 124 U/L (ref 40–150)
ALT SERPL W P-5'-P-CCNC: 48 U/L (ref 0–50)
ANION GAP SERPL CALCULATED.3IONS-SCNC: 7 MMOL/L (ref 3–14)
AST SERPL W P-5'-P-CCNC: 18 U/L (ref 0–45)
BILIRUB SERPL-MCNC: 0.2 MG/DL (ref 0.2–1.3)
BUN SERPL-MCNC: 9 MG/DL (ref 7–30)
CALCIUM SERPL-MCNC: 8.9 MG/DL (ref 8.5–10.1)
CHLORIDE SERPL-SCNC: 110 MMOL/L (ref 94–109)
CO2 SERPL-SCNC: 22 MMOL/L (ref 20–32)
CREAT SERPL-MCNC: 0.78 MG/DL (ref 0.52–1.04)
GFR SERPL CREATININE-BSD FRML MDRD: >90 ML/MIN/{1.73_M2}
GLUCOSE SERPL-MCNC: 100 MG/DL (ref 70–99)
POTASSIUM SERPL-SCNC: 4 MMOL/L (ref 3.4–5.3)
PROT SERPL-MCNC: 7.1 G/DL (ref 6.8–8.8)
SODIUM SERPL-SCNC: 139 MMOL/L (ref 133–144)

## 2020-07-29 PROCEDURE — 36415 COLL VENOUS BLD VENIPUNCTURE: CPT | Performed by: INTERNAL MEDICINE

## 2020-07-29 PROCEDURE — 80053 COMPREHEN METABOLIC PANEL: CPT | Performed by: INTERNAL MEDICINE

## 2020-07-29 PROCEDURE — 97116 GAIT TRAINING THERAPY: CPT | Mod: GP | Performed by: PHYSICAL THERAPIST

## 2020-07-29 PROCEDURE — 84305 ASSAY OF SOMATOMEDIN: CPT | Performed by: INTERNAL MEDICINE

## 2020-07-29 PROCEDURE — 97110 THERAPEUTIC EXERCISES: CPT | Mod: GP | Performed by: PHYSICAL THERAPIST

## 2020-07-31 LAB — IGF-I BLD-MCNC: 207 NG/ML (ref 76–271)

## 2020-08-03 ENCOUNTER — TELEPHONE (OUTPATIENT)
Dept: OBGYN | Facility: CLINIC | Age: 40
End: 2020-08-03

## 2020-08-03 ENCOUNTER — OFFICE VISIT (OUTPATIENT)
Dept: OBGYN | Facility: CLINIC | Age: 40
End: 2020-08-03
Payer: COMMERCIAL

## 2020-08-03 ENCOUNTER — TELEPHONE (OUTPATIENT)
Dept: ENDOCRINOLOGY | Facility: CLINIC | Age: 40
End: 2020-08-03

## 2020-08-03 VITALS
WEIGHT: 188.4 LBS | SYSTOLIC BLOOD PRESSURE: 112 MMHG | BODY MASS INDEX: 36.19 KG/M2 | HEART RATE: 98 BPM | DIASTOLIC BLOOD PRESSURE: 68 MMHG

## 2020-08-03 DIAGNOSIS — R73.01 ABNORMAL FASTING GLUCOSE: Primary | ICD-10-CM

## 2020-08-03 DIAGNOSIS — N92.1 MENORRHAGIA WITH IRREGULAR CYCLE: Primary | ICD-10-CM

## 2020-08-03 DIAGNOSIS — N80.9 ENDOMETRIOSIS: ICD-10-CM

## 2020-08-03 PROCEDURE — 99214 OFFICE O/P EST MOD 30 MIN: CPT | Performed by: OBSTETRICS & GYNECOLOGY

## 2020-08-03 NOTE — TELEPHONE ENCOUNTER
[facility] Call Center    Phone Message    May a detailed message be left on voicemail: yes     Reason for Call: Other: patient calling to report her family history that she left out during her visit today.      Mother had right ovarian tube removed due to stomach cancer at 16, started menopause somewhere around 53-55. Mother believes she had endometriosis as well in her 30s. When mother had periods, she would be down for at least 2 days, seemed to get better when she had kids/with pregnancy. She also believed she needed hysterectomy.    Mothers aunt had endometriosis.     Cousins on dads side, sister to patient all had endometriosis.    Wasn't sure about Grandmother on dads side but pretty sure she had endometriosis as well.   Wanted to give this information if it assists the provider w the dx of endometriosis.       Mother really wants to emphasize patients symptoms stating that she (patients) swelling and increased weight gain, and having to wear special socks. Nausea, extreme dizziness, weakness, tired, etc.     Action Taken: Message routed to:  [facility] p [phone]    Travel Screening: Not Applicable

## 2020-08-03 NOTE — RESULT ENCOUNTER NOTE
Please call and inform patient the following.  1.  The insulin growth factor I which was checked due to excessive sweating has been come back within the normal limits.  This is a screening test for excess growth hormone.  2.  The complete metabolic panel which included kidney function, liver function test results and other electrolytes have come back within the normal limit.  Blood sugar level was slightly high if it was a fasting blood work.  If this was a nonfasting blood work then the blood sugar level is within acceptable range.

## 2020-08-03 NOTE — PROGRESS NOTES
"This 40 y/o female, , LMP 2020, using abstinence for contraception, presents to discuss scheduling an outright hysterectomy to end her menses and \"clean out all the endometriosis.\"  She has had a laparoscopic diagnosis of severe endometriosis per the patient and is s/p RSO 17 years ago.  She describes this surgery as being \"very difficult\" due to extensive pelvic adhesions.  She received 6 injections of Lupron depot in her 20s so is not a candidate for this again unless she adds back estrogen.  After that, she was treated with Depo Provera x 17 years and admits that while amenorrheic, her pelvic pain resolved and she was very comfortable.  However, she developed osteoporosis and walks with a cane so can no longer use this medication.  She describes an attempt by a previous physician to insert a progesterone-containing IUD but this failed due to her retroverted fixed uterus so she declines another attempt today.  She developed a blood clot in her arm in 2018 from a needle so prefers to avoid estrogen.  She is not interested in future childbearing due to her multiple medical issues.  /68   Pulse 98   Wt 85.5 kg (188 lb 6.4 oz)   LMP 2020   BMI 36.19 kg/m    ROS:  10 systems were reviewed and the positives were listed under problems.  A pelvic exam was performed and her uterus is nulliparous in size and retroverted in position.  No adnexal mass or tenderness were noted.  There is no rebound nor guarding.  Assessment - menorrhagia, hx of severe endometriosis and extensive pelvic adhesive disease, osteoporosis  Plan - She has not had a pelvic US within the past year so will order this.  If abnormal, then will have her return for an endometrial biopsy for a tissue sampling.  We discussed the fact that a hysterectomy would not be the \"next step\" since she has not tried more conservative treatment options and would be a high-risk surgical candidate given the probability of extensive adhesions.  She " "believed that her endometriosis could simply be \"cleaned out\" and then her pelvic pain and bloating issues would resolve.  We discussed less invasive measures to achieve amenorrhea but she declines Nexplanon and a progesterone IUD.  She is not a candidate for estrogen-containing medication and has already received a 6-month course of Lupron and developed osteoporosis with Depo Provera.  She is not interested in estrogen feedback therapy with Lupron so we discussed a possible endometrial ablation procedure.  Pamphlets on Maryjane as well as her other option, Nexplanon, were provided to the patient and she will call back with her decision once her test results are back.  This was a 30-minute visit and over 50% of the time was spent in direct patient consultation.    "

## 2020-08-03 NOTE — TELEPHONE ENCOUNTER
----- Message from Dallas Flores MD sent at 8/3/2020 11:03 AM CDT -----  Please call and inform patient the following.  1.  The insulin growth factor I which was checked due to excessive sweating has been come back within the normal limits.  This is a screening test for excess growth hormone.  2.  The complete metabolic panel which included kidney function, liver function test results and other electrolytes have come back within the normal limit.  Blood sugar level was slightly high if it was a fasting blood work.  If this was a nonfasting blood work then the blood sugar level is within acceptable range.

## 2020-08-03 NOTE — TELEPHONE ENCOUNTER
Patient advised of results and recommendations. Patient verbalizes understanding.     Patient notes that she was fasting, just not on purpose. Patient states that all she had was water that morning. Patient wanting to know if she needs any further follow-up for Dr. Flores or what she should be doing?      Will send to Dr. Flores to review and writer will contact patient with update.       Marielle Suarez RN  Endocrine Care Coordinator  [facility]

## 2020-08-04 ENCOUNTER — ANCILLARY PROCEDURE (OUTPATIENT)
Dept: ULTRASOUND IMAGING | Facility: CLINIC | Age: 40
End: 2020-08-04
Attending: OBSTETRICS & GYNECOLOGY
Payer: COMMERCIAL

## 2020-08-04 DIAGNOSIS — N92.1 MENORRHAGIA WITH IRREGULAR CYCLE: ICD-10-CM

## 2020-08-04 DIAGNOSIS — N80.9 ENDOMETRIOSIS: ICD-10-CM

## 2020-08-04 PROCEDURE — 76856 US EXAM PELVIC COMPLETE: CPT | Mod: 59 | Performed by: RADIOLOGY

## 2020-08-04 PROCEDURE — 76830 TRANSVAGINAL US NON-OB: CPT | Performed by: RADIOLOGY

## 2020-08-04 NOTE — TELEPHONE ENCOUNTER
Per Dr. Flores:    We will recheck it and follow up with the next blood draw. I have add on A1c.          Patient advised. Patient verbalizes understanding and agrees to plan.         Marielle Suarez RN  Endocrine Care Coordinator  [facility]

## 2020-08-05 ENCOUNTER — TRANSFERRED RECORDS (OUTPATIENT)
Dept: HEALTH INFORMATION MANAGEMENT | Facility: CLINIC | Age: 40
End: 2020-08-05

## 2020-08-06 ENCOUNTER — TELEPHONE (OUTPATIENT)
Dept: INTERNAL MEDICINE | Facility: CLINIC | Age: 40
End: 2020-08-06

## 2020-08-06 DIAGNOSIS — Z71.89 COUNSELING AND COORDINATION OF CARE: Primary | ICD-10-CM

## 2020-08-06 NOTE — TELEPHONE ENCOUNTER
Spoke with patient.  Patient was in ER yesterday for a neck strain.  Patient given RX for 12 percocet and patient still has percocet from last RX from Dr Smith on 7-21-20 #50.  Patient asking for an increase in her Percocet to 2 tabs BID.  Patient scheduled for ER follow up on 8-10-20 with Stephen Nayak.  Informed her that we will send message to PCP but she can address further refill at ER follow up.  Pain should be improving, can also use ice and heat to help.  Patient verbalized understanding.          oxyCODONE-acetaminophen (PERCOCET) 5-325 MG tablet  50 tablet  0  7/21/2020   No    Sig - Route: Take 0.5-1 tablets by mouth 2 times daily as needed for pain - Oral    Sent to pharmacy as: oxyCODONE-Acetaminophen 5-325 MG Oral Tablet (PERCOCET)    Class: E-Prescribe    Earliest Fill Date: 7/21/2020    Order: [id number]    E-Prescribing Status: Receipt confirmed by pharmacy (7/21/2020  1:19 PM CDT)      Given in ER on 8-5-20  oxyCODONE-acetaminophen, 5-325 mg, (PERCOCET) 5-325 mg per tablet 12 tablet 0 8/5/2020   Take 1-2 tablets by mouth every 6 hours if needed for Pain Max acetaminophen dose: 4000mg in 24 hrs.   Oral

## 2020-08-06 NOTE — TELEPHONE ENCOUNTER
Reason for Call:  Other prescription    Detailed comments: pt has appt on 8-10 with you.  Pt just got out of hosp.  Pt request pain management. Pt has Percocet 1 tab twice a day and she would like to have 2 tabs for pain mgt twice a day.  Pt breaks a lot of things on her body.  Has severe osteo.  Please call pt and discuss.  Uses [facility]    Phone Number Patient can be reached at: Cell number on file:    Telephone Information:   Mobile [phone]       Best Time: any    Can we leave a detailed message on this number? YES    Call taken on 8/6/2020 at 2:50 PM by Ольга Guillaume

## 2020-08-10 ENCOUNTER — OFFICE VISIT (OUTPATIENT)
Dept: FAMILY MEDICINE | Facility: CLINIC | Age: 40
End: 2020-08-10
Payer: COMMERCIAL

## 2020-08-10 VITALS
HEART RATE: 84 BPM | BODY MASS INDEX: 35.88 KG/M2 | OXYGEN SATURATION: 99 % | WEIGHT: 186.8 LBS | TEMPERATURE: 96.3 F | SYSTOLIC BLOOD PRESSURE: 125 MMHG | DIASTOLIC BLOOD PRESSURE: 85 MMHG | RESPIRATION RATE: 16 BRPM

## 2020-08-10 DIAGNOSIS — S16.1XXA STRAIN OF NECK MUSCLE, INITIAL ENCOUNTER: Primary | ICD-10-CM

## 2020-08-10 DIAGNOSIS — G89.29 OTHER CHRONIC PAIN: ICD-10-CM

## 2020-08-10 DIAGNOSIS — S72.145S CLOSED NONDISPLACED INTERTROCHANTERIC FRACTURE OF LEFT FEMUR, SEQUELA: ICD-10-CM

## 2020-08-10 PROCEDURE — 99214 OFFICE O/P EST MOD 30 MIN: CPT | Performed by: PHYSICIAN ASSISTANT

## 2020-08-10 RX ORDER — OXYCODONE AND ACETAMINOPHEN 5; 325 MG/1; MG/1
.5-1 TABLET ORAL 2 TIMES DAILY PRN
Qty: 50 TABLET | Refills: 0 | Status: SHIPPED | OUTPATIENT
Start: 2020-08-10 | End: 2020-09-09

## 2020-08-10 RX ORDER — CYCLOBENZAPRINE HCL 10 MG
10 TABLET ORAL 3 TIMES DAILY PRN
Qty: 30 TABLET | Refills: 0 | Status: SHIPPED | OUTPATIENT
Start: 2020-08-10 | End: 2021-02-23

## 2020-08-10 NOTE — PROGRESS NOTES
"Subjective     Mily Grullon is a 39 year old female who presents to clinic today for the following health issues:    HPI     Hospital Follow-up Visit:    Hospital/Nursing Home/IP Rehab Facility: [facility]  Date of Admission: 8/5/20  Date of Discharge: 8/5/20  Reason(s) for Admission: \"Something felt like it cracked in the neck\" CT c spine showed no fracture or subluxation.      Was your hospitalization related to COVID-19? No   Problems taking medications regularly:  None  Medication changes since discharge: They changed the Percocet. Needs refill  Problems adhering to non-medication therapy:  None    Summary of hospitalization:  CareEverywhere information obtained and reviewed  Diagnostic Tests/Treatments reviewed.  Follow up needed: none  Other Healthcare Providers Involved in Patient s Care:         Physical Therapy  Update since discharge: stable.   Post Discharge Medication Reconciliation: discharge medications reconciled, continue medications without change.  Plan of care communicated with patient              Patient Active Problem List   Diagnosis     Personal history of nicotine dependence     CARDIOVASCULAR SCREENING; LDL GOAL LESS THAN 160     Gastritis     Osteoporosis of multiple sites     Closed nondisplaced intertrochanteric fracture of left femur (H)     Migraine without aura and without status migrainosus, not intractable     Pain in joint, ankle and foot, left     Closed nondisplaced fracture of body of left calcaneus with delayed healing, subsequent encounter     Heel pain, chronic, left     Gastroesophageal reflux disease, esophagitis presence not specified     Long-term (current) use of anticoagulants [Z79.01]     Superficial phlebitis     Obesity (BMI 35.0-39.9) with comorbidity (H)     Other chronic pain     Controlled substance agreement signed     Low serum cortisol level (H)     Anxiety     Endometriosis     Eosinophilic gastroenteritis     Chronic wrist pain     Moderate episode of recurrent " major depressive disorder (H)     Falls frequently     Past Surgical History:   Procedure Laterality Date     ARTHROSCOPY HIP, OSTEOPLASTY FEMUR PROXIMAL, COMBINED Left 2016    Procedure: COMBINED ARTHROSCOPY HIP, OSTEOPLASTY FEMUR PROXIMAL;  Surgeon: Godwin Deleon MD;  Location: [facility]     ARTHROSCOPY OF JOINT UNLISTED  2004    Left wrist, with debridement and repair of tear.     HC REMOVAL OF OVARY/TUBE(S)  2003    right with fallopian tube     HC REPAIR TRIANGULAR CART,WRIST JT      Dr Eloy Sosa     HC REPAIR TRIANGULAR CART,WRIST JT   or so    ulnar excision- Dr Eloy Sosa     HYSTEROSCOPY      laproscopy for endometriosis x2     LITHOTRIPSY         Social History     Tobacco Use     Smoking status: Former Smoker     Types: Cigarettes     Last attempt to quit: 2014     Years since quittin.5     Smokeless tobacco: Never Used     Tobacco comment: Ecig   Substance Use Topics     Alcohol use: No     Frequency: Never     Family History   Problem Relation Age of Onset     Hypertension Father      Lipids Father      Hypertension Maternal Grandmother      Genitourinary Problems Maternal Grandmother         kidney disease     Cancer Paternal Grandmother         leukemia     Asthma No family hx of      C.A.D. No family hx of      Diabetes No family hx of      Cerebrovascular Disease No family hx of      Breast Cancer No family hx of      Cancer - colorectal No family hx of      Prostate Cancer No family hx of      Alzheimer Disease No family hx of      Arthritis No family hx of      Blood Disease No family hx of      Circulatory No family hx of      Eye Disorder No family hx of      Gastrointestinal Disease No family hx of      Musculoskeletal Disorder No family hx of      Neurologic Disorder No family hx of      Respiratory No family hx of      Thyroid Disease No family hx of          Current Outpatient Medications   Medication Sig Dispense Refill     albuterol (PROAIR HFA/PROVENTIL  HFA/VENTOLIN HFA) 108 (90 Base) MCG/ACT inhaler Inhale 2 puffs into the lungs every 4 hours as needed for other (cough) 1 Inhaler 0     ALPRAZolam (XANAX) 0.5 MG tablet Take 0.5 mg by mouth daily       butalbital-acetaminophen-caffeine (ESGIC) -40 MG tablet Si tablet at onset of headache.  Limit one per day.  Limit 20 tabs per month. 20 tablet 0     Calcium-Phosphorus-Vitamin D 250-100-500 MG-MG-UNIT CHEW Take 2 tablets by mouth daily 60 tablet 11     cyclobenzaprine (FLEXERIL) 10 MG tablet Take 1 tablet (10 mg) by mouth 3 times daily as needed for muscle spasms 30 tablet 0     DULoxetine (CYMBALTA) 60 MG capsule Take 60 mg by mouth 2 times daily        eszopiclone (LUNESTA) 1 MG tablet Take 1 mg by mouth nightly as needed       famotidine (PEPCID) 20 MG tablet Take 1 tablet (20 mg) by mouth daily as needed (heartburn/reflux) 90 tablet 1     fluticasone (FLONASE) 50 MCG/ACT nasal spray Spray 1-2 sprays into both nostrils daily Use 1 spray per nostril per day for 2 weeks.  May increase to 2 sprays per nostril per day after. 16 g 2     lamoTRIgine (LAMICTAL) 100 MG tablet Take 150 mg by mouth daily        nortriptyline (PAMELOR) 50 MG capsule Take 100 mg by mouth At Bedtime       order for DME Equipment being ordered: 4 wheeled walker with wheels and basket. Color to be determined. 1 each 0     oxyCODONE-acetaminophen (PERCOCET) 5-325 MG tablet Take 0.5-1 tablets by mouth 2 times daily as needed for pain 50 tablet 0     pantoprazole (PROTONIX) 20 MG EC tablet Take by mouth 30-60 minutes before a meal. 90 tablet 1     propranolol (INDERAL) 10 MG tablet Take 10 mg by mouth 3 times daily       teriparatide, recombinant, (FORTEO) 600 MCG/2.4ML SOLN injection Inject 0.08 mLs (20 mcg) Subcutaneous daily 7.2 mL 1     tiZANidine (ZANAFLEX) 2 MG tablet Take 1-3 tablets (2-6 mg) by mouth 3 times daily as needed for muscle spasms 180 tablet 5     Vitamin D, Cholecalciferol, 25 MCG (1000 UT) CAPS Take 1,000 Units by  mouth daily 100 capsule 3     Allergies   Allergen Reactions     Amitriptyline Hives     Amoxicillin Diarrhea     Asa [Dihydroxyaluminum Aminoacetate]      Cipro [Ciprofloxacin]      Dizziness, vomiting, shortness of breath     Ibuprofen [Aspartame-Ibuprofen]      GI distress       Lyrica      extrematies swell up      Morphine      ithcy     Naproxen      GI distress     Neurontin [Gabapentin]      rash     Paxil [Paroxetine] Anaphylaxis     anaphylaxis     Phenergan [Promethazine Hcl]      dystonia     Prilosec [Omeprazole]      Rash, dizziness     Gabapentin Rash       Reviewed and updated as needed this visit by Provider  Tobacco  Allergies  Meds  Problems  Med Hx  Surg Hx  Fam Hx         Review of Systems   Constitutional, HEENT, cardiovascular, pulmonary, gi and gu systems are negative, except as otherwise noted.      Objective    /85   Pulse 84   Temp 96.3  F (35.7  C) (Tympanic)   Resp 16   Wt 84.7 kg (186 lb 12.8 oz)   LMP 07/14/2020   SpO2 99%   BMI 35.88 kg/m    Body mass index is 35.88 kg/m .  Physical Exam  Vitals signs and nursing note reviewed.   Constitutional:       General: She is not in acute distress.     Appearance: She is not ill-appearing or diaphoretic.   HENT:      Head: Normocephalic and atraumatic.      Mouth/Throat:      Mouth: Mucous membranes are moist.   Eyes:      Extraocular Movements: Extraocular movements intact.      Conjunctiva/sclera: Conjunctivae normal.      Pupils: Pupils are equal, round, and reactive to light.   Neck:      Vascular: No carotid bruit.      Comments: Diffuse tenderness to midline C-spine.  Paraspinal muscles nontender.  Limited range of motion in the neck.  Wearing a soft collar.  Cardiovascular:      Rate and Rhythm: Normal rate and regular rhythm.      Heart sounds: Normal heart sounds. No murmur. No friction rub. No gallop.    Pulmonary:      Effort: Pulmonary effort is normal. No respiratory distress.      Breath sounds: Normal breath  sounds. No stridor. No wheezing, rhonchi or rales.   Skin:     General: Skin is warm and dry.   Neurological:      General: No focal deficit present.      Mental Status: She is alert. Mental status is at baseline.      Comments: Face symmetric.  Speech clear.  Negative finger-nose-finger, heel-to-shin and pronator drift.  Gait normal.   Psychiatric:         Mood and Affect: Mood normal.         Behavior: Behavior normal.        Assessment & Plan   CT of c spine from 8/5/20 at [facility] not concerning:     IMPRESSION:  1.  No fracture or posttraumatic subluxation.  2.  No high-grade spinal canal or neural foraminal stenosis.    No paresthesias or other neurologic changes to suggest nerve root compression. No recent fever or surgical procedure, so doubt abscess. Unlikely neck vessel dissection given no neuro symptoms and reproducible on palpation. I suspect musclespasm/strain as the source of her pain.    No history or physical exam findings of midline spine injury or other acute emergent process that would require immediate intervention at this time.  Discussed all findings, differential diagnosis, and treatment plan with patient, and she voiced understanding.      Discussed plans for outpatient pain management with 4 day early refill of her Percocet and a switch to Flexeril for spasm.  Patient discharged in stable condition with follow up with us in 2-3 weeks if not improving.    Complete history and physical exam as above. AF with normal VS.    DDx and Dx discussed with and explained to the pt to their satisfaction.  All questions were answered at this time. Pt expressed understanding of and agreement with this dx, tx, and plan. No further workup warranted and standard medication warnings given. I have given the patient a list of pertinent indications for re-evaluation. Will go to the Emergency Department if symptoms worsen or new concerning symptoms arise. Patient left in no apparent distress.       ICD-10-CM    1.  Strain of neck muscle, initial encounter  S16.1XXA cyclobenzaprine (FLEXERIL) 10 MG tablet   2. Other chronic pain  G89.29 oxyCODONE-acetaminophen (PERCOCET) 5-325 MG tablet   3. Closed nondisplaced intertrochanteric fracture of left femur, sequela  S72.145S oxyCODONE-acetaminophen (PERCOCET) 5-325 MG tablet      See Patient Instructions    Return in about 3 weeks (around 8/31/2020), or if symptoms worsen or fail to improve.    DIANELYS Chase  [facility]

## 2020-08-10 NOTE — PATIENT INSTRUCTIONS
Sarah Minor,    Thank you for allowing [facility] to manage your care.    Your symptoms are most consistent with a neck strain.  This will likely take 2-3 weeks to fully resolve.    See us in 3 weeks if you are not improving and return here or go to the ER at any time if you develop numbness/tingling, changes in your vision, dizziness, or other worsening/worrisome symptoms.    For your pain, please use Tylenol 650mg every 4-6 hours as needed.     Max acetaminophen (Tylenol) 4,000mg/24 hours    For severe pain not controlled by over the counter medications, please use Percocet or Flexeril as prescribed. Do not use these medications within 4 hours of each other or while driving, operating machinery, with other sedating medications, or while drinking alcohol as it will make you drowsy.    I sent your prescriptions to your pharmacy.    If you have any questions or concerns, please feel free to call us at [phone]    Sincerely,    Joe Nayak PA-C    Did you know?  You can schedule an e-Visit for certain simple non-emergent issue for your convenience.  To learn more about or start an eVisit, simply login to [employer], click  Visits  on top banner, click  Start a Virtual Visit  drop down, and click  Symptom-Specific E-Visit     Patient Education     Understanding Cervical Strain    There are 7 bones (vertebrae) in the neck that are part of the spine. These are called the cervical spine. Cervical strain is a medical term for neck pain. The neck has several layers of muscles. These are connected with tendons to the cervical spine and other bones. Neck pain is often the result of injury to these muscles and tendons.  Causes of cervical strain  Different types of stress on the neck can damage muscles and tendons (soft tissues) and cause cervical strain. Cervical tissues can be damaged by:    The neck being forced past its normal range of motion, such as in a car accident or sports injury    Constant,  low-level stress, such as from poor posture or a poorly set-up workspace  Symptoms of cervical strain  These may include:    Neck pain or stiffness    Pain in the shoulders or upper back    Muscle spasms    Headache, often starting at the base of the neck    Irritability, difficulty concentrating, or sleeplessness  Treatment for cervical strain  This problem often gets better on its own. Treatments aim to reduce pain and inflammation and increase the range of motion of the neck. Possible treatments include:    Over-the-counter or prescription pain medicine. These help relieve pain and inflammation.    Stretching exercises to decrease neck stiffness.    Massage to decrease neck stiffness.    Cold or heat pack. These help reduce pain and swelling.  Call 911  Call 911 right away if you have any of these:    Face drooping or numbness    Numbness or weakness, especially in the arms or on one side    Slurred speech or difficulty speaking    Blurred vision   When to call your healthcare provider  Call your healthcare provider right away if you have any of these:    Fever of 100.4 F (38 C) or higher, or as directed    Pain or stiffness that gets worse    Symptoms that don t get better, or get worse    Numbness, tingling, weakness or shooting pains into the arms or legs    New symptoms  Date Last Reviewed: 3/10/2016    6946-2747 [employer]. [address] [place] [address], [place] [address]. All rights reserved. This information is not intended as a substitute for professional medical care. Always follow your healthcare professional's instructions.

## 2020-08-10 NOTE — Clinical Note
FYI, I refilled her Percocet Rx 4 days early as she had an acute injury. Just want to make sure she's not ramping up use going forward.

## 2020-08-17 DIAGNOSIS — G44.219 EPISODIC TENSION-TYPE HEADACHE, NOT INTRACTABLE: ICD-10-CM

## 2020-08-17 NOTE — PROGRESS NOTES
Subjective:    HPI                    Objective:    System    Physical Exam    General     ROS    Assessment/Plan:      SUBJECTIVE  Subjective changes as noted by pt: Patient reports that she has been having a lot of swelling - particularly on 12/9 - without any new injury. Upon further explanation - she has not been using her crutch at home.   Current pain level: 4/10   Changes in function: Yes - able to work most of her work shifts, mostly sitting  Adverse reaction to treatment or activity:  None    OBJECTIVE  Changes in objective findings: Without crutch, patient demonstates antalgic gait with left leg turned out and abducted. Gait quality and pain improve with use of 1 crutch. Long discussion of why off weighting with the crutch is necessary/appropriate.     ASSESSMENT  Mily continues to require intervention to meet STG and LTG's: PT  Patient is not progressing as expected.  Response to therapy has shown an improvement in function  Response to therapy has shown lack of progress in pain level, ROM , edema and gait  Progress made towards STG/LTG?  None    PLAN  Continue current treatment plan until patient demonstrates readiness to progress to higher level exercises.    PTA/ATC plan:  N/A    Please refer to the daily flowsheet for treatment today, total treatment time and time spent performing 1:1 timed codes.               [facility]   MD NINA MASON MD RUORU WONG, PA    TEL:  OFFICE: [phone]  DR HSU CELL: [phone]  RAS MORELOS CELL: [phone]  DR. MARQUEZ CELL: [phone]  DR. ALEX CELL: [phone]    FROM 5 PM - 7 AM PLEASE CALL ANSWERING SERVICE: [phone]    RENAL FOLLOW UP NOTE  --------------------------------------------------------------------------------  HPI:      Pt seen and examined at bedside.   Denies SOB, chest pain     PAST HISTORY  --------------------------------------------------------------------------------  No significant changes to PMH, PSH, FHx, SHx, unless otherwise noted    ALLERGIES & MEDICATIONS  --------------------------------------------------------------------------------  Allergies    No Known Allergies    Intolerances      Standing Inpatient Medications  aspirin enteric coated 81 milliGRAM(s) Oral daily  atorvastatin 80 milliGRAM(s) Oral at bedtime  chlorhexidine 2% Cloths 1 Application(s) Topical <User Schedule>  dextrose 5%. 1000 milliLiter(s) IV Continuous <Continuous>  dextrose 50% Injectable 12.5 Gram(s) IV Push once  dextrose 50% Injectable 25 Gram(s) IV Push once  dextrose 50% Injectable 25 Gram(s) IV Push once  insulin glargine Injectable (LANTUS) 18 Unit(s) SubCutaneous at bedtime  insulin lispro (HumaLOG) corrective regimen sliding scale   SubCutaneous three times a day before meals  insulin lispro (HumaLOG) corrective regimen sliding scale   SubCutaneous at bedtime  insulin lispro Injectable (HumaLOG) 8 Unit(s) SubCutaneous three times a day before meals  levothyroxine 88 MICROGram(s) Oral daily  metoprolol tartrate 50 milliGRAM(s) Oral every 8 hours  pantoprazole    Tablet 40 milliGRAM(s) Oral before breakfast  polyethylene glycol 3350 17 Gram(s) Oral daily  sodium chloride 0.9%. 1000 milliLiter(s) IV Continuous <Continuous>    PRN Inpatient Medications  acetaminophen   Tablet .. 650 milliGRAM(s) Oral every 6 hours PRN  dextrose 40% Gel 15 Gram(s) Oral once PRN  glucagon  Injectable 1 milliGRAM(s) IntraMuscular once PRN  melatonin 3 milliGRAM(s) Oral at bedtime PRN  oxyCODONE    IR 5 milliGRAM(s) Oral every 4 hours PRN      REVIEW OF SYSTEMS  --------------------------------------------------------------------------------  General: no fever  CVS: no chest pain  RESP: no sob, no cough  ABD: no abdominal pain  : no dysuria,  MSK: no edema     VITALS/PHYSICAL EXAM  --------------------------------------------------------------------------------  T(C): 36.8 (08-17-20 @ 04:30), Max: 36.8 (08-16-20 @ 20:08)  HR: 59 (08-17-20 @ 04:30) (58 - 70)  BP: 107/63 (08-17-20 @ 04:30) (107/63 - 135/78)  RR: 18 (08-17-20 @ 04:30) (18 - 18)  SpO2: 98% (08-17-20 @ 04:30) (96% - 100%)  Wt(kg): --        08-16-20 @ 07:01  -  08-17-20 @ 07:00  --------------------------------------------------------  IN: 545.5 mL / OUT: 750 mL / NET: -204.5 mL      Physical Exam:  	Gen: NAD  	HEENT: MMM  	Pulm: CTA B/L  	CV: S1S2  	Abd: Soft, +BS  	Ext: No LE edema B/L                      Neuro: Awake   	Skin: Warm and Dry   	Vascular access: femoral catheter           no  garrett  LABS/STUDIES  --------------------------------------------------------------------------------              8.0    7.21  >-----------<  164      [08-17-20 @ 05:59]              24.9     130  |  90  |  96  ----------------------------<  98      [08-17-20 @ 05:59]  5.0   |  18  |  8.56        Ca     8.9     [08-17-20 @ 05:59]      PT/INR: PT 13.8 , INR 1.17       [08-17-20 @ 05:59]  PTT: 45.0       [08-17-20 @ 05:59]      Creatinine Trend:  SCr 8.56 [08-17 @ 05:59]  SCr 7.41 [08-16 @ 05:09]  SCr 5.49 [08-15 @ 05:26]  SCr 4.80 [08-14 @ 02:53]  SCr 5.95 [08-13 @ 13:56]        Iron 45, TIBC 145, %sat 31      [12-26-19 @ 23:24]  Ferritin 1369      [12-26-19 @ 23:24]  PTH -- (Ca 8.4)      [02-21-20 @ 08:54]   93  HbA1c 6.0      [02-21-20 @ 08:53]  TSH 8.13      [08-16-20 @ 07:53]  Lipid: chol 124, TG 59, HDL 64, LDL 47      [08-08-20 @ 00:47]    HCV 0.24, Nonreact      [08-09-20 @ 19:37]  HIV Nonreact      [08-09-20 @ 19:26]

## 2020-08-19 ENCOUNTER — HOSPITAL ENCOUNTER (OUTPATIENT)
Dept: PHYSICAL THERAPY | Facility: CLINIC | Age: 40
Setting detail: THERAPIES SERIES
End: 2020-08-19
Attending: INTERNAL MEDICINE
Payer: COMMERCIAL

## 2020-08-19 PROCEDURE — 97116 GAIT TRAINING THERAPY: CPT | Mod: GP | Performed by: PHYSICAL THERAPIST

## 2020-08-19 PROCEDURE — 97110 THERAPEUTIC EXERCISES: CPT | Mod: GP | Performed by: PHYSICAL THERAPIST

## 2020-08-19 RX ORDER — BUTALBITAL, ACETAMINOPHEN AND CAFFEINE 50; 325; 40 MG/1; MG/1; MG/1
TABLET ORAL
Qty: 20 TABLET | Refills: 0 | Status: SHIPPED | OUTPATIENT
Start: 2020-08-19 | End: 2020-09-15

## 2020-08-19 NOTE — TELEPHONE ENCOUNTER
Routing refill request to provider for review/approval because:  Drug not on the [facility] refill protocol     butalbital-acetaminophen-caffeine (ESGIC) -40 MG tablet  Last Written Prescription Date:  7/14/20  Last Fill Quantity: 20,  # refills: 0   Last office visit: 3/4/2020 with prescribing provider:  Virtual visit with jose guadalupe 6/23/20, OV with loulou 8/10/20  Future Office Visit:   Next 5 appointments (look out 90 days)    Aug 27, 2020 10:15 AM CDT  Office Visit with Nadira Guerrero DO  [facility] ([facility]) [id number] [address] [place] [id number] [phone]

## 2020-08-26 ENCOUNTER — HOSPITAL ENCOUNTER (OUTPATIENT)
Dept: PHYSICAL THERAPY | Facility: CLINIC | Age: 40
Setting detail: THERAPIES SERIES
End: 2020-08-26
Attending: INTERNAL MEDICINE
Payer: COMMERCIAL

## 2020-08-26 PROCEDURE — 97116 GAIT TRAINING THERAPY: CPT | Mod: GP | Performed by: PHYSICAL THERAPIST

## 2020-08-26 PROCEDURE — 97110 THERAPEUTIC EXERCISES: CPT | Mod: GP | Performed by: PHYSICAL THERAPIST

## 2020-08-27 ENCOUNTER — OFFICE VISIT (OUTPATIENT)
Dept: OBGYN | Facility: CLINIC | Age: 40
End: 2020-08-27
Payer: COMMERCIAL

## 2020-08-27 ENCOUNTER — TELEPHONE (OUTPATIENT)
Dept: INTERNAL MEDICINE | Facility: CLINIC | Age: 40
End: 2020-08-27

## 2020-08-27 VITALS
WEIGHT: 183.5 LBS | HEART RATE: 88 BPM | SYSTOLIC BLOOD PRESSURE: 119 MMHG | BODY MASS INDEX: 35.25 KG/M2 | DIASTOLIC BLOOD PRESSURE: 76 MMHG | OXYGEN SATURATION: 99 %

## 2020-08-27 DIAGNOSIS — Z32.00 PREGNANCY EXAMINATION OR TEST, PREGNANCY UNCONFIRMED: ICD-10-CM

## 2020-08-27 DIAGNOSIS — N92.6 IRREGULAR MENSES: Primary | ICD-10-CM

## 2020-08-27 LAB — HCG UR QL: NEGATIVE

## 2020-08-27 PROCEDURE — 81025 URINE PREGNANCY TEST: CPT | Performed by: OBSTETRICS & GYNECOLOGY

## 2020-08-27 PROCEDURE — 88305 TISSUE EXAM BY PATHOLOGIST: CPT | Performed by: OBSTETRICS & GYNECOLOGY

## 2020-08-27 PROCEDURE — 99213 OFFICE O/P EST LOW 20 MIN: CPT | Mod: 25 | Performed by: OBSTETRICS & GYNECOLOGY

## 2020-08-27 PROCEDURE — 58100 BIOPSY OF UTERUS LINING: CPT | Performed by: OBSTETRICS & GYNECOLOGY

## 2020-08-27 NOTE — PROGRESS NOTES
"Outpatient Physical Therapy Progress Note     Patient: Mily Grullon  : 1980    Beginning/End Dates of Reporting Period:  2020 to 2020. She has been seen for 10 visits this episode of care. Treatment has focused on gait training, leg strengthening and neck exs (injury on 2020)..    Referring Provider: DR Smith    Therapy Diagnosis: osteoporosis, falls and left femur pain     Client Self Report: Still having lots of neck pain, wearing collar at night. Wearing collar during day when its really bad. Neck pain gives me a headache. Every day gets a headche. Doesn't feel neck is getting better. This is not an improvement to me since it hurts still. Improvement woud be not having any butt/groin pain. When laying down it is better, no buttock/groin pain. No time of day that makes it worse (not worse when she is tired). I try not to sit on that cheek and I ice it. Heat on the neck helps. Left leg feels \"weak\" \"not much for pain\", rates it a \"2\"  Buttock feels like it is asleep/tingles.     Objective Measurements:  Objective Measure: CROM: rotation to right \"pulls\".  rotation to left \"a little bit\" Retraction center of neck makes it worse, multiple reps gives her a headache. Protraction center of neck discomfort.  Details: CROM: sidebend feels like my head ways a hundred pounds. Thousand pounds with neck extension, Neck flexion heavy \"pushed it back in with my hands. Denies N/T in arms     Objective Measure: 25fTW 9.69 seconds and 16 steps with no right ear (on  this was 16.71 seconds and 22 steps and on 2020 it was 31.22 seconds)     Objective Measure: 3MWT with no  feet, 6MWT with no  feet, 10 MWT with no AD 1166 feet. This is .6 m/s  Details: pain increases from a 2 to a 4 in buttock/groin (pain rating has decreased from a 6 at initial appts).    Objective Measure on 7/15/2020 for sit to stnd test. 10 reps in 30 seconds without hands from 18 in ht with cues to go fast. "     Goals:  Goal Identifier gait-slowly, speed is not progressing. I do not think she will get to 1.0 m/s   Goal Description She will be able to complete a 6MWT at 1.0 m/s consistently with hip pain < a 3 on scale of 0-10   Target Date 09/16/20(goal date modified)   Date Met  08/26/20   Progress:     Goal Identifier leg strength-MET but not always consistent   Goal Description Sit to stand test to improve from 6reps to 10 reps in 30 seconds without hands with hip pain < 3 on scale of 0-10.   Target Date 08/02/20   Date Met      Progress:     Goal Identifier recreational walking program-progressing, needs more time   Goal Description She will be able to walk a mile daily with minimal hip pain (< 3 on scale of 0-10)   Target Date 09/16/20(goal date modified)   Date Met      Progress:     Goal Identifier headache   Goal Description Becky will reprot she is no longer having any headaches, curently having them daily.   Target Date 10/26/20   Date Met      Progress:     Goal Identifier cervical collar   Goal Description Becky will not need ot use the cervical collar to control her pain and neck fatigue. She will be able to do ADLS without the collar.    Target Date 11/26/20   Date Met      Progress:       Progress Toward Goals:   Mily has made steady progress in PT. She is walking much longer distances with no assistive device and her pain ratings have decreased. She is still at risk for falls, She still needs to further strengthen her legs. She has potential to further improve her mobility.     She recently injured her neck (8/5/2020) and we have started doing some basic neck exs.     Plan:  Continue therapy per current plan of care. Appts every 2 weeks.    Discharge:  Bertha Rahman DPT, MPT, NCS  Physical Therapist   Board Certified Neurologic Clinical Specialist     [facility]   [address] [place] [address]   [email or web address]  [email or web address]    [phone]   Clinic: [phone] //   Fax: [phone]

## 2020-08-27 NOTE — TELEPHONE ENCOUNTER
Duplicate.     Refill completed for Pepcid and Protonix refill sent to Joe Nayak for review.     Carola Pabon RN BSN

## 2020-08-27 NOTE — PROGRESS NOTES
This 38 y/o female, , presents for an endometrial biopsy since she is requesting a hysterectomy with LSO due to her hx of irregular menses and pelvic pain with a hx of extensive endometriosis.  She had a RSO 17 years ago so will request a copy of this op report to review ([facility]/Dr. Benavides).  Her UPT is negative and she denies any risk of recent pregnancy exposure.  /76 (BP Location: Right arm, Cuff Size: Adult Regular)   Pulse 88   Wt 83.2 kg (183 lb 8 oz)   LMP  (LMP Unknown)   SpO2 99%   BMI 35.25 kg/m    ROS:  10 systems were reviewed and the positives were listed under problems.  Informed consent was reviewed and obtained for the endometrial biopsy.  Using sterile technique, a bi-valve speculum was placed and her cervix was cleansed with betadine swabs x 3.  Next, the 12 o'clock position of her cervix was grasped with a single-toothed tenaculum and the internal os was dilated using an OsFinder.  Her uterus sounded to 7 cm and 2 swipes were made using a pipelle.  She tolerated the procedure well and the specimen was submitted to pathology.  EBL was 1 ml and there were no complications.  All instruments were removed and counts were correct.  Assessment - endometrial biopsy due to hx of irregular menses  Plan - The endometrial biopsy specimen was submitted to pathology and f/u care and directions were reviewed with the patient.  The plan is to review her op report from her RSO surgery 17 years ago to determine the best course of treatment.  She is requesting a hysterectomy with LSO and is not interested in future childbearing.  She signed the release of information form and all her questions and concerns were addressed.  This was a 20-minute visit and over 50% of the time was spent in direct patient consultation and an additional 10 minutes were spent in the procedure.

## 2020-08-27 NOTE — TELEPHONE ENCOUNTER
Reason for Call:  Medication or medication refill:    Do you use a [facility]?  Name of the pharmacy and phone number for the current request:  [facility] [phone]    Name of the medication requested: Famotodine, Pantoprazole    Other request: Patient requested a refill on 8/24. She will be out of medication over the weekend and wants to make sure it will be done before then.     Can we leave a detailed message on this number? YES    Phone number patient can be reached at: Home number on file [phone] (home)    Best Time: any     Call taken on 8/27/2020 at 4:18 PM by Carly Andersen

## 2020-08-27 NOTE — PROGRESS NOTES
[facility]      OUTPATIENT PHYSICAL THERAPY  PLAN OF TREATMENT FOR OUTPATIENT REHABILITATION    Patient's Last Name, First Name, M.I.                YOB: 1980  Mily Grullon                        Provider's Name  [facility] Medical Record No.  [id number]                               Onset Date: order date 5/18/2020   Start of Care Date: 6/1/2020   Type:     _X_PT   ___OT   ___SLP Medical Diagnosis: osteoporosis, falls and left hip pain                       PT Diagnosis: left hip/groin pain, risk of falls, weakness, neck pain, and limited activity level      _________________________________________________________________________________  Plan of Treatment:    Frequency/Duration: See her every two weeks for PT     Goals:  Goal Identifier gait-slowly, speed is not progressing. I do not think she will get to 1.0 m/s   Goal Description She will be able to complete a 6MWT at 1.0 m/s consistently with hip pain < a 3 on scale of 0-10   Target Date 09/16/20(goal date modified)   Date Met  08/26/20   Progress: Gait speed is stuck at .6 m/s, pain rating has improved and can walk now without a walker or cane.     Goal Identifier leg strength-MET but not always consistent   Goal Description Sit to stand test to improve from 6reps to 10 reps in 30 seconds without hands with hip pain < 3 on scale of 0-10.   Target Date 08/02/20   Date Met      Progress: need to retest this     Goal Identifier recreational walking program-progressing, needs more time   Goal Description She will be able to walk a mile daily with minimal hip pain (< 3 on scale of 0-10)   Target Date 10/16/20(goal date modified)   Date Met      Progress:     Goal Identifier headache   Goal Description Becky will report she is no longer having any headaches, curently having them daily.   Target Date 10/26/20   Date Met       Progress:     Goal Identifier cervical collar   Goal Description Becky will not need ot use the cervical collar to control her pain and neck fatigue. She will be able to do ADLS without the collar.    Target Date 11/26/20   Date Met      Progress:     Progress Toward Goals:   Mily has made steady progress in PT. She is walking much longer distances with no assistive device and her pain ratings have decreased. She is still at risk for falls, She still needs to further strengthen her legs. She has potential to further improve her mobility.      She recently injured her neck (8/5/2020) and we have started doing some basic neck exs.      Certification date from 9/2/2020 to 12/1/2020.    Miri Rahman, PT          I CERTIFY THE NEED FOR THESE SERVICES FURNISHED UNDER        THIS PLAN OF TREATMENT AND WHILE UNDER MY CARE     (Physician co-signature of this document indicates review and certification of the therapy plan).                Referring Provider: Dr William Smith

## 2020-08-31 LAB — COPATH REPORT: NORMAL

## 2020-09-02 ENCOUNTER — TELEPHONE (OUTPATIENT)
Dept: INTERNAL MEDICINE | Facility: CLINIC | Age: 40
End: 2020-09-02

## 2020-09-02 ENCOUNTER — TRANSFERRED RECORDS (OUTPATIENT)
Dept: HEALTH INFORMATION MANAGEMENT | Facility: CLINIC | Age: 40
End: 2020-09-02

## 2020-09-02 DIAGNOSIS — S72.145S CLOSED NONDISPLACED INTERTROCHANTERIC FRACTURE OF LEFT FEMUR, SEQUELA: ICD-10-CM

## 2020-09-02 DIAGNOSIS — G89.29 OTHER CHRONIC PAIN: ICD-10-CM

## 2020-09-02 NOTE — TELEPHONE ENCOUNTER
Routing refill request to provider for review/approval because:  Drug not on the [facility] refill protocol     Last Written Prescription Date:  8/10/20  Last Fill Quantity: #50,  # refills: 0     Last office visit: 8/10/20 with prescribing provider:  Joe IVORY     Future Office Visit:  None    Carola Pabon RN BSN

## 2020-09-04 DIAGNOSIS — S72.145S CLOSED NONDISPLACED INTERTROCHANTERIC FRACTURE OF LEFT FEMUR, SEQUELA: ICD-10-CM

## 2020-09-04 DIAGNOSIS — G89.29 OTHER CHRONIC PAIN: ICD-10-CM

## 2020-09-04 NOTE — TELEPHONE ENCOUNTER
Oxycodone-Acetaminophen 5-325 mg tablet     Last Written Prescription Date:  08/10/20  Last Fill Quantity: 50,   # refills: 0  Last Office Visit: 08/27/2020  Future Office visit:       Routing refill request to provider for review/approval because:  Drug not on the [facility], [facility] or [facility] refill protocol or controlled substance    Lisa Caceres CPhT  [facility]

## 2020-09-08 ENCOUNTER — TELEPHONE (OUTPATIENT)
Dept: OBGYN | Facility: CLINIC | Age: 40
End: 2020-09-08

## 2020-09-08 NOTE — TELEPHONE ENCOUNTER
[facility] Call Center    Phone Message    May a detailed message be left on voicemail: yes     Reason for Call: The patient is checking to see if her post op report has been received from the [facility].  She was advised to call and discuss next steps.  Thank you.     Action Taken: Message routed to:  [facility] p [phone]    Travel Screening: Not Applicable

## 2020-09-09 RX ORDER — OXYCODONE AND ACETAMINOPHEN 5; 325 MG/1; MG/1
.5-1 TABLET ORAL 2 TIMES DAILY PRN
Qty: 50 TABLET | Refills: 0 | Status: SHIPPED | OUTPATIENT
Start: 2020-09-09 | End: 2020-09-30

## 2020-09-10 RX ORDER — OXYCODONE AND ACETAMINOPHEN 5; 325 MG/1; MG/1
.5-1 TABLET ORAL 2 TIMES DAILY PRN
Qty: 50 TABLET | Refills: 0 | Status: SHIPPED | OUTPATIENT
Start: 2020-09-10 | End: 2020-09-30

## 2020-09-10 NOTE — TELEPHONE ENCOUNTER
Will refill. Dr. Smith is attempting to taper her. She cannot have another refill until 10/10/20.    DIANELYS Chase

## 2020-09-11 NOTE — TELEPHONE ENCOUNTER
Called and let her know I do not find any post op from [facility]  Told pt to call the hospital to inquire about it  Mily Mckeon M.A.

## 2020-09-13 DIAGNOSIS — G44.219 EPISODIC TENSION-TYPE HEADACHE, NOT INTRACTABLE: ICD-10-CM

## 2020-09-14 ENCOUNTER — HOSPITAL ENCOUNTER (OUTPATIENT)
Dept: PHYSICAL THERAPY | Facility: CLINIC | Age: 40
Setting detail: THERAPIES SERIES
End: 2020-09-14
Attending: INTERNAL MEDICINE
Payer: COMMERCIAL

## 2020-09-14 ENCOUNTER — TELEPHONE (OUTPATIENT)
Dept: OBGYN | Facility: CLINIC | Age: 40
End: 2020-09-14

## 2020-09-14 ENCOUNTER — TELEPHONE (OUTPATIENT)
Dept: FAMILY MEDICINE | Facility: CLINIC | Age: 40
End: 2020-09-14

## 2020-09-14 PROCEDURE — 97116 GAIT TRAINING THERAPY: CPT | Mod: GP | Performed by: PHYSICAL THERAPIST

## 2020-09-14 PROCEDURE — 97750 PHYSICAL PERFORMANCE TEST: CPT | Mod: GP | Performed by: PHYSICAL THERAPIST

## 2020-09-14 NOTE — TELEPHONE ENCOUNTER
Signature needed on outpatient physical therapy plan of treatment for outpatient rehab. Form placed in [employer] in basket for signature in absence of Dr. Smith.

## 2020-09-14 NOTE — TELEPHONE ENCOUNTER
Re faxed LILI to new number.      If pt calls back let her know.    Patrizia Lincoln MA on 9/14/2020 at 11:34 AM

## 2020-09-14 NOTE — TELEPHONE ENCOUNTER
[facility] Call Center    Phone Message    May a detailed message be left on voicemail: yes     Reason for Call: [facility] is not receiving LILI form.  Pt is requesting that you refax this to a different #.  Please try faxing LILI to [phone].    Please call her to confirm this has been faxed.    Action Taken: Message routed to:  [facility] p [phone]    Travel Screening: Not Applicable

## 2020-09-15 ENCOUNTER — OFFICE VISIT (OUTPATIENT)
Dept: FAMILY MEDICINE | Facility: CLINIC | Age: 40
End: 2020-09-15
Payer: COMMERCIAL

## 2020-09-15 VITALS
RESPIRATION RATE: 16 BRPM | WEIGHT: 191.6 LBS | DIASTOLIC BLOOD PRESSURE: 78 MMHG | BODY MASS INDEX: 36.8 KG/M2 | HEART RATE: 80 BPM | TEMPERATURE: 98.8 F | SYSTOLIC BLOOD PRESSURE: 132 MMHG

## 2020-09-15 DIAGNOSIS — H66.011 ACUTE SUPPURATIVE OTITIS MEDIA OF RIGHT EAR WITH SPONTANEOUS RUPTURE OF TYMPANIC MEMBRANE, RECURRENCE NOT SPECIFIED: ICD-10-CM

## 2020-09-15 DIAGNOSIS — H92.01 RIGHT EAR PAIN: Primary | ICD-10-CM

## 2020-09-15 DIAGNOSIS — G43.009 MIGRAINE WITHOUT AURA AND WITHOUT STATUS MIGRAINOSUS, NOT INTRACTABLE: ICD-10-CM

## 2020-09-15 PROCEDURE — 99214 OFFICE O/P EST MOD 30 MIN: CPT | Performed by: PHYSICIAN ASSISTANT

## 2020-09-15 RX ORDER — BUTALBITAL, ACETAMINOPHEN AND CAFFEINE 50; 325; 40 MG/1; MG/1; MG/1
TABLET ORAL
Qty: 20 TABLET | Refills: 0 | Status: SHIPPED | OUTPATIENT
Start: 2020-09-15 | End: 2020-10-20

## 2020-09-15 RX ORDER — CEFDINIR 300 MG/1
300 CAPSULE ORAL 2 TIMES DAILY
Qty: 14 CAPSULE | Refills: 0 | Status: SHIPPED | OUTPATIENT
Start: 2020-09-15 | End: 2020-09-22

## 2020-09-15 ASSESSMENT — ENCOUNTER SYMPTOMS
NERVOUS/ANXIOUS: 0
SINUS PRESSURE: 0
LIGHT-HEADEDNESS: 0
ABDOMINAL PAIN: 0
SINUS PAIN: 0
SHORTNESS OF BREATH: 0
COUGH: 0
FEVER: 0
SORE THROAT: 0

## 2020-09-15 ASSESSMENT — PAIN SCALES - GENERAL: PAINLEVEL: MODERATE PAIN (4)

## 2020-09-15 NOTE — PROGRESS NOTES
20 1100   Signing Clinician's Name / Credentials   Signing clinician's name / credentials Olivia Rahman DPT NCS   Dynamic Gait Index (Duane and Knott Ventura, 1995)   Gait Level Surface 3   Change in Gait Speed 2   Gait and Horizontal Head Turns 3  (hurts my neck, both directions)   Gait with Vertical Head Turns 2  (dizzy and off balance)   Gait and Pivot Turns 2   Step Over Obstacle 3   Step Around Obstacles 3   Steps 2  (slow, recipricates)   Total Dynamic Gait Index Score  (A score of 19 or less has been correlated to an increased risk of falls in community dwelling older adults, patients with vestibular disorders, and patients with MS.)   Total Score (out of 24) 20   Dynamic Gait Index (DGI):The DGI is a measure of balance during gait that is reliable and valid for the elderly and individuals with Parkinson's disease, MS, vestibular disorders, or s/p stroke. Gait assistive device used: None    Patient score: 20/24  Scores ?19/24 indicate an increased risk for falls according to Chani et al 2000  Minimal Detectable Change = 2.9 in community dwelling elderly according to Blake et al 2011    Assessment (rationale for performing, application to patient s function & care plan): outside of the fall risk range but below normal for her age. I do not think that changing gait speed will improve much as she does tto like to go fast. I do not think the stairs will ever get to her NOT using a railing. Overall I dont think this score will change much in the next 2 months.   Minutes billed as physical performance test 15 (FGA also)    Olivia Rahman DPT, MPT, NCS  Physical Therapist   Board Certified Neurologic Clinical Specialist     [facility]   [address] [place] [address]   [email or web address]  [email or web address]   [phone]   Clinic: [phone] //   Fax: [phone]

## 2020-09-15 NOTE — PROGRESS NOTES
Subjective     Mily Grullon is a 40 year old female who presents to clinic today for the following health issues:    HPI     Patient notes right ear pain that started with bleeding that started three days ago. She had been itching the ear. She then experienced increasing pain in the ear. Last night she noticed a large pop and crackling. Hearing is muffled. No sore throat or seasonal allergies.     Patient normally sees Dr. Smith who is out. Patient would like refill of ESGIC for migraines which works well. Patient would like refill today as she is in clinic and at pharmacy location. She is ok with RX being dated for next refill.     Acute Illness  Acute illness concerns: right ear pain and bleeding with difficulty hearing, cracking and ringing.   Onset/Duration: 3 days  Symptoms:  Fever: no  Chills/Sweats: no  Headache (location?): YES-this is not new  Sinus Pressure: no  Conjunctivitis:  YES-itching  Ear Pain: YES: right  Rhinorrhea: no  Congestion: no  Sore Throat: no  Cough: no  Wheeze: no  Decreased Appetite: no  Nausea: no  Vomiting: no  Diarrhea: no  Dysuria/Freq.: no  Dysuria or Hematuria: no  Fatigue/Achiness: YES-this is not new  Sick/Strep Exposure: no  Therapies tried and outcome: ear drops left from old prescription was not effective      Review of Systems   Constitutional: Negative for fever.   HENT: Positive for ear pain and hearing loss. Negative for congestion, sinus pressure, sinus pain and sore throat.    Respiratory: Negative for cough and shortness of breath.    Cardiovascular: Negative for chest pain.   Gastrointestinal: Negative for abdominal pain.   Skin: Negative for rash.   Neurological: Negative for light-headedness.   Psychiatric/Behavioral: The patient is not nervous/anxious.             Objective    /78 (BP Location: Right arm, Patient Position: Sitting, Cuff Size: Adult Large)   Pulse 80   Temp 98.8  F (37.1  C) (Tympanic)   Resp 16   Wt 86.9 kg (191 lb 9.6 oz)   LMP  (LMP  Unknown)   BMI 36.80 kg/m    Body mass index is 36.8 kg/m .  Physical Exam  Vitals signs and nursing note reviewed.   Constitutional:       General: She is not in acute distress.     Appearance: Normal appearance.   HENT:      Head: Normocephalic and atraumatic.      Right Ear: Ear canal and external ear normal.      Left Ear: Tympanic membrane, ear canal and external ear normal.      Ears:      Comments: Right TM with bulging and opacification. Possible rupture.  No discharge.     Mouth/Throat:      Mouth: Mucous membranes are moist.      Pharynx: Oropharynx is clear.   Eyes:      Extraocular Movements: Extraocular movements intact.      Pupils: Pupils are equal, round, and reactive to light.   Neck:      Musculoskeletal: Normal range of motion.   Cardiovascular:      Rate and Rhythm: Normal rate and regular rhythm.      Heart sounds: Normal heart sounds.   Pulmonary:      Effort: Pulmonary effort is normal.      Breath sounds: Normal breath sounds.   Musculoskeletal: Normal range of motion.   Skin:     General: Skin is warm and dry.   Neurological:      General: No focal deficit present.      Mental Status: She is alert.   Psychiatric:         Mood and Affect: Mood normal.         Behavior: Behavior normal.            Prior labs and imaging reviewed.         Assessment & Plan         Acute suppurative otitis media of right ear with spontaneous rupture of tympanic membrane, recurrence not specified  Right ear pain  Patient is a 40-year-old female presents clinic today due to right ear pain starting 3 days ago.  Patient also notes bloody discharge, popping, and crackling.  Vital signs normal.  Physical exam significant for bulging right TM with opacification and possibly small rupture.  Symptoms are consistent with otitis media.  Patient prescribed Ceftinir.  Recommended Tylenol for pain management.  Discussed return precautions.  - cefdinir (OMNICEF) 300 MG capsule; Take 1 capsule (300 mg) by mouth 2 times daily  for 7 days     Migraine without aura and without status migrainosus, not intractable  Patient also requests refill of ESGIC for migraines.  Patient has refill request ordered, but notes significant delay in refills and would like to  this medication today while she is near pharmacy.  Refill placed with caveat of next refill date being October 19, 2020 given date of last refill.  Patient is agreeable to this plan.      See Patient Instructions    Return in about 1 week (around 9/22/2020), or if symptoms worsen or fail to improve.    Stacey Graham PA-C  [facility]

## 2020-09-15 NOTE — PATIENT INSTRUCTIONS
Your ear exam does show an ear infection of your right ear.  This you prescribed an oral antibiotic, Ceftin ear.  For discomfort you may use Tylenol.  Your symptoms should slowly improve over the next week.  If your symptoms are worsening, or not improving with treatment, please return to clinic.    A refill is been provided of your migraine medication, ESGIC.  As discussed, your next refill will not be available until after October 19.      Patient Education     Middle Ear Infection (Otitis Media) in Adults  What is a middle ear infection?  A middle ear infection occurs behind the eardrum. It is most often caused by a virus or bacteria. Most kids have at least one middle ear infection by the time they are 3 years old. But adults can also get them.  What causes middle ear infections?  Inflammation in the middle ear most often starts after you ve had a sore throat, cold, or other upper respiratory problem. The infection spreads to the middle ear and causes fluid buildup behind the eardrum.   What are the symptoms of a middle ear infection?  These are the most common symptoms of middle ear infections in adults:    Ear pain    Feeling of fullness in the hear    Fluid draining from the ear    Fever    Hearing loss  These symptoms may look like other conditions or health problems. Always talk with your healthcare provider for a diagnosis.  How is a middle ear infection diagnosed?  Your healthcare provider will review your health history and do a physical exam. He or she will check the outer ear and the eardrum using an otoscope. The otoscope is a lighted tool that lets the healthcare provider see inside the ear. A pneumatic otoscope blows a puff of air into the ear to test eardrum movement. When there is fluid or infection in the middle ear, movement is decreased.  Your provider may also do a tympanometry. This is a test that directs air and sound to the middle ear.  If you have ear infections often, your healthcare  provider may suggest having a hearing test.  How is a middle ear infection treated?  Treatment will depend on your symptoms, age, and general health. It will also depend on how severe the condition is.  Treatment may include:    Antibiotics    Pain relievers    Placing small tubes in the eardrum for chronic ear infections   What are possible complications of a middle ear infection?  Untreated ear infections can lead to:    Infection in other parts of the head    Lasting (permanent) hearing loss    Speech and language problems  Can middle ear infections be prevented?  Cold and allergy medicines don't seem to prevent ear infections. And currently there is no vaccine that can prevent the disease. But check with your healthcare provider and make sure your vaccines are up-to-date. Living in a home where cigarettes are smoked can increase the chances of ear infections.  Key points about middle ear infections    Middle ear infections can affect both children and adults.    Pain and fever can be the most common symptoms.    Without treatment, permanent hearing loss may happen.    Take antibiotics as prescribed and finish all of the prescription. This can help prevent antibiotic-resistant infections or incomplete treatment with the infection returning.    Next steps  Tips to help you get the most from a visit to your healthcare provider:    Know the reason for your visit and what you want to happen.    Before your visit, write down questions you want answered.    Bring someone with you to help you ask questions and remember what your provider tells you.    At the visit, write down the name of a new diagnosis, and any new medicines, treatments, or tests. Also write down any new instructions your provider gives you.    Know why a new medicine or treatment is prescribed, and how it will help you. Also know what the side effects are.    Ask if your condition can be treated in other ways.    Know why a test or procedure is  recommended and what the results could mean.    Know what to expect if you do not take the medicine or have the test or procedure.    If you have a follow-up appointment, write down the date, time, and purpose for that visit.    Know how you can contact your provider if you have questions.      [id number] [employer]. [address] [place] [address], [place] [address]. All rights reserved. This information is not intended as a substitute for professional medical care. Always follow your healthcare professional's instructions.

## 2020-09-15 NOTE — PROGRESS NOTES
09/14/20 1100   Signing Clinician's Name / Credentials   Signing clinician's name / credentials Olivia Rahman DPTNCS   Functional Gait Assessment (PHILIPPE Bernstein, ADA Espitia, et al. (2004))   1. GAIT LEVEL SURFACE 2   2. CHANGE IN GAIT SPEED 2   3. GAIT WITH HORIZONTAL HEAD TURNS 3   4. GAIT WITH VERTICAL HEAD TURNS 2   5. GAIT AND PIVOT TURN 2   6. STEP OVER OBSTACLE 2  (can do two boxes but slows a bit)   7. GAIT WITH NARROW BASE OF SUPPORT 0  (hurts her hip and feels really unsteady)   8. GAIT WITH EYES CLOSED 2  (8.09 seconds)   9. AMBULATING BACKWARDS 2  (slow, afraid)   10. STEPS 2   Total Functional Gait Assessment Score   TOTAL SCORE: (MAXIMUM SCORE 30) 19   Functional Gait Assessment (FGA): The FGA assesses postural stability during various walking tasks.   Gait assistive device used: None    Patient Score: 19/30  Scores of <22/30 have been correlated with predicting falls in community-dwelling older adults according to Amandeep & Dawit 2010.   Scores of <18/30 have been correlated with increased risk for falls in patients with Parkinsons Disease according to Jason Fountain Zhou et al 2014.  Minimal Detectable Change for patients with acute/chronic stroke = 4.2 according to Thivivian & Lissethschel 2009  Minimal Detectable Change for patients with vestibular disorder = 8 according to Amandeep & Dawit 2010    Assessment (rationale for performing, application to patient s function & care plan): This test involves higher level activity that she avoids due to her fall risk. I do not think this score will change much in the next 2 months.   She is very fearful of falling and breaking a bone.    Minutes billed as physical performance test: 15 (DGI and FGA completed).    Olivia Rahman DPT, MPT, NCS  Physical Therapist   Board Certified Neurologic Clinical Specialist     [facility]   [address] [place] [address]   [email or web address]  [email or web address]    [phone]   Clinic: [phone] //   Fax: [phone]

## 2020-09-22 ENCOUNTER — TRANSFERRED RECORDS (OUTPATIENT)
Dept: HEALTH INFORMATION MANAGEMENT | Facility: CLINIC | Age: 40
End: 2020-09-22

## 2020-09-24 ENCOUNTER — OFFICE VISIT (OUTPATIENT)
Dept: FAMILY MEDICINE | Facility: CLINIC | Age: 40
End: 2020-09-24
Payer: COMMERCIAL

## 2020-09-24 ENCOUNTER — HOSPITAL ENCOUNTER (OUTPATIENT)
Dept: PHYSICAL THERAPY | Facility: CLINIC | Age: 40
Setting detail: THERAPIES SERIES
End: 2020-09-24
Attending: INTERNAL MEDICINE
Payer: COMMERCIAL

## 2020-09-24 ENCOUNTER — TELEPHONE (OUTPATIENT)
Dept: OBGYN | Facility: CLINIC | Age: 40
End: 2020-09-24

## 2020-09-24 VITALS
BODY MASS INDEX: 34.97 KG/M2 | HEART RATE: 87 BPM | DIASTOLIC BLOOD PRESSURE: 75 MMHG | HEIGHT: 61 IN | WEIGHT: 185.2 LBS | SYSTOLIC BLOOD PRESSURE: 133 MMHG | TEMPERATURE: 97.9 F | OXYGEN SATURATION: 97 %

## 2020-09-24 DIAGNOSIS — G89.29 OTHER CHRONIC PAIN: ICD-10-CM

## 2020-09-24 DIAGNOSIS — M54.2 NECK PAIN: Primary | ICD-10-CM

## 2020-09-24 DIAGNOSIS — H93.8X1 CONGESTION OF RIGHT EAR: ICD-10-CM

## 2020-09-24 DIAGNOSIS — S72.145S CLOSED NONDISPLACED INTERTROCHANTERIC FRACTURE OF LEFT FEMUR, SEQUELA: ICD-10-CM

## 2020-09-24 PROCEDURE — 97110 THERAPEUTIC EXERCISES: CPT | Mod: GP | Performed by: PHYSICAL THERAPIST

## 2020-09-24 PROCEDURE — 99214 OFFICE O/P EST MOD 30 MIN: CPT | Performed by: PHYSICIAN ASSISTANT

## 2020-09-24 RX ORDER — FLUTICASONE PROPIONATE 50 MCG
1 SPRAY, SUSPENSION (ML) NASAL DAILY
Qty: 18.2 ML | Refills: 0 | Status: SHIPPED | OUTPATIENT
Start: 2020-09-24 | End: 2020-11-05

## 2020-09-24 ASSESSMENT — ENCOUNTER SYMPTOMS
LIGHT-HEADEDNESS: 0
FEVER: 0
COUGH: 0
NECK PAIN: 1
SHORTNESS OF BREATH: 0
ABDOMINAL PAIN: 0
NERVOUS/ANXIOUS: 0

## 2020-09-24 ASSESSMENT — MIFFLIN-ST. JEOR: SCORE: 1442.44

## 2020-09-24 NOTE — PROGRESS NOTES
"Subjective     Mily Grullon is a 40 year old female who presents to clinic today for the following health issues:    HPI     1.Patient finished antibiotic for right ear on Monday. Patient said she is still having on and off pain, itchy, \"pops\"     2.Patient is still having neck pain. Patient is using the Tizanidine which she says is not helping. Patient has been in physical therapy for falls and has also been working on neck. She notes the neck discomfort has been persisting since injury 8/5/20 where patient was evaluated in ED and diagnosed with neck muscle strain. Patient had been in the shower and felt a pop in neck causing severe pain. CT performed and without fracture or subluxation. No foraminal stenosis. Patient notes she has tried a cervical collar as well as new pillows but continues to have pain.     Review of Systems   Constitutional: Negative for fever.   HENT: Positive for ear pain (improved ) and hearing loss (improved ). Negative for congestion.    Respiratory: Negative for cough and shortness of breath.    Cardiovascular: Negative for chest pain.   Gastrointestinal: Negative for abdominal pain.   Musculoskeletal: Positive for neck pain.   Skin: Negative for rash.   Neurological: Negative for light-headedness.   Psychiatric/Behavioral: The patient is not nervous/anxious.             Objective    /75   Pulse 87   Temp 97.9  F (36.6  C) (Tympanic)   Ht 1.549 m (5' 1\")   Wt 84 kg (185 lb 3.2 oz)   LMP  (LMP Unknown)   SpO2 97%   BMI 34.99 kg/m    Body mass index is 34.99 kg/m .  Physical Exam  Vitals signs and nursing note reviewed.   Constitutional:       General: She is not in acute distress.     Appearance: Normal appearance.   HENT:      Head: Normocephalic and atraumatic.      Right Ear: Tympanic membrane, ear canal and external ear normal.      Left Ear: Tympanic membrane, ear canal and external ear normal.      Nose: Congestion present.      Mouth/Throat:      Mouth: Mucous membranes " are moist.      Pharynx: Oropharynx is clear.   Eyes:      Extraocular Movements: Extraocular movements intact.      Pupils: Pupils are equal, round, and reactive to light.   Neck:      Musculoskeletal: Normal range of motion.   Cardiovascular:      Rate and Rhythm: Normal rate and regular rhythm.      Heart sounds: Normal heart sounds.   Pulmonary:      Effort: Pulmonary effort is normal.      Breath sounds: Normal breath sounds.   Musculoskeletal: Normal range of motion.      Comments: Tenderness to right-sided paraspinal musculature of cervical spine.  No tenderness palpation of cervical spine.  Right and left rotation limited.  Left lateral loading, right lateral bending, flexion, extension WNL.  Rounded posture.   Skin:     General: Skin is warm and dry.   Neurological:      General: No focal deficit present.      Mental Status: She is alert.   Psychiatric:         Mood and Affect: Mood normal.         Behavior: Behavior normal.            Prior labs and imaging reviewed.        Assessment & Plan     Neck pain  Patient is a 40-year-old female presents to clinic due to ongoing neck pain that started 1.5 months ago.  CT performed initially without signs of fracture or acute injury.  Patient has been completing physical therapy for balance with a slight amount of work on neck also.  Vital signs normal.  Physical exam significant for tenderness palpation of right-sided paraspinal musculature.  No triggers palpation cervical spine.  Limited right and left rotation with rounded posture.  Chronic neck pain likely positional.  Referral placed physical therapy.  Recommended heat/ice, Tylenol, and previously prescribed tizanidine if needed.  - [facility]; Future    Congestion of right ear  Patient notes ongoing right ear congestion with improved pain in hearing since treatment with Ceftinir for otitis media 9/15/2020.  Exam without signs of otitis media or otitis externa.  Symptoms likely due  to eustachian tube dysfunction.  Exam does show some nasal congestion.  Patient prescribed Flonase.  - fluticasone (FLONASE) 50 MCG/ACT nasal spray; Spray 1 spray into both nostrils daily      See Patient Instructions    Return in about 3 months (around 12/24/2020), or if symptoms worsen or fail to improve.    Stacey Graham PA-C  [facility]

## 2020-09-24 NOTE — PATIENT INSTRUCTIONS
-For your congestion and ear popping, please use Flonase. These symptoms can be due to nasal congestion and swelling and Flonase will treat this.  Your exam does not show any signs of ear infection.    -Your ongoing neck pain is likely due to posture and movement.  For this a referral has been placed to physical therapy.  Please discuss scheduling appointments with your physical therapist.  You may continue to use heat/ice and previously prescribed muscle relaxers.  You may also use Tylenol.    Please follow-up in 3 months for a recheck, or sooner if symptoms are worsening.    Patient Education     Earache, No Infection (Adult)  Earaches can happen without an infection. This occurs when air and fluid build up behind the eardrum causing a feeling of fullness and discomfort and reduced hearing. This is called otitis media with effusion (OME) or serous otitis media. It means there is fluid in the middle ear. It is not the same as acute otitis media, which is typically from infection.  OME can happen when you have a cold if congestion blocks the passage that drains the middle ear. This passage is called the eustachian tube. OME may also occur with nasal allergies or after a bacterial middle ear infection.    The pain or discomfort may come and go. You may hear clicking or popping sounds when you chew or swallow. You may feel that your balance is off. Or you may hear ringing in the ear.  It often takes from several weeks up to 3 months for the fluid to clear on its own. Oral pain relievers and ear drops help if there is pain. Decongestants and antihistamines sometimes help. Antibiotics don't help since there is no infection. Your doctor may prescribe a nasal spray to help reduce swelling in the nose and eustachian tube. This can allow the ear to drain.  If your OME doesn't improve after 3 months, surgery may be used to drain the fluid and insert a small tube in the eardrum to allow continued drainage.  Because the middle  ear fluid can become infected, it is important to watch for signs of an ear infection which may develop later. These signs include increased ear pain, fever, or drainage from the ear.  Home care  The following guidelines will help you care for yourself at home:    You may use over-the-counter medicine as directed to control pain, unless another medicine was prescribed. If you have chronic liver or kidney disease or ever had a stomach ulcer or GI bleeding, talk with your doctor before using these medicines. Aspirin should never be used in anyone under 18 years of age who is ill with a fever. It may cause severe liver damage.    You may use over-the-counter decongestants such as phenylephrine or pseudoephedrine. But they are not always helpful. Don't use nasal spray decongestants more than 3 days. Longer use can make congestion worse. Prescription nasal sprays from your doctor don't typically have those restrictions.    Antihistamines may help if you are also having allergy symptoms.    You may use medicines such as guaifenesin to thin mucus and promote drainage.  Follow-up care  Follow up with your healthcare provider or as advised if you are not feeling better after 3 days.  When to seek medical advice  Call your healthcare provider right away if any of the following occur:    Your ear pain gets worse or does not start to improve     Fever of 100.4 F (38 C) or higher, or as directed by your healthcare provider    Fluid or blood draining from the ear    Headache or sinus pain    Stiff neck    Unusual drowsiness or confusion  Date Last Reviewed: 10/1/2016    4396-3959 [employer]. [address] [place] [address], [place] [address]. All rights reserved. This information is not intended as a substitute for professional medical care. Always follow your healthcare professional's instructions.           Patient Education     Neck Pain  There are several possible causes of neck pain when there is no injury:    You can  get a minor ligament sprain or muscle strain from a sudden minor neck movement. Sleeping with your neck in an awkward position can also cause this.    Some people respond to emotional stress by tensing the muscles of their neck, shoulders, and upper back. Chronic spasm in these muscles can cause neck pain and sometimes headaches.    Gradual wear and tear of the joints in the spine can cause degenerative arthritis. This can be a source of occasional or chronic neck pain.    The spinal disks may bulge and put pressure on a nearby spinal nerve. This can happen as a natural result of aging or repeated small injuries to the neck. The spinal disks are the cushions between each spinal bone. This causes tingling, pain, or numbness that spreads from the neck to the shoulder, arm, or hand on one side.  Acute neck pain usually gets better in 1 to 2 weeks. Neck pain related to disk disease, arthritis in the spinal joints, or spinal stenosis can become chronic and last for months or years. Spinal stenosis is narrowing of the spinal canal.  X-rays are usually not ordered for the initial evaluation of neck pain. However, X-rays may be done if you had a forceful physical injury, such as a car accident or fall. If pain continues and doesn t respond to medical treatment, X-rays and other tests may be done at a later time.  Home care    Rest and relax the muscles. Use a comfortable pillow that supports the head. It should also help keep the spine in a neutral position. The position of the head should not be tilted forward or backward. A rolled up towel may help for a custom fit.    Some people find relief with heat. Heat can be applied with either a warm shower or bath or a moist towel heated in the microwave and massage. Others prefer cold packs. You can make an ice pack by filling a plastic bag that seals at the top with ice cubes or crushed ice and then wrapping it with a thin towel. Try both and use the method that feels best  for 15 to 20 minutes, several times a day.    Whether using ice or heat, be careful that you do not injure your skin. Never put ice directly on the skin. Always wrap the ice in a towel or other type of cloth.This is very important, especially in people with poor skin sensations.     Try to reduce your stress level. Emotional stress can lead to neck muscle tension and get in the way of or delay the healing process.    You may use over-the-counter pain medicine to control pain, unless another medicine was prescribed. If you have chronic liver or kidney disease or ever had a stomach ulcer or GI bleeding, talk with your healthcare provider before using these medicines.  Follow-up care  Follow up with your healthcare provider if your symptoms do not show signs of improvement after one week. Physical therapy or further tests may be needed.  If X-rays, CT scans, or MRI scans were taken, you will be told of any new findings that may affect your care.  Call 911  Call 911 if you have:    Sudden weakness or numbness in one or both arms    Neck swelling, difficulty or painful swallowing    Difficulty breathing    Chest pain  When to seek medical advice  Call your healthcare provider right away if any of these occur:    Pain becomes worse or spreads into one or both arm    Increasing headache    Fever of 100.4 F (38 C) or higher, or as directed by your healthcare provider  Date Last Reviewed: 7/1/2016 2000-2019 [employer]. [address] [place] [address], [place] [address]. All rights reserved. This information is not intended as a substitute for professional medical care. Always follow your healthcare professional's instructions.

## 2020-09-24 NOTE — TELEPHONE ENCOUNTER
[facility] Call Center    Phone Message Patient stopped by to see if medical records arrived from [facility].    May a detailed message be left on voicemail: yes     Reason for Call: Other:   Call Back    Action Taken:  Call patient @ [phone].    Travel Screening: Not Applicable

## 2020-09-24 NOTE — TELEPHONE ENCOUNTER
Called and spoke with pt, stated that we have sent the [facility] 4 times and still have no heard back and asked her to try and call their Records department.    Pt agreed.  Waiting to here back from pt.    Patrizia Lincoln MA on 9/24/2020 at 12:04 PM]

## 2020-09-29 NOTE — TELEPHONE ENCOUNTER
Routing refill request to provider for review/approval because:  Drug not on the [facility] refill protocol     Last Written Prescription Date:  9/9/20  Last Fill Quantity: 50,  # refills: 0  Checked with   Last office visit: 3/4/2020 with prescribing provider:  Luis  Has been seen by other providers since  Future Office Visit:

## 2020-09-30 ENCOUNTER — TELEPHONE (OUTPATIENT)
Dept: INTERNAL MEDICINE | Facility: CLINIC | Age: 40
End: 2020-09-30

## 2020-09-30 NOTE — TELEPHONE ENCOUNTER
Patient informed that her refill request has been submitted to Dr. [facility] for review and she verbalized a good understanding.     She states that she was calling early as she had been instructed to do.       Patient  has a week of medication left and hopes to fill on Friday, October 9 (since the pharmacy is closed on the weekend).     Carola Pabon RN BSN

## 2020-09-30 NOTE — TELEPHONE ENCOUNTER
Patient informed that her refill request has been submitted to Dr. [facility] for review and she verbalized a good understanding.      She states that she was calling early as she had been instructed to do.         Patient  has a week of medication left and hopes to fill on Friday, October 9 (since the pharmacy is closed on the weekend).     Pended with fill date of 10/9/20.      Carola Pabon RN BSN

## 2020-09-30 NOTE — TELEPHONE ENCOUNTER
Reason for Call:  Other prescription    Detailed comments: pt would like oxy.  Uses [employer]    Phone Number Patient can be reached at: Cell number on file:    Telephone Information:   Mobile [phone]       Best Time: any    Can we leave a detailed message on this number? YES    Call taken on 9/30/2020 at 1:45 PM by Ольга Guillaume

## 2020-10-06 RX ORDER — OXYCODONE AND ACETAMINOPHEN 5; 325 MG/1; MG/1
TABLET ORAL
Qty: 50 TABLET | Refills: 0 | Status: SHIPPED | OUTPATIENT
Start: 2020-10-06 | End: 2020-11-05

## 2020-10-08 ENCOUNTER — HOSPITAL ENCOUNTER (OUTPATIENT)
Dept: PHYSICAL THERAPY | Facility: CLINIC | Age: 40
Setting detail: THERAPIES SERIES
End: 2020-10-08
Attending: INTERNAL MEDICINE
Payer: COMMERCIAL

## 2020-10-08 ENCOUNTER — TELEPHONE (OUTPATIENT)
Dept: OBGYN | Facility: CLINIC | Age: 40
End: 2020-10-08

## 2020-10-08 ENCOUNTER — TELEPHONE (OUTPATIENT)
Dept: INTERNAL MEDICINE | Facility: CLINIC | Age: 40
End: 2020-10-08

## 2020-10-08 PROCEDURE — 97110 THERAPEUTIC EXERCISES: CPT | Mod: GP | Performed by: PHYSICAL THERAPIST

## 2020-10-08 PROCEDURE — 97750 PHYSICAL PERFORMANCE TEST: CPT | Mod: GP | Performed by: PHYSICAL THERAPIST

## 2020-10-08 PROCEDURE — 97140 MANUAL THERAPY 1/> REGIONS: CPT | Mod: GP | Performed by: PHYSICAL THERAPIST

## 2020-10-08 NOTE — TELEPHONE ENCOUNTER
Signature needed on ICD verification codes, multiple codes, please sign attached as well as the form. To .

## 2020-10-08 NOTE — TELEPHONE ENCOUNTER
..Reason for call:  Other   Patient called regarding (reason for call): call back  Additional comments: pt would like to know if her records have been received and if not? What are the next steps. The pr said it's been a month and a half.is she going to have surgery soon? Please give her a call with an update and answer.    Phone number to reach patient:  Home number on file [phone] (home)    Best Time:  anytime    Can we leave a detailed message on this number?  YES    Travel screening: Negative

## 2020-10-09 NOTE — TELEPHONE ENCOUNTER
Pt wondering if we have received records.   Eva made multiple attempts with trying to get records.    Pt states the records are over 15 years old.  And she has also spoke with the Records Department multiple times    What step would you like to take?      Patrizia Lincoln MA on 10/9/2020 at 8:32 AM

## 2020-10-09 NOTE — PROGRESS NOTES
10/08/20 1000   Signing Clinician's Name / Credentials   Signing clinician's name / credentials Olivia Rahman DPT NCS   Dynamic Gait Index ([employer] [place], 1995)   Gait Level Surface 3   Change in Gait Speed 2   Gait and Horizontal Head Turns 3  (feesls off balance a little, neck bothers her)   Gait with Vertical Head Turns 3  (feels off balance a little, neck bothers her)   Gait and Pivot Turns 3   4-Item Dynamic Gait Index: Is a measure of gait responses to changing task demands in people with balance and vestibular disorders. (gait on level, w/ lateral & vertical head turns, and w/ change of speed)    Gait assistive device used: none     Patient score: 11/12  Scores <9/12 are correlated with increased risk of falling according to Omkar & Chani 2006.       Timed Up and Go (TUG): TUG is a test of basic mobility skills. It is used to screen individuals prone to falls.  Gait assistive device used: none    Patient score 8.41 and 7.85 seconds  ?13.5 seconds indicate at risk for falls in older adults according to Stephanie-Daljit et al 2000.  ?30 seconds - indicates dependency in most ADL and mobility skills according to Posiadlo & Aragon 1991  >11.5 seconds indicate at risk for falls in adults with Parkinson's Disease    Minimal Detectable Change for patients with Alzheimer s = 4.09 sec   Minimal Detectable Change for patients with Parkinson s Disease = 3.5 sec   according to Ajay & Ty- Rafiq 2011    Assessment (rationale for performing, application to patient s function & care plan): She scores WNLS with low risk of falls but she is fearful of falling and breaking a bone (chronic problem with fear of falling).  Minutes billed as physical performance test: 10    Olivia Rahman DPT, MPT, NCS  Physical Therapist   Board Certified Neurologic Clinical Specialist     [facility], [facility]   [address] [place] [address]    [email or web address]  [email or web address]   [phone]   Clinic: [phone] //   Fax: [phone]

## 2020-10-13 DIAGNOSIS — G44.219 EPISODIC TENSION-TYPE HEADACHE, NOT INTRACTABLE: ICD-10-CM

## 2020-10-13 RX ORDER — BUTALBITAL, ACETAMINOPHEN AND CAFFEINE 50; 325; 40 MG/1; MG/1; MG/1
TABLET ORAL
Qty: 20 TABLET | Refills: 0 | Status: CANCELLED | OUTPATIENT
Start: 2020-10-13

## 2020-10-13 NOTE — TELEPHONE ENCOUNTER
I have not received her medical records yet.  The patient may need to go in person to the hospital where she had her previous surgery to obtain a copy if they are 15 years old but they have not been destroyed.  I cannot move forward until I have an opportunity to review these records.

## 2020-10-14 ENCOUNTER — TELEPHONE (OUTPATIENT)
Dept: OBGYN | Facility: CLINIC | Age: 40
End: 2020-10-14

## 2020-10-14 NOTE — TELEPHONE ENCOUNTER
[facility] Call Center    Phone Message    May a detailed message be left on voicemail: yes     Reason for Call: Pt is still trying to get medical records from her 2003 surgery.  She talked to the medical records people and they said that there records only go back to 2008 and they do not have her records so she can't get them.  Pt is requesting a call back in regards to what to do next.  She'd still like to be helped with hysterectomy even though she can't get records.  Thanks.    Action Taken: Message routed to:  [phone]    Travel Screening: Not Applicable

## 2020-10-14 NOTE — TELEPHONE ENCOUNTER
RN called and spoke to patient. She states she tried to go to hospital in person, they aren't letting people go in without appointments and they only have records going back to 2008. They are not able to get records beyond that. So the records from 2003 are not available any longer.     Patient has been trying for 2 months to obtain records. She would like to proceed with Dr. Guerrero for her hysterectomy.     RN will route to Dr. Guerrero for further advisement and next steps.     Patient verbalized understanding and agreed to plan.     Celine Sheldon RN on 10/14/2020 at 1:41 PM

## 2020-10-14 NOTE — TELEPHONE ENCOUNTER
Nadira Guerrero, DO  [facility] Triage 16 hours ago (5:27 PM)     Please see my note.  I need her previous op report to review before we can consider a hysterectomy.  She can go to the previous hospital in person to get these since I cannot find that they have been sent here.    Routing comment      Celine Sheldon RN on 10/14/2020 at 9:41 AM

## 2020-10-14 NOTE — TELEPHONE ENCOUNTER
Please see other encounter. RN called and spoke to patient regarding this update and routed to Dr. Guerrero for review.    Celine Sheldon RN on 10/14/2020 at 1:42 PM

## 2020-10-19 DIAGNOSIS — G44.219 EPISODIC TENSION-TYPE HEADACHE, NOT INTRACTABLE: ICD-10-CM

## 2020-10-20 ENCOUNTER — OFFICE VISIT (OUTPATIENT)
Dept: FAMILY MEDICINE | Facility: CLINIC | Age: 40
End: 2020-10-20
Payer: COMMERCIAL

## 2020-10-20 ENCOUNTER — ANCILLARY PROCEDURE (OUTPATIENT)
Dept: GENERAL RADIOLOGY | Facility: CLINIC | Age: 40
End: 2020-10-20
Attending: PHYSICIAN ASSISTANT
Payer: COMMERCIAL

## 2020-10-20 ENCOUNTER — TELEPHONE (OUTPATIENT)
Dept: INTERNAL MEDICINE | Facility: CLINIC | Age: 40
End: 2020-10-20

## 2020-10-20 VITALS
TEMPERATURE: 96.4 F | WEIGHT: 186 LBS | SYSTOLIC BLOOD PRESSURE: 116 MMHG | BODY MASS INDEX: 35.12 KG/M2 | OXYGEN SATURATION: 100 % | HEIGHT: 61 IN | HEART RATE: 90 BPM | DIASTOLIC BLOOD PRESSURE: 74 MMHG

## 2020-10-20 DIAGNOSIS — M25.562 ACUTE PAIN OF LEFT KNEE: Primary | ICD-10-CM
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
## 2020-10-20 PROCEDURE — 99213 OFFICE O/P EST LOW 20 MIN: CPT | Performed by: PHYSICIAN ASSISTANT

## 2020-10-20 PROCEDURE — 73562 X-RAY EXAM OF KNEE 3: CPT | Mod: TC

## 2020-10-20 RX ORDER — BUTALBITAL, ACETAMINOPHEN AND CAFFEINE 50; 325; 40 MG/1; MG/1; MG/1
TABLET ORAL
Qty: 20 TABLET | Refills: 0 | Status: SHIPPED | OUTPATIENT
Start: 2020-10-20 | End: 2020-11-20

## 2020-10-20 ASSESSMENT — ENCOUNTER SYMPTOMS
ABDOMINAL PAIN: 0
COUGH: 0
SHORTNESS OF BREATH: 0
LIGHT-HEADEDNESS: 0
FEVER: 0
SORE THROAT: 0
NERVOUS/ANXIOUS: 0

## 2020-10-20 ASSESSMENT — MIFFLIN-ST. JEOR: SCORE: 1446.07

## 2020-10-20 NOTE — TELEPHONE ENCOUNTER
Routing refill request to provider for review/approval because:  Drug not on the [facility] refill protocol     Last Written Prescription Date:  9-15-20  Last Fill Quantity: 20,  # refills: 0   Last office visit: 9-24-20 with Stacey Graham  Future Office Visit:

## 2020-10-20 NOTE — TELEPHONE ENCOUNTER
Reason for call:  Other   Patient called regarding (reason for call): xray     Additional comments: Patient is calling wanting to know if the xray was underneath the knee, please call to advise.     Phone number to reach patient:  Home number on file [phone] (home)    Best Time:  Any     Can we leave a detailed message on this number?  YES    Travel screening: Not Applicable

## 2020-10-20 NOTE — PATIENT INSTRUCTIONS
Your x-ray does not show signs of fracture.  You may have strained your calf muscle during the fall.  For pain management please continue to use your prescribed indications.  You may also use ice and Tylenol.  Please be careful outside in the slippery/icy weather.  Follow-up with your primary care provider as needed, or if symptoms are not improving over the next 1-2 weeks.      Patient Education     Muscle Strain in the Extremities  A muscle strain is a stretching and tearing of muscle fibers. This causes pain, especially when you move that muscle. There may also be some swelling and bruising.  Home care    Keep the hurt area raised above heart level to reduce pain and swelling. This is especially important during the first 48 hours.    Apply an ice pack over the injured area for 15 to 20 minutes every 3 to 6 hours. You should do this for the first 24 to 48 hours. You can make an ice pack by filling a plastic bag that seals at the top with ice cubes and then wrapping it with a thin towel. Be careful not to injure your skin with the ice treatments. Ice should never be applied directly to skin. Continue the use of ice packs for relief of pain and swelling as needed. After 48 hours, apply heat (warm shower or warm bath) for 15 to 20 minutes several times a day, or alternate ice and heat.    You may use over-the-counter pain medicine to control pain, unless another medicine was prescribed. If you have chronic liver or kidney disease or ever had a stomach ulcer or gastrointestinal bleeding, talk with your healthcare provider before using these medicines.    For leg strains: If crutches have been recommended, don t put full weight on the hurt leg until you can do so without pain. You can return to sports when you are able to hop and run on the injured leg without pain.  Follow-up care  Follow up with your healthcare provider, or as advised.  When to seek medical advice  Call your healthcare provider right away if any of  these occur:    The toes of the injured leg become swollen, cold, blue, numb, or tingly    Pain or swelling increases  Date Last Reviewed: 5/1/2018 2000-2019 [employer]. [address] [place] [address], [place] [address]. All rights reserved. This information is not intended as a substitute for professional medical care. Always follow your healthcare professional's instructions.

## 2020-10-21 ENCOUNTER — TRANSFERRED RECORDS (OUTPATIENT)
Dept: HEALTH INFORMATION MANAGEMENT | Facility: CLINIC | Age: 40
End: 2020-10-21

## 2020-10-21 NOTE — TELEPHONE ENCOUNTER
Spoke with patient regarding question. Reassured her that no fractures are seen on x-ray.     Stacey Graham PA-C on 10/21/2020 at 6:28 PM

## 2020-10-22 ENCOUNTER — ANCILLARY PROCEDURE (OUTPATIENT)
Dept: GENERAL RADIOLOGY | Facility: CLINIC | Age: 40
End: 2020-10-22
Attending: PHYSICIAN ASSISTANT
Payer: COMMERCIAL

## 2020-10-22 ENCOUNTER — OFFICE VISIT (OUTPATIENT)
Dept: FAMILY MEDICINE | Facility: CLINIC | Age: 40
End: 2020-10-22
Payer: COMMERCIAL

## 2020-10-22 VITALS
HEART RATE: 79 BPM | HEIGHT: 61 IN | WEIGHT: 187 LBS | SYSTOLIC BLOOD PRESSURE: 133 MMHG | BODY MASS INDEX: 35.3 KG/M2 | OXYGEN SATURATION: 99 % | TEMPERATURE: 97.3 F | DIASTOLIC BLOOD PRESSURE: 81 MMHG

## 2020-10-22 DIAGNOSIS — R29.6 FALLS FREQUENTLY: ICD-10-CM

## 2020-10-22 DIAGNOSIS — S80.02XD CONTUSION OF LEFT KNEE, SUBSEQUENT ENCOUNTER: ICD-10-CM

## 2020-10-22 DIAGNOSIS — R07.81 RIB PAIN ON RIGHT SIDE: Primary | ICD-10-CM

## 2020-10-22 DIAGNOSIS — M81.0 OSTEOPOROSIS OF MULTIPLE SITES: ICD-10-CM

## 2020-10-22 PROCEDURE — 99214 OFFICE O/P EST MOD 30 MIN: CPT | Performed by: PHYSICIAN ASSISTANT

## 2020-10-22 PROCEDURE — 71101 X-RAY EXAM UNILAT RIBS/CHEST: CPT | Mod: TC

## 2020-10-22 ASSESSMENT — ENCOUNTER SYMPTOMS
SHORTNESS OF BREATH: 0
BACK PAIN: 1
LIGHT-HEADEDNESS: 0
FEVER: 0
SORE THROAT: 0
NERVOUS/ANXIOUS: 0
ABDOMINAL PAIN: 0
CHILLS: 0

## 2020-10-22 ASSESSMENT — MIFFLIN-ST. JEOR: SCORE: 1450.61

## 2020-10-22 NOTE — PROGRESS NOTES
"Subjective     Mily Grullon is a 40 year old female who presents to clinic today for the following health issues:    HPI            Chief Complaint   Patient presents with     RECHECK     Fall, seen 10/20/20. Knee pain, lower back pain and right rib pain.      Per chart review, patient experienced fell in parking lot 10/20/2020.  Patient was evaluated at that time for left knee pain.  X-rays negative for fracture.  Patient has significant history of osteoporosis and is being treated with Forteo.    Today, patient notes ongoing left knee pain at anterior aspect.  She is able to walk easily with a cane and has normal range of motion of knee without swelling.  She also notes right back/rib pain since fall.  No fevers, cough, shortness of breath.      Review of Systems   Constitutional: Negative for chills and fever.   HENT: Negative for congestion and sore throat.    Respiratory: Negative for shortness of breath.    Cardiovascular: Negative for chest pain.   Gastrointestinal: Negative for abdominal pain.   Musculoskeletal: Positive for back pain (lumbar/right ribs ).        Left knee pain   Skin: Negative for rash.   Neurological: Negative for light-headedness.   Psychiatric/Behavioral: The patient is not nervous/anxious.             Objective    /81   Pulse 79   Temp 97.3  F (36.3  C) (Tympanic)   Ht 1.549 m (5' 1\")   Wt 84.8 kg (187 lb)   SpO2 99%   BMI 35.33 kg/m    Body mass index is 35.33 kg/m .  Physical Exam  Vitals signs and nursing note reviewed.   Constitutional:       General: She is not in acute distress.     Appearance: Normal appearance.   HENT:      Head: Normocephalic and atraumatic.   Eyes:      Extraocular Movements: Extraocular movements intact.      Pupils: Pupils are equal, round, and reactive to light.   Neck:      Musculoskeletal: Normal range of motion.   Cardiovascular:      Rate and Rhythm: Normal rate and regular rhythm.      Heart sounds: Normal heart sounds.   Pulmonary:      " Effort: Pulmonary effort is normal.      Breath sounds: Normal breath sounds.   Abdominal:      General: Bowel sounds are normal.      Palpations: Abdomen is soft.      Tenderness: There is no abdominal tenderness.   Musculoskeletal: Normal range of motion.      Comments: Left knee: No effusion, erythema.  Patient able ambulate easily.  Normal range of motion.  Small area of ecchymosis over tibial tubercle without swelling.  Tenderness palpation over ecchymosis area only.    No tenderness to palpation of thoracic, lumbar, or cervical spine.  Tenderness palpation over right-sided paraspinal musculature and thoracic and lumbar area.  No ecchymosis, erythema, swelling, deformity.   Skin:     General: Skin is warm and dry.   Neurological:      General: No focal deficit present.      Mental Status: She is alert.   Psychiatric:         Mood and Affect: Mood normal.         Behavior: Behavior normal.            Xray -right ribs and chest 3 views:  IMPRESSION: Old healed right anterior second through sixth rib  fractures. Old healed posterior lateral right 11th rib fracture. No  acute fractures are identified. Normal heart size and pulmonary  vascularity. The lungs are clear. No pleural effusions are evident.        Assessment & Plan     Osteoporosis of multiple sites  Falls frequently  Rib pain on right side  Contusion of left knee, subsequent encounter  Patient is a 40-year-old female with past medical history significant for osteoporosis, treated with Forteo, who fell in parking lot 2 days ago.  Patient was evaluated at the time for right knee pain.  X-rays without acute fracture.  Today patient notes ongoing pain.  Pain located over area of ecchymosis.  Low suspicion for occult fracture as there is no swelling, patient is able to ambulate easily, and patient has normal range of motion.  Symptoms likely due to contusion.  Recommended treatment with ice, rest, elevation, Tylenol.    Patient also notes right backslash rib  pain after fall.  Exam without deformity, swelling, erythema, ecchymosis, to suggest obvious fracture.  X-rays negative for fracture.  Tenderness is located over right-sided paraspinal musculature, specifically over the latissimus dorsi.  Symptoms likely due to muscle strain.  Recommended ice/heat, gentle ambulation, and Tylenol.  Discussed expected course of recovery over the next few weeks as well as fall prevention.    - XR Ribs & Chest Right G/E 3 Views      See Patient Instructions    Return in about 3 weeks (around 11/12/2020).    DEONDRE Cohen [facility]

## 2020-10-22 NOTE — PATIENT INSTRUCTIONS
-The pain of your knee is likely due to bruising.  Please use ice and Tylenol for pain management.  -The x-rays of your ribs did not show any signs of fractures.  Your symptoms are likely due to muscle strain.  For treatment also use ice and Tylenol.  Gentle walking can help loosen up the muscle tightness.  Your symptoms should improve over the next 2-3 weeks.  Please reach out with any questions or concerns.      Patient Education     Back Sprain or Strain    Injury to the muscles (strain) or ligaments (sprain) around the spine can be troubling. Injury may occur after a sudden forceful twisting or bending such as in a car accident, after a simple awkward movement, or after lifting something heavy with poor body positioning. In any case, muscle spasm is often present and adds to the pain.  Thankfully, most people feel better in 1 to 2 weeks. Most of the rest feel better in 1 to 2 months. Most people can remain active. Unless you had a forceful or traumatic physical injury such as a car accident or fall, X-rays may not be done for the first assessment of a back sprain or strain. If pain continues and doesn't respond to medical treatment, your healthcare provider may then do X-rays and other tests.  Home care  These guidelines will help you care for your injury at home:    When in bed, try to find a comfortable position. A firm mattress is best. Try lying flat on your back with pillows under your knees. You can also try lying on your side with your knees bent up toward your chest and a pillow between your knees.    Don't sit for long periods. Try not to take long car rides or take other trips that have you sitting for a long time. This puts more stress on the lower back than standing or walking.    During the first 24 to 72 hours after an injury or flare-up, put an ice pack on the painful area for 20 minutes. Then remove it for 20 minutes. Do this for 60 to 90 minutes, or several times a day. This will reduce swelling  and pain. Always wrap the ice pack in a thin towel or plastic to protect your skin.    You can start with ice, then switch to heat. Heat from a hot shower, hot bath, or heating pad reduces pain and works well for muscle spasms. Put heat on the painful area for 20 minutes, then remove for 20 minutes. Do this for 60 to 90 minutes, or several times a day. Don't use a heating pad while sleeping. It can burn the skin.    You can alternate the ice and heat. Talk with your healthcare provider to find out the best treatment or therapy for your back pain.    Therapeutic massage can help relax the back muscles without stretching them.    Be aware of safe lifting methods. Don't lift anything over 15 pounds until all of the pain is gone.  Medicines  Talk with your healthcare provider before using medicines, especially if you have other health problems or are taking other medicines.    You may use over-the-counter medicines such as acetaminophen, ibuprofen, or naproxen to control pain, unless another pain medicine was prescribed. Talk with your healthcare provider before taking any medicines if you have a chronic condition such as diabetes, liver or kidney disease, stomach ulcers, or digestive bleeding, or are taking blood-thinner medicines.    Be careful if you are given prescription medicines, opioids, or medicine for muscle spasm. They can cause drowsiness, and affect your coordination, reflexes, and judgment. Don't drive or operate heavy machinery when taking these types of medicines. Only take pain medicine as prescribed by your healthcare provider.  Follow-up care  Follow up with your healthcare provider, or as advised. You may need physical therapy or more tests if your symptoms get worse.  If you had X-rays, your healthcare provider may be checking for any broken bones, breaks, or fractures. Bruises and sprains can sometimes hurt as much as a fracture. These injuries can take time to heal fully. If your symptoms don t  get better or they get worse, talk with your healthcare provider. You may need a repeat X-ray or other tests.  Call 911  Call 911 if any of the following occur:    Trouble breathing    Confused    Very drowsy or trouble awakening    Fainting or loss of consciousness    Rapid or very slow heart rate    Loss of bowel or bladder control  When to seek medical advice  Call your healthcare provider right away if any of the following occur:    Pain gets worse or spreads to your arms or legs    Weakness or numbness in one or both arms or legs    Numbness in the groin or genital area  Date Last Reviewed: 6/1/2016 2000-2019 [employer]. [address] [place] [address], [place] [address]. All rights reserved. This information is not intended as a substitute for professional medical care. Always follow your healthcare professional's instructions.

## 2020-10-26 ENCOUNTER — TELEPHONE (OUTPATIENT)
Dept: OBGYN | Facility: CLINIC | Age: 40
End: 2020-10-26

## 2020-10-26 DIAGNOSIS — G89.29 OTHER CHRONIC PAIN: ICD-10-CM

## 2020-10-26 DIAGNOSIS — N95.1 SYMPTOMATIC MENOPAUSAL OR FEMALE CLIMACTERIC STATES: Primary | ICD-10-CM

## 2020-10-26 DIAGNOSIS — R10.2 PELVIC PAIN IN FEMALE: ICD-10-CM

## 2020-10-26 NOTE — TELEPHONE ENCOUNTER
Requested Prescriptions   Pending Prescriptions Disp Refills     tiZANidine (ZANAFLEX) 2 MG tablet 180 tablet 5     Sig: Take 1-3 tablets (2-6 mg) by mouth 3 times daily as needed for muscle spasms   Last Written Prescription Date:  10-8-20  Last Fill Quantity: 180,  # refills: 5   Last office visit: 3/4/2020 with prescribing provider:  3-4-20   Future Office Visit:   Next 5 appointments (look out 90 days)    Dec 02, 2020  9:45 AM  Office Visit with Nadira Guerrero DO  [facility] ([facility]) [address] [address] [place] [id number] [phone]                 There is no refill protocol information for this order

## 2020-10-26 NOTE — TELEPHONE ENCOUNTER
I called this pt and she would like to proceed with a RICA with LSO due to chronic pelvic pain and metrorrhagia felt to be due to extensive endometriosis per laparoscopies.  She is not interested in future childbearing so will need to sign the consent form at least 30 days prior to surgery.  She understands that Dr. Alonzo and I will do her surgery together.  Will check a FSH and LH this week since she feels that her hot flushes are getting worse.  If she is perimenopausal, then she may choose to hold off on surgery.  She will have these drawn this week.

## 2020-10-27 ENCOUNTER — TELEPHONE (OUTPATIENT)
Dept: OBGYN | Facility: CLINIC | Age: 40
End: 2020-10-27

## 2020-10-27 DIAGNOSIS — N95.1 SYMPTOMATIC MENOPAUSAL OR FEMALE CLIMACTERIC STATES: ICD-10-CM

## 2020-10-27 LAB
FSH SERPL-ACNC: 9.5 IU/L
LH SERPL-ACNC: 6.4 IU/L

## 2020-10-27 PROCEDURE — 83001 ASSAY OF GONADOTROPIN (FSH): CPT | Performed by: OBSTETRICS & GYNECOLOGY

## 2020-10-27 PROCEDURE — 83002 ASSAY OF GONADOTROPIN (LH): CPT | Performed by: OBSTETRICS & GYNECOLOGY

## 2020-10-27 NOTE — TELEPHONE ENCOUNTER
Lying quietly in bed, eyes closed, respirations even and unlabored. Slept about 4 hours thus far. Awoke earlier and wrote a letter to a friend, then went back to sleep. Safety precautions maintained. Rounds done every 15 minutes. Bed is fixed in low position and room is uncluttered with clear pathways. No falls have occurred this shift.   Procedure name(s) - multi select exam under anesthesia, total abdominal hysterectomy, left oophorectomy, left salpingectomy, and lysis of pelvic adhesions    Reason for procedure chronic pelvic pain and metrorrhagia with history of endometriosis    Surgeon: Dr. Guerrero    Is this a multi surgeon case? Yes Dr. Alonzo   Comments (who/departments/details) She has already had her right ovary and fallopian tube removed    Laterality N/A    Request for additional equipment Other (see comments) None   Anesthesia General    Initiate Pre-op orders for above procedure: Yes, as ordered in Epic Additional orders noted there also   Location of Case: [facility]    Sterilization or Hysterectomy consent signed in advance: No She will need to sign this at least 30 days prior to surgery   PA Assistant: No    Operating room  requested: Yes    Urgency of Surgery: Routine    Surgeon Procedure Time (incision to closure) in minutes (per procedure as applicable) 150    Note:  Surgical Case Time Needed (in minutes)   Patient Class (for admit prior to surgery, specify number of days in comments): Surgery admit    Length of Stay: 3 days    H&P To Be Completed By: PCP     needed? No    Post-Op Appointment 2 weeks    Bowel Prep: Yes    Vendor Needed? No    Spinal Cord Monitoring? No    Patient has Electronic Implant: No      Surgery Pre-Certification    Medical Record Number: [id number]  Mily Grullon  YOB: 1980   Phone: [phone] (home)   Primary Provider: William Smith    Reason for Admit:  chronic pelvic pain and metrorrhagia with history of endometriosis    Surgeon: MIYA Guerrero, DO Dr. Alonzo to Assist   ADDENDUM 12/2/20:  Due to illness of the primary surgeon Dr. Guerrero and not sure of a return to work date, Dr. Alonzo will be moving from the assistant to the Primary Surgeon.    Surgical Procedure: exam under anesthesia, total abdominal hysterectomy, left oophorectomy, left  salpingectomy, and lysis of pelvic adhesions  ICD-9 Coded: N92.6 N95.1 R10.2  Date of Surgery: 01/05/2021  Consent signed? No    Date signed: will mail form   12/2 Patient said she dropped the consent off at Check-In C desk at [facility] and the consent has been scanned in and she also has a copy of it.   Hospital: [facility]  Inpatient- Length of stay:  3 days.    Requestor:  Mily Mckeon /Margaret Muniz    Location:       Surgery Scheduled    Date of Surgery 01/05/2021 Time of Surgery 9:00 am  SURGERY DATE IS 12/4/2020 at 2:30 p.m.   Type of Anesthesia Anticipated: General  Pre-Op: On 12/29/20 with Stacey Graham at [facility]  Changed to 12/1  Post-Op:  2 weeks on 01/18/2021 with Cesar at [facility]  This will be rescheduled with Dr. Alonzo  Pre-certification routed to [employer]:  Yes  Sent again on 12/2    Surgery packet mailed to patient's home address: Yes  Patient instructed NPO 12 hours prior to surgery, arrive 1.5 hours prior to surgery, must have a .  Patient understood and agrees to the plan.      COVID testing:  The Covid test has been scheduled for 12/29/2020 at [facility] lab  at 10:30 am .  Performed on 12/1

## 2020-10-28 RX ORDER — TIZANIDINE 2 MG/1
2-6 TABLET ORAL 3 TIMES DAILY PRN
Qty: 180 TABLET | Refills: 5 | Status: SHIPPED | OUTPATIENT
Start: 2020-10-28 | End: 2020-11-05

## 2020-10-28 NOTE — TELEPHONE ENCOUNTER
ADDENDUM:  Due to provider illness and unsure of return to work date for Dr. Guerrero, Dr. Alonzo will be moving from the assistant surgeon to the primary surgeon for the surgery.  Patient is aware of the change and will be meeting with Dr. Alonzo the day of the surgery.      [facility] has been notified of the change and that Dr Alonzo will need a surgical tech assistant from the hospital.      I spoke with the patient and she will meet with Dr. Alonzo at the hospital on Friday before the surgery.  She was reminded to be NPO.  She said she dropped the consent off at Check-In C at [facility] and also has a copy of it that she will bring with to the hospital.       Margaret Muniz    12/2/2020      Dr. Guerrero on call   Surgery rescheduled   New Date: 12/04/2020  2:30 pm  [facility]   Dr. Alonzo to assist  Mily Mckeon M.A.

## 2020-11-02 ENCOUNTER — TELEPHONE (OUTPATIENT)
Dept: INTERNAL MEDICINE | Facility: CLINIC | Age: 40
End: 2020-11-02

## 2020-11-02 DIAGNOSIS — S72.145S CLOSED NONDISPLACED INTERTROCHANTERIC FRACTURE OF LEFT FEMUR, SEQUELA: ICD-10-CM

## 2020-11-02 DIAGNOSIS — G89.29 OTHER CHRONIC PAIN: ICD-10-CM

## 2020-11-02 NOTE — TELEPHONE ENCOUNTER
Requesting cpt codes from [facility]ana [facility] as this may need a PA due to requiring admission post procedure

## 2020-11-04 ENCOUNTER — TRANSFERRED RECORDS (OUTPATIENT)
Dept: HEALTH INFORMATION MANAGEMENT | Facility: CLINIC | Age: 40
End: 2020-11-04

## 2020-11-04 RX ORDER — OXYCODONE AND ACETAMINOPHEN 5; 325 MG/1; MG/1
TABLET ORAL
Qty: 50 TABLET | Refills: 0 | OUTPATIENT
Start: 2020-11-04

## 2020-11-05 ENCOUNTER — HOSPITAL ENCOUNTER (OUTPATIENT)
Dept: PHYSICAL THERAPY | Facility: CLINIC | Age: 40
Setting detail: THERAPIES SERIES
End: 2020-11-05
Attending: INTERNAL MEDICINE
Payer: COMMERCIAL

## 2020-11-05 ENCOUNTER — OFFICE VISIT (OUTPATIENT)
Dept: FAMILY MEDICINE | Facility: CLINIC | Age: 40
End: 2020-11-05
Payer: COMMERCIAL

## 2020-11-05 VITALS
SYSTOLIC BLOOD PRESSURE: 124 MMHG | WEIGHT: 185.8 LBS | BODY MASS INDEX: 35.11 KG/M2 | DIASTOLIC BLOOD PRESSURE: 70 MMHG | TEMPERATURE: 97.7 F

## 2020-11-05 DIAGNOSIS — S72.145S CLOSED NONDISPLACED INTERTROCHANTERIC FRACTURE OF LEFT FEMUR, SEQUELA: ICD-10-CM

## 2020-11-05 DIAGNOSIS — K21.9 GASTROESOPHAGEAL REFLUX DISEASE, UNSPECIFIED WHETHER ESOPHAGITIS PRESENT: ICD-10-CM

## 2020-11-05 DIAGNOSIS — M54.2 NECK PAIN: ICD-10-CM

## 2020-11-05 DIAGNOSIS — S16.1XXA STRAIN OF NECK MUSCLE, INITIAL ENCOUNTER: Primary | ICD-10-CM

## 2020-11-05 DIAGNOSIS — G89.29 OTHER CHRONIC PAIN: ICD-10-CM

## 2020-11-05 PROCEDURE — 97110 THERAPEUTIC EXERCISES: CPT | Mod: GP | Performed by: PHYSICAL THERAPIST

## 2020-11-05 PROCEDURE — 99214 OFFICE O/P EST MOD 30 MIN: CPT | Performed by: PHYSICIAN ASSISTANT

## 2020-11-05 PROCEDURE — 97112 NEUROMUSCULAR REEDUCATION: CPT | Mod: GP | Performed by: PHYSICAL THERAPIST

## 2020-11-05 RX ORDER — TIZANIDINE 2 MG/1
2-6 TABLET ORAL 3 TIMES DAILY PRN
Qty: 180 TABLET | Refills: 3 | Status: SHIPPED | OUTPATIENT
Start: 2020-11-05 | End: 2021-02-15

## 2020-11-05 RX ORDER — PANTOPRAZOLE SODIUM 20 MG/1
TABLET, DELAYED RELEASE ORAL
Qty: 90 TABLET | Refills: 0 | Status: SHIPPED | OUTPATIENT
Start: 2020-11-05 | End: 2021-02-15

## 2020-11-05 RX ORDER — OXYCODONE AND ACETAMINOPHEN 5; 325 MG/1; MG/1
0.5 TABLET ORAL 3 TIMES DAILY PRN
Qty: 45 TABLET | Refills: 0 | Status: SHIPPED | OUTPATIENT
Start: 2020-11-05 | End: 2020-12-14

## 2020-11-05 ASSESSMENT — ENCOUNTER SYMPTOMS
SHORTNESS OF BREATH: 0
ABDOMINAL PAIN: 0
COUGH: 0
LIGHT-HEADEDNESS: 0
NERVOUS/ANXIOUS: 0
FEVER: 0

## 2020-11-05 NOTE — TELEPHONE ENCOUNTER
PB DOS: 12/4/2020- [facility]  Type of Procedure: exam under anesthesia, total abdominal hysterectomy, left oophorectomy, left salpingectomy, and lysis of pelvic adhesions- RICA  CPT Codes: 50534  ICD10 Codes: chronic pelvic pain and metrorrhagia with history of endometriosis N92.6, N95.1  Surgeon/Ordering provider: Cesar  Pre-cert/Authorization completed:  Yes, pa not needed  Payer: [facility]  Date Checked: 11/5/2020  Spoke to online-[email or web address]  Ref. # n/a/ Auth # n/a  Valid Dates: n/a

## 2020-11-05 NOTE — PATIENT INSTRUCTIONS
Acid reflux: A refill of Protonix has been sent to your pharmacy.  Please continue this medication as prescribed.    Muscle spasm: A refill of tizanidine/Zanaflex has been sent to your pharmacy at your prescribed dose.    Chronic pain: Please continue physical therapy as you have been.  Keep up the great work!  A refill of Percocet has been sent to your pharmacy.  You will now be taking half a tablet 3 times daily for severe pain only.  Please continue to try to decrease the amount of this medication you are taking as it is not great to take long-term.    Please schedule a follow-up with Dr. Smith, or myself if he is out of the office, in 3 months for a recheck.    Nice to see you today!    Take Care,  Stacey Graham PA-C

## 2020-11-05 NOTE — PROGRESS NOTES
Subjective     Mily Grullon is a 40 year old female who presents to clinic today for the following health issues:    HPI         Medication Followup of Percocet, Protonix, Tizanidine    Taking Medication as prescribed: yes    Side Effects:  None    Medication Helping Symptoms:  yes     Lisa Mims CMA    Per chart review, patient last evaluated by PCP 6/23/2020 in regards to chronic pain.  At that time, patient was taking prescribed Percocet as well as completing physical therapy.  Patient was encouraged to work on continued physical therapy and was prescribed Percocet 0.5-1 tablets by mouth daily as needed for pain.  Short-term liberalization to twice daily as needed was allowed.  Patient also prescribed tizanidine for muscle spasm 1-2 tablets 3 times daily as needed.      Today, patient notes that she has been taking 1 tablet of Percocet BID due to increased pain. Patient has been taking Tizanidine, two tablets TID. Patient has been compliant with physical therapy and feels this helps with pain. Pain is primarily in the left hip and is chronic. She also notes chronic neck spasm.     Patient also notes she has been taking Protonix for many years due to reflux. This medication works well for her.     PCP has been out of office and not available for follow-up visits.    Review of Systems   Constitutional: Negative for fever.   HENT: Negative for congestion.    Respiratory: Negative for cough and shortness of breath.    Cardiovascular: Negative for chest pain.   Gastrointestinal: Negative for abdominal pain.   Musculoskeletal:        Neck pain/spasm  Extremity pain    Skin: Negative for rash.   Neurological: Negative for light-headedness.   Psychiatric/Behavioral: The patient is not nervous/anxious.             Objective    /70 (BP Location: Right arm, Patient Position: Sitting, Cuff Size: Adult Regular)   Temp 97.7  F (36.5  C) (Tympanic)   Wt 84.3 kg (185 lb 12.8 oz)   BMI 35.11 kg/m    Body mass  index is 35.11 kg/m .  Physical Exam  Vitals signs and nursing note reviewed.   Constitutional:       General: She is not in acute distress.     Appearance: Normal appearance.   HENT:      Head: Normocephalic and atraumatic.      Mouth/Throat:      Mouth: Mucous membranes are moist.      Pharynx: Oropharynx is clear.   Eyes:      Extraocular Movements: Extraocular movements intact.      Pupils: Pupils are equal, round, and reactive to light.   Neck:      Musculoskeletal: Normal range of motion.   Cardiovascular:      Rate and Rhythm: Normal rate and regular rhythm.      Heart sounds: Normal heart sounds.   Pulmonary:      Effort: Pulmonary effort is normal.      Breath sounds: Normal breath sounds.   Musculoskeletal: Normal range of motion.      Comments: Able to ambulate cautiously    Skin:     General: Skin is warm and dry.   Neurological:      General: No focal deficit present.      Mental Status: She is alert.   Psychiatric:         Mood and Affect: Mood normal.         Behavior: Behavior normal.                Assessment & Plan     Strain of neck muscle, initial encounter  Neck pain  Patient is a 40-year-old female who presents to clinic for refills of medications for chronic pain, muscle spasm, and reflux.  Vital signs normal.  Physical exam without acute abnormalities.  Patient has been using tizanidine as prescribed and feels this medication works well.  No side effects.  Refill provided.  - tiZANidine (ZANAFLEX) 2 MG tablet; Take 1-3 tablets (2-6 mg) by mouth 3 times daily as needed for muscle spasms    Other chronic pain  Closed nondisplaced intertrochanteric fracture of left femur, sequela  Patient notes she has been on Percocet for many years and had increased the dose to 1 tablet twice daily which was discussed with PCP at last visit.  Discussed that this was intended to be a temporary change and recommended taper. We have agreed upon 0.5 tablets 3 times daily as this is a small decrease.  Patient to  follow-up in 3 months for reevaluation and potentially further decrease in dose.  - oxyCODONE-acetaminophen (PERCOCET) 5-325 MG tablet; Take 0.5 tablets by mouth 3 times daily as needed for severe pain Fill date 11/5/2020.    Gastroesophageal reflux disease, unspecified whether esophagitis present  Patient has been using Protonix for reflux and states this medication works well for her.  No side effects.  Refill provided.  - pantoprazole (PROTONIX) 20 MG EC tablet; Take by mouth 30-60 minutes before a meal.      See Patient Instructions    Return in about 3 months (around 2/5/2021) for Reevaluation.    Stacey Graham PA-C  [facility]

## 2020-11-20 ENCOUNTER — TELEPHONE (OUTPATIENT)
Dept: FAMILY MEDICINE | Facility: CLINIC | Age: 40
End: 2020-11-20

## 2020-11-20 NOTE — TELEPHONE ENCOUNTER
Duplicate request.     Refill request has been forwarded to ordering provider Stacey IVORY.    Carola Pabon RN BSN

## 2020-11-20 NOTE — TELEPHONE ENCOUNTER
Reason for Call:  Other prescription    Detailed comments: patient is calling for refill status on RX: butalbital-acetaminophen-caffeine (ESGIC) -40 MG tablet, please call to advise. Thank  You.    Phone Number Patient can be reached at: Home number on file [phone] (home)    Best Time:     Can we leave a detailed message on this number? YES    Call taken on 11/20/2020 at 9:11 AM by Khushi Daniel

## 2020-11-25 ENCOUNTER — HOSPITAL ENCOUNTER (OUTPATIENT)
Dept: PHYSICAL THERAPY | Facility: CLINIC | Age: 40
Setting detail: THERAPIES SERIES
End: 2020-11-25
Attending: INTERNAL MEDICINE
Payer: COMMERCIAL

## 2020-11-25 PROCEDURE — 97110 THERAPEUTIC EXERCISES: CPT | Mod: GP | Performed by: PHYSICAL THERAPIST

## 2020-11-25 NOTE — PROGRESS NOTES
Outpatient Physical Therapy Discharge Note     Patient: Mily Grullon  : 1980    Beginning/End Dates of Reporting Period:  2020 to 2020. She was seen for 15 visits. Treatment for left hip pain, balance, and neck pain    Referring Provider: DR Smith    Therapy Diagnosis: osteoporosis, falls. HXof left femur pathological fracture () with ORIF 2014. Later neck strain/ injury order added     Client Self Report: Menstrual cycle last week was horrible, I am on day 10 currently. It was pouring out of me. Major anxiety about not having surgery due to COVID. neck si still sore somedays better than others. Certain positions when laying down.Left knee is fine. Left hip/buttock is the same. Its better than it used to be when sitting. continues to state that she hates stairs and doesnt need to do them ever.     Objective Measurements:  Objective Measure: left hip/buttock seated 1-2 for pain  Details: neck pain a 3-4 because of headache, very stressful couple of weeks. Neck can be down to a 1-2 and even go away sometimes.     Objective Measure: sit to stand 10 reps in 30 seconds without hands  Details: rates hip pain 1.5 after doing this. she has wide JODI.    Objective Measure: 6MWT with no  feet =.83 m/s (half a mile on my fitbit)  Details: 15 minutes 2475 feet (.83 m/s), and hip pain a 3-4.    Objective Measure: 10/8/2020 TUG 8.41 and 7.85 seconds. No AD     Goals:  Goal Identifier gait-slowly/steadily improving but I dont think she will get to 1.0 m/s ever   Goal Description She will be able to complete a 6MWT at 1.0 m/s consistently with hip pain < a 3 on scale of 0-10   Target Date 20(goal date modified)   Date Met  20   Progress: I do not think she will ever get to a speed of 1.0 m/s. Her speed is very functional for her activity level. Goal was to get her back to walking the dog a mile outside. Other factors have interfered with this including: other injuries, COVID restrictions and  the weather.      Goal Identifier leg strength-MET   Goal Description Sit to stand test to improve from 6reps to 10 reps in 30 seconds without hands with hip pain < 3 on scale of 0-10.   Target Date 08/02/20   Date Met  09/24/20   Progress: Consistently has been 10 reps for a few months. This is below normal for her age but still a good functional score.      Goal Identifier recreational walking program-progressing but not met due to many other factors   Goal Description She will be able to walk a mile daily with minimal hip pain (< 3 on scale of 0-10)   Target Date 09/16/20(goal date modified)   Date Met  11/25/20   Progress:     Goal Identifier headache-this will not be met as she was having daily headaches BEFORE her neck injury   Goal Description Se will reprot she is no longer having any headaches, curently having them daily.   Target Date 10/26/20   Date Met  10/08/20   Progress:     Goal Identifier cervical collar-MET   Goal Description She will not need to use the cervical collar to control her pain and neck fatigue. She will be able to do ADLS without the collar.    Target Date 11/26/20   Date Met  09/24/20   Progress:     Goal Identifier neck pain-MET   Goal Description She will report neck pain is improved, rating it a 1-2 on scale of 0-10 and able to turn/move head without symptoms   Target Date 12/09/20   Date Met  11/25/20   Progress: She has     Progress Toward Goals: Taryn has made stead, slow progress in PT. Her activity level is much improved and her left hip/buttock pain is better. We have also worked on her balance as she has a real fear of falling.  She has done well on tests (TUG, 4 item DGI). She developed a neck injury/strain that has also improved. She has daily headaches prior to neck injury. Perhaps seeing ortho based PT for her neck could further help with this problem. I would recommend more PT in the spring to get her comfortable with walking the dog consistently and tune up for her  balance.     Plan: Discharge from therapy.She is schedule to have a hysterectomy on 2020.    Reason for Discharge: Patient has met all goals for IND functional mobility.     Discharge Plan: Patient to continue home program until surgery. Limited opportunities to walk for exercise with the weather and COVID restrictions.    Olivia Rahman DPT, MPT, NCS  Physical Therapist   Board Certified Neurologic Clinical Specialist     [facility]   [address] [place] [address]   [email or web address]   [phone]   Clinic: [phone] //   Fax: [phone]

## 2020-11-30 ENCOUNTER — TELEPHONE (OUTPATIENT)
Dept: ENDOCRINOLOGY | Facility: CLINIC | Age: 40
End: 2020-11-30

## 2020-11-30 NOTE — TELEPHONE ENCOUNTER
Osteoporosis with recurrent pathological fracture/patient has had multiple fragility fractures over the last 5 years: Risk factors for osteoporosis - Depo-Provera use for 16 years since the age of 22. She was taken off of Depo-Provera in Jan 2019.      She has had workup for other possible secondary causes of osteoporosis including Cushing syndrome, celiac disease, hypercalciuria and primary hyperparathyroidism which was unremarkable.       Patient was started on Forteo 4/19 after discussing risks benefits and side effects and after addressing the fact that there is no data in the young age group.  She is tolerating therapy without significant side effects.      No fractures since she has started Forteo treatment; has completed a total of 20 months of treatment.  will stop Forteo at this time and do a follow up RECLAST infusion.   Pt is scheduled for hysterectomy this week and she will follow up with us after surgery.     Plan:    stop Forteo     Weight bearing Exercises; advised to work with physical therapy particularly exercises that focus on strengthening the lower extremities.  Shoulder walking for the spine.  At this point we can increase the limit for weight lifting up to 10 pounds.    Fall prevention     Adequate Calcium and vitamin D intake: for maintenance calcium 1200 mg daily and vitamin D 2000 IU daily.      Cessation of tobacco use.     Follow up in 1-2 weeks.

## 2020-12-01 ENCOUNTER — TELEPHONE (OUTPATIENT)
Dept: OBGYN | Facility: CLINIC | Age: 40
End: 2020-12-01

## 2020-12-01 ENCOUNTER — OFFICE VISIT (OUTPATIENT)
Dept: FAMILY MEDICINE | Facility: CLINIC | Age: 40
End: 2020-12-01
Payer: COMMERCIAL

## 2020-12-01 VITALS
HEIGHT: 61 IN | RESPIRATION RATE: 12 BRPM | TEMPERATURE: 98.3 F | WEIGHT: 187 LBS | OXYGEN SATURATION: 98 % | DIASTOLIC BLOOD PRESSURE: 79 MMHG | BODY MASS INDEX: 35.3 KG/M2 | SYSTOLIC BLOOD PRESSURE: 116 MMHG | HEART RATE: 83 BPM

## 2020-12-01 DIAGNOSIS — Z01.818 PREOP GENERAL PHYSICAL EXAM: Primary | ICD-10-CM

## 2020-12-01 DIAGNOSIS — F33.1 MODERATE EPISODE OF RECURRENT MAJOR DEPRESSIVE DISORDER (H): ICD-10-CM

## 2020-12-01 DIAGNOSIS — N93.9 ABNORMAL VAGINAL BLEEDING: ICD-10-CM

## 2020-12-01 DIAGNOSIS — N80.9 ENDOMETRIOSIS: ICD-10-CM

## 2020-12-01 DIAGNOSIS — G89.29 OTHER CHRONIC PAIN: ICD-10-CM

## 2020-12-01 PROBLEM — Z79.01 LONG TERM CURRENT USE OF ANTICOAGULANT THERAPY: Status: RESOLVED | Noted: 2018-09-19 | Resolved: 2020-12-01

## 2020-12-01 LAB
ANION GAP SERPL CALCULATED.3IONS-SCNC: 4 MMOL/L (ref 3–14)
BUN SERPL-MCNC: 17 MG/DL (ref 7–30)
CALCIUM SERPL-MCNC: 8.8 MG/DL (ref 8.5–10.1)
CHLORIDE SERPL-SCNC: 109 MMOL/L (ref 94–109)
CO2 SERPL-SCNC: 26 MMOL/L (ref 20–32)
CREAT SERPL-MCNC: 0.64 MG/DL (ref 0.52–1.04)
ERYTHROCYTE [DISTWIDTH] IN BLOOD BY AUTOMATED COUNT: 13.4 % (ref 10–15)
GFR SERPL CREATININE-BSD FRML MDRD: >90 ML/MIN/{1.73_M2}
GLUCOSE SERPL-MCNC: 83 MG/DL (ref 70–99)
HCT VFR BLD AUTO: 40.7 % (ref 35–47)
HGB BLD-MCNC: 12.7 G/DL (ref 11.7–15.7)
MCH RBC QN AUTO: 25 PG (ref 26.5–33)
MCHC RBC AUTO-ENTMCNC: 31.2 G/DL (ref 31.5–36.5)
MCV RBC AUTO: 80 FL (ref 78–100)
PLATELET # BLD AUTO: 385 10E9/L (ref 150–450)
POTASSIUM SERPL-SCNC: 4.2 MMOL/L (ref 3.4–5.3)
RBC # BLD AUTO: 5.09 10E12/L (ref 3.8–5.2)
SODIUM SERPL-SCNC: 139 MMOL/L (ref 133–144)
WBC # BLD AUTO: 7.9 10E9/L (ref 4–11)

## 2020-12-01 PROCEDURE — 99214 OFFICE O/P EST MOD 30 MIN: CPT | Performed by: PHYSICIAN ASSISTANT

## 2020-12-01 PROCEDURE — 36415 COLL VENOUS BLD VENIPUNCTURE: CPT | Performed by: PHYSICIAN ASSISTANT

## 2020-12-01 PROCEDURE — 85027 COMPLETE CBC AUTOMATED: CPT | Performed by: PHYSICIAN ASSISTANT

## 2020-12-01 PROCEDURE — U0003 INFECTIOUS AGENT DETECTION BY NUCLEIC ACID (DNA OR RNA); SEVERE ACUTE RESPIRATORY SYNDROME CORONAVIRUS 2 (SARS-COV-2) (CORONAVIRUS DISEASE [COVID-19]), AMPLIFIED PROBE TECHNIQUE, MAKING USE OF HIGH THROUGHPUT TECHNOLOGIES AS DESCRIBED BY CMS-2020-01-R: HCPCS | Performed by: PHYSICIAN ASSISTANT

## 2020-12-01 PROCEDURE — 80048 BASIC METABOLIC PNL TOTAL CA: CPT | Performed by: PHYSICIAN ASSISTANT

## 2020-12-01 ASSESSMENT — MIFFLIN-ST. JEOR: SCORE: 1450.61

## 2020-12-01 ASSESSMENT — ANXIETY QUESTIONNAIRES
3. WORRYING TOO MUCH ABOUT DIFFERENT THINGS: NEARLY EVERY DAY
7. FEELING AFRAID AS IF SOMETHING AWFUL MIGHT HAPPEN: NOT AT ALL
GAD7 TOTAL SCORE: 18
5. BEING SO RESTLESS THAT IT IS HARD TO SIT STILL: NEARLY EVERY DAY
6. BECOMING EASILY ANNOYED OR IRRITABLE: NEARLY EVERY DAY
1. FEELING NERVOUS, ANXIOUS, OR ON EDGE: NEARLY EVERY DAY
2. NOT BEING ABLE TO STOP OR CONTROL WORRYING: NEARLY EVERY DAY
IF YOU CHECKED OFF ANY PROBLEMS ON THIS QUESTIONNAIRE, HOW DIFFICULT HAVE THESE PROBLEMS MADE IT FOR YOU TO DO YOUR WORK, TAKE CARE OF THINGS AT HOME, OR GET ALONG WITH OTHER PEOPLE: SOMEWHAT DIFFICULT

## 2020-12-01 ASSESSMENT — PATIENT HEALTH QUESTIONNAIRE - PHQ9: 5. POOR APPETITE OR OVEREATING: NEARLY EVERY DAY

## 2020-12-01 ASSESSMENT — PAIN SCALES - GENERAL: PAINLEVEL: NO PAIN (0)

## 2020-12-01 NOTE — TELEPHONE ENCOUNTER
[facility] for patient to call back regarding surgery for Friday.  Dr. Guerrero needs to reschedule this as Dr. Guerrero is unable to perform the surgery or if the patient wants Dr. Alonzo to perform the surgery that is also an option.      Margaret Muniz      I spoke with the patient and she would like to proceed with the surgery because she has arranged for assistance postop and is very miserable. She will meet with Dr. Alonzo at the hospital on Friday prior to the surgery.  She is aware it will be Dr. Alonzo versus Dr. Guerrero performing the surgery.      Margaret Muniz

## 2020-12-01 NOTE — PROGRESS NOTES
[facility]  [address]  [place] [address]  [phone]  Dept: [phone]    PRE-OP EVALUATION:  Today's date: 2020    Mily Grullon (: 1980) presents for pre-operative evaluation assessment as requested by Dr. Guerrero.  She requires evaluation and anesthesia risk assessment prior to undergoing surgery/procedure for treatment of total abdominal hysterectomy, left oophorectomy, left salpingectomy, and lysis of pelvic adhesions- RICA  .    Proposed Surgery/ Procedure: total abdominal hysterectomy, left oophorectomy, left salpingectomy, and lysis of pelvic adhesions- RICA  Date of Surgery/ Procedure: 2020  Time of Surgery/ Procedure: 1:00 PM  Hospital/Surgical Facility: [facility]  Surgery Fax Number: Note does not need to be faxed, will be available electronically in Epic.  Primary Physician: William Smith  Type of Anesthesia Anticipated: General    Preoperative Questionnaire:   No - Have you ever had a heart attack or stroke?  No - HAVE YOU EVER HAD SURGERY ON YOUR HEART OR BLOOD VESSELS, SUCH AS A STENT, CORONARY (HEART) BYPASS, OR SURGERY ON AN ARTERY IN THE HEAD, NECK, HEART, OR LEGS?   No - Do you have chest pain when you are physically active?  No - Do you have a history of heart failure?  No - Do you currently have a cold, bronchitis, or symptoms of other respiratory (head and chest) infections?  No - Do you have a cough, shortness of breath, or wheezing?  NO - DO YOU OR ANYONE IN YOUR FAMILY HAVE A HISTORY OF BLOOD CLOTS? Self. Patient has history of superficial thrombophlebitis two years ago after IV placement. Patient was on anticoagulation for two months. No anticoagulation use since.  No - Do you or anyone in your family have a serious bleeding problem, such as long-lasting bleeding after surgeries or cuts?  No - Have you ever had anemia or been told to take iron pills?  YES - HAVE YOU HAD ANY ABNORMAL BLOOD LOSS SUCH AS BLACK,  TARRY OR BLOODY STOOLS, OR ABNORMAL VAGINAL BLEEDING? Vaginal bleeding  No - Have you ever had a blood transfusion?  Yes - Are you willing to have a blood transfusion if it is medically needed before, during, or after your surgery?  No - Have you or anyone in your family ever had problems with anesthesia (sedation for surgery)?  No - Do you have sleep apnea, excessive snoring, or daytime drowsiness?   No - Do you have any artifical heart valves or other implanted medical devices, such as a pacemaker, defibrillator, or continuous glucose monitor?  No - Do you have any artifical joints?  No - Are you allergic to latex?  No - Is there any chance that you may be pregnant?    Patient has a Health Care Directive or Living Will:  NO    HPI:     HPI related to upcoming procedure: Per chart review, patient wishes to proceed with hysterectomy due to history of irregular menses and pelvic pain with history of extensive endometriosis.  Patient has planned procedure, total abdominal hysterectomy, left oophorectomy, left salpingectomy, and lysis of pelvic adhesions 12/4/2020.      DEPRESSION - Patient has a long history of Depression of moderate severity requiring medication for control with recent symptoms being stable..Current symptoms of depression include none.       MEDICAL HISTORY:     Patient Active Problem List    Diagnosis Date Noted     Falls frequently 06/23/2020     Priority: Medium     Moderate episode of recurrent major depressive disorder (H) 10/02/2019     Priority: Medium     Anxiety 07/17/2019     Priority: Medium     Follows with Pedro Luis (Lashell Murray)  Has ARM worker       Endometriosis 07/17/2019     Priority: Medium     Low serum cortisol level (H) 03/16/2019     Priority: Medium     Other chronic pain 11/30/2018     Priority: Medium     Related to multiple fractures related to premature osteoporosis  As of 11/30/2018, limit of 3 Percocet 5/325 daily with intention to taper in months ahead        Controlled substance agreement signed 11/30/2018     Priority: Medium     Prescriber of controlled substances: William Phippsan        Obesity (BMI 35.0-39.9) with comorbidity (H) 10/24/2018     Priority: Medium     Superficial phlebitis 09/27/2018     Priority: Medium     Gastroesophageal reflux disease, esophagitis presence not specified 09/10/2018     Priority: Medium     IMO Regulatory Load OCT 2020       Closed nondisplaced fracture of body of left calcaneus with delayed healing, subsequent encounter 03/09/2018     Priority: Medium     Heel pain, chronic, left 03/09/2018     Priority: Medium     Pain in joint, ankle and foot, left 11/15/2017     Priority: Medium     Migraine without aura and without status migrainosus, not intractable 11/10/2015     Priority: Medium     Closed nondisplaced intertrochanteric fracture of left femur (H) 02/10/2014     Priority: Medium     Osteoporosis of multiple sites 12/10/2013     Priority: Medium     Chronic wrist pain 11/14/2012     Priority: Medium     Overview:   Due to TFCC tears and subsequent distal ulna resection and carpal fusion       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 Glover in [place]       Gastritis      Priority: Medium     dx 2010- sees Dr Glover in [facility]       CARDIOVASCULAR SCREENING; LDL GOAL LESS THAN 160 10/31/2010     Priority: Medium     Personal history of nicotine dependence 12/17/2009     Priority: Medium      Past Medical History:   Diagnosis Date     Endometriosis, site unspecified      Gastritis 2010    dx 2010- sees Dr Glover in [facility]     Osteoporosis      Urinary calculus, unspecified     kidney stones, age 19-20     Wrist injury Nov 19, 2003    Workman's Comp- left wrist- narc agreement on file     Past Surgical History:   Procedure Laterality Date     ARTHROSCOPY HIP, OSTEOPLASTY FEMUR PROXIMAL, COMBINED Left 6/29/2016    Procedure: COMBINED ARTHROSCOPY HIP,  OSTEOPLASTY FEMUR PROXIMAL;  Surgeon: Godwin Deleon MD;  Location: [facility] OR     ARTHROSCOPY OF JOINT UNLISTED  4/2004    Left wrist, with debridement and repair of tear.     HC REMOVAL OF OVARY/TUBE(S)  6/2003    right with fallopian tube     HC REPAIR TRIANGULAR CART,WRIST JT  2005    Dr Eloy Sosa     HC REPAIR TRIANGULAR CART,WRIST JT  2007 or so    ulnar excision- Dr Eloy Sosa     HYSTEROSCOPY      laproscopy for endometriosis x2     LITHOTRIPSY       Current Outpatient Medications   Medication Sig Dispense Refill     albuterol (PROAIR HFA/PROVENTIL HFA/VENTOLIN HFA) 108 (90 Base) MCG/ACT inhaler Inhale 2 puffs into the lungs every 4 hours as needed for other (cough) 1 Inhaler 0     ALPRAZolam (XANAX) 0.5 MG tablet Take 0.5 mg by mouth daily       butalbital-acetaminophen-caffeine (ESGIC) -40 MG tablet TAKE 1 TABLET BY MOUTH AT ONSET OF HEADACHE.  MAX 1 TABLET PER DAY AND 20 TABLETS PER MONTH. Next fill earliest date is 12/20/2020. 20 tablet 0     Calcium-Phosphorus-Vitamin D 250-100-500 MG-MG-UNIT CHEW Take 2 tablets by mouth daily 60 tablet 11     DULoxetine (CYMBALTA) 60 MG capsule Take 60 mg by mouth 2 times daily        eszopiclone (LUNESTA) 1 MG tablet Take 1 mg by mouth nightly as needed       famotidine (PEPCID) 20 MG tablet Take 1 tablet (20 mg) by mouth daily as needed (heartburn/reflux) 90 tablet 1     fluticasone (FLONASE) 50 MCG/ACT nasal spray Spray 1-2 sprays into both nostrils daily Use 1 spray per nostril per day for 2 weeks.  May increase to 2 sprays per nostril per day after. 16 g 2     lamoTRIgine (LAMICTAL) 100 MG tablet Take 150 mg by mouth daily        nortriptyline (PAMELOR) 50 MG capsule Take 100 mg by mouth At Bedtime       order for DME Equipment being ordered: 4 wheeled walker with wheels and basket. Color to be determined. 1 each 0     oxyCODONE-acetaminophen (PERCOCET) 5-325 MG tablet Take 0.5 tablets by mouth 3 times daily as needed for severe pain Fill date  2020. 45 tablet 0     pantoprazole (PROTONIX) 20 MG EC tablet Take by mouth 30-60 minutes before a meal. 90 tablet 0     propranolol (INDERAL) 10 MG tablet Take 10 mg by mouth 3 times daily       tiZANidine (ZANAFLEX) 2 MG tablet Take 1-3 tablets (2-6 mg) by mouth 3 times daily as needed for muscle spasms 180 tablet 3     Vitamin D, Cholecalciferol, 25 MCG (1000 UT) CAPS Take 1,000 Units by mouth daily 100 capsule 3     cyclobenzaprine (FLEXERIL) 10 MG tablet Take 1 tablet (10 mg) by mouth 3 times daily as needed for muscle spasms (Patient not taking: Reported on 2020) 30 tablet 0     OTC products: Calcium and Vitamin D. Tylenol, Naproxen.     Allergies   Allergen Reactions     Amitriptyline Hives     Amoxicillin Diarrhea     Asa [Dihydroxyaluminum Aminoacetate]      Cipro [Ciprofloxacin]      Dizziness, vomiting, shortness of breath     Ibuprofen [Aspartame-Ibuprofen]      GI distress       Lyrica      extrematies swell up      Morphine      ithcy     Naproxen      GI distress     Neurontin [Gabapentin]      rash     Paxil [Paroxetine] Anaphylaxis     anaphylaxis     Phenergan [Promethazine Hcl]      dystonia     Prilosec [Omeprazole]      Rash, dizziness     Gabapentin Rash      Latex Allergy: NO    Social History     Tobacco Use     Smoking status: Former Smoker     Types: Cigarettes     Quit date: 2014     Years since quittin.8     Smokeless tobacco: Never Used     Tobacco comment: Ecig   Substance Use Topics     Alcohol use: No     Frequency: Never     History   Drug Use Unknown       REVIEW OF SYSTEMS:   CONSTITUTIONAL: NEGATIVE for fever, chills  INTEGUMENTARY/SKIN: NEGATIVE for worrisome rashes  EYES: NEGATIVE for vision changes  ENT/MOUTH: NEGATIVE for ear, mouth and throat problems  RESP: NEGATIVE for significant cough or SOB  CV: NEGATIVE for chest pain, palpitations or peripheral edema  GI: Menstrual cramping. Otherwise, NEGATIVE for nausea, abdominal pain, heartburn, or change in bowel  "habits  : NEGATIVE for dysuria, or hematuria  MUSCULOSKELETAL: NEGATIVE for significant arthralgias or myalgia  NEURO: NEGATIVE for weakness, dizziness or paresthesias  ENDOCRINE: NEGATIVE for temperature intolerance, skin/hair changes  HEME: NEGATIVE for bleeding problems  PSYCHIATRIC: NEGATIVE for changes in mood or affect    EXAM:   /79 (BP Location: Left arm, Patient Position: Chair, Cuff Size: Adult Large)   Pulse 83   Temp 98.3  F (36.8  C) (Tympanic)   Resp 12   Ht 1.549 m (5' 1\")   Wt 84.8 kg (187 lb)   LMP 11/16/2020 (Exact Date)   SpO2 98%   Breastfeeding No   BMI 35.33 kg/m      GENERAL APPEARANCE: healthy, alert and no distress     EYES: EOMI, PERRL     HENT: ear canals and TM's normal and nose and mouth without ulcers or lesions     NECK: no adenopathy, no asymmetry, masses, or scars and thyroid normal to palpation     RESP: lungs clear to auscultation - no rales, rhonchi or wheezes     CV: regular rates and rhythm, normal S1 S2, no S3 or S4 and no murmur, click or rub     ABDOMEN:  soft, nontender, no HSM or masses and bowel sounds normal     MS: extremities normal- no gross deformities noted, no evidence of inflammation in joints, FROM in all extremities.     SKIN: no suspicious lesions or rashes     NEURO: Normal strength and tone, sensory exam grossly normal, mentation intact and speech normal     PSYCH: mentation appears normal. and affect normal/bright     LYMPHATICS: No cervical adenopathy    DIAGNOSTICS:   EKG: Not indicated due to non-vascular surgery and low risk of event (age <65 and without cardiac risk factors)  CBC, BMP pending    Recent Labs   Lab Test 07/29/20  1053 01/27/20  1128 01/22/19  0923 01/22/19  0923 11/23/18 11/13/18 10/29/18 08/09/18  0801 08/09/18  0801   HGB  --   --   --  12.4  --   --   --   --  13.6   PLT  --   --   --  330  --   --   --   --  360   INR  --   --   --   --   --  2.1* 2.3*   < >  --      --   --   --   --   --   --   --  141 "   POTASSIUM 4.0  --   --   --  3.6  --   --   --  4.0   CR 0.78 0.81   < > 0.79 0.89  --   --   --  0.90    < > = values in this interval not displayed.        IMPRESSION:   Reason for surgery/procedure: Abnormal vaginal bleeding, endometriosis   Diagnosis/reason for consult: Pre-operative consult     The proposed surgical procedure is considered INTERMEDIATE risk.    REVISED CARDIAC RISK INDEX  The patient has the following serious cardiovascular risks for perioperative complications such as (MI, PE, VFib and 3  AV Block):  No serious cardiac risks  INTERPRETATION: 0 risks: Class I (very low risk - 0.4% complication rate)    The patient has the following additional risks for perioperative complications:  High tolerance to opioid analgesics due to chronic pain medication use.       ICD-10-CM    1. Preop general physical exam  Z01.818    2. Moderate episode of recurrent major depressive disorder (H)  F33.1    3. Other chronic pain  G89.29    4. Endometriosis  N80.9    5. Abnormal vaginal bleeding  N93.9        RECOMMENDATIONS:     --Patient is to take all scheduled medications on the day of surgery EXCEPT for modifications listed below.    Anticoagulant or Antiplatelet Medication Use  NSAIDS: Naproxen (Naprosyn):   Stop 2-3 days prior to surgery        APPROVAL GIVEN to proceed with proposed procedure, without further diagnostic evaluation       Signed Electronically by: Stacey Graham PA-C    Copy of this evaluation report is provided to requesting physician.    [facility] Preop Guidelines    Revised Cardiac Risk Index

## 2020-12-01 NOTE — PATIENT INSTRUCTIONS
"Jhonathan Minor, you may continue with your procedure as planned.  We will complete blood work today and we will contact you if there are any worrisome findings.  We are also completing your COVID-19 testing.    -Please remember to avoid naproxen within 2 days of surgery.  For additional pain management you may use heat/ice and Tylenol.    Please reach out with any questions or concerns.  Preparing for Your Surgery  Getting started  A surgery nurse will call you to review your health history and instructions. They will give you an arrival time based on your scheduled surgery time.  Please be ready to share the following:    Your doctor's clinic name and phone number    Your medical, surgical and anesthesia history    A list of allergies and sensitivities    A list of medicines, including herbal treatments and over-the-counter drugs    Whether the patient has a legal guardian (ask how to send us the papers in advance)  If your child is having surgery, please ask for a copy of Preparing for Your Child's Surgery.    Preparing for surgery    Within 30 days of surgery: Have an exam at your family clinic (primary care clinic), or go to a pre-operative clinic. This exam is called a \"History and Physical,\" or H&P.    At your H&P exam, talk to your care team about all medicines you take. If you need to stop any medicines before surgery, ask when to start taking them again.  ? We do this for your safety. Many medicines can make you bleed too much during surgery. Some change how well surgery (anesthesia) drugs work.    Call your insurance company to see what it will and won't pay for. Ask if they need to pre-approve the surgery. (If no insurance, call [phone].)    Call your surgeon's clinic if there's any change in your health. This includes signs of a cold or flu (sore throat, runny nose, cough, rash, fever). It also includes a scrape or scratch near the surgery site.    If you have questions on the day of surgery, call your " surgery center.  Eating and drinking guidelines  For your safety: Unless your surgeon tells you otherwise, follow the guidelines below.    Eat and drink as usual until 8 hours before surgery. After that, no food or milk.    Drink clear liquids until 2 hours before surgery. These are liquids you can see through, like water, Gatorade and Propel Water. You may also have black coffee and tea (no cream or milk).    Nothing by mouth within 2 hours of surgery. This includes gum, candy and breath mints.    Stop alcohol the midnight before surgery.    If your family clinic tells you to take medicine on the morning of surgery, it's okay to take it with a sip of water.  Preventing infection    Shower or bathe the night before and morning of your surgery. Follow the instructions your clinic gave you. (If no instructions, use regular soap.)    Don't shave or clip hair near your surgery site. This can lead to skin infection.    Don't smoke the morning of surgery. Smoking increases the risk of infection. You may chew nicotine gum up to 2 hours before surgery. A nicotine patch is okay.  ? Note: Some surgeries require you to completely quit smoking and nicotine. Check with your surgeon.    Your care team will make every effort to keep you safe from infection. We will:  ? Clean our hands often with soap and water (or an alcohol-based hand rub).  ? Clean the skin at your surgery site with a special soap that kills germs. We'll also remove hair from the site as needed.  ? Wear special hair covers, masks, gowns and gloves during surgery.  ? Give antibiotic medicine, if prescribed. Not all surgeries need antibiotics.  What to bring on the day of surgery    Photo ID and insurance card    Copy of your health care directive, if you have one    Glasses and hearing aides (bring cases)  ? You can't wear contacts during surgery    Inhaler and eye drops, if you use them (tell us about these when you arrive)    CPAP machine or breathing device,  if you use them    A few personal items, if spending the night    If you have . . .  ? A pacemaker or ICD (cardiac defibrillator): Bring the ID card.  ? An implanted stimulator: Bring the remote control.  ? A legal guardian: Bring a copy of the certified (court-stamped) guardianship papers.  Please remove any jewelry, including body piercings. Leave jewelry and other valuables at home.  If you're going home the day of surgery  Important: If you don't follow the rules below, we must cancel your surgery.     Arrange for someone to drive you home after surgery. You may not drive, take a taxi or take public transportation by yourself (unless you'll have local anesthesia only).    Arrange for a responsible adult to stay with you overnight. If you don't, we may keep you in the hospital overnight, and you may need to pay the costs yourself.  Questions?   If you have any questions for your care team, list them here: _________________________________________________________________________________________________________________________________________________________________________________________________________________________________________________________________________________________________________________________  For informational purposes only. Not to replace the advice of your health care provider. Copyright   8291-3311 [facility]. All rights reserved. Clinically reviewed by Macie Fry MD. [employer] [id number] - REV 07/19.

## 2020-12-02 ENCOUNTER — TELEPHONE (OUTPATIENT)
Dept: OBGYN | Facility: CLINIC | Age: 40
End: 2020-12-02

## 2020-12-02 LAB
LABORATORY COMMENT REPORT: NORMAL
SARS-COV-2 RNA SPEC QL NAA+PROBE: NEGATIVE
SARS-COV-2 RNA SPEC QL NAA+PROBE: NORMAL
SPECIMEN SOURCE: NORMAL
SPECIMEN SOURCE: NORMAL

## 2020-12-02 ASSESSMENT — ANXIETY QUESTIONNAIRES: GAD7 TOTAL SCORE: 18

## 2020-12-02 NOTE — TELEPHONE ENCOUNTER
I spoke with the patient and she will meet with Dr. Alonzo at the hospital on Friday before the surgery.  She was reminded to be NPO.  She said she dropped the consent off at Check-In [facility] at [facility] and also has a copy of it that she will bring with to the hospital.     Margaret Muniz

## 2020-12-02 NOTE — TELEPHONE ENCOUNTER
[facility] Call Center    Phone Message    May a detailed message be left on voicemail: yes     Reason for Call: Other: pt suppose to have surgery Friday and is wondering what is happening, please advise with her     Action Taken: Message routed to:  [facility] p [phone]    Travel Screening: Not Applicable

## 2020-12-02 NOTE — TELEPHONE ENCOUNTER
PB DOS: 12/4/2020- [facility]  Type of Procedure: exam under anesthesia, total abdominal hysterectomy, left oophorectomy, left salpingectomy, and lysis of pelvic adhesions- RICA  CPT Codes: 30458  ICD10 Codes: chronic pelvic pain and metrorrhagia with history of endometriosis N92.6, N95.1  Surgeon/Ordering provider: Cheri  Pre-cert/Authorization completed:  Yes, pa not needed  Payer: [facility]  Date Checked: 12/2/2020  Spoke to online-[email or web address]  Ref. # n/a/ Auth # n/a  Valid Dates: n/a

## 2020-12-05 PROBLEM — N80.9 ENDOMETRIOSIS: Status: RESOLVED | Noted: 2019-07-17 | Resolved: 2020-12-05

## 2020-12-08 DIAGNOSIS — M81.0 OSTEOPOROSIS OF MULTIPLE SITES: Primary | ICD-10-CM

## 2020-12-08 DIAGNOSIS — S72.145S CLOSED NONDISPLACED INTERTROCHANTERIC FRACTURE OF LEFT FEMUR, SEQUELA: ICD-10-CM

## 2020-12-08 DIAGNOSIS — M48.50XS PATHOLOGIC COMPRESSION FRACTURE OF SPINE, SEQUELA: ICD-10-CM

## 2020-12-08 RX ORDER — TERIPARATIDE 250 UG/ML
20 INJECTION, SOLUTION SUBCUTANEOUS DAILY
Qty: 2.4 ML | Refills: 1 | Status: SHIPPED | OUTPATIENT
Start: 2020-12-08 | End: 2021-01-05

## 2020-12-08 NOTE — TELEPHONE ENCOUNTER
Refill request received from the patient.   teriparatide, recombinant, (FORTEO) 600 MCG/2.4ML SOPN injection 2.4 mL 1 12/8/2020  --   Sig - Route: Inject 0.08 mLs (20 mcg) Subcutaneous daily - Subcutaneous     Last Office Visit: 7/28/2020  Next Appointment Scheduled for: 1/5/2021    Patient is out of this med and does not have any to get her to the next visit on 1/5/21.    Zainab Zhang CMA  Adult Endocrinology  [facility]

## 2020-12-14 ENCOUNTER — TELEPHONE (OUTPATIENT)
Dept: FAMILY MEDICINE | Facility: CLINIC | Age: 40
End: 2020-12-14

## 2020-12-14 DIAGNOSIS — S72.145S CLOSED NONDISPLACED INTERTROCHANTERIC FRACTURE OF LEFT FEMUR, SEQUELA: ICD-10-CM

## 2020-12-14 DIAGNOSIS — G89.29 OTHER CHRONIC PAIN: ICD-10-CM

## 2020-12-14 RX ORDER — OXYCODONE AND ACETAMINOPHEN 5; 325 MG/1; MG/1
0.5 TABLET ORAL 3 TIMES DAILY PRN
Qty: 45 TABLET | Refills: 0 | Status: SHIPPED | OUTPATIENT
Start: 2020-12-14 | End: 2021-01-12

## 2020-12-14 NOTE — TELEPHONE ENCOUNTER
Reason for Call:  Other     Detailed comments: Patient stated that she has completed surgery and would like a script for percocet sent to [facility]     Phone Number Patient can be reached at: Home number on file [phone] (home)    Best Time:     Can we leave a detailed message on this number? YES    Call taken on 12/14/2020 at 8:27 AM by Donna Caballero

## 2020-12-14 NOTE — TELEPHONE ENCOUNTER
I see 12/4/20 [facility] procedure with Dr. Alonzo.  Hysterectomy.    Patient had pre-op 12/1/20 with Stacey Graham.    Percocet refill requested.    Routing refill request to provider for review/approval because:  Drug not on the [facility] [place]       Routed to Stacey Graham PA-C.    Lashell Woods, RN  [facility]

## 2020-12-14 NOTE — TELEPHONE ENCOUNTER
Chronic narcotic pain medication refilled.  Medication to be used as prescribed.  Please notify patient that any additional need for pain medication postoperatively must be managed by surgeon. Recommend continuing to decrease dose/frequency as tolerated.     Stacey Graham PA-C on 12/14/2020 at 2:43 PM

## 2020-12-15 NOTE — TELEPHONE ENCOUNTER
Left message on voice mail for patient to call clinic.   [phone]  Radha Hardy RN  [facility]

## 2020-12-15 NOTE — TELEPHONE ENCOUNTER
Gave patient message.    Patient states the surgeon does not give medication only E. Santos can per patient.    Please advise    Marina Souza

## 2021-01-03 ENCOUNTER — NURSE TRIAGE (OUTPATIENT)
Dept: NURSING | Facility: CLINIC | Age: 41
End: 2021-01-03

## 2021-01-03 NOTE — TELEPHONE ENCOUNTER
"Pt had a hysterectomy surgery about a month ago. Pt noticed the incision was infected a week ago and has been trying to take care of it using neosporin and peroxide.   Incision has clear fluid, yellow and green pus and odorous.   Pt believes there are stitches coming out, \"white threads\"  Some pain, 3 out of 10. Pain feels \"stingy\"  Redness on incision site (7 inches long)  Pt states no fever. No thermometer. Notices some chills last night and states she is usually very hot. Asked if pt is hot or warm to touch.   Pt noticed a lump three days after discharge and moves. Tender to touch. Pt notices the lump is low in the abdomen and shifts throughout the day, around the incision area or below.     Care Disposition: See HCP within 4 hours. Pt declines. Care advice given per protocol. Pt verbalizes understanding. Will call back if symptoms worsen or further questions. Pt would like an OV tomorrow with Dr. Alonzo or Dr. Guerrero. Dr. Alonzo performed the surgery. Connected pt with  to schedule OV with Dr. Guerrero tomorrow.     Karli Cedillo, KOBE Triage Nurse Advisor 10:15 AM      Additional Information    Negative: [1] Bleeding from incision AND [2] won't stop after 10 minutes of direct pressure    Negative: [1] Widespread rash AND [2] bright red, sunburn-like    Negative: Severe pain in the incision    Negative: [1] Incision gaping open AND [2] < 48 hours since wound re-opened    Negative: [1] Incision gaping open AND [2] length of opening > 2 inches (5 cm)    Negative: Patient sounds very sick or weak to the triager    Negative: Sounds like a serious complication to the triager    [1] Incision looks infected (spreading redness, pain) AND [2] large red area (> 2 in. or 5 cm)    Protocols used: POST-OP INCISION SYMPTOMS-A-AH      "

## 2021-01-04 ENCOUNTER — OFFICE VISIT (OUTPATIENT)
Dept: OBGYN | Facility: CLINIC | Age: 41
End: 2021-01-04
Payer: COMMERCIAL

## 2021-01-04 VITALS
SYSTOLIC BLOOD PRESSURE: 127 MMHG | TEMPERATURE: 98.8 F | OXYGEN SATURATION: 100 % | WEIGHT: 180.4 LBS | HEART RATE: 98 BPM | DIASTOLIC BLOOD PRESSURE: 85 MMHG | BODY MASS INDEX: 34.09 KG/M2

## 2021-01-04 DIAGNOSIS — L08.9 LOCAL INFECTION OF WOUND: Primary | ICD-10-CM

## 2021-01-04 DIAGNOSIS — Z79.890 HORMONE REPLACEMENT THERAPY (HRT): ICD-10-CM

## 2021-01-04 DIAGNOSIS — T14.8XXA LOCAL INFECTION OF WOUND: Primary | ICD-10-CM

## 2021-01-04 PROCEDURE — 99024 POSTOP FOLLOW-UP VISIT: CPT | Performed by: OBSTETRICS & GYNECOLOGY

## 2021-01-04 RX ORDER — CEPHALEXIN 500 MG/1
500 CAPSULE ORAL 4 TIMES DAILY
Qty: 40 CAPSULE | Refills: 0 | Status: SHIPPED | OUTPATIENT
Start: 2021-01-04 | End: 2021-01-18

## 2021-01-04 NOTE — PATIENT INSTRUCTIONS
If you have any questions regarding your visit, Please contact your care team.    [facility]: [phone]      [facility] CLINIC HOURS TELEPHONE NUMBER   Nadira Guerrero .    TJ Acharya -  Margaret -       KOBE Vargas, RN  Sarah, RN     Monday, Wednesday, Thursday and Friday, [place]  8:30a.m-5:00 p.m   [facility]  [address] [place] [address]  [phone] ask for [facility]    Imaging Etjfshrsge-[phone]       Urgent Care locations:    [facility] Saturday and Sunday   9 am - 5 pm    Monday-Friday   12 pm - 8 pm  Saturday and Sunday   9 am - 5 pm   [phone]     [facility] Labor and Delivery:  [phone]    If you need a medication refill, please contact your pharmacy. Please allow 3 business days for your refill to be completed.  As always, Thank you for trusting us with your healthcare needs!

## 2021-01-04 NOTE — PROGRESS NOTES
"This 41 y/o female, , presents for an incision check since she feels that her stitches are coming out and is worried that her \"guts are going to fall out.\"  She has not taken her temperature with a thermometer but it was normal here at 98.8.  She understands that the pathology report showed benign findings/adenomyosis.  She is experiencing hot flushes and cold sweats with a pus-like discharge from her incision.  She is requesting that I remove all her stitches from the incision and \"restitch\" it but I explained that I would first inspect her incision.    /85 (BP Location: Right arm, Cuff Size: Adult Large)   Pulse 98   Temp 98.8  F (37.1  C) (Tympanic)   Wt 81.8 kg (180 lb 6.4 oz)   LMP 2020 (Exact Date)   SpO2 100%   BMI 34.09 kg/m    ROS:  10 systems were reviewed and the positives were listed under problems.  Her skin incision was inspected and there is no erythematous changes.  The stitches are dissolving but there is no defect involving her incision and I was unable to insert a sterile swab so no fascial issue.  There is a small amount of yellow pus-like discharge at the incision so will treat with antibiotic po.  There is no rebound nor guarding.  Assessment - incisional check s/p abdominal hysterectomy, HRT initiation  Plan - Initiate Keflex with instructions reviewed with the patient.  Initiate Premarin po for HRT for symptom relief.  She is to f/u with Dr. Alonzo per her next scheduled appt or earlier prn.  "

## 2021-01-05 ENCOUNTER — VIRTUAL VISIT (OUTPATIENT)
Dept: ENDOCRINOLOGY | Facility: CLINIC | Age: 41
End: 2021-01-05
Payer: COMMERCIAL

## 2021-01-05 ENCOUNTER — TELEPHONE (OUTPATIENT)
Dept: OBGYN | Facility: CLINIC | Age: 41
End: 2021-01-05

## 2021-01-05 DIAGNOSIS — Z02.71 DISABILITY EXAMINATION: ICD-10-CM

## 2021-01-05 DIAGNOSIS — M80.80XD OTHER OSTEOPOROSIS WITH CURRENT PATHOLOGICAL FRACTURE WITH ROUTINE HEALING, SUBSEQUENT ENCOUNTER: ICD-10-CM

## 2021-01-05 DIAGNOSIS — M81.0 OSTEOPOROSIS OF MULTIPLE SITES: Primary | ICD-10-CM

## 2021-01-05 PROCEDURE — 99443 PR PHYSICIAN TELEPHONE EVALUATION 21-30 MIN: CPT | Performed by: INTERNAL MEDICINE

## 2021-01-05 NOTE — LETTER
1/5/2021         RE: Mily Grullon  [address]  [place] [address]        Dear Colleague,    Thank you for referring your patient, Mily Grullon, to the [facility]. Please see a copy of my visit note below.    Endocrinology Visit    Chief Complaint: Osteoporosis     Information obtained from:Patient       Subjective:         HPI: Mily Grullon is a 40 year old female with history of Osteoporosis and fragility fracture who is here for a follow up.     Surgery = total hysterectomy on 12/4/20.   Pain controlled. Has had infection which is subsiding.     Patient has history of endometriosis and per report has underwent right oophorectomy at the age of 22.  She was started on Depo-Provera at the  age of 22.  She has been on this medication until she was taken off of it in January of 2019.   Osteoporosis and fragility fracture presumed to be secondary to Depo-Provera    She had a screening test for celiac disease which was within the normal limits.  She was screened for Cushing's syndrome which was normal.  She was screened for hyper-calciuria again which came back within the normal limits.  Thyroid lab results were within the normal limits.  GFR is within the normal limits and electrolytes including calcium.  He has had elevated PTH level in the setting of low vitamin D level and based on that result she was started on ergocalciferol 50,000 international unit twice weekly and she has been on this medication for the last 6 weeks at least. PTH elevation resolved after correcting low vitamin D level.   2019  FINDINGS:               Lumbar Spine (L1-L4) Z-score:  -0.8               Right Femoral Neck Z-score:  -3.1               Forearm (radius 33%) Z-score:  -0.6  The left femur is not acceptable for evaluation due to previous surgical repair                   Lumbar (L1-L4) BMD: 1.152   Previous: 1.078                          Right Total Hip BMD: 0.740    Previous: 0.751                Forearm (radius 33%) BMD: 0.672   Previous: 0.694    DEXA scan 2018   FINDINGS:               Lumbar Spine (L1-L4) Z-score:  -1.7, degenerative changes present               Right Femoral Neck Z-score:  -3.1               Forearm (radius 33%) Z-score:  -0.3  The left femur is not acceptable for evaluation due to previous surgical repair                                Lumbar (L1-L4) BMD: 1.078               Previous: 1.054                                                 Right Total Hip BMD: 0.751                Previous: 0.718                            Forearm (radius 33%) BMD: 0.694     Previous: NA  DEXA scan from 2014:   Femoral BMD 0.531 Z score -3.5     She has never been on steroids. Multiple fractures over the five years prior to starting FORTEO:  Hip fracture, wrist fracture, multiple ankle fractures, now foot and rib fractures. All of these fractures are fragility fracture without any trauma. She feels just pain. Hip # was following lifting.    After discussing risk benefits of each options and discussing treatment options; patient was started on Forteo for the treatment of osteoporosis and fragility fracture; 4/2019.  Patient reports that she does not have any side effects from the Forteo injection.  She was able to manage a daily injection without any problem. No fractures since she was started on forteo.     She is taking calcium and vitamin D supplements in addition.  She has not had any fractures since we saw her.  She is in the process of applying follow-up disability right now/woould like a paper work from me.  We have had her on limitations particularly with lifting due to her frequent fractures mentioned above.        Allergies   Allergen Reactions     Amitriptyline Hives     Amoxicillin Diarrhea     Asa [Dihydroxyaluminum Aminoacetate]      Cipro [Ciprofloxacin]      Dizziness, vomiting, shortness of breath     Ibuprofen [Aspartame-Ibuprofen]      GI distress       Lyrica      monicoatiabhijeet swell up       Morphine      ithcy     Naproxen      GI distress     Neurontin [Gabapentin]      rash     Paxil [Paroxetine] Anaphylaxis     anaphylaxis     Phenergan [Promethazine Hcl]      dystonia     Prilosec [Omeprazole]      Rash, dizziness     Gabapentin Rash       Current Outpatient Medications   Medication Sig Dispense Refill     albuterol (PROAIR HFA/PROVENTIL HFA/VENTOLIN HFA) 108 (90 Base) MCG/ACT inhaler Inhale 2 puffs into the lungs every 4 hours as needed for other (cough) 1 Inhaler 0     ALPRAZolam (XANAX) 0.5 MG tablet Take 0.5 mg by mouth daily       butalbital-acetaminophen-caffeine (ESGIC) -40 MG tablet TAKE 1 TABLET BY MOUTH AT ONSET OF HEADACHE.  MAX 1 TABLET PER DAY AND 20 TABLETS PER MONTH. Next fill earliest date is 1/23/2020. 20 tablet 0     Calcium-Phosphorus-Vitamin D 250-100-500 MG-MG-UNIT CHEW Take 2 tablets by mouth daily 60 tablet 11     cephALEXin (KEFLEX) 500 MG capsule Take 1 capsule (500 mg) by mouth 4 times daily 40 capsule 0     DULoxetine (CYMBALTA) 60 MG capsule Take 60 mg by mouth 2 times daily        eszopiclone (LUNESTA) 1 MG tablet Take 1 mg by mouth nightly as needed       famotidine (PEPCID) 20 MG tablet Take 1 tablet (20 mg) by mouth daily as needed (heartburn/reflux) 90 tablet 1     fluticasone (FLONASE) 50 MCG/ACT nasal spray Spray 1-2 sprays into both nostrils daily Use 1 spray per nostril per day for 2 weeks.  May increase to 2 sprays per nostril per day after. 16 g 2     lamoTRIgine (LAMICTAL) 100 MG tablet Take 150 mg by mouth daily        nortriptyline (PAMELOR) 50 MG capsule Take 100 mg by mouth At Bedtime       oxyCODONE-acetaminophen (PERCOCET) 5-325 MG tablet Take 0.5 tablets by mouth 3 times daily as needed for severe pain Refill date 12/14/20 45 tablet 0     pantoprazole (PROTONIX) 20 MG EC tablet Take by mouth 30-60 minutes before a meal. 90 tablet 0     propranolol (INDERAL) 10 MG tablet Take 10 mg by mouth 3 times daily       tiZANidine (ZANAFLEX) 2 MG  tablet Take 1-3 tablets (2-6 mg) by mouth 3 times daily as needed for muscle spasms 180 tablet 3     Vitamin D, Cholecalciferol, 25 MCG (1000 UT) CAPS Take 1,000 Units by mouth daily 100 capsule 3     cyclobenzaprine (FLEXERIL) 10 MG tablet Take 1 tablet (10 mg) by mouth 3 times daily as needed for muscle spasms (Patient not taking: Reported on 11/5/2020) 30 tablet 0     estrogen conj (PREMARIN) 1.25 MG tablet Take 1 tablet (1.25 mg) by mouth daily (Patient not taking: Reported on 1/5/2021) 90 tablet 3     order for DME Equipment being ordered: 4 wheeled walker with wheels and basket. Color to be determined. 1 each 0       Review of Systems     as per HPI above and multiple pains involving the back and joints.     Past Medical History:   Diagnosis Date     Class 2 severe obesity due to excess calories with serious comorbidity and body mass index (BMI) of 35.0 to 35.9 in adult (H)      Endometriosis, site unspecified      Gastritis 2010    dx 2010- sees Dr Glover in [facility]     Osteoporosis      Urinary calculus, unspecified     kidney stones, age 19-20     Wrist injury 11/19/2003    Workman's Comp- left wrist- narc agreement on file       Past Surgical History:   Procedure Laterality Date     ARTHROSCOPY HIP, OSTEOPLASTY FEMUR PROXIMAL, COMBINED Left 06/29/2016    Procedure: COMBINED ARTHROSCOPY HIP, OSTEOPLASTY FEMUR PROXIMAL;  Surgeon: Godwin Deleon MD;  Location: [facility] OR     ARTHROSCOPY OF JOINT UNLISTED  04/01/2004    Left wrist, with debridement and repair of tear.     C TOTAL ABDOM HYSTERECTOMY  2020    with LSO     HC REMOVAL OF OVARY/TUBE(S)  06/01/2003    right with fallopian tube     HC REPAIR TRIANGULAR CART,WRIST JT  01/01/2005    Dr Eloy Sosa     HC REPAIR TRIANGULAR CART,WRIST JT  2007 or so    ulnar excision- Dr Eloy Sosa     LAPAROSCOPIC ABLATION ENDOMETRIOSIS      x 2     LITHOTRIPSY       No family history of osteoporosis.     Former smoker stopped in 2014    Objective:    LMP 11/16/2020 (Exact Date)   Constitutional: Pleasant no acute cardiopulmonary distress.   Psychological: appropriate mood and affect     In House Labs:     TSH   Date Value Ref Range Status   01/27/2020 0.62 0.40 - 4.00 mU/L Final   11/23/2018 0.68 0.30 - 5.00 uIU/mL Final   09/11/2017 1.36 0.40 - 4.00 mU/L Final   12/10/2013 2.37 0.4 - 5.0 mU/L Final   01/07/2013 0.70 mcU/mL Final     T4 Free   Date Value Ref Range Status   12/10/2013 1.08 0.70 - 1.85 ng/dL Final       Creatinine   Date Value Ref Range Status   12/01/2020 0.64 0.52 - 1.04 mg/dL Final     24-hour urine for calcium  Sodium to creatinine ratio was 150 reference range   Sodium 24-hour 184 difference for   Creatinine 24-hour was 1.59 (0.5-2.15  Calcium to creatinine ratio was 68 reference range 30- 275  Calcium 24-hour urine was 108 reference range   Creatinine 24-hour 1.59  Total volume 2700  Urine free cortisol 3.2    TSH was 0.68 and free T4 0.74 tissue transglutaminase IgG and IgA was undetectable creatinine within the normal limits BMD in 2014 was 0.718 at the total hip in 2018 was 0.751.  Z score -3.1 at the neck, trochanteric -2.7 and total -2.4        ENDO CALCIUM LABS-[facility] Latest Ref Rng & Units 1/27/2020   CALCIUM 8.5 - 10.1 mg/dL 9.4   PHOSPHOROUS 2.5 - 4.5 mg/dL    ALBUMIN 3.4 - 5.0 g/dL 3.8   BUN 7 - 30 mg/dL 12   CREATININE 0.52 - 1.04 mg/dL 0.81   PARATHYROID HORMONE INTACT 18 - 80 pg/mL    ALKPHOS 40 - 150 U/L 103   OSTEOCALCIN ng/mL    VITAMIN D DEFICIENCY SCREENING 20 - 75 ug/L    PROTEIN, TOTAL 6.8 - 8.8 g/dL        Assessment/Treatment Plan:      Osteoporosis with recurrent pathological fracture/patient has had multiple fragility fractures over the last 5 years prior to starting treatment with Forteo: Risk factors for osteoporosis - Depo-Provera use for 16 years since the age of 22. She was taken off of Depo-Provera in Jan 2019.     She has had workup for other possible secondary causes of osteoporosis including  Cushing syndrome, celiac disease, hypercalciuria and primary hyperparathyroidism which was unremarkable.      Patient was started on Forteo 4/19 after discussing risks benefits and side effects and after addressing the fact that there is no data in the young age group.  She is tolerating therapy without significant side effects.  She has received a total of about 22 months of treatment.  We will stop Forteo treatment on 1/31/2021 and follow-up with consolidative treatment with reclast infusion.  discussed risk of this medication including.  Written information below provided.  Follow-up DEXA scan ordered today.    Plan:    Discontinue Forteo on 1/31/2021.      Weight bearing Exercises; advised to work with physical therapy particularly exercises that focus on strengthening the lower extremities.     physical therapy.      Fall prevention     Adequate Calcium and vitamin D intake: for maintenance calcium 1200 mg daily and vitamin D 2000 IU daily.      Cessation of tobacco use; counseling provided.     Follow-up DEXA scan and follow-up labs ordered today.    Scheduled Reclast infusion in February 2021 as part of consolidative therapy.  Discussed with the patient and her mom today that there is limited data in [place] osteoporosis medications in young patients like Mily.  The following written instructions provided to the patient.    Disability evaluation referral placed.  Regarding question of estrogen use in the setting of past history of VTE-I have directed patient that she has seen hematology previously and reminded on recommendations provided.  Documentation from hematology from four 2019 can be referred by gynecology for additional information.      Patient Instructions   Mily      Stop Forteo on 1/31/2021.    Schedule Reclast infusion in February 2021.    Blood work can be completed at the time of Reclast infusion.    Please schedule DEXA scan    Disability evaluation referral is placed for disability  evaluation.    As we have discussed, ZOLEDRONIC ACID (YOLIE le dron ik AS id) or RECLAST. It is used to treat osteoporosis and to preserve the effect of Forteo treatment..  FLU LIKE SYMPTOMS are common after the infusion and I recommend Tylenol or acetaminophen 500 mg q 8 hours for three days starting from the day of infusion.      Some of other rare long-term side effects of Reclast infusion include the following:  #1  Osteonecrosis of the jaw  #2 atypical fracture of the hip     Please complete dental procedures like root canal prior to the infusion and discuss with your dentist plan for Reclast infusion.  Please see the detailed information below for additional information on Reclast.  Patient Education     Patient Education  What is this medicine?  ZOLEDRONIC ACID (YOLIE le dron ik AS id) lowers the amount of calcium loss from bone. It is used to treat Paget's disease and osteoporosis in women.  This medicine may be used for other purposes; ask your health care provider or pharmacist if you have questions.  What should I tell my health care provider before I take this medicine?  They need to know if you have any of these conditions:    aspirin-sensitive asthma    cancer, especially if you are receiving medicines used to treat cancer    dental disease or wear dentures    infection    kidney disease    low levels of calcium in the blood    past surgery on the parathyroid gland or intestines    receiving corticosteroids like dexamethasone or prednisone    an unusual or allergic reaction to zoledronic acid, other medicines, foods, dyes, or preservatives    pregnant or trying to get pregnant    breast-feeding  How should I use this medicine?  This medicine is for infusion into a vein. It is given by a health care professional in a hospital or clinic setting.  Talk to your pediatrician regarding the use of this medicine in children. This medicine is not approved for use in children.  Overdosage: If you think you have  taken too much of this medicine contact a poison control center or emergency room at once.  NOTE: This medicine is only for you. Do not share this medicine with others.  What if I miss a dose?  It is important not to miss your dose. Call your doctor or health care professional if you are unable to keep an appointment.  What may interact with this medicine?    certain antibiotics given by injection    NSAIDs, medicines for pain and inflammation, like ibuprofen or naproxen    some diuretics like bumetanide, furosemide    teriparatide  This list may not describe all possible interactions. Give your health care provider a list of all the medicines, herbs, non-prescription drugs, or dietary supplements you use. Also tell them if you smoke, drink alcohol, or use illegal drugs. Some items may interact with your medicine.  What should I watch for while using this medicine?  Visit your doctor or health care professional for regular checkups. It may be some time before you see the benefit from this medicine. Do not stop taking your medicine unless your doctor tells you to. Your doctor may order blood tests or other tests to see how you are doing.  Women should inform their doctor if they wish to become pregnant or think they might be pregnant. There is a potential for serious side effects to an unborn child. Talk to your health care professional or pharmacist for more information.  You should make sure that you get enough calcium and vitamin D while you are taking this medicine. Discuss the foods you eat and the vitamins you take with your health care professional.  Some people who take this medicine have severe bone, joint, and/or muscle pain. This medicine may also increase your risk for jaw problems or a broken thigh bone. Tell your doctor right away if you have severe pain in your jaw, bones, joints, or muscles. Tell your doctor if you have any pain that does not go away or that gets worse.  Tell your dentist and dental  surgeon that you are taking this medicine. You should not have major dental surgery while on this medicine. See your dentist to have a dental exam and fix any dental problems before starting this medicine. Take good care of your teeth while on this medicine. Make sure you see your dentist for regular follow-up appointments.  What side effects may I notice from receiving this medicine?  Side effects that you should report to your doctor or health care professional as soon as possible:    allergic reactions like skin rash, itching or hives, swelling of the face, lips, or tongue    anxiety, confusion, or depression    breathing problems    changes in vision    eye pain    feeling faint or lightheaded, falls    jaw pain, especially after dental work    mouth sores    muscle cramps, stiffness, or weakness    redness, blistering, peeling or loosening of the skin, including inside the mouth    trouble passing urine or change in the amount of urine  Side effects that usually do not require medical attention (report to your doctor or health care professional if they continue or are bothersome):    bone, joint, or muscle pain    constipation    diarrhea    fever    hair loss    irritation at site where injected    loss of appetite    nausea, vomiting    stomach upset    trouble sleeping    trouble swallowing    weak or tired  This list may not describe all possible side effects. Call your doctor for medical advice about side effects. You may report side effects to FDA at 5-947-FDA-3427.  Where should I keep my medicine?  This drug is given in a hospital or clinic and will not be stored at home.  NOTE:This sheet is a summary. It may not cover all possible information. If you have questions about this medicine, talk to your doctor, pharmacist, or health care provider. Copyright  2016 Gold Standard             I will contact the patient with the test results.  Return to clinic in 12 months.    Test and/or medications prescribed  today:  Orders Placed This Encounter   Procedures     LIFE/DISABILITY EVALUATION     Dexa hip/pelvis/spine     Dexa vertebral fracture analysis     Vitamin D Deficiency (D3 Only)     Albumin level     Alkaline phosphatase     Calcium     Creatinine     Urea nitrogen         Dallas Flores MD  Staff Endocrinologist    Division of Endocrinology and Diabetes    Phone call duration: 28 minutes    50 minutes spent on the date of the encounter doing chart review, history and exam, documentation and further activities as noted above    {Provider  Link to MDM Help Grid :15            Again, thank you for allowing me to participate in the care of your patient.        Sincerely,        Dallas Flores MD

## 2021-01-05 NOTE — TELEPHONE ENCOUNTER
**Please Do Not Close This Encounter**               ** Until This Has Been Addressed**          PRIOR AUTHORIZATION REQUIED PER INSURANCE       PATIENT:Mily Grullon YOB: 1980          MEDICATION:Premarin 1.25mg tabs                    SIG:Take 1 tablet (1.25 mg) by mouth daily       NDC:[address]-35      INSURANCE:[facility]       INSURANCE PHONE #:[phone]      ID #:[id number]      INSURANCE REJECT: MR- Product Not on Formulary      PHARMACY:[facility]      PHARMACY NPI:[id number]      PHARMACY PHONE #:[phone]                                                          Please let us know when Prior Auth approved/denied, prescriber refusal to complete prior auth or if you would like to change medication/discontinue                                                            Thank you

## 2021-01-05 NOTE — PROGRESS NOTES
Endocrinology Visit    Chief Complaint: Osteoporosis     Information obtained from:Patient       Subjective:         HPI: Mily Grullon is a 40 year old female with history of Osteoporosis and fragility fracture who is here for a follow up.     Surgery = total hysterectomy on 12/4/20.   Pain controlled. Has had infection which is subsiding.     Patient has history of endometriosis and per report has underwent right oophorectomy at the age of 22.  She was started on Depo-Provera at the  age of 22.  She has been on this medication until she was taken off of it in January of 2019.   Osteoporosis and fragility fracture presumed to be secondary to Depo-Provera    She had a screening test for celiac disease which was within the normal limits.  She was screened for Cushing's syndrome which was normal.  She was screened for hyper-calciuria again which came back within the normal limits.  Thyroid lab results were within the normal limits.  GFR is within the normal limits and electrolytes including calcium.  He has had elevated PTH level in the setting of low vitamin D level and based on that result she was started on ergocalciferol 50,000 international unit twice weekly and she has been on this medication for the last 6 weeks at least. PTH elevation resolved after correcting low vitamin D level.   2019  FINDINGS:               Lumbar Spine (L1-L4) Z-score:  -0.8               Right Femoral Neck Z-score:  -3.1               Forearm (radius 33%) Z-score:  -0.6  The left femur is not acceptable for evaluation due to previous surgical repair                   Lumbar (L1-L4) BMD: 1.152   Previous: 1.078                          Right Total Hip BMD: 0.740    Previous: 0.751               Forearm (radius 33%) BMD: 0.672   Previous: 0.694    DEXA scan 2018   FINDINGS:               Lumbar Spine (L1-L4) Z-score:  -1.7, degenerative changes present               Right Femoral Neck Z-score:  -3.1               Forearm (radius 33%)  Z-score:  -0.3  The left femur is not acceptable for evaluation due to previous surgical repair                                Lumbar (L1-L4) BMD: 1.078               Previous: 1.054                                                 Right Total Hip BMD: 0.751                Previous: 0.718                            Forearm (radius 33%) BMD: 0.694     Previous: NA  DEXA scan from 2014:   Femoral BMD 0.531 Z score -3.5     She has never been on steroids. Multiple fractures over the five years prior to starting FORTEO:  Hip fracture, wrist fracture, multiple ankle fractures, now foot and rib fractures. All of these fractures are fragility fracture without any trauma. She feels just pain. Hip # was following lifting.    After discussing risk benefits of each options and discussing treatment options; patient was started on Forteo for the treatment of osteoporosis and fragility fracture; 4/2019.  Patient reports that she does not have any side effects from the Forteo injection.  She was able to manage a daily injection without any problem. No fractures since she was started on forteo.     She is taking calcium and vitamin D supplements in addition.  She has not had any fractures since we saw her.  She is in the process of applying follow-up disability right now/woould like a paper work from me.  We have had her on limitations particularly with lifting due to her frequent fractures mentioned above.        Allergies   Allergen Reactions     Amitriptyline Hives     Amoxicillin Diarrhea     Asa [Dihydroxyaluminum Aminoacetate]      Cipro [Ciprofloxacin]      Dizziness, vomiting, shortness of breath     Ibuprofen [Aspartame-Ibuprofen]      GI distress       Lyrica      extrematies swell up      Morphine      ithcy     Naproxen      GI distress     Neurontin [Gabapentin]      rash     Paxil [Paroxetine] Anaphylaxis     anaphylaxis     Phenergan [Promethazine Hcl]      dystonia     Prilosec [Omeprazole]      Rash, dizziness      Gabapentin Rash       Current Outpatient Medications   Medication Sig Dispense Refill     albuterol (PROAIR HFA/PROVENTIL HFA/VENTOLIN HFA) 108 (90 Base) MCG/ACT inhaler Inhale 2 puffs into the lungs every 4 hours as needed for other (cough) 1 Inhaler 0     ALPRAZolam (XANAX) 0.5 MG tablet Take 0.5 mg by mouth daily       butalbital-acetaminophen-caffeine (ESGIC) -40 MG tablet TAKE 1 TABLET BY MOUTH AT ONSET OF HEADACHE.  MAX 1 TABLET PER DAY AND 20 TABLETS PER MONTH. Next fill earliest date is 1/23/2020. 20 tablet 0     Calcium-Phosphorus-Vitamin D 250-100-500 MG-MG-UNIT CHEW Take 2 tablets by mouth daily 60 tablet 11     cephALEXin (KEFLEX) 500 MG capsule Take 1 capsule (500 mg) by mouth 4 times daily 40 capsule 0     DULoxetine (CYMBALTA) 60 MG capsule Take 60 mg by mouth 2 times daily        eszopiclone (LUNESTA) 1 MG tablet Take 1 mg by mouth nightly as needed       famotidine (PEPCID) 20 MG tablet Take 1 tablet (20 mg) by mouth daily as needed (heartburn/reflux) 90 tablet 1     fluticasone (FLONASE) 50 MCG/ACT nasal spray Spray 1-2 sprays into both nostrils daily Use 1 spray per nostril per day for 2 weeks.  May increase to 2 sprays per nostril per day after. 16 g 2     lamoTRIgine (LAMICTAL) 100 MG tablet Take 150 mg by mouth daily        nortriptyline (PAMELOR) 50 MG capsule Take 100 mg by mouth At Bedtime       oxyCODONE-acetaminophen (PERCOCET) 5-325 MG tablet Take 0.5 tablets by mouth 3 times daily as needed for severe pain Refill date 12/14/20 45 tablet 0     pantoprazole (PROTONIX) 20 MG EC tablet Take by mouth 30-60 minutes before a meal. 90 tablet 0     propranolol (INDERAL) 10 MG tablet Take 10 mg by mouth 3 times daily       tiZANidine (ZANAFLEX) 2 MG tablet Take 1-3 tablets (2-6 mg) by mouth 3 times daily as needed for muscle spasms 180 tablet 3     Vitamin D, Cholecalciferol, 25 MCG (1000 UT) CAPS Take 1,000 Units by mouth daily 100 capsule 3     cyclobenzaprine (FLEXERIL) 10 MG tablet Take  1 tablet (10 mg) by mouth 3 times daily as needed for muscle spasms (Patient not taking: Reported on 11/5/2020) 30 tablet 0     estrogen conj (PREMARIN) 1.25 MG tablet Take 1 tablet (1.25 mg) by mouth daily (Patient not taking: Reported on 1/5/2021) 90 tablet 3     order for DME Equipment being ordered: 4 wheeled walker with wheels and basket. Color to be determined. 1 each 0       Review of Systems     as per HPI above and multiple pains involving the back and joints.     Past Medical History:   Diagnosis Date     Class 2 severe obesity due to excess calories with serious comorbidity and body mass index (BMI) of 35.0 to 35.9 in adult (H)      Endometriosis, site unspecified      Gastritis 2010    dx 2010- sees Dr Glover in [facility]     Osteoporosis      Urinary calculus, unspecified     kidney stones, age 19-20     Wrist injury 11/19/2003    Workman's Comp- left wrist- narc agreement on file       Past Surgical History:   Procedure Laterality Date     ARTHROSCOPY HIP, OSTEOPLASTY FEMUR PROXIMAL, COMBINED Left 06/29/2016    Procedure: COMBINED ARTHROSCOPY HIP, OSTEOPLASTY FEMUR PROXIMAL;  Surgeon: Godwin Deleon MD;  Location: [facility] OR     ARTHROSCOPY OF JOINT UNLISTED  04/01/2004    Left wrist, with debridement and repair of tear.     C TOTAL ABDOM HYSTERECTOMY  2020    with LSO     HC REMOVAL OF OVARY/TUBE(S)  06/01/2003    right with fallopian tube     HC REPAIR TRIANGULAR CART,WRIST JT  01/01/2005    Dr Eloy Sosa     HC REPAIR TRIANGULAR CART,WRIST JT  2007 or so    ulnar excision- Dr Eloy Sosa     LAPAROSCOPIC ABLATION ENDOMETRIOSIS      x 2     LITHOTRIPSY       No family history of osteoporosis.     Former smoker stopped in 2014    Objective:   LMP 11/16/2020 (Exact Date)   Constitutional: Pleasant no acute cardiopulmonary distress.   Psychological: appropriate mood and affect     In House Labs:     TSH   Date Value Ref Range Status   01/27/2020 0.62 0.40 - 4.00 mU/L Final    11/23/2018 0.68 0.30 - 5.00 uIU/mL Final   09/11/2017 1.36 0.40 - 4.00 mU/L Final   12/10/2013 2.37 0.4 - 5.0 mU/L Final   01/07/2013 0.70 mcU/mL Final     T4 Free   Date Value Ref Range Status   12/10/2013 1.08 0.70 - 1.85 ng/dL Final       Creatinine   Date Value Ref Range Status   12/01/2020 0.64 0.52 - 1.04 mg/dL Final     24-hour urine for calcium  Sodium to creatinine ratio was 150 reference range   Sodium 24-hour 184 difference for   Creatinine 24-hour was 1.59 (0.5-2.15  Calcium to creatinine ratio was 68 reference range 30- 275  Calcium 24-hour urine was 108 reference range   Creatinine 24-hour 1.59  Total volume 2700  Urine free cortisol 3.2    TSH was 0.68 and free T4 0.74 tissue transglutaminase IgG and IgA was undetectable creatinine within the normal limits BMD in 2014 was 0.718 at the total hip in 2018 was 0.751.  Z score -3.1 at the neck, trochanteric -2.7 and total -2.4        ENDO CALCIUM LABS-[facility] Latest Ref Rng & Units 1/27/2020   CALCIUM 8.5 - 10.1 mg/dL 9.4   PHOSPHOROUS 2.5 - 4.5 mg/dL    ALBUMIN 3.4 - 5.0 g/dL 3.8   BUN 7 - 30 mg/dL 12   CREATININE 0.52 - 1.04 mg/dL 0.81   PARATHYROID HORMONE INTACT 18 - 80 pg/mL    ALKPHOS 40 - 150 U/L 103   OSTEOCALCIN ng/mL    VITAMIN D DEFICIENCY SCREENING 20 - 75 ug/L    PROTEIN, TOTAL 6.8 - 8.8 g/dL        Assessment/Treatment Plan:      Osteoporosis with recurrent pathological fracture/patient has had multiple fragility fractures over the last 5 years prior to starting treatment with Forteo: Risk factors for osteoporosis - Depo-Provera use for 16 years since the age of 22. She was taken off of Depo-Provera in Jan 2019.     She has had workup for other possible secondary causes of osteoporosis including Cushing syndrome, celiac disease, hypercalciuria and primary hyperparathyroidism which was unremarkable.      Patient was started on Forteo 4/19 after discussing risks benefits and side effects and after addressing the fact that there  is no data in the young age group.  She is tolerating therapy without significant side effects.  She has received a total of about 22 months of treatment.  We will stop Forteo treatment on 1/31/2021 and follow-up with consolidative treatment with reclast infusion.  discussed risk of this medication including.  Written information below provided.  Follow-up DEXA scan ordered today.    Plan:    Discontinue Forteo on 1/31/2021.      Weight bearing Exercises; advised to work with physical therapy particularly exercises that focus on strengthening the lower extremities.     physical therapy.      Fall prevention     Adequate Calcium and vitamin D intake: for maintenance calcium 1200 mg daily and vitamin D 2000 IU daily.      Cessation of tobacco use; counseling provided.     Follow-up DEXA scan and follow-up labs ordered today.    Scheduled Reclast infusion in February 2021 as part of consolidative therapy.  Discussed with the patient and her mom today that there is limited data in [place] osteoporosis medications in young patients like Mily.  The following written instructions provided to the patient.    Disability evaluation referral placed.  Regarding question of estrogen use in the setting of past history of VTE-I have directed patient that she has seen hematology previously and reminded on recommendations provided.  Documentation from hematology from four 2019 can be referred by gynecology for additional information.      Patient Instructions   Mily      Stop Forteo on 1/31/2021.    Schedule Reclast infusion in February 2021.    Blood work can be completed at the time of Reclast infusion.    Please schedule DEXA scan    Disability evaluation referral is placed for disability evaluation.    As we have discussed, ZOLEDRONIC ACID (YOLIE avila ik AS id) or RECLAST. It is used to treat osteoporosis and to preserve the effect of Forteo treatment..  FLU LIKE SYMPTOMS are common after the infusion and I recommend Tylenol  or acetaminophen 500 mg q 8 hours for three days starting from the day of infusion.      Some of other rare long-term side effects of Reclast infusion include the following:  #1  Osteonecrosis of the jaw  #2 atypical fracture of the hip     Please complete dental procedures like root canal prior to the infusion and discuss with your dentist plan for Reclast infusion.  Please see the detailed information below for additional information on Reclast.  Patient Education     Patient Education  What is this medicine?  ZOLEDRONIC ACID (YOLIE martha gonzalez ik AS id) lowers the amount of calcium loss from bone. It is used to treat Paget's disease and osteoporosis in women.  This medicine may be used for other purposes; ask your health care provider or pharmacist if you have questions.  What should I tell my health care provider before I take this medicine?  They need to know if you have any of these conditions:    aspirin-sensitive asthma    cancer, especially if you are receiving medicines used to treat cancer    dental disease or wear dentures    infection    kidney disease    low levels of calcium in the blood    past surgery on the parathyroid gland or intestines    receiving corticosteroids like dexamethasone or prednisone    an unusual or allergic reaction to zoledronic acid, other medicines, foods, dyes, or preservatives    pregnant or trying to get pregnant    breast-feeding  How should I use this medicine?  This medicine is for infusion into a vein. It is given by a health care professional in a hospital or clinic setting.  Talk to your pediatrician regarding the use of this medicine in children. This medicine is not approved for use in children.  Overdosage: If you think you have taken too much of this medicine contact a poison control center or emergency room at once.  NOTE: This medicine is only for you. Do not share this medicine with others.  What if I miss a dose?  It is important not to miss your dose. Call your  doctor or health care professional if you are unable to keep an appointment.  What may interact with this medicine?    certain antibiotics given by injection    NSAIDs, medicines for pain and inflammation, like ibuprofen or naproxen    some diuretics like bumetanide, furosemide    teriparatide  This list may not describe all possible interactions. Give your health care provider a list of all the medicines, herbs, non-prescription drugs, or dietary supplements you use. Also tell them if you smoke, drink alcohol, or use illegal drugs. Some items may interact with your medicine.  What should I watch for while using this medicine?  Visit your doctor or health care professional for regular checkups. It may be some time before you see the benefit from this medicine. Do not stop taking your medicine unless your doctor tells you to. Your doctor may order blood tests or other tests to see how you are doing.  Women should inform their doctor if they wish to become pregnant or think they might be pregnant. There is a potential for serious side effects to an unborn child. Talk to your health care professional or pharmacist for more information.  You should make sure that you get enough calcium and vitamin D while you are taking this medicine. Discuss the foods you eat and the vitamins you take with your health care professional.  Some people who take this medicine have severe bone, joint, and/or muscle pain. This medicine may also increase your risk for jaw problems or a broken thigh bone. Tell your doctor right away if you have severe pain in your jaw, bones, joints, or muscles. Tell your doctor if you have any pain that does not go away or that gets worse.  Tell your dentist and dental surgeon that you are taking this medicine. You should not have major dental surgery while on this medicine. See your dentist to have a dental exam and fix any dental problems before starting this medicine. Take good care of your teeth while on  this medicine. Make sure you see your dentist for regular follow-up appointments.  What side effects may I notice from receiving this medicine?  Side effects that you should report to your doctor or health care professional as soon as possible:    allergic reactions like skin rash, itching or hives, swelling of the face, lips, or tongue    anxiety, confusion, or depression    breathing problems    changes in vision    eye pain    feeling faint or lightheaded, falls    jaw pain, especially after dental work    mouth sores    muscle cramps, stiffness, or weakness    redness, blistering, peeling or loosening of the skin, including inside the mouth    trouble passing urine or change in the amount of urine  Side effects that usually do not require medical attention (report to your doctor or health care professional if they continue or are bothersome):    bone, joint, or muscle pain    constipation    diarrhea    fever    hair loss    irritation at site where injected    loss of appetite    nausea, vomiting    stomach upset    trouble sleeping    trouble swallowing    weak or tired  This list may not describe all possible side effects. Call your doctor for medical advice about side effects. You may report side effects to FDA at 9-295-FDA-7881.  Where should I keep my medicine?  This drug is given in a hospital or clinic and will not be stored at home.  NOTE:This sheet is a summary. It may not cover all possible information. If you have questions about this medicine, talk to your doctor, pharmacist, or health care provider. Copyright  2016 Gold Standard             I will contact the patient with the test results.  Return to clinic in 12 months.    Test and/or medications prescribed today:  Orders Placed This Encounter   Procedures     LIFE/DISABILITY EVALUATION     Dexa hip/pelvis/spine     Dexa vertebral fracture analysis     Vitamin D Deficiency (D3 Only)     Albumin level     Alkaline phosphatase     Calcium      Creatinine     Urea nitrogen         Dallas Flores MD  Staff Endocrinologist    Division of Endocrinology and Diabetes    Phone call duration: 28 minutes    50 minutes spent on the date of the encounter doing chart review, history and exam, documentation and further activities as noted above    {Provider  Link to MDM Help Grid :15

## 2021-01-05 NOTE — PATIENT INSTRUCTIONS
Mily      Stop Forteo on 1/31/2021.    Schedule Reclast infusion in February 2021.    Blood work can be completed at the time of Reclast infusion.    Please schedule DEXA scan    Disability evaluation referral is placed for disability evaluation.    As we have discussed, ZOLEDRONIC ACID (YOLIE le dron ik AS id) or RECLAST. It is used to treat osteoporosis and to preserve the effect of Forteo treatment..  FLU LIKE SYMPTOMS are common after the infusion and I recommend Tylenol or acetaminophen 500 mg q 8 hours for three days starting from the day of infusion.      Some of other rare long-term side effects of Reclast infusion include the following:  #1  Osteonecrosis of the jaw  #2 atypical fracture of the hip     Please complete dental procedures like root canal prior to the infusion and discuss with your dentist plan for Reclast infusion.  Please see the detailed information below for additional information on Reclast.  Patient Education     Patient Education  What is this medicine?  ZOLEDRONIC ACID (YOLIE le dron ik AS id) lowers the amount of calcium loss from bone. It is used to treat Paget's disease and osteoporosis in women.  This medicine may be used for other purposes; ask your health care provider or pharmacist if you have questions.  What should I tell my health care provider before I take this medicine?  They need to know if you have any of these conditions:    aspirin-sensitive asthma    cancer, especially if you are receiving medicines used to treat cancer    dental disease or wear dentures    infection    kidney disease    low levels of calcium in the blood    past surgery on the parathyroid gland or intestines    receiving corticosteroids like dexamethasone or prednisone    an unusual or allergic reaction to zoledronic acid, other medicines, foods, dyes, or preservatives    pregnant or trying to get pregnant    breast-feeding  How should I use this medicine?  This medicine is for infusion into a vein. It  is given by a health care professional in a hospital or clinic setting.  Talk to your pediatrician regarding the use of this medicine in children. This medicine is not approved for use in children.  Overdosage: If you think you have taken too much of this medicine contact a poison control center or emergency room at once.  NOTE: This medicine is only for you. Do not share this medicine with others.  What if I miss a dose?  It is important not to miss your dose. Call your doctor or health care professional if you are unable to keep an appointment.  What may interact with this medicine?    certain antibiotics given by injection    NSAIDs, medicines for pain and inflammation, like ibuprofen or naproxen    some diuretics like bumetanide, furosemide    teriparatide  This list may not describe all possible interactions. Give your health care provider a list of all the medicines, herbs, non-prescription drugs, or dietary supplements you use. Also tell them if you smoke, drink alcohol, or use illegal drugs. Some items may interact with your medicine.  What should I watch for while using this medicine?  Visit your doctor or health care professional for regular checkups. It may be some time before you see the benefit from this medicine. Do not stop taking your medicine unless your doctor tells you to. Your doctor may order blood tests or other tests to see how you are doing.  Women should inform their doctor if they wish to become pregnant or think they might be pregnant. There is a potential for serious side effects to an unborn child. Talk to your health care professional or pharmacist for more information.  You should make sure that you get enough calcium and vitamin D while you are taking this medicine. Discuss the foods you eat and the vitamins you take with your health care professional.  Some people who take this medicine have severe bone, joint, and/or muscle pain. This medicine may also increase your risk for jaw  problems or a broken thigh bone. Tell your doctor right away if you have severe pain in your jaw, bones, joints, or muscles. Tell your doctor if you have any pain that does not go away or that gets worse.  Tell your dentist and dental surgeon that you are taking this medicine. You should not have major dental surgery while on this medicine. See your dentist to have a dental exam and fix any dental problems before starting this medicine. Take good care of your teeth while on this medicine. Make sure you see your dentist for regular follow-up appointments.  What side effects may I notice from receiving this medicine?  Side effects that you should report to your doctor or health care professional as soon as possible:    allergic reactions like skin rash, itching or hives, swelling of the face, lips, or tongue    anxiety, confusion, or depression    breathing problems    changes in vision    eye pain    feeling faint or lightheaded, falls    jaw pain, especially after dental work    mouth sores    muscle cramps, stiffness, or weakness    redness, blistering, peeling or loosening of the skin, including inside the mouth    trouble passing urine or change in the amount of urine  Side effects that usually do not require medical attention (report to your doctor or health care professional if they continue or are bothersome):    bone, joint, or muscle pain    constipation    diarrhea    fever    hair loss    irritation at site where injected    loss of appetite    nausea, vomiting    stomach upset    trouble sleeping    trouble swallowing    weak or tired  This list may not describe all possible side effects. Call your doctor for medical advice about side effects. You may report side effects to FDA at 2-329-FDA-8206.  Where should I keep my medicine?  This drug is given in a hospital or clinic and will not be stored at home.  NOTE:This sheet is a summary. It may not cover all possible information. If you have questions about  this medicine, talk to your doctor, pharmacist, or health care provider. Copyright  2016 Gold Standard

## 2021-01-05 NOTE — TELEPHONE ENCOUNTER
Central Prior Authorization Team   Phone: [phone]      P/A demographics have been submitted to insurance, am awaiting question set.

## 2021-01-06 NOTE — TELEPHONE ENCOUNTER
PA Initiation    Medication: Premarin  Insurance Company: [employer] - Phone [phone] Fax [phone]  Pharmacy Filling the Rx: [facility] - [place], [place] - [address], [address]  Filling Pharmacy Phone: [phone]  Filling Pharmacy Fax:    Start Date: 1/5/2021

## 2021-01-07 NOTE — TELEPHONE ENCOUNTER
Prior Authorization Approval        Authorization Effective Date: 12/7/2020  Authorization Expiration Date: 1/6/2022  Medication: Premarin 1.25mg tab  Approved Dose/Quantity:   Reference #: [id number]   Insurance Company: [employer] - Phone [phone] Fax [phone]  Which Pharmacy is filling the prescription (Not needed for infusion/clinic administered): [facility] - [place], [place] - [address], [address]  Pharmacy Notified: Yes - left voicemail for pharmacy (not yet open), and asked that they let me know if it doesn't process otherwise to let patient know when medication is ready.  Patient Notified: No

## 2021-01-11 ENCOUNTER — TELEPHONE (OUTPATIENT)
Dept: FAMILY MEDICINE | Facility: CLINIC | Age: 41
End: 2021-01-11

## 2021-01-11 DIAGNOSIS — S72.145S CLOSED NONDISPLACED INTERTROCHANTERIC FRACTURE OF LEFT FEMUR, SEQUELA: ICD-10-CM

## 2021-01-11 DIAGNOSIS — G89.29 OTHER CHRONIC PAIN: ICD-10-CM

## 2021-01-11 NOTE — TELEPHONE ENCOUNTER
Requested Prescriptions   Pending Prescriptions Disp Refills     oxyCODONE-acetaminophen (PERCOCET) 5-325 MG tablet 45 tablet 0     Sig: Take 0.5 tablets by mouth 3 times daily as needed for severe pain Refill date 12/14/20       There is no refill protocol information for this order        Last Written Prescription Date:  12/14/20   Last Fill Quantity: 45,  # refills: 0   Last office visit: 12/1/2020   Future Office Visit:   Next 5 appointments (look out 90 days)    Feb 04, 2021 11:00 AM  Pre-procedure Covid with [facility]  [place] [facility] ([facility]) [id number] [place] [id number] [phone]           Routing refill request to provider for review/approval because:  Drug not on the [facility] refill protocol   Loretta HARDY-RN  Triage Nurse  [facility]: [place] [facility]

## 2021-01-11 NOTE — TELEPHONE ENCOUNTER
Reason for Call:  Medication or medication refill:    Do you use a [facility]?  Name of the pharmacy and phone number for the current request: [facility]    Name of the medication requested:  oxyCODONE-acetaminophen (PERCOCET) 5-325 MG tablet    Can we leave a detailed message on this number? YES    Phone number patient can be reached at: Cell number on file:    Telephone Information:   Mobile [phone]     Best Time: Anytime    Call taken on 1/11/2021 at 11:28 AM by Leydi Hancock

## 2021-01-12 RX ORDER — OXYCODONE AND ACETAMINOPHEN 5; 325 MG/1; MG/1
0.5 TABLET ORAL 3 TIMES DAILY PRN
Qty: 45 TABLET | Refills: 0 | Status: SHIPPED | OUTPATIENT
Start: 2021-01-14 | End: 2021-02-15

## 2021-01-18 ENCOUNTER — OFFICE VISIT (OUTPATIENT)
Dept: OBGYN | Facility: CLINIC | Age: 41
End: 2021-01-18
Payer: COMMERCIAL

## 2021-01-18 VITALS
HEART RATE: 96 BPM | SYSTOLIC BLOOD PRESSURE: 95 MMHG | BODY MASS INDEX: 34.58 KG/M2 | OXYGEN SATURATION: 91 % | DIASTOLIC BLOOD PRESSURE: 61 MMHG | WEIGHT: 183 LBS

## 2021-01-18 DIAGNOSIS — Z09 POSTOPERATIVE EXAMINATION: Primary | ICD-10-CM

## 2021-01-18 PROCEDURE — 99024 POSTOP FOLLOW-UP VISIT: CPT | Performed by: OBSTETRICS & GYNECOLOGY

## 2021-01-19 NOTE — PROGRESS NOTES
Mily Grullon is a 40 year old year old who is here today for a postoperative exam.      Doing fairly well now after recent surgery- total abdominal hysterectomy with left salpingo-oophorectomy.  Past right salpingo-oophorectomy. No signif signs, symptoms or concerns except still has small tender lump above left margin of incision.  Taking ET.     Past medical, obstetrical, surgical, family and social history reviewed and as noted or updated in chart.      Exam:BP 95/61 (BP Location: Right arm, Patient Position: Chair, Cuff Size: Adult Large)   Pulse 96   Wt 83 kg (183 lb)   LMP 11/16/2020 (Exact Date)   SpO2 91%   Breastfeeding No   BMI 34.58 kg/m    Abdomen negative except 3 cm round mildly tender subcutaneous indurated tissue above left margin of incision- suspect old hematoma and reactive adipose tissue and this should gradually regress, no hernia, Wd A otherwise. Incision: intact, no erythema, induration or discharge. Minimal visible skin sutures removed.     A/P: Satisfactory postop exam. RTC in 4 weeks for final recheck with pelvic exam then. Reviewed operation and findings. Instructions reviewed. See orders. Continue other general medical care.    Zackary Alonzo MD

## 2021-01-20 ENCOUNTER — ANCILLARY PROCEDURE (OUTPATIENT)
Dept: BONE DENSITY | Facility: CLINIC | Age: 41
End: 2021-01-20
Attending: INTERNAL MEDICINE
Payer: COMMERCIAL

## 2021-01-20 ENCOUNTER — TRANSFERRED RECORDS (OUTPATIENT)
Dept: HEALTH INFORMATION MANAGEMENT | Facility: CLINIC | Age: 41
End: 2021-01-20

## 2021-01-20 DIAGNOSIS — M81.0 OSTEOPOROSIS OF MULTIPLE SITES: ICD-10-CM

## 2021-01-20 DIAGNOSIS — M81.0 OSTEOPOROSIS: ICD-10-CM

## 2021-01-20 PROCEDURE — 77080 DXA BONE DENSITY AXIAL: CPT | Performed by: INTERNAL MEDICINE

## 2021-01-20 PROCEDURE — 77081 DXA BONE DENSITY APPENDICULR: CPT | Mod: 59 | Performed by: INTERNAL MEDICINE

## 2021-01-20 PROCEDURE — 77086 VRT FRACTURE ASSMT VIA DXA: CPT | Performed by: INTERNAL MEDICINE

## 2021-01-21 DIAGNOSIS — G44.219 EPISODIC TENSION-TYPE HEADACHE, NOT INTRACTABLE: ICD-10-CM

## 2021-01-21 NOTE — TELEPHONE ENCOUNTER
Butalbital-Apap-Caff -40 tablet      Last Written Prescription Date:  12/23/20  Last Fill Quantity: 20,   # refills: 0  Last Office Visit: 01/18/21  Future Office visit:    Next 5 appointments (look out 90 days)    Feb 04, 2021 11:00 AM  Pre-procedure Covid with [facility]  [facility] ([facility] ) [address] [place] [id number] [phone]           Routing refill request to provider for review/approval because:  Drug not on the [facility], [facility] or [facility] refill protocol or controlled substance    Lisa Caceres CPhT  [facility]

## 2021-01-25 RX ORDER — BUTALBITAL, ACETAMINOPHEN AND CAFFEINE 50; 325; 40 MG/1; MG/1; MG/1
TABLET ORAL
Qty: 20 TABLET | Refills: 0 | Status: SHIPPED | OUTPATIENT
Start: 2021-01-25 | End: 2021-02-23

## 2021-01-26 ENCOUNTER — TELEPHONE (OUTPATIENT)
Dept: ENDOCRINOLOGY | Facility: CLINIC | Age: 41
End: 2021-01-26

## 2021-01-26 DIAGNOSIS — Z02.71 DISABILITY EXAMINATION: Primary | ICD-10-CM

## 2021-01-26 NOTE — RESULT ENCOUNTER NOTE
Please inform patient that the bone density results are overall stable compared to previous results; there is insignificant improvement at the right hip level.  As we have discussed at the time of your visit, treatment with Forteo needs to be followed up with the Reclast infusion.  Please complete the Reclast infusion as scheduled.  Follow-up in clinic in 1 year after Reclast infusion.

## 2021-01-26 NOTE — TELEPHONE ENCOUNTER
Patient advised. Patient verbalizes understanding and agrees to plan. Patient states that she needs a letter from Dr. Flores stating that the DEXA is stable and there was no improvement. Patient needs this for disability.      Will send to Dr. Flores to review.    Marielle Suarez, RN  Endocrine Care Coordinator  [facility]

## 2021-01-26 NOTE — TELEPHONE ENCOUNTER
----- Message from Dallas Flores MD sent at 1/26/2021 10:29 AM CST -----  Please inform patient that the bone density results are overall stable compared to previous results; there is insignificant improvement at the right hip level.  As we have discussed at the time of your visit, treatment with Forteo needs to be followed up with the Reclast infusion.  Please complete the Reclast infusion as scheduled.  Follow-up in clinic in 1 year after Reclast infusion.

## 2021-01-27 NOTE — TELEPHONE ENCOUNTER
Per Dr. Flores:    DEXA scan is not an indication for disability. DEXA scan stable and no fracture over the last 2 years while on FORTEO is an improvement (BEFORE TREATMENT HAD MULTIPLE FRACTURES).  Per discussion at the time of her appointment; disability referral is placed and I advise scheduling an appointment.          Patient advised. Patient verbalizes understanding. Patient states that she feels the reason she has not had anymore fractures because of the major life modifications she is taking. Patient states that she has caregivers, her parents pay her rent, she is not working, her family does her shopping, etc.     Again reviewed above with patient in detail. Writer advised patient that writer will check in with Dr. Flores on who patient gets scheduled for with referral for life/disability evaluation order that was placed 1/5/21.    Patient verbalizes understanding.    Marielle Suarez RN  Endocrine Care Coordinator  [facility]

## 2021-02-04 ENCOUNTER — DOCUMENTATION ONLY (OUTPATIENT)
Dept: LAB | Facility: CLINIC | Age: 41
End: 2021-02-04

## 2021-02-04 DIAGNOSIS — R73.01 ABNORMAL FASTING GLUCOSE: ICD-10-CM

## 2021-02-04 DIAGNOSIS — M81.0 OSTEOPOROSIS OF MULTIPLE SITES: ICD-10-CM

## 2021-02-04 DIAGNOSIS — M81.0 OSTEOPOROSIS OF MULTIPLE SITES: Primary | ICD-10-CM

## 2021-02-04 LAB
ALBUMIN SERPL-MCNC: 4 G/DL (ref 3.4–5)
ALP SERPL-CCNC: 111 U/L (ref 40–150)
BUN SERPL-MCNC: 14 MG/DL (ref 7–30)
CALCIUM SERPL-MCNC: 9.3 MG/DL (ref 8.5–10.1)
CREAT SERPL-MCNC: 0.74 MG/DL (ref 0.52–1.04)
DEPRECATED CALCIDIOL+CALCIFEROL SERPL-MC: 47 UG/L (ref 20–75)
GFR SERPL CREATININE-BSD FRML MDRD: >90 ML/MIN/{1.73_M2}
HBA1C MFR BLD: 5.5 % (ref 0–5.6)
LABORATORY COMMENT REPORT: NORMAL
SARS-COV-2 RNA RESP QL NAA+PROBE: NEGATIVE
SARS-COV-2 RNA RESP QL NAA+PROBE: NORMAL
SPECIMEN SOURCE: NORMAL
SPECIMEN SOURCE: NORMAL

## 2021-02-04 PROCEDURE — 36415 COLL VENOUS BLD VENIPUNCTURE: CPT | Performed by: INTERNAL MEDICINE

## 2021-02-04 PROCEDURE — 82306 VITAMIN D 25 HYDROXY: CPT | Performed by: INTERNAL MEDICINE

## 2021-02-04 PROCEDURE — 84520 ASSAY OF UREA NITROGEN: CPT | Performed by: INTERNAL MEDICINE

## 2021-02-04 PROCEDURE — 82040 ASSAY OF SERUM ALBUMIN: CPT | Performed by: INTERNAL MEDICINE

## 2021-02-04 PROCEDURE — 82310 ASSAY OF CALCIUM: CPT | Performed by: INTERNAL MEDICINE

## 2021-02-04 PROCEDURE — U0003 INFECTIOUS AGENT DETECTION BY NUCLEIC ACID (DNA OR RNA); SEVERE ACUTE RESPIRATORY SYNDROME CORONAVIRUS 2 (SARS-COV-2) (CORONAVIRUS DISEASE [COVID-19]), AMPLIFIED PROBE TECHNIQUE, MAKING USE OF HIGH THROUGHPUT TECHNOLOGIES AS DESCRIBED BY CMS-2020-01-R: HCPCS | Performed by: INTERNAL MEDICINE

## 2021-02-04 PROCEDURE — U0005 INFEC AGEN DETEC AMPLI PROBE: HCPCS | Performed by: INTERNAL MEDICINE

## 2021-02-04 PROCEDURE — 84075 ASSAY ALKALINE PHOSPHATASE: CPT | Performed by: INTERNAL MEDICINE

## 2021-02-04 PROCEDURE — 83036 HEMOGLOBIN GLYCOSYLATED A1C: CPT | Performed by: INTERNAL MEDICINE

## 2021-02-04 PROCEDURE — 82565 ASSAY OF CREATININE: CPT | Performed by: INTERNAL MEDICINE

## 2021-02-04 NOTE — PROGRESS NOTES
Order placed.     Marielle Suarez, RN  Endocrine Care Coordinator  [facility]

## 2021-02-04 NOTE — RESULT ENCOUNTER NOTE
Calcium, albumin,Alk phos and creatinine stable.  We will proceed with infusion as scheduled.  Hemoglobin A1c within the normal limits.  Please inform patient.

## 2021-02-08 DIAGNOSIS — K21.00 GASTROESOPHAGEAL REFLUX DISEASE WITH ESOPHAGITIS WITHOUT HEMORRHAGE: ICD-10-CM

## 2021-02-08 RX ORDER — ZOLEDRONIC ACID 5 MG/100ML
5 INJECTION, SOLUTION INTRAVENOUS ONCE
Status: CANCELLED
Start: 2021-02-08 | End: 2021-02-08

## 2021-02-08 RX ORDER — HEPARIN SODIUM,PORCINE 10 UNIT/ML
5 VIAL (ML) INTRAVENOUS
Status: CANCELLED | OUTPATIENT
Start: 2021-02-08

## 2021-02-08 RX ORDER — HEPARIN SODIUM (PORCINE) LOCK FLUSH IV SOLN 100 UNIT/ML 100 UNIT/ML
5 SOLUTION INTRAVENOUS
Status: CANCELLED | OUTPATIENT
Start: 2021-02-08

## 2021-02-09 ENCOUNTER — INFUSION THERAPY VISIT (OUTPATIENT)
Dept: INFUSION THERAPY | Facility: CLINIC | Age: 41
End: 2021-02-09
Payer: COMMERCIAL

## 2021-02-09 VITALS
WEIGHT: 181 LBS | DIASTOLIC BLOOD PRESSURE: 83 MMHG | RESPIRATION RATE: 14 BRPM | BODY MASS INDEX: 34.2 KG/M2 | HEART RATE: 84 BPM | TEMPERATURE: 97.4 F | OXYGEN SATURATION: 97 % | SYSTOLIC BLOOD PRESSURE: 151 MMHG

## 2021-02-09 DIAGNOSIS — M81.0 OSTEOPOROSIS OF MULTIPLE SITES: Primary | ICD-10-CM

## 2021-02-09 PROCEDURE — 96365 THER/PROPH/DIAG IV INF INIT: CPT | Performed by: NURSE PRACTITIONER

## 2021-02-09 PROCEDURE — 99207 PR NO CHARGE LOS: CPT

## 2021-02-09 RX ORDER — FAMOTIDINE 20 MG/1
20 TABLET, FILM COATED ORAL DAILY PRN
Qty: 90 TABLET | Refills: 0 | OUTPATIENT
Start: 2021-02-09

## 2021-02-09 RX ORDER — HEPARIN SODIUM,PORCINE 10 UNIT/ML
5 VIAL (ML) INTRAVENOUS
Status: CANCELLED | OUTPATIENT
Start: 2021-02-09

## 2021-02-09 RX ORDER — HEPARIN SODIUM (PORCINE) LOCK FLUSH IV SOLN 100 UNIT/ML 100 UNIT/ML
5 SOLUTION INTRAVENOUS
Status: CANCELLED | OUTPATIENT
Start: 2021-02-09

## 2021-02-09 RX ORDER — ZOLEDRONIC ACID 5 MG/100ML
5 INJECTION, SOLUTION INTRAVENOUS ONCE
Status: CANCELLED
Start: 2021-02-09 | End: 2021-02-09

## 2021-02-09 RX ORDER — ZOLEDRONIC ACID 5 MG/100ML
5 INJECTION, SOLUTION INTRAVENOUS ONCE
Status: COMPLETED | OUTPATIENT
Start: 2021-02-09 | End: 2021-02-09

## 2021-02-09 RX ADMIN — Medication 250 ML: at 14:59

## 2021-02-09 RX ADMIN — ZOLEDRONIC ACID 5 MG: 0.05 INJECTION, SOLUTION INTRAVENOUS at 14:59

## 2021-02-09 ASSESSMENT — PAIN SCALES - GENERAL: PAINLEVEL: MILD PAIN (2)

## 2021-02-09 NOTE — TELEPHONE ENCOUNTER
"Requested Prescriptions   Pending Prescriptions Disp Refills     famotidine (PEPCID) 20 MG tablet 90 tablet 0     Sig: Take 1 tablet (20 mg) by mouth daily as needed (heartburn/reflux)       H2 Blockers Protocol Passed - 2/8/2021  8:31 AM        Passed - Patient is age 12 or older        Passed - Recent (12 mo) or future (30 days) visit within the authorizing provider's specialty     Patient has had an office visit with the authorizing provider or a provider within the authorizing providers department within the previous 12 mos or has a future within next 30 days. See \"Patient Info\" tab in inbasket, or \"Choose Columns\" in Meds & Orders section of the refill encounter.              Passed - Medication is active on med list           Refilled on 2/3/21      Loretta HARDY-RN  Triage Nurse  [facility]: [facility]      "

## 2021-02-09 NOTE — PROGRESS NOTES
Infusion Nursing Note:  Mily Grullon presents today for Reclast.    Patient seen by provider today: No   present during visit today: Not Applicable.    Note: N/A.  Patient did not meet criteria for an asymptomatic covid-19 PCR test in infusion today. Patient declined the covid-19 test.    Patient will inform dentist(s)/provider(s) about receiving  reclast infusion(s), prior to any invasive dental work/procedures/surgeries in the future.     Reminded patient to drink 6-8 glasses of water today, and tomorrow, to protect kidneys.      Intravenous Access:  Peripheral IV placed.    Treatment Conditions:  Not Applicable.      Post Infusion Assessment:  Patient tolerated infusion without incident.       Discharge Plan:   Patient discharged in stable condition accompanied by: self.  Departure Mode: Ambulatory.    Cathleen Villafuerte RN

## 2021-02-10 DIAGNOSIS — G89.29 OTHER CHRONIC PAIN: ICD-10-CM

## 2021-02-10 DIAGNOSIS — S72.145S CLOSED NONDISPLACED INTERTROCHANTERIC FRACTURE OF LEFT FEMUR, SEQUELA: ICD-10-CM

## 2021-02-10 DIAGNOSIS — K21.9 GASTROESOPHAGEAL REFLUX DISEASE, UNSPECIFIED WHETHER ESOPHAGITIS PRESENT: ICD-10-CM

## 2021-02-10 NOTE — TELEPHONE ENCOUNTER
Patient requesting refills of tiZANidine (ZANAFLEX) 2 MG tablet, pantoprazole (PROTONIX) 20 MG EC tablet, oxyCODONE-acetaminophen (PERCOCET) 5-325 MG tablet. Patient uses [facility].     Patient asking to  Percocet on Friday since Pharmacy is closed Sundays.

## 2021-02-10 NOTE — TELEPHONE ENCOUNTER
Routing refill request to provider for review/approval because:  Drug not on the [facility] refill protocol     Tizanidine has refills remaining       Margret Schwartz RN

## 2021-02-15 RX ORDER — PANTOPRAZOLE SODIUM 20 MG/1
TABLET, DELAYED RELEASE ORAL
Qty: 90 TABLET | Refills: 0 | Status: SHIPPED | OUTPATIENT
Start: 2021-02-15 | End: 2021-02-23

## 2021-02-15 RX ORDER — OXYCODONE AND ACETAMINOPHEN 5; 325 MG/1; MG/1
0.5 TABLET ORAL 3 TIMES DAILY PRN
Qty: 45 TABLET | Refills: 0 | Status: SHIPPED | OUTPATIENT
Start: 2021-02-15 | End: 2021-03-15

## 2021-02-15 RX ORDER — TIZANIDINE 2 MG/1
2-6 TABLET ORAL 3 TIMES DAILY PRN
Qty: 180 TABLET | Refills: 3 | Status: SHIPPED | OUTPATIENT
Start: 2021-02-15 | End: 2021-02-23

## 2021-02-15 NOTE — TELEPHONE ENCOUNTER
Patient called in regarding these refills. She says she has no refills of tizanidine at the pharmacy and she is now out of pain medication.     Rerouted message to provider.     Thank you,   Margret Schwartz RN

## 2021-02-15 NOTE — TELEPHONE ENCOUNTER
Refills provided. Please call patient to schedule follow up with me for re-check on chronic conditions. Will need appointment prior to any additional refills.     Stacey Graham PA-C on 2/15/2021 at 9:21 AM

## 2021-02-18 DIAGNOSIS — K21.00 GASTROESOPHAGEAL REFLUX DISEASE WITH ESOPHAGITIS WITHOUT HEMORRHAGE: ICD-10-CM

## 2021-02-18 RX ORDER — FAMOTIDINE 20 MG/1
20 TABLET, FILM COATED ORAL DAILY PRN
Qty: 90 TABLET | Refills: 0 | Status: CANCELLED | OUTPATIENT
Start: 2021-02-18

## 2021-02-23 ENCOUNTER — OFFICE VISIT (OUTPATIENT)
Dept: FAMILY MEDICINE | Facility: CLINIC | Age: 41
End: 2021-02-23
Payer: COMMERCIAL

## 2021-02-23 VITALS
RESPIRATION RATE: 14 BRPM | WEIGHT: 181.25 LBS | BODY MASS INDEX: 34.22 KG/M2 | HEIGHT: 61 IN | SYSTOLIC BLOOD PRESSURE: 124 MMHG | TEMPERATURE: 98.6 F | DIASTOLIC BLOOD PRESSURE: 70 MMHG | HEART RATE: 80 BPM

## 2021-02-23 DIAGNOSIS — K21.9 GASTROESOPHAGEAL REFLUX DISEASE, UNSPECIFIED WHETHER ESOPHAGITIS PRESENT: ICD-10-CM

## 2021-02-23 DIAGNOSIS — G89.29 OTHER CHRONIC PAIN: ICD-10-CM

## 2021-02-23 DIAGNOSIS — K21.00 GASTROESOPHAGEAL REFLUX DISEASE WITH ESOPHAGITIS WITHOUT HEMORRHAGE: ICD-10-CM

## 2021-02-23 DIAGNOSIS — G44.219 EPISODIC TENSION-TYPE HEADACHE, NOT INTRACTABLE: ICD-10-CM

## 2021-02-23 PROCEDURE — 99214 OFFICE O/P EST MOD 30 MIN: CPT | Performed by: PHYSICIAN ASSISTANT

## 2021-02-23 RX ORDER — TIZANIDINE 2 MG/1
2-6 TABLET ORAL 3 TIMES DAILY PRN
Qty: 180 TABLET | Refills: 3 | Status: SHIPPED | OUTPATIENT
Start: 2021-02-23 | End: 2021-05-25

## 2021-02-23 RX ORDER — FAMOTIDINE 20 MG/1
20 TABLET, FILM COATED ORAL DAILY PRN
Qty: 90 TABLET | Refills: 1 | Status: SHIPPED | OUTPATIENT
Start: 2021-02-23 | End: 2021-05-03

## 2021-02-23 RX ORDER — PANTOPRAZOLE SODIUM 20 MG/1
TABLET, DELAYED RELEASE ORAL
Qty: 90 TABLET | Refills: 0 | Status: SHIPPED | OUTPATIENT
Start: 2021-02-23 | End: 2021-05-03

## 2021-02-23 RX ORDER — BUTALBITAL, ACETAMINOPHEN AND CAFFEINE 50; 325; 40 MG/1; MG/1; MG/1
TABLET ORAL
Qty: 20 TABLET | Refills: 0 | Status: SHIPPED | OUTPATIENT
Start: 2021-02-23 | End: 2021-03-26

## 2021-02-23 ASSESSMENT — ENCOUNTER SYMPTOMS
FEVER: 0
HEADACHES: 1
NAUSEA: 1
ABDOMINAL PAIN: 0
HEARTBURN: 1
COUGH: 0
NERVOUS/ANXIOUS: 0
WHEEZING: 0
SHORTNESS OF BREATH: 0
LIGHT-HEADEDNESS: 0

## 2021-02-23 ASSESSMENT — MIFFLIN-ST. JEOR: SCORE: 1424.52

## 2021-02-23 ASSESSMENT — PAIN SCALES - GENERAL: PAINLEVEL: MILD PAIN (3)

## 2021-02-23 NOTE — PROGRESS NOTES
Assessment & Plan   Patient is a 40-year-old female who presents to clinic for medication refills and reevaluation.  No new complaints.  Vital signs normal.  Physical exam without acute abnormalities.    Episodic tension-type headache, not intractable  Patient notes episodic tension headaches are treated successfully with ESGIC.  Patient has been compliant with using this medication as prescribed.  No side effects.  Refill provided.  Discussed importance of using sparingly.  - butalbital-acetaminophen-caffeine (ESGIC) -40 MG tablet; TAKE 1 TABLET BY MOUTH AT ONSET OF HEADACHE.  MAX 1 TABLET PER DAY AND 20 TABLETS PER MONTH. Next fill earliest date is 3/26/21.    Gastroesophageal reflux disease with esophagitis without hemorrhage  Patient requests refills of famotidine as well as pantoprazole which work well to treat reflux.  Refills provided.  Discussed lifestyle modifications including avoiding triggering foods and avoiding eating within 2.5 to 3 hours of bedtime to alleviate eating reflux.  - famotidine (PEPCID) 20 MG tablet; Take 1 tablet (20 mg) by mouth daily as needed (heartburn/reflux)  - pantoprazole (PROTONIX) 20 MG EC tablet; Take by mouth 30-60 minutes before a meal.    Other chronic pain  Patient would like refill of tizanidine for chronic pain.  Refill provided.  Discussed continued taper of Percocet.  Patient is agreeable.  She will start taking 0.5 tablets twice daily today instead of 3 times daily.  We will continue twice daily dosing with the next medication refill.  Goal is to wean patient completely off of Percocet and reduced Zanaflex use.  Patient is aware of goal and agreeable to this.  - tiZANidine (ZANAFLEX) 2 MG tablet; Take 1-3 tablets (2-6 mg) by mouth 3 times daily as needed for muscle spasms    See Patient Instructions    Return in about 3 months (around 5/23/2021) for Reevaluation.    Stacey Graham PA-C  [facility] EFFIE Minor is a 40 year old  who presents for the following health issues:     HPI       Migraine     Since your last clinic visit, how have your headaches changed?  Worsened    How often are you getting headaches or migraines? 20-25 per days     Are you able to do normal daily activities when you have a migraine? No    Are you taking rescue/relief medications? (Select all that apply) ibuprofen (Advil, Motrin) and naproxyn (Aleve)    How helpful is your rescue/relief medication?  I get total relief    Are you taking any medications to prevent migraines? (Select all that apply)  Other: Esgic    In the past 4 weeks, how often have you gone to urgent care or the emergency room because of your headaches?  0    Chronic Pain Follow-Up  Tizanidine 2-6mg tid prn. Chronic hip pain. Works well for patient. She has been walking for exercise. Patient will restart physical therapy this week. Discussed plan to wean down on Percocet to 0.5 tablet twice daily instead of 3 times daily.  Patient will start this today.  Going forward, Rx will be written for 0.5 tablets twice daily.  Attempted to review  which did not load medication information.   Where in your body do you have pain? Joint pain, hip pain, ostroporosis  How has your pain affected your ability to work? Unable to work  Which of these pain treatments have you tried since your last clinic visit? Other: pain medications and muscle relaxers  How well are you sleeping? Fair  How has your mood been since your last visit? About the same  Have you had a significant life event? No  Other aggravating factors: Prolonged acitivty  Taking medication as directed? Yes    PHQ-9 SCORE 2/5/2020 2/5/2020 2/5/2020   PHQ-9 Total Score - - -   PHQ-9 Total Score - - -   PHQ-9 External Data 16 16 16     DEJON-7 SCORE 7/17/2019 8/30/2019 12/1/2020   Total Score 17 16 18     No flowsheet data found.  Encounter-Level CSA - 11/30/2018:    Controlled Substance Agreement - Scan on 12/11/2018  9:24 AM: CONTROLLED SUBSTANCE  "AGREEMENT     Encounter-Level CSA - 06/15/2016:    Controlled Substance Agreement - Scan on 6/22/2016  1:18 PM: CONTROLLED SUBSTANCE AGREEMENT     Encounter-Level CSA - 06/02/2015:    Controlled Substance Agreement - Scan on 8/4/2015 10:40 AM: CONTROLLED MEDICATION AGREEMENT 06/02/15     Patient-Level CSA:    There are no patient-level csa.         Medication Followup of esophageal reflux  Pepcid 20mg qh prn, protonix 20mg EC qd    Taking Medication as prescribed: yes    Side Effects:  None    Medication Helping Symptoms:  Yes-works well to treatment symptoms.  Patient notes reflux is worse at night and with specific triggering foods.         Review of Systems   Constitutional: Negative for fever.   HENT: Negative for congestion.    Respiratory: Negative for cough, shortness of breath and wheezing.    Cardiovascular: Negative for chest pain.   Gastrointestinal: Positive for heartburn and nausea. Negative for abdominal pain.   Musculoskeletal:        Chronic hip pain   Skin: Negative for rash.   Neurological: Positive for headaches (baseline ). Negative for light-headedness.   Psychiatric/Behavioral: The patient is not nervous/anxious.             Objective    /70   Pulse 80   Temp 98.6  F (37  C) (Tympanic)   Resp 14   Ht 1.549 m (5' 1\")   Wt 82.2 kg (181 lb 4 oz)   LMP 11/16/2020 (Exact Date)   BMI 34.25 kg/m    Body mass index is 34.25 kg/m .  Physical Exam  Vitals signs and nursing note reviewed.   Constitutional:       General: She is not in acute distress.     Appearance: Normal appearance.   HENT:      Head: Normocephalic and atraumatic.   Eyes:      Extraocular Movements: Extraocular movements intact.      Pupils: Pupils are equal, round, and reactive to light.   Neck:      Musculoskeletal: Normal range of motion.   Cardiovascular:      Rate and Rhythm: Normal rate and regular rhythm.      Heart sounds: Normal heart sounds.   Pulmonary:      Effort: Pulmonary effort is normal.      Breath sounds: " Normal breath sounds.   Musculoskeletal: Normal range of motion.      Comments: Patient ambulates with a cane.   Skin:     General: Skin is warm and dry.   Neurological:      General: No focal deficit present.      Mental Status: She is alert.   Psychiatric:         Mood and Affect: Mood normal.         Behavior: Behavior normal.

## 2021-02-23 NOTE — PATIENT INSTRUCTIONS
Jhonathan Minor,    Keep up the great work and decrease your pain medications!    - Starting today, please decrease your Percocet dosing to twice daily instead of 3 times daily.  Going forward your Percocet refills will be twice daily.  -You have been provided with refills of tizanidine, Pepcid, Protonix, and ESGIC.  Please use the muscle relaxer and ESGIC very sparingly.  -Continue with physical therapy and walking for exercise.  -Avoiding eating within 2.5-3 hours of bedtime will help decrease your reflux symptoms.    I would like you to follow-up with your primary care provider in 3 months for a recheck.    Please reach out any questions or concerns along the way.  Take Care,  Stacey Graham PA-C

## 2021-02-25 ENCOUNTER — HOSPITAL ENCOUNTER (OUTPATIENT)
Dept: PHYSICAL THERAPY | Facility: CLINIC | Age: 41
Setting detail: THERAPIES SERIES
End: 2021-02-25
Attending: INTERNAL MEDICINE
Payer: COMMERCIAL

## 2021-02-25 DIAGNOSIS — Z02.71 DISABILITY EXAMINATION: ICD-10-CM

## 2021-02-25 PROCEDURE — 97110 THERAPEUTIC EXERCISES: CPT | Mod: GP | Performed by: PHYSICAL THERAPIST

## 2021-02-25 PROCEDURE — 97161 PT EVAL LOW COMPLEX 20 MIN: CPT | Mod: GP | Performed by: PHYSICAL THERAPIST

## 2021-02-25 ASSESSMENT — 6 MINUTE WALK TEST (6MWT): TOTAL DISTANCE WALKED (FT): 1112

## 2021-02-26 NOTE — PROGRESS NOTES
Rehabilitation Services        OUTPATIENT PHYSICAL THERAPY FUNCTIONAL EVALUATION  PLAN OF TREATMENT FOR OUTPATIENT REHABILITATION  (COMPLETE FOR INITIAL CLAIMS ONLY)  Patient's Last Name, First Name, M.I.  YOB: 1980  Mily Grullon     Provider's Name   Miri Rahman, PT   Medical Record No.  [id number]     Start of Care Date:  02/25/21   Onset Date:  Order date 2/3/2021   Type:     _X__PT   ____OT  ____SLP Medical Diagnosis:   Osteoporosis, debility     PT Diagnosis:  left hip pain, decreased activity tolerance, risk of falls Visits from SOC:  1                              __________________________________________________________________________________  Plan of Treatment/Functional Goals:     GOALS  online walking ex program  She will complete online walking calss for at least 15 minutes 4 days a week for exercise.   05/26/21    sit to stand test (leg strength)  Improve on STS test from 10 reps to 13 reps in 30 seconds (no hands)  05/26/21       Therapy Frequency:  other (see comments)(see every 2 weeks)   Predicted Duration of Therapy Intervention:  3 months    Miri Rahman, PT                                    I CERTIFY THE NEED FOR THESE SERVICES FURNISHED UNDER        THIS PLAN OF TREATMENT AND WHILE UNDER MY CARE     (Physician co-signature of this document indicates review and certification of the therapy plan).                Certification Date From:    2/25/2021  Certification Date To:   5/25/2021    Referring Provider:  DR Flores    Initial Assessment  See Epic Evaluation- Start of Care Date: 02/25/21

## 2021-02-26 NOTE — PROGRESS NOTES
21 1400   Signing Clinician's Name / Credentials   Signing clinician's name / credentials Olivia SANDOVALT NCS   Dynamic Gait Index ([employer] [place], 1995)   Gait Level Surface 3   Change in Gait Speed 2   Gait and Horizontal Head Turns 3  (feels a bit dizzi)   Gait with Vertical Head Turns 3   Gait and Pivot Turns 3   Step Over Obstacle 3   Step Around Obstacles 3   Steps 2   Total Dynamic Gait Index Score  (A score of 19 or less has been correlated to an increased risk of falls in community dwelling older adults, patients with vestibular disorders, and patients with MS.)   Total Score (out of 24) 22   Dynamic Gait Index (DGI):The DGI is a measure of balance during gait that is reliable and valid for the elderly and individuals with Parkinson's disease, MS, vestibular disorders, or s/p stroke. Gait assistive device used: None    Patient score: 22/24  Scores ?19/24 indicate an increased risk for falls according to Chani et al 2000  Minimal Detectable Change = 2.9 in community dwelling elderly according to Lozano et al 2011    Assessment (rationale for performing, application to patient s function & care plan): low risk of falls with this test  Minutes billed as physical performance test: part of evaluation    Olivia Rahman DPT, MPT, NCS  Physical Therapist   Board Certified Neurologic Clinical Specialist     [facility]   [address] [place] [address]   [email or web address]  [email or web address]   [phone]   Clinic: [phone] //   Fax: [phone]

## 2021-03-01 DIAGNOSIS — M81.0 OSTEOPOROSIS OF MULTIPLE SITES: ICD-10-CM

## 2021-03-02 RX ORDER — VITAMIN B COMPLEX
1 TABLET ORAL DAILY
Qty: 100 TABLET | Refills: 3 | Status: SHIPPED | OUTPATIENT
Start: 2021-03-02 | End: 2022-03-29

## 2021-03-02 NOTE — TELEPHONE ENCOUNTER
Vitamin D, Cholecalciferol, 25 MCG (1000 UT) CAPS      Last Written Prescription Date:  1/29/2020  Last Fill Quantity: 100 cap,   # refills: 3  Last Office Visit : 1/5/2021  Future Office visit:  1/4/2022    Routing refill request to provider for review/approval because:  Medication/Vitamins not on the Endocrine refill protocol.

## 2021-03-15 DIAGNOSIS — G89.29 OTHER CHRONIC PAIN: ICD-10-CM

## 2021-03-15 DIAGNOSIS — S72.145S CLOSED NONDISPLACED INTERTROCHANTERIC FRACTURE OF LEFT FEMUR, SEQUELA: ICD-10-CM

## 2021-03-15 NOTE — TELEPHONE ENCOUNTER
See note regarding the 2/15/21 percocet refill:    Refills provided. Please call patient to schedule follow up with me for re-check on chronic conditions. Will need appointment prior to any additional refills.      Stacey Graham PA-C on 2/15/2021 at 9:21 AM    Patient was seen 2/23/21.  Due back 5/23/21 per plan.    Percocet refill:    Routing refill request to provider for review/approval because:  Drug not on the [facility] refill protocol     Lashell Woods RN  [facility]

## 2021-03-15 NOTE — TELEPHONE ENCOUNTER
Reason for Call:  Medication or medication refill:    Do you use a [facility]?  Name of the pharmacy and phone number for the current request:  [facility] [phone]    Name of the medication requested: oxyCODONE-acetaminophen (PERCOCET) 5-325 MG tablet    Other request:     Can we leave a detailed message on this number? YES    Phone number patient can be reached at: Home number on file [phone] (home)    Best Time: anytime    Call taken on 3/15/2021 at 9:40 AM by RICARDA EMERSON

## 2021-03-16 RX ORDER — OXYCODONE AND ACETAMINOPHEN 5; 325 MG/1; MG/1
0.5 TABLET ORAL 2 TIMES DAILY PRN
Qty: 30 TABLET | Refills: 0 | Status: SHIPPED | OUTPATIENT
Start: 2021-03-16 | End: 2021-04-15

## 2021-03-16 NOTE — TELEPHONE ENCOUNTER
As discussed with patient, will continue to decrease Percocet dosing.  Refill provided.Stacey Graham PA-C on 3/16/2021 at 2:09 PM

## 2021-03-24 ENCOUNTER — HOSPITAL ENCOUNTER (OUTPATIENT)
Dept: PHYSICAL THERAPY | Facility: CLINIC | Age: 41
Setting detail: THERAPIES SERIES
End: 2021-03-24
Attending: INTERNAL MEDICINE
Payer: COMMERCIAL

## 2021-03-24 PROCEDURE — 97110 THERAPEUTIC EXERCISES: CPT | Mod: GP | Performed by: PHYSICAL THERAPIST

## 2021-03-24 NOTE — DISCHARGE INSTRUCTIONS
"Go to YOUTUBE and type in \"walking workouts 15 minutes seniors\"    15 minute at home walking video was one we tried today     mile happy walk we also tried    Beginner walking workouts    Need to do 15 minutes every other day for a total of 3-4 times a week.  Do not do on shopping day.    See about the FIT wally on your android phone. IF phone is on your body it will count your steps or how long you walked.    Olivia  "

## 2021-03-24 NOTE — IP AVS SNAPSHOT
"                  MRN:[id number]                      After Visit Summary   3/24/2021    Mily Grullon    MRN: [id number]           Visit Information        Provider Department      3/24/2021 11:15 AM Miri Rahman PT [facility]        Your next 10 appointments already scheduled    2021 11:00 AM  Post-Op Visit with Zackary Alonzo MD  [facility] ([facility] ) [address] [place] [phone]      2022  3:00 PM  ENDOCRINE RETURN with Dallas Flores MD  [facility] ([facility]) [address] [place] [address] [phone]           Further instructions from your care team       Go to [employer] and type in \"walking workouts 15 minutes seniors\"    15 minute at home walking video was one we tried today     mile happy walk we also tried    Beginner walking workouts    Need to do 15 minutes every other day for a total of 3-4 times a week.  Do not do on shopping day.    See about the FIT wally on your android phone. IF phone is on your body it will count your steps or how long you walked.    Olivia    [employer] Information    [employer] lets you send messages to your doctor, view your test results, renew your prescriptions, schedule appointments and more. To sign up, go to [email or web address] . Click on \"Log in\" on the left side of the screen, which will take you to the Welcome page. Then click on \"Sign up Now\" on the right side of the page.     You will be asked to enter the access code listed below, as well as some personal information. Please follow the directions to create your username and password.     Your access code is: [email or web address]  Expires: 2021 12:11 PM     Your access code will  in 60 days. If you need help or a new code, please call your   [facility] or [phone].     "   Care EveryWhere ID    This is your Care EveryWhere ID. This could be used by other organizations to access your [place] [facility] medical records  [id number]       Equal Access to Services    JASSON VIZCARRA : Hadii aad ku hadvenuaneudy Bliss, michaeldeepa markfeiha, nehatj huynhkeri weller, abdifatah spivey. So [facility] [phone].    ATENCIÓN: Si habla español, tiene a lentz disposición servicios gratuitos de asistencia lingüística. Llame al [phone].    We comply with applicable federal and state civil rights laws, including the [place] Human Rights Act. We do not discriminate on the basis of race, color, creed, Yazidism, national origin, marital status, age, disability, sex, sexual orientation, or gender identity.    If you would like an itemization of your charges they will now be available in [employer] 30 days after discharge. To access the itemized statements in [employer] go to billing/billing summary. From there select view account. There will be multiple tabs showing an overview of your account, detail, payments, and communications. From the communications tab you can see your monthly statements, your itemized statements, and any billing letters generated for your account. If you do not have a [employer] account and need help getting access please contact [employer] support at [phone].  If you would prefer to have your itemized statements mailed please contact our automated itemized bill request line at [phone] option  2.

## 2021-03-25 DIAGNOSIS — G44.219 EPISODIC TENSION-TYPE HEADACHE, NOT INTRACTABLE: ICD-10-CM

## 2021-03-25 RX ORDER — BUTALBITAL, ACETAMINOPHEN AND CAFFEINE 50; 325; 40 MG/1; MG/1; MG/1
TABLET ORAL
Qty: 20 TABLET | Refills: 0 | Status: CANCELLED | OUTPATIENT
Start: 2021-03-25

## 2021-03-25 NOTE — TELEPHONE ENCOUNTER
Routing refill request to provider for review/approval because:  Drug not on the [facility] refill protocol     Last Written Prescription Date:  2-23-21  Last Fill Quantity: 20,  # refills: 0   Last office visit: 2/23/2021 with prescribing provider:  Stacey Graham   Future Office Visit:

## 2021-03-26 ENCOUNTER — VIRTUAL VISIT (OUTPATIENT)
Dept: FAMILY MEDICINE | Facility: CLINIC | Age: 41
End: 2021-03-26
Payer: COMMERCIAL

## 2021-03-26 DIAGNOSIS — N76.0 VAGINITIS AND VULVOVAGINITIS: Primary | ICD-10-CM

## 2021-03-26 DIAGNOSIS — N30.00 ACUTE CYSTITIS WITHOUT HEMATURIA: ICD-10-CM

## 2021-03-26 DIAGNOSIS — G44.219 EPISODIC TENSION-TYPE HEADACHE, NOT INTRACTABLE: ICD-10-CM

## 2021-03-26 PROCEDURE — 99213 OFFICE O/P EST LOW 20 MIN: CPT | Mod: 95 | Performed by: PHYSICIAN ASSISTANT

## 2021-03-26 RX ORDER — BUTALBITAL, ACETAMINOPHEN AND CAFFEINE 50; 325; 40 MG/1; MG/1; MG/1
TABLET ORAL
Qty: 20 TABLET | Refills: 0 | Status: SHIPPED | OUTPATIENT
Start: 2021-03-26 | End: 2021-04-27

## 2021-03-26 RX ORDER — METRONIDAZOLE 7.5 MG/G
1 GEL VAGINAL DAILY
Qty: 25 G | Refills: 0 | Status: SHIPPED | OUTPATIENT
Start: 2021-03-26 | End: 2021-03-31

## 2021-03-26 RX ORDER — NITROFURANTOIN 25; 75 MG/1; MG/1
100 CAPSULE ORAL 2 TIMES DAILY
Qty: 14 CAPSULE | Refills: 0 | Status: SHIPPED | OUTPATIENT
Start: 2021-03-26 | End: 2021-04-02

## 2021-03-26 NOTE — PROGRESS NOTES
Mily is a 40 year old who is being evaluated via a billable telephone visit.      What phone number would you like to be contacted at? [phone]  How would you like to obtain your AVS? Mail a copy        Subjective   Mily is a 40 year old who presents for the following health issues     HPI     Genitourinary - Female  Onset/Duration: x 3-4 days  Description:   Painful urination (Dysuria): YES           Frequency: hard to say  Blood in urine (Hematuria): notices pink when wipes  Delay in urine (Hesitency): a little  Intensity: moderate  Progression of Symptoms:  worsening  Accompanying Signs & Symptoms:  Fever/chills: no  Flank pain: no  Nausea and vomiting: no  Vaginal symptoms: itching and pain  Abdominal/Pelvic Pain: no  History:   History of frequent UTI s: YES  History of kidney stones: YES  Sexually Active: YES  Possibility of pregnancy: No  Precipitating or alleviating factors: raw from wiping so much, sometimes put a piece of tissue down there  Therapies tried and outcome: vagisil   No vaginal discharge .  Some urinary urgency/frequncy.   No fever or flank pain.   Headaches - Stress - refill medication      How many servings of fruits and vegetables do you eat daily?  2-3    On average, how many sweetened beverages do you drink each day (Examples: soda, juice, sweet tea, etc.  Do NOT count diet or artificially sweetened beverages)?   0    How many days per week do you exercise enough to make your heart beat faster? 4    How many minutes a day do you exercise enough to make your heart beat faster? 30 - 60    How many days per week do you miss taking your medication? 0      Review of Systems   Constitutional, HEENT, cardiovascular, pulmonary, GI, , musculoskeletal, neuro, skin, endocrine and psych systems are negative, except as otherwise noted.      Objective           Vitals:  No vitals were obtained today due to virtual visit.    Physical Exam   healthy, alert and no distress  PSYCH: Alert and  oriented times 3; coherent speech, normal   rate and volume, able to articulate logical thoughts, able   to abstract reason, no tangential thoughts, no hallucinations   or delusions  Her affect is normal  RESP: No cough, no audible wheezing, able to talk in full sentences  Remainder of exam unable to be completed due to telephone visits    Mily was seen today for urinary problem and headache.    Diagnoses and all orders for this visit:    Vaginitis and vulvovaginitis  -     metroNIDAZOLE (METROGEL) 0.75 % vaginal gel; Place 1 applicator (5 g) vaginally daily for 5 days    Episodic tension-type headache, not intractable  -     butalbital-acetaminophen-caffeine (ESGIC) -40 MG tablet; TAKE 1 TABLET BY MOUTH AT ONSET OF HEADACHE.  MAX 1 TABLET PER DAY AND 20 TABLETS PER MONTH. Next fill earliest date is 3/26/21.    Acute cystitis without hematuria  -     nitroFURantoin macrocrystal-monohydrate (MACROBID) 100 MG capsule; Take 1 capsule (100 mg) by mouth 2 times daily for 7 days      Advised supportive and symptomatic treatment.  Follow up with Provider - if condition persists or worsens.           Phone call duration: 8 minutes

## 2021-04-01 ENCOUNTER — HOSPITAL ENCOUNTER (OUTPATIENT)
Dept: PHYSICAL THERAPY | Facility: CLINIC | Age: 41
Setting detail: THERAPIES SERIES
End: 2021-04-01
Attending: INTERNAL MEDICINE
Payer: COMMERCIAL

## 2021-04-01 PROCEDURE — 97110 THERAPEUTIC EXERCISES: CPT | Mod: GP | Performed by: PHYSICAL THERAPIST

## 2021-04-05 ENCOUNTER — OFFICE VISIT (OUTPATIENT)
Dept: OBGYN | Facility: CLINIC | Age: 41
End: 2021-04-05
Payer: COMMERCIAL

## 2021-04-05 ENCOUNTER — TELEPHONE (OUTPATIENT)
Dept: OBGYN | Facility: CLINIC | Age: 41
End: 2021-04-05

## 2021-04-05 ENCOUNTER — PREP FOR PROCEDURE (OUTPATIENT)
Dept: OBGYN | Facility: CLINIC | Age: 41
End: 2021-04-05

## 2021-04-05 VITALS
OXYGEN SATURATION: 100 % | HEART RATE: 60 BPM | BODY MASS INDEX: 33.63 KG/M2 | DIASTOLIC BLOOD PRESSURE: 66 MMHG | SYSTOLIC BLOOD PRESSURE: 113 MMHG | WEIGHT: 178 LBS

## 2021-04-05 DIAGNOSIS — Z09 POSTOPERATIVE EXAMINATION: ICD-10-CM

## 2021-04-05 DIAGNOSIS — R22.2 ABDOMINAL WALL LUMP: Primary | ICD-10-CM

## 2021-04-05 PROCEDURE — 99214 OFFICE O/P EST MOD 30 MIN: CPT | Performed by: OBSTETRICS & GYNECOLOGY

## 2021-04-05 NOTE — TELEPHONE ENCOUNTER
Associated Diagnoses    Abdominal wall lump [R22.2]  - Primary       Source Order Set    Order Set Name Order ID    [id number]   Case Request: Case Info    Panel 1 (Provider: Zackary Alonzo MD)    Procedures Laterality Anesthesia Region   Excision of abdominal wall lump Left Combined MAC with Local       Requested date:    Location:  OR   Patient class:       Pre-op diagnoses:  Abdominal wall lump   Scheduling Instructions    Additional Instructions for the Case     Surgical Assistant: No   Multi Surgeon Case No   H&P:  Pre-op options: PCP   Post-op:  6 weeks   Vendor: No   Surgical time needed: 45 Minutes   ERAS patient: NA       Surgery Scheduled    Date of Surgery 2021 Time of Surgery 10:30 a.m.  Type of Anesthesia Anticipated: Local with MAC  Pre-Op: On 5/3 with Jenifer Graham at 9:00 a.m.  Post-Op:   11:00 Dr. Alonzo  Pre-certification routed to Financial Counselors:  Yes auto routes    Surgery packet mailed to patient's home address: Yes  Patient instructed NPO 12 hours prior to surgery, arrive 1 hour(s) prior to surgery, must have a .  Patient understood and agrees to the plan.      COVID testin/3 [facility] at 8:45 a.m.    Surgery Pre-Certification    Medical Record Number: [id number]  Mily Grullon  YOB: 1980   Phone: [phone] (home)   Primary Provider: William Smith    Reason for Admit:  Abdominal wall lump    Surgeon: Zackary Alonzo MD  Surgical Procedure: Excision of abdominal wall lump  ICD-9 Coded: Abdominal wall lump [R22.2]   Date of Surgery:   Consent signed? N/A      Hospital: [facility]  Outpatient    Requestor:  Lashell Muniz     Location:  [place]

## 2021-04-05 NOTE — PATIENT INSTRUCTIONS
If you have any questions regarding your visit, Please contact your care team.     [employer]: [phone]  Women s Health CLINIC HOURS TELEPHONE NUMBER       Zackary Alonzo M.D.    Sarah-KOBE Vargas-KOBE Wiggins-Medical Assistant    Mily-  Margaret-     Monday-[place]  8:00a.m-4:45 p.m  Tuesday-[place]  9:00a.m-4:00 p.m  Wednesday-[place] 8:00a.m-4:45 p.m.  Thursday-[place]  8:00a.m-4:45 p.m.  Friday-[place]  8:00a.m-4:45 p.m. [facility]  [address] [facility] [place] [address] [phone] ask for [facility]  [phone] Fax  Imaging [phone]    [facility] Labor and Delivery  [address]  [place], [place] [address] [phone]    [facility]  [address] [place]  [place] [address] [phone] ask [facility]  [phone] Fax  Imaging [phone]     Urgent Care locations:    [place]        [place] Monday-Friday  5 pm - 9 pm  Saturday and Sunday   9 am - 5 pm  Monday-Friday   11 am - 9 pm  Saturday and Sunday   9 am - 5 pm   [phone]   If you need a medication refill, please contact your pharmacy. Please allow 3 business days for your refill to be completed.  As always, Thank you for trusting us with your healthcare needs!

## 2021-04-06 NOTE — PROGRESS NOTES
OB-GYN Problem-Oriented Visit or Consultation      Mily Grullon is a 40 year old year old P 0 who presents with a chief complaint of postop exam and persistent LLQ abd wall lump.  Referred by self.  Patient's last menstrual period was 11/16/2020 (exact date).    Assessment:   Encounter Diagnoses   Name Primary?     Abdominal wall lump Yes     Postoperative examination          Plan and Recommendations: Non-specific but persistent lump. Discussed imaging options for evaluation. Advise excise lump. IV sedation MAC and local. MGASC. Will use separate small incision.   I reviewed the condition, causes, differential diagnosis, prognosis, evaluation and management considerations and options.  Questions answered and information given. See orders.     I discussed the operation, indications, goals, general and specific risks, complications, alternatives and anticipated post-op course including success/failure rates, limitations and consequences.  Patient understands and desires to proceed.  Instructions reviewed. Second opinion or subspecialty evaluation and management also offered.  Pre-anesthetic medical evaluation is to be arranged.    30 minutes spent on the date of encounter doing chart review, history, examination, discussion with patient, and documentation, and further activities as noted, and review of appropriateness of decision-making for care.     A/P:  Mily was seen today for surgical followup.    Diagnoses and all orders for this visit:    Abdominal wall lump  -     Arlene-Operative Worksheet    Postoperative examination        Zackary Alonzo MD    HPI:     Doing well following total abdominal hysterectomy 5 months ago except persistent tender LLQ lump adjacent to incision. No vaginal bleeding. Recent vaginitis treated. Continues ET.     Past medical, obstetrical, surgical, family and social history reviewed and as noted or updated in chart.     Allergies, meds and supplements are as noted or updated in chart.       ROS:   Systems reviewed include:  constitutional, gastrointestinal, genitourinary, integumentary, psychological, hematologic/lymphatic and endocrine.    These systems were negative for significant symptoms except for the following additional: none; see HPI.    EXAM:  VS as noted. /66 (BP Location: Right arm, Patient Position: Chair, Cuff Size: Adult Regular)   Pulse 60   Wt 80.7 kg (178 lb)   LMP 11/16/2020 (Exact Date)   SpO2 100%   Breastfeeding No   BMI 33.63 kg/m    Constitutionally normal.     Abd and Pelvis are  normal or negative except for, or in particular noting, the following  pertinent findings: tender oval superficial 2 cm mass in subcutaneous tissue just cephalad to healed surgical scar. This seems slightly smaller than before. Vaginal cuff well healed and supported without nodularity. No discharge.     Zackary Alonzo MD

## 2021-04-13 NOTE — ADDENDUM NOTE
Addended by: ROHAN KIM on: 4/12/2021 10:04 PM     Modules accepted: Orders     Alert and oriented, no focal deficits, no motor or sensory deficits.

## 2021-04-15 ENCOUNTER — OFFICE VISIT (OUTPATIENT)
Dept: FAMILY MEDICINE | Facility: CLINIC | Age: 41
End: 2021-04-15
Payer: COMMERCIAL

## 2021-04-15 ENCOUNTER — ANCILLARY PROCEDURE (OUTPATIENT)
Dept: GENERAL RADIOLOGY | Facility: CLINIC | Age: 41
End: 2021-04-15
Attending: PHYSICIAN ASSISTANT
Payer: COMMERCIAL

## 2021-04-15 VITALS
HEIGHT: 61 IN | OXYGEN SATURATION: 100 % | TEMPERATURE: 96.9 F | DIASTOLIC BLOOD PRESSURE: 80 MMHG | HEART RATE: 76 BPM | BODY MASS INDEX: 34.06 KG/M2 | RESPIRATION RATE: 16 BRPM | SYSTOLIC BLOOD PRESSURE: 110 MMHG | WEIGHT: 180.38 LBS

## 2021-04-15 DIAGNOSIS — M25.552 HIP PAIN, LEFT: Primary | ICD-10-CM

## 2021-04-15 DIAGNOSIS — S72.145S CLOSED NONDISPLACED INTERTROCHANTERIC FRACTURE OF LEFT FEMUR, SEQUELA: ICD-10-CM

## 2021-04-15 DIAGNOSIS — G89.29 OTHER CHRONIC PAIN: ICD-10-CM

## 2021-04-15 PROCEDURE — 73502 X-RAY EXAM HIP UNI 2-3 VIEWS: CPT | Performed by: RADIOLOGY

## 2021-04-15 PROCEDURE — 99214 OFFICE O/P EST MOD 30 MIN: CPT | Performed by: PHYSICIAN ASSISTANT

## 2021-04-15 RX ORDER — OXYCODONE AND ACETAMINOPHEN 5; 325 MG/1; MG/1
0.5 TABLET ORAL 2 TIMES DAILY PRN
Qty: 30 TABLET | Refills: 0 | Status: SHIPPED | OUTPATIENT
Start: 2021-04-15 | End: 2021-05-11

## 2021-04-15 ASSESSMENT — PAIN SCALES - GENERAL: PAINLEVEL: MILD PAIN (3)

## 2021-04-15 ASSESSMENT — ENCOUNTER SYMPTOMS
NERVOUS/ANXIOUS: 0
ABDOMINAL PAIN: 0
SHORTNESS OF BREATH: 0
FEVER: 0
LIGHT-HEADEDNESS: 0

## 2021-04-15 ASSESSMENT — MIFFLIN-ST. JEOR: SCORE: 1420.56

## 2021-04-15 NOTE — PROGRESS NOTES
Assessment & Plan     Other chronic pain  Hip pain, left  Patient is a 40 YOF who presents to clinic due to ongoing left hip pain. Physical therapist was concerned for new injury given increase in pain. Patient feels that increase in pain is related to increase in activity. Vial signs normal. Physical exam with normal ROM and mild pain with ER/IR. Gait with cane use has improved since last. Xray negative for fracture or OA. Hardware appears in place and without loosening. Symptoms are likely muscular and due to increase in activity. Recommended continuing PT, ice, Tylenol and pain medications as prescribed. Patient to continue with taper of narcotic pain medications.     - oxyCODONE-acetaminophen (PERCOCET) 5-325 MG tablet; Take 0.5 tablets by mouth 2 times daily as needed for severe pain  - XR Hip Left 2-3 Views    Closed nondisplaced intertrochanteric fracture of left femur, sequela  History of fracture with hardware fixation. No new injuries. Working on taper of narcotic pain medications.     - oxyCODONE-acetaminophen (PERCOCET) 5-325 MG tablet; Take 0.5 tablets by mouth 2 times daily as needed for severe pain    To note: Patient's PCP on extended leave. Patient needs to establish new PCP in the interim and needs to complete Physical. Patient will schedule annual exam upon exit today.     See Patient Instructions    Return in about 2 weeks (around 4/29/2021), or if symptoms worsen or fail to improve.    DEONDRE Cohen [facility] EFFIE Minor is a 40 year old who presents for the following health issues     HPI     Chronic Pain Follow-Up  No new falls. Pain has always been present in the left him, but patient notes that with increase in walking, pain has slightly increased and is sharp, dull, and achy. It is more intense with pressure on the hip. No specific position that feels great. Pain is worse as the day progresses.   Where in your body do you have pain? Left hip and  "femur  How has your pain affected your ability to work? Unable to work due to pain   What type of work do you or did you do? [employer]  Which of these pain treatments have you tried since your last clinic visit? PT, pain medications  How well are you sleeping? Poor  How has your mood been since your last visit? About the same  Have you had a significant life event? No  Other aggravating factors: Walking, prolonged movement  Taking medication as directed? Yes    PHQ-9 SCORE 2/5/2020 2/5/2020 2/5/2020   PHQ-9 Total Score - - -   PHQ-9 Total Score - - -   PHQ-9 External Data 16 16 16     DEJON-7 SCORE 7/17/2019 8/30/2019 12/1/2020   Total Score 17 16 18     No flowsheet data found.  Encounter-Level CSA - 11/30/2018:    Controlled Substance Agreement - Scan on 12/11/2018  9:24 AM: CONTROLLED SUBSTANCE AGREEMENT     Encounter-Level CSA - 06/15/2016:    Controlled Substance Agreement - Scan on 6/22/2016  1:18 PM: CONTROLLED SUBSTANCE AGREEMENT     Encounter-Level CSA - 06/02/2015:    Controlled Substance Agreement - Scan on 8/4/2015 10:40 AM: CONTROLLED MEDICATION AGREEMENT 06/02/15     Patient-Level CSA:    There are no patient-level csa.               Review of Systems   Constitutional: Negative for fever.   HENT: Negative for congestion.    Respiratory: Negative for shortness of breath.    Cardiovascular: Negative for chest pain.   Gastrointestinal: Negative for abdominal pain.   Musculoskeletal:        Left hip pain   Skin: Negative for rash.   Neurological: Negative for light-headedness.   Psychiatric/Behavioral: The patient is not nervous/anxious.             Objective    /80   Pulse 76   Temp 96.9  F (36.1  C) (Tympanic)   Resp 16   Ht 1.549 m (5' 1\")   Wt 81.8 kg (180 lb 6 oz)   LMP 11/16/2020 (Exact Date)   SpO2 100%   BMI 34.08 kg/m    Body mass index is 34.08 kg/m .  Physical Exam  Vitals signs and nursing note reviewed.   Constitutional:       General: She is not in acute distress.     " Appearance: Normal appearance.   HENT:      Head: Normocephalic and atraumatic.   Eyes:      Extraocular Movements: Extraocular movements intact.      Pupils: Pupils are equal, round, and reactive to light.   Neck:      Musculoskeletal: Normal range of motion.   Cardiovascular:      Rate and Rhythm: Normal rate and regular rhythm.      Heart sounds: Normal heart sounds.   Pulmonary:      Effort: Pulmonary effort is normal.      Breath sounds: Normal breath sounds.   Musculoskeletal: Normal range of motion.      Comments: Sensation intact and equal to bilateral lower extremities.   Able to ambulate easily/smoothly with cane. Improved from prior evaluations.   Left hip: Normal ROM. Mild pain with IR/ER. Mild tenderness to palpation of greater trochanter and hip     Skin:     General: Skin is warm and dry.   Neurological:      General: No focal deficit present.      Mental Status: She is alert.   Psychiatric:         Mood and Affect: Mood normal.         Behavior: Behavior normal.            Xray - Reviewed and interpreted by me.  Left hip 2 views:   Hardware in place and without signs of loosening. No fracture. No joint space narrowing.

## 2021-04-15 NOTE — PATIENT INSTRUCTIONS
Your x-rays of your hip do not show any new abnormality such as fractures or arthritis.  Your hip pain may be due to increase in exercise.  For pain management please use your prescribed medications as well as Tylenol and ice.    As discussed, please schedule a physical with one of our primary care providers on your way out today.    Please reach out with questions or concerns.

## 2021-04-15 NOTE — LETTER
April 16, 2021      Mily Grullon  [address]  [place] [address]        Dear ,    We are writing to inform you of your test results.    As we discussed, your x-rays do not show any worrisome findings.  Please continue with physical therapy as well as Tylenol and ice for pain management.     Resulted Orders   XR Hip Left 2-3 Views    Narrative    HIP LEFT TWO TO THREE VIEWS   4/15/2021 11:30 AM     HISTORY:  History of osteoporosis, chronic pain, but worse. Evaluate  for fracture. Hip pain, left.    COMPARISON: Radiographs from 1/14/2016.      Impression    IMPRESSION: Postoperative changes in the proximal left femur again  noted. No complication evident. No evidence of fracture. No  degenerative changes are evident in the left hip. Mild degenerative  changes in the left SI joint. Otherwise unremarkable.    RYAN ETIENNE MD       If you have any questions or concerns, please call the clinic at the number listed above.       Sincerely,      Stacey Graham PA-C/austen

## 2021-04-22 ENCOUNTER — HOSPITAL ENCOUNTER (OUTPATIENT)
Dept: PHYSICAL THERAPY | Facility: CLINIC | Age: 41
Setting detail: THERAPIES SERIES
End: 2021-04-22
Attending: INTERNAL MEDICINE
Payer: COMMERCIAL

## 2021-04-22 PROCEDURE — 97110 THERAPEUTIC EXERCISES: CPT | Mod: GP | Performed by: PHYSICAL THERAPIST

## 2021-04-26 ENCOUNTER — TELEPHONE (OUTPATIENT)
Dept: FAMILY MEDICINE | Facility: CLINIC | Age: 41
End: 2021-04-26

## 2021-04-26 ENCOUNTER — TELEPHONE (OUTPATIENT)
Dept: INTERNAL MEDICINE | Facility: CLINIC | Age: 41
End: 2021-04-26

## 2021-04-26 DIAGNOSIS — G44.219 EPISODIC TENSION-TYPE HEADACHE, NOT INTRACTABLE: ICD-10-CM

## 2021-04-26 NOTE — TELEPHONE ENCOUNTER
Reva García         4/26/21 3:17 PM  Note     Patient is calling for refill on butalbital-acetaminophen-caffeine (ESGIC) -40 MG tablet     Pt says last time it was filled by someone else and she wants it filled by [facility].      Please call patient with any questions at [phone]

## 2021-04-26 NOTE — TELEPHONE ENCOUNTER
Patient is calling for refill on butalbital-acetaminophen-caffeine (ESGIC) -40 MG tablet    Pt says last time it was filled by someone else and she wants it filled by [facility].     Please call patient with any questions at [phone]

## 2021-04-26 NOTE — TELEPHONE ENCOUNTER
Routing refill request to provider for review/approval because:  Drug not on the [facility] refill protocol     Last Written Prescription Date:  3-26-21  Last Fill Quantity: 20,  # refills: 0   Last office visit: 4/15/2021 with prescribing provider:  Stacey Graham   Future Office Visit:   Next 5 appointments (look out 90 days)    May 03, 2021  8:45 AM  Pre-procedure Covid with [facility] LAB  [facility] ([facility] ) [address]  [place] [address]  [phone]   May 03, 2021  9:00 AM  Pre-Op physical with Stacey Graham PA-C  [facility] [place] ([facility] [place] ) [address]  [place] [address]  [phone]   May 25, 2021 10:30 AM  PHYSICAL with Mireya Agarwal MD  [facility] [place] ([facility] ) [address]  [place] [address]  [phone]

## 2021-04-27 RX ORDER — BUTALBITAL, ACETAMINOPHEN AND CAFFEINE 50; 325; 40 MG/1; MG/1; MG/1
TABLET ORAL
Qty: 20 TABLET | Refills: 0 | Status: SHIPPED | OUTPATIENT
Start: 2021-04-27 | End: 2021-05-25

## 2021-05-03 ENCOUNTER — OFFICE VISIT (OUTPATIENT)
Dept: FAMILY MEDICINE | Facility: CLINIC | Age: 41
End: 2021-05-03
Payer: COMMERCIAL

## 2021-05-03 VITALS
DIASTOLIC BLOOD PRESSURE: 74 MMHG | HEART RATE: 67 BPM | HEIGHT: 61 IN | TEMPERATURE: 97.2 F | RESPIRATION RATE: 18 BRPM | SYSTOLIC BLOOD PRESSURE: 110 MMHG | BODY MASS INDEX: 35.12 KG/M2 | OXYGEN SATURATION: 99 % | WEIGHT: 186 LBS

## 2021-05-03 DIAGNOSIS — K21.9 GASTROESOPHAGEAL REFLUX DISEASE, UNSPECIFIED WHETHER ESOPHAGITIS PRESENT: ICD-10-CM

## 2021-05-03 DIAGNOSIS — Z01.818 PREOP GENERAL PHYSICAL EXAM: Primary | ICD-10-CM

## 2021-05-03 DIAGNOSIS — R22.2 ABDOMINAL WALL LUMP: ICD-10-CM

## 2021-05-03 DIAGNOSIS — M81.0 OSTEOPOROSIS OF MULTIPLE SITES: ICD-10-CM

## 2021-05-03 DIAGNOSIS — G89.29 OTHER CHRONIC PAIN: ICD-10-CM

## 2021-05-03 DIAGNOSIS — F33.1 MODERATE EPISODE OF RECURRENT MAJOR DEPRESSIVE DISORDER (H): ICD-10-CM

## 2021-05-03 LAB
ANION GAP SERPL CALCULATED.3IONS-SCNC: 6 MMOL/L (ref 3–14)
BUN SERPL-MCNC: 10 MG/DL (ref 7–30)
CALCIUM SERPL-MCNC: 8.8 MG/DL (ref 8.5–10.1)
CHLORIDE SERPL-SCNC: 110 MMOL/L (ref 94–109)
CO2 SERPL-SCNC: 23 MMOL/L (ref 20–32)
CREAT SERPL-MCNC: 0.71 MG/DL (ref 0.52–1.04)
ERYTHROCYTE [DISTWIDTH] IN BLOOD BY AUTOMATED COUNT: 14.4 % (ref 10–15)
GFR SERPL CREATININE-BSD FRML MDRD: >90 ML/MIN/{1.73_M2}
GLUCOSE SERPL-MCNC: 90 MG/DL (ref 70–99)
HCT VFR BLD AUTO: 39.8 % (ref 35–47)
HGB BLD-MCNC: 12.5 G/DL (ref 11.7–15.7)
LABORATORY COMMENT REPORT: NORMAL
MCH RBC QN AUTO: 24.9 PG (ref 26.5–33)
MCHC RBC AUTO-ENTMCNC: 31.4 G/DL (ref 31.5–36.5)
MCV RBC AUTO: 79 FL (ref 78–100)
PLATELET # BLD AUTO: 369 10E9/L (ref 150–450)
POTASSIUM SERPL-SCNC: 4 MMOL/L (ref 3.4–5.3)
RBC # BLD AUTO: 5.03 10E12/L (ref 3.8–5.2)
SARS-COV-2 RNA RESP QL NAA+PROBE: NEGATIVE
SARS-COV-2 RNA RESP QL NAA+PROBE: NORMAL
SODIUM SERPL-SCNC: 139 MMOL/L (ref 133–144)
SPECIMEN SOURCE: NORMAL
SPECIMEN SOURCE: NORMAL
WBC # BLD AUTO: 5.9 10E9/L (ref 4–11)

## 2021-05-03 PROCEDURE — U0003 INFECTIOUS AGENT DETECTION BY NUCLEIC ACID (DNA OR RNA); SEVERE ACUTE RESPIRATORY SYNDROME CORONAVIRUS 2 (SARS-COV-2) (CORONAVIRUS DISEASE [COVID-19]), AMPLIFIED PROBE TECHNIQUE, MAKING USE OF HIGH THROUGHPUT TECHNOLOGIES AS DESCRIBED BY CMS-2020-01-R: HCPCS | Performed by: OBSTETRICS & GYNECOLOGY

## 2021-05-03 PROCEDURE — 85027 COMPLETE CBC AUTOMATED: CPT | Performed by: PHYSICIAN ASSISTANT

## 2021-05-03 PROCEDURE — 99214 OFFICE O/P EST MOD 30 MIN: CPT | Performed by: PHYSICIAN ASSISTANT

## 2021-05-03 PROCEDURE — U0005 INFEC AGEN DETEC AMPLI PROBE: HCPCS | Performed by: OBSTETRICS & GYNECOLOGY

## 2021-05-03 PROCEDURE — 80048 BASIC METABOLIC PNL TOTAL CA: CPT | Performed by: PHYSICIAN ASSISTANT

## 2021-05-03 PROCEDURE — 36415 COLL VENOUS BLD VENIPUNCTURE: CPT | Performed by: PHYSICIAN ASSISTANT

## 2021-05-03 RX ORDER — PANTOPRAZOLE SODIUM 20 MG/1
40 TABLET, DELAYED RELEASE ORAL DAILY
Qty: 180 TABLET | Refills: 0 | Status: SHIPPED | OUTPATIENT
Start: 2021-05-03 | End: 2021-06-01

## 2021-05-03 ASSESSMENT — MIFFLIN-ST. JEOR: SCORE: 1446.07

## 2021-05-03 NOTE — H&P (VIEW-ONLY)
[facility]  [address]  [place] [address]  Phone: [phone]  Primary Provider: William Smith  Pre-op Performing Provider: LISETTE LISA      PREOPERATIVE EVALUATION:  Today's date: 5/3/2021    Mily Grullon is a 40 year old female who presents for a preoperative evaluation.    Surgical Information:  Surgery/Procedure: Excision of abdominal wall lump  Surgery Location: [facility]   Surgeon: Cheri  Surgery Date: 5/5/2021  Time of Surgery: 10:50AM  Where patient plans to recover: At home with family  Fax number for surgical facility: Note does not need to be faxed, will be available electronically in Epic.    Type of Anesthesia Anticipated: Combines MAC with Local    Assessment & Plan     The proposed surgical procedure is considered INTERMEDIATE risk.    Preop general physical exam  Abdominal wall lump  Patient is a 40-year-old female who presents to clinic for preoperative evaluation.  Patient has scheduled excision of abdominal wall lump 5/5/2021.  Vital signs normal.  Physical exam significant for palpable mass deep to scar from abdominal incision on LLQ.  - CBC with platelets  - Basic metabolic panel  (Ca, Cl, CO2, Creat, Gluc, K, Na, BUN)    Other chronic pain  Stable.  Patient has been compliant with taper plan and medications as prescribed.    Moderate episode of recurrent major depressive disorder (H)  Stable.  Patient has been compliant with prescribed medications.    Gastroesophageal reflux disease, unspecified whether esophagitis present  Patient notes Protonix works to control reflux, but not working as well lately.  Will increase dose to 40 mg daily.  - pantoprazole (PROTONIX) 20 MG EC tablet; Take 2 tablets (40 mg) by mouth daily Take by mouth 30-60 minutes before a meal.     Osteoporosis of multiple sites  Due to severity of osteoporosis, patient is requesting letter stating that she does not have to complete chiropractics as recommended by  insurance.  Letter provided.      Risks and Recommendations:  The patient has the following additional risks and recommendations for perioperative complications:   - No identified additional risk factors other than previously addressed    Medication Instructions:  Patient is to take all scheduled medications on the day of surgery    RECOMMENDATION:  APPROVAL GIVEN to proceed with proposed procedure, without further diagnostic evaluation.      Subjective     HPI related to upcoming procedure: Per chart review, patient evaluated by OB/GYN due to persistent nonspecific LLQ abdominal wall lump that is causing pain.  Plan for excision of abdominal wall lump 5/5/21.      Preop Questions 5/3/2021   1. Have you ever had a heart attack or stroke? No   2. Have you ever had surgery on your heart or blood vessels, such as a stent placement, a coronary artery bypass, or surgery on an artery in your head, neck, heart, or legs? No   3. Do you have chest pain with activity? No   4. Do you have a history of  heart failure? No   5. Do you currently have a cold, bronchitis or symptoms of other infection? No   6. Do you have a cough, shortness of breath, or wheezing? No   7. Do you or anyone in your family have previous history of blood clots? YES - Self Patient has history of superficial thrombophlebitis two years ago after IV placement. Patient was on anticoagulation for two months. No anticoagulation use since.   8. Do you or does anyone in your family have a serious bleeding problem such as prolonged bleeding following surgeries or cuts? No   9. Have you ever had problems with anemia or been told to take iron pills? No   10. Have you had any abnormal blood loss such as black, tarry or bloody stools, or abnormal vaginal bleeding? Yes-vaginal bleeding. Hysterectomy completed 12/4/20.    11. Have you ever had a blood transfusion? YES - following hysterectomy.    11a. Have you ever had a transfusion reaction? No   12. Are you willing  to have a blood transfusion if it is medically needed before, during, or after your surgery? Yes   13. Have you or any of your relatives ever had problems with anesthesia? No   14. Do you have sleep apnea, excessive snoring or daytime drowsiness? No   15. Do you have any artifical heart valves or other implanted medical devices like a pacemaker, defibrillator, or continuous glucose monitor? No   16. Do you have artificial joints? No   17. Are you allergic to latex? No   18. Is there any chance that you may be pregnant? No     Health Care Directive:  Patient does not have a Health Care Directive or Living Will: Discussed advance care planning with patient; however, patient declined at this time.    Preoperative Review of : Patient compliant with chronic pain medications as prescribed    Status of Chronic Conditions:  See problem list for active medical problems.  Problems all longstanding and stable, except as noted/documented.  See ROS for pertinent symptoms related to these conditions.    DEPRESSION - Patient has a long history of Depression of moderate severity requiring medication for control with recent symptoms being stable. Current symptoms of depression include none.     Chronic pain-patient has been compliant with pain medications in process of taper as recommended.    Reflux-Patient notes Protonix works to control acid reflux, but is not working as well as before. She is taking 20mg daily.     Patient requesting note as insurance is requesting that she see chiropractics. She has history of osteoporosis and is concerned for risks of fracture.     Review of Systems  CONSTITUTIONAL: NEGATIVE for fever, chills  INTEGUMENTARY/SKIN: NEGATIVE for worrisome rashes  EYES: NEGATIVE for vision changes   ENT/MOUTH: NEGATIVE for ear, mouth and throat problems  RESP: NEGATIVE for significant cough or SOB  CV: NEGATIVE for chest pain, palpitations or peripheral edema  GI: LLQ Abdominal wall pain. NEGATIVE for nausea,  abdominal pain, heartburn, or change in bowel habits  : NEGATIVE for frequency, dysuria, or hematuria  MUSCULOSKELETAL: NEGATIVE for significant arthralgias or myalgia  NEURO: NEGATIVE for weakness, dizziness or paresthesias  ENDOCRINE: NEGATIVE for temperature intolerance, skin/hair changes  HEME: NEGATIVE for bleeding problems  PSYCHIATRIC: NEGATIVE for changes in mood or affect    Patient Active Problem List    Diagnosis Date Noted     Abdominal wall lump 04/05/2021     Priority: Medium     Added automatically from request for surgery [id number]       Falls frequently 06/23/2020     Priority: Medium     Moderate episode of recurrent major depressive disorder (H) 10/02/2019     Priority: Medium     Anxiety 07/17/2019     Priority: Medium     Follows with Pedro Luis (Lashell Murray)  Has [facility] worker       Low serum cortisol level (H) 03/16/2019     Priority: Medium     Other chronic pain 11/30/2018     Priority: Medium     Related to multiple fractures related to premature osteoporosis  As of 11/30/2018, limit of 3 Percocet 5/325 daily with intention to taper in months ahead       Controlled substance agreement signed 11/30/2018     Priority: Medium     Prescriber of controlled substances: William Smith        Obesity (BMI 35.0-39.9) with comorbidity (H) 10/24/2018     Priority: Medium     Superficial phlebitis 09/27/2018     Priority: Medium     Gastroesophageal reflux disease, esophagitis presence not specified 09/10/2018     Priority: Medium     IMO Regulatory Load OCT 2020       Closed nondisplaced fracture of body of left calcaneus with delayed healing, subsequent encounter 03/09/2018     Priority: Medium     Heel pain, chronic, left 03/09/2018     Priority: Medium     Pain in joint, ankle and foot, left 11/15/2017     Priority: Medium     Migraine without aura and without status migrainosus, not intractable 11/10/2015     Priority: Medium     Closed nondisplaced intertrochanteric fracture of left femur (H)  02/10/2014     Priority: Medium     Osteoporosis of multiple sites 12/10/2013     Priority: Medium     Chronic wrist pain 11/14/2012     Priority: Medium     Overview:   Due to TFCC tears and subsequent distal ulna resection and carpal fusion       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 Glover in [place]       Gastritis      Priority: Medium     dx 2010- sees Dr Glover in [facility]       CARDIOVASCULAR SCREENING; LDL GOAL LESS THAN 160 10/31/2010     Priority: Medium     Personal history of nicotine dependence 12/17/2009     Priority: Medium      Past Medical History:   Diagnosis Date     Class 2 severe obesity due to excess calories with serious comorbidity and body mass index (BMI) of 35.0 to 35.9 in adult (H)      Endometriosis, site unspecified      Gastritis 2010    dx 2010- sees Dr Glover in [facility]     Osteoporosis      Urinary calculus, unspecified     kidney stones, age 19-20     Wrist injury 11/19/2003    Workman's Comp- left wrist- narc agreement on file     Past Surgical History:   Procedure Laterality Date     ARTHROSCOPY HIP, OSTEOPLASTY FEMUR PROXIMAL, COMBINED Left 06/29/2016    Procedure: COMBINED ARTHROSCOPY HIP, OSTEOPLASTY FEMUR PROXIMAL;  Surgeon: Godwin Deleon MD;  Location: [facility] OR     ARTHROSCOPY OF JOINT UNLISTED  04/01/2004    Left wrist, with debridement and repair of tear.     C TOTAL ABDOM HYSTERECTOMY  2020    with LSO     HC REMOVAL OF OVARY/TUBE(S)  06/01/2003    right with fallopian tube     HC REPAIR TRIANGULAR CART,WRIST JT  01/01/2005    Dr Eloy Sosa     HC REPAIR TRIANGULAR CART,WRIST JT  2007 or so    ulnar excision- Dr Eloy Sosa     LAPAROSCOPIC ABLATION ENDOMETRIOSIS      x 2     LITHOTRIPSY       Current Outpatient Medications   Medication Sig Dispense Refill     Calcium-Phosphorus-Vitamin D 250-100-500 MG-MG-UNIT CHEW Take 2 tablets by mouth daily 60 tablet 11     DULoxetine  (CYMBALTA) 60 MG capsule Take 60 mg by mouth 2 times daily        estrogen conj (PREMARIN) 1.25 MG tablet Take 1 tablet (1.25 mg) by mouth daily 90 tablet 3     eszopiclone (LUNESTA) 1 MG tablet Take 1 mg by mouth nightly as needed       famotidine (PEPCID) 20 MG tablet Take 1 tablet (20 mg) by mouth daily as needed (heartburn/reflux) 90 tablet 1     fluticasone (FLONASE) 50 MCG/ACT nasal spray Spray 1-2 sprays into both nostrils daily Use 1 spray per nostril per day for 2 weeks.  May increase to 2 sprays per nostril per day after. 16 g 2     lamoTRIgine (LAMICTAL) 100 MG tablet Take 150 mg by mouth daily        nortriptyline (PAMELOR) 50 MG capsule Take 100 mg by mouth At Bedtime       oxyCODONE-acetaminophen (PERCOCET) 5-325 MG tablet Take 0.5 tablets by mouth 2 times daily as needed for severe pain 30 tablet 0     pantoprazole (PROTONIX) 20 MG EC tablet Take by mouth 30-60 minutes before a meal. 90 tablet 0     propranolol (INDERAL) 10 MG tablet Take 10 mg by mouth 3 times daily       Vitamin D3 (VITAMIN D3) 25 mcg (1000 units) tablet Take 1 tablet (25 mcg) by mouth daily 100 tablet 3     albuterol (PROAIR HFA/PROVENTIL HFA/VENTOLIN HFA) 108 (90 Base) MCG/ACT inhaler Inhale 2 puffs into the lungs every 4 hours as needed for other (cough) 1 Inhaler 0     ALPRAZolam (XANAX) 0.5 MG tablet Take 0.5 mg by mouth daily       butalbital-acetaminophen-caffeine (ESGIC) -40 MG tablet TAKE ONE TABLET BY MOUTH AT ONSET OF HEADACHE - MAX ONE TABLET PER DAY, AND 20 TABLETS PER MONTH. 20 tablet 0     order for DME Equipment being ordered: 4 wheeled walker with wheels and basket. Color to be determined. 1 each 0     tiZANidine (ZANAFLEX) 2 MG tablet Take 1-3 tablets (2-6 mg) by mouth 3 times daily as needed for muscle spasms 180 tablet 3       Allergies   Allergen Reactions     Amitriptyline Hives     Amoxicillin Diarrhea     Asa [Dihydroxyaluminum Aminoacetate]      Cipro [Ciprofloxacin]      Dizziness, vomiting, shortness  "of breath     Ibuprofen [Aspartame-Ibuprofen]      GI distress       Lyrica      extrematies swell up      Morphine      ithcy     Naproxen      GI distress     Neurontin [Gabapentin]      rash     Paxil [Paroxetine] Anaphylaxis     anaphylaxis     Phenergan [Promethazine Hcl]      dystonia     Prilosec [Omeprazole]      Rash, dizziness     Gabapentin Rash        Social History     Tobacco Use     Smoking status: Former Smoker     Types: Cigarettes     Quit date: 2014     Years since quittin.2     Smokeless tobacco: Never Used     Tobacco comment: Ecig   Substance Use Topics     Alcohol use: No     Frequency: Never     Family History   Problem Relation Age of Onset     Hypertension Father      Lipids Father      Hypertension Maternal Grandmother      Genitourinary Problems Maternal Grandmother         kidney disease     Cancer Paternal Grandmother         leukemia     Asthma No family hx of      C.A.D. No family hx of      Diabetes No family hx of      Cerebrovascular Disease No family hx of      Breast Cancer No family hx of      Cancer - colorectal No family hx of      Prostate Cancer No family hx of      Alzheimer Disease No family hx of      Arthritis No family hx of      Blood Disease No family hx of      Circulatory No family hx of      Eye Disorder No family hx of      Gastrointestinal Disease No family hx of      Musculoskeletal Disorder No family hx of      Neurologic Disorder No family hx of      Respiratory No family hx of      Thyroid Disease No family hx of      History   Drug Use     Comment: rare         Objective     /74   Pulse 67   Temp 97.2  F (36.2  C) (Tympanic)   Resp 18   Ht 1.549 m (5' 1\")   Wt 84.4 kg (186 lb)   LMP 2020 (Exact Date)   SpO2 99%   BMI 35.14 kg/m      Physical Exam    GENERAL APPEARANCE: healthy, alert and no distress     EYES: EOMI, PERRL     HENT: ear canals and TM's normal and nose and mouth without ulcers or lesions     NECK: no adenopathy, no " asymmetry, masses, or scars and thyroid normal to palpation     RESP: lungs clear to auscultation - no rales, rhonchi or wheezes     CV: regular rates and rhythm, normal S1 S2, no S3 or S4 and no murmur, click or rub     ABDOMEN: Palpable abdominal mass deep to surgical incision at left lower quadrant.  Soft, nontender, no HSM and bowel sounds normal     MS: extremities normal- no gross deformities noted, no evidence of inflammation in joints, FROM in all extremities.     SKIN: no suspicious lesions or rashes     NEURO: Normal strength and tone, sensory exam grossly normal, mentation intact and speech normal     PSYCH: mentation appears normal. and affect normal/bright     LYMPHATICS: No cervical adenopathy    Recent Labs   Lab Test 02/04/21  1056 12/01/20  1046 07/29/20  1053   HGB  --  12.7  --    PLT  --  385  --    NA  --  139 139   POTASSIUM  --  4.2 4.0   CR 0.74 0.64 0.78   A1C 5.5  --   --       Results for orders placed or performed in visit on 05/03/21   CBC with platelets     Status: Abnormal   Result Value Ref Range    WBC 5.9 4.0 - 11.0 10e9/L    RBC Count 5.03 3.8 - 5.2 10e12/L    Hemoglobin 12.5 11.7 - 15.7 g/dL    Hematocrit 39.8 35.0 - 47.0 %    MCV 79 78 - 100 fl    MCH 24.9 (L) 26.5 - 33.0 pg    MCHC 31.4 (L) 31.5 - 36.5 g/dL    RDW 14.4 10.0 - 15.0 %    Platelet Count 369 150 - 450 10e9/L   Basic metabolic panel  (Ca, Cl, CO2, Creat, Gluc, K, Na, BUN)     Status: Abnormal   Result Value Ref Range    Sodium 139 133 - 144 mmol/L    Potassium 4.0 3.4 - 5.3 mmol/L    Chloride 110 (H) 94 - 109 mmol/L    Carbon Dioxide 23 20 - 32 mmol/L    Anion Gap 6 3 - 14 mmol/L    Glucose 90 70 - 99 mg/dL    Urea Nitrogen 10 7 - 30 mg/dL    Creatinine 0.71 0.52 - 1.04 mg/dL    GFR Estimate >90 >60 mL/min/[1.73_m2]    GFR Estimate If Black >90 >60 mL/min/[1.73_m2]    Calcium 8.8 8.5 - 10.1 mg/dL   Results for orders placed or performed in visit on 05/03/21   Asymptomatic COVID-19 Virus (Coronavirus) by PCR      Status: None    Specimen: Nasopharyngeal   Result Value Ref Range    COVID-19 Virus PCR to [facility] - Source Nasopharyngeal     COVID-19 Virus PCR to [facility] - Result       Test received-See reflex to IDDL test SARS CoV2 (COVID-19) Virus RT-PCR         Diagnostics:  No EKG required, no history of coronary heart disease, significant arrhythmia, peripheral arterial disease or other structural heart disease.    Revised Cardiac Risk Index (RCRI):  The patient has the following serious cardiovascular risks for perioperative complications:   - No serious cardiac risks = 0 points     RCRI Interpretation: 0 points: Class I (very low risk - 0.4% complication rate)       Signed Electronically by: Stacey Graham PA-C  Copy of this evaluation report is provided to requesting physician.

## 2021-05-03 NOTE — PROGRESS NOTES
[facility]  [address]  [place] [address]  Phone: [phone]  Primary Provider: William Smith  Pre-op Performing Provider: LISETTE LISA      PREOPERATIVE EVALUATION:  Today's date: 5/3/2021    Mily Grullon is a 40 year old female who presents for a preoperative evaluation.    Surgical Information:  Surgery/Procedure: Excision of abdominal wall lump  Surgery Location: [facility]   Surgeon: Cheri  Surgery Date: 5/5/2021  Time of Surgery: 10:50AM  Where patient plans to recover: At home with family  Fax number for surgical facility: Note does not need to be faxed, will be available electronically in Epic.    Type of Anesthesia Anticipated: Combines MAC with Local    Assessment & Plan     The proposed surgical procedure is considered INTERMEDIATE risk.    Preop general physical exam  Abdominal wall lump  Patient is a 40-year-old female who presents to clinic for preoperative evaluation.  Patient has scheduled excision of abdominal wall lump 5/5/2021.  Vital signs normal.  Physical exam significant for palpable mass deep to scar from abdominal incision on LLQ.  - CBC with platelets  - Basic metabolic panel  (Ca, Cl, CO2, Creat, Gluc, K, Na, BUN)    Other chronic pain  Stable.  Patient has been compliant with taper plan and medications as prescribed.    Moderate episode of recurrent major depressive disorder (H)  Stable.  Patient has been compliant with prescribed medications.    Gastroesophageal reflux disease, unspecified whether esophagitis present  Patient notes Protonix works to control reflux, but not working as well lately.  Will increase dose to 40 mg daily.  - pantoprazole (PROTONIX) 20 MG EC tablet; Take 2 tablets (40 mg) by mouth daily Take by mouth 30-60 minutes before a meal.     Osteoporosis of multiple sites  Due to severity of osteoporosis, patient is requesting letter stating that she does not have to complete chiropractics as recommended by  insurance.  Letter provided.      Risks and Recommendations:  The patient has the following additional risks and recommendations for perioperative complications:   - No identified additional risk factors other than previously addressed    Medication Instructions:  Patient is to take all scheduled medications on the day of surgery    RECOMMENDATION:  APPROVAL GIVEN to proceed with proposed procedure, without further diagnostic evaluation.      Subjective     HPI related to upcoming procedure: Per chart review, patient evaluated by OB/GYN due to persistent nonspecific LLQ abdominal wall lump that is causing pain.  Plan for excision of abdominal wall lump 5/5/21.      Preop Questions 5/3/2021   1. Have you ever had a heart attack or stroke? No   2. Have you ever had surgery on your heart or blood vessels, such as a stent placement, a coronary artery bypass, or surgery on an artery in your head, neck, heart, or legs? No   3. Do you have chest pain with activity? No   4. Do you have a history of  heart failure? No   5. Do you currently have a cold, bronchitis or symptoms of other infection? No   6. Do you have a cough, shortness of breath, or wheezing? No   7. Do you or anyone in your family have previous history of blood clots? YES - Self Patient has history of superficial thrombophlebitis two years ago after IV placement. Patient was on anticoagulation for two months. No anticoagulation use since.   8. Do you or does anyone in your family have a serious bleeding problem such as prolonged bleeding following surgeries or cuts? No   9. Have you ever had problems with anemia or been told to take iron pills? No   10. Have you had any abnormal blood loss such as black, tarry or bloody stools, or abnormal vaginal bleeding? Yes-vaginal bleeding. Hysterectomy completed 12/4/20.    11. Have you ever had a blood transfusion? YES - following hysterectomy.    11a. Have you ever had a transfusion reaction? No   12. Are you willing  to have a blood transfusion if it is medically needed before, during, or after your surgery? Yes   13. Have you or any of your relatives ever had problems with anesthesia? No   14. Do you have sleep apnea, excessive snoring or daytime drowsiness? No   15. Do you have any artifical heart valves or other implanted medical devices like a pacemaker, defibrillator, or continuous glucose monitor? No   16. Do you have artificial joints? No   17. Are you allergic to latex? No   18. Is there any chance that you may be pregnant? No     Health Care Directive:  Patient does not have a Health Care Directive or Living Will: Discussed advance care planning with patient; however, patient declined at this time.    Preoperative Review of : Patient compliant with chronic pain medications as prescribed    Status of Chronic Conditions:  See problem list for active medical problems.  Problems all longstanding and stable, except as noted/documented.  See ROS for pertinent symptoms related to these conditions.    DEPRESSION - Patient has a long history of Depression of moderate severity requiring medication for control with recent symptoms being stable. Current symptoms of depression include none.     Chronic pain-patient has been compliant with pain medications in process of taper as recommended.    Reflux-Patient notes Protonix works to control acid reflux, but is not working as well as before. She is taking 20mg daily.     Patient requesting note as insurance is requesting that she see chiropractics. She has history of osteoporosis and is concerned for risks of fracture.     Review of Systems  CONSTITUTIONAL: NEGATIVE for fever, chills  INTEGUMENTARY/SKIN: NEGATIVE for worrisome rashes  EYES: NEGATIVE for vision changes   ENT/MOUTH: NEGATIVE for ear, mouth and throat problems  RESP: NEGATIVE for significant cough or SOB  CV: NEGATIVE for chest pain, palpitations or peripheral edema  GI: LLQ Abdominal wall pain. NEGATIVE for nausea,  abdominal pain, heartburn, or change in bowel habits  : NEGATIVE for frequency, dysuria, or hematuria  MUSCULOSKELETAL: NEGATIVE for significant arthralgias or myalgia  NEURO: NEGATIVE for weakness, dizziness or paresthesias  ENDOCRINE: NEGATIVE for temperature intolerance, skin/hair changes  HEME: NEGATIVE for bleeding problems  PSYCHIATRIC: NEGATIVE for changes in mood or affect    Patient Active Problem List    Diagnosis Date Noted     Abdominal wall lump 04/05/2021     Priority: Medium     Added automatically from request for surgery [id number]       Falls frequently 06/23/2020     Priority: Medium     Moderate episode of recurrent major depressive disorder (H) 10/02/2019     Priority: Medium     Anxiety 07/17/2019     Priority: Medium     Follows with Pedro Luis (Lashell Murray)  Has [facility] worker       Low serum cortisol level (H) 03/16/2019     Priority: Medium     Other chronic pain 11/30/2018     Priority: Medium     Related to multiple fractures related to premature osteoporosis  As of 11/30/2018, limit of 3 Percocet 5/325 daily with intention to taper in months ahead       Controlled substance agreement signed 11/30/2018     Priority: Medium     Prescriber of controlled substances: William Smith        Obesity (BMI 35.0-39.9) with comorbidity (H) 10/24/2018     Priority: Medium     Superficial phlebitis 09/27/2018     Priority: Medium     Gastroesophageal reflux disease, esophagitis presence not specified 09/10/2018     Priority: Medium     IMO Regulatory Load OCT 2020       Closed nondisplaced fracture of body of left calcaneus with delayed healing, subsequent encounter 03/09/2018     Priority: Medium     Heel pain, chronic, left 03/09/2018     Priority: Medium     Pain in joint, ankle and foot, left 11/15/2017     Priority: Medium     Migraine without aura and without status migrainosus, not intractable 11/10/2015     Priority: Medium     Closed nondisplaced intertrochanteric fracture of left femur (H)  02/10/2014     Priority: Medium     Osteoporosis of multiple sites 12/10/2013     Priority: Medium     Chronic wrist pain 11/14/2012     Priority: Medium     Overview:   Due to TFCC tears and subsequent distal ulna resection and carpal fusion       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 Glover in [place]       Gastritis      Priority: Medium     dx 2010- sees Dr Glover in [facility]       CARDIOVASCULAR SCREENING; LDL GOAL LESS THAN 160 10/31/2010     Priority: Medium     Personal history of nicotine dependence 12/17/2009     Priority: Medium      Past Medical History:   Diagnosis Date     Class 2 severe obesity due to excess calories with serious comorbidity and body mass index (BMI) of 35.0 to 35.9 in adult (H)      Endometriosis, site unspecified      Gastritis 2010    dx 2010- sees Dr Glover in [facility]     Osteoporosis      Urinary calculus, unspecified     kidney stones, age 19-20     Wrist injury 11/19/2003    Workman's Comp- left wrist- narc agreement on file     Past Surgical History:   Procedure Laterality Date     ARTHROSCOPY HIP, OSTEOPLASTY FEMUR PROXIMAL, COMBINED Left 06/29/2016    Procedure: COMBINED ARTHROSCOPY HIP, OSTEOPLASTY FEMUR PROXIMAL;  Surgeon: Godwin Deleon MD;  Location: [facility] OR     ARTHROSCOPY OF JOINT UNLISTED  04/01/2004    Left wrist, with debridement and repair of tear.     C TOTAL ABDOM HYSTERECTOMY  2020    with LSO     HC REMOVAL OF OVARY/TUBE(S)  06/01/2003    right with fallopian tube     HC REPAIR TRIANGULAR CART,WRIST JT  01/01/2005    Dr Eloy Sosa     HC REPAIR TRIANGULAR CART,WRIST JT  2007 or so    ulnar excision- Dr Eloy Sosa     LAPAROSCOPIC ABLATION ENDOMETRIOSIS      x 2     LITHOTRIPSY       Current Outpatient Medications   Medication Sig Dispense Refill     Calcium-Phosphorus-Vitamin D 250-100-500 MG-MG-UNIT CHEW Take 2 tablets by mouth daily 60 tablet 11     DULoxetine  (CYMBALTA) 60 MG capsule Take 60 mg by mouth 2 times daily        estrogen conj (PREMARIN) 1.25 MG tablet Take 1 tablet (1.25 mg) by mouth daily 90 tablet 3     eszopiclone (LUNESTA) 1 MG tablet Take 1 mg by mouth nightly as needed       famotidine (PEPCID) 20 MG tablet Take 1 tablet (20 mg) by mouth daily as needed (heartburn/reflux) 90 tablet 1     fluticasone (FLONASE) 50 MCG/ACT nasal spray Spray 1-2 sprays into both nostrils daily Use 1 spray per nostril per day for 2 weeks.  May increase to 2 sprays per nostril per day after. 16 g 2     lamoTRIgine (LAMICTAL) 100 MG tablet Take 150 mg by mouth daily        nortriptyline (PAMELOR) 50 MG capsule Take 100 mg by mouth At Bedtime       oxyCODONE-acetaminophen (PERCOCET) 5-325 MG tablet Take 0.5 tablets by mouth 2 times daily as needed for severe pain 30 tablet 0     pantoprazole (PROTONIX) 20 MG EC tablet Take by mouth 30-60 minutes before a meal. 90 tablet 0     propranolol (INDERAL) 10 MG tablet Take 10 mg by mouth 3 times daily       Vitamin D3 (VITAMIN D3) 25 mcg (1000 units) tablet Take 1 tablet (25 mcg) by mouth daily 100 tablet 3     albuterol (PROAIR HFA/PROVENTIL HFA/VENTOLIN HFA) 108 (90 Base) MCG/ACT inhaler Inhale 2 puffs into the lungs every 4 hours as needed for other (cough) 1 Inhaler 0     ALPRAZolam (XANAX) 0.5 MG tablet Take 0.5 mg by mouth daily       butalbital-acetaminophen-caffeine (ESGIC) -40 MG tablet TAKE ONE TABLET BY MOUTH AT ONSET OF HEADACHE - MAX ONE TABLET PER DAY, AND 20 TABLETS PER MONTH. 20 tablet 0     order for DME Equipment being ordered: 4 wheeled walker with wheels and basket. Color to be determined. 1 each 0     tiZANidine (ZANAFLEX) 2 MG tablet Take 1-3 tablets (2-6 mg) by mouth 3 times daily as needed for muscle spasms 180 tablet 3       Allergies   Allergen Reactions     Amitriptyline Hives     Amoxicillin Diarrhea     Asa [Dihydroxyaluminum Aminoacetate]      Cipro [Ciprofloxacin]      Dizziness, vomiting, shortness  "of breath     Ibuprofen [Aspartame-Ibuprofen]      GI distress       Lyrica      extrematies swell up      Morphine      ithcy     Naproxen      GI distress     Neurontin [Gabapentin]      rash     Paxil [Paroxetine] Anaphylaxis     anaphylaxis     Phenergan [Promethazine Hcl]      dystonia     Prilosec [Omeprazole]      Rash, dizziness     Gabapentin Rash        Social History     Tobacco Use     Smoking status: Former Smoker     Types: Cigarettes     Quit date: 2014     Years since quittin.2     Smokeless tobacco: Never Used     Tobacco comment: Ecig   Substance Use Topics     Alcohol use: No     Frequency: Never     Family History   Problem Relation Age of Onset     Hypertension Father      Lipids Father      Hypertension Maternal Grandmother      Genitourinary Problems Maternal Grandmother         kidney disease     Cancer Paternal Grandmother         leukemia     Asthma No family hx of      C.A.D. No family hx of      Diabetes No family hx of      Cerebrovascular Disease No family hx of      Breast Cancer No family hx of      Cancer - colorectal No family hx of      Prostate Cancer No family hx of      Alzheimer Disease No family hx of      Arthritis No family hx of      Blood Disease No family hx of      Circulatory No family hx of      Eye Disorder No family hx of      Gastrointestinal Disease No family hx of      Musculoskeletal Disorder No family hx of      Neurologic Disorder No family hx of      Respiratory No family hx of      Thyroid Disease No family hx of      History   Drug Use     Comment: rare         Objective     /74   Pulse 67   Temp 97.2  F (36.2  C) (Tympanic)   Resp 18   Ht 1.549 m (5' 1\")   Wt 84.4 kg (186 lb)   LMP 2020 (Exact Date)   SpO2 99%   BMI 35.14 kg/m      Physical Exam    GENERAL APPEARANCE: healthy, alert and no distress     EYES: EOMI, PERRL     HENT: ear canals and TM's normal and nose and mouth without ulcers or lesions     NECK: no adenopathy, no " asymmetry, masses, or scars and thyroid normal to palpation     RESP: lungs clear to auscultation - no rales, rhonchi or wheezes     CV: regular rates and rhythm, normal S1 S2, no S3 or S4 and no murmur, click or rub     ABDOMEN: Palpable abdominal mass deep to surgical incision at left lower quadrant.  Soft, nontender, no HSM and bowel sounds normal     MS: extremities normal- no gross deformities noted, no evidence of inflammation in joints, FROM in all extremities.     SKIN: no suspicious lesions or rashes     NEURO: Normal strength and tone, sensory exam grossly normal, mentation intact and speech normal     PSYCH: mentation appears normal. and affect normal/bright     LYMPHATICS: No cervical adenopathy    Recent Labs   Lab Test 02/04/21  1056 12/01/20  1046 07/29/20  1053   HGB  --  12.7  --    PLT  --  385  --    NA  --  139 139   POTASSIUM  --  4.2 4.0   CR 0.74 0.64 0.78   A1C 5.5  --   --       Results for orders placed or performed in visit on 05/03/21   CBC with platelets     Status: Abnormal   Result Value Ref Range    WBC 5.9 4.0 - 11.0 10e9/L    RBC Count 5.03 3.8 - 5.2 10e12/L    Hemoglobin 12.5 11.7 - 15.7 g/dL    Hematocrit 39.8 35.0 - 47.0 %    MCV 79 78 - 100 fl    MCH 24.9 (L) 26.5 - 33.0 pg    MCHC 31.4 (L) 31.5 - 36.5 g/dL    RDW 14.4 10.0 - 15.0 %    Platelet Count 369 150 - 450 10e9/L   Basic metabolic panel  (Ca, Cl, CO2, Creat, Gluc, K, Na, BUN)     Status: Abnormal   Result Value Ref Range    Sodium 139 133 - 144 mmol/L    Potassium 4.0 3.4 - 5.3 mmol/L    Chloride 110 (H) 94 - 109 mmol/L    Carbon Dioxide 23 20 - 32 mmol/L    Anion Gap 6 3 - 14 mmol/L    Glucose 90 70 - 99 mg/dL    Urea Nitrogen 10 7 - 30 mg/dL    Creatinine 0.71 0.52 - 1.04 mg/dL    GFR Estimate >90 >60 mL/min/[1.73_m2]    GFR Estimate If Black >90 >60 mL/min/[1.73_m2]    Calcium 8.8 8.5 - 10.1 mg/dL   Results for orders placed or performed in visit on 05/03/21   Asymptomatic COVID-19 Virus (Coronavirus) by PCR      Status: None    Specimen: Nasopharyngeal   Result Value Ref Range    COVID-19 Virus PCR to [facility] - Source Nasopharyngeal     COVID-19 Virus PCR to [facility] - Result       Test received-See reflex to IDDL test SARS CoV2 (COVID-19) Virus RT-PCR         Diagnostics:  No EKG required, no history of coronary heart disease, significant arrhythmia, peripheral arterial disease or other structural heart disease.    Revised Cardiac Risk Index (RCRI):  The patient has the following serious cardiovascular risks for perioperative complications:   - No serious cardiac risks = 0 points     RCRI Interpretation: 0 points: Class I (very low risk - 0.4% complication rate)       Signed Electronically by: Stacey Graham PA-C  Copy of this evaluation report is provided to requesting physician.

## 2021-05-03 NOTE — PATIENT INSTRUCTIONS
You may continue with your procedure as planned.  Please reach out with any questions or concerns.  Preparing for Your Surgery  Getting started  A nurse will call you to review your health history and instructions. They will give you an arrival time based on your scheduled surgery time.  Please be ready to share the following:    Your doctor's clinic name and phone number    Your medical, surgical and anesthesia history    A list of allergies and sensitivities    A list of medicines, including herbal treatments and over-the-counter drugs    Whether the patient has a legal guardian (ask how to send us the papers in advance)  If you have a child who's having surgery, please ask for a copy of Preparing for Your Child's Surgery.    Preparing for surgery    Within 30 days of surgery: Have a pre-op exam (sometimes called an H&P, or History and Physical). This can be done at a clinic or pre-operative center.  ? If you're having a , you may not need this exam. Talk to your care team    At your pre-op exam, talk to your care team about all medicines you take. If you need to stop any medicines before surgery, ask when to start taking them again.  ? We do this for your safety. Many medicines can make you bleed too much during surgery. Some change how well surgery (anesthesia) drugs work.    Call your insurance company to let them know you're having surgery. (If you don't have insurance, call [phone].)    Call your clinic if there's any change in your health. This includes signs of a cold or flu (sore throat, runny nose, cough, rash, fever). It also includes a scrape or scratch near the surgery site.    If you have questions on the day of surgery, call your hospital or surgery center.  Eating and drinking guidelines  For your safety: Unless your surgeon tells you otherwise, follow the guidelines below.    Eat and drink as usual until 8 hours before surgery. After that, no food or milk.    Drink clear liquids until  2 hours before surgery. These are liquids you can see through, like water, Gatorade and Propel Water. You may also have black coffee and tea (no cream or milk).    Nothing by mouth within 2 hours of surgery. This includes gum, candy and breath mints.    If you drink, stop drinking alcohol the night before surgery.    If your care team tells you to take medicine on the morning of surgery, it's okay to take it with a sip of water.  Preventing infection    Shower or bathe the night before and morning of your surgery. Follow the instructions your clinic gave you. (If no instructions, use regular soap.)    Don't shave or clip hair near your surgery site. We'll remove the hair if needed.    Don't smoke or vape the morning of surgery. You may chew nicotine gum up to 2 hours before surgery. A nicotine patch is okay.  ? Note: Some surgeries require you to completely quit smoking and nicotine. Check with your surgeon.    Your care team will make every effort to keep you safe from infection. We will:  ? Clean our hands often with soap and water (or an alcohol-based hand rub).  ? Clean the skin at your surgery site with a special soap that kills germs.  ? Give you a special gown to keep you warm. (Cold raises the risk of infection.)  ? Wear special hair covers, masks, gowns and gloves during surgery.  ? Give antibiotic medicine, if prescribed. Not all surgeries need antibiotics.  What to bring on the day of surgery    Photo ID and insurance card    Copy of your health care directive, if you have one    Glasses and hearing aides (bring cases)  ? You can't wear contacts during surgery    Inhaler and eye drops, if you use them (tell us about these when you arrive)    CPAP machine or breathing device, if you use them    A few personal items, if spending the night    If you have . . .  ? A pacemaker or ICD (cardiac defibrillator): Bring the ID card.  ? An implanted stimulator: Bring the remote control.  ? A legal guardian: Bring a  copy of the certified (court-stamped) guardianship papers.  Please remove any jewelry, including body piercings. Leave jewelry and other valuables at home.  If you're going home the day of surgery  Important: If you don't follow the rules below, we must cancel your surgery.     Arrange for someone to drive you home after surgery. You may not drive, take a taxi or take public transportation by yourself (unless you'll have local anesthesia only).    Arrange for a responsible adult to stay with you overnight. If you don't, we may keep you in the hospital overnight, and you may need to pay the costs yourself.  Questions?   If you have any questions for your care team, list them here: _________________________________________________________________________________________________________________________________________________________________________________________________________________________________________________________________________________________________________________________  For informational purposes only. Not to replace the advice of your health care provider. Copyright   2003, 2019 [employer]. All rights reserved. Clinically reviewed by Macie Fry MD. [employer] [id number] - REV 4/20.

## 2021-05-03 NOTE — LETTER
May 3, 2021      Mily Grullon  [address]  [place] [address]        To Whom It May Concern:    Mily Grullon was seen in our clinic.     Due to underlying health conditions, we do not recommend chiropractics for Mily.      Sincerely,        Stacey Graham PA-C

## 2021-05-04 ENCOUNTER — ANESTHESIA EVENT (OUTPATIENT)
Dept: SURGERY | Facility: AMBULATORY SURGERY CENTER | Age: 41
End: 2021-05-04

## 2021-05-05 ENCOUNTER — HOSPITAL ENCOUNTER (OUTPATIENT)
Facility: AMBULATORY SURGERY CENTER | Age: 41
Discharge: HOME | End: 2021-05-05
Attending: OBSTETRICS & GYNECOLOGY | Admitting: OBSTETRICS & GYNECOLOGY
Payer: COMMERCIAL

## 2021-05-05 ENCOUNTER — ANESTHESIA (OUTPATIENT)
Dept: SURGERY | Facility: AMBULATORY SURGERY CENTER | Age: 41
End: 2021-05-05

## 2021-05-05 VITALS
TEMPERATURE: 97.5 F | HEART RATE: 63 BPM | RESPIRATION RATE: 16 BRPM | SYSTOLIC BLOOD PRESSURE: 126 MMHG | DIASTOLIC BLOOD PRESSURE: 70 MMHG | OXYGEN SATURATION: 99 %

## 2021-05-05 DIAGNOSIS — R22.2 ABDOMINAL WALL LUMP: ICD-10-CM

## 2021-05-05 PROCEDURE — G8918 PT W/O PREOP ORDER IV AB PRO: HCPCS

## 2021-05-05 PROCEDURE — G8907 PT DOC NO EVENTS ON DISCHARG: HCPCS

## 2021-05-05 PROCEDURE — 22902 EXC ABD LES SC < 3 CM: CPT

## 2021-05-05 PROCEDURE — 11402 EXC TR-EXT B9+MARG 1.1-2 CM: CPT | Performed by: OBSTETRICS & GYNECOLOGY

## 2021-05-05 PROCEDURE — 88307 TISSUE EXAM BY PATHOLOGIST: CPT | Performed by: PATHOLOGY

## 2021-05-05 RX ORDER — LIDOCAINE 40 MG/G
CREAM TOPICAL
Status: DISCONTINUED | OUTPATIENT
Start: 2021-05-05 | End: 2021-05-06 | Stop reason: HOSPADM

## 2021-05-05 RX ORDER — FENTANYL CITRATE 50 UG/ML
25-50 INJECTION, SOLUTION INTRAMUSCULAR; INTRAVENOUS
Status: DISCONTINUED | OUTPATIENT
Start: 2021-05-05 | End: 2021-05-06 | Stop reason: HOSPADM

## 2021-05-05 RX ORDER — KETOROLAC TROMETHAMINE 30 MG/ML
30 INJECTION, SOLUTION INTRAMUSCULAR; INTRAVENOUS EVERY 6 HOURS PRN
Status: DISCONTINUED | OUTPATIENT
Start: 2021-05-05 | End: 2021-05-06 | Stop reason: HOSPADM

## 2021-05-05 RX ORDER — NALOXONE HYDROCHLORIDE 0.4 MG/ML
0.4 INJECTION, SOLUTION INTRAMUSCULAR; INTRAVENOUS; SUBCUTANEOUS
Status: DISCONTINUED | OUTPATIENT
Start: 2021-05-05 | End: 2021-05-06 | Stop reason: HOSPADM

## 2021-05-05 RX ORDER — PROPOFOL 10 MG/ML
INJECTION, EMULSION INTRAVENOUS CONTINUOUS PRN
Status: DISCONTINUED | OUTPATIENT
Start: 2021-05-05 | End: 2021-05-05

## 2021-05-05 RX ORDER — OXYCODONE HYDROCHLORIDE 5 MG/1
5-10 TABLET ORAL EVERY 4 HOURS PRN
Status: DISCONTINUED | OUTPATIENT
Start: 2021-05-05 | End: 2021-05-06 | Stop reason: HOSPADM

## 2021-05-05 RX ORDER — DEXAMETHASONE SODIUM PHOSPHATE 4 MG/ML
4 INJECTION, SOLUTION INTRA-ARTICULAR; INTRALESIONAL; INTRAMUSCULAR; INTRAVENOUS; SOFT TISSUE EVERY 10 MIN PRN
Status: DISCONTINUED | OUTPATIENT
Start: 2021-05-05 | End: 2021-05-06 | Stop reason: HOSPADM

## 2021-05-05 RX ORDER — BUPIVACAINE HYDROCHLORIDE AND EPINEPHRINE 2.5; 5 MG/ML; UG/ML
INJECTION, SOLUTION EPIDURAL; INFILTRATION; INTRACAUDAL; PERINEURAL PRN
Status: DISCONTINUED | OUTPATIENT
Start: 2021-05-05 | End: 2021-05-05 | Stop reason: HOSPADM

## 2021-05-05 RX ORDER — LIDOCAINE HYDROCHLORIDE 20 MG/ML
INJECTION, SOLUTION INFILTRATION; PERINEURAL PRN
Status: DISCONTINUED | OUTPATIENT
Start: 2021-05-05 | End: 2021-05-05

## 2021-05-05 RX ORDER — NALOXONE HYDROCHLORIDE 0.4 MG/ML
0.2 INJECTION, SOLUTION INTRAMUSCULAR; INTRAVENOUS; SUBCUTANEOUS
Status: DISCONTINUED | OUTPATIENT
Start: 2021-05-05 | End: 2021-05-06 | Stop reason: HOSPADM

## 2021-05-05 RX ORDER — MEPERIDINE HYDROCHLORIDE 25 MG/ML
12.5 INJECTION INTRAMUSCULAR; INTRAVENOUS; SUBCUTANEOUS
Status: DISCONTINUED | OUTPATIENT
Start: 2021-05-05 | End: 2021-05-06 | Stop reason: HOSPADM

## 2021-05-05 RX ORDER — OXYCODONE HYDROCHLORIDE 5 MG/1
5 TABLET ORAL EVERY 6 HOURS PRN
Qty: 6 TABLET | Refills: 0 | Status: SHIPPED | OUTPATIENT
Start: 2021-05-05 | End: 2021-05-08

## 2021-05-05 RX ORDER — ACETAMINOPHEN 325 MG/1
975 TABLET ORAL ONCE
Status: COMPLETED | OUTPATIENT
Start: 2021-05-05 | End: 2021-05-05

## 2021-05-05 RX ORDER — SODIUM CHLORIDE, SODIUM LACTATE, POTASSIUM CHLORIDE, CALCIUM CHLORIDE 600; 310; 30; 20 MG/100ML; MG/100ML; MG/100ML; MG/100ML
INJECTION, SOLUTION INTRAVENOUS CONTINUOUS
Status: DISCONTINUED | OUTPATIENT
Start: 2021-05-05 | End: 2021-05-06 | Stop reason: HOSPADM

## 2021-05-05 RX ORDER — LABETALOL HYDROCHLORIDE 5 MG/ML
10 INJECTION, SOLUTION INTRAVENOUS
Status: DISCONTINUED | OUTPATIENT
Start: 2021-05-05 | End: 2021-05-06 | Stop reason: HOSPADM

## 2021-05-05 RX ORDER — ACETAMINOPHEN 325 MG/1
975 TABLET ORAL ONCE
Status: CANCELLED | OUTPATIENT
Start: 2021-05-05 | End: 2021-05-05

## 2021-05-05 RX ORDER — PROPOFOL 10 MG/ML
INJECTION, EMULSION INTRAVENOUS PRN
Status: DISCONTINUED | OUTPATIENT
Start: 2021-05-05 | End: 2021-05-05

## 2021-05-05 RX ORDER — ONDANSETRON 4 MG/1
4 TABLET, ORALLY DISINTEGRATING ORAL EVERY 30 MIN PRN
Status: DISCONTINUED | OUTPATIENT
Start: 2021-05-05 | End: 2021-05-06 | Stop reason: HOSPADM

## 2021-05-05 RX ORDER — ONDANSETRON 2 MG/ML
4 INJECTION INTRAMUSCULAR; INTRAVENOUS EVERY 30 MIN PRN
Status: DISCONTINUED | OUTPATIENT
Start: 2021-05-05 | End: 2021-05-06 | Stop reason: HOSPADM

## 2021-05-05 RX ORDER — FENTANYL CITRATE 50 UG/ML
INJECTION, SOLUTION INTRAMUSCULAR; INTRAVENOUS PRN
Status: DISCONTINUED | OUTPATIENT
Start: 2021-05-05 | End: 2021-05-05

## 2021-05-05 RX ORDER — ALBUTEROL SULFATE 0.83 MG/ML
2.5 SOLUTION RESPIRATORY (INHALATION) EVERY 4 HOURS PRN
Status: DISCONTINUED | OUTPATIENT
Start: 2021-05-05 | End: 2021-05-06 | Stop reason: HOSPADM

## 2021-05-05 RX ADMIN — ACETAMINOPHEN 975 MG: 325 TABLET ORAL at 08:49

## 2021-05-05 RX ADMIN — PROPOFOL 100 MCG/KG/MIN: 10 INJECTION, EMULSION INTRAVENOUS at 09:34

## 2021-05-05 RX ADMIN — SODIUM CHLORIDE, SODIUM LACTATE, POTASSIUM CHLORIDE, CALCIUM CHLORIDE: 600; 310; 30; 20 INJECTION, SOLUTION INTRAVENOUS at 08:49

## 2021-05-05 RX ADMIN — OXYCODONE HYDROCHLORIDE 5 MG: 5 TABLET ORAL at 10:33

## 2021-05-05 RX ADMIN — FENTANYL CITRATE 50 MCG: 50 INJECTION, SOLUTION INTRAMUSCULAR; INTRAVENOUS at 10:22

## 2021-05-05 RX ADMIN — FENTANYL CITRATE 50 MCG: 50 INJECTION, SOLUTION INTRAMUSCULAR; INTRAVENOUS at 09:34

## 2021-05-05 RX ADMIN — PROPOFOL 100 MG: 10 INJECTION, EMULSION INTRAVENOUS at 09:34

## 2021-05-05 RX ADMIN — KETOROLAC TROMETHAMINE 30 MG: 30 INJECTION, SOLUTION INTRAMUSCULAR; INTRAVENOUS at 10:47

## 2021-05-05 RX ADMIN — LIDOCAINE HYDROCHLORIDE 60 MG: 20 INJECTION, SOLUTION INFILTRATION; PERINEURAL at 09:34

## 2021-05-05 ASSESSMENT — LIFESTYLE VARIABLES: TOBACCO_USE: 1

## 2021-05-05 NOTE — ANESTHESIA POSTPROCEDURE EVALUATION
Patient: Mily Grullon    Procedure(s):  Excision of abdominal wall lump    Diagnosis:Abdominal wall lump [R22.2]  Diagnosis Additional Information: No value filed.    Anesthesia Type:  MAC    Note:  Disposition: Outpatient   Postop Pain Control: Uneventful            Sign Out: Well controlled pain   PONV: No   Neuro/Psych: Uneventful            Sign Out: Acceptable/Baseline neuro status   Airway/Respiratory: Uneventful            Sign Out: Acceptable/Baseline resp. status   CV/Hemodynamics: Uneventful            Sign Out: Acceptable CV status; No obvious hypovolemia; No obvious fluid overload   Other NRE: NONE   DID A NON-ROUTINE EVENT OCCUR? No           Last vitals:  Vitals:    05/05/21 1030 05/05/21 1045 05/05/21 1100   BP: 96/58 108/66 126/70   Pulse: 62 59 63   Resp: 14 16 16   Temp:   97.5  F (36.4  C)   SpO2: 97% 98% 99%       Last vitals prior to Anesthesia Care Transfer:  CRNA VITALS  5/5/2021 0948 - 5/5/2021 1048      5/5/2021             Resp Rate (observed):  (!) 1          Electronically Signed By: Darshan Muniz DO  May 5, 2021  11:45 AM

## 2021-05-05 NOTE — DISCHARGE INSTRUCTIONS
[facility]   Adult Discharge Orders & Instructions     For 24 hours after surgery    1. Get plenty of rest.  A responsible adult must stay with you for at least 24 hours after you leave the hospital.   2. Do not drive or use heavy equipment.  If you have weakness or tingling, don't drive or use heavy equipment until this feeling goes away.  3. Do not drink alcohol.  4. Avoid strenuous or risky activities.  Ask for help when climbing stairs.   5. You may feel lightheaded.  IF so, sit for a few minutes before standing.  Have someone help you get up.   6. If you have nausea (feel sick to your stomach): Drink only clear liquids such as apple juice, ginger ale, broth or 7-Up.  Rest may also help.  Be sure to drink enough fluids.  Move to a regular diet as you feel able.  7. You may have a slight fever. Call the doctor if your fever is over 100 F (37.7 C) (taken under the tongue) or lasts longer than 24 hours.  8. You may have a dry mouth, a sore throat, muscle aches or trouble sleeping.  These should go away after 24 hours.  9. Do not make important or legal decisions.     Call your doctor for any of the followin.  Signs of infection (fever, growing tenderness at the surgery site, a large amount of drainage or bleeding, severe pain, foul-smelling drainage, redness, swelling).    2. It has been over 8 to 10 hours since surgery and you are still not able to urinate (pass water).    3.  Headache for over 24 hours.    4.  Numbness, tingling or weakness the day after surgery (if you had spinal anesthesia).                  5. Signs of Covid-19 infection (temperature over 100 degrees, shortness of breath, cough, loss of taste/smell, generalized body aches, persistent headache,                  chills, sore throat, nausea/vomiting/diarrhea).    To contact a doctor, call __[phone]_____    Tylenol was given at 8:50 am.

## 2021-05-05 NOTE — OP NOTE
Mily Grullon  1980  Preoperative diagnosis: Tender subcutaneous left abdominal wall mass    Postoperative diagnosis: Same    Date of Procedure: May 5, 2021    Operation: Excision of left abdominal wall subcutaneous mass    Surgeon: Zackary Alonzo MD    Anesthesia: IV sedation MAC and 0.25% Marcaine with epinephrine local     Specimens: tender left abdominal wall mass    History and operative indications: Please see history and physical and progress notes for details of those findings. I discussed the operation, indications, goals, risks, complications, alternatives and anticipated postoperative course including success/failure rates, limitations and consequences. Patient understood and desired to proceed.     Operative procedure and findings: After appropriate preparation, exam was notable for a 2 cm round subcutaneous mass at the left lower abdominal wall immediately cephalad of the old low transverse surgical scar. Approximately 15 mL of Marcaine was used lateral to this and at the proposed incision line.  A 3 cm incision was made over the mass and Lencho retractors were placed and the mass grasped with an Allis.  It was exposed and dissected free with electrocautery, blunt, and sharp dissection. It did appear to be above the fascia. After the excision, limited irrigation was performed and hemostasis achieved. The subcutaneous adipose tissues were closed with interrupted 2-0 plain gut and the skin closed with continuous subcuticular 3-0 Vicryl followed by Exofin glue. EBL= 5 mL.    Zackary Alonzo MD

## 2021-05-05 NOTE — ANESTHESIA CARE TRANSFER NOTE
Patient: Mily Grullon    Procedure(s):  Excision of abdominal wall lump    Diagnosis: Abdominal wall lump [R22.2]  Diagnosis Additional Information: No value filed.    Anesthesia Type:   MAC     Note:    Oropharynx: oropharynx clear of all foreign objects  Level of Consciousness: awake  Oxygen Supplementation: room air    Independent Airway: airway patency satisfactory and stable  Dentition: dentition unchanged  Vital Signs Stable: post-procedure vital signs reviewed and stable  Report to RN Given: handoff report given  Patient transferred to: Phase II  Comments: To PACU for Phase II. Report to RN.  VSS Resp status stable.  Handoff Report: Identifed the Patient, Identified the Reponsible Provider, Reviewed the pertinent medical history, Discussed the surgical course, Reviewed Intra-OP anesthesia mangement and issues during anesthesia, Set expectations for post-procedure period and Allowed opportunity for questions and acknowledgement of understanding      Vitals: (Last set prior to Anesthesia Care Transfer)  CRNA VITALS  5/5/2021 0948 - 5/5/2021 1024      5/5/2021             Resp Rate (observed):  (!) 1        Electronically Signed By: TARAS Laguna CRNA  May 5, 2021  10:24 AM

## 2021-05-05 NOTE — ANESTHESIA PREPROCEDURE EVALUATION
Anesthesia Pre-Procedure Evaluation    Patient: Mily Grullon   MRN: [id number] : 1980        Preoperative Diagnosis: Abdominal wall lump [R22.2]   Procedure : Procedure(s):  Excision of abdominal wall lump     Past Medical History:   Diagnosis Date     Class 2 severe obesity due to excess calories with serious comorbidity and body mass index (BMI) of 35.0 to 35.9 in adult (H)      Endometriosis, site unspecified      Gastritis     dx 2010- sees Dr Glover in [facility]     Osteoporosis      Urinary calculus, unspecified     kidney stones, age 18-20     Wrist injury 2003    Workman's Comp- left wrist- narc agreement on file      Past Surgical History:   Procedure Laterality Date     ARTHROSCOPY HIP, OSTEOPLASTY FEMUR PROXIMAL, COMBINED Left 2016    Procedure: COMBINED ARTHROSCOPY HIP, OSTEOPLASTY FEMUR PROXIMAL;  Surgeon: Godwin Deleon MD;  Location: [facility] OR     ARTHROSCOPY OF JOINT UNLISTED  2004    Left wrist, with debridement and repair of tear.     C TOTAL ABDOM HYSTERECTOMY      with LSO     HC REMOVAL OF OVARY/TUBE(S)  2003    right with fallopian tube     HC REPAIR TRIANGULAR CART,WRIST JT  2005    Dr Eloy Sosa     HC REPAIR TRIANGULAR CART,WRIST JT   or so    ulnar excision- Dr Eloy Sosa     LAPAROSCOPIC ABLATION ENDOMETRIOSIS      x 2     LITHOTRIPSY        Allergies   Allergen Reactions     Amitriptyline Hives     Amoxicillin Diarrhea     Asa [Dihydroxyaluminum Aminoacetate]      Cipro [Ciprofloxacin]      Dizziness, vomiting, shortness of breath     Ibuprofen [Aspartame-Ibuprofen]      GI distress       Lyrica      extrematies swell up      Morphine      ithcy     Naproxen      GI distress     Neurontin [Gabapentin]      rash     Paxil [Paroxetine] Anaphylaxis     anaphylaxis     Phenergan [Promethazine Hcl]      dystonia     Prilosec [Omeprazole]      Rash, dizziness     Gabapentin Rash      Social History     Tobacco Use      Smoking status: Former Smoker     Types: Cigarettes     Quit date: 2014     Years since quittin.2     Smokeless tobacco: Never Used     Tobacco comment: Ecig   Substance Use Topics     Alcohol use: No     Frequency: Never      Wt Readings from Last 1 Encounters:   21 84.4 kg (186 lb)        Anesthesia Evaluation   Pt has had prior anesthetic.     No history of anesthetic complications       ROS/MED HX  ENT/Pulmonary:     (+) tobacco use, Past use,     Neurologic:  - neg neurologic ROS     Cardiovascular:  - neg cardiovascular ROS     METS/Exercise Tolerance: 4 - Raking leaves, gardening Comment: Osteoprosis pain, not SOB or chest pain   Hematologic:  - neg hematologic  ROS     Musculoskeletal: Comment: Wrist injury  osteoprosis      GI/Hepatic:     (+) GERD, Other,     Renal/Genitourinary:  - neg Renal ROS     Endo:     (+) Obesity,     Psychiatric/Substance Use:     (+) psychiatric history depression     Infectious Disease:  - neg infectious disease ROS     Malignancy:  - neg malignancy ROS     Other:  - neg other ROS    (+) , H/O Chronic Pain,        Physical Exam    Airway  airway exam normal      Mallampati: I   TM distance: > 3 FB   Neck ROM: full   Mouth opening: > 3 cm    Respiratory Devices and Support         Dental  no notable dental history         Cardiovascular   cardiovascular exam normal       Rhythm and rate: regular and normal     Pulmonary   pulmonary exam normal        breath sounds clear to auscultation           OUTSIDE LABS:  CBC:   Lab Results   Component Value Date    WBC 5.9 2021    WBC 7.9 2020    HGB 12.5 2021    HGB 12.7 2020    HCT 39.8 2021    HCT 40.7 2020     2021     2020     BMP:   Lab Results   Component Value Date     2021     2020    POTASSIUM 4.0 2021    POTASSIUM 4.2 2020    CHLORIDE 110 (H) 2021    CHLORIDE 109 2020    CO2 23 2021    CO2 26  12/01/2020    BUN 10 05/03/2021    BUN 14 02/04/2021    CR 0.71 05/03/2021    CR 0.74 02/04/2021    GLC 90 05/03/2021    GLC 83 12/01/2020     COAGS:   Lab Results   Component Value Date    INR 2.1 (A) 11/13/2018     POC:   Lab Results   Component Value Date    HCG Negative 08/27/2020     HEPATIC:   Lab Results   Component Value Date    ALBUMIN 4.0 02/04/2021    PROTTOTAL 7.1 07/29/2020    ALT 48 07/29/2020    AST 18 07/29/2020    ALKPHOS 111 02/04/2021    BILITOTAL 0.2 07/29/2020     OTHER:   Lab Results   Component Value Date    A1C 5.5 02/04/2021    ROWAN 8.8 05/03/2021    PHOS 2.8 04/22/2019    TSH 0.62 01/27/2020    T4 1.08 12/10/2013       Anesthesia Plan    ASA Status:  2   NPO Status:  NPO Appropriate    Anesthesia Type: MAC.     - Reason for MAC: Deep or markedly invasive procedure (G8)   Induction: Propofol.   Maintenance: N/A.        Consents    Anesthesia Plan(s) and associated risks, benefits, and realistic alternatives discussed. Questions answered and patient/representative(s) expressed understanding.     - Discussed with:  Patient    Use of blood products discussed: No .     Postoperative Care    Pain management: Multi-modal analgesia, Oral pain medications.   PONV prophylaxis: Ondansetron (or other 5HT-3), Dexamethasone or Solumedrol     Comments:                Darshan Muniz DO

## 2021-05-07 LAB — COPATH REPORT: NORMAL

## 2021-05-10 ENCOUNTER — TELEPHONE (OUTPATIENT)
Dept: INTERNAL MEDICINE | Facility: CLINIC | Age: 41
End: 2021-05-10

## 2021-05-10 DIAGNOSIS — S72.145S CLOSED NONDISPLACED INTERTROCHANTERIC FRACTURE OF LEFT FEMUR, SEQUELA: ICD-10-CM

## 2021-05-10 DIAGNOSIS — G89.29 OTHER CHRONIC PAIN: ICD-10-CM

## 2021-05-10 RX ORDER — OXYCODONE AND ACETAMINOPHEN 5; 325 MG/1; MG/1
0.5 TABLET ORAL 2 TIMES DAILY PRN
Qty: 30 TABLET | Refills: 0 | OUTPATIENT
Start: 2021-05-10

## 2021-05-10 NOTE — TELEPHONE ENCOUNTER
Per chart review, additional pain medication for postoperative pain was prescribed by patient's OB/GYN.  Patient is not due for a chronic pain refill until May 15.  No refills will provided before this date.    Stacey Graham PA-C on 5/10/2021 at 11:43 AM

## 2021-05-10 NOTE — TELEPHONE ENCOUNTER
Routing to Stacey Graham PA-C      Patient states that her pain RX was changed by her surgeon 5/5/21 for oxycodone every 6 hours due to post surgical pain.      She would now like to have her normal prescription for pain management of twice daily PRN.        Order pended if agreeable      Nola Garcia RN  [facility]

## 2021-05-11 RX ORDER — OXYCODONE AND ACETAMINOPHEN 5; 325 MG/1; MG/1
0.5 TABLET ORAL 2 TIMES DAILY PRN
Qty: 30 TABLET | Refills: 0 | Status: SHIPPED | OUTPATIENT
Start: 2021-05-14 | End: 2021-06-01

## 2021-05-11 NOTE — TELEPHONE ENCOUNTER
Refill of Percocet will be available for patient to  Friday 5/14/21.  This will not be provided sooner.    Patient will need follow-up appointment if current medications are not working to control reflux.    Stacey Graham PA-C on 5/11/2021 at 3:43 PM

## 2021-05-11 NOTE — TELEPHONE ENCOUNTER
Spoke with patient and informed her per Stacey Graham PA-C, see below read word for word.  Patient verbalized understanding and is scheduled with Dr Agarwal on 5-25-21

## 2021-05-11 NOTE — TELEPHONE ENCOUNTER
Patient called back, says she only got 6 tabs of oxycodone on 5/5/21, those are gone, still having pain so was taking percocet every 6 hours for a few days post op.  She is back to taking her chronic dosing of percocet 1/2 tab BID now but will run out early, only has one tablet left today so needs to refill tomorrow.   Will need note approving early refill if granted.    She also points out that 5/15 is a Saturday, pharmacy not open on Saturday so would need refill approved before then.    She also says the pharmacy does not have the protonix 20 mg, take 2 tabs daily, says Stacey increased her dose.   I see Rx sent 5/3/21.    I called pharmacy, they say they dispensed 40 mg pantoprazole tabs, quantity 30 on 5/4/21.  Insurance won't cover 2 tabs a day but will cover one 40 mg tab a day.    I called patient back.  She checked her bottle, she does have 40 mg pantoprazole and has been mistakenly taking it twice a day as that was the plan per Stacey Graham (twice a day, not 2 tabs once a day as the Rx reads).    I advised her to just take one tab daily for now.   Patient states the 40 mg BID is STILL not enough, still having stomach acid and excess saliva production.       Routed to Stacey Graham to address early percocet refill due to increased dose post-op and protonix issue/dosing.    Lashell Woods RN  [facility]

## 2021-05-25 ENCOUNTER — OFFICE VISIT (OUTPATIENT)
Dept: FAMILY MEDICINE | Facility: CLINIC | Age: 41
End: 2021-05-25
Payer: COMMERCIAL

## 2021-05-25 VITALS
TEMPERATURE: 97.2 F | WEIGHT: 181.2 LBS | RESPIRATION RATE: 14 BRPM | OXYGEN SATURATION: 99 % | DIASTOLIC BLOOD PRESSURE: 85 MMHG | HEART RATE: 80 BPM | SYSTOLIC BLOOD PRESSURE: 135 MMHG | BODY MASS INDEX: 34.24 KG/M2

## 2021-05-25 DIAGNOSIS — F33.1 MODERATE RECURRENT MAJOR DEPRESSION (H): ICD-10-CM

## 2021-05-25 DIAGNOSIS — G44.219 EPISODIC TENSION-TYPE HEADACHE, NOT INTRACTABLE: ICD-10-CM

## 2021-05-25 DIAGNOSIS — Z79.899 POLYPHARMACY: Primary | ICD-10-CM

## 2021-05-25 DIAGNOSIS — G89.29 OTHER CHRONIC PAIN: ICD-10-CM

## 2021-05-25 DIAGNOSIS — F41.1 GENERALIZED ANXIETY DISORDER: ICD-10-CM

## 2021-05-25 PROCEDURE — 99214 OFFICE O/P EST MOD 30 MIN: CPT | Performed by: FAMILY MEDICINE

## 2021-05-25 RX ORDER — TIZANIDINE 2 MG/1
2-6 TABLET ORAL 3 TIMES DAILY PRN
Qty: 180 TABLET | Refills: 0 | Status: SHIPPED | OUTPATIENT
Start: 2021-05-25 | End: 2021-06-01

## 2021-05-25 RX ORDER — BUTALBITAL, ACETAMINOPHEN AND CAFFEINE 50; 325; 40 MG/1; MG/1; MG/1
TABLET ORAL
Qty: 20 TABLET | Refills: 0 | Status: SHIPPED | OUTPATIENT
Start: 2021-05-25 | End: 2021-06-01

## 2021-05-25 NOTE — PROGRESS NOTES
Assessment & Plan     Polypharmacy  - MED THERAPY MANAGE REFERRAL    Chronic pain, multiple joints  - Refill: tiZANidine (ZANAFLEX) 2 MG tablet  Dispense: 180 tablet; Refill: 0  - Discussed plan to gradually decrease dose/quantity, taper to discontinue the Percocet in the near future. Patient was reluctant but willing to try.     Episodic tension-type headache, not intractable  - Refill: butalbital-acetaminophen-caffeine (ESGIC) -40 MG tablet  Dispense: 20 tablet; Refill: 0    Generalized anxiety disorder  -  Following with Mental Health- [facility].     Moderate recurrent major depression (H)  - Following with [facility]- [facility].      Review of prior external note(s) from - Outside records from [facility]. Previous Records.  15 minutes spent on the date of the encounter doing chart review, history and exam, documentation and further activities per the note         Return in about 1 month (around 6/25/2021) for Physical Exam.    Mireya Agarwal MD  [facility] EFFIE Minor is a 40 year old who presents for the following health issues     HPI     Med refills   PCP: Dr Smith. Out on Medical Leave.   Has been seeing Same Day provider for med refills.   Encouraged to see a PCP for ongoing med refills and management.     Currently following with Psychiatry- [facility] for Mental Health and medication management.   Has a known history of borderline intellectual functioning with Major Depressive Disorder, Generalized anxiety disorder and insomnia.     Reports a history of Episodic tension- type headaches that are controlled with Butalbital- acetaminophen-caffeine which she takes as needed with a max of # 20 tabs/month. Requesting for a refill today.     Also reports a history of chronic pain in multiple joints more intense in the left hip. Recent X ray revealed mild degenerative changes in the left SI joint. Currently going to  Physical Therapy and taking Percocet and Tizanidine for pain relief.   Also states that she's been taking it for her recent post op pain- (Excision of abdominal wall lump (Left Abdomen)- had this procedure 5/5/21.  Requesting a refill of Tizanidine.     Patient on multiple meds, concerning for polypharmacy along with a benzodiazepine from her Psychiatrist along with opioid and muscle relaxant.     HEALTH CARE MAINTENANCE: Due for a Physical and age appropriate screenings.       Review of Systems   Constitutional, HEENT, cardiovascular, pulmonary, gi and gu systems are negative, except as otherwise noted.      Objective    /85   Pulse 80   Temp 97.2  F (36.2  C) (Tympanic)   Resp 14   Wt 82.2 kg (181 lb 3.2 oz)   LMP 11/16/2020 (Exact Date)   SpO2 99%   BMI 34.24 kg/m    Body mass index is 34.24 kg/m .  Physical Exam   GENERAL: healthy, alert and no distress  PSYCH: mentation appears normal, affect flat and appearance well groomed    DATA  Recent labs reviewed.

## 2021-05-26 NOTE — PROGRESS NOTES
"    Assessment & Plan     Advance care planning      Other chronic pain    - tiZANidine (ZANAFLEX) 2 MG tablet; Take 1-3 tablets (2-6 mg) by mouth 3 times daily as needed for muscle spasms  - Drug Abuse Screen Panel 13, Urine (Care Map)  - oxyCODONE-acetaminophen (PERCOCET) 5-325 MG tablet; Take 0.5 tablets by mouth 2 times daily as needed for severe pain  - naloxone (NARCAN) 4 MG/0.1ML nasal spray; Spray 1 spray (4 mg) into one nostril alternating nostrils once as needed for opioid reversal every 2-3 minutes until assistance arrives    Gastroesophageal reflux disease, unspecified whether esophagitis present    - pantoprazole (PROTONIX) 20 MG EC tablet; Take 2 tablets (40 mg) by mouth daily Take by mouth 30-60 minutes before a meal.    Closed nondisplaced intertrochanteric fracture of left femur, sequela    - oxyCODONE-acetaminophen (PERCOCET) 5-325 MG tablet; Take 0.5 tablets by mouth 2 times daily as needed for severe pain    Vertigo      Other fatigue      Weight gain      Episodic tension-type headache, not intractable    - butalbital-acetaminophen-caffeine (ESGIC) -40 MG tablet; TAKE ONE TABLET BY MOUTH AT ONSET OF HEADACHE - MAX ONE TABLET PER DAY, AND 20 TABLETS PER MONTH.    Chronic, continuous use of opioids    - Drug Abuse Screen Panel 13, Urine (Care Map)    Anxiety    - propranolol (INDERAL) 10 MG tablet; Take 1 tablet (10 mg) by mouth 3 times daily    30 minutes spent on the date of the encounter doing chart review, history and exam, documentation and further activities per the note     BMI:   Estimated body mass index is 34.16 kg/m  as calculated from the following:    Height as of 5/3/21: 1.549 m (5' 1\").    Weight as of this encounter: 82 kg (180 lb 12.8 oz).   Weight management plan: Discussed healthy diet and exercise guidelines    See Patient Instructions: advised follow up as needed- new guidelines- every 3 month visits for controlled meds.     Return in 3 months (on 9/1/2021), or if " symptoms worsen or fail to improve.    Xenia Gaviria, NISHAP  [facility] EFFIE Minor is a 40 year old who presents for the following health issues     HPI     Patient is here for a meet and greet. She reports she was seeing Stacey since Dr. Smith has been out of office.  She had a good rapport with Stacey, would like to keep seeing her because she understands her osteoporosis with recurrent fractures, but Stacey is acute/ same day provider.  She saw Dr. Agarwal, who though she should see a Medication Management Pharmacist to see if all of her meds are appropriate.  Pt reports all of her meds are appropriate and working for her, which is why she is not interested in doing that. She reports she has been weaning off her percocet- currently taking only 1/2 pill BID. She reports her treatments for osteoporosis have changed to once annual infusions. She reports no fractures for 1-2 years, the year before that she had 12 fractures that year.         Review of Systems   Constitutional, HEENT, cardiovascular, pulmonary, GI, , musculoskeletal, neuro, skin, endocrine and psych systems are negative, except as otherwise noted.      Objective    /82   Pulse 79   Temp 96.8  F (36  C) (Tympanic)   Resp 16   Wt 82 kg (180 lb 12.8 oz)   LMP 11/16/2020 (Exact Date)   SpO2 99%   BMI 34.16 kg/m    Body mass index is 34.16 kg/m .  Physical Exam   GENERAL: Healthy, alert and no distress  EYES: Eyes grossly normal to inspection.  No discharge or erythema, or obvious scleral/conjunctival abnormalities.  RESP: No audible wheeze, cough, or visible cyanosis.  No visible retractions or increased work of breathing.    SKIN: Visible skin clear. No significant rash, abnormal pigmentation or lesions.  NEURO: Cranial nerves grossly intact.  Mentation and speech appropriate for age.  PSYCH: Mentation appears normal, affect normal/bright, judgement and insight intact, normal speech and appearance  well-groomed.    See orders

## 2021-05-27 ENCOUNTER — HOSPITAL ENCOUNTER (OUTPATIENT)
Dept: PHYSICAL THERAPY | Facility: CLINIC | Age: 41
Setting detail: THERAPIES SERIES
End: 2021-05-27
Attending: INTERNAL MEDICINE
Payer: COMMERCIAL

## 2021-05-27 PROCEDURE — 97110 THERAPEUTIC EXERCISES: CPT | Mod: GP | Performed by: PHYSICAL THERAPIST

## 2021-05-29 NOTE — PROGRESS NOTES
Patient was fitted with kids size crutches. All questions were answered to patient's satisfaction. DME form explained, signed, and copy given to the patient for their records.   Melinda Richardson Clinical Medical Assistant       94

## 2021-05-29 NOTE — PROGRESS NOTES
Rehabilitation Services      OUTPATIENT PHYSICAL THERAPY  PLAN OF TREATMENT FOR OUTPATIENT REHABILITATION    Patient's Last Name, First Name, M.I.                YOB: 1980  Mily Grullon                        Provider's Name  Miri Rahman, PT Medical Record No.  [id number]                               Onset Date: 2/3/2021   Start of Care Date: 2/25/2021   Type:     _X_PT   ___OT   ___SLP Medical Diagnosis: osteoporosis, debility                       PT Diagnosis: left hip pain, decreased activity tolerance, risk of falls      _________________________________________________________________________________  Plan of Treatment:    Frequency/Duration: This cert covers visit on 5/27. No further PT planned after this date.      Goals:  Goal Identifier online walking ex program-not met but she is walking for exercise at dog park daily (unless weather)   Goal Description She will complete online walking calss for at least 15 minutes 4 days a week for exercise.    Target Date 05/26/21   Date Met  05/27/21   Progress:     Goal Identifier sit to stand test (leg strength)-MET   Goal Description Improve on STS test from 10 reps to 13 reps in 30 seconds (no hands)   Target Date 05/26/21   Date Met  05/27/21   Progress:       Progress Toward Goals: I have seen Taryn for a number of PT visits in the past 2 years. She has improved. She has less left hip pain and is doing more activity. I think she does follow through on the HEP to some degree. Due to her osteoporosis everyone wants her to be doing wt bearing exs. I have worked with her to do more walking for exercise. She is now walking her sisters dog daily at a dog park and really enjoys this. I have also shown her online walking exs for in the house but she has made  limited effort to do this at home.     She does well on the balance tests when we do them but she is still afraid  of falling because of previous broken bones from falls due to her osteoporosis.  She is at risk of falls but only when distracted and not concentrating on the activity (example dog leash getting caught on her leg).     No further PT planned at this time. She is aware that should do exs daily.    Certification date from 5/26/2021 to 6/26/2021.    Miri Rahman, PT          I CERTIFY THE NEED FOR THESE SERVICES FURNISHED UNDER        THIS PLAN OF TREATMENT AND WHILE UNDER MY CARE     (Physician co-signature of this document indicates review and certification of the therapy plan).                Referring Provider: DR Flores

## 2021-05-29 NOTE — PROGRESS NOTES
"Outpatient Physical Therapy Discharge Note     Patient: Mily Grullon  : 1980    Beginning/End Dates of Reporting Period:  2021 to 2021. She was seen for 5 visits this episode of care.    Referring Provider: Dr Brown    Therapy Diagnosis: osteoporosis     Client Self Report: 2 weeks of soreness after stomach surgery () Things are going well. Found a new dog park going there everyday, uneven ground/trails but they have benches/tables to sit on when she gets tired. Is at dog park for 1.5 hours. Hip is pissed off. Not doing much for online exs. The cane is in the car. Only uses it when feeling wobbly.  Parents make her use cane. does not use it at dog park    Objective Measurements:  Objective Measure: sit to stand from 18 inch ht chair with no hands. 14 reps.in 30 seconds.  Details: \"sore\", very wide JODI,    Objective Measure: sit to stand from 18 inch chair with no hands.13 reps in 30 seconds.  Details:  feet almost normal JODI not wide.    Goals:  Goal Identifier online walking ex program-not met but she is walking for exercise at dog park daily (unless weather)   Goal Description She will complete online walking calss for at least 15 minutes 4 days a week for exercise.    Target Date 21   Date Met  21   Progress:     Goal Identifier sit to stand test (leg strength)-MET   Goal Description Improve on STS test from 10 reps to 13 reps in 30 seconds (no hands)   Target Date 21   Date Met  21   Progress:     Progress Toward Goals: I have seen Trayn for a number of PT visits in the past 2 years. She has improved. She has less left hip pain and is doing more activity. I think she does follow through on the HEP to some degree. Due to her osteoporosis everyone wants her to be doing wt bearing exs. I have worked with her to do more walking for exercise. She is now walking her sisters dog daily at a dog park and really enjoys this. I have also shown her online walking exs for in " the house but she has made  limited effort to do this at home.     She does well on the balance tests when we do them but she is still afraid of falling because of previous broken bones from falls due to her osteoporosis.  She is at risk of falls but only when distracted and not concentrating on the activity (example dog leash getting caught on her leg).     Plan: Discharge from therapy.    Reason for Discharge: Patient has met all goals. No further expectation of progress. I have given her a number of wt bearing exs to do and shown her online exs. She needs to do them consistently to further improve. She is walking more and enjoys walking at dog park.     Equipment Issued: none    Discharge Plan: Patient to continue home program    Olivia Rahman DPT, MPT, NCS  Physical Therapist   Board Certified Neurologic Clinical Specialist     [facility]   [address] [place] [address]   [email or web address]   [phone]   Clinic: [phone] //   Fax: [phone]    .

## 2021-06-01 ENCOUNTER — OFFICE VISIT (OUTPATIENT)
Dept: FAMILY MEDICINE | Facility: CLINIC | Age: 41
End: 2021-06-01
Payer: COMMERCIAL

## 2021-06-01 VITALS
RESPIRATION RATE: 16 BRPM | SYSTOLIC BLOOD PRESSURE: 137 MMHG | OXYGEN SATURATION: 99 % | WEIGHT: 180.8 LBS | DIASTOLIC BLOOD PRESSURE: 82 MMHG | TEMPERATURE: 96.8 F | BODY MASS INDEX: 34.16 KG/M2 | HEART RATE: 79 BPM

## 2021-06-01 DIAGNOSIS — R42 VERTIGO: ICD-10-CM

## 2021-06-01 DIAGNOSIS — R53.83 OTHER FATIGUE: ICD-10-CM

## 2021-06-01 DIAGNOSIS — F11.90 CHRONIC, CONTINUOUS USE OF OPIOIDS: ICD-10-CM

## 2021-06-01 DIAGNOSIS — R63.5 WEIGHT GAIN: ICD-10-CM

## 2021-06-01 DIAGNOSIS — F41.9 ANXIETY: ICD-10-CM

## 2021-06-01 DIAGNOSIS — K21.9 GASTROESOPHAGEAL REFLUX DISEASE, UNSPECIFIED WHETHER ESOPHAGITIS PRESENT: ICD-10-CM

## 2021-06-01 DIAGNOSIS — G89.29 OTHER CHRONIC PAIN: ICD-10-CM

## 2021-06-01 DIAGNOSIS — Z71.89 ADVANCE CARE PLANNING: Primary | ICD-10-CM

## 2021-06-01 DIAGNOSIS — S72.145S CLOSED NONDISPLACED INTERTROCHANTERIC FRACTURE OF LEFT FEMUR, SEQUELA: ICD-10-CM

## 2021-06-01 DIAGNOSIS — G44.219 EPISODIC TENSION-TYPE HEADACHE, NOT INTRACTABLE: ICD-10-CM

## 2021-06-01 LAB
AMPHETAMINES UR QL: NOT DETECTED NG/ML
BARBITURATES UR QL SCN: ABNORMAL NG/ML
BENZODIAZ UR QL SCN: NOT DETECTED NG/ML
BUPRENORPHINE UR QL: NOT DETECTED NG/ML
CANNABINOIDS UR QL: NOT DETECTED NG/ML
COCAINE UR QL SCN: NOT DETECTED NG/ML
D-METHAMPHET UR QL: NOT DETECTED NG/ML
METHADONE UR QL SCN: NOT DETECTED NG/ML
OPIATES UR QL SCN: NOT DETECTED NG/ML
OXYCODONE UR QL SCN: NOT DETECTED NG/ML
PCP UR QL SCN: NOT DETECTED NG/ML
PROPOXYPH UR QL: NOT DETECTED NG/ML
TRICYCLICS UR QL SCN: ABNORMAL NG/ML

## 2021-06-01 PROCEDURE — 99214 OFFICE O/P EST MOD 30 MIN: CPT | Performed by: NURSE PRACTITIONER

## 2021-06-01 PROCEDURE — 80306 DRUG TEST PRSMV INSTRMNT: CPT | Performed by: NURSE PRACTITIONER

## 2021-06-01 RX ORDER — PANTOPRAZOLE SODIUM 20 MG/1
40 TABLET, DELAYED RELEASE ORAL DAILY
Qty: 180 TABLET | Refills: 0 | Status: SHIPPED | OUTPATIENT
Start: 2021-06-01 | End: 2021-09-10 | Stop reason: DRUGHIGH

## 2021-06-01 RX ORDER — TIZANIDINE 2 MG/1
2-6 TABLET ORAL 3 TIMES DAILY PRN
Qty: 180 TABLET | Refills: 0 | Status: SHIPPED | OUTPATIENT
Start: 2021-06-01 | End: 2021-07-07

## 2021-06-01 RX ORDER — BUTALBITAL, ACETAMINOPHEN AND CAFFEINE 50; 325; 40 MG/1; MG/1; MG/1
TABLET ORAL
Qty: 20 TABLET | Refills: 0 | Status: SHIPPED | OUTPATIENT
Start: 2021-06-01 | End: 2021-07-19

## 2021-06-01 RX ORDER — OXYCODONE AND ACETAMINOPHEN 5; 325 MG/1; MG/1
0.5 TABLET ORAL 2 TIMES DAILY PRN
Qty: 30 TABLET | Refills: 0 | Status: SHIPPED | OUTPATIENT
Start: 2021-06-01 | End: 2021-07-07

## 2021-06-01 RX ORDER — PROPRANOLOL HYDROCHLORIDE 10 MG/1
10 TABLET ORAL 3 TIMES DAILY
Qty: 90 TABLET | Refills: 3 | Status: SHIPPED | OUTPATIENT
Start: 2021-06-01 | End: 2022-03-15

## 2021-06-01 NOTE — LETTER
Opioid / Opioid Plus Controlled Substance Agreement    This is an agreement between you and your provider about the safe and appropriate use of controlled substance/opioids prescribed by your care team. Controlled substances are medicines that can cause physical and mental dependence (abuse).    There are strict laws about having and using these medicines. We here at [facility] are committing to working with you in your efforts to get better. To support you in this work, we ll help you schedule regular office appointments for medicine refills. If we must cancel or change your appointment for any reason, we ll make sure you have enough medicine to last until your next appointment.     As a Provider, I will:    Listen carefully to your concerns and treat you with respect.     Recommend a treatment plan that I believe is in your best interest. This plan may involve therapies other than opioid pain medication.     Talk with you often about the possible benefits, and the risk of harm of any medicine that we prescribe for you.     Provide a plan on how to taper (discontinue or go off) using this medicine if the decision is made to stop its use.    As a Patient, I understand that opioid(s):     Are a controlled substance prescribed by my care team to help me function or work and manage my condition(s).     Are strong medicines and can cause serious side effects such as:    Drowsiness, which can seriously affect my driving ability    A lower breathing rate, enough to cause death    Harm to my thinking ability     Depression     Abuse of and addiction to this medicine    Need to be taken exactly as prescribed. Combining opioids with certain medicines or chemicals (such as illegal drugs, sedatives, sleeping pills, and benzodiazepines) can be dangerous or even fatal. If I stop opioids suddenly, I may have severe withdrawal symptoms.    Do not work for all types of pain nor for all patients. If they re not helpful, I may  be asked to stop them.    I am also being prescribed a benzodiazepine (tranquilizer) controlled substance: I understand this type of medicine is sedating and can increase the risk of death when taken together with opioids. I have talked to my care team about having prescription Narcan available for reversing the opioid medicine and have been instructed in its use. I will be very careful to take my medicines only as directed  I am also being prescribed a stimulant controlled substance to help manage symptoms of attention deficit, appetite suppression, and/or fatigue.    The risks, benefits and side effects of these medicine(s) were explained to me. I agree that:  1. I will take part in other treatments as advised by my care team. This may be psychiatry or counseling, physical therapy, behavioral therapy, group treatment or a referral to a specialist.     2. I will keep all my appointments. I understand that this is part of the monitoring of opioids. My care team may require an office visit for EVERY opioid/controlled substance refill. If I miss appointments or don t follow instructions, my care team may stop my medicine.    3. I will take my medicines as prescribed. I will not change the dose or schedule unless my care team tells me to. There will be no refills if I run out early.     4. I may be asked to come to the clinic and complete a urine drug test or complete a pill count at any time. If I don t give a urine sample or participate in a pill count, the care team may stop my medicine.    5. I will only receive prescriptions from this clinic for chronic pain. If I am treated by another provider for acute pain issues, I will tell them that I am taking opioid pain medication for chronic pain and that I have a treatment agreement with this provider. I will inform my [facility] care team within one business day if I am given a prescription for any pain medication by another healthcare provider. My [facility]  [place] care team can contact other providers and pharmacists about my use of any medicines.    6. It is up to me to make sure that I don t run out of my medicines on weekends or holidays. If my care team is willing to refill my opioid prescription without a visit, I must request refills only during office hours. Refills may take up to 3 business days to process. I will use one pharmacy to fill all my opioid and other controlled substance prescriptions. I will notify the clinic about any changes to my insurance or medication availability.    7. I am responsible for my prescriptions. If the medicine/prescription is lost, stolen or destroyed, it will not be replaced. I also agree not to share controlled substance medicines with anyone.    8. I am aware I should not use any illegal or recreational drugs. I agree not to drink alcohol unless my care team says I can.       9. If I enroll in the [place] [facility] Cannabis program, I will tell my care team prior to my next refill.     10. I will tell my care team right away if I become pregnant, have a new medical problem treated outside of my regular clinic, or have a change in my medications.    11. I understand that this medicine can affect my thinking, judgment and reaction time. Alcohol and drugs affect the brain and body, which can affect the safety of my driving. Being under the influence of alcohol or drugs can affect my decision-making, behaviors, personal safety, and the safety of others. Driving while impaired (DWI) can occur if a person is driving, operating, or in physical control of a car, motorcycle, boat, snowmobile, ATV, motorbike, off-road vehicle, or any other motor vehicle ([place] Statute 169A.20). I understand the risk if I choose to drive or operate any vehicle or machinery.    I understand that if I do not follow any of the conditions above, my prescriptions or treatment may be stopped or changed.          Opioids  What You Need to Know    What are  opioids?   Opioids are pain medicines that must be prescribed by a doctor. They are also known as narcotics.     Examples are:   1. morphine (MS Contin, Patrizia)  2. oxycodone (Oxycontin)  3. oxycodone and acetaminophen (Percocet)  4. hydrocodone and acetaminophen (Vicodin, Norco)   5. fentanyl patch (Duragesic)   6. hydromorphone (Dilaudid)   7. methadone  8. codeine (Tylenol #3)     What do opioids do well?   Opioids are best for severe short-term pain such as after a surgery or injury. They may work well for cancer pain. They may help some people with long-lasting (chronic) pain.     What do opioids NOT do well?   Opioids never get rid of pain entirely, and they don t work well for most patients with chronic pain. Opioids don t reduce swelling, one of the causes of pain.                                    Other ways to manage chronic pain and improve function include:       Treat the health problem that may be causing pain    Anti-inflammation medicines, which reduce swelling and tenderness, such as ibuprofen (Advil, Motrin) or naproxen (Aleve)    Acetaminophen (Tylenol)    Antidepressants and anti-seizure medicines, especially for nerve pain    Topical treatments such as patches or creams    Injections or nerve blocks    Chiropractic or osteopathic treatment    Acupuncture, massage, deep breathing, meditation, visual imagery, aromatherapy    Use heat or ice at the pain site    Physical therapy     Exercise    Stop smoking    Take part in therapy       Risks and side effects     Talk to your doctor before you start or decide to keep taking opioids. Possible side effects include:      Lowering your breathing rate enough to cause death    Overdose, including death, especially if taking higher than prescribed doses    Worse depression symptoms; less pleasure in things you usually enjoy    Feeling tired or sluggish    Slower thoughts or cloudy thinking    Being more sensitive to pain over time; pain is harder to  control    Trouble sleeping or restless sleep    Changes in hormone levels (for example, less testosterone)    Changes in sex drive or ability to have sex    Constipation    Unsafe driving    Itching and sweating    Dizziness    Nausea, throwing up and dry mouth    What else should I know about opioids?    Opioids may lead to dependence, tolerance, or addiction.      Dependence means that if you stop or reduce the medicine too quickly, you will have withdrawal symptoms. These include loose poop (diarrhea), jitters, flu-like symptoms, nervousness and tremors. Dependence is not the same as addiction.                       Tolerance means needing higher doses over time to get the same effect. This may increase the chance of serious side effects.      Addiction is when people improperly use a substance that harms their body, their mind or their relations with others. Use of opiates can cause a relapse of addiction if you have a history of drug or alcohol abuse.      People who have used opioids for a long time may have a lower quality of life, worse depression, higher levels of pain and more visits to doctors.    You can overdose on opioids. Take these steps to lower your risk of overdose:    1. Recognize the signs:  Signs of overdose include decrease or loss of consciousness (blackout), slowed breathing, trouble waking up and blue lips. If someone is worried about overdose, they should call 911.    2. Talk to your doctor about Narcan (naloxone).   If you are at risk for overdose, you may be given a prescription for Narcan. This medicine very quickly reverses the effects of opioids.   If you overdose, a friend or family member can give you Narcan while waiting for the ambulance. They need to know the signs of overdose and how to give Narcan.     3. Don't use alcohol or street drugs.   Taking them with opioids can cause death.    4. Do not take any of these medicines unless your doctor says it s OK. Taking these with  opioids can cause death:    Benzodiazepines, such as lorazepam (Ativan), alprazolam (Xanax) or diazepam (Valium)    Muscle relaxers, such as cyclobenzaprine (Flexeril)    Sleeping pills like zolpidem (Ambien)     Other opioids      How to keep you and other people safe while taking opioids:    1. Never share your opioids with others.  Opioid medicines are regulated by the Drug Enforcement Agency (SERAFIN). Selling or sharing medications is a criminal act.    2. Be sure to store opioids in a secure place, locked up if possible. Young children can easily swallow them and overdose.    3. When you are traveling with your medicines, keep them in the original bottles. If you use a pill box, be sure you also carry a copy of your medicine list from your clinic or pharmacy.    4. Safe disposal of opioids    Most pharmacies have places to get rid of medicine, called disposal kiosks. Medicine disposal options are also available in every [facility]. Search your county and  medication disposal  to find more options. You can find more details at:  https://www.pca.[facility].[place]./living-green/managing-unwanted-medications     I agree that my provider, clinic care team, and pharmacy may work with any city, state or federal law enforcement agency that investigates the misuse, sale, or other diversion of my controlled medicine. I will allow my provider to discuss my care with, or share a copy of, this agreement with any other treating provider, pharmacy or emergency room where I receive care.    I have read this agreement and have asked questions about anything I did not understand.    _______________________________________________________  Patient Signature - Mily Grullon _____________________                   Date     _______________________________________________________  Provider Signature - Xenia Gaviria NP   _____________________                   Date     _______________________________________________________  Witness  Signature (required if provider not present while patient signing)   _____________________                   Date

## 2021-06-01 NOTE — PATIENT INSTRUCTIONS
Patient Education     Managing Chronic Pain   Being in pain can be exhausting. You may find you have trouble working, sleeping, or just doing day-to-day tasks. But you can learn to manage pain, feel better, and regain control of your life.    Understanding chronic pain  Chronic pain is a serious medical problem. It's defined as pain that lasts longer than 3 months. Chronic pain includes pain that you feel regularly, even if it comes and goes. The pain may be from an ongoing injury or health problem. Or it may be because of a chronic pain syndrome, such as fibromyalgia. Sometimes pain persists when no cause can be found.    Pain should be treated  You have a right to have your pain treated. Untreated chronic pain can affect your overall health. It can lead to depression, anxiety, anger, and personality changes. It can also disrupt work, sleep, relationships, and other aspects of normal life. It may not be possible to relieve all of your pain. But it can be reduced to a level you can cope with.    Your role in treatment  Your healthcare provider will work closely with you on a plan to manage your pain. But it s up to you to put this plan into action. Control of chronic pain is done mainly through self-management. This means that you take an active role in your care. Getting support from family and friends is important too.    Planning your treatment  Your healthcare provider will first look for a cause of your pain that can be treated. He or she will also assess your pain level. This may be done by asking you to rate your pain on a scale from 1 (low pain) to 10 (severe pain). Your provider will also ask you to describe the pain. For example, is your pain sharp or dull? Is it constant or does it come and go? You may be asked to keep a pain log. This is a diary in which you track your pain. It may help identify things that tend to make your pain worse. You and your healthcare provider can make a plan to help prevent  and cope with pain on a daily basis. In some cases, you may be referred to a special pain program or clinic. Your treatment plan may include:     Medicines    Complementary therapies    Mind and body therapies    Other medical treatments    Getting physical activity   Medicines  Medicine will most likely be a part of your treatment plan. Your provider will evaluate which are the best medicines for your pain. You may use over-the-counter or prescription medicines. You may need to take more than one medicine. It may take some time to find the best medicine or combination of medicines for your pain. Take all medicines as directed. Pain medicines can be used in many ways. You may take medicines:     Every day to help   stay ahead  of the pain so that it doesn t flare up    At times when pain is worse than usual    Before activities that tend to trigger pain    To decrease sensitivity to pain  Medicines for chronic pain include:    Nonsteroidal anti-inflammatory medicines (NSAIDs) for pain from swelling and inflammation. Your provider may prescribe a type of NSAID called a ERICKSON inhibitor.    Acetaminophen    Anticonvulsants to treat nerve pain called neuropathy    Antidepressants to treat neuropathy    Muscle relaxants for muscle spasms    Topical medicines. These are put on the skin.    Opioids, or narcotics, to treat severe pain. These very strong medicines can help ease certain kinds of pain. They most often are used for short periods of time. Your provider will monitor your care very closely if you take opioids to reduce the risk for addiction.   Complementary therapies  These are treatments that can be used along with medical care to help relieve pain. Look for a licensed practitioner with experience treating chronic pain. Talk with your healthcare provider about using complementary therapies such as:     Massage    Physical therapy    Acupuncture and acupressure    Chiropractic    Vitamins or herbal  supplements     Naturopathy    Dry needling  Mind/body therapies  The brain and the body are both part of the pain response. The brain reads the pain signals from the body. This means that your mind has some control over how pain signals are processed. Mind/body therapies may help change how your brain reads pain signals. They may be learned with the help of a trained therapist or in a class. They include:     Deep breathing    Distraction    Visualization    Meditation    Biofeedback     Yoga  Other medical treatments  If other treatments don t work for you, one of these procedures or devices may help:    Nerve blocks to numb nerves in a painful area    Trigger point injections for painful muscles    Steroid injections for joint pain     Transcutaneous electrical nerve stimulation (TENS) to block pain signals to the brain    Spinal stimulation to block spinal pain    Implanted spinal pump that contains pain medicine    Ablation using heat, cold, or chemicals to destroy painful nerves                                                                                                                    Getting physical activity  Being physically active has many benefits. It can improve your ability to cope with pain. It may also help improve your mood, sleep, and overall health. Your healthcare provider can help you plan an exercise program that s right for your needs. This may include:     Stretching and range-of-motion exercises    Low-impact exercise such as walking, biking, swimming, and other water exercise    Strength training using light weights    Walking up the stairs instead of taking the elevator    Riding a bike instead of driving    Parking your car farther from your destination   You may need to avoid high-impact activities. These involve jumping, running, or sudden starts, stops, or changes of direction. If you haven t exercised in a long time or you have physical limitations, your healthcare provider may  refer you to a physical therapist. He or she can teach you stretches and exercises that fit your condition and fitness level.    Being active and healthy  A healthier lifestyle makes it easier to cope with pain and function better. Follow these tips:     Choose a balance of healthy foods and drinks.    Limit alcohol and caffeine.    Go to bed at about the same time each day and get enough restful sleep.    Don t let pain keep you from others. Spend time with friends and family.    Keep your mind active. Read books or take classes.    If you re not working, volunteer or join a club or social group.   Getting support  A support group lets you talk with others who also have chronic pain. Chronic pain support groups can help you feel less isolated. They can also give you tips for coping with pain. To find a local support group, contact your nearest hospital or pain clinic.    You may also want to try counseling. Counseling can help you learn coping skills and methods such as visualization. It can also help with mood problems. When choosing a counselor, look for someone who has worked with people who have chronic pain.    See your provider for regular visits and let him or her know how well treatments are working for you. Also reach out to family and friends for help and support.                               For more support and information, contact these groups:    American Academy of Pain Medicine, www.painmed.org    American Chronic Pain Association, www.theacpa.org    National Pain Foundation, www.nationalpainfoundation.org  [employer] last reviewed this educational content on 8/1/2020 2000-2021 [employer]. All rights reserved. This information is not intended as a substitute for professional medical care. Always follow your healthcare professional's instructions.           Patient Education     Preventing Osteoporosis: Meeting Your Calcium Needs    Your body needs calcium to build and repair bones.  But it can't make calcium on its own. That's why it's important to eat calcium-rich foods. Some foods are naturally rich in calcium. Others have calcium added (fortified). It's best to get calcium from the foods you eat. But if you can't get enough, you may want to take calcium supplements. To meet your daily calcium needs, try the foods listed below.  Dairy Fish & beans Other sources   Source   Calcium (mg) per serving   Source   Calcium (mg) per serving   Source   Calcium (mg) per serving   Low-fat yogurt, plain   415 mg/8 oz.   Sardines, Atlantic, canned, with bones   351 mg/3 oz.   Oatmeal, instant, fortified   215 mg/1 cup   Nonfat milk   302 mg/1 cup   Buford, sockeye, canned, with bones   239 mg/3 oz.   Tofu made with calcium sulfate   204 mg/3 oz.   Low-fat milk   297 mg/1 cup   Soybeans, fresh, boiled   131 mg/1/2 cup   Collards   179 mg/1/2 cup   Swiss cheese   272 mg/1 oz.   White beans, cooked   81 mg/1/2 cup   English muffin, whole wheat   175 mg/1 muffin   Cheddar cheese   205 mg/1 oz.   Navy beans, cooked   79 mg/1/2 cup   Kale   90 mg/1/2 cup   Ice cream strawberry   79 mg/1/2 cup           Orange, navel   56 mg/1 medium   Note: Calcium levels may vary depending on brand and size.  Daily calcium needs  14 to 18 years old: 1,300 mg  19 to 30 years old: 1,000 mg  31 to 50 years old: 1,000 mg  51 to 70 years old, women: 1,200 mg  51 to 70 years old, men: 1,000 mg  Pregnant or nursin to 18 years old: 1,300 mg, 19 to 50 years old: 1,000 mg  Older than 70 (women and men): 1,200 mg   [employer] last reviewed this educational content on 2018-2021 [employer]. All rights reserved. This information is not intended as a substitute for professional medical care. Always follow your healthcare professional's instructions.           Patient Education     Living with Osteoporosis: Preventing Fractures  If you have osteoporosis, you can do a lot to reduce its effect on your life. Knowing  how to prevent fractures and spinal curvature can help you live more comfortably and safely with this disease.    Reducing your risk for fractures  The most common fracture sites in people with osteoporosis are the wrist, spine, and hip. These fractures are often caused by accidents and falls. All fractures are painful and may limit what you can do. But hip fractures are very serious. They often need surgery, and it can take months to recover. To reduce your risk for fractures:    Get regular exercise. Try walking, swimming, or weight training.    Eat foods that are rich in calcium, or take calcium supplements.    Make your home safe to help avoid accidents.    Take extra precautions not to fall in risky areas, such as icy sidewalks.  Understanding spinal fractures  Your spine is made up of many bones called vertebrae. Osteoporosis can cause the vertebrae in your spine to collapse. As a result, your upper back may arch forward, creating a curvature. Spine fractures may also result from back strain and bad posture. You will also lose height. Your lower spine must then adjust to keep your body balanced. This can cause back pain. To prevent or lessen these spinal changes:    Practice good posture.    Use proper techniques if you need to lift heavy objects.    Do back exercises to help your posture.    Lie on your back when you have pain.    Ask your healthcare provider about these and other ways to help your spine.  [employer] last reviewed this educational content on 5/1/2018 2000-2021 [employer]. All rights reserved. This information is not intended as a substitute for professional medical care. Always follow your healthcare professional's instructions.

## 2021-06-02 ENCOUNTER — TELEPHONE (OUTPATIENT)
Dept: FAMILY MEDICINE | Facility: CLINIC | Age: 41
End: 2021-06-02

## 2021-06-02 DIAGNOSIS — K21.00 GASTROESOPHAGEAL REFLUX DISEASE WITH ESOPHAGITIS WITHOUT HEMORRHAGE: Primary | ICD-10-CM

## 2021-06-02 RX ORDER — PANTOPRAZOLE SODIUM 40 MG/1
40 TABLET, DELAYED RELEASE ORAL DAILY
Qty: 90 TABLET | Refills: 0 | Status: SHIPPED | OUTPATIENT
Start: 2021-06-02 | End: 2021-08-04

## 2021-06-02 NOTE — TELEPHONE ENCOUNTER
Pt's insurance will only cover 1 tablet per day of pantoprazole.    Last month patient took 40mg once daily.    Is 40mg once daily okay? And if so, can you send a new script over to correct records please?    To take 20mg twice daily will require a prior authorization    Please reach out to the pharmacy [phone] with questions as I will be out of the office for the next two days    Let us know, thanks!  [facility]  [phone]

## 2021-06-06 ENCOUNTER — HEALTH MAINTENANCE LETTER (OUTPATIENT)
Age: 41
End: 2021-06-06

## 2021-06-07 ENCOUNTER — TRANSFERRED RECORDS (OUTPATIENT)
Dept: HEALTH INFORMATION MANAGEMENT | Facility: CLINIC | Age: 41
End: 2021-06-07

## 2021-06-15 ENCOUNTER — MYC MEDICAL ADVICE (OUTPATIENT)
Dept: FAMILY MEDICINE | Facility: CLINIC | Age: 41
End: 2021-06-15

## 2021-06-15 PROBLEM — F11.90 CHRONIC, CONTINUOUS USE OF OPIOIDS: Status: ACTIVE | Noted: 2021-06-15

## 2021-06-18 ENCOUNTER — OFFICE VISIT (OUTPATIENT)
Dept: FAMILY MEDICINE | Facility: CLINIC | Age: 41
End: 2021-06-18
Payer: COMMERCIAL

## 2021-06-18 VITALS
DIASTOLIC BLOOD PRESSURE: 72 MMHG | OXYGEN SATURATION: 99 % | TEMPERATURE: 97.1 F | WEIGHT: 183.13 LBS | BODY MASS INDEX: 34.6 KG/M2 | HEART RATE: 77 BPM | SYSTOLIC BLOOD PRESSURE: 100 MMHG

## 2021-06-18 DIAGNOSIS — L03.116 CELLULITIS OF LEFT LOWER EXTREMITY: ICD-10-CM

## 2021-06-18 DIAGNOSIS — Z48.02 VISIT FOR SUTURE REMOVAL: ICD-10-CM

## 2021-06-18 DIAGNOSIS — S81.812D LACERATION OF LEFT LOWER EXTREMITY, SUBSEQUENT ENCOUNTER: Primary | ICD-10-CM

## 2021-06-18 PROCEDURE — 99214 OFFICE O/P EST MOD 30 MIN: CPT | Performed by: PHYSICIAN ASSISTANT

## 2021-06-18 RX ORDER — SULFAMETHOXAZOLE/TRIMETHOPRIM 800-160 MG
1 TABLET ORAL 2 TIMES DAILY
Qty: 14 TABLET | Refills: 0 | Status: SHIPPED | OUTPATIENT
Start: 2021-06-18 | End: 2021-06-25

## 2021-06-18 ASSESSMENT — ENCOUNTER SYMPTOMS
LIGHT-HEADEDNESS: 0
FEVER: 0
NERVOUS/ANXIOUS: 0
SHORTNESS OF BREATH: 0
COUGH: 0

## 2021-06-18 NOTE — PATIENT INSTRUCTIONS
Your sutures were removed today in clinic.  Additionally, you have been prescribed antibiotics to treat the infection.  Please continue to monitor this area.  Return to clinic for any new or worsening symptoms.  I would like you to continue with follow-up/counseling next week as scheduled.  I have also added crisis information below which I would like you use if you develop any thoughts of self-harm.    Patient Education     Discharge Instructions for Cellulitis   You have been diagnosed with cellulitis. This is an infection in the deepest layer of the skin and tissue beneath the skin. In some cases, the infection also affects the muscle. Cellulitis is caused by bacteria. The bacteria can enter the body through broken skin. This can happen with a cut, scratch, animal bite, or an insect bite that has been scratched. You may have been treated in the hospital with antibiotics and fluids. You will likely be given a prescription for antibiotics to take at home. This sheet will help you take care of yourself at home.  Home care  When you are home:    Take the prescribed antibiotic medicine you are given as directed until it is gone. Take it even if you feel better. It treats the infection and stops it from returning. Not taking all the medicine can make future infections hard to treat.    Keep the infected area clean.    When possible, raise the infected area above the level of your heart. This helps keep swelling down.    Talk with your healthcare provider if you are in pain. Ask what kind of over-the-counter medicine you can take for pain.    Apply clean bandages as advised.    Take your temperature once a day for a week.    Wash your hands often to prevent spreading the infection.  In the future, wash your hands before and after you touch cuts, scratches, or bandages. This will help prevent infection.   When to call your healthcare provider  Call your healthcare provider right away if you have any of the  following:    Trouble or pain when moving the joints above or below the infected area    Discharge or pus draining from the area    Fever of 100.4 F (38 C) or higher, or as directed by your healthcare provider    Pain that gets worse in or around the infected     Redness that gets worse in or around the infected area, particularly if the area of redness expands to a wider area    Shaking chills    Swelling of the infected area    Vomiting  [employer] last reviewed this educational content on 11/1/2019 2000-2021 [employer]. All rights reserved. This information is not intended as a substitute for professional medical care. Always follow your healthcare professional's instructions.           Patient Education     Suture Removal, Infected Wound  Your sutures are being removed. Your wound has become infected. The wound will not heal properly unless the infection is cleared. Infection in a wound may also spread if it is not treated. In most cases, antibiotic medicines are prescribed to treat a wound infection.  Symptoms of a wound infection include:    Redness or swelling around the wound    Warmth coming from the wound    New or worsening pain    Red streaks around the wound    Draining pus    Fever  Home care  Medicines    If you were given antibiotics, take them until they are gone or your healthcare provider tells you to stop. It is vital to finish the antibiotics even if you feel better. If you don't finish them, the infection may come back and be harder to treat.    Take medicine for pain as directed by your healthcare provider.  Wound care  Care for your wound as directed by the healthcare provider. This may include the following:    Apply a warm compress (clean cloth soaked in hot water) to the infected area for about 5 to 10 minutes at a time. Be very careful not to burn yourself. Test the cloth on a non-infected area to make sure it is not too hot.    Change the dressing daily. If it becomes  wet, stained with wound fluid, or dirty, change it sooner. To change it:  ? Wash your hands with soap and water before changing the dressing.  ? Carefully remove the dressing and tape. If it sticks to the wound, you may need to wet it a little to remove it. (Don't do this if your healthcare provider has told you not to.)  ? Gently clean the wound with clean water (or saline) using gauze, a clean washcloth, or cotton swab.  ? Don't use soap, alcohol, peroxide or other cleansers.  ? If you were told to dry the wound before putting on a new dressing, gently pat. Don't rub.  ? Throw out the old dressing.  ? Wash your hands again before opening the new, clean dressing.  ? Wash your hands again when you are done.  Follow-up care  Follow up with your healthcare provider as advised. It is important to keep your follow-up appointment to be certain that the infection is being treated. If a culture was done, you will be notified if the treatment needs to change. Call as directed for the results.  When to seek medical advice  Call your healthcare provider right away if any of these occur:    Symptoms of infection don't start to improve within 2 days of starting antibiotics.    Symptoms of infection get worse.    New symptoms, such as red streaks around the wound.    Fever of 100.4 F (38.0 C) or higher for more than 2 days after starting the antibiotics, or as advised by your healthcare provider  [employer] last reviewed this educational content on 3/1/2018    4923-1174 [employer]. All rights reserved. This information is not intended as a substitute for professional medical care. Always follow your healthcare professional's instructions.    In an emergency--call 911  Don't leave the person alone. Anyone who is at imminent risk of suicide needs psychiatric services right away. The person must be constantly watched, and never left out of sight. Call 911 or a 24-hour suicide crisis hotline. You can search for this  online. You can also take the person to the nearest hospital emergency room.     Don't keep it a secret and don't wait  Call a mental health clinic or a licensed mental health professional in your area right away. This may be a psychiatrist, clinical psychologist, psychiatric or licensed clinical , marriage and family counselor, or clergy. If you don't know how to contact such professionals and there is an immediate risk, call 911. Tell them you need help for a person who is thinking about suicide.     Resources    National Suicide Prevention Vfsgyghu661-829-7330 (168-673-HSOG)www.suicidepreventionlifeline.org    National Suicide Eakcfho378-639-5213 (800-SUICIDE)    National [place] of Mental Fqlcsz428-558-2069hgc.nimh.nih.gov    National [place] on Mental Pdjfhwf211-626-8551awi.delia.org    Mental Health Tzucalj698-800-8259jsr.[facility].org

## 2021-06-18 NOTE — PROGRESS NOTES
Assessment & Plan     Laceration of left lower extremity, subsequent encounter  Visit for suture removal  Cellulitis of left lower extremity  Patient is a 40-year-old female who presents to clinic for emergency department follow-up. Patient was evaluated in emergency department for self-inflicted laceration of left thigh. Patient presents to clinic today for suture removal and evaluation of laceration due to discharge and redness. Vital signs without fever or tachycardia. Sutures removed successfully. Laceration does have small amount of purulent discharge and surrounding erythema suggesting mild infection. Patient prescribed Bactrim.    Patient able to contract for safety today and has scheduled psychology follow-up next week. Recommended also following up with psychiatry. Patient agreeable. Crisis information provided.    Return precautions provided.  - sulfamethoxazole-trimethoprim (BACTRIM DS) 800-160 MG tablet; Take 1 tablet by mouth 2 times daily for 7 days     See Patient Instructions    Return in about 1 week (around 6/25/2021), or if symptoms worsen or fail to improve.    Stacey Graham PA-C  [facility] EFFIE Minor is a 40 year old who presents for the following health issues     HPI   Per chart review, patient valuated in emergency department June 9, 2021 due to laceration of left thigh.  Patient reported that she cut left thigh using a knife in order to make herself feel better.  Patient denied suicidal ideation at the time.  Patient is following with psychiatry/psychology.  Patient was evaluated by assessment/referral in emergency department who agreed with continued outpatient management.  Patient able to contract for safety  .  Suture removal:     Date sutures applied: 6/9/2021         Where (setting) in which they applied:ER visit    Description:  Type: sutures  Location: Left inner thigh    History:    Cause of laceration: Self induced    Accompanying Signs &  Symptoms: (staff: if yes-describe)  Redness: YES  Warmth: YES  Drainage: YES- bleeding and there is yellow/green drainage  Still bleeding: YES  Fevers: no    Last tetanus shot: last tetanus booster within 10 years  Patient denies any current suicidal thoughts or plans.  She has therapy Scheduled for twice next week and recently followed up with psychiatry.      Review of Systems   Constitutional: Negative for fever.   Respiratory: Negative for cough and shortness of breath.    Cardiovascular: Negative for chest pain.   Skin: Negative for rash.   Neurological: Negative for light-headedness.   Psychiatric/Behavioral: The patient is not nervous/anxious.             Objective    /72   Pulse 77   Temp 97.1  F (36.2  C) (Tympanic)   Wt 83.1 kg (183 lb 2 oz)   LMP 11/16/2020 (Exact Date)   SpO2 99%   BMI 34.60 kg/m    Body mass index is 34.6 kg/m .  Physical Exam  Vitals signs and nursing note reviewed.   Constitutional:       General: She is not in acute distress.     Appearance: Normal appearance.   HENT:      Head: Normocephalic and atraumatic.   Eyes:      Extraocular Movements: Extraocular movements intact.      Pupils: Pupils are equal, round, and reactive to light.   Neck:      Musculoskeletal: Normal range of motion.   Cardiovascular:      Rate and Rhythm: Normal rate.   Pulmonary:      Effort: Pulmonary effort is normal.   Musculoskeletal: Normal range of motion.   Skin:     General: Skin is warm and dry.      Comments: Approximately 6cm laceration horizontally on left anterior/upper thigh with 4 sutures in place.  3 horizontal mattress and 1 simple interrupted.  Erythema surrounding laceration approximately 1.5 cm in all directions.  Small amount of purulent discharge from laceration.   Neurological:      General: No focal deficit present.      Mental Status: She is alert.   Psychiatric:         Mood and Affect: Mood normal.         Behavior: Behavior normal.

## 2021-07-05 DIAGNOSIS — G89.29 OTHER CHRONIC PAIN: ICD-10-CM

## 2021-07-05 DIAGNOSIS — S72.145S CLOSED NONDISPLACED INTERTROCHANTERIC FRACTURE OF LEFT FEMUR, SEQUELA: ICD-10-CM

## 2021-07-05 NOTE — TELEPHONE ENCOUNTER
Routing refill request to provider for review/approval because:  Drug not on the [facility] refill protocol     Last Written Prescription Date:  6-1-21  Last Fill Quantity: 30,  # refills: 0   Last office visit: 6/1/2021 with prescribing provider:  Xenia Gaviria   Future Office Visit:

## 2021-07-05 NOTE — TELEPHONE ENCOUNTER
Patient requesting refill of oxyCODONE-acetaminophen (PERCOCET) 5-325 MG tablet. Patient uses [facility].

## 2021-07-07 RX ORDER — OXYCODONE AND ACETAMINOPHEN 5; 325 MG/1; MG/1
0.5 TABLET ORAL 2 TIMES DAILY PRN
Qty: 30 TABLET | Refills: 0 | Status: SHIPPED | OUTPATIENT
Start: 2021-07-07 | End: 2021-08-04

## 2021-07-07 RX ORDER — TIZANIDINE 2 MG/1
2-6 TABLET ORAL 3 TIMES DAILY PRN
Qty: 180 TABLET | Refills: 0 | Status: SHIPPED | OUTPATIENT
Start: 2021-07-07 | End: 2021-08-04

## 2021-07-09 ENCOUNTER — TRANSFERRED RECORDS (OUTPATIENT)
Dept: HEALTH INFORMATION MANAGEMENT | Facility: CLINIC | Age: 41
End: 2021-07-09

## 2021-07-19 DIAGNOSIS — G44.219 EPISODIC TENSION-TYPE HEADACHE, NOT INTRACTABLE: ICD-10-CM

## 2021-07-19 RX ORDER — BUTALBITAL, ACETAMINOPHEN AND CAFFEINE 50; 325; 40 MG/1; MG/1; MG/1
TABLET ORAL
Qty: 20 TABLET | Refills: 0 | Status: SHIPPED | OUTPATIENT
Start: 2021-07-19 | End: 2021-08-18

## 2021-07-19 NOTE — TELEPHONE ENCOUNTER
Routing refill request to provider for review/approval because:  Drug not on the [facility] refill protocol           Pending Prescriptions:                       Disp   Refills    butalbital-acetaminophen-caffeine (ESGIC) *20 tab*0        Sig: TAKE ONE TABLET BY MOUTH AT ONSET OF HEADACHE - MAX           ONE TABLET PER DAY, AND 20 TABLETS PER MONTH.        Waqas Siegel RN

## 2021-07-19 NOTE — TELEPHONE ENCOUNTER
Patient requesting refill of butalbital-acetaminophen-caffeine (ESGIC) -40 MG tablet. Patient uses [facility].

## 2021-07-23 ENCOUNTER — TRANSFERRED RECORDS (OUTPATIENT)
Dept: HEALTH INFORMATION MANAGEMENT | Facility: CLINIC | Age: 41
End: 2021-07-23

## 2021-07-24 NOTE — TELEPHONE ENCOUNTER
Last Written Prescription Date:  10/6/2020  Last Fill Quantity: 50,  # refills: 0   Last office visit: 3/4/2020 with prescribing provider:  Dr. Smith with advised F/U in 4 weeks  Last OV: 10/22/2020 with Stacey Graham for acute injury     Future Office Visit:   Next 5 appointments (look out 90 days)    Dec 01, 2020  9:40 AM  Pre-Op physical with Stacey Graham PA-C  [facility] ([facility]) [address] [facility] [address]  [phone]         Routing refill request to provider for review/approval because:  Drug not on the [facility] refill protocol     Yasemin Page, RN, BSN      
Left message on voicemail for patient to return call.   
Patient is scheduled to see Stacey Graham on 12/01/20, her Rx has been filled.   
Patient needs to follow up to re-establish care. I am not comfortable continuing her opioid prescription   
Reason for call:  Medication   If this is a refill request, has the caller requested the refill from the pharmacy already? Yes  Will the patient be using a [facility]? Yes  Name of the pharmacy and phone number for the current request: [facility] GARY Mehta - [address] [facility]  [phone]    Name of the medication requested: Percocet    Other request: Patient is requesting refill    Phone number to reach patient:  Home number on file [phone] (home)    Best Time:  any    Can we leave a detailed message on this number?  YES    Travel screening: Not Applicable    
please call and schedule patient an appointment  
24-Jul-2021 14:47

## 2021-08-02 DIAGNOSIS — G89.29 OTHER CHRONIC PAIN: ICD-10-CM

## 2021-08-02 DIAGNOSIS — K21.00 GASTROESOPHAGEAL REFLUX DISEASE WITH ESOPHAGITIS WITHOUT HEMORRHAGE: ICD-10-CM

## 2021-08-02 DIAGNOSIS — S72.145S CLOSED NONDISPLACED INTERTROCHANTERIC FRACTURE OF LEFT FEMUR, SEQUELA: ICD-10-CM

## 2021-08-02 DIAGNOSIS — F33.2 MAJOR DEPRESSIVE DISORDER, RECURRENT EPISODE, SEVERE (H): Primary | ICD-10-CM

## 2021-08-02 NOTE — TELEPHONE ENCOUNTER
Patient is also calling for a refill of oxyCODONE-acetaminophen (PERCOCET) 5-325 MG tablet (not due until 08/08/21) also, pantoprazole (PROTONIX) 40 MG EC tablet.     Routing refill request to provider for review/approval because:  Drug not on the [facility] refill protocol   Percocet  Last Written Prescription Date:  7-7-21  Last Fill Quantity: 30,  # refills: 0   Last office visit: 6/1/2021 with prescribing provider:  Xenia Gaviria   Future Office Visit:

## 2021-08-02 NOTE — TELEPHONE ENCOUNTER
Routing refill request to provider for review/approval because:  Drug not on the [facility] refill protocol           Pending Prescriptions:                       Disp   Refills    tiZANidine (ZANAFLEX) 2 MG tablet [Pharmac*180 ta*0        Sig: TAKE 1-3 TABLETS (2-6 MG) BY MOUTH 3 TIMES DAILY AS           NEEDED FOR MUSCLE SPASMS        Waqas Siegel RN

## 2021-08-03 ENCOUNTER — LAB (OUTPATIENT)
Dept: LAB | Facility: CLINIC | Age: 41
End: 2021-08-03
Payer: COMMERCIAL

## 2021-08-03 DIAGNOSIS — F33.2 MAJOR DEPRESSIVE DISORDER, RECURRENT EPISODE, SEVERE (H): ICD-10-CM

## 2021-08-03 LAB
BASOPHILS # BLD AUTO: 0 10E3/UL (ref 0–0.2)
BASOPHILS NFR BLD AUTO: 0 %
BUN SERPL-MCNC: 13 MG/DL (ref 7–30)
CALCIUM SERPL-MCNC: 9.3 MG/DL (ref 8.5–10.1)
CREAT SERPL-MCNC: 0.82 MG/DL (ref 0.52–1.04)
EOSINOPHIL # BLD AUTO: 0.2 10E3/UL (ref 0–0.7)
EOSINOPHIL NFR BLD AUTO: 3 %
ERYTHROCYTE [DISTWIDTH] IN BLOOD BY AUTOMATED COUNT: 13.5 % (ref 10–15)
FASTING STATUS PATIENT QL REPORTED: YES
GFR SERPL CREATININE-BSD FRML MDRD: 89 ML/MIN/1.73M2
GLUCOSE BLD-MCNC: 82 MG/DL (ref 70–99)
HCT VFR BLD AUTO: 40.6 % (ref 35–47)
HGB BLD-MCNC: 12.6 G/DL (ref 11.7–15.7)
LYMPHOCYTES # BLD AUTO: 2 10E3/UL (ref 0.8–5.3)
LYMPHOCYTES NFR BLD AUTO: 29 %
MCH RBC QN AUTO: 24.9 PG (ref 26.5–33)
MCHC RBC AUTO-ENTMCNC: 31 G/DL (ref 31.5–36.5)
MCV RBC AUTO: 80 FL (ref 78–100)
MONOCYTES # BLD AUTO: 0.5 10E3/UL (ref 0–1.3)
MONOCYTES NFR BLD AUTO: 8 %
NEUTROPHILS # BLD AUTO: 4.1 10E3/UL (ref 1.6–8.3)
NEUTROPHILS NFR BLD AUTO: 60 %
PLATELET # BLD AUTO: 355 10E3/UL (ref 150–450)
RBC # BLD AUTO: 5.07 10E6/UL (ref 3.8–5.2)
TSH SERPL DL<=0.005 MIU/L-ACNC: 1.31 MU/L (ref 0.4–4)
WBC # BLD AUTO: 6.9 10E3/UL (ref 4–11)

## 2021-08-03 PROCEDURE — 36415 COLL VENOUS BLD VENIPUNCTURE: CPT

## 2021-08-03 PROCEDURE — 84443 ASSAY THYROID STIM HORMONE: CPT

## 2021-08-03 PROCEDURE — 82565 ASSAY OF CREATININE: CPT

## 2021-08-03 PROCEDURE — 82947 ASSAY GLUCOSE BLOOD QUANT: CPT

## 2021-08-03 PROCEDURE — 84520 ASSAY OF UREA NITROGEN: CPT

## 2021-08-03 PROCEDURE — 82310 ASSAY OF CALCIUM: CPT

## 2021-08-03 PROCEDURE — 85025 COMPLETE CBC W/AUTO DIFF WBC: CPT

## 2021-08-04 RX ORDER — OXYCODONE AND ACETAMINOPHEN 5; 325 MG/1; MG/1
0.5 TABLET ORAL 2 TIMES DAILY PRN
Qty: 30 TABLET | Refills: 0 | Status: SHIPPED | OUTPATIENT
Start: 2021-08-04 | End: 2021-08-30

## 2021-08-04 RX ORDER — PANTOPRAZOLE SODIUM 40 MG/1
40 TABLET, DELAYED RELEASE ORAL DAILY
Qty: 90 TABLET | Refills: 0 | Status: SHIPPED | OUTPATIENT
Start: 2021-08-04 | End: 2021-11-29

## 2021-08-04 RX ORDER — TIZANIDINE 2 MG/1
2-6 TABLET ORAL 3 TIMES DAILY PRN
Qty: 180 TABLET | Refills: 0 | Status: SHIPPED | OUTPATIENT
Start: 2021-08-04 | End: 2021-08-30

## 2021-08-12 DIAGNOSIS — Z79.899 HIGH RISK MEDICATION USE: Primary | ICD-10-CM

## 2021-08-16 DIAGNOSIS — G44.219 EPISODIC TENSION-TYPE HEADACHE, NOT INTRACTABLE: ICD-10-CM

## 2021-08-17 DIAGNOSIS — G44.219 EPISODIC TENSION-TYPE HEADACHE, NOT INTRACTABLE: ICD-10-CM

## 2021-08-17 NOTE — TELEPHONE ENCOUNTER
Requested Prescriptions   Pending Prescriptions Disp Refills     butalbital-acetaminophen-caffeine (ESGIC) -40 MG tablet [Pharmacy Med Name: BUTALBITAL-APAP-CAFF -40 TABS] 20 tablet 0     Sig: TAKE ONE TABLET BY MOUTH AT ONSET OF HEADACHE - MAX ONE TABLET PER DAY, AND 20 TABLETS PER MONTH.       There is no refill protocol information for this order        Routing refill request to provider for review/approval because:  Drug not on the [facility] refill protocol

## 2021-08-18 RX ORDER — BUTALBITAL, ACETAMINOPHEN AND CAFFEINE 50; 325; 40 MG/1; MG/1; MG/1
TABLET ORAL
Qty: 20 TABLET | Refills: 0 | OUTPATIENT
Start: 2021-08-18

## 2021-08-18 RX ORDER — BUTALBITAL, ACETAMINOPHEN AND CAFFEINE 50; 325; 40 MG/1; MG/1; MG/1
TABLET ORAL
Qty: 20 TABLET | Refills: 0 | Status: SHIPPED | OUTPATIENT
Start: 2021-08-18 | End: 2021-09-10

## 2021-08-30 ENCOUNTER — LAB (OUTPATIENT)
Dept: LAB | Facility: CLINIC | Age: 41
End: 2021-08-30
Payer: COMMERCIAL

## 2021-08-30 DIAGNOSIS — Z79.899 HIGH RISK MEDICATION USE: ICD-10-CM

## 2021-08-30 DIAGNOSIS — G89.29 OTHER CHRONIC PAIN: ICD-10-CM

## 2021-08-30 LAB
ANION GAP SERPL CALCULATED.3IONS-SCNC: 4 MMOL/L (ref 3–14)
BUN SERPL-MCNC: 20 MG/DL (ref 7–30)
CALCIUM SERPL-MCNC: 9.2 MG/DL (ref 8.5–10.1)
CHLORIDE BLD-SCNC: 107 MMOL/L (ref 94–109)
CO2 SERPL-SCNC: 25 MMOL/L (ref 20–32)
CREAT SERPL-MCNC: 0.84 MG/DL (ref 0.52–1.04)
GFR SERPL CREATININE-BSD FRML MDRD: 87 ML/MIN/1.73M2
GLUCOSE BLD-MCNC: 90 MG/DL (ref 70–99)
LITHIUM SERPL-SCNC: 0.8 MMOL/L
POTASSIUM BLD-SCNC: 4.4 MMOL/L (ref 3.4–5.3)
SODIUM SERPL-SCNC: 136 MMOL/L (ref 133–144)
T4 FREE SERPL-MCNC: 0.83 NG/DL (ref 0.76–1.46)
TSH SERPL DL<=0.005 MIU/L-ACNC: 2.04 MU/L (ref 0.4–4)

## 2021-08-30 PROCEDURE — 36415 COLL VENOUS BLD VENIPUNCTURE: CPT

## 2021-08-30 PROCEDURE — 84443 ASSAY THYROID STIM HORMONE: CPT

## 2021-08-30 PROCEDURE — 80178 ASSAY OF LITHIUM: CPT

## 2021-08-30 PROCEDURE — 80048 BASIC METABOLIC PNL TOTAL CA: CPT

## 2021-08-30 PROCEDURE — 84439 ASSAY OF FREE THYROXINE: CPT

## 2021-08-30 RX ORDER — TIZANIDINE 2 MG/1
TABLET ORAL
Qty: 180 TABLET | Refills: 0 | Status: SHIPPED | OUTPATIENT
Start: 2021-08-30 | End: 2021-09-10

## 2021-08-30 NOTE — TELEPHONE ENCOUNTER
Requested Prescriptions   Pending Prescriptions Disp Refills     tiZANidine (ZANAFLEX) 2 MG tablet [Pharmacy Med Name: TIZANIDINE HCL 2MG TABS] 180 tablet 0     Sig: TAKE ONE TO THREE TABLETS BY MOUTH THREE TIMES A DAY AS NEEDED FOR MUSCLE SPASMS       There is no refill protocol information for this order        Routing refill request to provider for review/approval because:  Drug not on the [facility] refill protocol

## 2021-09-02 ENCOUNTER — TRANSFERRED RECORDS (OUTPATIENT)
Dept: HEALTH INFORMATION MANAGEMENT | Facility: CLINIC | Age: 41
End: 2021-09-02

## 2021-09-02 ENCOUNTER — MEDICAL CORRESPONDENCE (OUTPATIENT)
Dept: HEALTH INFORMATION MANAGEMENT | Facility: CLINIC | Age: 41
End: 2021-09-02

## 2021-09-08 ENCOUNTER — DOCUMENTATION ONLY (OUTPATIENT)
Dept: LAB | Facility: CLINIC | Age: 41
End: 2021-09-08

## 2021-09-08 NOTE — PROGRESS NOTES
Assessment & Plan     Episodic tension-type headache, not intractable    - butalbital-acetaminophen-caffeine (ESGIC) -40 MG tablet; Take 1 tablet by mouth 2 times daily as needed for headaches    Other chronic pain    - tiZANidine (ZANAFLEX) 2 MG tablet; TAKE ONE TO THREE TABLETS BY MOUTH THREE TIMES A DAY AS NEEDED FOR MUSCLE SPASMS    Class 2 obesity due to excess calories without serious comorbidity with body mass index (BMI) of 36.0 to 36.9 in adult    - phentermine (ADIPEX-P) 37.5 MG capsule; Take 1 capsule (37.5 mg) by mouth every morning    Weight gain    - phentermine (ADIPEX-P) 37.5 MG capsule; Take 1 capsule (37.5 mg) by mouth every morning    Morbid obesity (H)      Local infection of skin and subcutaneous tissue    - sulfamethoxazole-trimethoprim (BACTRIM DS) 800-160 MG tablet; Take 1 tablet by mouth 2 times daily With daily yogurt or probiotics.  - SUTURE REMOVAL TRAY    Hospital discharge follow-up    - SUTURE REMOVAL TRAY    Laceration of left leg, sequela    - SUTURE REMOVAL TRAY    Laceration of left lower leg with infection, subsequent encounter    - SUTURE REMOVAL TRAY    At risk for self harm      Deliberate self-cutting    - SUTURE REMOVAL TRAY    20 minutes spent on the date of the encounter doing chart review, history and exam, documentation and further activities per the note     Depression Screening Follow Up    PHQ 9/10/2021   PHQ-9 Total Score 26   Q9: Thoughts of better off dead/self-harm past 2 weeks Nearly every day   PHQ-9 External Data -     Last PHQ-9 9/10/2021   1.  Little interest or pleasure in doing things 3   2.  Feeling down, depressed, or hopeless 3   3.  Trouble falling or staying asleep, or sleeping too much 3   4.  Feeling tired or having little energy 3   5.  Poor appetite or overeating 2   6.  Feeling bad about yourself 3   7.  Trouble concentrating 3   8.  Moving slowly or restless 3   Q9: Thoughts of better off dead/self-harm past 2 weeks 3   PHQ-9 Total Score  26   Difficulty at work, home, or with people Somewhat difficult       No flowsheet data found.      Follow Up      Follow Up Actions Taken  Crisis resource information provided in the After Visit Summary  Referred patient back to mental health provider  Scheduled appointment with [facility]    Discussed the following ways the patient can remain in a safe environment:  dispose of old medications  and be around others  See Patient Instructions: follow up as needed for any worsening symptoms or other concerns. Pt in agreement.     Return in about 3 months (around 12/10/2021), or if symptoms worsen or fail to improve, for for controlled medicaiton check .    Xenia Gaviria, J CARLOS  [facility] EFFIE Minor is a 41 year old who presents for the following health issues     HPI       ED/UC Followup:    Facility:  [facility]  Date of visit: 9/2/21  Reason for visit: stitches-left leg x5. Pt has multiple healing self inflicted lacerations to legs. One laceration to left leg has 5 sutures. Incision margins well approximated. Small area of infection to middle of laceration. Purulent drainage present. No surrounding warmth or redness.  Discussed risks of cutting- scaring, infection, loss of a limb, death. Pt reports she has a therapist- but talking with a therapist and psychiatrist- brings up emotions that cause her pain and then she feels the need to cut her self. Needing refills of her headache medicine, wondering if can increase to twice daily as needed. Takes only as needed. She reports frustration.  State of [place] has her as disabled, but she has not been able to get approved for social security. Has her second .   Current Status: discharged        Review of Systems   Constitutional, HEENT, cardiovascular, pulmonary, GI, , musculoskeletal, neuro, skin, endocrine and psych systems are negative, except as otherwise noted.      Objective    /51   Pulse 67   Temp 97.2  F (36.2  C) (Tympanic)    Resp 16   Wt 86.6 kg (191 lb)   LMP 11/16/2020 (Exact Date)   SpO2 99%   BMI 36.09 kg/m    Body mass index is 36.09 kg/m .  Physical Exam   GENERAL: Healthy, alert and no distress  EYES: Eyes grossly normal to inspection.  No discharge or erythema, or obvious scleral/conjunctival abnormalities.  RESP: No audible wheeze, cough, or visible cyanosis.  No visible retractions or increased work of breathing.    SKIN: POSITIVE pt has multiple haling lacerations to legs. One laceration to left leg has 5 sutures. Sutures removed without difficulty. Incision margins well approximated. Small area of infection to middle of laceration. Purulent drainage present. No surrounding warmth or redness. Pt requesting antibiotic she had for a skin infection last from [facility]  Advised to take the full course of antibiotics with daily yogurt or probiotics  NEURO: Cranial nerves grossly intact.  Mentation and speech appropriate for age.  PSYCH: Mentation appears normal, affect normal/bright, judgement and insight intact, normal speech and appearance well-groomed.     See orders

## 2021-09-09 DIAGNOSIS — F33.2 MAJOR DEPRESSIVE DISORDER, RECURRENT EPISODE, SEVERE (H): Primary | ICD-10-CM

## 2021-09-10 ENCOUNTER — TELEPHONE (OUTPATIENT)
Dept: FAMILY MEDICINE | Facility: CLINIC | Age: 41
End: 2021-09-10

## 2021-09-10 ENCOUNTER — OFFICE VISIT (OUTPATIENT)
Dept: FAMILY MEDICINE | Facility: CLINIC | Age: 41
End: 2021-09-10
Payer: COMMERCIAL

## 2021-09-10 VITALS
DIASTOLIC BLOOD PRESSURE: 51 MMHG | HEART RATE: 67 BPM | SYSTOLIC BLOOD PRESSURE: 111 MMHG | TEMPERATURE: 97.2 F | RESPIRATION RATE: 16 BRPM | WEIGHT: 191 LBS | BODY MASS INDEX: 36.09 KG/M2 | OXYGEN SATURATION: 99 %

## 2021-09-10 DIAGNOSIS — R63.5 WEIGHT GAIN: ICD-10-CM

## 2021-09-10 DIAGNOSIS — Z09 HOSPITAL DISCHARGE FOLLOW-UP: Primary | ICD-10-CM

## 2021-09-10 DIAGNOSIS — G89.29 OTHER CHRONIC PAIN: ICD-10-CM

## 2021-09-10 DIAGNOSIS — Z48.02 VISIT FOR SUTURE REMOVAL: ICD-10-CM

## 2021-09-10 DIAGNOSIS — E66.01 MORBID OBESITY (H): ICD-10-CM

## 2021-09-10 DIAGNOSIS — L08.9 LOCAL INFECTION OF SKIN AND SUBCUTANEOUS TISSUE: ICD-10-CM

## 2021-09-10 DIAGNOSIS — R45.89 AT RISK FOR SELF HARM: ICD-10-CM

## 2021-09-10 DIAGNOSIS — Z72.89 DELIBERATE SELF-CUTTING: ICD-10-CM

## 2021-09-10 DIAGNOSIS — G44.219 EPISODIC TENSION-TYPE HEADACHE, NOT INTRACTABLE: ICD-10-CM

## 2021-09-10 DIAGNOSIS — S81.812D LACERATION OF LEFT LOWER LEG WITH INFECTION, SUBSEQUENT ENCOUNTER: ICD-10-CM

## 2021-09-10 DIAGNOSIS — E66.09 CLASS 2 OBESITY DUE TO EXCESS CALORIES WITHOUT SERIOUS COMORBIDITY WITH BODY MASS INDEX (BMI) OF 36.0 TO 36.9 IN ADULT: ICD-10-CM

## 2021-09-10 DIAGNOSIS — E66.812 CLASS 2 OBESITY DUE TO EXCESS CALORIES WITHOUT SERIOUS COMORBIDITY WITH BODY MASS INDEX (BMI) OF 36.0 TO 36.9 IN ADULT: ICD-10-CM

## 2021-09-10 DIAGNOSIS — L08.9 LACERATION OF LEFT LOWER LEG WITH INFECTION, SUBSEQUENT ENCOUNTER: ICD-10-CM

## 2021-09-10 DIAGNOSIS — S81.812S LACERATION OF LEFT LEG, SEQUELA: ICD-10-CM

## 2021-09-10 PROCEDURE — 99214 OFFICE O/P EST MOD 30 MIN: CPT | Performed by: NURSE PRACTITIONER

## 2021-09-10 RX ORDER — SULFAMETHOXAZOLE/TRIMETHOPRIM 800-160 MG
1 TABLET ORAL 2 TIMES DAILY
Qty: 14 TABLET | Refills: 0 | Status: SHIPPED | OUTPATIENT
Start: 2021-09-10 | End: 2021-12-10

## 2021-09-10 RX ORDER — BUTALBITAL, ACETAMINOPHEN AND CAFFEINE 50; 325; 40 MG/1; MG/1; MG/1
1 TABLET ORAL 2 TIMES DAILY PRN
Qty: 60 TABLET | Refills: 3 | Status: SHIPPED | OUTPATIENT
Start: 2021-09-10 | End: 2021-12-10

## 2021-09-10 RX ORDER — PHENTERMINE HYDROCHLORIDE 37.5 MG/1
37.5 CAPSULE ORAL EVERY MORNING
Qty: 30 CAPSULE | Refills: 2 | Status: SHIPPED | OUTPATIENT
Start: 2021-09-10 | End: 2021-11-23

## 2021-09-10 RX ORDER — HYDROXYZINE HYDROCHLORIDE 25 MG/1
TABLET, FILM COATED ORAL
COMMUNITY
Start: 2021-09-02 | End: 2022-05-27

## 2021-09-10 RX ORDER — LITHIUM CARBONATE 300 MG/1
300 TABLET, FILM COATED, EXTENDED RELEASE ORAL AT BEDTIME
COMMUNITY
Start: 2021-07-23

## 2021-09-10 RX ORDER — TIZANIDINE 2 MG/1
TABLET ORAL
Qty: 180 TABLET | Refills: 0 | Status: SHIPPED | OUTPATIENT
Start: 2021-09-10 | End: 2021-10-07

## 2021-09-10 ASSESSMENT — ANXIETY QUESTIONNAIRES
GAD7 TOTAL SCORE: 18
2. NOT BEING ABLE TO STOP OR CONTROL WORRYING: NEARLY EVERY DAY
3. WORRYING TOO MUCH ABOUT DIFFERENT THINGS: NEARLY EVERY DAY
5. BEING SO RESTLESS THAT IT IS HARD TO SIT STILL: MORE THAN HALF THE DAYS
1. FEELING NERVOUS, ANXIOUS, OR ON EDGE: NEARLY EVERY DAY
7. FEELING AFRAID AS IF SOMETHING AWFUL MIGHT HAPPEN: SEVERAL DAYS
6. BECOMING EASILY ANNOYED OR IRRITABLE: NEARLY EVERY DAY
IF YOU CHECKED OFF ANY PROBLEMS ON THIS QUESTIONNAIRE, HOW DIFFICULT HAVE THESE PROBLEMS MADE IT FOR YOU TO DO YOUR WORK, TAKE CARE OF THINGS AT HOME, OR GET ALONG WITH OTHER PEOPLE: SOMEWHAT DIFFICULT

## 2021-09-10 ASSESSMENT — PATIENT HEALTH QUESTIONNAIRE - PHQ9
5. POOR APPETITE OR OVEREATING: NEARLY EVERY DAY
SUM OF ALL RESPONSES TO PHQ QUESTIONS 1-9: 26

## 2021-09-10 ASSESSMENT — PAIN SCALES - GENERAL: PAINLEVEL: NO PAIN (0)

## 2021-09-10 NOTE — TELEPHONE ENCOUNTER
Reason for Call:  Other     Detailed comments: Patient said that pharmacy would not cover phentermine and would like a PA to be submitted.     Phone Number Patient can be reached at: Home number on file [phone] (home)    Best Time:     Can we leave a detailed message on this number? YES    Call taken on 9/10/2021 at 1:19 PM by Donna Caballero

## 2021-09-10 NOTE — PATIENT INSTRUCTIONS
Patient Education   Please make an appointment to follow up with your therapist and/or Psychiatric Clinic (phone: [phone]) within 1-3 days.     Return to the ED if you are having worsening thoughts/feelings of harming yourself or others, or any urgent/life-threatening concerns.     DISCHARGE RESOURCES:    [facility] [phone]     [facility] [phone] (detox), [phone] (outpatient & [facility])      Crisis Intervention: [phone] or [phone] (TTY: [phone]).  Call anytime.    Suicide Awareness Voices of Education (SAVE) (www.save.org): 691-379-TOWQ (3247)    National Suicide Prevention Line (www.mentalhealthmn.org): 444-624-IVAR (6501)    National [place] on Mental Illness (www.mn.delia.org): 212.713.2064 or 695-169-6934.    Lcpp7aazc: text the word LIFE to 56872 for immediate support and crisis intervention    Mental Health Consumer/Survivor Network of [place] (www.mhcsn.net): 539.268.1061 or 760-238-6708    Mental Health Association of [place] (www.mentalhealth.org): 673.841.5926 or 622-215-3016         Patient Education     Weight Management: Take It Off and Keep It Off    It s easy to be motivated when you first start. The key is to stay motivated all along the way and to have realistic and achievable goals. There are things you can do to keep yourself on the path to success.  Track your weight loss  Don t focus on daily weight gains and losses. Instead, weigh yourself no more than once a week at the same time of day. Weighing yourself each day will probably only frustrate you. Find a friend who also needs to lose some weight and you can encourage each other.  Stay motivated  Here are suggestions to keep you motivated:     Remind yourself of your goals. Post them near the refrigerator or desk where you work.    Make daily entries in your diary or journal about your activity and eating. A visual reminder of success, like a gold star, can  help keep you going.    Every week, take time to look back on how much you ve accomplished, and the changes you may have made.    Try taking a nutrition class. It can help you learn new shopping, cooking, and eating skills, and also meet new people. You might try a low-fat cooking or yoga class.    Don t be hard on yourself or give up if you slip. Be patient. Learn from your mistakes and adjust your plan if you need to. Then get right back to it.    Be realistic about your goals. Talk with a dietitian or your healthcare provider about what goals are reasonable for you.   Believe that you can do it  How you think about yourself is just as important as what you do. If you don t think you can succeed, chances are you won t. Believe that you can stick to your plan and meet your goals:    If you don t meet a goal, don t use it as an excuse to give up on your whole plan. Adjust your goal and try again.    Try to understand your own attitude about food.  Are you subject to emotional eating?    Learn how to accept compliments. Even if you get embarrassed, just say  thank you.     Make a list of the things that others like about you and that you like about yourself. Add something new from time to time. Keep this list to look at when you need a lift.  To learn more    The President s [place] on Fitness, Sports & Nutritionwww.fitness.gov    Academy of Nutrition and Dieteticswww.eatright.org    Healthfinderwww.healthfinder.gov    [employer]ec[employer] last reviewed this educational content on 11/1/2020 2000-2021 [employer]. All rights reserved. This information is not intended as a substitute for professional medical care. Always follow your healthcare professional's instructions.           Patient Education     Weight Management: Healthy Eating  Food is your body s fuel. You can t live without it. The key is to give your body enough nutrients and energy without eating too much. Reading food labels can help you make  healthy choices. Also, learn new eating habits to manage your weight. Nutrition labels are being redesigned by the FDA to emphasize the number of calories being consumed as well as the amount of more nutrients, such as added sugars, vitamin D, and potassium.     All the values on the label are based on one serving. The serving size is the average portion. Remember to multiply the values on the label by the number of servings you eat.   Eat less fat  A gram of fat has almost 2.5 times the calories of a gram of protein or carbohydrates. Try to balance your food choices so that only 20% to 35% of your calories comes from total fat. This means an average of 2  to 3  grams of fat for each 100 calories you eat.  Eat more fiber  High-fiber foods are digested more slowly than low-fiber foods, so you feel full longer. Try to get at least 25 grams of fiber each day for a 2000 calorie diet. Foods high in fiber include:    Vegetables and fruits    Whole-grain or bran breads, pastas, and cereals    Legumes (beans) and peas  As you start to eat more fiber, be sure to drink plenty of water. It will help keep your digestive system working smoothly.  Tips  Do's and don'ts include:     Eat a variety of foods, not just a few favorites.    If you find yourself eating when you re not hungry, ask yourself why. Many of us eat when we re bored, stressed, or just to be polite. Listen to your body. If you re not hungry, get busy doing something else instead of eating.    Eat slower, shooting for 20 to 30 minutes for each meal. It takes 20 minutes for your stomach to tell your brain that it s full. Slow eaters tend to eat less and are still satisfied, while fast eaters may tend to be overeaters.     Pay attention to what you eat. Don t read or watch TV during your meal.  [employer] last reviewed this educational content on 11/1/2020 2000-2021 The [employer]Well [employer], LLC. All rights reserved. This information is not intended as a substitute for  professional medical care. Always follow your healthcare professional's instructions.           Patient Education     Weight Management: Exercise and Activity    Studies show that people who exercise are the most likely to lose weight and keep it off. Exercise burns calories. It helps build muscle to make your body stronger. Make exercise an important part of your weight-management plan.   Make activity part of your day  You may not think you have the time to exercise. But you can work activity into your daily life--you just need to be committed. Take 10 minutes out of your lunch hour to take a walk. Walk to the [employer] to get your paper instead of having it delivered. Make it a habit to take the stairs instead of the elevator. Park in a far away parking spot instead of the closest. You ll be surprised at how fast these little changes can make a difference.   Some people really cannot walk very far, and tire out quickly with exercise. Instead of becoming discouraged, resolve to do what you can do, and work to make that a regular frequent habit.    The benefits of exercise  Exercise offers many benefits including:     Exercise increases how fast your body burns calories (your metabolism).    Regular exercise can increase the amount of muscle in your body. Muscle burns calories faster than fat. The more muscle you have, the more calories you burn.    Exercise gives you energy and curbs your appetite.    Exercise decreases stress and helps you sleep better. Find out for yourself what time of day works best for you.  Make exercise fun  Exercise can be fun. Choose an activity you enjoy. You may even get a friend to do it with you:     Take a resistance-training or aerobics class.    Join a team sport.    Take a dance class.    Walk the dog.    Ride a bike.  If you have health problems, always ask your healthcare provider before you start an exercise program. Have a  help you develop a plan that s  safe for you.   Cecilio last reviewed this educational content on 11/1/2020 2000-2021 [employer], LLC. All rights reserved. This information is not intended as a substitute for professional medical care. Always follow your healthcare professional's instructions.

## 2021-09-11 ASSESSMENT — ANXIETY QUESTIONNAIRES: GAD7 TOTAL SCORE: 18

## 2021-09-13 NOTE — TELEPHONE ENCOUNTER
Prior Authorization Retail Medication Request    Medication/Dose: phentermine (ADIPEX-P) 37.5 MG capsule  ICD code (if different than what is on RX):    Previously Tried and Failed:    Rationale:      [facility]   Insurance ID: [id number]      Pharmacy Information (if different than what is on RX)  Name:    Phone:

## 2021-09-26 ENCOUNTER — HEALTH MAINTENANCE LETTER (OUTPATIENT)
Age: 41
End: 2021-09-26

## 2021-09-30 ENCOUNTER — LAB (OUTPATIENT)
Dept: LAB | Facility: CLINIC | Age: 41
End: 2021-09-30
Payer: COMMERCIAL

## 2021-09-30 DIAGNOSIS — S72.145S CLOSED NONDISPLACED INTERTROCHANTERIC FRACTURE OF LEFT FEMUR, SEQUELA: ICD-10-CM

## 2021-09-30 DIAGNOSIS — G89.29 OTHER CHRONIC PAIN: ICD-10-CM

## 2021-09-30 DIAGNOSIS — F33.2 MAJOR DEPRESSIVE DISORDER, RECURRENT EPISODE, SEVERE (H): ICD-10-CM

## 2021-09-30 LAB
ANION GAP SERPL CALCULATED.3IONS-SCNC: 3 MMOL/L (ref 3–14)
BUN SERPL-MCNC: 27 MG/DL (ref 7–30)
CALCIUM SERPL-MCNC: 10.1 MG/DL (ref 8.5–10.1)
CHLORIDE BLD-SCNC: 106 MMOL/L (ref 94–109)
CO2 SERPL-SCNC: 26 MMOL/L (ref 20–32)
CREAT SERPL-MCNC: 0.82 MG/DL (ref 0.52–1.04)
GFR SERPL CREATININE-BSD FRML MDRD: 89 ML/MIN/1.73M2
GLUCOSE BLD-MCNC: 82 MG/DL (ref 70–99)
LITHIUM SERPL-SCNC: 1 MMOL/L
POTASSIUM BLD-SCNC: 4.3 MMOL/L (ref 3.4–5.3)
SODIUM SERPL-SCNC: 135 MMOL/L (ref 133–144)
T4 FREE SERPL-MCNC: 0.84 NG/DL (ref 0.76–1.46)
TSH SERPL DL<=0.005 MIU/L-ACNC: 2.93 MU/L (ref 0.4–4)

## 2021-09-30 PROCEDURE — 80178 ASSAY OF LITHIUM: CPT

## 2021-09-30 PROCEDURE — 80048 BASIC METABOLIC PNL TOTAL CA: CPT

## 2021-09-30 PROCEDURE — 36415 COLL VENOUS BLD VENIPUNCTURE: CPT

## 2021-09-30 PROCEDURE — 84439 ASSAY OF FREE THYROXINE: CPT

## 2021-09-30 PROCEDURE — 84443 ASSAY THYROID STIM HORMONE: CPT

## 2021-09-30 RX ORDER — OXYCODONE AND ACETAMINOPHEN 5; 325 MG/1; MG/1
0.5 TABLET ORAL 2 TIMES DAILY PRN
Qty: 30 TABLET | Refills: 0 | Status: SHIPPED | OUTPATIENT
Start: 2021-09-30 | End: 2021-10-27

## 2021-09-30 NOTE — TELEPHONE ENCOUNTER
Last Written Prescription Date:  9/1/21  Last Fill Quantity: 30,  # refills: 0   Last office visit: 9/10/2021 with prescribing provider:  Xenia Gaviria with advised F/U in 3 months  Future Office Visit: none    Routing refill request to provider for review/approval because:  Drug not on the [facility] refill protocol     Yasemin Page, RN, BSN  [facility]

## 2021-09-30 NOTE — TELEPHONE ENCOUNTER
Patient requesting refill of oxyCODONE-acetaminophen (PERCOCET) 5-325 MG tablet. Please send to [facility].

## 2021-10-07 DIAGNOSIS — G89.29 OTHER CHRONIC PAIN: ICD-10-CM

## 2021-10-07 RX ORDER — TIZANIDINE 2 MG/1
TABLET ORAL
Qty: 180 TABLET | Refills: 0 | Status: SHIPPED | OUTPATIENT
Start: 2021-10-07 | End: 2021-11-04

## 2021-10-07 NOTE — TELEPHONE ENCOUNTER
Routing refill request to provider for review/approval because:  Drug not on the [facility] refill protocol   tiZANidine (ZANAFLEX) 2 MG tablet 180 tablet 0 9/10/2021  No   Sig: TAKE ONE TO THREE TABLETS BY MOUTH THREE TIMES A DAY AS NEEDED FOR MUSCLE SPASMS     Last OV-9/10/2021

## 2021-10-12 ENCOUNTER — TELEPHONE (OUTPATIENT)
Dept: FAMILY MEDICINE | Facility: CLINIC | Age: 41
End: 2021-10-12

## 2021-10-12 ENCOUNTER — OFFICE VISIT (OUTPATIENT)
Dept: FAMILY MEDICINE | Facility: CLINIC | Age: 41
End: 2021-10-12
Payer: COMMERCIAL

## 2021-10-12 ENCOUNTER — ANCILLARY PROCEDURE (OUTPATIENT)
Dept: GENERAL RADIOLOGY | Facility: CLINIC | Age: 41
End: 2021-10-12
Attending: PHYSICIAN ASSISTANT
Payer: COMMERCIAL

## 2021-10-12 VITALS
SYSTOLIC BLOOD PRESSURE: 110 MMHG | DIASTOLIC BLOOD PRESSURE: 74 MMHG | TEMPERATURE: 97.1 F | HEART RATE: 66 BPM | OXYGEN SATURATION: 100 % | RESPIRATION RATE: 18 BRPM

## 2021-10-12 DIAGNOSIS — R07.81 RIB PAIN ON RIGHT SIDE: ICD-10-CM

## 2021-10-12 DIAGNOSIS — R07.81 RIB PAIN ON RIGHT SIDE: Primary | ICD-10-CM

## 2021-10-12 PROCEDURE — 99214 OFFICE O/P EST MOD 30 MIN: CPT | Performed by: PHYSICIAN ASSISTANT

## 2021-10-12 PROCEDURE — 71101 X-RAY EXAM UNILAT RIBS/CHEST: CPT | Mod: RT | Performed by: RADIOLOGY

## 2021-10-12 RX ORDER — LIDOCAINE 50 MG/G
1 PATCH TOPICAL EVERY 24 HOURS
Qty: 15 PATCH | Refills: 0 | Status: SHIPPED | OUTPATIENT
Start: 2021-10-12 | End: 2022-05-27

## 2021-10-12 ASSESSMENT — PAIN SCALES - GENERAL: PAINLEVEL: SEVERE PAIN (6)

## 2021-10-12 ASSESSMENT — ENCOUNTER SYMPTOMS
COUGH: 0
NERVOUS/ANXIOUS: 0
HEMATURIA: 0
FEVER: 0
SHORTNESS OF BREATH: 0
LIGHT-HEADEDNESS: 0
DYSURIA: 0
WHEEZING: 0

## 2021-10-12 NOTE — PATIENT INSTRUCTIONS
Good news, your x-rays do not show any signs of broken bones/fractures.  Your symptoms are likely due to inflammation of the cartilage and possible muscle strain between your ribs.  For treatment I would like you to use heat/ice and Tylenol.  You have also been prescribed lidocaine patches.  Please make sure to use these as prescribed.  Your symptoms should improve over the next few weeks.  Reach out with questions or concerns.  Return to clinic for new or worsening symptoms.

## 2021-10-12 NOTE — PROGRESS NOTES
Assessment & Plan     Rib pain on right side  Patient is a 41-year-old female who presents to clinic due to right posterior/lower rib pain ongoing for 2 weeks without trauma.  Vital signs normal.  Physical exam significant for tenderness palpation of this area without erythema, swelling, ecchymosis, deformity, crepitus to suggest infectious etiology or fracture.  X-ray negative for fracture.  Old rib fractures of this area noted.  Discussed that symptoms are likely due to muscle strain or inflammation.  Recommended lidocaine patches as prescribed, ice, heat, Tylenol.  Discussed expected course of recovery over the next few weeks.  Return precautions provided.    - XR Ribs & Chest Right G/E 3 Views; Future  - lidocaine (LIDODERM) 5 % patch; Place 1 patch onto the skin every 24 hours To prevent lidocaine toxicity, patient should be patch free for 12 hrs daily.    See Patient Instructions    Return in about 2 weeks (around 10/26/2021), or if symptoms worsen or fail to improve.    DEONDRE Cohen [facility] EFFIE Minor is a 41 year old who presents for the following health issues     HPI     Back Pain  Onset/Duration: 2 weeks ago patient noticed pain at the right posterior/lower ribs that has increased and now wakes her up at night superficially with twisting and bending. No radiation.   Description:   Location of pain: middle of back right  Character of pain: Achy and sharp  Pain radiation: none  New numbness or weakness in legs, not attributed to pain: no   Intensity: Currently 6/10  Progression of Symptoms: worsening  History:   Specific cause: none  Pain interferes with job: not applicable  History of back problems: past fractures through [employer]  Any previous MRI or X-rays: None  Sees a specialist for back pain: No  Alleviating factors:   Improved by: Pain medication, ice/heat    Precipitating factors:  Worsened by: Bending, twisting  Therapies tried and outcome:  Oxycodone    Accompanying Signs & Symptoms:  Risk of Fracture: None  Risk of Cauda Equina: None  Risk of Infection: None  Risk of Cancer: None  Risk of Ankylosing Spondylitis: Onset at age <35, male, AND morning back stiffness  no       Review of Systems   Constitutional: Negative for fever.   Respiratory: Negative for cough, shortness of breath and wheezing.    Cardiovascular: Negative for chest pain.   Genitourinary: Negative for dysuria and hematuria.   Skin: Negative for rash.   Neurological: Negative for light-headedness.   Psychiatric/Behavioral: The patient is not nervous/anxious.             Objective    /74   Pulse 66   Temp 97.1  F (36.2  C) (Tympanic)   Resp 18   LMP 11/16/2020 (Exact Date)   SpO2 100%   There is no height or weight on file to calculate BMI.  Physical Exam  Vitals and nursing note reviewed.   Constitutional:       General: She is not in acute distress.     Appearance: Normal appearance.   HENT:      Head: Normocephalic and atraumatic.      Mouth/Throat:      Mouth: Mucous membranes are moist.      Pharynx: Oropharynx is clear.   Eyes:      Extraocular Movements: Extraocular movements intact.      Pupils: Pupils are equal, round, and reactive to light.   Cardiovascular:      Rate and Rhythm: Normal rate and regular rhythm.      Heart sounds: Normal heart sounds.   Pulmonary:      Effort: Pulmonary effort is normal.      Breath sounds: Normal breath sounds.   Musculoskeletal:         General: Normal range of motion.      Cervical back: Normal range of motion.      Comments: Cautious gait-baseline for patient  No tenderness palpation of spine.  Tenderness to palpation over right, posterior, lower ribs without crepitus, swelling, deformity, erythema, ecchymosis.   Skin:     General: Skin is warm and dry.   Neurological:      General: No focal deficit present.      Mental Status: She is alert.   Psychiatric:         Mood and Affect: Mood normal.         Behavior: Behavior normal.             XR ribs and chest right, 3 views:    FINDINGS: Multiple old right rib fractures.           IMPRESSION:  No acute right rib fracture identified. No pneumothorax.

## 2021-10-12 NOTE — TELEPHONE ENCOUNTER
Kaylyn Ibarra,    The lidocaine patches you sent over for sriram are not covered by her insurance. Did you want to try for a PA or send over an alternative medication?    Let us know, thanks!    [id number]  [address] [place] [address]    Phone: [phone]    Fax: [phone]

## 2021-10-27 ENCOUNTER — TELEPHONE (OUTPATIENT)
Dept: FAMILY MEDICINE | Facility: CLINIC | Age: 41
End: 2021-10-27

## 2021-10-27 DIAGNOSIS — S72.145S CLOSED NONDISPLACED INTERTROCHANTERIC FRACTURE OF LEFT FEMUR, SEQUELA: ICD-10-CM

## 2021-10-27 DIAGNOSIS — G89.29 OTHER CHRONIC PAIN: ICD-10-CM

## 2021-10-27 RX ORDER — OXYCODONE AND ACETAMINOPHEN 5; 325 MG/1; MG/1
0.5 TABLET ORAL 2 TIMES DAILY PRN
Qty: 30 TABLET | Refills: 0 | Status: SHIPPED | OUTPATIENT
Start: 2021-10-27 | End: 2021-11-23

## 2021-10-27 NOTE — TELEPHONE ENCOUNTER
Reason for Call:  Medication refill:    Do you use a [facility]? Yes, [facility] [phone]    Name of the medication requested: oxyCODONE-acetaminophen (PERCOCET) 5-325 MG tablet    Phone number patient can be reached at: Home number on file [phone]         Call taken on 10/27/2021 at 1:19 PM by Rosina Soto

## 2021-10-27 NOTE — TELEPHONE ENCOUNTER
Requested Prescriptions   Pending Prescriptions Disp Refills     oxyCODONE-acetaminophen (PERCOCET) 5-325 MG tablet 30 tablet 0     Sig: Take 0.5 tablets by mouth 2 times daily as needed for severe pain       There is no refill protocol information for this order        Routing refill request to provider for review/approval because:  Drug not on the [facility] [place] protocol

## 2021-11-03 DIAGNOSIS — G89.29 OTHER CHRONIC PAIN: ICD-10-CM

## 2021-11-04 RX ORDER — TIZANIDINE 2 MG/1
TABLET ORAL
Qty: 180 TABLET | Refills: 0 | Status: SHIPPED | OUTPATIENT
Start: 2021-11-04 | End: 2021-11-26

## 2021-11-04 NOTE — TELEPHONE ENCOUNTER
Routing refill request to provider for review/approval because:  Drug not on the [facility] refill protocol     Last Written Prescription Date:  10-7-21  Last Fill Quantity: 180,  # refills: 0   Last office visit: 9/10/2021 with prescribing provider:  Xenia Gaviria   Future Office Visit:

## 2021-11-09 NOTE — LETTER
[facility]  [address] [place] [address]  Phone: [phone]    September 19, 2019        Mily Grullon  [address] [address]          To whom it may concern:    RE: Mily Grullon    Patient was seen and treated today at our clinic.  Patient may return to work  with the following:  May only work 4 hours a day for the next week    Please contact me for questions or concerns.      Sincerely,        William Smith MD   12-Nov-2021

## 2021-11-12 ENCOUNTER — MYC MEDICAL ADVICE (OUTPATIENT)
Dept: ENDOCRINOLOGY | Facility: CLINIC | Age: 41
End: 2021-11-12
Payer: COMMERCIAL

## 2021-11-22 ENCOUNTER — TELEPHONE (OUTPATIENT)
Dept: FAMILY MEDICINE | Facility: CLINIC | Age: 41
End: 2021-11-22
Payer: COMMERCIAL

## 2021-11-22 NOTE — TELEPHONE ENCOUNTER
"Mily reports that she has a history of \"Self Harm\" and she cut her upper thigh about a half hour ago. She said she has wrapped her upper leg with an ace wrap and it has  stopped bleeding after she held pressure there. She said the cut is about 3-4 inches long and she is able to see fatty tissue.     Patient wondered if Stacey Graham would be able to \"stich her up\" this afternoon. I told patient that there were no openings here at [place] and that she would need to go to the Emergency Room.     Mily states that she's reluctant to go to the ED because \"I'm afraid they will lock me up.\"   She said she will need to call her parents to take her to the [facility] and they will be upset.     I consulted with Stacey Graham PA-C and she agreed that patient needs to go to the ED. I informed Mily that this is also Stacey's recommendation. Patient agreed to call her parents and plans to go to [facility].     Carola Pabon RN BSN  [facility]    "

## 2021-11-23 ENCOUNTER — TELEPHONE (OUTPATIENT)
Dept: FAMILY MEDICINE | Facility: CLINIC | Age: 41
End: 2021-11-23
Payer: COMMERCIAL

## 2021-11-23 DIAGNOSIS — S72.145S CLOSED NONDISPLACED INTERTROCHANTERIC FRACTURE OF LEFT FEMUR, SEQUELA: ICD-10-CM

## 2021-11-23 DIAGNOSIS — G89.29 OTHER CHRONIC PAIN: ICD-10-CM

## 2021-11-23 RX ORDER — OXYCODONE AND ACETAMINOPHEN 5; 325 MG/1; MG/1
TABLET ORAL
Qty: 30 TABLET | Refills: 0 | Status: SHIPPED | OUTPATIENT
Start: 2021-11-23 | End: 2021-12-10

## 2021-11-23 NOTE — TELEPHONE ENCOUNTER
Patient call taken, was in ER at [facility] yesterday, left thigh laceration (self-cutting).   She is following up with mental health today, sees them twice a week.    Says the ER did not give her printed directions/orders.    She says they told her they placed 8 sutures, needs removed in 10 days.   Has a bulky wrap in place now, she is not sure how long to leave on.   I advised usually a couple days, then remove and gently wash site, pat dry, apply antibiotic ointment.    Call if swelling, streaking red, draining, or having fever or chills.   She says she was NOT started on an antibiotic.  She will call if she observes any of the mentioned symptoms.      Scheduled nurse visit for suture removal in 10 days from 11/22.   PCP will be in clinic if wound needs provider attention.    Routed to sara Montero to remove sutures to left thigh as scheduled on 12/2 unless nurse observes any issues such as wound infection or dehiscence?        Lashell Woods RN  [facility]

## 2021-11-23 NOTE — TELEPHONE ENCOUNTER
Noted.  Added to appointment notes.    Loretta RN,BSN  Triage Nurse  [facility]: [facility]  Ph: [phone]

## 2021-11-23 NOTE — TELEPHONE ENCOUNTER
Requested Prescriptions   Pending Prescriptions Disp Refills     oxyCODONE-acetaminophen (PERCOCET) 5-325 MG tablet [Pharmacy Med Name: OXYCODONE-ACETAMINOPHEN 5-325 TABS] 30 tablet 0     Sig: TAKE ONE-HALF TABLET BY MOUTH TWICE A DAY AS NEEDED FOR SEVERE PAIN (NEED TO BE SEEN IN CLINIC FOR FURTHER REFILLS)       There is no refill protocol information for this order        Routing refill request to provider for review/approval because:  Drug not on the [facility] refill protocol

## 2021-11-25 DIAGNOSIS — G89.29 OTHER CHRONIC PAIN: ICD-10-CM

## 2021-11-26 DIAGNOSIS — K21.00 GASTROESOPHAGEAL REFLUX DISEASE WITH ESOPHAGITIS WITHOUT HEMORRHAGE: ICD-10-CM

## 2021-11-26 RX ORDER — TIZANIDINE 2 MG/1
TABLET ORAL
Qty: 180 TABLET | Refills: 0 | Status: SHIPPED | OUTPATIENT
Start: 2021-11-26 | End: 2021-12-28

## 2021-11-29 DIAGNOSIS — Z79.890 HORMONE REPLACEMENT THERAPY (HRT): ICD-10-CM

## 2021-11-29 RX ORDER — PANTOPRAZOLE SODIUM 40 MG/1
TABLET, DELAYED RELEASE ORAL
Qty: 90 TABLET | Refills: 0 | Status: SHIPPED | OUTPATIENT
Start: 2021-11-29 | End: 2021-12-10

## 2021-11-29 NOTE — TELEPHONE ENCOUNTER
"Requested Prescriptions   Pending Prescriptions Disp Refills     estrogen conj (PREMARIN) 1.25 MG tablet 90 tablet 3     Sig: Take 1 tablet (1.25 mg) by mouth daily       Hormone Replacement Therapy Passed - 11/29/2021 10:52 AM        Passed - Blood pressure under 140/90 in past 12 months     BP Readings from Last 3 Encounters:   10/12/21 110/74   09/10/21 111/51   06/18/21 100/72                 Passed - Recent (12 mo) or future (30 days) visit within the authorizing provider's specialty     Patient has had an office visit with the authorizing provider or a provider within the authorizing providers department within the previous 12 mos or has a future within next 30 days. See \"Patient Info\" tab in inbasket, or \"Choose Columns\" in Meds & Orders section of the refill encounter.              Passed - Medication is active on med list        Passed - Patient is 18 years of age or older        Passed - No active pregnancy on record        Passed - No positive pregnancy test on record in past 12 months           Pt last seen 4/5/2021 for abdominal wall lump.    Last prescribed 1/4/2021 for 90 tablets with 3 refills.    Prescription approved per [facility] Refill Protocol.    RN sent harvey refill.    Sarah Andersen RN on 11/29/2021 at 10:56 AM    "

## 2021-12-01 ENCOUNTER — TELEPHONE (OUTPATIENT)
Dept: FAMILY MEDICINE | Facility: CLINIC | Age: 41
End: 2021-12-01
Payer: COMMERCIAL

## 2021-12-01 NOTE — TELEPHONE ENCOUNTER
Called and notified pharmacy.    Called and advised patient.    Loretta RN,BSN  Triage Nurse  [facility]: [facility]  Ph: [phone]

## 2021-12-01 NOTE — TELEPHONE ENCOUNTER
Patient is calling to see if she can  her refill on her Esgic ,( that is already at the pharmacy), on Thursday.  The pharmacy advised her that she can not pick it up until Friday.  She will be here for a suture removal tomorrow, and she would like to pick it up then. She stated that she has a ride from her PCA, and she hates to have them bring her back again on Friday.    Routed to PCP to advise if ok for early refill.    When calling patient back, she stated ok to leave orders on her voicemail, if she does not .    Loretta RN,BSN  Triage Nurse  [facility]: [facility]  Ph: [phone]

## 2021-12-02 ENCOUNTER — ALLIED HEALTH/NURSE VISIT (OUTPATIENT)
Dept: NURSING | Facility: CLINIC | Age: 41
End: 2021-12-02
Payer: COMMERCIAL

## 2021-12-02 DIAGNOSIS — Z48.02 ENCOUNTER FOR REMOVAL OF SUTURES: ICD-10-CM

## 2021-12-02 PROCEDURE — 99207 PR NO CHARGE NURSE ONLY: CPT

## 2021-12-02 NOTE — NURSING NOTE
Mily Grullon presents to the clinic today for removal of sutures.  The patient has had the sutures in place for 10 days.  There has been no history of infection or drainage.  Placed at [facility] on 11/22/21.    See phone encounter, PCP approved RN removal in clinic today.    7 sutures are seen located on the upper anterior left thigh.  The wound is healing well with no signs of infection.  Pink edges 1/4 in out from wound.  Tetanus status is up to date.   All sutures were easily removed today.  Thin line of bacitracin applied, then tincture of benzoin to wound edges and 1/4 inch steri strips place to help reinforce for a day or two, covered with gauze and gauze wrap and coban (skin above wound looks a bit irritated from adhesive).  Routine wound care discussed.  The patient will follow up as needed.    She has an appointment with PCP on 12/10/21; advised she call or get seen sooner if streaking redness, drainage, fever, or chills occur.    Patient verbalized understanding of and agreement with plan.    Lashell Woods RN  [facility]

## 2021-12-08 NOTE — PROGRESS NOTES
"  Assessment & Plan     Low serum cortisol level (H)      Episodic tension-type headache, not intractable    - butalbital-acetaminophen-caffeine (ESGIC) -40 MG tablet; Take 1 tablet by mouth 2 times daily as needed for headaches    Other chronic pain    - oxyCODONE-acetaminophen (PERCOCET) 5-325 MG tablet; Take 0.5-1 tablets by mouth 2 times daily as needed for severe pain    Closed nondisplaced intertrochanteric fracture of left femur, sequela    - oxyCODONE-acetaminophen (PERCOCET) 5-325 MG tablet; Take 0.5-1 tablets by mouth 2 times daily as needed for severe pain    Class 2 obesity due to excess calories without serious comorbidity with body mass index (BMI) of 36.0 to 36.9 in adult    - phentermine (ADIPEX-P) 37.5 MG capsule; Take 1 capsule (37.5 mg) by mouth every morning Due for follow up in 3 months.    Weight gain    - phentermine (ADIPEX-P) 37.5 MG capsule; Take 1 capsule (37.5 mg) by mouth every morning Due for follow up in 3 months.    Gastroesophageal reflux disease with esophagitis without hemorrhage    - pantoprazole (PROTONIX) 40 MG EC tablet; Take 1 tablet (40 mg) by mouth daily    Housing lack    - CARE COORDINATION    Severe episode of recurrent major depressive disorder, without psychotic features (H)    - CARE COORDINATION    20 minutes spent on the date of the encounter doing chart review, history and exam, documentation and further activities per the note     BMI:   Estimated body mass index is 35.79 kg/m  as calculated from the following:    Height as of 5/3/21: 1.549 m (5' 1\").    Weight as of this encounter: 85.9 kg (189 lb 6.4 oz).   Weight management plan: Discussed healthy diet and exercise guidelines    Depression Screening Follow Up    PHQ 12/10/2021   PHQ-9 Total Score 23   Q9: Thoughts of better off dead/self-harm past 2 weeks Nearly every day   F/U: Thoughts of suicide or self-harm Yes   F/U: Self harm-plan Yes   F/U: Self-harm action Yes   F/U: Safety concerns Yes   PHQ-9 " External Data -     Last PHQ-9 12/10/2021   1.  Little interest or pleasure in doing things 1   2.  Feeling down, depressed, or hopeless 3   3.  Trouble falling or staying asleep, or sleeping too much 3   4.  Feeling tired or having little energy 3   5.  Poor appetite or overeating 3   6.  Feeling bad about yourself 3   7.  Trouble concentrating 3   8.  Moving slowly or restless 1   Q9: Thoughts of better off dead/self-harm past 2 weeks 3   PHQ-9 Total Score 23   Difficulty at work, home, or with people -   In the past two weeks have you had thoughts of suicide or self harm? Yes   Do you have concerns about your personal safety or the safety of others? Yes   In the past 2 weeks have you thought about a plan or had intention to harm yourself? Yes   In the past 2 weeks have you acted on these thoughts in any way? Yes         Follow Up      Follow Up Actions Taken  Crisis resource information provided in the After Visit Summary    Discussed the following ways the patient can remain in a safe environment:  dispose of old medications , be around others and advised her to see if she can have an emotional support animal; I will complete forms if needed.  See Patient Instructions: follow up as needed.     Return in about 3 months (around 3/10/2022), or if symptoms worsen or fail to improve, for using a phone visit- controlled medication check.    Xenia Gaviria, DOV  [facility] [place] Minor is a 41 year old who presents for the following health issues     She reports her mental health is suffering. She is having thoughts of not wanting to live but that has been taken off the table by her therapist. She has not cut herself since needing to go to the ER for stitches. Wound is fully healed per pt. She reports every time she has to deal with this social security stuff it makes her mental health worse. She has been having disassociation's for the last 3 days straight. She reports she used to work  "at [employer] but had to stop due to severe osteoporosis resulting in rib fractures due to the arm movements required to work there. She reports she does not like the place she is living at all.  Discussed care coordinator referral.     History of Present Illness       Mental Health Follow-up:  Patient presents to follow-up on Depression & Anxiety.Patient's depression since last visit has been:  Worse  The patient is having other symptoms associated with depression.  Patient's anxiety since last visit has been:  Bad  The patient is having other symptoms associated with anxiety.  Any significant life events: relationship concerns, job concerns, financial concerns, housing concerns, grief or loss and health concerns  Patient is feeling anxious or having panic attacks.  Patient has no concerns about alcohol or drug use.     Social History  Tobacco Use    Smoking status: Former Smoker      Packs/day: 0.00      Years: 18.00      Pack years: 0      Types: Cigarettes      Quit date: 2014      Years since quittin.8    Smokeless tobacco: Never Used    Tobacco comment: Started in probably  stopped in   Vaping Use    Vaping Use: Never used  Alcohol use: Yes    Comment: Once in a great while like a drink on a holiday. Something l  Drug use: Never    Comment: rare      Today's PHQ-9         PHQ-9 Total Score:     (P) 23   PHQ-9 Q9 Thoughts of better off dead/self-harm past 2 weeks :   (P) Nearly every day   Thoughts of suicide or self harm:  (P) Yes   Self-harm Plan:    (P) Yes   Self-harm Action:      (P) Yes   Safety concerns for self or others: (P) Yes         Migraines:   Since the patient's last clinic visit, headaches are: no change  The patient is getting headaches:  Almost every day  She is not able to do normal daily activities when she has a migraine.  The patient is taking the following rescue/relief medications:  Naproxyn (Aleve), Tylenol and other   Patient states \"The relief is inconsistent\" from the " rescue/relief medications.   The patient is taking the following medications to prevent migraines:  No medications to prevent migraines  In the past 4 weeks, the patient has gone to an Urgent Care or Emergency Room 0 times times due to headaches.    She eats 4 or more servings of fruits and vegetables daily.She consumes 0 sweetened beverage(s) daily.She exercises with enough effort to increase her heart rate 20 to 29 minutes per day.  She exercises with enough effort to increase her heart rate 5 days per week.   She is taking medications regularly.       Medication Followup of pain managment    Taking Medication as prescribed: yes    Side Effects:  None    Medication Helping Symptoms:  yes       Review of Systems   Constitutional, HEENT, cardiovascular, pulmonary, GI, , musculoskeletal, neuro, skin, endocrine and psych systems are negative, except as otherwise noted.      Objective    /87   Pulse 104   Temp 97.1  F (36.2  C) (Tympanic)   Resp 20   Wt 85.9 kg (189 lb 6.4 oz)   LMP 11/16/2020 (Exact Date)   SpO2 96%   BMI 35.79 kg/m    Body mass index is 35.79 kg/m .  Physical Exam   GENERAL: Healthy, alert and no distress  EYES: Eyes grossly normal to inspection.  No discharge or erythema, or obvious scleral/conjunctival abnormalities.  RESP: No audible wheeze, cough, or visible cyanosis.  No visible retractions or increased work of breathing.    SKIN: Visible skin clear. No significant rash, abnormal pigmentation or lesions.  NEURO: Cranial nerves grossly intact.  Mentation and speech appropriate for age.  PSYCH: Mentation appears normal, affect normal/flat, appears sedated- she is on a lot of sedating medication.       See orders          Answers for HPI/ROS submitted by the patient on 12/10/2021  If you checked off any problems, how difficult have these problems made it for you to do your work, take care of things at home, or get along with other people?: Very difficult  PHQ9 TOTAL SCORE: 23  DEJON 7  TOTAL SCORE: 15

## 2021-12-10 ENCOUNTER — OFFICE VISIT (OUTPATIENT)
Dept: FAMILY MEDICINE | Facility: CLINIC | Age: 41
End: 2021-12-10
Payer: COMMERCIAL

## 2021-12-10 VITALS
SYSTOLIC BLOOD PRESSURE: 118 MMHG | DIASTOLIC BLOOD PRESSURE: 87 MMHG | HEART RATE: 104 BPM | TEMPERATURE: 97.1 F | OXYGEN SATURATION: 96 % | BODY MASS INDEX: 35.79 KG/M2 | WEIGHT: 189.4 LBS | RESPIRATION RATE: 20 BRPM

## 2021-12-10 DIAGNOSIS — R63.5 WEIGHT GAIN: ICD-10-CM

## 2021-12-10 DIAGNOSIS — Z59.00 HOUSING LACK: Primary | ICD-10-CM

## 2021-12-10 DIAGNOSIS — K21.00 GASTROESOPHAGEAL REFLUX DISEASE WITH ESOPHAGITIS WITHOUT HEMORRHAGE: ICD-10-CM

## 2021-12-10 DIAGNOSIS — E66.812 CLASS 2 OBESITY DUE TO EXCESS CALORIES WITHOUT SERIOUS COMORBIDITY WITH BODY MASS INDEX (BMI) OF 36.0 TO 36.9 IN ADULT: ICD-10-CM

## 2021-12-10 DIAGNOSIS — S72.145S CLOSED NONDISPLACED INTERTROCHANTERIC FRACTURE OF LEFT FEMUR, SEQUELA: ICD-10-CM

## 2021-12-10 DIAGNOSIS — F33.2 SEVERE EPISODE OF RECURRENT MAJOR DEPRESSIVE DISORDER, WITHOUT PSYCHOTIC FEATURES (H): ICD-10-CM

## 2021-12-10 DIAGNOSIS — G44.219 EPISODIC TENSION-TYPE HEADACHE, NOT INTRACTABLE: ICD-10-CM

## 2021-12-10 DIAGNOSIS — G89.29 OTHER CHRONIC PAIN: ICD-10-CM

## 2021-12-10 DIAGNOSIS — E66.09 CLASS 2 OBESITY DUE TO EXCESS CALORIES WITHOUT SERIOUS COMORBIDITY WITH BODY MASS INDEX (BMI) OF 36.0 TO 36.9 IN ADULT: ICD-10-CM

## 2021-12-10 DIAGNOSIS — R79.89 LOW SERUM CORTISOL LEVEL: ICD-10-CM

## 2021-12-10 PROCEDURE — H1002 CARECOORDINATION PRENATAL: HCPCS | Performed by: NURSE PRACTITIONER

## 2021-12-10 PROCEDURE — 99214 OFFICE O/P EST MOD 30 MIN: CPT | Performed by: NURSE PRACTITIONER

## 2021-12-10 RX ORDER — PANTOPRAZOLE SODIUM 40 MG/1
40 TABLET, DELAYED RELEASE ORAL DAILY
Qty: 90 TABLET | Refills: 1 | Status: SHIPPED | OUTPATIENT
Start: 2021-12-10 | End: 2022-06-14

## 2021-12-10 RX ORDER — BUTALBITAL, ACETAMINOPHEN AND CAFFEINE 50; 325; 40 MG/1; MG/1; MG/1
1 TABLET ORAL 2 TIMES DAILY PRN
Qty: 60 TABLET | Refills: 3 | Status: SHIPPED | OUTPATIENT
Start: 2021-12-10 | End: 2022-04-25

## 2021-12-10 RX ORDER — PHENTERMINE HYDROCHLORIDE 37.5 MG/1
37.5 CAPSULE ORAL EVERY MORNING
Qty: 30 CAPSULE | Refills: 2 | Status: SHIPPED | OUTPATIENT
Start: 2021-12-10 | End: 2022-03-15

## 2021-12-10 RX ORDER — OXYCODONE AND ACETAMINOPHEN 5; 325 MG/1; MG/1
.5-1 TABLET ORAL 2 TIMES DAILY PRN
Qty: 30 TABLET | Refills: 0 | Status: SHIPPED | OUTPATIENT
Start: 2021-12-10 | End: 2022-01-12

## 2021-12-10 SDOH — ECONOMIC STABILITY - HOUSING INSECURITY: HOMELESSNESS UNSPECIFIED: Z59.00

## 2021-12-10 ASSESSMENT — ANXIETY QUESTIONNAIRES
7. FEELING AFRAID AS IF SOMETHING AWFUL MIGHT HAPPEN: SEVERAL DAYS
3. WORRYING TOO MUCH ABOUT DIFFERENT THINGS: SEVERAL DAYS
1. FEELING NERVOUS, ANXIOUS, OR ON EDGE: NEARLY EVERY DAY
GAD7 TOTAL SCORE: 15
GAD7 TOTAL SCORE: 15
4. TROUBLE RELAXING: MORE THAN HALF THE DAYS
6. BECOMING EASILY ANNOYED OR IRRITABLE: NEARLY EVERY DAY
5. BEING SO RESTLESS THAT IT IS HARD TO SIT STILL: MORE THAN HALF THE DAYS
GAD7 TOTAL SCORE: 15
2. NOT BEING ABLE TO STOP OR CONTROL WORRYING: NEARLY EVERY DAY
7. FEELING AFRAID AS IF SOMETHING AWFUL MIGHT HAPPEN: SEVERAL DAYS

## 2021-12-10 ASSESSMENT — PAIN SCALES - GENERAL: PAINLEVEL: NO PAIN (0)

## 2021-12-10 ASSESSMENT — PATIENT HEALTH QUESTIONNAIRE - PHQ9
SUM OF ALL RESPONSES TO PHQ QUESTIONS 1-9: 23
10. IF YOU CHECKED OFF ANY PROBLEMS, HOW DIFFICULT HAVE THESE PROBLEMS MADE IT FOR YOU TO DO YOUR WORK, TAKE CARE OF THINGS AT HOME, OR GET ALONG WITH OTHER PEOPLE: VERY DIFFICULT
SUM OF ALL RESPONSES TO PHQ QUESTIONS 1-9: 23

## 2021-12-10 NOTE — PATIENT INSTRUCTIONS
Patient Education   Please make an appointment to follow up with your therapist and/or Psychiatric Clinic (phone: [phone]) within 1-3 days.     Return to the ED if you are having worsening thoughts/feelings of harming yourself or others, or any urgent/life-threatening concerns.     DISCHARGE RESOURCES:    [facility] [phone]     [facility] [phone] (detox), [phone] (outpatient & Lodging [facility])      Crisis Intervention: 546.961.3333 or [phone] (TTY: [phone]).  Call anytime.    Suicide Awareness Voices of Education (SAVE) (www.save.org): 349-428-EFMN (2247)    National Suicide Prevention Line (www.mentalhealthmn.org): 704-300-CPDJ (8578)    National [place] on Mental Illness (www.mn.delia.org): 431.905.2770 or 549-921-9733.    Dmtf3gwep: text the word LIFE to 21292 for immediate support and crisis intervention    Mental Health Consumer/Survivor Network of [place] (www.mhcsn.net): 819.822.5079 or 222-834-5737    Mental Health Association [place] (www.mentalhealth.org): 621.395.4760 or 351-539-1562

## 2021-12-10 NOTE — LETTER
[facility]  [address]  [place] [address]  [phone]          December 10, 2021    RE:  Mily Grullon                                                                                                                                                       [id number]  [place] [address]            To whom it may concern:    Mily Grullon is under my professional care, last seen for evaluation 12/10/21.  Due to her currently active and ongoing medical conditions of severe Major depression with suicidal ideation, generalized anxiety, PTSD, and intellectual disability, I do not feel that Mily could work a job or maintain a job.       Sincerely,        Xenia Gaviria RN, CNP

## 2021-12-11 ASSESSMENT — ANXIETY QUESTIONNAIRES: GAD7 TOTAL SCORE: 15

## 2021-12-11 ASSESSMENT — PATIENT HEALTH QUESTIONNAIRE - PHQ9: SUM OF ALL RESPONSES TO PHQ QUESTIONS 1-9: 23

## 2021-12-23 ENCOUNTER — TELEPHONE (OUTPATIENT)
Dept: OBGYN | Facility: CLINIC | Age: 41
End: 2021-12-23
Payer: COMMERCIAL

## 2021-12-23 NOTE — TELEPHONE ENCOUNTER
Prior Authorization Retail Medication Request    Medication/Dose: (RENEWAL) - estrogen conj (PREMARIN) 1.25 MG tablet  ICD code (if different than what is on RX):  Hormone replacement therapy (HRT) [Z79.890]   Previously Tried and Failed:    Rationale:     Insurance Name: [employer]  Insurance ID: [id number]    Pharmacy Information (if different than what is on RX)  Name:  [facility] - [address]  Phone:  [phone]

## 2021-12-24 NOTE — TELEPHONE ENCOUNTER
Prior Authorization Approval    Authorization Effective Date: 11/24/2021  Authorization Expiration Date: 3/24/2022  Medication: estrogen conj (PREMARIN) 1.25 MG tablet--APPROVED  Approved Dose/Quantity:   Reference #:     Insurance Company: [employer] - Phone [phone] Fax [phone]  Expected CoPay:       CoPay Card Available:      Foundation Assistance Needed:    Which Pharmacy is filling the prescription (Not needed for infusion/clinic administered): [facility] - [address] [place]  Pharmacy Notified: Yes  Patient Notified: Yes **Instructed pharmacy to notify patient when script is ready to /ship.**

## 2021-12-24 NOTE — TELEPHONE ENCOUNTER
PA Initiation    Medication: estrogen conj (PREMARIN) 1.25 MG tablet   Insurance Company: [employer] - Phone [phone] Fax [phone]  Pharmacy Filling the Rx: [facility] - [address] [address] [place]  Filling Pharmacy Phone: [phone]  Filling Pharmacy Fax: [phone]  Start Date: 12/24/2021

## 2021-12-28 DIAGNOSIS — G89.29 OTHER CHRONIC PAIN: ICD-10-CM

## 2021-12-28 RX ORDER — TIZANIDINE 2 MG/1
TABLET ORAL
Qty: 180 TABLET | Refills: 0 | Status: SHIPPED | OUTPATIENT
Start: 2021-12-28 | End: 2022-01-21

## 2022-01-04 ENCOUNTER — VIRTUAL VISIT (OUTPATIENT)
Dept: ENDOCRINOLOGY | Facility: CLINIC | Age: 42
End: 2022-01-04
Payer: COMMERCIAL

## 2022-01-04 DIAGNOSIS — M81.0 OSTEOPOROSIS OF MULTIPLE SITES: Primary | ICD-10-CM

## 2022-01-04 PROCEDURE — 99443 PR PHYSICIAN TELEPHONE EVALUATION 21-30 MIN: CPT | Mod: 95 | Performed by: INTERNAL MEDICINE

## 2022-01-04 NOTE — PROGRESS NOTES
Endocrinology Visit    Chief Complaint: Follow Up and Osteoporosis     Information obtained from:Patient       Subjective:         HPI: Mily Grullon is a 41 year old female with history of Osteoporosis and fragility fracture who is here for a follow up.     Surgery = total hysterectomy with oophorectomy on 12/4/20. Currently on HRT    Patient has history of endometriosis and per report has underwent right oophorectomy at the age of 22.  She was started on Depo-Provera at the  age of 22.  She has been on this medication until she was taken off of it in January of 2019.   Osteoporosis and fragility fracture presumed to be secondary to Depo-Provera    She had a screening test for celiac disease which was within the normal limits.  She was screened for Cushing's syndrome which was normal.  She was screened for hyper-calciuria again which came back within the normal limits.  Thyroid lab results were within the normal limits.  GFR is within the normal limits and electrolytes including calcium.  He has had elevated PTH level in the setting of low vitamin D level and based on that result she was started on ergocalciferol 50,000 international unit twice weekly and she has been on this medication for the last 6 weeks at least. PTH elevation resolved after correcting low vitamin D level.   2019  FINDINGS:               Lumbar Spine (L1-L4) Z-score:  -0.8               Right Femoral Neck Z-score:  -3.1               Forearm (radius 33%) Z-score:  -0.6  The left femur is not acceptable for evaluation due to previous surgical repair                   Lumbar (L1-L4) BMD: 1.152   Previous: 1.078                          Right Total Hip BMD: 0.740    Previous: 0.751               Forearm (radius 33%) BMD: 0.672   Previous: 0.694    DEXA scan 2018   FINDINGS:               Lumbar Spine (L1-L4) Z-score:  -1.7, degenerative changes present               Right Femoral Neck Z-score:  -3.1               Forearm (radius 33%) Z-score:   -0.3  The left femur is not acceptable for evaluation due to previous surgical repair                                Lumbar (L1-L4) BMD: 1.078               Previous: 1.054                                                 Right Total Hip BMD: 0.751                Previous: 0.718                            Forearm (radius 33%) BMD: 0.694     Previous: NA  DEXA scan from 2014:   Femoral BMD 0.531 Z score -3.5     She has never been on steroids. Multiple fractures over the five years prior to starting FORTEO:  Hip fracture, wrist fracture, multiple ankle fractures, now foot and rib fractures. All of these fractures are fragility fracture without any trauma. She feels just pain. Hip # was following lifting.    After discussing risk benefits of each options and discussing treatment options; patient was started on Forteo for the treatment of osteoporosis and fragility fracture; 4/2019.  Patient reports that she does not have any side effects from the Forteo injection.  She was able to manage a daily injection without any problem. No fractures since she was started on forteo.     She is taking calcium and vitamin D supplements in addition.  She has not had any fractures since we saw her.  Completed FORTEO AND RECLAST ADMINISTERED IN 2/2021      Allergies   Allergen Reactions     Ciprofloxacin Dizziness, Shortness Of Breath and Nausea and Vomiting     Paxil [Paroxetine] Anaphylaxis     anaphylaxis     Amitriptyline Hives and Other (See Comments)     shocking feeling up the arm     Amoxicillin Diarrhea     Asa [Dihydroxyaluminum Aminoacetate]      Cipro [Ciprofloxacin]      Dizziness, vomiting, shortness of breath     Ibuprofen [Aspartame-Ibuprofen]      GI distress       Lyrica      extrematies swell up      Morphine      ithcy     Naproxen      GI distress     Neurontin [Gabapentin]      rash     No Clinical Screening - See Comments Other (See Comments)     Bees causes watery itchy eyes, swelling in mouth, needs epi pen      Phenergan [Promethazine Hcl]      dystonia     Pregabalin Swelling and Unknown     Of all limbs       Prilosec [Omeprazole]      Rash, dizziness     Venlafaxine Other (See Comments)     Intolerance- eye     Gabapentin Rash     Omeprazole Dizziness and Rash       Current Outpatient Medications   Medication Sig Dispense Refill     ALPRAZolam (XANAX) 0.5 MG tablet Take 0.5 mg by mouth daily       butalbital-acetaminophen-caffeine (ESGIC) -40 MG tablet Take 1 tablet by mouth 2 times daily as needed for headaches 60 tablet 3     Calcium-Phosphorus-Vitamin D 250-100-500 MG-MG-UNIT CHEW Take 2 tablets by mouth daily 60 tablet 11     DULoxetine (CYMBALTA) 60 MG capsule Take 60 mg by mouth 2 times daily        estrogen conj (PREMARIN) 1.25 MG tablet Take 1 tablet (1.25 mg) by mouth daily 90 tablet 0     eszopiclone (LUNESTA) 1 MG tablet Take 1 mg by mouth nightly as needed       fluticasone (FLONASE) 50 MCG/ACT nasal spray Spray 1-2 sprays into both nostrils daily Use 1 spray per nostril per day for 2 weeks.  May increase to 2 sprays per nostril per day after. 16 g 2     hydrOXYzine (ATARAX) 25 MG tablet 3 times a day as needed       lamoTRIgine (LAMICTAL) 100 MG tablet Take 150 mg by mouth daily        lithium ER (LITHOBID) 300 MG CR tablet 3 tablets at bedtime       naloxone (NARCAN) 4 MG/0.1ML nasal spray Spray 1 spray (4 mg) into one nostril alternating nostrils once as needed for opioid reversal every 2-3 minutes until assistance arrives 0.2 mL 0     nortriptyline (PAMELOR) 50 MG capsule Take 100 mg by mouth At Bedtime       order for DME Equipment being ordered: 4 wheeled walker with wheels and basket. Color to be determined. 1 each 0     oxyCODONE-acetaminophen (PERCOCET) 5-325 MG tablet Take 0.5-1 tablets by mouth 2 times daily as needed for severe pain 30 tablet 0     pantoprazole (PROTONIX) 40 MG EC tablet Take 1 tablet (40 mg) by mouth daily 90 tablet 1     phentermine (ADIPEX-P) 37.5 MG capsule Take 1  capsule (37.5 mg) by mouth every morning Due for follow up in 3 months. 30 capsule 2     propranolol (INDERAL) 10 MG tablet Take 1 tablet (10 mg) by mouth 3 times daily 90 tablet 3     tiZANidine (ZANAFLEX) 2 MG tablet TAKE ONE TO THREE TABLETS BY MOUTH THREE TIMES A DAY AS NEEDED FOR MUSCLE SPASMS 180 tablet 0     Vitamin D3 (VITAMIN D3) 25 mcg (1000 units) tablet Take 1 tablet (25 mcg) by mouth daily 100 tablet 3     lidocaine (LIDODERM) 5 % patch Place 1 patch onto the skin every 24 hours To prevent lidocaine toxicity, patient should be patch free for 12 hrs daily. (Patient not taking: Reported on 12/10/2021) 15 patch 0     No family history of osteoporosis.     Former smoker stopped in 2014    Objective:   LMP 11/16/2020 (Exact Date)   Constitutional: Pleasant no acute cardiopulmonary distress.   Psychological: appropriate mood and affect     In House Labs:     TSH   Date Value Ref Range Status   09/30/2021 2.93 0.40 - 4.00 mU/L Final   08/30/2021 2.04 0.40 - 4.00 mU/L Final   08/03/2021 1.31 0.40 - 4.00 mU/L Final   01/27/2020 0.62 0.40 - 4.00 mU/L Final   11/23/2018 0.68 0.30 - 5.00 uIU/mL Final   09/11/2017 1.36 0.40 - 4.00 mU/L Final   12/10/2013 2.37 0.4 - 5.0 mU/L Final   01/07/2013 0.70 mcU/mL Final     T4 Free   Date Value Ref Range Status   12/10/2013 1.08 0.70 - 1.85 ng/dL Final     Free T4   Date Value Ref Range Status   09/30/2021 0.84 0.76 - 1.46 ng/dL Final   08/30/2021 0.83 0.76 - 1.46 ng/dL Final       Creatinine   Date Value Ref Range Status   09/30/2021 0.82 0.52 - 1.04 mg/dL Final   05/03/2021 0.71 0.52 - 1.04 mg/dL Final     24-hour urine for calcium  Sodium to creatinine ratio was 150 reference range   Sodium 24-hour 184 difference for   Creatinine 24-hour was 1.59 (0.5-2.15  Calcium to creatinine ratio was 68 reference range 30- 275  Calcium 24-hour urine was 108 reference range   Creatinine 24-hour 1.59  Total volume 2700  Urine free cortisol 3.2    TSH was 0.68 and free  T4 0.74 tissue transglutaminase IgG and IgA was undetectable creatinine within the normal limits BMD in 2014 was 0.718 at the total hip in 2018 was 0.751.  Z score -3.1 at the neck, trochanteric -2.7 and total -2.4      ENDO CALCIUM LABS-UMP Latest Ref Rng & Units 9/30/2021   VITAMIN D DEFICIENCY SCREENING 20 - 75 ug/L    ALBUMIN 3.4 - 5.0 g/dL    ALKPHOS 40 - 150 U/L    CALCIUM 8.5 - 10.1 mg/dL 10.1   CREATININE 0.52 - 1.04 mg/dL 0.82     ENDO CALCIUM LABS-[facility] Latest Ref Rng & Units 5/3/2021 2/4/2021   VITAMIN D DEFICIENCY SCREENING 20 - 75 ug/L  47   ALBUMIN 3.4 - 5.0 g/dL  4.0   ALKPHOS 40 - 150 U/L  111   CALCIUM 8.5 - 10.1 mg/dL 8.8 9.3   CREATININE 0.52 - 1.04 mg/dL 0.71 0.74   1/2021  FINDINGS:               Lumbar Spine (L1-L4)      Z-score: -0.5               Right Femoral Neck          Z-score:  -2.8               Forearm (radius 33%)      Zscore:  -0.8                               Lumbar (L1-L4) BMD: 1.180  Previous: 1.152                                       Right Total Hip BMD: 0.760   Previous: 0.740               Forearm (radius 33%) BMD: 0.657   Previous: 0.672     Comparison is made to another DXA performed on the same Lunar [employer]dig[employer]  machine on 12/23/2019.    Assessment/Treatment Plan:      Osteoporosis with recurrent pathological fracture/patient has had multiple fragility fractures over the last 5 years prior to starting treatment with Forteo: Risk factors for osteoporosis - Depo-Provera use for 16 years since the age of 22. She was taken off of Depo-Provera in Jan 2019.     She has had workup for other possible secondary causes of osteoporosis including Cushing syndrome, celiac disease, hypercalciuria and primary hyperparathyroidism which was unremarkable.      Patient was started on Forteo 4/19 after discussing risks benefits; completed  1/31/2021  (22 months) and follow-up with consolidative treatment with reclast infusion in 2/2021.   Follow-up DEXA scan ordered today and next steps based on  results.    Plan:    Weight bearing Exercises    Fall prevention     Adequate Calcium and vitamin D intake: for maintenance calcium 1200 mg daily and vitamin D 2000 IU daily.      Cessation of tobacco use; counseling provided.     Follow-up DEXA scan and follow-up labs ordered today.    Currently on HRT s/p total hysterectomy and oophorectomy.     Patient Instructions   Mily,      Please schedule bone density     Will determine bone specific therapy based on the results.     Continue calcium and vitamin D supplement.   Weight bearing Exercises  Fall prevention          Return to clinic in 1-2 months.    Test and/or medications prescribed today:  Orders Placed This Encounter   Procedures     Dexa hip/pelvis/spine     Dexa Wrist/Heel     Vitamin D Deficiency (D3 Only)         Dallas Flores MD  Staff Endocrinologist    Division of Endocrinology and Diabetes    Phone call duration: 12 minutes  23 minutes spent on the date of the encounter doing chart review, history, documentation and further activities per the note.

## 2022-01-04 NOTE — PROGRESS NOTES
Mily is a 41 year old who is being evaluated via a billable telephone visit.      What phone number would you like to be contacted at? [phone]  How would you like to obtain your AVS? Ladonna GONZALEZ MA   [facility]   [facility]     regular

## 2022-01-04 NOTE — LETTER
1/4/2022         RE: Mily Grullon  [address]  [place] [address]        Dear Colleague,    Thank you for referring your patient, Mily Grullon, to the [facility]. Please see a copy of my visit note below.    Mily is a 41 year old who is being evaluated via a billable telephone visit.      What phone number would you like to be contacted at? [phone]  How would you like to obtain your AVS? Ladonna GONZALEZ MA   Adult Endocrine   [facility]      Endocrinology Visit    Chief Complaint: Follow Up and Osteoporosis     Information obtained from:Patient       Subjective:         HPI: Mily Grullon is a 41 year old female with history of Osteoporosis and fragility fracture who is here for a follow up.     Surgery = total hysterectomy with oophorectomy on 12/4/20. Currently on HRT    Patient has history of endometriosis and per report has underwent right oophorectomy at the age of 22.  She was started on Depo-Provera at the  age of 22.  She has been on this medication until she was taken off of it in January of 2019.   Osteoporosis and fragility fracture presumed to be secondary to Depo-Provera    She had a screening test for celiac disease which was within the normal limits.  She was screened for Cushing's syndrome which was normal.  She was screened for hyper-calciuria again which came back within the normal limits.  Thyroid lab results were within the normal limits.  GFR is within the normal limits and electrolytes including calcium.  He has had elevated PTH level in the setting of low vitamin D level and based on that result she was started on ergocalciferol 50,000 international unit twice weekly and she has been on this medication for the last 6 weeks at least. PTH elevation resolved after correcting low vitamin D level.   2019  FINDINGS:               Lumbar Spine (L1-L4) Z-score:  -0.8               Right Femoral Neck Z-score:   -3.1               Forearm (radius 33%) Z-score:  -0.6  The left femur is not acceptable for evaluation due to previous surgical repair                   Lumbar (L1-L4) BMD: 1.152   Previous: 1.078                          Right Total Hip BMD: 0.740    Previous: 0.751               Forearm (radius 33%) BMD: 0.672   Previous: 0.694    DEXA scan 2018   FINDINGS:               Lumbar Spine (L1-L4) Z-score:  -1.7, degenerative changes present               Right Femoral Neck Z-score:  -3.1               Forearm (radius 33%) Z-score:  -0.3  The left femur is not acceptable for evaluation due to previous surgical repair                                Lumbar (L1-L4) BMD: 1.078               Previous: 1.054                                                 Right Total Hip BMD: 0.751                Previous: 0.718                            Forearm (radius 33%) BMD: 0.694     Previous: NA  DEXA scan from 2014:   Femoral BMD 0.531 Z score -3.5     She has never been on steroids. Multiple fractures over the five years prior to starting FORTEO:  Hip fracture, wrist fracture, multiple ankle fractures, now foot and rib fractures. All of these fractures are fragility fracture without any trauma. She feels just pain. Hip # was following lifting.    After discussing risk benefits of each options and discussing treatment options; patient was started on Forteo for the treatment of osteoporosis and fragility fracture; 4/2019.  Patient reports that she does not have any side effects from the Forteo injection.  She was able to manage a daily injection without any problem. No fractures since she was started on forteo.     She is taking calcium and vitamin D supplements in addition.  She has not had any fractures since we saw her.  Completed FORTEO AND RECLAST ADMINISTERED IN 2/2021      Allergies   Allergen Reactions     Ciprofloxacin Dizziness, Shortness Of Breath and Nausea and Vomiting     Paxil [Paroxetine] Anaphylaxis     anaphylaxis      Amitriptyline Hives and Other (See Comments)     shocking feeling up the arm     Amoxicillin Diarrhea     Asa [Dihydroxyaluminum Aminoacetate]      Cipro [Ciprofloxacin]      Dizziness, vomiting, shortness of breath     Ibuprofen [Aspartame-Ibuprofen]      GI distress       Lyrica      extrematies swell up      Morphine      ithcy     Naproxen      GI distress     Neurontin [Gabapentin]      rash     No Clinical Screening - See Comments Other (See Comments)     Bees causes watery itchy eyes, swelling in mouth, needs epi pen     Phenergan [Promethazine Hcl]      dystonia     Pregabalin Swelling and Unknown     Of all limbs       Prilosec [Omeprazole]      Rash, dizziness     Venlafaxine Other (See Comments)     Intolerance- eye     Gabapentin Rash     Omeprazole Dizziness and Rash       Current Outpatient Medications   Medication Sig Dispense Refill     ALPRAZolam (XANAX) 0.5 MG tablet Take 0.5 mg by mouth daily       butalbital-acetaminophen-caffeine (ESGIC) -40 MG tablet Take 1 tablet by mouth 2 times daily as needed for headaches 60 tablet 3     Calcium-Phosphorus-Vitamin D 250-100-500 MG-MG-UNIT CHEW Take 2 tablets by mouth daily 60 tablet 11     DULoxetine (CYMBALTA) 60 MG capsule Take 60 mg by mouth 2 times daily        estrogen conj (PREMARIN) 1.25 MG tablet Take 1 tablet (1.25 mg) by mouth daily 90 tablet 0     eszopiclone (LUNESTA) 1 MG tablet Take 1 mg by mouth nightly as needed       fluticasone (FLONASE) 50 MCG/ACT nasal spray Spray 1-2 sprays into both nostrils daily Use 1 spray per nostril per day for 2 weeks.  May increase to 2 sprays per nostril per day after. 16 g 2     hydrOXYzine (ATARAX) 25 MG tablet 3 times a day as needed       lamoTRIgine (LAMICTAL) 100 MG tablet Take 150 mg by mouth daily        lithium ER (LITHOBID) 300 MG CR tablet 3 tablets at bedtime       naloxone (NARCAN) 4 MG/0.1ML nasal spray Spray 1 spray (4 mg) into one nostril alternating nostrils once as needed for opioid  reversal every 2-3 minutes until assistance arrives 0.2 mL 0     nortriptyline (PAMELOR) 50 MG capsule Take 100 mg by mouth At Bedtime       order for DME Equipment being ordered: 4 wheeled walker with wheels and basket. Color to be determined. 1 each 0     oxyCODONE-acetaminophen (PERCOCET) 5-325 MG tablet Take 0.5-1 tablets by mouth 2 times daily as needed for severe pain 30 tablet 0     pantoprazole (PROTONIX) 40 MG EC tablet Take 1 tablet (40 mg) by mouth daily 90 tablet 1     phentermine (ADIPEX-P) 37.5 MG capsule Take 1 capsule (37.5 mg) by mouth every morning Due for follow up in 3 months. 30 capsule 2     propranolol (INDERAL) 10 MG tablet Take 1 tablet (10 mg) by mouth 3 times daily 90 tablet 3     tiZANidine (ZANAFLEX) 2 MG tablet TAKE ONE TO THREE TABLETS BY MOUTH THREE TIMES A DAY AS NEEDED FOR MUSCLE SPASMS 180 tablet 0     Vitamin D3 (VITAMIN D3) 25 mcg (1000 units) tablet Take 1 tablet (25 mcg) by mouth daily 100 tablet 3     lidocaine (LIDODERM) 5 % patch Place 1 patch onto the skin every 24 hours To prevent lidocaine toxicity, patient should be patch free for 12 hrs daily. (Patient not taking: Reported on 12/10/2021) 15 patch 0     No family history of osteoporosis.     Former smoker stopped in 2014    Objective:   LMP 11/16/2020 (Exact Date)   Constitutional: Pleasant no acute cardiopulmonary distress.   Psychological: appropriate mood and affect     In House Labs:     TSH   Date Value Ref Range Status   09/30/2021 2.93 0.40 - 4.00 mU/L Final   08/30/2021 2.04 0.40 - 4.00 mU/L Final   08/03/2021 1.31 0.40 - 4.00 mU/L Final   01/27/2020 0.62 0.40 - 4.00 mU/L Final   11/23/2018 0.68 0.30 - 5.00 uIU/mL Final   09/11/2017 1.36 0.40 - 4.00 mU/L Final   12/10/2013 2.37 0.4 - 5.0 mU/L Final   01/07/2013 0.70 mcU/mL Final     T4 Free   Date Value Ref Range Status   12/10/2013 1.08 0.70 - 1.85 ng/dL Final     Free T4   Date Value Ref Range Status   09/30/2021 0.84 0.76 - 1.46 ng/dL Final   08/30/2021 0.83  0.76 - 1.46 ng/dL Final       Creatinine   Date Value Ref Range Status   09/30/2021 0.82 0.52 - 1.04 mg/dL Final   05/03/2021 0.71 0.52 - 1.04 mg/dL Final     24-hour urine for calcium  Sodium to creatinine ratio was 150 reference range   Sodium 24-hour 184 difference for   Creatinine 24-hour was 1.59 (0.5-2.15  Calcium to creatinine ratio was 68 reference range 30- 275  Calcium 24-hour urine was 108 reference range   Creatinine 24-hour 1.59  Total volume 2700  Urine free cortisol 3.2    TSH was 0.68 and free T4 0.74 tissue transglutaminase IgG and IgA was undetectable creatinine within the normal limits BMD in 2014 was 0.718 at the total hip in 2018 was 0.751.  Z score -3.1 at the neck, trochanteric -2.7 and total -2.4      ENDO CALCIUM LABS-UMP Latest Ref Rng & Units 9/30/2021   VITAMIN D DEFICIENCY SCREENING 20 - 75 ug/L    ALBUMIN 3.4 - 5.0 g/dL    ALKPHOS 40 - 150 U/L    CALCIUM 8.5 - 10.1 mg/dL 10.1   CREATININE 0.52 - 1.04 mg/dL 0.82     ENDO CALCIUM LABS-UMP Latest Ref Rng & Units 5/3/2021 2/4/2021   VITAMIN D DEFICIENCY SCREENING 20 - 75 ug/L  47   ALBUMIN 3.4 - 5.0 g/dL  4.0   ALKPHOS 40 - 150 U/L  111   CALCIUM 8.5 - 10.1 mg/dL 8.8 9.3   CREATININE 0.52 - 1.04 mg/dL 0.71 0.74   1/2021  FINDINGS:               Lumbar Spine (L1-L4)      Z-score: -0.5               Right Femoral Neck          Z-score:  -2.8               Forearm (radius 33%)      Zscore:  -0.8                               Lumbar (L1-L4) BMD: 1.180  Previous: 1.152                                       Right Total Hip BMD: 0.760   Previous: 0.740               Forearm (radius 33%) BMD: 0.657   Previous: 0.672     Comparison is made to another DXA performed on the same [employer]  machine on 12/23/2019.    Assessment/Treatment Plan:      Osteoporosis with recurrent pathological fracture/patient has had multiple fragility fractures over the last 5 years prior to starting treatment with Forteo: Risk factors for osteoporosis  - Depo-Provera use for 16 years since the age of 22. She was taken off of Depo-Provera in Jan 2019.     She has had workup for other possible secondary causes of osteoporosis including Cushing syndrome, celiac disease, hypercalciuria and primary hyperparathyroidism which was unremarkable.      Patient was started on Forteo 4/19 after discussing risks benefits; completed  1/31/2021  (22 months) and follow-up with consolidative treatment with reclast infusion in 2/2021.   Follow-up DEXA scan ordered today and next steps based on results.    Plan:    Weight bearing Exercises    Fall prevention     Adequate Calcium and vitamin D intake: for maintenance calcium 1200 mg daily and vitamin D 2000 IU daily.      Cessation of tobacco use; counseling provided.     Follow-up DEXA scan and follow-up labs ordered today.    Currently on HRT s/p total hysterectomy and oophorectomy.     Patient Instructions   Mily,      Please schedule bone density     Will determine bone specific therapy based on the results.     Continue calcium and vitamin D supplement.   Weight bearing Exercises  Fall prevention          Return to clinic in 1-2 months.    Test and/or medications prescribed today:  Orders Placed This Encounter   Procedures     Dexa hip/pelvis/spine     Dexa Wrist/Heel     Vitamin D Deficiency (D3 Only)         Dallas Flores MD  Staff Endocrinologist    Division of Endocrinology and Diabetes    Phone call duration: 12 minutes  23 minutes spent on the date of the encounter doing chart review, history, documentation and further activities per the note.        Again, thank you for allowing me to participate in the care of your patient.        Sincerely,        Dallas Flores MD

## 2022-01-04 NOTE — PATIENT INSTRUCTIONS
Mily,      Please schedule bone density     Will determine bone specific therapy based on the results.     Continue calcium and vitamin D supplement.   Weight bearing Exercises  Fall prevention

## 2022-01-05 ENCOUNTER — TELEPHONE (OUTPATIENT)
Dept: ENDOCRINOLOGY | Facility: CLINIC | Age: 42
End: 2022-01-05
Payer: COMMERCIAL

## 2022-01-07 ENCOUNTER — TRANSFERRED RECORDS (OUTPATIENT)
Dept: HEALTH INFORMATION MANAGEMENT | Facility: CLINIC | Age: 42
End: 2022-01-07
Payer: COMMERCIAL

## 2022-01-12 ENCOUNTER — TELEPHONE (OUTPATIENT)
Dept: FAMILY MEDICINE | Facility: CLINIC | Age: 42
End: 2022-01-12
Payer: COMMERCIAL

## 2022-01-12 DIAGNOSIS — G89.29 OTHER CHRONIC PAIN: ICD-10-CM

## 2022-01-12 DIAGNOSIS — S72.145S CLOSED NONDISPLACED INTERTROCHANTERIC FRACTURE OF LEFT FEMUR, SEQUELA: ICD-10-CM

## 2022-01-12 RX ORDER — OXYCODONE AND ACETAMINOPHEN 5; 325 MG/1; MG/1
.5-1 TABLET ORAL 2 TIMES DAILY PRN
Qty: 30 TABLET | Refills: 0 | Status: SHIPPED | OUTPATIENT
Start: 2022-01-12 | End: 2022-01-19

## 2022-01-12 NOTE — TELEPHONE ENCOUNTER
Pt got new [employer] ins this year and it is limiting her to get a 1 week supply for the first fill.  We filled 14 tabs for 7 days supply for patient.        Thank you,  Kiera Nj [facility]

## 2022-01-12 NOTE — TELEPHONE ENCOUNTER
Prior Authorization Retail Medication Request    Medication/Dose: oxycodone-acetaminophen 5/325   ICD code (if different than what is on RX):    Previously Tried and Failed:    Rationale:  CALL HELP DESK  LIMIT 1ST SA OPIOID TO 7 DAYS    Insurance Name:  Leslie RIZO  Insurance ID:  [id number]      Pharmacy Information (if different than what is on RX)  Name:    Phone:      Insurance is stating a PA is needed or 7 day supply for the first 4 fills.      If any questions please contact the site directly.    Thank you,  Hetal Rodgers [facility] Technician

## 2022-01-12 NOTE — TELEPHONE ENCOUNTER
Reason for Call:  Other prescription    Detailed comments: patient calling in for hr monthly refill of medication, oxyCODONE-acetaminophen (PERCOCET) 5-325 MG tablet. No changes needed     Phone Number Patient can be reached at: Home number on file [phone] (home)    Best Time: anytime    Can we leave a detailed message on this number? YES    Call taken on 1/12/2022 at 10:23 AM by Kaya Brennan

## 2022-01-12 NOTE — TELEPHONE ENCOUNTER
Prior Authorization Not Needed per Insurance    Medication: oxycodone-acetaminophen 5/325   Insurance Company: [employer] - Phone [phone] Fax [phone]  Expected CoPay:      Pharmacy Filling the Rx: [facility] - [id number] [facility]  Pharmacy Notified: Yes  Patient Notified: Yes    Spoke with rep at [facility]- patient needed to fill initial 7-day supply, she will be able to fill full quantity next fill. Notified pharmacy.

## 2022-01-19 ENCOUNTER — MYC MEDICAL ADVICE (OUTPATIENT)
Dept: FAMILY MEDICINE | Facility: CLINIC | Age: 42
End: 2022-01-19
Payer: COMMERCIAL

## 2022-01-19 DIAGNOSIS — G89.29 OTHER CHRONIC PAIN: ICD-10-CM

## 2022-01-19 DIAGNOSIS — S72.145S CLOSED NONDISPLACED INTERTROCHANTERIC FRACTURE OF LEFT FEMUR, SEQUELA: ICD-10-CM

## 2022-01-19 RX ORDER — OXYCODONE AND ACETAMINOPHEN 5; 325 MG/1; MG/1
.5-1 TABLET ORAL 2 TIMES DAILY PRN
Qty: 30 TABLET | Refills: 0 | Status: SHIPPED | OUTPATIENT
Start: 2022-01-19 | End: 2022-02-14

## 2022-01-19 RX ORDER — OXYCODONE AND ACETAMINOPHEN 5; 325 MG/1; MG/1
TABLET ORAL
Qty: 30 TABLET | Refills: 0 | OUTPATIENT
Start: 2022-01-19

## 2022-01-19 NOTE — TELEPHONE ENCOUNTER
Patient only got 14 tablets per her insurance coverage on 1/12/22, can she get a new script for the full quantity of 30 tablets. Patient is out today.      If any questions please contact the site directly.    Thank you,  Hetal Rodgers [facility]

## 2022-01-21 RX ORDER — TIZANIDINE 2 MG/1
TABLET ORAL
Qty: 180 TABLET | Refills: 0 | Status: SHIPPED | OUTPATIENT
Start: 2022-01-21 | End: 2022-02-23

## 2022-02-04 ENCOUNTER — TRANSFERRED RECORDS (OUTPATIENT)
Dept: HEALTH INFORMATION MANAGEMENT | Facility: CLINIC | Age: 42
End: 2022-02-04
Payer: COMMERCIAL

## 2022-02-14 DIAGNOSIS — G89.29 OTHER CHRONIC PAIN: ICD-10-CM

## 2022-02-14 DIAGNOSIS — S72.145S CLOSED NONDISPLACED INTERTROCHANTERIC FRACTURE OF LEFT FEMUR, SEQUELA: ICD-10-CM

## 2022-02-14 RX ORDER — OXYCODONE AND ACETAMINOPHEN 5; 325 MG/1; MG/1
.5-1 TABLET ORAL 2 TIMES DAILY PRN
Qty: 30 TABLET | Refills: 0 | Status: SHIPPED | OUTPATIENT
Start: 2022-02-14 | End: 2022-03-15

## 2022-02-14 NOTE — TELEPHONE ENCOUNTER
Patient requesting refill of oxyCODONE-acetaminophen (PERCOCET) 5-325 MG tablet. Patient uses [facility].

## 2022-02-14 NOTE — TELEPHONE ENCOUNTER
Requested Prescriptions   Pending Prescriptions Disp Refills     oxyCODONE-acetaminophen (PERCOCET) 5-325 MG tablet 30 tablet 0     Sig: Take 0.5-1 tablets by mouth 2 times daily as needed for severe pain       There is no refill protocol information for this order        Routing refill request to provider for review/approval because:  Drug not on the [facility] refill protocol

## 2022-02-17 PROBLEM — K21.9 GASTROESOPHAGEAL REFLUX DISEASE: Status: ACTIVE | Noted: 2018-09-10

## 2022-02-22 DIAGNOSIS — G89.29 OTHER CHRONIC PAIN: ICD-10-CM

## 2022-02-23 RX ORDER — TIZANIDINE 2 MG/1
TABLET ORAL
Qty: 180 TABLET | Refills: 0 | Status: SHIPPED | OUTPATIENT
Start: 2022-02-23 | End: 2022-03-15

## 2022-02-24 ENCOUNTER — TELEPHONE (OUTPATIENT)
Dept: FAMILY MEDICINE | Facility: CLINIC | Age: 42
End: 2022-02-24
Payer: COMMERCIAL

## 2022-02-24 NOTE — TELEPHONE ENCOUNTER
Prior Authorization Retail Medication Request    Medication/Dose: Phentermine  ICD code (if different than what is on RX):    Previously Tried and Failed:    Rationale:  PA Required    Insurance Name:  [facility]  Insurance ID:  [id number]      Pharmacy Information (if different than what is on RX)  Name:    Phone:              Thank you,  Hetal Rodgers [facility] [place]  [phone]

## 2022-02-28 ENCOUNTER — LAB (OUTPATIENT)
Dept: LAB | Facility: OTHER | Age: 42
End: 2022-02-28
Payer: COMMERCIAL

## 2022-02-28 DIAGNOSIS — Z79.899 LITHIUM USE: Primary | ICD-10-CM

## 2022-02-28 LAB
LITHIUM SERPL-SCNC: 0.9 MMOL/L
PHQ9 SCORE: 23

## 2022-02-28 PROCEDURE — 36415 COLL VENOUS BLD VENIPUNCTURE: CPT

## 2022-02-28 PROCEDURE — 80178 ASSAY OF LITHIUM: CPT

## 2022-03-01 NOTE — TELEPHONE ENCOUNTER
Has the patient lost and maintain greater than or equal to 5% baseline body weight since starting this medication?  If yes, chart note/ documentation is required. Please route encounter back to the pa team.

## 2022-03-01 NOTE — TELEPHONE ENCOUNTER
There is already a pa in process for this medication. Insurance plan will fax the clinical criteria questions to the pa team to complete.

## 2022-03-02 ENCOUNTER — ANCILLARY PROCEDURE (OUTPATIENT)
Dept: GENERAL RADIOLOGY | Facility: CLINIC | Age: 42
End: 2022-03-02
Attending: NURSE PRACTITIONER
Payer: COMMERCIAL

## 2022-03-02 ENCOUNTER — OFFICE VISIT (OUTPATIENT)
Dept: FAMILY MEDICINE | Facility: CLINIC | Age: 42
End: 2022-03-02
Payer: COMMERCIAL

## 2022-03-02 VITALS
SYSTOLIC BLOOD PRESSURE: 130 MMHG | HEIGHT: 62 IN | HEART RATE: 68 BPM | TEMPERATURE: 98.5 F | DIASTOLIC BLOOD PRESSURE: 70 MMHG | RESPIRATION RATE: 16 BRPM | BODY MASS INDEX: 35.96 KG/M2 | WEIGHT: 195.4 LBS

## 2022-03-02 DIAGNOSIS — L03.90 CELLULITIS, UNSPECIFIED CELLULITIS SITE: ICD-10-CM

## 2022-03-02 DIAGNOSIS — M79.641 PAIN OF RIGHT HAND: Primary | ICD-10-CM

## 2022-03-02 DIAGNOSIS — M79.641 PAIN OF RIGHT HAND: ICD-10-CM

## 2022-03-02 LAB
ERYTHROCYTE [DISTWIDTH] IN BLOOD BY AUTOMATED COUNT: 14.7 % (ref 10–15)
ERYTHROCYTE [SEDIMENTATION RATE] IN BLOOD BY WESTERGREN METHOD: 9 MM/HR (ref 0–20)
HCT VFR BLD AUTO: 38.5 % (ref 35–47)
HGB BLD-MCNC: 12 G/DL (ref 11.7–15.7)
MCH RBC QN AUTO: 24.3 PG (ref 26.5–33)
MCHC RBC AUTO-ENTMCNC: 31.2 G/DL (ref 31.5–36.5)
MCV RBC AUTO: 78 FL (ref 78–100)
PLATELET # BLD AUTO: 392 10E3/UL (ref 150–450)
RBC # BLD AUTO: 4.94 10E6/UL (ref 3.8–5.2)
WBC # BLD AUTO: 7.9 10E3/UL (ref 4–11)

## 2022-03-02 PROCEDURE — 85027 COMPLETE CBC AUTOMATED: CPT | Performed by: NURSE PRACTITIONER

## 2022-03-02 PROCEDURE — 73130 X-RAY EXAM OF HAND: CPT | Mod: RT | Performed by: RADIOLOGY

## 2022-03-02 PROCEDURE — 85652 RBC SED RATE AUTOMATED: CPT | Performed by: NURSE PRACTITIONER

## 2022-03-02 PROCEDURE — 36415 COLL VENOUS BLD VENIPUNCTURE: CPT | Performed by: NURSE PRACTITIONER

## 2022-03-02 PROCEDURE — 99213 OFFICE O/P EST LOW 20 MIN: CPT | Performed by: NURSE PRACTITIONER

## 2022-03-02 RX ORDER — CEPHALEXIN 500 MG/1
500 CAPSULE ORAL 4 TIMES DAILY
Qty: 40 CAPSULE | Refills: 0 | Status: SHIPPED | OUTPATIENT
Start: 2022-03-02 | End: 2022-03-12

## 2022-03-02 ASSESSMENT — ENCOUNTER SYMPTOMS
DIZZINESS: 0
NAUSEA: 0
FEVER: 0
HEADACHES: 0
FATIGUE: 0
EYE ITCHING: 0
SORE THROAT: 0
COUGH: 0
LIGHT-HEADEDNESS: 0
DIARRHEA: 0
SINUS PRESSURE: 0
SHORTNESS OF BREATH: 0
CHILLS: 0
CONSTIPATION: 0
EYE DISCHARGE: 0
DIAPHORESIS: 0
RHINORRHEA: 0
WHEEZING: 0

## 2022-03-02 ASSESSMENT — PATIENT HEALTH QUESTIONNAIRE - PHQ9
SUM OF ALL RESPONSES TO PHQ QUESTIONS 1-9: 25
10. IF YOU CHECKED OFF ANY PROBLEMS, HOW DIFFICULT HAVE THESE PROBLEMS MADE IT FOR YOU TO DO YOUR WORK, TAKE CARE OF THINGS AT HOME, OR GET ALONG WITH OTHER PEOPLE: VERY DIFFICULT
SUM OF ALL RESPONSES TO PHQ QUESTIONS 1-9: 25

## 2022-03-02 ASSESSMENT — PAIN SCALES - GENERAL: PAINLEVEL: MODERATE PAIN (5)

## 2022-03-02 NOTE — PATIENT INSTRUCTIONS
Keep hand elevated.    Apply ice as needed.    Take full course of antibiotic.    You may take Ace bandage off at nighttime.     You may take over-the-counter ibuprofen 400 mg 3 times per day as needed for discomfort.  Please take this with food.    Follow-up in 1 week if no improvement.

## 2022-03-02 NOTE — PROGRESS NOTES
Assessment & Plan     Pain of right hand  - XR Hand Right G/E 3 Views; Future  - CBC with platelets; Future  - Erythrocyte sedimentation rate auto; Future  - CBC with platelets  - Erythrocyte sedimentation rate auto    Cellulitis, unspecified cellulitis site  We will treat for cellulitis-x-ray negative.  Educated on use of antibiotic.  Keep elevated.  Educated regarding over-the-counter medications that may take to help decrease pain.  Encouraged to continue to apply ice as needed.  To follow-up if no improvement over the next 48 hours.  - cephALEXin (KEFLEX) 500 MG capsule; Take 1 capsule (500 mg) by mouth 4 times daily for 10 days    Review of the result(s) of each unique test - Labs  Ordering of each unique test  Prescription drug management  48 minutes spent on the date of the encounter doing chart review, history and exam, documentation and further activities per the note     Depression Screening Follow Up    PHQ 3/2/2022   PHQ-9 Total Score 25   Q9: Thoughts of better off dead/self-harm past 2 weeks Nearly every day   F/U: Thoughts of suicide or self-harm Yes   F/U: Self harm-plan Yes   F/U: Self-harm action No   F/U: Safety concerns No   PHQ-9 External Data -       Return in about 1 week (around 3/9/2022), or if symptoms worsen or fail to improve.    TARAS Bah CNP  [facility] JAIRO Minor is a 41 year old who presents for the following health issues     History of Present Illness     Reason for visit:  Swollen right wrist and hand  Symptom onset:  1-3 days ago  Symptoms include:  Pain in hand and wrist, swelling  Symptom intensity:  Moderate  Symptom progression:  Worsening  Had these symptoms before:  No  What makes it worse:  Using the wrist and hand  What makes it better:  Ice, ibuprofen    She eats 2-3 servings of fruits and vegetables daily.She consumes 1 sweetened beverage(s) daily.She exercises with enough effort to increase her heart rate 20 to 29  "minutes per day.  She exercises with enough effort to increase her heart rate 5 days per week.   She is taking medications regularly.     Right hand is swollen.  Started a couple of days ago.  Does have some numbness to top of hand, thinks is because it is so swollen. Did bump right hand the other night, when got up during the night.  Uncertain what bumped it on. Using ice and ibuprofen as needed. Takes 1 oxy as needed.  No fevers or chills.  Does have decreased range of motion.  No numbness or tingling to fingers.  Did have blood drawn from this hand a few days ago.     Review of Systems   Constitutional: Negative for chills, diaphoresis, fatigue and fever.   HENT: Negative for ear discharge, ear pain, hearing loss, rhinorrhea, sinus pressure and sore throat.    Eyes: Negative for discharge and itching.   Respiratory: Negative for cough, shortness of breath and wheezing.    Gastrointestinal: Negative for constipation, diarrhea and nausea.   Musculoskeletal:        Right hand pain and swelling      Skin: Negative for rash.   Neurological: Negative for dizziness, light-headedness and headaches.          Objective    /70 (BP Location: Left arm, Patient Position: Sitting, Cuff Size: Adult Large)   Pulse 68   Temp 98.5  F (36.9  C) (Tympanic)   Resp 16   Ht 1.562 m (5' 1.5\")   Wt 88.6 kg (195 lb 6.4 oz)   LMP 11/16/2020 (Exact Date)   BMI 36.32 kg/m    Body mass index is 36.32 kg/m .  Physical Exam  Constitutional:       Appearance: She is well-developed.   Cardiovascular:      Rate and Rhythm: Normal rate and regular rhythm.   Pulmonary:      Effort: Pulmonary effort is normal.      Breath sounds: Normal breath sounds.   Musculoskeletal:        Arms:    Skin:     General: Skin is warm.   Neurological:      Mental Status: She is alert.                "

## 2022-03-03 ASSESSMENT — PATIENT HEALTH QUESTIONNAIRE - PHQ9: SUM OF ALL RESPONSES TO PHQ QUESTIONS 1-9: 25

## 2022-03-03 NOTE — TELEPHONE ENCOUNTER
Called and spoke with patient. Medication started 9/10/21 at 191lbs, visit from 32/22 was 195lbs. See below.    Vital Signs 9/10/2021   Weight (LB) 191 lb     Vital Signs 12/10/2021   Weight (LB) 189 lb 6.4 oz     Vital Signs 3/2/2022   Weight (LB) 195 lb 6.4 oz

## 2022-03-03 NOTE — TELEPHONE ENCOUNTER
Please call Mily and ask her if she has lost weight with phentermine and how much weight she has lost with it.  Her insurance is denying coverage. TARAS Verma, FNP-BC

## 2022-03-08 NOTE — TELEPHONE ENCOUNTER
So, she can either pay out of pocket, or check with her insurance to see if a different weight loss medication would be covered? Xenia Gaviria, APRN, FNP-BC

## 2022-03-11 LAB — HOLD SPECIMEN: NORMAL

## 2022-03-14 DIAGNOSIS — G89.29 OTHER CHRONIC PAIN: ICD-10-CM

## 2022-03-14 DIAGNOSIS — S72.145S CLOSED NONDISPLACED INTERTROCHANTERIC FRACTURE OF LEFT FEMUR, SEQUELA: ICD-10-CM

## 2022-03-14 DIAGNOSIS — F41.9 ANXIETY: ICD-10-CM

## 2022-03-14 RX ORDER — OXYCODONE AND ACETAMINOPHEN 5; 325 MG/1; MG/1
.5-1 TABLET ORAL 2 TIMES DAILY PRN
Qty: 30 TABLET | Refills: 0 | Status: CANCELLED | OUTPATIENT
Start: 2022-03-14

## 2022-03-15 ENCOUNTER — VIRTUAL VISIT (OUTPATIENT)
Dept: FAMILY MEDICINE | Facility: CLINIC | Age: 42
End: 2022-03-15
Payer: COMMERCIAL

## 2022-03-15 VITALS — BODY MASS INDEX: 35.88 KG/M2 | WEIGHT: 195 LBS | HEIGHT: 62 IN

## 2022-03-15 DIAGNOSIS — S72.145S CLOSED NONDISPLACED INTERTROCHANTERIC FRACTURE OF LEFT FEMUR, SEQUELA: ICD-10-CM

## 2022-03-15 DIAGNOSIS — F33.2 SEVERE EPISODE OF RECURRENT MAJOR DEPRESSIVE DISORDER, WITHOUT PSYCHOTIC FEATURES (H): ICD-10-CM

## 2022-03-15 DIAGNOSIS — G89.29 OTHER CHRONIC PAIN: ICD-10-CM

## 2022-03-15 DIAGNOSIS — E66.812 CLASS 2 SEVERE OBESITY DUE TO EXCESS CALORIES WITH SERIOUS COMORBIDITY AND BODY MASS INDEX (BMI) OF 36.0 TO 36.9 IN ADULT (H): Primary | ICD-10-CM

## 2022-03-15 DIAGNOSIS — F41.9 ANXIETY: ICD-10-CM

## 2022-03-15 DIAGNOSIS — R79.89 LOW SERUM CORTISOL LEVEL: ICD-10-CM

## 2022-03-15 DIAGNOSIS — Z79.890 HORMONE REPLACEMENT THERAPY (HRT): ICD-10-CM

## 2022-03-15 DIAGNOSIS — E66.01 CLASS 2 SEVERE OBESITY DUE TO EXCESS CALORIES WITH SERIOUS COMORBIDITY AND BODY MASS INDEX (BMI) OF 36.0 TO 36.9 IN ADULT (H): Primary | ICD-10-CM

## 2022-03-15 PROCEDURE — 99214 OFFICE O/P EST MOD 30 MIN: CPT | Mod: 95 | Performed by: NURSE PRACTITIONER

## 2022-03-15 RX ORDER — TIZANIDINE 2 MG/1
2 TABLET ORAL 3 TIMES DAILY PRN
Qty: 180 TABLET | Refills: 1 | Status: SHIPPED | OUTPATIENT
Start: 2022-03-15 | End: 2022-03-24

## 2022-03-15 RX ORDER — OXYCODONE AND ACETAMINOPHEN 5; 325 MG/1; MG/1
.5-1 TABLET ORAL 2 TIMES DAILY PRN
Qty: 30 TABLET | Refills: 0 | Status: SHIPPED | OUTPATIENT
Start: 2022-03-15 | End: 2022-03-31

## 2022-03-15 RX ORDER — PROPRANOLOL HYDROCHLORIDE 10 MG/1
10 TABLET ORAL 3 TIMES DAILY
Qty: 90 TABLET | Refills: 3 | Status: SHIPPED | OUTPATIENT
Start: 2022-03-15 | End: 2022-07-12

## 2022-03-15 RX ORDER — PROPRANOLOL HYDROCHLORIDE 10 MG/1
TABLET ORAL
Qty: 90 TABLET | Refills: 3 | OUTPATIENT
Start: 2022-03-15

## 2022-03-15 RX ORDER — ORLISTAT 120 MG/1
120 CAPSULE ORAL
Qty: 90 CAPSULE | Refills: 2 | Status: SHIPPED | OUTPATIENT
Start: 2022-03-15 | End: 2022-03-25

## 2022-03-15 RX ORDER — TIZANIDINE 2 MG/1
TABLET ORAL
Qty: 180 TABLET | Refills: 0 | OUTPATIENT
Start: 2022-03-15

## 2022-03-15 NOTE — TELEPHONE ENCOUNTER
Routing refill request to provider for review/approval because:  Drug not on the [facility] refill protocol     oxyCODONE-acetaminophen (PERCOCET) 5-325 MG tablet 30 tablet 0 2/14/2022     Patient has appointment today with PCP Xenia Gaviria    Future Office Visit:   Next 5 appointments (look out 90 days)    Mar 15, 2022  5:20 PM  (Arrive by 5:00 PM)  Provider Visit with Xenia Gaviria NP  [facility] [place] ([facility] [place] ) [address] [address] [facility]  [place] [address]  [phone]

## 2022-03-15 NOTE — PROGRESS NOTES
Mily is a 41 year old who is being evaluated via a billable telephone visit.      What phone number would you like to be contacted at? [phone]  How would you like to obtain your AVS? Ladonna    Assessment & Plan     (E66.01,  Z68.36) Class 2 severe obesity due to excess calories with serious comorbidity and body mass index (BMI) of 36.0 to 36.9 in adult (H)  (primary encounter diagnosis)  Comment:   Plan: orlistat (XENICAL) 120 MG capsule            (Z79.890) Hormone replacement therapy (HRT)  Comment: Reviewed risks of hormone therapy- DVT (S&S), PE, MI, stroked, death, increased risk with smoking, breast ca  Plan: estrogen conj (PREMARIN) 1.25 MG tablet            (G89.29) Other chronic pain  Comment:   Plan: oxyCODONE-acetaminophen (PERCOCET) 5-325 MG         tablet, tiZANidine (ZANAFLEX) 2 MG tablet            (S72.145S) Closed nondisplaced intertrochanteric fracture of left femur, sequela  Comment:  Plan: oxyCODONE-acetaminophen (PERCOCET) 5-325 MG         tablet         (F41.9) Anxiety  Comment:  Plan: propranolol (INDERAL) 10 MG tablet        (F33.2) Severe episode of recurrent major depressive disorder, without psychotic features (H)  Comment: continue with therapy and psychatry    (E27.40) Low serum cortisol level (H)  Plan:  Weight loss medication to help with weight loss      20 minutes spent on the date of the encounter doing chart review, history and exam, documentation and further activities per the note     See Patient Instructions: keep dexa scan appointment. Follow up as needed.  Let me know how medication is helping or not for weight loss.     Return in about 3 months (around 6/15/2022), or if symptoms worsen or fail to improve.    J CARLOS Montero  [facility] EFFIE Minor is a 41 year old who presents for the following health issues     HPI     Depression and Anxiety Follow-Up    How are you doing with your depression since your last visit? No change    How  are you doing with your anxiety since your last visit?  No change    Are you having other symptoms that might be associated with depression or anxiety? Yes:  mental health is doing ok.  Medications are filled by psychiatry    Have you had a significant life event? No     Do you have any concerns with your use of alcohol or other drugs? No    Social History     Tobacco Use     Smoking status: Former Smoker     Packs/day: 0.00     Years: 18.00     Pack years: 0.00     Types: Cigarettes     Quit date: 2014     Years since quittin.1     Smokeless tobacco: Never Used     Tobacco comment: Started in 1996 stopped in    Vaping Use     Vaping Use: Never used   Substance Use Topics     Alcohol use: Yes     Comment: Once in a great while like a drink on a holiday. Something l     Drug use: Never     Comment: rare     PHQ 9/10/2021 12/10/2021 3/2/2022   PHQ-9 Total Score 26 23 25   Q9: Thoughts of better off dead/self-harm past 2 weeks Nearly every day Nearly every day Nearly every day   F/U: Thoughts of suicide or self-harm - Yes Yes   F/U: Self harm-plan - Yes Yes   F/U: Self-harm action - Yes No   F/U: Safety concerns - Yes No   PHQ-9 External Data - - -     DEJON-7 SCORE 2020 9/10/2021 12/10/2021   Total Score - - 15 (severe anxiety)   Total Score 18 18 15     Last PHQ-9 3/2/2022   1.  Little interest or pleasure in doing things 3   2.  Feeling down, depressed, or hopeless 3   3.  Trouble falling or staying asleep, or sleeping too much 3   4.  Feeling tired or having little energy 3   5.  Poor appetite or overeating 2   6.  Feeling bad about yourself 3   7.  Trouble concentrating 3   8.  Moving slowly or restless 2   Q9: Thoughts of better off dead/self-harm past 2 weeks 3   PHQ-9 Total Score 25   Difficulty at work, home, or with people -   In the past two weeks have you had thoughts of suicide or self harm? Yes   Do you have concerns about your personal safety or the safety of others? No   In the  past 2 weeks have you thought about a plan or had intention to harm yourself? Yes   In the past 2 weeks have you acted on these thoughts in any way? No     DEJON-7  12/10/2021   1. Feeling nervous, anxious, or on edge 3   2. Not being able to stop or control worrying 3   3. Worrying too much about different things 1   4. Trouble relaxing 2   5. Being so restless that it is hard to sit still 2   6. Becoming easily annoyed or irritable 3   7. Feeling afraid, as if something awful might happen 1   DEJON-7 Total Score 15   If you checked any problems, how difficult have they made it for you to do your work, take care of things at home, or get along with other people? -       Suicide Assessment Five-step Evaluation and Treatment (SAFE-T)          Concern - Fall  Onset: 1-2 weeks  Description: Pt fell on ice and is feeling increase in back pain. No bruising, swelling, or warmth to touch. Pt has been taking tylenol and ibuprofen. Pt also is using percocet. Pt reports some relief from medications but still has pain. Pt also has tried icing and using heat.   Discussed rib pain can hurt for 6-8 weeks. Discussed imaging. She has a dexa scan scheduled next week so she will wait for that.       Concern: Weight   Description: Pt was taken off Phentermine due to insurance. Pt is wondering if there is another medication she can take. Also concerned about why she is gaining and losing weight drastically.       Review of Systems   Constitutional, HEENT, cardiovascular, pulmonary, GI, , musculoskeletal, neuro, skin, endocrine and psych systems are negative, except as otherwise noted.      Objective           Vitals:  No vitals were obtained today due to virtual visit.    Physical Exam   healthy, alert and no distress  PSYCH: Alert and oriented times 3; coherent speech, normal   rate and volume, able to articulate logical thoughts, able   to abstract reason, no tangential thoughts, no hallucinations   or delusions  Her affect is  normal  RESP: No cough, no audible wheezing, able to talk in full sentences  Remainder of exam unable to be completed due to telephone visits    See orders          Phone call duration: 14 minutes

## 2022-03-15 NOTE — PATIENT INSTRUCTIONS
Patient Education     Weight Management: Take It Off and Keep It Off    It s easy to be motivated when you first start. The key is to stay motivated all along the way and to have realistic and achievable goals. There are things you can do to keep yourself on the path to success.  Track your weight loss  Don t focus on daily weight gains and losses. Instead, weigh yourself no more than once a week at the same time of day. Weighing yourself each day will probably only frustrate you. Find a friend who also needs to lose some weight and you can encourage each other.  Stay motivated  Here are suggestions to keep you motivated:     Remind yourself of your goals. Post them near the refrigerator or desk where you work.    Make daily entries in your diary or journal about your activity and eating. A visual reminder of success, like a gold star, can help keep you going.    Every week, take time to look back on how much you ve accomplished, and the changes you may have made.    Try taking a nutrition class. It can help you learn new shopping, cooking, and eating skills, and also meet new people. You might try a low-fat cooking or yoga class.    Don t be hard on yourself or give up if you slip. Be patient. Learn from your mistakes and adjust your plan if you need to. Then get right back to it.    Be realistic about your goals. Talk with a dietitian or your healthcare provider about what goals are reasonable for you.   Believe that you can do it  How you think about yourself is just as important as what you do. If you don t think you can succeed, chances are you won t. Believe that you can stick to your plan and meet your goals:    If you don t meet a goal, don t use it as an excuse to give up on your whole plan. Adjust your goal and try again.    Try to understand your own attitude about food.  Are you subject to emotional eating?    Learn how to accept compliments. Even if you get embarrassed, just say  thank you.     Make a  list of the things that others like about you and that you like about yourself. Add something new from time to time. Keep this list to look at when you need a lift.  To learn more    The President s [place] on Fitness, Sports & Nutritionwww.fitness.gov    Academy of Nutrition and Dieteticswww.eatright.org    Healthfinderwww.healthfinder.gov    StayWell last reviewed this educational content on 11/1/2020 2000-2021 [employer]. All rights reserved. This information is not intended as a substitute for professional medical care. Always follow your healthcare professional's instructions.           Patient Education     Weight Management: Overcoming Your Barriers    You may have many reasons why you re not ready to lose weight. You may not feel you have the time or the skills. You may be afraid of losing weight and gaining it back again. Well, you can lose weight and can keep the weight off, if you make changes slowly and stick with them. Remember that you may never find the perfect time to lose weight. Decide that the right time to be healthier is now.  Common barriers  Barrier 1: I don t want to deny myself.   Barrier Buster: You don t have to! Moderation is the key:     Watch portion sizes and know when you're eating more than one serving.    Plan to ask for a doggy bag when you eat out.    Have just one.    Read the labels on foods to know what foods may be hiding calories or salt.    Choose lower-fat and lower-calorie versions of your favorites.    Use a small plate instead of a normal-sized plate.   Barrier 2: I lost weight before, but I gained it right back.   Barrier Buster: Make this time different:     List what worked and didn t work last time and what you can try this time.    Choose changes that you are willing to stick with.    Work exercise into your weight-loss plan.    Be realistic about what is possible. Your plan has to fit into your life in a balanced way that works for you.   Barrier  3: I don t have the time to be active.   Barrier Buster: It takes just a few minutes a day!     Be active with a pet or the kids.    Block off activity time in your schedule.    Borrow some time that you usually spend watching TV.    You are too important not to take time to exercise--it is your life!   Feel good about yourself  Do you eat more because you feel bad about yourself, then feel even worse as you gain weight? This is a  vicious cycle.  Breaking this cycle is not easy. You may need group support or counseling. Always remember that you are a valuable person, no matter what size or shape you are.  Get motivated  Do you have a health problem? If so, don t use it as an excuse for not losing weight. Ask your healthcare provider or dietitian about methods to lose weight that are safe for you. For example, even if you have severe arthritis, it may be easier for you to exercise in a pool. Get advice from a .   [employer] last reviewed this educational content on 11/1/2020 2000-2021 [employer]. All rights reserved. This information is not intended as a substitute for professional medical care. Always follow your healthcare professional's instructions.           Patient Education     Weight Management: Healthy Eating  Food is your body s fuel. You can t live without it. The key is to give your body enough nutrients and energy without eating too much. Reading food labels can help you make healthy choices. Also, learn new eating habits to manage your weight. Nutrition labels are being redesigned by the FDA to emphasize the number of calories being consumed as well as the amount of more nutrients, such as added sugars, vitamin D, and potassium.     All the values on the label are based on one serving. The serving size is the average portion. Remember to multiply the values on the label by the number of servings you eat.   Eat less fat  A gram of fat has almost 2.5 times the calories  of a gram of protein or carbohydrates. Try to balance your food choices so that only 20% to 35% of your calories comes from total fat. This means an average of 2  to 3  grams of fat for each 100 calories you eat.  Eat more fiber  High-fiber foods are digested more slowly than low-fiber foods, so you feel full longer. Try to get at least 25 grams of fiber each day for a 2000 calorie diet. Foods high in fiber include:    Vegetables and fruits    Whole-grain or bran breads, pastas, and cereals    Legumes (beans) and peas  As you start to eat more fiber, be sure to drink plenty of water. It will help keep your digestive system working smoothly.  Tips  Do's and don'ts include:     Eat a variety of foods, not just a few favorites.    If you find yourself eating when you re not hungry, ask yourself why. Many of us eat when we re bored, stressed, or just to be polite. Listen to your body. If you re not hungry, get busy doing something else instead of eating.    Eat slower, shooting for 20 to 30 minutes for each meal. It takes 20 minutes for your stomach to tell your brain that it s full. Slow eaters tend to eat less and are still satisfied, while fast eaters may tend to be overeaters.     Pay attention to what you eat. Don t read or watch TV during your meal.  [employer] last reviewed this educational content on 11/1/2020 2000-2021 The [employer]. All rights reserved. This information is not intended as a substitute for professional medical care. Always follow your healthcare professional's instructions.           Patient Education     Weight Management: Exercise and Activity    Studies show that people who exercise are the most likely to lose weight and keep it off. Exercise burns calories. It helps build muscle to make your body stronger. Make exercise an important part of your weight-management plan.   Make activity part of your day  You may not think you have the time to exercise. But you can work activity  into your daily life--you just need to be committed. Take 10 minutes out of your lunch hour to take a walk. Walk to the [employer] to get your paper instead of having it delivered. Make it a habit to take the stairs instead of the elevator. Park in a far away parking spot instead of the closest. You ll be surprised at how fast these little changes can make a difference.   Some people really cannot walk very far, and tire out quickly with exercise. Instead of becoming discouraged, resolve to do what you can do, and work to make that a regular frequent habit.    The benefits of exercise  Exercise offers many benefits including:     Exercise increases how fast your body burns calories (your metabolism).    Regular exercise can increase the amount of muscle in your body. Muscle burns calories faster than fat. The more muscle you have, the more calories you burn.    Exercise gives you energy and curbs your appetite.    Exercise decreases stress and helps you sleep better. Find out for yourself what time of day works best for you.  Make exercise fun  Exercise can be fun. Choose an activity you enjoy. You may even get a friend to do it with you:     Take a resistance-training or aerobics class.    Join a team sport.    Take a dance class.    Walk the dog.    Ride a bike.  If you have health problems, always ask your healthcare provider before you start an exercise program. Have a  help you develop a plan that s safe for you.   [employer] last reviewed this educational content on 11/1/2020 2000-2021 [employer]. All rights reserved. This information is not intended as a substitute for professional medical care. Always follow your healthcare professional's instructions.           Patient Education   Please make an appointment to follow up with your therapist and/or Psychiatric Clinic (phone: [phone]) within 1-3 days.     Return to the ED if you are having worsening thoughts/feelings of  harming yourself or others, or any urgent/life-threatening concerns.     DISCHARGE RESOURCES:    [facility] [phone]     [facility] [phone] (detox), [phone] (outpatient & [facility])      Crisis Intervention: 347.140.5223 or 987-016-7013 (TTY: 348.455.5874).  Call anytime.    Suicide Awareness Voices of Education (SAVE) (www.save.org): 000-872-JCLZ (7420)    National Suicide Prevention Line (www.mentalhealthmn.org): 421-498-EBGP (1079)    National [employer] on Mental Illness (www.mn.delia.org): 667.458.1121 or 661-290-3024.    Guly1glqf: text the word LIFE to 62931 for immediate support and crisis intervention    Mental Health Consumer/Survivor Network of [place] (www.mhcsn.net): 296.518.8619 or 348-436-7585    Mental Health Association of [place] (www.mentalhealth.org): 479.367.4284 or 108-205-1425

## 2022-03-15 NOTE — TELEPHONE ENCOUNTER
DUPLICATE. Message sent to pharmacy.    Yasemin Page RN, BSN  [facility]

## 2022-03-16 ENCOUNTER — TELEPHONE (OUTPATIENT)
Dept: FAMILY MEDICINE | Facility: CLINIC | Age: 42
End: 2022-03-16
Payer: COMMERCIAL

## 2022-03-16 DIAGNOSIS — E66.812 CLASS 2 SEVERE OBESITY DUE TO EXCESS CALORIES WITH SERIOUS COMORBIDITY AND BODY MASS INDEX (BMI) OF 36.0 TO 36.9 IN ADULT (H): Primary | ICD-10-CM

## 2022-03-16 DIAGNOSIS — E66.01 CLASS 2 SEVERE OBESITY DUE TO EXCESS CALORIES WITH SERIOUS COMORBIDITY AND BODY MASS INDEX (BMI) OF 36.0 TO 36.9 IN ADULT (H): Primary | ICD-10-CM

## 2022-03-16 NOTE — TELEPHONE ENCOUNTER
We received an RX today for Xenical 120mg Caps, unfortunately it is no longer available. Carlos 60mg is the same active ingredient but  Not covered under the patients insurance. Do you want to try the Prior Auth for Carlos or send over an alternative?          Thank you,  Hetal Rodgers [facility] [place]  [phone]

## 2022-03-18 ENCOUNTER — TELEPHONE (OUTPATIENT)
Dept: FAMILY MEDICINE | Facility: CLINIC | Age: 42
End: 2022-03-18

## 2022-03-18 ENCOUNTER — TELEPHONE (OUTPATIENT)
Dept: FAMILY MEDICINE | Facility: CLINIC | Age: 42
End: 2022-03-18
Payer: COMMERCIAL

## 2022-03-18 ENCOUNTER — MYC MEDICAL ADVICE (OUTPATIENT)
Dept: FAMILY MEDICINE | Facility: CLINIC | Age: 42
End: 2022-03-18
Payer: COMMERCIAL

## 2022-03-18 DIAGNOSIS — E66.01 CLASS 2 SEVERE OBESITY DUE TO EXCESS CALORIES WITH SERIOUS COMORBIDITY AND BODY MASS INDEX (BMI) OF 36.0 TO 36.9 IN ADULT (H): Primary | ICD-10-CM

## 2022-03-18 DIAGNOSIS — E66.812 CLASS 2 SEVERE OBESITY DUE TO EXCESS CALORIES WITH SERIOUS COMORBIDITY AND BODY MASS INDEX (BMI) OF 36.0 TO 36.9 IN ADULT (H): Primary | ICD-10-CM

## 2022-03-18 NOTE — TELEPHONE ENCOUNTER
EPA DENIAL  PRIOR AUTHORIZATION DENIED    Medication: orlistat (DALI) 60 MG capsule - DENIED    Denial Date:  3/18/2022    Denial Rational: WILL UPDATE ONCE LETTER IS RECEIVED    Appeal Information: WILL UPDATE ONCE LETTER IS RECEIVED

## 2022-03-21 ENCOUNTER — ANCILLARY PROCEDURE (OUTPATIENT)
Dept: BONE DENSITY | Facility: CLINIC | Age: 42
End: 2022-03-21
Attending: INTERNAL MEDICINE
Payer: COMMERCIAL

## 2022-03-21 DIAGNOSIS — M81.0 OSTEOPOROSIS OF MULTIPLE SITES: ICD-10-CM

## 2022-03-21 PROCEDURE — 77081 DXA BONE DENSITY APPENDICULR: CPT | Performed by: INTERNAL MEDICINE

## 2022-03-21 PROCEDURE — 77080 DXA BONE DENSITY AXIAL: CPT | Mod: XU | Performed by: INTERNAL MEDICINE

## 2022-03-21 NOTE — TELEPHONE ENCOUNTER
Called and verified with INS = they did indeed deny the PA request  Even though it was an error on their part - they will not overturn the denial with out an appeal  If provider would like to appeal denial please add LMN in letters tab

## 2022-03-22 NOTE — TELEPHONE ENCOUNTER
Please review denial for orlistat (DALI) 60 MG capsule and place appeal letter if chart if needed.

## 2022-03-23 ENCOUNTER — TELEPHONE (OUTPATIENT)
Dept: FAMILY MEDICINE | Facility: CLINIC | Age: 42
End: 2022-03-23

## 2022-03-23 ENCOUNTER — LAB (OUTPATIENT)
Dept: LAB | Facility: OTHER | Age: 42
End: 2022-03-23
Payer: COMMERCIAL

## 2022-03-23 DIAGNOSIS — M81.0 OSTEOPOROSIS OF MULTIPLE SITES: ICD-10-CM

## 2022-03-23 LAB — DEPRECATED CALCIDIOL+CALCIFEROL SERPL-MC: 59 UG/L (ref 20–75)

## 2022-03-23 PROCEDURE — 82306 VITAMIN D 25 HYDROXY: CPT

## 2022-03-23 PROCEDURE — 36415 COLL VENOUS BLD VENIPUNCTURE: CPT

## 2022-03-23 RX ORDER — PHENTERMINE AND TOPIRAMATE 3.75; 23 MG/1; MG/1
CAPSULE, EXTENDED RELEASE ORAL
Qty: 44 CAPSULE | Refills: 1 | Status: SHIPPED | OUTPATIENT
Start: 2022-03-23 | End: 2022-03-25

## 2022-03-23 NOTE — TELEPHONE ENCOUNTER
Kaylyn Gaviria,     For Mily we were able to get the Xenical in for her. I see you sent the Qysimia, which isn't covered. Is it okay to dispense the Xenical.          Thank you,  Hetal Rodgers [facility] [place]  [phone]

## 2022-03-24 ENCOUNTER — TELEPHONE (OUTPATIENT)
Dept: FAMILY MEDICINE | Facility: CLINIC | Age: 42
End: 2022-03-24
Payer: COMMERCIAL

## 2022-03-24 DIAGNOSIS — G89.29 OTHER CHRONIC PAIN: ICD-10-CM

## 2022-03-24 RX ORDER — TIZANIDINE 2 MG/1
2-6 TABLET ORAL 3 TIMES DAILY PRN
Qty: 270 TABLET | Refills: 4 | Status: SHIPPED | OUTPATIENT
Start: 2022-03-24 | End: 2022-05-27

## 2022-03-24 NOTE — TELEPHONE ENCOUNTER
Mily was not expecting a change in directions on her tizanidine prescription.  Her previous prescriptions were for tizanidine 2mg, with directions of 1-3 tablets 3 times daily as needed.  The last prescription the pharmacy received was for 1 tablet 3 times daily as needed.  If the directions were sent in error, can you please resend a new prescription.  If Mily is suppose to be going down on her Tizanidine dose, can you please reach out to her and discuss the change in therapy as she was not aware you wanted her to change doses.    Please call the pharmacy at [phone] with questions.    Thanks.

## 2022-03-25 ENCOUNTER — TELEPHONE (OUTPATIENT)
Dept: FAMILY MEDICINE | Facility: CLINIC | Age: 42
End: 2022-03-25
Payer: COMMERCIAL

## 2022-03-25 DIAGNOSIS — E66.812 CLASS 2 SEVERE OBESITY DUE TO EXCESS CALORIES WITH SERIOUS COMORBIDITY AND BODY MASS INDEX (BMI) OF 36.0 TO 36.9 IN ADULT (H): Primary | ICD-10-CM

## 2022-03-25 DIAGNOSIS — E66.01 CLASS 2 SEVERE OBESITY DUE TO EXCESS CALORIES WITH SERIOUS COMORBIDITY AND BODY MASS INDEX (BMI) OF 36.0 TO 36.9 IN ADULT (H): Primary | ICD-10-CM

## 2022-03-25 RX ORDER — PHENTERMINE HYDROCHLORIDE 37.5 MG/1
37.5 CAPSULE ORAL EVERY MORNING
Qty: 30 CAPSULE | Refills: 2 | Status: SHIPPED | OUTPATIENT
Start: 2022-03-25 | End: 2022-05-27

## 2022-03-25 RX ORDER — TOPIRAMATE 50 MG/1
25 TABLET, FILM COATED ORAL 2 TIMES DAILY
Qty: 180 TABLET | Refills: 0 | Status: SHIPPED | OUTPATIENT
Start: 2022-03-25 | End: 2022-05-27

## 2022-03-25 NOTE — TELEPHONE ENCOUNTER
QSYMIA is not covered under [employer] insurance.  It is a closed form not covered product, so most likely a prior authorization will not cover this medication either.    Could you please send new prescriptions for phentermine and topiramate as separate prescriptions as these will be covered.     You may call the pharmacy at [phone] with questions.    Thank You.

## 2022-03-25 NOTE — TELEPHONE ENCOUNTER
Prior Authorization Retail Medication Request    Medication/Dose: Phentermine 37.5mg capsules  ICD code (if different than what is on RX):    Previously Tried and Failed:    Rationale:      [facility]  Insurance ID:  [id number]      Pharmacy Information (if different than what is on RX)  Name:  [facility] [place]  Phone:  [phone]

## 2022-03-29 ENCOUNTER — OFFICE VISIT (OUTPATIENT)
Dept: ENDOCRINOLOGY | Facility: CLINIC | Age: 42
End: 2022-03-29
Payer: COMMERCIAL

## 2022-03-29 VITALS
DIASTOLIC BLOOD PRESSURE: 74 MMHG | OXYGEN SATURATION: 99 % | HEART RATE: 69 BPM | WEIGHT: 192 LBS | SYSTOLIC BLOOD PRESSURE: 112 MMHG | BODY MASS INDEX: 35.69 KG/M2

## 2022-03-29 DIAGNOSIS — M81.0 OSTEOPOROSIS OF MULTIPLE SITES: Primary | ICD-10-CM

## 2022-03-29 PROCEDURE — 99215 OFFICE O/P EST HI 40 MIN: CPT | Performed by: INTERNAL MEDICINE

## 2022-03-29 RX ORDER — HEPARIN SODIUM (PORCINE) LOCK FLUSH IV SOLN 100 UNIT/ML 100 UNIT/ML
5 SOLUTION INTRAVENOUS
Status: CANCELLED | OUTPATIENT
Start: 2022-03-29

## 2022-03-29 RX ORDER — HEPARIN SODIUM,PORCINE 10 UNIT/ML
5 VIAL (ML) INTRAVENOUS
Status: CANCELLED | OUTPATIENT
Start: 2022-03-29

## 2022-03-29 RX ORDER — VITAMIN B COMPLEX
1 TABLET ORAL DAILY
Qty: 100 TABLET | Refills: 3 | Status: SHIPPED | OUTPATIENT
Start: 2022-03-29 | End: 2022-08-17

## 2022-03-29 RX ORDER — EPINEPHRINE 1 MG/ML
0.3 INJECTION, SOLUTION INTRAMUSCULAR; SUBCUTANEOUS EVERY 5 MIN PRN
Status: CANCELLED | OUTPATIENT
Start: 2022-03-29

## 2022-03-29 RX ORDER — DIPHENHYDRAMINE HYDROCHLORIDE 50 MG/ML
50 INJECTION INTRAMUSCULAR; INTRAVENOUS
Status: CANCELLED
Start: 2022-03-29

## 2022-03-29 RX ORDER — ALBUTEROL SULFATE 0.83 MG/ML
2.5 SOLUTION RESPIRATORY (INHALATION)
Status: CANCELLED | OUTPATIENT
Start: 2022-03-29

## 2022-03-29 RX ORDER — ZOLEDRONIC ACID 5 MG/100ML
5 INJECTION, SOLUTION INTRAVENOUS ONCE
Status: CANCELLED
Start: 2022-03-29 | End: 2022-03-29

## 2022-03-29 RX ORDER — NALOXONE HYDROCHLORIDE 0.4 MG/ML
0.2 INJECTION, SOLUTION INTRAMUSCULAR; INTRAVENOUS; SUBCUTANEOUS
Status: CANCELLED | OUTPATIENT
Start: 2022-03-29

## 2022-03-29 RX ORDER — METHYLPREDNISOLONE SODIUM SUCCINATE 125 MG/2ML
125 INJECTION, POWDER, LYOPHILIZED, FOR SOLUTION INTRAMUSCULAR; INTRAVENOUS
Status: CANCELLED
Start: 2022-03-29

## 2022-03-29 RX ORDER — ALBUTEROL SULFATE 90 UG/1
1-2 AEROSOL, METERED RESPIRATORY (INHALATION)
Status: CANCELLED
Start: 2022-03-29

## 2022-03-29 NOTE — LETTER
3/29/2022         RE: Mily Grullon  [address]  [place] [address]        Dear Colleague,    Thank you for referring your patient, Mily Grullon, to the [facility]. Please see a copy of my visit note below.    Endocrinology Clinic Visit    Chief Complaint: RECHECK (follow up, discuss results DEXA 3/21 and labs 3/23)     Information obtained from:Patient       Subjective:         HPI: Mily Grullon is a 41 year old female with history of Osteoporosis and fragility fracture who is here for a follow up.     Surgery = total hysterectomy with oophorectomy on 12/4/20. Currently on HRT    Patient has history of endometriosis and per report has underwent right oophorectomy at the age of 22.  She was started on Depo-Provera at the  age of 22.  She has been on this medication until she was taken off of it in January of 2019.   Osteoporosis and fragility fracture presumed to be secondary to Depo-Provera    She had a screening test for celiac disease which was within the normal limits.  She was screened for Cushing's syndrome which was normal.  She was screened for hyper-calciuria again which came back within the normal limits.  Thyroid lab results were within the normal limits.  GFR is within the normal limits and electrolytes including calcium.  He has had elevated PTH level in the setting of low vitamin D level and based on that result she was started on ergocalciferol 50,000 international unit twice weekly and she has been on this medication for the last 6 weeks at least. PTH elevation resolved after correcting low vitamin D level.   2019  FINDINGS:               Lumbar Spine (L1-L4) Z-score:  -0.8               Right Femoral Neck Z-score:  -3.1               Forearm (radius 33%) Z-score:  -0.6  The left femur is not acceptable for evaluation due to previous surgical repair                   Lumbar (L1-L4) BMD: 1.152   Previous: 1.078                          Right Total Hip BMD:  0.740    Previous: 0.751               Forearm (radius 33%) BMD: 0.672   Previous: 0.694    DEXA scan 2018   FINDINGS:               Lumbar Spine (L1-L4) Z-score:  -1.7, degenerative changes present               Right Femoral Neck Z-score:  -3.1               Forearm (radius 33%) Z-score:  -0.3  The left femur is not acceptable for evaluation due to previous surgical repair                                Lumbar (L1-L4) BMD: 1.078               Previous: 1.054                                                 Right Total Hip BMD: 0.751                Previous: 0.718                            Forearm (radius 33%) BMD: 0.694     Previous: NA  DEXA scan from 2014:   Femoral BMD 0.531 Z score -3.5     She has never been on steroids. Multiple fractures over the five years prior to starting FORTEO:  Hip fracture, wrist fracture, multiple ankle fractures, now foot and rib fractures. All of these fractures are fragility fracture without any trauma. She feels just pain. Hip # was following lifting.    After discussing risk benefits of each options and discussing treatment options; patient was started on Forteo for the treatment of osteoporosis and fragility fracture; 4/2019.  Patient reports that she does not have any side effects from the Forteo injection.  She was able to manage a daily injection without any problem. No fractures since she was started on forteo. Completed FORTEO AND RECLAST ADMINISTERED IN 2/2021      She is taking calcium and vitamin D supplements in addition.  She has not had any fractures since we saw her.      Allergies   Allergen Reactions     Ciprofloxacin Dizziness, Shortness Of Breath and Nausea and Vomiting     Paxil [Paroxetine] Anaphylaxis     anaphylaxis     Amitriptyline Hives and Other (See Comments)     shocking feeling up the arm     Amoxicillin Diarrhea     Asa [Dihydroxyaluminum Aminoacetate]      Cipro [Ciprofloxacin]      Dizziness, vomiting, shortness of breath     Ibuprofen  [Aspartame-Ibuprofen]      GI distress       Lyrica      extrematies swell up      Morphine      ithcy     Naproxen      GI distress     Neurontin [Gabapentin]      rash     No Clinical Screening - See Comments Other (See Comments)     Bees causes watery itchy eyes, swelling in mouth, needs epi pen     Phenergan [Promethazine Hcl]      dystonia     Pregabalin Swelling and Unknown     Of all limbs       Prilosec [Omeprazole]      Rash, dizziness     Venlafaxine Other (See Comments)     Intolerance- eye     Gabapentin Rash     Omeprazole Dizziness and Rash       Current Outpatient Medications   Medication Sig Dispense Refill     Vitamin D3 (VITAMIN D3) 25 mcg (1000 units) tablet Take 1 tablet (25 mcg) by mouth daily Five days a week. 100 tablet 3     ALPRAZolam (XANAX) 0.5 MG tablet Take 0.5 mg by mouth daily       butalbital-acetaminophen-caffeine (ESGIC) -40 MG tablet Take 1 tablet by mouth 2 times daily as needed for headaches 60 tablet 3     Calcium-Phosphorus-Vitamin D 250-100-500 MG-MG-UNIT CHEW Take 2 tablets by mouth daily 60 tablet 11     DULoxetine (CYMBALTA) 60 MG capsule Take 60 mg by mouth 2 times daily        estrogen conj (PREMARIN) 1.25 MG tablet Take 1 tablet (1.25 mg) by mouth daily 90 tablet 1     eszopiclone (LUNESTA) 1 MG tablet Take 1 mg by mouth nightly as needed       fluticasone (FLONASE) 50 MCG/ACT nasal spray Spray 1-2 sprays into both nostrils daily Use 1 spray per nostril per day for 2 weeks.  May increase to 2 sprays per nostril per day after. 16 g 2     hydrOXYzine (ATARAX) 25 MG tablet 3 times a day as needed       lamoTRIgine (LAMICTAL) 100 MG tablet Take 150 mg by mouth daily        lidocaine (LIDODERM) 5 % patch Place 1 patch onto the skin every 24 hours To prevent lidocaine toxicity, patient should be patch free for 12 hrs daily. 15 patch 0     lithium ER (LITHOBID) 300 MG CR tablet 3 tablets at bedtime       naloxone (NARCAN) 4 MG/0.1ML nasal spray Spray 1 spray (4 mg) into  one nostril alternating nostrils once as needed for opioid reversal every 2-3 minutes until assistance arrives 0.2 mL 0     nortriptyline (PAMELOR) 50 MG capsule Take 100 mg by mouth At Bedtime       order for DME Equipment being ordered: 4 wheeled walker with wheels and basket. Color to be determined. 1 each 0     oxyCODONE-acetaminophen (PERCOCET) 5-325 MG tablet Take 0.5-1 tablets by mouth 2 times daily as needed for severe pain 30 tablet 0     pantoprazole (PROTONIX) 40 MG EC tablet Take 1 tablet (40 mg) by mouth daily 90 tablet 1     phentermine (ADIPEX-P) 37.5 MG capsule Take 1 capsule (37.5 mg) by mouth every morning Due for follow up in 3 months. 30 capsule 2     propranolol (INDERAL) 10 MG tablet Take 1 tablet (10 mg) by mouth 3 times daily 90 tablet 3     tiZANidine (ZANAFLEX) 2 MG tablet Take 1-3 tablets (2-6 mg) by mouth 3 times daily as needed for muscle spasms 270 tablet 4     topiramate (TOPAMAX) 50 MG tablet Take 0.5 tablets (25 mg) by mouth 2 times daily 180 tablet 0     No family history of osteoporosis.     Former smoker stopped in 2014    Objective:   /74 (BP Location: Left arm, Patient Position: Sitting, Cuff Size: Adult Regular)   Pulse 69   Wt 87.1 kg (192 lb)   LMP 11/16/2020 (Exact Date)   SpO2 99%   BMI 35.69 kg/m    /74 (BP Location: Left arm, Patient Position: Sitting, Cuff Size: Adult Regular)   Pulse 69   Wt 87.1 kg (192 lb)   LMP 11/16/2020 (Exact Date)   SpO2 99%   BMI 35.69 kg/m    Constitutional: Pleasant no acute cardiopulmonary distress.   EYES: anicteric, normal extra-ocular movements, no lid lag or retraction.  Cardiovascular: RRR, S1, S2 normal.   Pulmonary/Chest: CTAB. No wheezing or rales.   Abdominal: +BS. Non tender to palpation.  Stretch marks: none.   Neurological: Alert and oriented.  No tremor and reflexes are symmetrical bilaterally and within the normal limits. Muscle strength 5/5.   Extremities: No edema.  Skin: scars from cutting multiple  involving the upper leg.   Psychological: appropriate mood and affect     In House Labs:     TSH   Date Value Ref Range Status   09/30/2021 2.93 0.40 - 4.00 mU/L Final   08/30/2021 2.04 0.40 - 4.00 mU/L Final   08/03/2021 1.31 0.40 - 4.00 mU/L Final   01/27/2020 0.62 0.40 - 4.00 mU/L Final   11/23/2018 0.68 0.30 - 5.00 uIU/mL Final   09/11/2017 1.36 0.40 - 4.00 mU/L Final   12/10/2013 2.37 0.4 - 5.0 mU/L Final   01/07/2013 0.70 mcU/mL Final     T4 Free   Date Value Ref Range Status   12/10/2013 1.08 0.70 - 1.85 ng/dL Final     Free T4   Date Value Ref Range Status   09/30/2021 0.84 0.76 - 1.46 ng/dL Final   08/30/2021 0.83 0.76 - 1.46 ng/dL Final       Creatinine   Date Value Ref Range Status   09/30/2021 0.82 0.52 - 1.04 mg/dL Final   05/03/2021 0.71 0.52 - 1.04 mg/dL Final     24-hour urine for calcium  Sodium to creatinine ratio was 150 reference range   Sodium 24-hour 184 difference for   Creatinine 24-hour was 1.59 (0.5-2.15  Calcium to creatinine ratio was 68 reference range 30- 275  Calcium 24-hour urine was 108 reference range   Creatinine 24-hour 1.59  Total volume 2700  Urine free cortisol 3.2    TSH was 0.68 and free T4 0.74 tissue transglutaminase IgG and IgA was undetectable creatinine within the normal limits BMD in 2014 was 0.718 at the total hip in 2018 was 0.751.  Z score -3.1 at the neck, trochanteric -2.7 and total -2.4      ENDO CALCIUM LABS-[facility] Latest Ref Rng & Units 9/30/2021   VITAMIN D DEFICIENCY SCREENING 20 - 75 ug/L    ALBUMIN 3.4 - 5.0 g/dL    ALKPHOS 40 - 150 U/L    CALCIUM 8.5 - 10.1 mg/dL 10.1   CREATININE 0.52 - 1.04 mg/dL 0.82     ENDO CALCIUM LABS-[facility] Latest Ref Rng & Units 5/3/2021 2/4/2021   VITAMIN D DEFICIENCY SCREENING 20 - 75 ug/L  47   ALBUMIN 3.4 - 5.0 g/dL  4.0   ALKPHOS 40 - 150 U/L  111   CALCIUM 8.5 - 10.1 mg/dL 8.8 9.3   CREATININE 0.52 - 1.04 mg/dL 0.71 0.74   1/2021  FINDINGS:               Lumbar Spine (L1-L4)      Z-score: -0.5               Right  Femoral Neck          Z-score:  -2.8               Forearm (radius 33%)      Zscore:  -0.8                               Lumbar (L1-L4) BMD: 1.180  Previous: 1.152                                       Right Total Hip BMD: 0.760   Previous: 0.740               Forearm (radius 33%) BMD: 0.657   Previous: 0.672                     Assessment/Treatment Plan:      Osteoporosis with history of recurrent pathological fracture/patient has had multiple fragility fractures prior to starting treatment with Forteo: personally reviewed bone density from 3/2022. Agree with the interpretation.     Risk factors for osteoporosis - Depo-Provera use for 16 years since the age of 22. She was taken off of Depo-Provera in Jan 2019. She has had workup for other possible secondary causes of osteoporosis including Cushing syndrome, celiac disease, hypercalciuria and primary hyperparathyroidism which was unremarkable.      Now s/p completion of FORTEO and RECLAST X1 in 2/2021. No reaction from medications. Follow up bone density noted; stable however risk of hip fracture still high. Discussed conservative management vs continuing RECLAST. Decision was made to proceed with RECLAST infusion.           Plan:    Weight bearing Exercises    Fall prevention     Adequate Calcium and vitamin D intake: for maintenance calcium 1200 mg daily and vitamin D 1000 IU daily.      RECLAST infusion now.      Currently on HRT s/p total hysterectomy and oophorectomy.     Patient Instructions   Mily,      Continue current calcium and vitamin D supplement.   Weight bearing Exercises; lunges are good to strengthen the legs   Fall prevention  Schedule RECLAST infusion     Zoledronic acid: Brand Names: [place]     Reclast;   What is this drug used for?         It is used to prevent or treat soft, brittle bones (osteoporosis).     What are some side effects that I need to call my doctor about right away?     Signs of an allergic reaction, like rash; hives; itching;  red, swollen, blistered, or peeling skin with or without fever; wheezing; tightness in the chest or throat; trouble breathing, swallowing, or talking; unusual hoarseness; or swelling of the mouth, face, lips, tongue, or throat.    Signs of low calcium levels like muscle cramps or spasms, numbness and tingling, or seizures.     This drug may cause jawbone problems. The risk may be higher the longer you take this drug. The risk may be higher if you have cancer, dental problems, dentures that do not fit well, anemia, blood clotting problems, or an infection. The risk may also be higher if you are having dental work, get chemo or radiation, or take other drugs that may cause jawbone problems (like some steroid drugs).  Notify your provider; if you have jaw swelling or pain.     How is this drug best taken?     It is given as an infusion into a vein over a period of time.    Drink lots of noncaffeine liquids due to sedation   take Tylenol 500 mg every 8 hours for three days after the infusion.    Drink at least 2 glasses of liquids a few hours before you get this drug.         Return to clinic in 12 months.    Dallas Flores MD  Staff Endocrinologist    Division of Endocrinology and Diabetes  40 minutes spent on the date of the encounter doing chart review, history and exam, documentation and further activities per the note.      Again, thank you for allowing me to participate in the care of your patient.        Sincerely,        Dallas Flores MD

## 2022-03-29 NOTE — NURSING NOTE
Mily Grullon's goals for this visit include:   Chief Complaint   Patient presents with     RECHECK     follow up, discuss results DEXA 3/21 and labs 3/23     She requests these members of her care team be copied on today's visit information: Yes    PCP: Xenia Gaviria    Referring Provider:  No referring provider defined for this encounter.    /74 (BP Location: Left arm, Patient Position: Sitting, Cuff Size: Adult Regular)   Pulse 69   Wt 87.1 kg (192 lb)   LMP 11/16/2020 (Exact Date)   SpO2 99%   BMI 35.69 kg/m      Do you need any medication refills at today's visit? Yes    Karthikeyan Daniel [facility]  Adult Endocrinology  [facility]

## 2022-03-29 NOTE — PROGRESS NOTES
Endocrinology Clinic Visit    Chief Complaint: RECHECK (follow up, discuss results DEXA 3/21 and labs 3/23)     Information obtained from:Patient       Subjective:         HPI: Mily Grullon is a 41 year old female with history of Osteoporosis and fragility fracture who is here for a follow up.     Surgery = total hysterectomy with oophorectomy on 12/4/20. Currently on HRT    Patient has history of endometriosis and per report has underwent right oophorectomy at the age of 22.  She was started on Depo-Provera at the  age of 22.  She has been on this medication until she was taken off of it in January of 2019.   Osteoporosis and fragility fracture presumed to be secondary to Depo-Provera    She had a screening test for celiac disease which was within the normal limits.  She was screened for Cushing's syndrome which was normal.  She was screened for hyper-calciuria again which came back within the normal limits.  Thyroid lab results were within the normal limits.  GFR is within the normal limits and electrolytes including calcium.  He has had elevated PTH level in the setting of low vitamin D level and based on that result she was started on ergocalciferol 50,000 international unit twice weekly and she has been on this medication for the last 6 weeks at least. PTH elevation resolved after correcting low vitamin D level.   2019  FINDINGS:               Lumbar Spine (L1-L4) Z-score:  -0.8               Right Femoral Neck Z-score:  -3.1               Forearm (radius 33%) Z-score:  -0.6  The left femur is not acceptable for evaluation due to previous surgical repair                   Lumbar (L1-L4) BMD: 1.152   Previous: 1.078                          Right Total Hip BMD: 0.740    Previous: 0.751               Forearm (radius 33%) BMD: 0.672   Previous: 0.694    DEXA scan 2018   FINDINGS:               Lumbar Spine (L1-L4) Z-score:  -1.7, degenerative changes present               Right Femoral Neck Z-score:   -3.1               Forearm (radius 33%) Z-score:  -0.3  The left femur is not acceptable for evaluation due to previous surgical repair                                Lumbar (L1-L4) BMD: 1.078               Previous: 1.054                                                 Right Total Hip BMD: 0.751                Previous: 0.718                            Forearm (radius 33%) BMD: 0.694     Previous: NA  DEXA scan from 2014:   Femoral BMD 0.531 Z score -3.5     She has never been on steroids. Multiple fractures over the five years prior to starting FORTEO:  Hip fracture, wrist fracture, multiple ankle fractures, now foot and rib fractures. All of these fractures are fragility fracture without any trauma. She feels just pain. Hip # was following lifting.    After discussing risk benefits of each options and discussing treatment options; patient was started on Forteo for the treatment of osteoporosis and fragility fracture; 4/2019.  Patient reports that she does not have any side effects from the Forteo injection.  She was able to manage a daily injection without any problem. No fractures since she was started on forteo. Completed FORTEO AND RECLAST ADMINISTERED IN 2/2021      She is taking calcium and vitamin D supplements in addition.  She has not had any fractures since we saw her.      Allergies   Allergen Reactions     Ciprofloxacin Dizziness, Shortness Of Breath and Nausea and Vomiting     Paxil [Paroxetine] Anaphylaxis     anaphylaxis     Amitriptyline Hives and Other (See Comments)     shocking feeling up the arm     Amoxicillin Diarrhea     Asa [Dihydroxyaluminum Aminoacetate]      Cipro [Ciprofloxacin]      Dizziness, vomiting, shortness of breath     Ibuprofen [Aspartame-Ibuprofen]      GI distress       Lyrica      extrematies swell up      Morphine      ithcy     Naproxen      GI distress     Neurontin [Gabapentin]      rash     No Clinical Screening - See Comments Other (See Comments)     Bees causes  watery itchy eyes, swelling in mouth, needs epi pen     Phenergan [Promethazine Hcl]      dystonia     Pregabalin Swelling and Unknown     Of all limbs       Prilosec [Omeprazole]      Rash, dizziness     Venlafaxine Other (See Comments)     Intolerance- eye     Gabapentin Rash     Omeprazole Dizziness and Rash       Current Outpatient Medications   Medication Sig Dispense Refill     Vitamin D3 (VITAMIN D3) 25 mcg (1000 units) tablet Take 1 tablet (25 mcg) by mouth daily Five days a week. 100 tablet 3     ALPRAZolam (XANAX) 0.5 MG tablet Take 0.5 mg by mouth daily       butalbital-acetaminophen-caffeine (ESGIC) -40 MG tablet Take 1 tablet by mouth 2 times daily as needed for headaches 60 tablet 3     Calcium-Phosphorus-Vitamin D 250-100-500 MG-MG-UNIT CHEW Take 2 tablets by mouth daily 60 tablet 11     DULoxetine (CYMBALTA) 60 MG capsule Take 60 mg by mouth 2 times daily        estrogen conj (PREMARIN) 1.25 MG tablet Take 1 tablet (1.25 mg) by mouth daily 90 tablet 1     eszopiclone (LUNESTA) 1 MG tablet Take 1 mg by mouth nightly as needed       fluticasone (FLONASE) 50 MCG/ACT nasal spray Spray 1-2 sprays into both nostrils daily Use 1 spray per nostril per day for 2 weeks.  May increase to 2 sprays per nostril per day after. 16 g 2     hydrOXYzine (ATARAX) 25 MG tablet 3 times a day as needed       lamoTRIgine (LAMICTAL) 100 MG tablet Take 150 mg by mouth daily        lidocaine (LIDODERM) 5 % patch Place 1 patch onto the skin every 24 hours To prevent lidocaine toxicity, patient should be patch free for 12 hrs daily. 15 patch 0     lithium ER (LITHOBID) 300 MG CR tablet 3 tablets at bedtime       naloxone (NARCAN) 4 MG/0.1ML nasal spray Spray 1 spray (4 mg) into one nostril alternating nostrils once as needed for opioid reversal every 2-3 minutes until assistance arrives 0.2 mL 0     nortriptyline (PAMELOR) 50 MG capsule Take 100 mg by mouth At Bedtime       order for DME Equipment being ordered: 4 wheeled  walker with wheels and basket. Color to be determined. 1 each 0     oxyCODONE-acetaminophen (PERCOCET) 5-325 MG tablet Take 0.5-1 tablets by mouth 2 times daily as needed for severe pain 30 tablet 0     pantoprazole (PROTONIX) 40 MG EC tablet Take 1 tablet (40 mg) by mouth daily 90 tablet 1     phentermine (ADIPEX-P) 37.5 MG capsule Take 1 capsule (37.5 mg) by mouth every morning Due for follow up in 3 months. 30 capsule 2     propranolol (INDERAL) 10 MG tablet Take 1 tablet (10 mg) by mouth 3 times daily 90 tablet 3     tiZANidine (ZANAFLEX) 2 MG tablet Take 1-3 tablets (2-6 mg) by mouth 3 times daily as needed for muscle spasms 270 tablet 4     topiramate (TOPAMAX) 50 MG tablet Take 0.5 tablets (25 mg) by mouth 2 times daily 180 tablet 0     No family history of osteoporosis.     Former smoker stopped in 2014    Objective:   /74 (BP Location: Left arm, Patient Position: Sitting, Cuff Size: Adult Regular)   Pulse 69   Wt 87.1 kg (192 lb)   LMP 11/16/2020 (Exact Date)   SpO2 99%   BMI 35.69 kg/m    /74 (BP Location: Left arm, Patient Position: Sitting, Cuff Size: Adult Regular)   Pulse 69   Wt 87.1 kg (192 lb)   LMP 11/16/2020 (Exact Date)   SpO2 99%   BMI 35.69 kg/m    Constitutional: Pleasant no acute cardiopulmonary distress.   EYES: anicteric, normal extra-ocular movements, no lid lag or retraction.  Cardiovascular: RRR, S1, S2 normal.   Pulmonary/Chest: CTAB. No wheezing or rales.   Abdominal: +BS. Non tender to palpation.  Stretch marks: none.   Neurological: Alert and oriented.  No tremor and reflexes are symmetrical bilaterally and within the normal limits. Muscle strength 5/5.   Extremities: No edema.  Skin: scars from cutting multiple involving the upper leg.   Psychological: appropriate mood and affect     In House Labs:     TSH   Date Value Ref Range Status   09/30/2021 2.93 0.40 - 4.00 mU/L Final   08/30/2021 2.04 0.40 - 4.00 mU/L Final   08/03/2021 1.31 0.40 - 4.00 mU/L Final    01/27/2020 0.62 0.40 - 4.00 mU/L Final   11/23/2018 0.68 0.30 - 5.00 uIU/mL Final   09/11/2017 1.36 0.40 - 4.00 mU/L Final   12/10/2013 2.37 0.4 - 5.0 mU/L Final   01/07/2013 0.70 mcU/mL Final     T4 Free   Date Value Ref Range Status   12/10/2013 1.08 0.70 - 1.85 ng/dL Final     Free T4   Date Value Ref Range Status   09/30/2021 0.84 0.76 - 1.46 ng/dL Final   08/30/2021 0.83 0.76 - 1.46 ng/dL Final       Creatinine   Date Value Ref Range Status   09/30/2021 0.82 0.52 - 1.04 mg/dL Final   05/03/2021 0.71 0.52 - 1.04 mg/dL Final     24-hour urine for calcium  Sodium to creatinine ratio was 150 reference range   Sodium 24-hour 184 difference for   Creatinine 24-hour was 1.59 (0.5-2.15  Calcium to creatinine ratio was 68 reference range 30- 275  Calcium 24-hour urine was 108 reference range   Creatinine 24-hour 1.59  Total volume 2700  Urine free cortisol 3.2    TSH was 0.68 and free T4 0.74 tissue transglutaminase IgG and IgA was undetectable creatinine within the normal limits BMD in 2014 was 0.718 at the total hip in 2018 was 0.751.  Z score -3.1 at the neck, trochanteric -2.7 and total -2.4      ENDO CALCIUM LABS-[facility] Latest Ref Rng & Units 9/30/2021   VITAMIN D DEFICIENCY SCREENING 20 - 75 ug/L    ALBUMIN 3.4 - 5.0 g/dL    ALKPHOS 40 - 150 U/L    CALCIUM 8.5 - 10.1 mg/dL 10.1   CREATININE 0.52 - 1.04 mg/dL 0.82     ENDO CALCIUM LABS-[facility] Latest Ref Rng & Units 5/3/2021 2/4/2021   VITAMIN D DEFICIENCY SCREENING 20 - 75 ug/L  47   ALBUMIN 3.4 - 5.0 g/dL  4.0   ALKPHOS 40 - 150 U/L  111   CALCIUM 8.5 - 10.1 mg/dL 8.8 9.3   CREATININE 0.52 - 1.04 mg/dL 0.71 0.74   1/2021  FINDINGS:               Lumbar Spine (L1-L4)      Z-score: -0.5               Right Femoral Neck          Z-score:  -2.8               Forearm (radius 33%)      Zscore:  -0.8                               Lumbar (L1-L4) BMD: 1.180  Previous: 1.152                                       Right Total Hip BMD: 0.760   Previous:  0.740               Forearm (radius 33%) BMD: 0.657   Previous: 0.672                     Assessment/Treatment Plan:      Osteoporosis with history of recurrent pathological fracture/patient has had multiple fragility fractures prior to starting treatment with Forteo: personally reviewed bone density from 3/2022. Agree with the interpretation.     Risk factors for osteoporosis - Depo-Provera use for 16 years since the age of 22. She was taken off of Depo-Provera in Jan 2019. She has had workup for other possible secondary causes of osteoporosis including Cushing syndrome, celiac disease, hypercalciuria and primary hyperparathyroidism which was unremarkable.      Now s/p completion of FORTEO and RECLAST X1 in 2/2021. No reaction from medications. Follow up bone density noted; stable however risk of hip fracture still high. Discussed conservative management vs continuing RECLAST. Decision was made to proceed with RECLAST infusion.           Plan:    Weight bearing Exercises    Fall prevention     Adequate Calcium and vitamin D intake: for maintenance calcium 1200 mg daily and vitamin D 1000 IU daily.      RECLAST infusion now.      Currently on HRT s/p total hysterectomy and oophorectomy.     Patient Instructions   Mily,      Continue current calcium and vitamin D supplement.   Weight bearing Exercises; lunges are good to strengthen the legs   Fall prevention  Schedule RECLAST infusion     Zoledronic acid: Brand Names: US     Reclast;   What is this drug used for?         It is used to prevent or treat soft, brittle bones (osteoporosis).     What are some side effects that I need to call my doctor about right away?     Signs of an allergic reaction, like rash; hives; itching; red, swollen, blistered, or peeling skin with or without fever; wheezing; tightness in the chest or throat; trouble breathing, swallowing, or talking; unusual hoarseness; or swelling of the mouth, face, lips, tongue, or throat.    Signs of low  calcium levels like muscle cramps or spasms, numbness and tingling, or seizures.     This drug may cause jawbone problems. The risk may be higher the longer you take this drug. The risk may be higher if you have cancer, dental problems, dentures that do not fit well, anemia, blood clotting problems, or an infection. The risk may also be higher if you are having dental work, get chemo or radiation, or take other drugs that may cause jawbone problems (like some steroid drugs).  Notify your provider; if you have jaw swelling or pain.     How is this drug best taken?     It is given as an infusion into a vein over a period of time.    Drink lots of noncaffeine liquids due to sedation   take Tylenol 500 mg every 8 hours for three days after the infusion.    Drink at least 2 glasses of liquids a few hours before you get this drug.         Return to clinic in 12 months.    Dallas Flores MD  Staff Endocrinologist    Division of Endocrinology and Diabetes  40 minutes spent on the date of the encounter doing chart review, history and exam, documentation and further activities per the note.

## 2022-03-29 NOTE — PATIENT INSTRUCTIONS
Mily,      Continue current calcium and vitamin D supplement.   Weight bearing Exercises; lunges are good to strengthen the legs   Fall prevention  Schedule RECLAST infusion     Zoledronic acid: Brand Names: US     Reclast;   What is this drug used for?         It is used to prevent or treat soft, brittle bones (osteoporosis).     What are some side effects that I need to call my doctor about right away?     Signs of an allergic reaction, like rash; hives; itching; red, swollen, blistered, or peeling skin with or without fever; wheezing; tightness in the chest or throat; trouble breathing, swallowing, or talking; unusual hoarseness; or swelling of the mouth, face, lips, tongue, or throat.    Signs of low calcium levels like muscle cramps or spasms, numbness and tingling, or seizures.     This drug may cause jawbone problems. The risk may be higher the longer you take this drug. The risk may be higher if you have cancer, dental problems, dentures that do not fit well, anemia, blood clotting problems, or an infection. The risk may also be higher if you are having dental work, get chemo or radiation, or take other drugs that may cause jawbone problems (like some steroid drugs).  Notify your provider; if you have jaw swelling or pain.     How is this drug best taken?     It is given as an infusion into a vein over a period of time.    Drink lots of noncaffeine liquids due to sedation   take Tylenol 500 mg every 8 hours for three days after the infusion.    Drink at least 2 glasses of liquids a few hours before you get this drug.

## 2022-03-30 NOTE — TELEPHONE ENCOUNTER
Central Prior Authorization Team   Phone: [phone]      PA has been submitted and denied. Please refer to encounter on 02/24/2022 if provider would like to appeal. Case ID [id number].      Prior Authorization Not Available    03/30/2022  Medication: Phentermine - NOT AVAILABLE  Insurance Company: [employer] - Phone [phone] Fax [phone]

## 2022-03-31 ENCOUNTER — VIRTUAL VISIT (OUTPATIENT)
Dept: FAMILY MEDICINE | Facility: CLINIC | Age: 42
End: 2022-03-31
Payer: COMMERCIAL

## 2022-03-31 DIAGNOSIS — G89.29 OTHER CHRONIC PAIN: ICD-10-CM

## 2022-03-31 DIAGNOSIS — S72.145S CLOSED NONDISPLACED INTERTROCHANTERIC FRACTURE OF LEFT FEMUR, SEQUELA: ICD-10-CM

## 2022-03-31 PROCEDURE — 99214 OFFICE O/P EST MOD 30 MIN: CPT | Mod: 95 | Performed by: NURSE PRACTITIONER

## 2022-03-31 RX ORDER — OXYCODONE AND ACETAMINOPHEN 5; 325 MG/1; MG/1
.5-1 TABLET ORAL 2 TIMES DAILY PRN
Qty: 60 TABLET | Refills: 0 | Status: SHIPPED | OUTPATIENT
Start: 2022-03-31 | End: 2022-05-27

## 2022-03-31 NOTE — PROGRESS NOTES
Mily is a 41 year old who is being evaluated via a billable telephone visit.      What phone number would you like to be contacted at? [phone]  How would you like to obtain your AVS? MyChart    Assessment & Plan     Other chronic pain    - oxyCODONE-acetaminophen (PERCOCET) 5-325 MG tablet; Take 0.5-1 tablets by mouth 2 times daily as needed for severe pain (use sparingly- to last one month)  - Pain Management Referral; Future    Closed nondisplaced intertrochanteric fracture of left femur, sequela    - oxyCODONE-acetaminophen (PERCOCET) 5-325 MG tablet; Take 0.5-1 tablets by mouth 2 times daily as needed for severe pain (use sparingly- to last one month)  - Pain Management Referral; Future    25 minutes spent on the date of the encounter doing chart review, history and exam, documentation and further activities per the note       See Patient Instructions: follow up as needed. Controlled medications require some type of follow up visit every 3 months.     Return in about 3 months (around 6/30/2022), or if symptoms worsen or fail to improve.    Xenia Gaviria, J CARLOS  [facility] EFFIE Minor is a 41 year old who presents for the following health issues     HPI     Medication Followup of phentermine and topamax    Taking Medication as prescribed: yes    Side Effects:  None    Medication Helping Symptoms:  Yes    Would like to know which she should be taking for weight management     Would also like to discuss her ongoing hip pain.  Had dexa scan which showed worsening osteoporosis.  She has ongoing hip and back pain.  Discussed weight loss would most likely help her pain.  We have been trying to get weight loss meds covered for her. Pharmacist told me that phentermine and topamax would be covered for her. We can increase her pain meds to up to 2 pills daily; however if she needs it increased further we will need recommendations from pain management.  Pt reports she would have to see  Dr. Nicholas again.  Discused that unfortunately Dr. Nicholas passed away, so she should get scheduled with pain management incase we need their assistance now, as the wait could be long. Pt in agreement. She thinks having 2 pills daily will help her a lot. Discussed using them sparingly so that they work when needed.       Review of Systems   Constitutional, HEENT, cardiovascular, pulmonary, GI, , musculoskeletal, neuro, skin, endocrine and psych systems are negative, except as otherwise noted.      Objective           Vitals:  No vitals were obtained today due to virtual visit.    Physical Exam   healthy, alert and no distress  PSYCH: Alert and oriented times 3; coherent speech, normal   rate and volume, able to articulate logical thoughts, able   to abstract reason, no tangential thoughts, no hallucinations   or delusions  Her affect is normal  RESP: No cough, no audible wheezing, able to talk in full sentences  Remainder of exam unable to be completed due to telephone visits    See orders        Phone call duration: 15 minutes

## 2022-04-01 ENCOUNTER — TRANSFERRED RECORDS (OUTPATIENT)
Dept: HEALTH INFORMATION MANAGEMENT | Facility: CLINIC | Age: 42
End: 2022-04-01
Payer: COMMERCIAL

## 2022-04-11 ENCOUNTER — LAB (OUTPATIENT)
Dept: LAB | Facility: CLINIC | Age: 42
End: 2022-04-11

## 2022-04-11 ENCOUNTER — INFUSION THERAPY VISIT (OUTPATIENT)
Dept: INFUSION THERAPY | Facility: CLINIC | Age: 42
End: 2022-04-11
Payer: COMMERCIAL

## 2022-04-11 VITALS
OXYGEN SATURATION: 98 % | TEMPERATURE: 98 F | WEIGHT: 189.7 LBS | SYSTOLIC BLOOD PRESSURE: 106 MMHG | BODY MASS INDEX: 35.26 KG/M2 | DIASTOLIC BLOOD PRESSURE: 74 MMHG | HEART RATE: 80 BPM

## 2022-04-11 DIAGNOSIS — M81.0 OSTEOPOROSIS OF MULTIPLE SITES: Primary | ICD-10-CM

## 2022-04-11 LAB
CALCIUM SERPL-MCNC: 10 MG/DL (ref 8.5–10.1)
CREAT SERPL-MCNC: 0.97 MG/DL (ref 0.52–1.04)
GFR SERPL CREATININE-BSD FRML MDRD: 74 ML/MIN/1.73M2

## 2022-04-11 PROCEDURE — 36415 COLL VENOUS BLD VENIPUNCTURE: CPT | Performed by: INTERNAL MEDICINE

## 2022-04-11 PROCEDURE — 82565 ASSAY OF CREATININE: CPT | Performed by: INTERNAL MEDICINE

## 2022-04-11 PROCEDURE — 82310 ASSAY OF CALCIUM: CPT | Performed by: INTERNAL MEDICINE

## 2022-04-11 PROCEDURE — 99207 PR NO CHARGE LOS: CPT

## 2022-04-11 PROCEDURE — 96365 THER/PROPH/DIAG IV INF INIT: CPT | Performed by: NURSE PRACTITIONER

## 2022-04-11 RX ORDER — EPINEPHRINE 1 MG/ML
0.3 INJECTION, SOLUTION, CONCENTRATE INTRAVENOUS EVERY 5 MIN PRN
Status: CANCELLED | OUTPATIENT
Start: 2022-04-11

## 2022-04-11 RX ORDER — ALBUTEROL SULFATE 90 UG/1
1-2 AEROSOL, METERED RESPIRATORY (INHALATION)
Status: CANCELLED
Start: 2022-04-11

## 2022-04-11 RX ORDER — HEPARIN SODIUM (PORCINE) LOCK FLUSH IV SOLN 100 UNIT/ML 100 UNIT/ML
5 SOLUTION INTRAVENOUS
Status: CANCELLED | OUTPATIENT
Start: 2022-04-11

## 2022-04-11 RX ORDER — ALBUTEROL SULFATE 0.83 MG/ML
2.5 SOLUTION RESPIRATORY (INHALATION)
Status: CANCELLED | OUTPATIENT
Start: 2022-04-11

## 2022-04-11 RX ORDER — HEPARIN SODIUM,PORCINE 10 UNIT/ML
5 VIAL (ML) INTRAVENOUS
Status: CANCELLED | OUTPATIENT
Start: 2022-04-11

## 2022-04-11 RX ORDER — NALOXONE HYDROCHLORIDE 0.4 MG/ML
0.2 INJECTION, SOLUTION INTRAMUSCULAR; INTRAVENOUS; SUBCUTANEOUS
Status: CANCELLED | OUTPATIENT
Start: 2022-04-11

## 2022-04-11 RX ORDER — EPINEPHRINE 1 MG/ML
0.3 INJECTION, SOLUTION INTRAMUSCULAR; SUBCUTANEOUS EVERY 5 MIN PRN
Status: CANCELLED | OUTPATIENT
Start: 2022-04-11

## 2022-04-11 RX ORDER — ZOLEDRONIC ACID 5 MG/100ML
5 INJECTION, SOLUTION INTRAVENOUS ONCE
Status: COMPLETED | OUTPATIENT
Start: 2022-04-11 | End: 2022-04-11

## 2022-04-11 RX ORDER — ZOLEDRONIC ACID 5 MG/100ML
5 INJECTION, SOLUTION INTRAVENOUS ONCE
Status: CANCELLED
Start: 2022-04-11 | End: 2022-04-11

## 2022-04-11 RX ORDER — METHYLPREDNISOLONE SODIUM SUCCINATE 125 MG/2ML
125 INJECTION, POWDER, LYOPHILIZED, FOR SOLUTION INTRAMUSCULAR; INTRAVENOUS
Status: CANCELLED
Start: 2022-04-11

## 2022-04-11 RX ORDER — DIPHENHYDRAMINE HYDROCHLORIDE 50 MG/ML
50 INJECTION INTRAMUSCULAR; INTRAVENOUS
Status: CANCELLED
Start: 2022-04-11

## 2022-04-11 RX ADMIN — ZOLEDRONIC ACID 5 MG: 0.05 INJECTION, SOLUTION INTRAVENOUS at 16:03

## 2022-04-11 RX ADMIN — Medication 250 ML: at 16:03

## 2022-04-11 ASSESSMENT — PAIN SCALES - GENERAL: PAINLEVEL: MODERATE PAIN (4)

## 2022-04-11 NOTE — PROGRESS NOTES
Infusion Nursing Note:  Mily Grullon presents today for Reclast.    Patient seen by provider today: No   present during visit today: Not Applicable.    Note: Pt c/o R hip pain, denies new medical concerns, see flow sheet for assessment.      Intravenous Access:  Peripheral IV placed.    Treatment Conditions:  Lab Results   Component Value Date     09/30/2021    POTASSIUM 4.3 09/30/2021    CR 0.97 04/11/2022    ROWAN 10.0 04/11/2022    BILITOTAL 0.2 07/29/2020    ALBUMIN 4.0 02/04/2021    ALT 48 07/29/2020    AST 18 07/29/2020     Results reviewed, labs MET treatment parameters, ok to proceed with treatment.      Post Infusion Assessment:  Patient tolerated infusion without incident.  Blood return noted pre and post infusion.  Site patent and intact, free from redness, edema or discomfort.  No evidence of extravasations.  Access discontinued per protocol.       Discharge Plan:   Patient discharged in stable condition accompanied by: self.  Departure Mode: Ambulatory.  Pt will RTC in 1 year for Reclast.      Raffi Fung RN

## 2022-04-18 ENCOUNTER — TELEPHONE (OUTPATIENT)
Dept: FAMILY MEDICINE | Facility: CLINIC | Age: 42
End: 2022-04-18
Payer: MEDICARE

## 2022-04-18 NOTE — TELEPHONE ENCOUNTER
Prior Authorization Retail Medication Request    Medication/Dose: Premarin  ICD code (if different than what is on RX):    Previously Tried and Failed:    Rationale:  CLOSED FORM.  FORM REVIEW CALL [phone]    Insurance Name:  [facility]  Insurance ID:  [id number]      Pharmacy Information (if different than what is on RX)  Name:  [facility]  Phone:  [phone]          Thank you,  Hetal Rodgers [facility] [place]  [phone]

## 2022-04-21 DIAGNOSIS — G44.219 EPISODIC TENSION-TYPE HEADACHE, NOT INTRACTABLE: ICD-10-CM

## 2022-04-21 NOTE — TELEPHONE ENCOUNTER
Prior Authorization Approval    Authorization Effective Date: 3/22/2022  Authorization Expiration Date: 4/21/2023  Medication: Premarin  Approved Dose/Quantity:   Reference #:     Insurance Company: [employer] - Phone [phone] Fax [phone]  Expected CoPay:       CoPay Card Available:      Foundation Assistance Needed:    Which Pharmacy is filling the prescription (Not needed for infusion/clinic administered): [facility] - [id number] [facility] [place]  Pharmacy Notified: Yes  Patient Notified: No    **Instructed pharmacy to notify patient when script is ready to /ship.**

## 2022-04-21 NOTE — TELEPHONE ENCOUNTER
Central Prior Authorization Team   Phone: [phone]      PA Initiation    Medication: Premarin  Insurance Company: [employer] - Phone [phone] Fax [phone]  Pharmacy Filling the Rx: [place] [facility] - [id number] [facility] [place]  Filling Pharmacy Phone: [phone]  Filling Pharmacy Fax:    Start Date: 4/21/2022

## 2022-04-22 NOTE — TELEPHONE ENCOUNTER
Routing refill request to provider for review/approval because:  Drug not on the [facility] refill protocol   Karli Erickson RN

## 2022-04-25 RX ORDER — BUTALBITAL, ACETAMINOPHEN AND CAFFEINE 50; 325; 40 MG/1; MG/1; MG/1
1 TABLET ORAL 2 TIMES DAILY PRN
Qty: 60 TABLET | Refills: 3 | Status: SHIPPED | OUTPATIENT
Start: 2022-04-25 | End: 2022-07-12

## 2022-04-25 NOTE — TELEPHONE ENCOUNTER
Per chart review and , appears patient has been taking nearly daily.  We will hold for PCP to address when she returns    Yue MILES

## 2022-05-19 ENCOUNTER — APPOINTMENT (OUTPATIENT)
Dept: URGENT CARE | Facility: CLINIC | Age: 42
End: 2022-05-19
Payer: COMMERCIAL

## 2022-05-25 ENCOUNTER — TELEPHONE (OUTPATIENT)
Dept: FAMILY MEDICINE | Facility: CLINIC | Age: 42
End: 2022-05-25
Payer: COMMERCIAL

## 2022-05-25 NOTE — TELEPHONE ENCOUNTER
Reason for Call:  Other     Detailed comments: Patient would like to be seen sooner then her 06/16/2022 appointment she is experiencing some swelling also    Phone Number Patient can be reached at: Home number on file [phone] (home)    Best Time:     Can we leave a detailed message on this number? YES    Call taken on 5/25/2022 at 10:49 AM by Donna Caballero

## 2022-05-26 NOTE — PROGRESS NOTES
"   SUBJECTIVE:   CC: Mily Grullon is an 41 year old woman who presents for preventive health visit.       Patient has been advised of split billing requirements and indicates understanding: {Yes and No:860389}  HPI  Patient would still like to discuss the following concern(s):  1. ***  2. ***  3. ***      Today's PHQ-2 Score:   PHQ-2 (  [employer]) 3/2/2022   Q1: Little interest or pleasure in doing things 3   Q2: Feeling down, depressed or hopeless 3   PHQ-2 Score 6   PHQ-2 Total Score (12-17 Years)- Positive if 3 or more points; Administer PHQ-A if positive -   Q1: Little interest or pleasure in doing things Nearly every day   Q2: Feeling down, depressed or hopeless Nearly every day   PHQ-2 Score 6       Abuse: Current or Past (Physical, Sexual or Emotional) - { :234169}  Do you feel safe in your environment? { :262378}        Social History     Tobacco Use     Smoking status: Former Smoker     Packs/day: 0.00     Years: 18.00     Pack years: 0.00     Types: Cigarettes     Quit date: 2014     Years since quittin.3     Smokeless tobacco: Never Used     Tobacco comment: Started in probably  stopped in    Substance Use Topics     Alcohol use: Yes     Comment: Once in a great while like a drink on a holiday. Something l         Alcohol Use 2016   Prescreen: >3 drinks/day or >7 drinks/week? The patient does not drink >3 drinks per day nor >7 drinks per week.       Reviewed orders with patient.  Reviewed health maintenance and updated orders accordingly - { :545860::\"Yes\"}  {Chronicprobdata (optional):014271}    Breast Cancer Screening:    FHS-7: No flowsheet data found.  {If any of the questions to the BCRA (FHS-7) are answered yes, consider ordering referral for genetic counseling (Optional) :676768::\"click delete button to remove this line now\"}  {AMB Mammogram Decision Support (Optional) :600476}  Pertinent mammograms are reviewed under the imaging tab.    History of abnormal Pap smear: { " ":477926}  PAP / HPV Latest Ref Rng & Units 8/30/2016   PAP (Historical) - NIL   HPV16 NEG Negative   HPV18 NEG Negative   HRHPV NEG Negative     Reviewed and updated as needed this visit by clinical staff                    Reviewed and updated as needed this visit by Provider                   {HISTORY OPTIONS (Optional):939923}    Review of Systems  {FEMALE ROS (Optional):344268}     OBJECTIVE:   LMP 11/16/2020 (Exact Date)   Physical Exam  {Exam Choices (Optional):759788}    {Diagnostic Test Results (Optional):270730::\"Diagnostic Test Results:\",\"Labs reviewed in Epic\"}    ASSESSMENT/PLAN:   {Diag Picklist:822642}    {Patient advised of split billing (Optional):788240}    COUNSELING:  {FEMALE COUNSELING MESSAGES:511416::\"Reviewed preventive health counseling, as reflected in patient instructions\"}    Estimated body mass index is 35.26 kg/m  as calculated from the following:    Height as of 3/15/22: 1.562 m (5' 1.5\").    Weight as of 4/11/22: 86 kg (189 lb 11.2 oz).    {Weight Management Plan (ACO) Complete if BMI is abnormal-  Ages 18-64  BMI >24.9.  Age 65+ with BMI <23 or >30 (Optional):950648}    She reports that she quit smoking about 8 years ago. Her smoking use included cigarettes. She smoked 0.00 packs per day for 18.00 years. She has never used smokeless tobacco.      Counseling Resources:  ATP IV Guidelines  Pooled Cohorts Equation Calculator  Breast Cancer Risk Calculator  BRCA-Related Cancer Risk Assessment: FHS-7 Tool  FRAX Risk Assessment  ICSI Preventive Guidelines  Dietary Guidelines for Americans, 2010  USDA's MyPlate  ASA Prophylaxis  Lung CA Screening    Xenia Gaviria NP  [facility] [place]  "

## 2022-05-27 ENCOUNTER — OFFICE VISIT (OUTPATIENT)
Dept: FAMILY MEDICINE | Facility: CLINIC | Age: 42
End: 2022-05-27
Payer: COMMERCIAL

## 2022-05-27 VITALS
TEMPERATURE: 97.8 F | DIASTOLIC BLOOD PRESSURE: 83 MMHG | HEIGHT: 62 IN | OXYGEN SATURATION: 97 % | HEART RATE: 73 BPM | WEIGHT: 182.4 LBS | SYSTOLIC BLOOD PRESSURE: 136 MMHG | BODY MASS INDEX: 33.57 KG/M2 | RESPIRATION RATE: 20 BRPM

## 2022-05-27 DIAGNOSIS — G47.00 PERSISTENT INSOMNIA: ICD-10-CM

## 2022-05-27 DIAGNOSIS — R53.83 OTHER FATIGUE: ICD-10-CM

## 2022-05-27 DIAGNOSIS — F41.9 ANXIETY: ICD-10-CM

## 2022-05-27 DIAGNOSIS — F33.2 SEVERE EPISODE OF RECURRENT MAJOR DEPRESSIVE DISORDER, WITHOUT PSYCHOTIC FEATURES (H): ICD-10-CM

## 2022-05-27 DIAGNOSIS — Z59.00 HOUSING LACK: ICD-10-CM

## 2022-05-27 DIAGNOSIS — R45.89 AT RISK FOR SELF HARM: ICD-10-CM

## 2022-05-27 DIAGNOSIS — S72.145S CLOSED NONDISPLACED INTERTROCHANTERIC FRACTURE OF LEFT FEMUR, SEQUELA: ICD-10-CM

## 2022-05-27 DIAGNOSIS — R63.5 WEIGHT GAIN: ICD-10-CM

## 2022-05-27 DIAGNOSIS — R42 VERTIGO: ICD-10-CM

## 2022-05-27 DIAGNOSIS — Z79.899 ENCOUNTER FOR LONG-TERM (CURRENT) USE OF MEDICATIONS: ICD-10-CM

## 2022-05-27 DIAGNOSIS — Z09 HOSPITAL DISCHARGE FOLLOW-UP: ICD-10-CM

## 2022-05-27 DIAGNOSIS — F41.0 PANIC ATTACK: ICD-10-CM

## 2022-05-27 DIAGNOSIS — G89.29 OTHER CHRONIC PAIN: ICD-10-CM

## 2022-05-27 DIAGNOSIS — E66.01 CLASS 2 SEVERE OBESITY DUE TO EXCESS CALORIES WITH SERIOUS COMORBIDITY AND BODY MASS INDEX (BMI) OF 36.0 TO 36.9 IN ADULT (H): ICD-10-CM

## 2022-05-27 DIAGNOSIS — E66.812 CLASS 2 SEVERE OBESITY DUE TO EXCESS CALORIES WITH SERIOUS COMORBIDITY AND BODY MASS INDEX (BMI) OF 36.0 TO 36.9 IN ADULT (H): ICD-10-CM

## 2022-05-27 DIAGNOSIS — M79.89 FOOT SWELLING: Primary | ICD-10-CM

## 2022-05-27 LAB
CREAT UR-MCNC: 26 MG/DL
D DIMER PPP FEU-MCNC: 0.32 UG/ML FEU (ref 0–0.5)
NT-PROBNP SERPL-MCNC: 111 PG/ML (ref 0–450)

## 2022-05-27 PROCEDURE — 83880 ASSAY OF NATRIURETIC PEPTIDE: CPT | Performed by: NURSE PRACTITIONER

## 2022-05-27 PROCEDURE — 85379 FIBRIN DEGRADATION QUANT: CPT | Performed by: NURSE PRACTITIONER

## 2022-05-27 PROCEDURE — 99214 OFFICE O/P EST MOD 30 MIN: CPT | Performed by: NURSE PRACTITIONER

## 2022-05-27 PROCEDURE — 80307 DRUG TEST PRSMV CHEM ANLYZR: CPT | Performed by: NURSE PRACTITIONER

## 2022-05-27 PROCEDURE — 36415 COLL VENOUS BLD VENIPUNCTURE: CPT | Performed by: NURSE PRACTITIONER

## 2022-05-27 RX ORDER — ACETAMINOPHEN 325 MG/1
650 TABLET ORAL EVERY 4 HOURS PRN
COMMUNITY
Start: 2022-05-18 | End: 2023-03-01

## 2022-05-27 RX ORDER — TOPIRAMATE 50 MG/1
25 TABLET, FILM COATED ORAL 2 TIMES DAILY
Qty: 180 TABLET | Refills: 0 | Status: SHIPPED | OUTPATIENT
Start: 2022-05-27 | End: 2022-07-14

## 2022-05-27 RX ORDER — TIZANIDINE 2 MG/1
2-6 TABLET ORAL 3 TIMES DAILY PRN
Qty: 270 TABLET | Refills: 4 | Status: SHIPPED | OUTPATIENT
Start: 2022-05-27 | End: 2022-07-12

## 2022-05-27 RX ORDER — HYDROCHLOROTHIAZIDE 12.5 MG/1
12.5 TABLET ORAL DAILY
Qty: 30 TABLET | Refills: 0 | Status: SHIPPED | OUTPATIENT
Start: 2022-05-27 | End: 2022-07-12

## 2022-05-27 RX ORDER — OXYCODONE AND ACETAMINOPHEN 5; 325 MG/1; MG/1
.5-1 TABLET ORAL 2 TIMES DAILY PRN
Qty: 60 TABLET | Refills: 0 | Status: SHIPPED | OUTPATIENT
Start: 2022-05-27 | End: 2022-07-14

## 2022-05-27 RX ORDER — MIRTAZAPINE 15 MG/1
15 TABLET, FILM COATED ORAL
COMMUNITY
Start: 2022-05-18 | End: 2022-07-12

## 2022-05-27 RX ORDER — NORTRIPTYLINE HYDROCHLORIDE 50 MG/1
100 CAPSULE ORAL AT BEDTIME
Qty: 30 CAPSULE | Refills: 2 | Status: SHIPPED | OUTPATIENT
Start: 2022-05-27 | End: 2022-07-12

## 2022-05-27 RX ORDER — PHENTERMINE HYDROCHLORIDE 37.5 MG/1
37.5 CAPSULE ORAL EVERY MORNING
Qty: 30 CAPSULE | Refills: 2 | Status: SHIPPED | OUTPATIENT
Start: 2022-05-27 | End: 2022-07-14

## 2022-05-27 RX ORDER — ORLISTAT 120 MG/1
1 CAPSULE ORAL 3 TIMES DAILY
COMMUNITY
Start: 2022-05-18 | End: 2022-07-14

## 2022-05-27 RX ORDER — ESZOPICLONE 1 MG/1
1 TABLET, FILM COATED ORAL
Qty: 30 TABLET | Refills: 2 | Status: SHIPPED | OUTPATIENT
Start: 2022-05-27 | End: 2022-07-12

## 2022-05-27 RX ORDER — HYDROXYZINE HYDROCHLORIDE 10 MG/1
10 TABLET, FILM COATED ORAL 3 TIMES DAILY PRN
Qty: 90 TABLET | Refills: 3 | Status: SHIPPED | OUTPATIENT
Start: 2022-05-27 | End: 2022-12-13

## 2022-05-27 RX ORDER — ALPRAZOLAM 0.5 MG
0.5 TABLET ORAL DAILY
Qty: 30 TABLET | Refills: 2 | Status: SHIPPED | OUTPATIENT
Start: 2022-05-27 | End: 2022-07-12

## 2022-05-27 SDOH — ECONOMIC STABILITY - HOUSING INSECURITY: HOMELESSNESS UNSPECIFIED: Z59.00

## 2022-05-27 ASSESSMENT — ANXIETY QUESTIONNAIRES
5. BEING SO RESTLESS THAT IT IS HARD TO SIT STILL: MORE THAN HALF THE DAYS
1. FEELING NERVOUS, ANXIOUS, OR ON EDGE: NEARLY EVERY DAY
GAD7 TOTAL SCORE: 19
GAD7 TOTAL SCORE: 19
2. NOT BEING ABLE TO STOP OR CONTROL WORRYING: NEARLY EVERY DAY
7. FEELING AFRAID AS IF SOMETHING AWFUL MIGHT HAPPEN: NEARLY EVERY DAY
7. FEELING AFRAID AS IF SOMETHING AWFUL MIGHT HAPPEN: NEARLY EVERY DAY
8. IF YOU CHECKED OFF ANY PROBLEMS, HOW DIFFICULT HAVE THESE MADE IT FOR YOU TO DO YOUR WORK, TAKE CARE OF THINGS AT HOME, OR GET ALONG WITH OTHER PEOPLE?: EXTREMELY DIFFICULT
GAD7 TOTAL SCORE: 19
3. WORRYING TOO MUCH ABOUT DIFFERENT THINGS: NEARLY EVERY DAY
4. TROUBLE RELAXING: MORE THAN HALF THE DAYS
6. BECOMING EASILY ANNOYED OR IRRITABLE: NEARLY EVERY DAY

## 2022-05-27 ASSESSMENT — PATIENT HEALTH QUESTIONNAIRE - PHQ9
SUM OF ALL RESPONSES TO PHQ QUESTIONS 1-9: 24
SUM OF ALL RESPONSES TO PHQ QUESTIONS 1-9: 24
10. IF YOU CHECKED OFF ANY PROBLEMS, HOW DIFFICULT HAVE THESE PROBLEMS MADE IT FOR YOU TO DO YOUR WORK, TAKE CARE OF THINGS AT HOME, OR GET ALONG WITH OTHER PEOPLE: VERY DIFFICULT

## 2022-05-27 ASSESSMENT — PAIN SCALES - GENERAL: PAINLEVEL: NO PAIN (0)

## 2022-05-27 NOTE — LETTER
Opioid / Opioid Plus Controlled Substance Agreement    This is an agreement between you and your provider about the safe and appropriate use of controlled substance/opioids prescribed by your care team. Controlled substances are medicines that can cause physical and mental dependence (abuse).    There are strict laws about having and using these medicines. We here at [facility] are committing to working with you in your efforts to get better. To support you in this work, we ll help you schedule regular office appointments for medicine refills. If we must cancel or change your appointment for any reason, we ll make sure you have enough medicine to last until your next appointment.     As a Provider, I will:    Listen carefully to your concerns and treat you with respect.     Recommend a treatment plan that I believe is in your best interest. This plan may involve therapies other than opioid pain medication.     Talk with you often about the possible benefits, and the risk of harm of any medicine that we prescribe for you.     Provide a plan on how to taper (discontinue or go off) using this medicine if the decision is made to stop its use.    As a Patient, I understand that opioid(s):     Are a controlled substance prescribed by my care team to help me function or work and manage my condition(s).     Are strong medicines and can cause serious side effects such as:    Drowsiness, which can seriously affect my driving ability    A lower breathing rate, enough to cause death    Harm to my thinking ability     Depression     Abuse of and addiction to this medicine    Need to be taken exactly as prescribed. Combining opioids with certain medicines or chemicals (such as illegal drugs, sedatives, sleeping pills, and benzodiazepines) can be dangerous or even fatal. If I stop opioids suddenly, I may have severe withdrawal symptoms.    Do not work for all types of pain nor for all patients. If they re not helpful, I may  be asked to stop them.    I am also being prescribed a benzodiazepine (tranquilizer) controlled substance: I understand this type of medicine is sedating and can increase the risk of death when taken together with opioids. I have talked to my care team about having prescription Narcan available for reversing the opioid medicine and have been instructed in its use. I will be very careful to take my medicines only as directed  I am also being prescribed a stimulant controlled substance to help manage symptoms of attention deficit, appetite suppression, and/or fatigue.    The risks, benefits and side effects of these medicine(s) were explained to me. I agree that:  1. I will take part in other treatments as advised by my care team. This may be psychiatry or counseling, physical therapy, behavioral therapy, group treatment or a referral to a specialist.     2. I will keep all my appointments. I understand that this is part of the monitoring of opioids. My care team may require an office visit for EVERY opioid/controlled substance refill. If I miss appointments or don t follow instructions, my care team may stop my medicine.    3. I will take my medicines as prescribed. I will not change the dose or schedule unless my care team tells me to. There will be no refills if I run out early.     4. I may be asked to come to the clinic and complete a urine drug test or complete a pill count at any time. If I don t give a urine sample or participate in a pill count, the care team may stop my medicine.    5. I will only receive prescriptions from this clinic for chronic pain. If I am treated by another provider for acute pain issues, I will tell them that I am taking opioid pain medication for chronic pain and that I have a treatment agreement with this provider. I will inform my [facility] care team within one business day if I am given a prescription for any pain medication by another healthcare provider. My [facility] care team can contact other providers and pharmacists about my use of any medicines.    6. It is up to me to make sure that I don t run out of my medicines on weekends or holidays. If my care team is willing to refill my opioid prescription without a visit, I must request refills only during office hours. Refills may take up to 3 business days to process. I will use one pharmacy to fill all my opioid and other controlled substance prescriptions. I will notify the clinic about any changes to my insurance or medication availability.    7. I am responsible for my prescriptions. If the medicine/prescription is lost, stolen or destroyed, it will not be replaced. I also agree not to share controlled substance medicines with anyone.    8. I am aware I should not use any illegal or recreational drugs. I agree not to drink alcohol unless my care team says I can.       9. If I enroll in the [place] Medical Cannabis program, I will tell my care team prior to my next refill.     10. I will tell my care team right away if I become pregnant, have a new medical problem treated outside of my regular clinic, or have a change in my medications.    11. I understand that this medicine can affect my thinking, judgment and reaction time. Alcohol and drugs affect the brain and body, which can affect the safety of my driving. Being under the influence of alcohol or drugs can affect my decision-making, behaviors, personal safety, and the safety of others. Driving while impaired (DWI) can occur if a person is driving, operating, or in physical control of a car, motorcycle, boat, snowmobile, ATV, motorbike, off-road vehicle, or any other motor vehicle ([place] Statute 169A.20). I understand the risk if I choose to drive or operate any vehicle or machinery.    I understand that if I do not follow any of the conditions above, my prescriptions or treatment may be stopped or changed.          Opioids  What You Need to Know    What are  opioids?   Opioids are pain medicines that must be prescribed by a doctor. They are also known as narcotics.     Examples are:   1. morphine (MS Contin, Patrizia)  2. oxycodone (Oxycontin)  3. oxycodone and acetaminophen (Percocet)  4. hydrocodone and acetaminophen (Vicodin, Norco)   5. fentanyl patch (Duragesic)   6. hydromorphone (Dilaudid)   7. methadone  8. codeine (Tylenol #3)     What do opioids do well?   Opioids are best for severe short-term pain such as after a surgery or injury. They may work well for cancer pain. They may help some people with long-lasting (chronic) pain.     What do opioids NOT do well?   Opioids never get rid of pain entirely, and they don t work well for most patients with chronic pain. Opioids don t reduce swelling, one of the causes of pain.                                    Other ways to manage chronic pain and improve function include:       Treat the health problem that may be causing pain    Anti-inflammation medicines, which reduce swelling and tenderness, such as ibuprofen (Advil, Motrin) or naproxen (Aleve)    Acetaminophen (Tylenol)    Antidepressants and anti-seizure medicines, especially for nerve pain    Topical treatments such as patches or creams    Injections or nerve blocks    Chiropractic or osteopathic treatment    Acupuncture, massage, deep breathing, meditation, visual imagery, aromatherapy    Use heat or ice at the pain site    Physical therapy     Exercise    Stop smoking    Take part in therapy       Risks and side effects     Talk to your doctor before you start or decide to keep taking opioids. Possible side effects include:      Lowering your breathing rate enough to cause death    Overdose, including death, especially if taking higher than prescribed doses    Worse depression symptoms; less pleasure in things you usually enjoy    Feeling tired or sluggish    Slower thoughts or cloudy thinking    Being more sensitive to pain over time; pain is harder to  control    Trouble sleeping or restless sleep    Changes in hormone levels (for example, less testosterone)    Changes in sex drive or ability to have sex    Constipation    Unsafe driving    Itching and sweating    Dizziness    Nausea, throwing up and dry mouth    What else should I know about opioids?    Opioids may lead to dependence, tolerance, or addiction.      Dependence means that if you stop or reduce the medicine too quickly, you will have withdrawal symptoms. These include loose poop (diarrhea), jitters, flu-like symptoms, nervousness and tremors. Dependence is not the same as addiction.                       Tolerance means needing higher doses over time to get the same effect. This may increase the chance of serious side effects.      Addiction is when people improperly use a substance that harms their body, their mind or their relations with others. Use of opiates can cause a relapse of addiction if you have a history of drug or alcohol abuse.      People who have used opioids for a long time may have a lower quality of life, worse depression, higher levels of pain and more visits to doctors.    You can overdose on opioids. Take these steps to lower your risk of overdose:    1. Recognize the signs:  Signs of overdose include decrease or loss of consciousness (blackout), slowed breathing, trouble waking up and blue lips. If someone is worried about overdose, they should call 911.    2. Talk to your doctor about Narcan (naloxone).   If you are at risk for overdose, you may be given a prescription for Narcan. This medicine very quickly reverses the effects of opioids.   If you overdose, a friend or family member can give you Narcan while waiting for the ambulance. They need to know the signs of overdose and how to give Narcan.     3. Don't use alcohol or street drugs.   Taking them with opioids can cause death.    4. Do not take any of these medicines unless your doctor says it s OK. Taking these with  opioids can cause death:    Benzodiazepines, such as lorazepam (Ativan), alprazolam (Xanax) or diazepam (Valium)    Muscle relaxers, such as cyclobenzaprine (Flexeril)    Sleeping pills like zolpidem (Ambien)     Other opioids      How to keep you and other people safe while taking opioids:    1. Never share your opioids with others.  Opioid medicines are regulated by the Drug Enforcement Agency (SERAFIN). Selling or sharing medications is a criminal act.    2. Be sure to store opioids in a secure place, locked up if possible. Young children can easily swallow them and overdose.    3. When you are traveling with your medicines, keep them in the original bottles. If you use a pill box, be sure you also carry a copy of your medicine list from your clinic or pharmacy.    4. Safe disposal of opioids    Most pharmacies have places to get rid of medicine, called disposal kiosks. Medicine disposal options are also available in every [facility]. Search your county and  medication disposal  to find more options. You can find more details at:  [email or web address]-medications     I agree that my provider, clinic care team, and pharmacy may work with any city, state or federal law enforcement agency that investigates the misuse, sale, or other diversion of my controlled medicine. I will allow my provider to discuss my care with, or share a copy of, this agreement with any other treating provider, pharmacy or emergency room where I receive care.    I have read this agreement and have asked questions about anything I did not understand.    _______________________________________________________  Patient Signature - Mily Grullon _____________________                   Date     _______________________________________________________  Provider Signature - Xenia Gaviria NP   _____________________                   Date     _______________________________________________________  Witness  Signature (required if provider not present while patient signing)   _____________________                   Date

## 2022-05-31 ENCOUNTER — PATIENT OUTREACH (OUTPATIENT)
Dept: CARE COORDINATION | Facility: CLINIC | Age: 42
End: 2022-05-31
Payer: COMMERCIAL

## 2022-05-31 NOTE — RESULT ENCOUNTER NOTE
Jhonathan Minor,    Thank you for your recent office visit.    Here are your recent results.  Normal labs.     Feel free to contact me via [employer] or call the clinic at [phone].    Sincerely,    TARAS Verma, FNP-BC

## 2022-05-31 NOTE — PROGRESS NOTES
Clinic Care Coordination Contact   Initial Outreach    CHW Initial Information Gathering:  Referral Source: PCP  Preferred Hospital: [facility]  [phone]  Preferred Urgent Care: Other  Current living arrangement:: Not Assessed  Community Resources: County Programs, [facility] Worker, [facility], Transportation Services, Financial/Insurance ([facility] ,  therapist, [facility] Care Coordinator)  No PCP office visit in Past Year: No  Transportation means:: Medical transport  CHW Additional Questions  If ED/Hospital discharge, follow-up appointment scheduled as recommended?: N/A  Medication changes made following ED/Hospital discharge?: N/A  MyChart active?: Yes  Patient sent Social Determinants of Health questionnaire?: Yes    Patient accepts CC: Yes. Patient scheduled for assessment with CC SW on 6/2/22 at 2pm. Patient noted desire to discuss housing resources.

## 2022-06-02 ENCOUNTER — PATIENT OUTREACH (OUTPATIENT)
Dept: CARE COORDINATION | Facility: CLINIC | Age: 42
End: 2022-06-02
Payer: COMMERCIAL

## 2022-06-02 NOTE — PROGRESS NOTES
Clinic Care Coordination Contact  [facility]/Voicemail- No show initial assessment       Clinical Data: Care Coordinator Outreach  Outreach attempted x 1.  Left message on patient's voicemail with call back information and requested return call.    Plan: Care Coordinator will try to reach patient again in 3-5 business days.

## 2022-06-03 LAB
A-OH ALPRAZ UR QL CFM: PRESENT
ALPRAZ UR QL CFM: PRESENT

## 2022-06-09 ENCOUNTER — PATIENT OUTREACH (OUTPATIENT)
Dept: CARE COORDINATION | Facility: CLINIC | Age: 42
End: 2022-06-09
Payer: COMMERCIAL

## 2022-06-09 NOTE — LETTER
[facility] C[facility] COORDINATION  [address]  [place] [address]    June 9, 2022    Mily Grullon  [address]  [place] [address]      Dear Mily,    I am a clinic care coordinator who works with GIA WOODARD, NP with the [facility]. I recently tried to call and was unable to reach you.          Please feel free to contact me with any questions or concerns regarding care coordination and what we can offer.  We are focused on providing you with the highest-quality healthcare experience possible.    Sincerely,     Sara Greenfield   Care Coordination Team

## 2022-06-09 NOTE — PROGRESS NOTES
Clinic Care Coordination Contact  [facility]/Voicemail       Clinical Data: Care Coordinator Outreach  Outreach attempted x 2.  Left message on patient's voicemail with call back information and requested return call.    Plan: Care Coordinator will send unable to contact letter with care coordinator contact information via [employer]. Care Coordinator will do no further outreaches at this time.

## 2022-06-13 ENCOUNTER — TELEPHONE (OUTPATIENT)
Dept: FAMILY MEDICINE | Facility: CLINIC | Age: 42
End: 2022-06-13
Payer: COMMERCIAL

## 2022-06-13 DIAGNOSIS — K21.00 GASTROESOPHAGEAL REFLUX DISEASE WITH ESOPHAGITIS WITHOUT HEMORRHAGE: ICD-10-CM

## 2022-06-13 NOTE — TELEPHONE ENCOUNTER
Prakash from [facility] is calling with concerns that the patient has been having more headaches headaches and been notably tired lately. Prakash reports she has been isolating in her room more than normal and they have been having to encourage her to come take her medications. Prakash said patient has no reports of vision changes or reporting the worst headache of her life.     Prakash reported that the patient hid her butalbital-acetaminophen-caffeine (ESGIC) -40 MG tablet prescription from the staff and went through a month supply in 2 weeks. Prakash said medications are kept at the nurses station/office and they did not know the patient had this medication but when she reported it she informed them she finished the month prescription that she picked 5/26/22.     Prakash request a message be sent to update the provider.     Margret Schwartz RN

## 2022-06-14 NOTE — TELEPHONE ENCOUNTER
"Routing refill request to provider for review/approval because:  Drug interaction warning   No diagnosis of osteoporosis on record    Requested Prescriptions   Pending Prescriptions Disp Refills     pantoprazole (PROTONIX) 40 MG EC tablet [Pharmacy Med Name: PANTOPRAZOLE SODIUM 40MG TBEC] 90 tablet 1     Sig: TAKE 1 TABLET (40 MG) BY MOUTH DAILY       PPI Protocol Failed - 6/13/2022  1:31 PM        Failed - No diagnosis of osteoporosis on record        Passed - Not on Clopidogrel (unless Pantoprazole ordered)        Passed - Recent (12 mo) or future (30 days) visit within the authorizing provider's specialty     Patient has had an office visit with the authorizing provider or a provider within the authorizing providers department within the previous 12 mos or has a future within next 30 days. See \"Patient Info\" tab in inbasket, or \"Choose Columns\" in Meds & Orders section of the refill encounter.              Passed - Medication is active on med list        Passed - Patient is age 18 or older        Passed - No active pregnacy on record        Passed - No positive pregnancy test in past 12 months         \    "

## 2022-06-15 RX ORDER — PANTOPRAZOLE SODIUM 40 MG/1
40 TABLET, DELAYED RELEASE ORAL DAILY
Qty: 90 TABLET | Refills: 1 | Status: SHIPPED | OUTPATIENT
Start: 2022-06-15 | End: 2022-07-08 | Stop reason: DRUGHIGH

## 2022-06-27 ENCOUNTER — TELEPHONE (OUTPATIENT)
Dept: FAMILY MEDICINE | Facility: CLINIC | Age: 42
End: 2022-06-27

## 2022-06-27 NOTE — TELEPHONE ENCOUNTER
"Kieran with [facility] calling, says he sent us a fax twice regarding recent hospital stay and has concerns about her med use, fioricet and other meds being dispensed from varying pharmacies and patient using more than prescribed.    I see PCP Xenia Khadra sent Mily a [employer] message asking her to schedule a visit to discuss her health and medication use.   Does not appear Mily has read any [facility] messages since April.    Mily is not with Kieran at this time.   He verified Mily's personal phone number we have on file.    I advised we'd call Mily to schedule her for a visit, assume virtual would be okay.    I see patient scheduled a follow up visit for 8/11/22, appears was scheduled on 6/24/22.      Xenia's [employer] message indicates she wants to discuss these issues \"sooner than later\".    Attempted to call patient at home/mobile number, no answer, left message on voicemail; patient was instructed to return call to [facility] at [phone].    Plan to schedule a phone/video visit for this.      Lashell Woods RN  [facility]        "

## 2022-06-29 ENCOUNTER — TELEPHONE (OUTPATIENT)
Dept: FAMILY MEDICINE | Facility: CLINIC | Age: 42
End: 2022-06-29

## 2022-06-29 NOTE — TELEPHONE ENCOUNTER
Forms received from: [facility]   Phone number listed: [phone]   Fax listed: [phone]  Date received: 6/28/22  Form description: Non covered prescription form  Once forms are completed, please return to [facility] via fax [phone].  Is patient requesting to be contacted when forms are completed: na  Phone: na  Form placed:  To Xenia's basket  Maddy Armendariz

## 2022-06-30 ENCOUNTER — TELEPHONE (OUTPATIENT)
Dept: FAMILY MEDICINE | Facility: CLINIC | Age: 42
End: 2022-06-30

## 2022-06-30 NOTE — TELEPHONE ENCOUNTER
Second attempt to call patient, no answer. Left voicemail and requested patient call back at [phone].     Gris Addison RN   [facility]

## 2022-06-30 NOTE — TELEPHONE ENCOUNTER
Prior Authorization Retail Medication Request    Medication/Dose: phentermine (ADIPEX-P) 37.5 MG capsule  ICD code (if different than what is on RX):  E66.01, Z68.36  Previously Tried and Failed:  na  Rationale:  na    [facility]   Insurance ID:  [id number]      Pharmacy Information (if different than what is on RX)  [facility]  Phone:  [phone]

## 2022-06-30 NOTE — TELEPHONE ENCOUNTER
PA for this medication was submitted and denied in encounter dated 02/24/2022. If you would like to appeal please provide a letter of medical necessity with clinical reason and route the original encounter back to the PA team. Thank you.

## 2022-06-30 NOTE — TELEPHONE ENCOUNTER
[facility] has been notified of the denial. The patient is currently in a facility and they are in charge of he medications.

## 2022-07-01 ENCOUNTER — TELEPHONE (OUTPATIENT)
Dept: FAMILY MEDICINE | Facility: CLINIC | Age: 42
End: 2022-07-01

## 2022-07-01 DIAGNOSIS — G89.29 OTHER CHRONIC PAIN: ICD-10-CM

## 2022-07-01 DIAGNOSIS — F43.23 ADJUSTMENT DISORDER WITH MIXED ANXIETY AND DEPRESSED MOOD: Primary | ICD-10-CM

## 2022-07-01 DIAGNOSIS — M79.89 FOOT SWELLING: ICD-10-CM

## 2022-07-01 DIAGNOSIS — F41.9 ANXIETY: ICD-10-CM

## 2022-07-01 DIAGNOSIS — G44.219 EPISODIC TENSION-TYPE HEADACHE, NOT INTRACTABLE: ICD-10-CM

## 2022-07-01 NOTE — TELEPHONE ENCOUNTER
Called [phone] (home)     Did patient answer the phone: No, left a message on voicemail to return call to the [facility] at [phone].    Loretta RN,BSN  Triage Nurse  [facility]: [facility]

## 2022-07-01 NOTE — TELEPHONE ENCOUNTER
Medication reconciliation form received for completion, to Xenia's in basket.   Will fax to [facility] once signed.

## 2022-07-03 ENCOUNTER — HEALTH MAINTENANCE LETTER (OUTPATIENT)
Age: 42
End: 2022-07-03

## 2022-07-08 ENCOUNTER — TELEPHONE (OUTPATIENT)
Dept: FAMILY MEDICINE | Facility: CLINIC | Age: 42
End: 2022-07-08

## 2022-07-08 DIAGNOSIS — K21.00 GASTROESOPHAGEAL REFLUX DISEASE WITH ESOPHAGITIS WITHOUT HEMORRHAGE: Primary | ICD-10-CM

## 2022-07-08 RX ORDER — PANTOPRAZOLE SODIUM 20 MG/1
20 TABLET, DELAYED RELEASE ORAL 2 TIMES DAILY
Qty: 180 TABLET | Refills: 0 | Status: SHIPPED | OUTPATIENT
Start: 2022-07-08 | End: 2022-10-19

## 2022-07-08 NOTE — TELEPHONE ENCOUNTER
[facility] asking for [facility] for Protonix sent to [facility].  He reports patient currently taking Protonix 20 mg one tab bid.  Script pended for approval.  Once script sent please discontinue script on med list for Protonix 40 mg once daily.  Radha Hardy RN  [facility]

## 2022-07-08 NOTE — TELEPHONE ENCOUNTER
Phone number not given to call Kieran back.  Medication was sent to the pharmacy.    Loretta RN,BSN  Triage Nurse  [facility]: [facility]  Ph: [phone]

## 2022-07-11 NOTE — TELEPHONE ENCOUNTER
All of her meds should be filled by her facility only and not by the patient.  Please call facility and verify the only pharmacy medications should be sent to a delete all other pharmacies. Thanks. TARAS Verma, FNP-BC

## 2022-07-11 NOTE — TELEPHONE ENCOUNTER
Please complete an appeal for the phentermine if this is desired by her facility and not the patient.  It appears she filled her meds separate from the facility and was taking meds without their knowledge. TARAS Verma, FNP-BC

## 2022-07-11 NOTE — TELEPHONE ENCOUNTER
Called   Kieran nurse with  residence [phone]     Transferred into his voicemail.  Did he answer the phone: No, left a message on voicemail to return call to the [facility] at [phone].    Loretta BULLOCK,BSN  Triage Nurse  [facility]: [facility]

## 2022-07-12 ENCOUNTER — TELEPHONE (OUTPATIENT)
Dept: FAMILY MEDICINE | Facility: CLINIC | Age: 42
End: 2022-07-12

## 2022-07-12 RX ORDER — BUTALBITAL, ACETAMINOPHEN AND CAFFEINE 50; 325; 40 MG/1; MG/1; MG/1
1 TABLET ORAL EVERY 4 HOURS PRN
Qty: 60 TABLET | Refills: 0 | Status: SHIPPED | OUTPATIENT
Start: 2022-07-12 | End: 2022-07-14

## 2022-07-12 RX ORDER — HYDROCHLOROTHIAZIDE 12.5 MG/1
12.5 TABLET ORAL DAILY
Qty: 30 TABLET | Refills: 0 | Status: SHIPPED | OUTPATIENT
Start: 2022-07-12 | End: 2022-08-19

## 2022-07-12 RX ORDER — OLANZAPINE 5 MG/1
5 TABLET ORAL 2 TIMES DAILY
COMMUNITY
Start: 2022-07-12 | End: 2022-07-14

## 2022-07-12 RX ORDER — PROPRANOLOL HYDROCHLORIDE 10 MG/1
10 TABLET ORAL 3 TIMES DAILY PRN
Qty: 90 TABLET | Refills: 0 | Status: SHIPPED | OUTPATIENT
Start: 2022-07-12 | End: 2022-09-29

## 2022-07-12 RX ORDER — TIZANIDINE 2 MG/1
2 TABLET ORAL 3 TIMES DAILY PRN
Qty: 90 TABLET | Refills: 0 | Status: SHIPPED | OUTPATIENT
Start: 2022-07-12 | End: 2022-10-19

## 2022-07-12 NOTE — TELEPHONE ENCOUNTER
"Patient called back, scheduled virtual video visit for \"med check\" on 7/14.    Lashell Woods RN  [facility]      "

## 2022-07-12 NOTE — TELEPHONE ENCOUNTER
Per Kieran at [facility], go ahead with the appeal letter/phone call for Phentermine. The Rx should go to [facility].

## 2022-07-12 NOTE — TELEPHONE ENCOUNTER
Who restarted the olanzapine?  Trying to complete med rec from hospital discharge, and this was not on the med list. TARAS Verma, FNP-BC

## 2022-07-12 NOTE — TELEPHONE ENCOUNTER
"I see hydrochlorothiazide 12.5 mg daily on med list, Rx sent 5/27/22 to [facility] pharmacy.    I called and spoke to Kieran, he says patient continues to have bilateral lower leg edema, looks better this AM after being wrapped, verified her hydrochlorothiazide dosing is as listed on [facility].    He notes she is back on olanzapine after her hospital stay but dose reduced to 5 mg BID.   I updated Epic med list.   The olanzapine was thought to be causing swelling previously.    States she is able to walk, denies pain, feet not cold or blue but does complain of some tingling.    Kieran states they are working to get all [facility] Rx's [facility].   I removed the other pharmacies from her chart to help prevent refills going elsewhere.  However, unable to remove the [facility] pharmacy, I \"un-starred\" it.    Routed to PCP to address swelling issue and meds.  I do plan to call patient per other encounter to schedule her for a virtual visit to discuss med use per provider's request in that note.    Lashell Woods RN  [facility]          "

## 2022-07-12 NOTE — TELEPHONE ENCOUNTER
See new message/concern regarding bilateral leg swelling in other phone call received today on this patient from Kieran at  residence.    I see 6/14/22 Button message to Mily does not appear it has been read, PCP wants to discuss her medication use with her.  Patient has an in person visit scheduled on 8/11/22.   Assume provider wants something sooner virtually.    See call from today, Kieran states we can call Mily directly to schedule the virtual visit.  Phone number verified.    Attempted to call patient at home/mobile number, no answer, left message on voicemail identified as Mily Grullon; patient was instructed to return call to [facility] at [phone].    Lashell Woods RN  [facility]

## 2022-07-12 NOTE — TELEPHONE ENCOUNTER
[facility]an from  [facility] calling to report bilateral leg swelling. Patient was started on Hydrochlorothiazide in hospital and doesn't seem to be helping. Please call Kieran back.

## 2022-07-12 NOTE — TELEPHONE ENCOUNTER
See 614/22- 6/22/22 [facility] admission discharge med list, it has:    OLANzapine (ZYPREXA) 5 mg tablet    Indications: Psychosis, unspecified psychosis type (HC) Take 1 Tablet (5 mg) by mouth 2 times daily.      I called Kieran who verified the olanzapine was reduced from 10 mg BID to 5 mg BID during hospital stay, the med lists in that admission do support this.      Since her admission was in [facility], it is possible they were not aware she was off olanzapine, if that was the case?    Looks like olanzapine was started during 4/28/22 [facility] admission, 15 mg at HS and 10 mg in the AM.    She was seen 5/27/22 by PCP, I don't see documentation that olanzapine was stopped.    Routed to Xenia Gaviria to address.    Lashell Woods RN  [facility]

## 2022-07-13 NOTE — TELEPHONE ENCOUNTER
Signed,faxed [place] denial information to [facility]. OOP cost will be $11.47 . Form then sent to scanning.

## 2022-07-13 NOTE — PROGRESS NOTES
Mily is a 41 year old who is being evaluated via a billable phone visit- patient could not get video connection     How would you like to obtain your AVS? Marvinharhelen  If the video visit is dropped, the invitation should be resent by: Text to cell phone: [phone]   Will anyone else be joining your video visit? No    Assessment & Plan     Class 2 severe obesity due to excess calories with serious comorbidity and body mass index (BMI) of 36.0 to 36.9 in adult (H)    - phentermine (ADIPEX-P) 37.5 MG capsule; Take 1 capsule (37.5 mg) by mouth every morning Due for follow up in 3 months.  - topiramate (TOPAMAX) 50 MG tablet; Take 1 tablet (50 mg) by mouth 2 times daily    Other chronic pain    - oxyCODONE-acetaminophen (PERCOCET) 5-325 MG tablet; Take 0.5-1 tablets by mouth 2 times daily as needed for severe pain (use sparingly- to last one month) Do not take with anxiety medication.    Closed nondisplaced intertrochanteric fracture of left femur, sequela    - oxyCODONE-acetaminophen (PERCOCET) 5-325 MG tablet; Take 0.5-1 tablets by mouth 2 times daily as needed for severe pain (use sparingly- to last one month) Do not take with anxiety medication.    Episodic tension-type headache, not intractable    - butalbital-acetaminophen-caffeine (ESGIC) -40 MG tablet; Take 1 tablet by mouth 2 times daily as needed for headaches    Abnormal weight gain    - orlistat (XENICAL) 120 MG capsule; Take 1 capsule (120 mg) by mouth 3 times daily    Adjustment disorder with mixed anxiety and depressed mood    - topiramate (TOPAMAX) 50 MG tablet; Take 1 tablet (50 mg) by mouth 2 times daily  - sertraline (ZOLOFT) 50 MG tablet; Take 1 tablet (50 mg) by mouth daily    Mood disorder (H)    - topiramate (TOPAMAX) 50 MG tablet; Take 1 tablet (50 mg) by mouth 2 times daily    30 minutes spent on the date of the encounter doing chart review, history and exam, documentation and further activities per the note       BMI:   Estimated body  "mass index is 33.57 kg/m  as calculated from the following:    Height as of 5/27/22: 1.57 m (5' 1.81\").    Weight as of 5/27/22: 82.7 kg (182 lb 6.4 oz).   Weight management plan: Discussed healthy diet and exercise guidelines Specific weight management program called phentermine, topamax, orlistat discussed    Depression Screening Follow Up    PHQ 7/14/2022   PHQ-9 Total Score 10   Q9: Thoughts of better off dead/self-harm past 2 weeks More than half the days   F/U: Thoughts of suicide or self-harm Yes   F/U: Self harm-plan No   F/U: Self-harm action No   F/U: Safety concerns No   PHQ-9 External Data -     Last PHQ-9 7/14/2022   1.  Little interest or pleasure in doing things 1   2.  Feeling down, depressed, or hopeless 1   3.  Trouble falling or staying asleep, or sleeping too much 0   4.  Feeling tired or having little energy 1   5.  Poor appetite or overeating 1   6.  Feeling bad about yourself 1   7.  Trouble concentrating 1   8.  Moving slowly or restless 2   Q9: Thoughts of better off dead/self-harm past 2 weeks 2   PHQ-9 Total Score 10   Difficulty at work, home, or with people -   In the past two weeks have you had thoughts of suicide or self harm? Yes   Do you have concerns about your personal safety or the safety of others? No   In the past 2 weeks have you thought about a plan or had intention to harm yourself? No   In the past 2 weeks have you acted on these thoughts in any way? No         No flowsheet data found.      Follow Up      Follow Up Actions Taken  Crisis resource information provided in the After Visit Summary  Referred patient back to mental health provider    Discussed the following ways the patient can remain in a safe environment:  be around others  See Patient Instructions: follow up as needed or in 3 months for controlled medication refill.     Return in about 3 months (around 10/14/2022), or if symptoms worsen or fail to improve, for using a MyChart eVisit.    GIA WOODARD, " [facility] EFFIE Minor is a 41 year old, presenting for the following health issues:  Recheck Medication      HPI   Patient would like to discuss phentamine- was told by insurance that it would be covered for another year, but then the pharmacy is not filling it.  She will pay out of pocket for medication if needed.  Has been having abnormal weight gain > 12 lbs.    She is having swelling and weight gain- thinks it is r/t the zyprexa; had this before in the past.  Swelling is in hands and feet. Has been wrapping feet which helps some.  She denies CP/ SOB.  Will increase topamax to see if this helps mood since stopping zyprexa. She reports her mental health is doing fine.  Needing refill of pain medication.  Using Esig 1-2 time daily, will change directions for this.  Discussed we discontinued several medications d/t patient coming in for medications and filling them without her current facility knowing; was taking medications on her own with out facility being aware.  Discussed with nurse that these medications are ok with their facility, he as in agreement.  Will send new MAR to facility. She feels things are going ok at her current living facility.     Review of Systems   Constitutional, HEENT, cardiovascular, pulmonary, GI, , musculoskeletal, neuro, skin, endocrine and psych systems are negative, except as otherwise noted.      Objective    Vitals - Patient Reported  Weight (Patient Reported): 81.6 kg (180 lb)      Vitals:  No vitals were obtained today due to virtual visit.    Physical Exam   healthy, alert and no distress  PSYCH: Alert and oriented times 3; coherent speech, normal   rate and volume, able to articulate logical thoughts, able   to abstract reason, no tangential thoughts, no hallucinations   or delusions    RESP: No cough, no audible wheezing, able to talk in full sentences  Remainder of exam unable to be completed due to telephone visits    See orders         Phone visit duration 13 minutes.     Answers for HPI/ROS submitted by the patient on 7/14/2022  If you checked off any problems, how difficult have these problems made it for you to do your work, take care of things at home, or get along with other people?: Somewhat difficult  PHQ9 TOTAL SCORE: 10  DEJON 7 TOTAL SCORE: 12

## 2022-07-13 NOTE — TELEPHONE ENCOUNTER
"Xenia Gaviria has signed the \"Advance Member Notice of Noncovered Prescription Form\" for phentermine. The patient's OOP cost will be $11.47.  No appeal started. Form sent to scanning, STAT.   "

## 2022-07-14 ENCOUNTER — VIRTUAL VISIT (OUTPATIENT)
Dept: FAMILY MEDICINE | Facility: CLINIC | Age: 42
End: 2022-07-14
Payer: COMMERCIAL

## 2022-07-14 ENCOUNTER — TELEPHONE (OUTPATIENT)
Dept: FAMILY MEDICINE | Facility: CLINIC | Age: 42
End: 2022-07-14

## 2022-07-14 DIAGNOSIS — G89.29 OTHER CHRONIC PAIN: ICD-10-CM

## 2022-07-14 DIAGNOSIS — F43.23 ADJUSTMENT DISORDER WITH MIXED ANXIETY AND DEPRESSED MOOD: ICD-10-CM

## 2022-07-14 DIAGNOSIS — R63.5 ABNORMAL WEIGHT GAIN: Primary | ICD-10-CM

## 2022-07-14 DIAGNOSIS — E66.01 CLASS 2 SEVERE OBESITY DUE TO EXCESS CALORIES WITH SERIOUS COMORBIDITY AND BODY MASS INDEX (BMI) OF 36.0 TO 36.9 IN ADULT (H): ICD-10-CM

## 2022-07-14 DIAGNOSIS — E66.812 CLASS 2 SEVERE OBESITY DUE TO EXCESS CALORIES WITH SERIOUS COMORBIDITY AND BODY MASS INDEX (BMI) OF 36.0 TO 36.9 IN ADULT (H): ICD-10-CM

## 2022-07-14 DIAGNOSIS — F39 MOOD DISORDER (H): ICD-10-CM

## 2022-07-14 DIAGNOSIS — G44.219 EPISODIC TENSION-TYPE HEADACHE, NOT INTRACTABLE: ICD-10-CM

## 2022-07-14 DIAGNOSIS — S72.145S CLOSED NONDISPLACED INTERTROCHANTERIC FRACTURE OF LEFT FEMUR, SEQUELA: ICD-10-CM

## 2022-07-14 PROCEDURE — 99214 OFFICE O/P EST MOD 30 MIN: CPT | Mod: 95 | Performed by: NURSE PRACTITIONER

## 2022-07-14 PROCEDURE — 96127 BRIEF EMOTIONAL/BEHAV ASSMT: CPT | Mod: 95 | Performed by: NURSE PRACTITIONER

## 2022-07-14 RX ORDER — DULOXETIN HYDROCHLORIDE 60 MG/1
60 CAPSULE, DELAYED RELEASE ORAL 2 TIMES DAILY
COMMUNITY
Start: 2022-07-14

## 2022-07-14 RX ORDER — BUTALBITAL, ACETAMINOPHEN AND CAFFEINE 50; 325; 40 MG/1; MG/1; MG/1
1 TABLET ORAL 2 TIMES DAILY PRN
Qty: 60 TABLET | Refills: 0 | Status: SHIPPED | OUTPATIENT
Start: 2022-07-14 | End: 2022-10-19

## 2022-07-14 RX ORDER — PHENTERMINE HYDROCHLORIDE 37.5 MG/1
37.5 CAPSULE ORAL EVERY MORNING
Qty: 30 CAPSULE | Refills: 2 | Status: SHIPPED | OUTPATIENT
Start: 2022-07-14 | End: 2022-10-19

## 2022-07-14 RX ORDER — TOPIRAMATE 50 MG/1
50 TABLET, FILM COATED ORAL 2 TIMES DAILY
Qty: 180 TABLET | Refills: 1 | Status: SHIPPED | OUTPATIENT
Start: 2022-07-14 | End: 2022-10-25

## 2022-07-14 RX ORDER — OXYCODONE AND ACETAMINOPHEN 5; 325 MG/1; MG/1
.5-1 TABLET ORAL 2 TIMES DAILY PRN
Qty: 60 TABLET | Refills: 0 | Status: SHIPPED | OUTPATIENT
Start: 2022-07-14 | End: 2022-08-31

## 2022-07-14 RX ORDER — ORLISTAT 120 MG/1
120 CAPSULE ORAL 3 TIMES DAILY
Qty: 90 CAPSULE | Refills: 0 | Status: SHIPPED | OUTPATIENT
Start: 2022-07-14 | End: 2022-08-19

## 2022-07-14 ASSESSMENT — ANXIETY QUESTIONNAIRES
GAD7 TOTAL SCORE: 12
5. BEING SO RESTLESS THAT IT IS HARD TO SIT STILL: MORE THAN HALF THE DAYS
7. FEELING AFRAID AS IF SOMETHING AWFUL MIGHT HAPPEN: SEVERAL DAYS
3. WORRYING TOO MUCH ABOUT DIFFERENT THINGS: SEVERAL DAYS
6. BECOMING EASILY ANNOYED OR IRRITABLE: MORE THAN HALF THE DAYS
GAD7 TOTAL SCORE: 12
GAD7 TOTAL SCORE: 12
7. FEELING AFRAID AS IF SOMETHING AWFUL MIGHT HAPPEN: SEVERAL DAYS
1. FEELING NERVOUS, ANXIOUS, OR ON EDGE: NEARLY EVERY DAY
8. IF YOU CHECKED OFF ANY PROBLEMS, HOW DIFFICULT HAVE THESE MADE IT FOR YOU TO DO YOUR WORK, TAKE CARE OF THINGS AT HOME, OR GET ALONG WITH OTHER PEOPLE?: SOMEWHAT DIFFICULT
2. NOT BEING ABLE TO STOP OR CONTROL WORRYING: SEVERAL DAYS
4. TROUBLE RELAXING: MORE THAN HALF THE DAYS

## 2022-07-14 ASSESSMENT — PATIENT HEALTH QUESTIONNAIRE - PHQ9
SUM OF ALL RESPONSES TO PHQ QUESTIONS 1-9: 10
10. IF YOU CHECKED OFF ANY PROBLEMS, HOW DIFFICULT HAVE THESE PROBLEMS MADE IT FOR YOU TO DO YOUR WORK, TAKE CARE OF THINGS AT HOME, OR GET ALONG WITH OTHER PEOPLE: SOMEWHAT DIFFICULT
SUM OF ALL RESPONSES TO PHQ QUESTIONS 1-9: 10

## 2022-07-14 NOTE — TELEPHONE ENCOUNTER
Kieran from patient's facility calling to report that the medication list patient brought home does not have Duloxetine 60 mg BID listed. This medication is prescribed by her psychiatrist but he wanted to make sure we had an accurate medication list. Nurse added this as an historical medication.    Margret Schwartz RN

## 2022-07-26 NOTE — PATIENT INSTRUCTIONS
Patient Education     Living with Osteoporosis: Preventing Fractures  If you have osteoporosis, you can do a lot to reduce its effect on your life. Knowing how to prevent fractures and spinal curvature can help you live more comfortably and safely with this disease.    Reducing your risk for fractures  The most common fracture sites in people with osteoporosis are the wrist, spine, and hip. These fractures are often caused by accidents and falls. All fractures are painful and may limit what you can do. But hip fractures are very serious. They often need surgery, and it can take months to recover. To reduce your risk for fractures:    Get regular exercise. Try walking, swimming, or weight training.    Eat foods that are rich in calcium, or take calcium supplements.    Make your home safe to help avoid accidents.    Take extra precautions not to fall in risky areas, such as icy sidewalks.  Understanding spinal fractures  Your spine is made up of many bones called vertebrae. Osteoporosis can cause the vertebrae in your spine to collapse. As a result, your upper back may arch forward, creating a curvature. Spine fractures may also result from back strain and bad posture. You will also lose height. Your lower spine must then adjust to keep your body balanced. This can cause back pain. To prevent or lessen these spinal changes:    Practice good posture.    Use proper techniques if you need to lift heavy objects.    Do back exercises to help your posture.    Lie on your back when you have pain.    Ask your healthcare provider about these and other ways to help your spine.  Cecilio last reviewed this educational content on 5/1/2018 2000-2021 [employer]. All rights reserved. This information is not intended as a substitute for professional medical care. Always follow your healthcare professional's instructions.           Patient Education     Living with Osteoporosis: Regular Exercise  If you have  osteoporosis, exercise is vital for your health. It can prevent bone fractures and spine changes. It will slow bone loss. Exercise will strengthen your body. It can also be fun. A variety of exercises is best. See below for exercises that can help you. But before you start, talk with your healthcare provider to be sure these exercises are right for you.     Resistance exercises. These build muscle strength and maintain bone mass. They also make you less prone to injury. Exercises include lifting small weights, doing push-ups and sit-ups, using elastic exercise bands, and using weight machines.   Weight-bearing activities. These help your whole body. They also help you maintain bone mass. Activities include walking, dancing, and housework.   Non-weight-bearing exercises. These help prevent back strain and pain. They do this by building the trunk and leg muscles. Exercises that help with flexibility can prevent falls. Examples include swimming, water exercise, and stretching.   Staying safe  Here are tips to stay safe:     Always check with your healthcare provider before starting any new exercise program.    Use weights only as instructed.    Stop any exercise that causes pain.  [employer] last reviewed this educational content on 5/1/2018 2000-2021 [employer]. All rights reserved. This information is not intended as a substitute for professional medical care. Always follow your healthcare professional's instructions.           Patient Education     Managing Chronic Pain   Being in pain can be exhausting. You may find you have trouble working, sleeping, or just doing day-to-day tasks. But you can learn to manage pain, feel better, and regain control of your life.    Understanding chronic pain  Chronic pain is a serious medical problem. It's defined as pain that lasts longer than 3 months. Chronic pain includes pain that you feel regularly, even if it comes and goes. The pain may be from an ongoing  injury or health problem. Or it may be because of a chronic pain syndrome, such as fibromyalgia. Sometimes pain persists when no cause can be found.    Pain should be treated  You have a right to have your pain treated. Untreated chronic pain can affect your overall health. It can lead to depression, anxiety, anger, and personality changes. It can also disrupt work, sleep, relationships, and other aspects of normal life. It may not be possible to relieve all of your pain. But it can be reduced to a level you can cope with.    Your role in treatment  Your healthcare provider will work closely with you on a plan to manage your pain. But it s up to you to put this plan into action. Control of chronic pain is done mainly through self-management. This means that you take an active role in your care. Getting support from family and friends is important too.    Planning your treatment  Your healthcare provider will first look for a cause of your pain that can be treated. He or she will also assess your pain level. This may be done by asking you to rate your pain on a scale from 1 (low pain) to 10 (severe pain). Your provider will also ask you to describe the pain. For example, is your pain sharp or dull? Is it constant or does it come and go? You may be asked to keep a pain log. This is a diary in which you track your pain. It may help identify things that tend to make your pain worse. You and your healthcare provider can make a plan to help prevent and cope with pain on a daily basis. In some cases, you may be referred to a special pain program or clinic. Your treatment plan may include:     Medicines    Complementary therapies    Mind and body therapies    Other medical treatments    Getting physical activity   Medicines  Medicine will most likely be a part of your treatment plan. Your provider will evaluate which are the best medicines for your pain. You may use over-the-counter or prescription medicines. You may need  to take more than one medicine. It may take some time to find the best medicine or combination of medicines for your pain. Take all medicines as directed. Pain medicines can be used in many ways. You may take medicines:     Every day to help   stay ahead  of the pain so that it doesn t flare up    At times when pain is worse than usual    Before activities that tend to trigger pain    To decrease sensitivity to pain  Medicines for chronic pain include:    Nonsteroidal anti-inflammatory medicines (NSAIDs) for pain from swelling and inflammation. Your provider may prescribe a type of NSAID called a ERICKSON inhibitor.    Acetaminophen    Anticonvulsants to treat nerve pain called neuropathy    Antidepressants to treat neuropathy    Muscle relaxants for muscle spasms    Topical medicines. These are put on the skin.    Opioids, or narcotics, to treat severe pain. These very strong medicines can help ease certain kinds of pain. They most often are used for short periods of time. Your provider will monitor your care very closely if you take opioids to reduce the risk for addiction.   Complementary therapies  These are treatments that can be used along with medical care to help relieve pain. Look for a licensed practitioner with experience treating chronic pain. Talk with your healthcare provider about using complementary therapies such as:     Massage    Physical therapy    Acupuncture and acupressure    Chiropractic    Vitamins or herbal supplements     Naturopathy    Dry needling  Mind/body therapies  The brain and the body are both part of the pain response. The brain reads the pain signals from the body. This means that your mind has some control over how pain signals are processed. Mind/body therapies may help change how your brain reads pain signals. They may be learned with the help of a trained therapist or in a class. They include:     Deep  breathing    Distraction    Visualization    Meditation    Biofeedback     Yoga  Other medical treatments  If other treatments don t work for you, one of these procedures or devices may help:    Nerve blocks to numb nerves in a painful area    Trigger point injections for painful muscles    Steroid injections for joint pain     Transcutaneous electrical nerve stimulation (TENS) to block pain signals to the brain    Spinal stimulation to block spinal pain    Implanted spinal pump that contains pain medicine    Ablation using heat, cold, or chemicals to destroy painful nerves                                                                                                                    Getting physical activity  Being physically active has many benefits. It can improve your ability to cope with pain. It may also help improve your mood, sleep, and overall health. Your healthcare provider can help you plan an exercise program that s right for your needs. This may include:     Stretching and range-of-motion exercises    Low-impact exercise such as walking, biking, swimming, and other water exercise    Strength training using light weights    Walking up the stairs instead of taking the elevator    Riding a bike instead of driving    Parking your car farther from your destination   You may need to avoid high-impact activities. These involve jumping, running, or sudden starts, stops, or changes of direction. If you haven t exercised in a long time or you have physical limitations, your healthcare provider may refer you to a physical therapist. He or she can teach you stretches and exercises that fit your condition and fitness level.    Being active and healthy  A healthier lifestyle makes it easier to cope with pain and function better. Follow these tips:     Choose a balance of healthy foods and drinks.    Limit alcohol and caffeine.    Go to bed at about the same time each day and get enough restful sleep.    Don t let  pain keep you from others. Spend time with friends and family.    Keep your mind active. Read books or take classes.    If you re not working, volunteer or join a club or social group.   Getting support  A support group lets you talk with others who also have chronic pain. Chronic pain support groups can help you feel less isolated. They can also give you tips for coping with pain. To find a local support group, contact your nearest hospital or pain clinic.    You may also want to try counseling. Counseling can help you learn coping skills and methods such as visualization. It can also help with mood problems. When choosing a counselor, look for someone who has worked with people who have chronic pain.    See your provider for regular visits and let him or her know how well treatments are working for you. Also reach out to family and friends for help and support.                               For more support and information, contact these groups:    American Academy of Pain Medicine, www.painmed.org    American Chronic Pain Association, www.theacpa.org    National Pain Foundation, www.nationalpainfoundation.org  Cecilio last reviewed this educational content on 8/1/2020 2000-2021 [employer]. All rights reserved. This information is not intended as a substitute for professional medical care. Always follow your healthcare professional's instructions.           Patient Education   Please make an appointment to follow up with your therapist and/or Psychiatric Clinic (phone: [phone]) within 1-3 days.     Return to the ED if you are having worsening thoughts/feelings of harming yourself or others, or any urgent/life-threatening concerns.     DISCHARGE RESOURCES:    [facility] [phone]     [facility] [phone] (detox), [phone] (outpatient & [facility])      Crisis Intervention: 105.874.8313 or 751-109-8742 (TTY: 564.709.3123).   Call anytime.    Suicide Awareness Voices of Education (SAVE) (www.save.org): 996-950-VHPS (7283)    National Suicide Prevention Line (www.mentalhealthmn.org): 056-880-QWXI (4559)    National [place] on Mental Illness (www.mn.delia.org): 957.953.8528 or 305-698-7751.    Lbrs1mqhh: text the word LIFE to 62444 for immediate support and crisis intervention    Mental Health Consumer/Survivor Network of [place] (www.mhcsn.net): 164.890.9024 or 736-124-3574    Mental Health Association of [place] (www.mentalhealth.org): 734.832.8273 or 792-051-8945          Moderna dose 1, 2, and 3

## 2022-08-16 DIAGNOSIS — M81.0 OSTEOPOROSIS OF MULTIPLE SITES: ICD-10-CM

## 2022-08-17 DIAGNOSIS — M79.89 FOOT SWELLING: ICD-10-CM

## 2022-08-17 DIAGNOSIS — R63.5 ABNORMAL WEIGHT GAIN: ICD-10-CM

## 2022-08-17 RX ORDER — VITAMIN B COMPLEX
1 TABLET ORAL DAILY
Qty: 100 TABLET | Refills: 1 | Status: SHIPPED | OUTPATIENT
Start: 2022-08-17 | End: 2022-12-07

## 2022-08-17 NOTE — TELEPHONE ENCOUNTER
Prescription approved per [facility].    Neida Monsivais, RN, BSN

## 2022-08-18 NOTE — TELEPHONE ENCOUNTER
Routing refill request to provider for review/approval because:  Drug not on the [facility] refill protocol     Tatyana Armas RN, BSN, PHN  [facility]: [place]

## 2022-08-19 RX ORDER — ORLISTAT 120 MG
CAPSULE ORAL
Qty: 90 CAPSULE | Refills: 0 | Status: SHIPPED | OUTPATIENT
Start: 2022-08-19 | End: 2022-09-06

## 2022-08-19 RX ORDER — HYDROCHLOROTHIAZIDE 12.5 MG/1
TABLET ORAL
Qty: 30 TABLET | Refills: 0 | Status: SHIPPED | OUTPATIENT
Start: 2022-08-19 | End: 2022-08-31

## 2022-08-22 NOTE — TELEPHONE ENCOUNTER
[facility] calling. Patient is restricted to [facility]. Prescription for XENICAL 120 MG capsule, and hydrochlorothiazide (HYDRODIURIL) 12.5 MG tablet need to come from [facility]. TC did inform pharmacy [facility] has been out of clinic.

## 2022-08-29 DIAGNOSIS — G89.29 OTHER CHRONIC PAIN: ICD-10-CM

## 2022-08-29 DIAGNOSIS — S72.145S CLOSED NONDISPLACED INTERTROCHANTERIC FRACTURE OF LEFT FEMUR, SEQUELA: ICD-10-CM

## 2022-08-29 DIAGNOSIS — Z79.890 HORMONE REPLACEMENT THERAPY (HRT): ICD-10-CM

## 2022-08-29 NOTE — TELEPHONE ENCOUNTER
Medication Question or Refill    Contacts       Type Contact Phone/Fax    08/29/2022 02:55 PM CDT Phone (Incoming) [facility] (Other) [phone]          What medication are you calling about (include dose and sig)?: Premarin    Controlled Substance Agreement on file:   CSA -- Patient Level:    Controlled Substance Agreement - Opioid - Scan on 5/31/2022  1:40 PM  Controlled Substance Agreement - Opioid - Scan on 6/1/2021  2:26 PM       Who prescribed the medication?: unknown-prescribed in the ER and group home states it has to come from PCP    Do you need a refill? Yes:     When did you use the medication last?     Patient offered an appointment? No    Do you have any questions or concerns?  No    Preferred Pharmacy:     [facility] - [place], [place] - [address]  [facility] [address]  Phone: [phone] Fax: [phone]

## 2022-08-31 DIAGNOSIS — M79.89 FOOT SWELLING: ICD-10-CM

## 2022-08-31 DIAGNOSIS — Z79.890 HORMONE REPLACEMENT THERAPY (HRT): ICD-10-CM

## 2022-08-31 RX ORDER — OXYCODONE AND ACETAMINOPHEN 5; 325 MG/1; MG/1
.5-1 TABLET ORAL 2 TIMES DAILY PRN
Qty: 60 TABLET | Refills: 0 | Status: SHIPPED | OUTPATIENT
Start: 2022-08-31 | End: 2022-10-19

## 2022-09-05 RX ORDER — HYDROCHLOROTHIAZIDE 12.5 MG/1
TABLET ORAL
Qty: 30 TABLET | Refills: 0 | Status: SHIPPED | OUTPATIENT
Start: 2022-09-05 | End: 2022-10-07

## 2022-09-06 RX ORDER — ORLISTAT 120 MG/1
CAPSULE ORAL
Qty: 90 CAPSULE | Refills: 0 | Status: SHIPPED | OUTPATIENT
Start: 2022-09-06 | End: 2022-10-11

## 2022-10-03 ENCOUNTER — TELEPHONE (OUTPATIENT)
Dept: FAMILY MEDICINE | Facility: CLINIC | Age: 42
End: 2022-10-03

## 2022-10-03 NOTE — TELEPHONE ENCOUNTER
Alejandro from [facility], it is a [facility], transitional care facility.    His phone number is [phone],  Fax number is [phone].    He stated that the back of her right knee is having pain, from an increased in walking. She is able to bear weight, but it is painful.    Patient does not know if it is swollen, pain is just in the back of her knee only.    They are wondering if they can get an order for crutches, or any other ideas.    Routed to PCP to advise.    Loretta RN,BSN  Triage Nurse  [facility]: [facility]  Ph: [phone]

## 2022-10-04 NOTE — TELEPHONE ENCOUNTER
Notified Alejandro of the orders below per PCP.    He stated that patient does not have an issues today.  .    He stated understanding and agreeable with the plan of care.     Loretta RN,BSN  Triage Nurse  [facility]: [facility]

## 2022-10-04 NOTE — TELEPHONE ENCOUNTER
It could be a Baker's cyst, or of DVT.  She should probably be evaluated by urgent care or a walk-in orthopedic clinic.  They can determine appropriate treatment and give crutches if needed. TARAS Verma, FNP-BC

## 2022-10-04 NOTE — TELEPHONE ENCOUNTER
"Jun Glez is a 50 year old year old who is being evaluated via a billable video visit.      How would you like to obtain your AVS? MyChart  If you are dropped from the video visit, the video invite should be resent to: Text to cell phone: see Epic  Will anyone else be joining your video visit? No     Telemedicine Visit: The patient's condition can be safely assessed and treated via synchronous audio and visual telemedicine encounter.      Reason for Telemedicine Visit: Covid-19 Pandemic    Originating Site (Patient Location): Patient's home     Distant Site (Provider Location): Provider Remote Setting    Mode of Communication:  Video Conference via Duolingo    As the provider I attest to compliance with applicable laws and regulations related to telemedicine.        Outpatient Psychiatric Progress Note    Name: Jun Glez   : 1972                    Primary Care Provider: Physician No Ref-Primary   Therapist: None     PHQ-9 scores:  PHQ-9 SCORE 2022   PHQ-9 Total Score MyChart 10 (Moderate depression) - 16 (Moderately severe depression)   PHQ-9 Total Score 10 11 16       JULIENNE-7 scores:  JULIENNE-7 SCORE 2022 10/3/2022   Total Score 21 (severe anxiety) 17 (severe anxiety)   Total Score 21 17       Patient Identification:  Patient is a 50 year old, partnered / significant other  White  or  male  who presents for return visit with me.  Patient is currently employed full time. Patient attended the phone/video session alone. Patient prefers to be called: \"Jun\".    Interim History:  I last saw Jun Glez for outpatient psychiatry return vist on 2022. During that appointment, we:      Start gabapentin for alcohol use and anxiety: Start with 300 mg twice daily and increase to 600 mg daily as tolerated.  Can increase as quickly as 1-2 days if no side effects.  Try 300 mg twice daily and then 300 mg in the morning and 600 mg at bedtime, and if " Last Written Prescription Date:  6/10/20  Last Fill Quantity: 20,  # refills: 2   Last office visit: 3/4/2020 with prescribing provider:  Dr. Smith with advised 1 month follow up  Virtual visit: 6/23/20 with advised 4-6 week follow up  Future Office Visit:   Next 5 appointments (look out 90 days)    Jul 28, 2020 10:30 AM CDT  [facility] Visit with Dallas Flores MD  [facility] [address] [place] [id number] [phone]         Routing refill request to provider for review/approval because:  Drug not on the [facility] refill protocol     Yasemin Page, RN, BSN           still tolerating very well, try 600 mg twice daily.    Start clonidine for anxiety: Take 0.05- 0.1 mg every AM and 0.1-0.2 mg every bedtime.  Watch for feeling too lightheaded or dizzy.  Can also cause some sedation.    Continue to cut back on your alcohol use and try to abstain completely from alcohol for a while as you work towards better control of your mental health symptoms.  Discussed the podcast This Naked Mind.    Strongly recommend individual psychotherapy for additional support and ongoing development of nonpharmacologic coping skills and strategies.    10/4: Patient overall with some slight improvements but still feeling quite anxious at times.  Will often come on randomly.  Near panic symptoms at times.  Sometimes last a few hours sometimes will be more brief.  Gets quite tired from a.m. clonidine dose.  Not sure how helpful p.m. clonidine dose has been.  Feels like gabapentin has been helpful.  Some mild alcohol cravings but has decreased alcohol use significantly.  No longer drinking daily.  No acute safety concerns.  No SI.  Still some nightmares and trouble sleeping.    [employer] testing revealed the following medications in the green category per patient report:  -pristiq  -fetzima  -viibryd  -selegiline    Per [facility], Dr. Alejo Cervantes, during today's team-based visit:  Reported that gabapentin seems to have worked to some extent. He notices that he is a little more relaxed. He has only been using 1 clonidine in the morning because it was too sedating. He finds that his nightmares are more graphic since he stopped drinking. He has chosen to cut back on alcohol and has not had anything to drink in almost 2 weeks. He spoke about his search for a therapist and is awaiting a call back from some places.     Past Psychiatric Med Trials:  Psych Meds at Intake:  Buspar - pt reported not taking   Klonopin - rare prn     Past Psych Meds:  escitalopram   venlafaxine  buspar - up to 45 mg daily and felt  dizzy  clonazepam  Likely others he cannot remember    Psychiatric ROS:  Jun NOLEN John Dey reports mood has been: Still slightly improved  Anxiety has been: Still quite high at times  Sleep has been: Still struggling, see HPI above  Doris sxs: None  Psychosis sxs: None  ADHD/ADD sxs: N/A  PTSD sxs: See HPI above  PHQ9 and GAD7 scores were reviewed today if completed.   Medication side effects: Sedation from a.m. dose of clonidine  Current stressors include: Symptoms and See HPI above  Coping mechanisms and supports include: Family, Hobbies and Friends    Current medications include:   Current Outpatient Medications   Medication Sig     cloNIDine (CATAPRES) 0.1 MG tablet Take 1-2 tabs by mouth every AM and 1-2 tabs every bedtime.     gabapentin (NEURONTIN) 300 MG capsule Take 3 capsules (900 mg) by mouth 3 times daily     magnesium oxide (MAG-OX) 400 MG tablet Take 400 mg by mouth daily     vitamin B complex with vitamin C (STRESS TAB) tablet Take 1 tablet by mouth daily     No current facility-administered medications for this visit.       The [place] Prescription Monitoring Program has been reviewed and there are no concerns about diversionary activity for controlled substances at this time.   09/14/2022  1   09/13/2022  Gabapentin 300 MG Capsule    270.00  30 [address]   [id number]   [place] ([id number][place]   0/1   Comm Ins   [place]   08/29/2022  1   08/02/2022  Gabapentin 300 MG Capsule    120.00  30 [address]   [id number]   [place] ([id number][place]   1/1   [place]   08/02/2022  1   08/02/2022  Gabapentin 300 MG Capsule    120.00  30 [address]   [id number]   [place] ([id number][place]   0/1   [place]   02/09/2022  1   02/09/2022  Clonazepam 0.5 MG Tablet    40.00  20 [facility]   [id number]   [place] ([id number][place]   0/0         Past Medical/Surgical History:  Past Medical History:   Diagnosis Date     Acute appendicitis with peritoneal abscess 7/9/2013      has a past medical history of Acute appendicitis with peritoneal abscess (7/9/2013).    Social History:  Reviewed. No changes to  social history except as noted above in HPI.    Vital Signs:   None. This is phone/video visit.     Labs:  Most recent laboratory results reviewed and no new labs.     Review of Systems:  10 systems (general, cardiovascular, respiratory, eyes, ENT, endocrine, GI, , M/S, neurological) were reviewed. Most pertinent finding(s) is/are: denies fever, cough, headaches, shortness of breath, chest pain, N/V, constipation/diarrhea, trouble urinating, aches and pains.. The remaining systems are all unremarkable.    Mental Status Examination (limited as this is by phone/video):  Appearance: Awake, alert, appears stated age, no acute distress, well-groomed   Attitude:  cooperative, pleasant   Motor: No gross abnormalities observed via video, not formally tested   Oriented to:  person, place, time, and situation  Attention Span and Concentration:  normal  Speech:  clear, coherent, regular rate, rhythm, and volume  Language: intact  Mood:  anxious  Affect:  appropriate and in normal range and mood congruent  Associations:  no loose associations  Thought Process:  logical, linear and goal oriented  Thought Content:  no evidence of suicidal ideation or homicidal ideation, no evidence of psychotic thought, no auditory hallucinations present and no visual hallucinations present  Recent and Remote Memory:  Intact to interview. Not formally assessed. No amnesia.  Fund of Knowledge: appropriate  Insight:  good  Judgment:  intact, adequate for safety  Impulse Control:  intact    Suicide Risk Assessment:  Today Jun Glez reports no suicidal ideation. Based on all available evidence including the factors cited above, Jun Glez does not appear to be at imminent risk for self-harm, does not meet criteria for a 72-hr hold, and therefore remains appropriate for ongoing outpatient level of care.  A thorough assessment of risk factors related to suicide and self-harm have been reviewed and are noted above. The patient  convincingly denies suicidality on several occasions. Local community safety resources printed and reviewed for patient to use if needed. There was no deceit detected, and the patient presented in a manner that was believable.     DSM5 Diagnosis:  Generalized anxiety disorder  PTSD  Alcohol dependence, uncomplicated in remission    Medical comorbidities include:   Patient Active Problem List    Diagnosis Date Noted     S/P laparoscopic appendectomy 06/25/2013     Priority: Medium       Psychosocial & Contextual Factors: see HPI above    Assessment:  From Intake, 8/2/2022:  Jun Glez is a 50-year-old male with past psychiatric history including PTSD and anxiety who presents today for psychiatric evaluation.  Patient has a history of working with psychiatric outpatient providers and also primary care provider regarding mental health symptoms.  Through questioning, it became quite apparent that the patient's difficult to treat anxiety symptoms are likely related to alcohol withdrawal he is experiencing on a near daily basis.  Patient reports feeling fine most mornings into late morning, very early afternoon but then by around noon time he will start to feel his blood pressure increased, pulse increase, will have increased sweating, sometimes stomach upset, and feeling of doom and significantly increased anxiety.  These are all symptoms of alcohol withdrawal.  Patient was educated on the symptoms and the pattern of alcohol withdrawal in relation to when he is drinking as 5-7 drinks every night.  This likely is becoming more of a thing the longer he has been drinking excessively and also ever since he switched to hard liquor 2-3 years ago (which correlates when the symptoms began occurring).  Patient was encouraged to decrease his alcohol use.  He declined the need for any chemical dependency resources or supports at this time.  If he cannot cut back on his own he is agreeable to reaching out to us for  additional chemical dependency supports and resources for treatment or detox.  He is aware of the available resources.  At this time, he is agreeable to prescriptions for clonidine and gabapentin to treat his withdrawal symptoms and to support his attempts in cutting back his alcohol use.  Discussed off label use for these medications for this purpose and also risks and benefits of these medications.  We also reviewed the risks of using clonazepam with alcohol and patient denies that he ever uses clonazepam with alcohol.  Individual psychotherapy strongly recommended.  No acute safety concerns or SI.  Patient will be seen back for follow-up.    9/13/2022:  Patient overall with some improvement of symptoms, particularly initially.  Initial improvement unfortunately did not last very long.  Patient was able to decrease his alcohol use somewhat significantly.  Still trying to decrease use further but still struggling some to eliminate completely.  Admits to using alcohol to self medicate.  Unclear still at this time what symptoms might be more related to anxiety and PTSD versus more subtle withdrawal.  Patient did not notice anything at all on lower dose of clonidine and so I do recommend titrating dose a little higher since I think decreased adrenergic drive will help quite significantly.  I think that will also help with nightmares and sleep.  Also discussed potentially adding mirtazapine at bedtime.  I would like to see him cut back a little more on alcohol use.  We will titrate gabapentin dose a little higher since it was quite helpful initially when first started.  Patient agrees to message me if he would like to change his dose of his medications.  No acute safety concerns.  No SI.  No drug use.    10/4/2022:  Patient overall doing a little better.  Has significantly cut back on alcohol use.  Gabapentin continues to be helpful.  Not so sure about clonidine.  We will wean off a.m. dose since causing some sedation  and limited efficacy.  Since ongoing anxiety and PTSD related symptoms, we will start Viibryd.  Patient has had [employer] testing and Viibryd metabolized normally.  No acute safety concerns.  No SI.  No problematic drug or alcohol use.  Individual psychotherapy encouraged.    Medication side effects and alternatives were reviewed. Health promotion activities recommended and reviewed today. All questions addressed. Education and counseling completed regarding risks and benefits of medications and psychotherapy options. Recommend therapy for additional support.     Treatment Plan:    Continue gabapentin 900 mg 3 times daily for anxiety and alcohol cravings.    Decrease clonidine: After stable on Viibryd 10 mg daily decrease a.m. clonidine dose to 0.05 mg for about 3-5 days and then discontinue a.m. clonidine dose.  Can continue with 0.2 mg bedtime dose until follow-up visit.     Start Viibryd/vilazodone 10 mg daily for anxiety, PTSD.  Can message me in a couple weeks if tolerating well to consider increasing dose to 20 mg daily.    Continue all other cares per primary care provider.     Continue all other medications as reviewed per electronic medical record today.     Safety plan reviewed. To the Emergency Department as needed or call after hours crisis line at [phone] or [phone]. [place] Crisis Text Line. Text [place] to 085602 or Suicide LifeLine Chat: suicidepreventionTail-f Systemsline.org/chat    Consider individual psychotherapy for additional support and ongoing development of nonpharmacologic coping skills and strategies.    Schedule an appointment with me in 4 weeks or sooner as needed. Call [facility] at [phone] to schedule.    Follow up with primary care provider as planned or for acute medical concerns.    Call the psychiatric nurse line with medication questions or concerns at [phone].    MyChart may be used to communicate with your provider, but this is not intended to be used  for emergencies.    Administrative Billing:   Phone Call/Video Duration: 25 Minutes  Start: 10:08a  Stop: 10:33a    Time spent with patient was 25 minutes and greater than 50% of time or 13 minutes was spent in counseling and coordination of care regarding above diagnoses and treatment plan.    Patient Status:  Patient will continue to be seen for ongoing consultation and stabilization.    Signed:   Leigh Ann Elam DO  [facility]    Disclaimer: This note consists of symbols derived from keyboarding, dictation and/or voice recognition software. As a result, there may be errors in the script that have gone undetected. Please consider this when interpreting information found in this chart.  Answers for HPI/ROS submitted by the patient on 10/3/2022  JULIENNE 7 TOTAL SCORE: 17

## 2022-10-06 DIAGNOSIS — M79.89 FOOT SWELLING: ICD-10-CM

## 2022-10-06 DIAGNOSIS — R63.5 ABNORMAL WEIGHT GAIN: ICD-10-CM

## 2022-10-06 DIAGNOSIS — F41.9 ANXIETY: ICD-10-CM

## 2022-10-06 RX ORDER — HYDROCHLOROTHIAZIDE 12.5 MG/1
TABLET ORAL
Qty: 30 TABLET | Refills: 0 | Status: CANCELLED | OUTPATIENT
Start: 2022-10-06

## 2022-10-06 RX ORDER — ORLISTAT 120 MG/1
CAPSULE ORAL
Qty: 90 CAPSULE | Refills: 0 | Status: CANCELLED | OUTPATIENT
Start: 2022-10-06

## 2022-10-07 RX ORDER — HYDROCHLOROTHIAZIDE 12.5 MG/1
TABLET ORAL
Qty: 30 TABLET | Refills: 0 | Status: SHIPPED | OUTPATIENT
Start: 2022-10-07 | End: 2022-11-08

## 2022-10-10 DIAGNOSIS — R63.5 ABNORMAL WEIGHT GAIN: ICD-10-CM

## 2022-10-11 RX ORDER — ORLISTAT 120 MG/1
CAPSULE ORAL
Qty: 90 CAPSULE | Refills: 2 | Status: SHIPPED | OUTPATIENT
Start: 2022-10-11 | End: 2022-12-07

## 2022-10-12 ENCOUNTER — TELEPHONE (OUTPATIENT)
Dept: FAMILY MEDICINE | Facility: CLINIC | Age: 42
End: 2022-10-12

## 2022-10-12 NOTE — TELEPHONE ENCOUNTER
Patient and Kieran calling asking about if the prior auth on Xenical is completed.  See below appears is should be completed. Will call pharmacy to clarify.    Spoke with Lola at Pharmacy (prior -auth) team, discuss below. She will speak with pharmacy staff to see if a ndc number could be added, which then would approve medication.  Lola will call back with information.  Radha Hardy RN  [facility]        orlistat (XENICAL) 120 MG capsule 90 capsule 2 10/11/2022  No   Si CAPSULE ORALLY 3 TIMES DAILY   Sent to pharmacy as: Orlistat 120 MG Oral Capsule (Xenical)   Class: E-Prescribe   Order: [id number]   E-Prescribing Status: Receipt confirmed by pharmacy (10/11/2022  8:13 AM CDT)   No prior authorization was found for this prescription.   Found prior authorization for another prescription for the same medication: Approved     Radha Hardy RN  [facility]

## 2022-10-12 NOTE — TELEPHONE ENCOUNTER
"Lola called back from the [facility].     She confirmed that she was unable to run the claim for both Orlistat  OR XENICAL. Both still require a Prior Authorization.     Pharmacist recommends the Xenical as it is well supplied (Olista is sometimes out of stock).     The pharmacist also stared a \"Cover My Meds\" application.     Will forward to the PA Team.     Carola HARDY  [facility]    "

## 2022-10-13 ENCOUNTER — TELEPHONE (OUTPATIENT)
Dept: FAMILY MEDICINE | Facility: CLINIC | Age: 42
End: 2022-10-13

## 2022-10-13 NOTE — TELEPHONE ENCOUNTER
PRIOR AUTHORIZATION DENIED    Medication: orlistat (XENICAL) 120 MG capsule--DENIED    Denial Date: 10/13/2022    Denial Rational: Excluded by primary insurance--not covered under Medicare Part D law.  Also not covered by [facility] due to them being listed as secondary.  Per rep cannot do a PA as plan is secondary insurance.

## 2022-10-14 NOTE — TELEPHONE ENCOUNTER
Notified patient of the message below.  Virtual appointment made with PCP to discuss below.    Loretta RN,BSN  Triage Nurse  [facility]: [facility]  Ph: [phone]

## 2022-10-14 NOTE — TELEPHONE ENCOUNTER
She would need a follow-up visit to change medications.  I do not know that alternative medications are appropriate for her at this time; as she is living in a treatment/rehab type facility and she went around them and got her meds refilled without them knowing in the past.  We could put in a referral for weight management. TARAS Verma, FNP-BC

## 2022-10-17 NOTE — PROGRESS NOTES
Mily is a 42 year old who is being evaluated via a billable telephone visit.      What phone number would you like to be contacted at? [phone]   How would you like to obtain your AVS? MyChart    Assessment & Plan     Other chronic pain    - tiZANidine (ZANAFLEX) 2 MG tablet; Take 1 tablet (2 mg) by mouth 3 times daily as needed for muscle spasms  - oxyCODONE-acetaminophen (PERCOCET) 5-325 MG tablet; Take 0.5-1 tablets by mouth 2 times daily as needed for severe pain (use sparingly- to last one month) Do not take with anxiety medication.    Class 2 severe obesity due to excess calories with serious comorbidity and body mass index (BMI) of 36.0 to 36.9 in adult (H)    - phentermine (ADIPEX-P) 37.5 MG capsule; Take 1 capsule (37.5 mg) by mouth every morning Due for follow up in 3 months.    Closed nondisplaced intertrochanteric fracture of left femur, sequela    - oxyCODONE-acetaminophen (PERCOCET) 5-325 MG tablet; Take 0.5-1 tablets by mouth 2 times daily as needed for severe pain (use sparingly- to last one month) Do not take with anxiety medication.    Episodic tension-type headache, not intractable    - butalbital-acetaminophen-caffeine (ESGIC) -40 MG tablet; Take 1 tablet by mouth 2 times daily as needed for headaches    Gastroesophageal reflux disease with esophagitis without hemorrhage    - pantoprazole (PROTONIX) 20 MG EC tablet; Take 1 tablet (20 mg) by mouth 2 times daily    Daily headache    - Adult Neurology  Referral; Future    30 minutes spent on the date of the encounter doing chart review, history and exam, documentation and further activities per the note     Depression Screening Follow Up    PHQ 10/19/2022   PHQ-9 Total Score 17   Q9: Thoughts of better off dead/self-harm past 2 weeks Several days   F/U: Thoughts of suicide or self-harm -   F/U: Self harm-plan -   F/U: Self-harm action -   F/U: Safety concerns -   PHQ-9 External Data -     Last PHQ-9 10/19/2022   1.  Little  interest or pleasure in doing things 2   2.  Feeling down, depressed, or hopeless 3   3.  Trouble falling or staying asleep, or sleeping too much 0   4.  Feeling tired or having little energy 3   5.  Poor appetite or overeating 0   6.  Feeling bad about yourself 2   7.  Trouble concentrating 3   8.  Moving slowly or restless 3   Q9: Thoughts of better off dead/self-harm past 2 weeks 1   PHQ-9 Total Score 17   Difficulty at work, home, or with people Somewhat difficult   In the past two weeks have you had thoughts of suicide or self harm? -   Do you have concerns about your personal safety or the safety of others? -   In the past 2 weeks have you thought about a plan or had intention to harm yourself? -   In the past 2 weeks have you acted on these thoughts in any way? -     Follow Up      Follow Up Actions Taken  Crisis resource information provided in the After Visit Summary    Discussed the following ways the patient can remain in a safe environment:  dispose of old medications  and be around others     See Patient Instructions: Schedule with neurology, follow-up as needed or in 3 months for refill of controlled medications.  Continue working with psychiatry.  Discussed reviewing after visit summary tips regarding headache treatment and headache prevention.  Patient in agreement.    Return in about 3 months (around 1/19/2023), or if symptoms worsen or fail to improve.    GIA WOODARD, J CARLOS  [facility] EFFIE Minor is a 42 year old accompanied by her , presenting for the following health issues:  Recheck Medication and Weight Problem  Would like to discuss neck pain with her headaches.  Patient reports daily headaches- she is taking her ESIG twice a day she is wondering if this medication can be increased.  Discussed after review of the PDMP before she was just having 30 pills/month.  Discussed that she had come to me without her group home being aware and was taking  medication without their knowledge.  Discussed that she could see neurology since she is having daily headaches and having to take the ESIG twice daily; that there are medications neurology could give her to prevent chronic daily headache instead of increasing pain medication.  Patient reports if we increased her ESIG she would not need her Percocet as she believes when she was taking more ESIG she had gone for months without needing Percocet.  She feels the last time she saw neurology she did not get anywhere with them.  Discussed I would need recommendations from neurology, the headache specialist to change her current headache treatments.  Patient saw her psychiatrist yesterday for change of mental health medications; she reports she was having increased anger.  She is hopeful her med change will help.  She is needing refill of her phentermine for obesity which she states is not contributing to her anger and she is tolerating it well.  Her GERD is controlled with her Protonix.  She has no other concerns at this time.  HPI     Review of Systems   Constitutional, HEENT, cardiovascular, pulmonary, GI, , musculoskeletal, neuro, skin, endocrine and psych systems are negative, except as otherwise noted.      Objective           Vitals:  No vitals were obtained today due to virtual visit.    Physical Exam   healthy, alert and no distress  PSYCH: Alert and oriented times 3; coherent speech, normal   rate and volume, able to articulate logical thoughts, able   to abstract reason, no tangential thoughts, no hallucinations   or delusions  Her affect is normal  RESP: No cough, no audible wheezing, able to talk in full sentences  Remainder of exam unable to be completed due to telephone visits    See orders      Phone call duration: 14 minutes

## 2022-10-19 ENCOUNTER — TELEPHONE (OUTPATIENT)
Dept: FAMILY MEDICINE | Facility: CLINIC | Age: 42
End: 2022-10-19

## 2022-10-19 ENCOUNTER — VIRTUAL VISIT (OUTPATIENT)
Dept: FAMILY MEDICINE | Facility: CLINIC | Age: 42
End: 2022-10-19
Payer: MEDICARE

## 2022-10-19 DIAGNOSIS — E66.01 CLASS 2 SEVERE OBESITY DUE TO EXCESS CALORIES WITH SERIOUS COMORBIDITY AND BODY MASS INDEX (BMI) OF 36.0 TO 36.9 IN ADULT (H): ICD-10-CM

## 2022-10-19 DIAGNOSIS — K21.00 GASTROESOPHAGEAL REFLUX DISEASE WITH ESOPHAGITIS WITHOUT HEMORRHAGE: ICD-10-CM

## 2022-10-19 DIAGNOSIS — G89.29 OTHER CHRONIC PAIN: ICD-10-CM

## 2022-10-19 DIAGNOSIS — S72.145S CLOSED NONDISPLACED INTERTROCHANTERIC FRACTURE OF LEFT FEMUR, SEQUELA: ICD-10-CM

## 2022-10-19 DIAGNOSIS — G44.219 EPISODIC TENSION-TYPE HEADACHE, NOT INTRACTABLE: ICD-10-CM

## 2022-10-19 DIAGNOSIS — E66.812 CLASS 2 SEVERE OBESITY DUE TO EXCESS CALORIES WITH SERIOUS COMORBIDITY AND BODY MASS INDEX (BMI) OF 36.0 TO 36.9 IN ADULT (H): ICD-10-CM

## 2022-10-19 DIAGNOSIS — R51.9 DAILY HEADACHE: Primary | ICD-10-CM

## 2022-10-19 PROCEDURE — 99214 OFFICE O/P EST MOD 30 MIN: CPT | Mod: 95 | Performed by: NURSE PRACTITIONER

## 2022-10-19 RX ORDER — TIZANIDINE 2 MG/1
2 TABLET ORAL 3 TIMES DAILY PRN
Qty: 90 TABLET | Refills: 2 | Status: SHIPPED | OUTPATIENT
Start: 2022-10-19 | End: 2022-12-13

## 2022-10-19 RX ORDER — HYDROXYZINE HYDROCHLORIDE 25 MG/1
25 TABLET, FILM COATED ORAL PRN
COMMUNITY
Start: 2022-10-18 | End: 2022-12-07

## 2022-10-19 RX ORDER — SERTRALINE HYDROCHLORIDE 100 MG/1
100 TABLET, FILM COATED ORAL DAILY
COMMUNITY
Start: 2022-10-18 | End: 2023-12-27

## 2022-10-19 RX ORDER — PANTOPRAZOLE SODIUM 20 MG/1
20 TABLET, DELAYED RELEASE ORAL 2 TIMES DAILY
Qty: 180 TABLET | Refills: 1 | Status: SHIPPED | OUTPATIENT
Start: 2022-10-19 | End: 2022-12-07

## 2022-10-19 RX ORDER — BUTALBITAL, ACETAMINOPHEN AND CAFFEINE 50; 325; 40 MG/1; MG/1; MG/1
1 TABLET ORAL 2 TIMES DAILY PRN
Qty: 60 TABLET | Refills: 2 | Status: SHIPPED | OUTPATIENT
Start: 2022-10-19 | End: 2022-12-13

## 2022-10-19 RX ORDER — OXYCODONE AND ACETAMINOPHEN 5; 325 MG/1; MG/1
.5-1 TABLET ORAL 2 TIMES DAILY PRN
Qty: 60 TABLET | Refills: 0 | Status: SHIPPED | OUTPATIENT
Start: 2022-10-19 | End: 2022-12-07

## 2022-10-19 RX ORDER — PHENTERMINE HYDROCHLORIDE 37.5 MG/1
37.5 CAPSULE ORAL EVERY MORNING
Qty: 30 CAPSULE | Refills: 2 | Status: SHIPPED | OUTPATIENT
Start: 2022-10-19 | End: 2022-12-07

## 2022-10-19 ASSESSMENT — ANXIETY QUESTIONNAIRES
1. FEELING NERVOUS, ANXIOUS, OR ON EDGE: NEARLY EVERY DAY
IF YOU CHECKED OFF ANY PROBLEMS ON THIS QUESTIONNAIRE, HOW DIFFICULT HAVE THESE PROBLEMS MADE IT FOR YOU TO DO YOUR WORK, TAKE CARE OF THINGS AT HOME, OR GET ALONG WITH OTHER PEOPLE: VERY DIFFICULT
7. FEELING AFRAID AS IF SOMETHING AWFUL MIGHT HAPPEN: MORE THAN HALF THE DAYS
GAD7 TOTAL SCORE: 20
2. NOT BEING ABLE TO STOP OR CONTROL WORRYING: NEARLY EVERY DAY
4. TROUBLE RELAXING: NEARLY EVERY DAY
GAD7 TOTAL SCORE: 20
6. BECOMING EASILY ANNOYED OR IRRITABLE: NEARLY EVERY DAY
3. WORRYING TOO MUCH ABOUT DIFFERENT THINGS: NEARLY EVERY DAY
5. BEING SO RESTLESS THAT IT IS HARD TO SIT STILL: NEARLY EVERY DAY

## 2022-10-19 ASSESSMENT — PATIENT HEALTH QUESTIONNAIRE - PHQ9: SUM OF ALL RESPONSES TO PHQ QUESTIONS 1-9: 17

## 2022-10-19 NOTE — TELEPHONE ENCOUNTER
I have received your forms and I am having her do anything about medication surplus.  Patient was asking me to increase her medication and I told her no at her visit today.  I will discuss this further with patient when she requests further refills.  Why was not facility on the call today if they have concerns regarding her medications?  TARAS Verma, FNP-BC

## 2022-10-19 NOTE — TELEPHONE ENCOUNTER
Called and spoke with Kieran, he stated he took care of this with the pharmacy. He did not have a response when asked why the facility was not on the call for the appointment.    Margret Schwartz RN

## 2022-10-19 NOTE — TELEPHONE ENCOUNTER
Spoke with KOBE Alcaraz from [facility] patient is residing in. He is calling to clarify whether medication refills had been sent for:    butalbital-acetaminophen-caffeine (ESGIC) -40 MG tablet    oxyCODONE-acetaminophen (PERCOCET) 5-325 MG tablet    tiZANidine (ZANAFLEX) 2 MG tablet    Writer verified that three prescriptions were sent today (10/19) to pharmacy. Kieran states that there has been a medication surplus at facility; Tizanidine (128 tablets), Oxycodone-Acetaminophen (95 tablets), and Butalbital-Acetaminophen-Caffeine (85 tablets) and would like to request these medications be held. Kieran believes medications should be held, as it may be a problem post-discharge from facility. Kieran plans to call [facility] to request these to be held.     Kieran also noted that he had faxed over form on 9/29 regarding medication surplus. Unsure if fax was received.    Brock Perez RN  [facility]

## 2022-10-24 DIAGNOSIS — E66.812 CLASS 2 SEVERE OBESITY DUE TO EXCESS CALORIES WITH SERIOUS COMORBIDITY AND BODY MASS INDEX (BMI) OF 36.0 TO 36.9 IN ADULT (H): ICD-10-CM

## 2022-10-24 DIAGNOSIS — F39 MOOD DISORDER (H): ICD-10-CM

## 2022-10-24 DIAGNOSIS — E66.01 CLASS 2 SEVERE OBESITY DUE TO EXCESS CALORIES WITH SERIOUS COMORBIDITY AND BODY MASS INDEX (BMI) OF 36.0 TO 36.9 IN ADULT (H): ICD-10-CM

## 2022-10-24 DIAGNOSIS — F43.23 ADJUSTMENT DISORDER WITH MIXED ANXIETY AND DEPRESSED MOOD: ICD-10-CM

## 2022-10-25 RX ORDER — TOPIRAMATE 50 MG/1
50 TABLET, FILM COATED ORAL 2 TIMES DAILY
Qty: 180 TABLET | Refills: 1 | Status: SHIPPED | OUTPATIENT
Start: 2022-10-25 | End: 2022-12-07

## 2022-11-09 ENCOUNTER — TELEPHONE (OUTPATIENT)
Dept: FAMILY MEDICINE | Facility: CLINIC | Age: 42
End: 2022-11-09

## 2022-11-09 NOTE — TELEPHONE ENCOUNTER
Kieran, nurse from [facility] calling to request an appointment.     Patient is discharging to an adult foster care [facility].     Foster care is requesting a physical prior to patient being accepted to the adult foster care.     Unknown when patient is getting discharged from [facility].    CM for patient is requesting a physical within the week.     Earliest available is December 20th with PCP.     Please contact KOBE Alcaraz at [facility] at this phone number [phone].     Or patient at [phone].     Please advise.      Xenia Joshua RN  [facility]

## 2022-11-09 NOTE — TELEPHONE ENCOUNTER
Patient can take one of my openings for her required physical.  Or she could schedule with another provider who has openings.TARAS Verma, FNP-BC

## 2022-11-15 ENCOUNTER — TELEPHONE (OUTPATIENT)
Dept: FAMILY MEDICINE | Facility: CLINIC | Age: 42
End: 2022-11-15

## 2022-11-15 DIAGNOSIS — R51.9 DAILY HEADACHE: ICD-10-CM

## 2022-11-15 DIAGNOSIS — G44.219 EPISODIC TENSION-TYPE HEADACHE, NOT INTRACTABLE: Primary | ICD-10-CM

## 2022-11-15 NOTE — TELEPHONE ENCOUNTER
Forms received from: [facility]   Phone number listed: [phone]   Fax listed: na  Date received:  11/10/22  Form description: Annual physical examination form  Once forms are completed, please return to [facility] via na.  Is patient requesting to be contacted when forms are completed: na  Phone: na  Form placed:  To Xenia Armendariz

## 2022-11-17 DIAGNOSIS — F41.9 ANXIETY: ICD-10-CM

## 2022-11-17 RX ORDER — PROPRANOLOL HYDROCHLORIDE 10 MG/1
TABLET ORAL
Qty: 30 TABLET | Refills: 0 | Status: SHIPPED | OUTPATIENT
Start: 2022-11-17 | End: 2022-12-07

## 2022-11-17 NOTE — TELEPHONE ENCOUNTER
Routing to PCP, team    Patient needing refill on Propranolol.  Current script pended    [facility] not aware medication had been switched to PRN.  Patient has been receiving daily.    Appears medication switched to PRN in July    Facility requesting updated Med list be faxed to [phone].    RN received call from Kieran at [facility] regarding above.    Parag Jeong, RN, BSN, PHN  [facility]

## 2022-11-18 ENCOUNTER — TELEPHONE (OUTPATIENT)
Dept: FAMILY MEDICINE | Facility: CLINIC | Age: 42
End: 2022-11-18

## 2022-11-18 NOTE — TELEPHONE ENCOUNTER
Patient has not had a physical with me in the last year.  In order for me to complete her forms for her annual physical (for her group home/ housing) she needs to schedule one with me.  Please call her to notify her of this. TARAS Verma, FNP-BC     [Takes medication as prescribed] : takes [None] : Patient does not have any barriers to medication adherence

## 2022-12-01 ENCOUNTER — TELEPHONE (OUTPATIENT)
Dept: FAMILY MEDICINE | Facility: CLINIC | Age: 42
End: 2022-12-01

## 2022-12-01 ENCOUNTER — NURSE TRIAGE (OUTPATIENT)
Dept: NURSING | Facility: CLINIC | Age: 42
End: 2022-12-01

## 2022-12-01 NOTE — TELEPHONE ENCOUNTER
Patient was supposed to follow-up for physical to have forms completed for group home and med list reviewed.  You can print off her current med list in the chart and fax it to them. TARAS Verma, FNP-BC

## 2022-12-01 NOTE — TELEPHONE ENCOUNTER
Patient is being transferred from [facility] to a Foster Care facility due to some mental illness.    Patient needs a list of medications for her foster home. They need this list before they transfer her.    Need to call  Melinda Rosario [facility]   [phone].  She will give you a fax number you can fax the medication list to.      [facility]  if any questions  or Santiago, the patients father .    The parents are hoping to do this ASAP so she is able to get transferred.    Will route message to clinic.    Lola Verma RN   12/01/22 4:39 PM  [facility] Nurse Advisor      Reason for Disposition    [1] Caller requesting NON-URGENT health information AND [2] PCP's office is the best resource    Additional Information    Negative: [1] Caller is not with the adult (patient) AND [2] reporting urgent symptoms    Negative: Lab result questions    Negative: Medication questions    Negative: Caller can't be reached by phone    Negative: Caller has already spoken to PCP or another triager    Negative: RN needs further essential information from caller in order to complete triage    Negative: Requesting regular office appointment    Protocols used: INFORMATION ONLY CALL - NO TRIAGE-A-

## 2022-12-01 NOTE — TELEPHONE ENCOUNTER
Received call from Malone with [facility]. She is calling to request a signed medication list so that patient can take medications at her new place of residence.    Fax: [phone]  , Melinda Rosario  [facility]     HAYLEY Armas RN  [facility]

## 2022-12-02 ENCOUNTER — TELEPHONE (OUTPATIENT)
Dept: FAMILY MEDICINE | Facility: CLINIC | Age: 42
End: 2022-12-02

## 2022-12-02 NOTE — TELEPHONE ENCOUNTER
Dad Santiago called needs patients Med list and how often she takes her medication faxed patient is going to be going to an Adult Foster Home and they can not take her until they have this so Dad would like this done soon.  is Melinda Rosario at [phone] and her Fax is [phone].  Jazmin Álvarez   [facility]     Subjective:      Patient ID: Alexia Dempsey is a 51 y.o. female.    Chief Complaint: Follow-up (3m)    Disclaimer:  This note is prepared using voice recognition software and as such is likely to have errors and has not been proof read. Please contact me for questions.     Patient is coming in today for 2 day follow-up after seeing urgent care.  Coming in with abdominal pain nausea and discomfort.  Fatigue.  Was seen by urgent care 2 days ago for chest pain. At that time EKG showed no significant changes.  They did lab work which showed her CBC was upper limits of normal with 12,000 white count however ANC was elevated liver enzymes kidney function were normal troponin was negative.  She had been trying milk of magnesia as well as PPI therapy in [place] and it was not improving.  She reports today that she really felt nauseated and thus she took a Zofran she had left over.  Since then some of her issues have started to ease up some but she has not felt like eating anything.  She ate a few pieces a cereal this morning dry.  Was not even able to eat a half a banana.  Upon further review in 2015 she had ultrasound and a HIDA scan done for her gallbladder which showed biliary dyskinesia versus chronic coli cystitis.  No gallstones though.  Her gallbladder was function about a 10%.  She states she was unaware this at this time.  She reports she has a hiatal hernia.  She is concerned about potential ulcer.  She has also had a lot of sinus production yellow greenish drainage.  A lot of pressure as well.  Just feeling run down.  The like the sinus symptoms have also been going on for about a week as well.  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    She has been holding the metformin as she has a known history of insulin resistance.  Cannot tolerate over the last few days.  No diarrhea though.    Has had the fatigue some chills as well.  Does still have her appendix.    Earlier this summer she ended up having  both of her ovaries removed.  She now has had a full complete hysterectomy is now on estrogen therapy at 2 mg a day.  She reports that the ovaries had several cyst in the gynecologist was happy and glad he removed both of them.      Lab Results   Component Value Date    WBC 12.04 10/29/2019    HGB 12.7 10/29/2019    HCT 40.4 10/29/2019     10/29/2019    CHOL 191 07/09/2019    TRIG 90 07/09/2019    HDL 61 07/09/2019    ALT 14 10/29/2019    AST 13 10/29/2019     10/29/2019    K 3.7 10/29/2019     10/29/2019    CREATININE 0.7 10/29/2019    BUN 9 10/29/2019    CO2 27 10/29/2019    TSH 1.273 07/09/2019    HGBA1C 5.4 07/09/2019       X-Ray Hand 3 View Bilateral  Narrative: EXAMINATION:  XR HAND COMPLETE 3 VIEWS BILATERAL    CLINICAL HISTORY:  . Pain in unspecified joint    TECHNIQUE:  PA, lateral, and oblique views of both hands were performed.    COMPARISON:  None    FINDINGS:  Bones are intact without evidence of fracture or dislocation.  Minimal degenerative changes.  No focal erosions.  Soft tissues are symmetric in appearance.  Impression: As above    Electronically signed by: Simón Gu MD  Date:    07/09/2019  Time:    09:32        Review of Systems   Constitutional: Positive for activity change, appetite change, chills and fatigue. Negative for fever and unexpected weight change.   HENT: Positive for congestion, postnasal drip, sinus pressure and sinus pain. Negative for hearing loss, rhinorrhea and trouble swallowing.    Eyes: Negative for discharge and visual disturbance.   Respiratory: Positive for cough. Negative for chest tightness, shortness of breath and wheezing.    Cardiovascular: Negative for chest pain and palpitations.   Gastrointestinal: Positive for abdominal distention, abdominal pain, diarrhea and nausea. Negative for blood in stool, constipation and vomiting.   Endocrine: Negative for polydipsia and polyuria.   Genitourinary: Negative for difficulty urinating, dysuria,  "hematuria and menstrual problem.   Musculoskeletal: Positive for arthralgias. Negative for joint swelling and neck pain.   Skin: Negative for color change and wound.   Neurological: Positive for weakness. Negative for headaches.   Psychiatric/Behavioral: Positive for dysphoric mood and sleep disturbance. Negative for confusion. The patient is nervous/anxious.      Objective:     Vitals:    10/31/19 1511   BP: 128/80   Pulse: 60   Temp: 98.3 °F (36.8 °C)   Weight: 85 kg (187 lb 6.3 oz)   Height: 5' 2" (1.575 m)     Physical Exam   Constitutional: She appears well-developed and well-nourished.   HENT:   Head: Normocephalic and atraumatic.   Right Ear: Tympanic membrane normal.   Left Ear: Tympanic membrane normal.   Nose: Right sinus exhibits maxillary sinus tenderness and frontal sinus tenderness. Left sinus exhibits maxillary sinus tenderness and frontal sinus tenderness.   Mouth/Throat: Oropharynx is clear and moist.   Eyes: Conjunctivae and EOM are normal.   Neck: Normal range of motion. Neck supple.   Cardiovascular: Normal rate and regular rhythm.   Pulmonary/Chest: Effort normal and breath sounds normal.   Abdominal: Soft. Bowel sounds are normal. She exhibits distension. She exhibits no mass. There is tenderness. There is guarding. There is no rebound.   Psychiatric: She has a normal mood and affect. Her behavior is normal.   Nursing note and vitals reviewed.    Assessment:     1. Nausea    2. Right upper quadrant abdominal pain    3. Biliary dyskinesia    4. Sinusitis, unspecified chronicity, unspecified location      Plan:   Alexia was seen today for follow-up.    Diagnoses and all orders for this visit:    Nausea-new at this time concern more for biliary issues versus pancreatitis.  On the setting of the high normal white count but elevated ANC concern for infection at this time.  Will go ahead and do repeat lab work today also include lipase and amylase.  Also obtain liver ultrasound at this time.  " Encouraged her to do liquid diet at this time.  Follow-up based on the results.  Given Zofran as well as Levsin to use p.r.n..  -     CBC auto differential; Future  -     Lipase; Future  -     Amylase; Future  -     Comprehensive metabolic panel; Future  -     US Abdomen Limited; Future    Right upper quadrant abdominal pain-suspected gallbladder issues at this time obtain lab work today rule out a chronic infections starting on Levaquin.  Obtain ultrasound below is a copy of her HIDA scan from 2015  -     CBC auto differential; Future  -     Lipase; Future  -     Amylase; Future  -     Comprehensive metabolic panel; Future  -     US Abdomen Limited; Future    Biliary dyskinesia- noted on HIDA in 2015.   Reading Physician Reading Date Result Priority   Ever Ceron,  3/5/2015       Narrative     HIDA scan    Findings: 5 mCi of technetium 99m Choletec was utilized.  1.78 mcg of CCK was also administered.    Findings: There is prompt concentration and excretion of radiotracer by the liver.  The gallbladder is initially seen at the 11 minute sheyla.  Bowel activity is also noted in a timely fashion and initially seen at approximately 22 minutes.    Status post administration of CCK the gallbladder ejection fraction 32 minutes is 10%.  Normal is greater than 35%.      Impression          1. No evidence to suggest acute cholecystitis.    2.  Diminished gallbladder ejection fraction of 10% which can be seen with biliary dyskinesia/chronic cholecystitis.      Electronically signed by: EVER CERON D.O.  Date: 03/05/15  Time: 10:17        -     CBC auto differential; Future  -     Lipase; Future  -     Amylase; Future  -     Comprehensive metabolic panel; Future  -     US Abdomen Limited; Future    Sinusitis, unspecified chronicity, unspecified location-suspected will go ahead treat with Levaquin at this time also concern for possibility of chronic cholecystitis    Follow-up based on imaging results and if not  improving in the next 24-48 hours will refer accordingly.    Other orders  -     hyoscyamine (LEVSIN/SL) 0.125 mg Subl; Place 1 tablet (0.125 mg total) under the tongue every 4 (four) hours as needed (epigastric/gb pain).  -     ondansetron (ZOFRAN-ODT) 8 MG TbDL; Take 1 tablet (8 mg total) by mouth every 6 (six) hours as needed.  -     levoFLOXacin (LEVAQUIN) 750 MG tablet; Take 1 tablet (750 mg total) by mouth once daily.    Continue to hold metformin at this time consume bland liquid diet        Follow up if symptoms worsen or fail to improve.    There are no Patient Instructions on file for this visit.

## 2022-12-02 NOTE — TELEPHONE ENCOUNTER
Med list printed, signed by Dr. Agarwal in absence of Xenia Gaviria. Faxed to [phone]. Attn Melinda Rosario.

## 2022-12-06 ENCOUNTER — TELEPHONE (OUTPATIENT)
Dept: FAMILY MEDICINE | Facility: CLINIC | Age: 42
End: 2022-12-06

## 2022-12-06 NOTE — TELEPHONE ENCOUNTER
Prior Authorization Retail Medication Request    Medication/Dose: pantoprazole (PROTONIX) 20 MG EC tablet  ICD code (if different than what is on RX):  K21.00  Previously Tried and Failed:  na  Rationale:  na    Insurance Name:  [facility]  Insurance ID:  [id number]      Pharmacy Information (if different than what is on RX)  Name:  [facility]  Phone:  [phone]

## 2022-12-06 NOTE — TELEPHONE ENCOUNTER
Patients mom is calling and would like to  patients medication list.    Ok per Mily to put med list at .    They would like this asap.    Loretta RN,BSN  Triage Nurse  [facility]: [facility]  Ph: [phone]

## 2022-12-07 ENCOUNTER — VIRTUAL VISIT (OUTPATIENT)
Dept: FAMILY MEDICINE | Facility: CLINIC | Age: 42
End: 2022-12-07
Payer: MEDICARE

## 2022-12-07 DIAGNOSIS — G89.29 OTHER CHRONIC PAIN: ICD-10-CM

## 2022-12-07 DIAGNOSIS — S72.145S CLOSED NONDISPLACED INTERTROCHANTERIC FRACTURE OF LEFT FEMUR, SEQUELA: ICD-10-CM

## 2022-12-07 DIAGNOSIS — Z78.9 LIVES IN GROUP HOME: Primary | ICD-10-CM

## 2022-12-07 DIAGNOSIS — F41.9 ANXIETY: ICD-10-CM

## 2022-12-07 DIAGNOSIS — Z79.890 HORMONE REPLACEMENT THERAPY (HRT): ICD-10-CM

## 2022-12-07 PROCEDURE — 99443 PR PHYSICIAN TELEPHONE EVALUATION 21-30 MIN: CPT | Mod: 93 | Performed by: NURSE PRACTITIONER

## 2022-12-07 RX ORDER — PROPRANOLOL HYDROCHLORIDE 10 MG/1
10 TABLET ORAL 3 TIMES DAILY
Qty: 90 TABLET | Refills: 1 | Status: SHIPPED | OUTPATIENT
Start: 2022-12-07 | End: 2023-03-01

## 2022-12-07 RX ORDER — OXYCODONE AND ACETAMINOPHEN 5; 325 MG/1; MG/1
.5-1 TABLET ORAL 2 TIMES DAILY PRN
Qty: 60 TABLET | Refills: 0 | Status: SHIPPED | OUTPATIENT
Start: 2022-12-07 | End: 2022-12-13

## 2022-12-07 NOTE — TELEPHONE ENCOUNTER
Prior Authorization Approval    Authorization Effective Date: 10/1/2022  Authorization Expiration Date: 12/31/2022  Medication: pantoprazole (PROTONIX) 20 MG EC tablet  Approved Dose/Quantity:    Reference #:     Insurance Company: [employer] - Phone [phone] Fax [phone]  Expected CoPay:       CoPay Card Available:      Foundation Assistance Needed:    Which Pharmacy is filling the prescription (Not needed for infusion/clinic administered): [facility] - [place], [place] - [address]  Pharmacy Notified: Yes  Patient Notified: Yes  **Instructed pharmacy to notify patient when script is ready to /ship.**

## 2022-12-07 NOTE — Clinical Note
Please print updated med list and allergy list and have at  for  today. Thanks Xenia Gaviria, TARAS, FNP-BC

## 2022-12-07 NOTE — PROGRESS NOTES
Mily is a 42 year old who is being evaluated via a billable telephone visit.      What phone number would you like to be contacted at?   How would you like to obtain your AVS? MyChart    Assessment & Plan     Anxiety    - propranolol (INDERAL) 10 MG tablet; Take 1 tablet (10 mg) by mouth 3 times daily 1 TABLET ORALLY 3 TIMES DAILY AS NEEDED (DX: ANXIETY) Strength: 10 mg    Hormone replacement therapy (HRT)    - estrogen conj (PREMARIN) 1.25 MG tablet; Take 1 tablet (1.25 mg) by mouth daily For hormone replacement r/t hysterectomy    Other chronic pain    - naloxone (NARCAN) 4 MG/0.1ML nasal spray; Spray 1 spray (4 mg) into one nostril alternating nostrils as needed for opioid reversal every 2-3 minutes until assistance arrives  - oxyCODONE-acetaminophen (PERCOCET) 5-325 MG tablet; Take 0.5-1 tablets by mouth 2 times daily as needed for severe pain (7-10) (use sparingly- to last one month) Do not take with anxiety medication.    Closed nondisplaced intertrochanteric fracture of left femur, sequela    - oxyCODONE-acetaminophen (PERCOCET) 5-325 MG tablet; Take 0.5-1 tablets by mouth 2 times daily as needed for severe pain (7-10) (use sparingly- to last one month) Do not take with anxiety medication.    30 minutes spent on the date of the encounter doing chart review, history and exam, documentation and further activities per the note     See Patient Instructions: Take medications as prescribed.  Follow-up for healthcare questions or concerns at any time.  Controlled medications require some type of follow-up visit every 3 months for refill.  Med list and allergy list should be at  for pickup.  Patient and parents in agreement.    Return in about 3 months (around 3/7/2023), or if symptoms worsen or fail to improve, for for controlled medication refill.    J CARLOS PANCHAL  [facility] EFFIE Minor is a 42 year old, presenting for the following health issues:  No chief  complaint on file.      HPI     Concern - moving into a foster home and needing med reconciliation. Pt is unable to move into a foster home until the list is complete.  She is currently living with her parents.     She reports all of her stuff is at the foster home and they will not let her move in without an updated medication list and allergy list.  We reviewed her medications over the phone with her parents in attendance and put indications for needed of medications.  She reports she had an appointment with psychiatry yesterday and they sent in olanzapine for her to continue but she reports history of swelling with this medication and she wants this removed from her med list.  Her parents question if she should continue taking this medicine.  Discussed since it is not on her current med list and she does have an in her allergy list she will need this sent to the group home from psychiatry if they want her to continue this medication.  She reports her mental health is stable at this time.  She is excited to move into a group home.  Other meds updated.  Refill of Percocet sent in as requested.  Advised patient not to send extra meds to group home with patient.  Narcan nasal spray changed from injection as requested.  Advised follow-up if questions or concerns.  They want to  med list and allergy list today from .  Discussed I will have secretaries prepare this for pickup.  Pt is also needing her naloxone injection changed to a nasal spray. The foster home is unable to administer injections.   Hermila Oseguera LPN on 12/7/2022 at 10:11 AM          Review of Systems   Constitutional, HEENT, cardiovascular, pulmonary, GI, , musculoskeletal, neuro, skin, endocrine and psych systems are negative, except as otherwise noted.      Objective           Vitals:  No vitals were obtained today due to virtual visit.    Physical Exam   healthy, alert and no distress  PSYCH: Alert and oriented times 3; coherent  speech, normal   rate and volume, able to articulate logical thoughts, able   to abstract reason, no tangential thoughts, no hallucinations   or delusions  Her affect is normal  RESP: No cough, no audible wheezing, able to talk in full sentences  Remainder of exam unable to be completed due to telephone visits    See orders            Phone call duration: 17 minutes

## 2022-12-07 NOTE — TELEPHONE ENCOUNTER
Central Prior Authorization Team   Phone: [phone]    PA Initiation    Medication: pantoprazole (PROTONIX) 20 MG EC tablet  Insurance Company: [employer] - Phone [phone] Fax [phone]  Pharmacy Filling the Rx: [facility] - [place], [place] - [address]  Filling Pharmacy Phone: [phone]  Filling Pharmacy Fax:    Start Date: 12/7/2022

## 2022-12-09 NOTE — PROGRESS NOTES
SUBJECTIVE:   CC: Mily is an 42 year old who presents for preventive health visit.     Patient has been advised of split billing requirements and indicates understanding: Yes  HPI  Patient would still like to discuss the followin.) Verify medications and allergies - reprint of MAR and allergy list at discharge.  Pt can give her own OTC medications.  If staff needs to leave the home, can leave Mily with one dose of prescribed PRN medication.  2.) Form needs to be filled out     Occasional constipation. Drinks adequate water. Discussed PRN miralax, exercise regularly. Hx of anaphylaxis to medications, bees. Would like epi pen.     Today's PHQ-2 Score:   PHQ-2 (  [employer]) 2022   Q1: Little interest or pleasure in doing things 3   Q2: Feeling down, depressed or hopeless 3   PHQ-2 Score 6   PHQ-2 Total Score (12-17 Years)- Positive if 3 or more points; Administer PHQ-A if positive -   Q1: Little interest or pleasure in doing things Nearly every day   Q2: Feeling down, depressed or hopeless Nearly every day   PHQ-2 Score 6           Social History     Tobacco Use     Smoking status: Former     Packs/day: 0.00     Years: 18.00     Pack years: 0.00     Types: Cigarettes     Quit date: 2014     Years since quittin.8     Smokeless tobacco: Never     Tobacco comments:     Started in probably  stopped in    Substance Use Topics     Alcohol use: Yes     Comment: Once in a great while like a drink on a holiday. Something l         Alcohol Use 2022   Prescreen: >3 drinks/day or >7 drinks/week? Not Applicable   Prescreen: >3 drinks/day or >7 drinks/week? -       Reviewed orders with patient.  Reviewed health maintenance and updated orders accordingly - Yes  Lab work is in process  Labs reviewed in EPIC  BP Readings from Last 3 Encounters:   22 112/70   22 136/83   22 106/74    Wt Readings from Last 3 Encounters:   22 75.6 kg (166 lb 9.6 oz)   22 82.7 kg (182  lb 6.4 oz)   04/11/22 86 kg (189 lb 11.2 oz)                  Patient Active Problem List   Diagnosis     Personal history of nicotine dependence     CARDIOVASCULAR SCREENING; LDL GOAL LESS THAN 160     Gastritis     Osteoporosis of multiple sites     Closed nondisplaced intertrochanteric fracture of left femur (H)     Migraine without aura and without status migrainosus, not intractable     Closed nondisplaced fracture of body of left calcaneus with delayed healing, subsequent encounter     Heel pain, chronic, left     Gastroesophageal reflux disease, esophagitis presence not specified     Superficial phlebitis     Class 2 severe obesity due to excess calories with serious comorbidity and body mass index (BMI) of 36.0 to 36.9 in adult (H)     Other chronic pain     Low serum cortisol level     Anxiety     Eosinophilic gastroenteritis     Chronic wrist pain     Moderate episode of recurrent major depressive disorder (H)     Falls frequently     Abdominal wall lump     Chronic, continuous use of opioids     Deliberate self-cutting     At risk for self harm     Morbid obesity (H)     Encounter for removal of sutures     Episodic tension-type headache, not intractable     Hormone replacement therapy (HRT)     Severe episode of recurrent major depressive disorder, without psychotic features (H)     Lives in group home     Slow transit constipation     Hx of anaphylaxis     Past Surgical History:   Procedure Laterality Date     ARTHROSCOPY HIP, OSTEOPLASTY FEMUR PROXIMAL, COMBINED Left 06/29/2016    Procedure: COMBINED ARTHROSCOPY HIP, OSTEOPLASTY FEMUR PROXIMAL;  Surgeon: Godwin Deleon MD;  Location: [facility]     ARTHROSCOPY OF JOINT UNLISTED  04/01/2004    Left wrist, with debridement and repair of tear.     BIOPSY  Not exactly sure    Multiple for lady reproductive stuff     COLONOSCOPY  Not sure    [facility] in [facility] and an endoscopy done too     EXCISE MASS ABDOMEN Left 5/5/2021    Procedure: Excision of  abdominal wall lump;  Surgeon: Zackary Alonzo MD;  Location: [facility] OR     HC REMOVAL OF OVARY/TUBE(S)  2003    right with fallopian tube     HC REPAIR TRIANGULAR CART,WRIST JT  2005    Dr Eloy Sosa     HC REPAIR TRIANGULAR CART,WRIST JT  2007 or so    ulnar excision- Dr Eloy Sosa     LAPAROSCOPIC ABLATION ENDOMETRIOSIS      x 2     LITHOTRIPSY       SOFT TISSUE SURGERY   and     2 tfcc repair l wrist so procedure with partial removal of u     ZZC TOTAL ABDOM HYSTERECTOMY      with LSO       Social History     Tobacco Use     Smoking status: Former     Packs/day: 0.00     Years: 18.00     Pack years: 0.00     Types: Cigarettes     Quit date: 2014     Years since quittin.8     Smokeless tobacco: Never     Tobacco comments:     Started in 1996 stopped in    Substance Use Topics     Alcohol use: Yes     Comment: Once in a great while like a drink on a holiday. Something l     Family History   Problem Relation Age of Onset     Hypertension Mother      Other Cancer Mother         Stomach     Depression Mother      Obesity Mother      Hypertension Father      Lipids Father      Hyperlipidemia Father      Hypertension Maternal Grandmother      Genitourinary Problems Maternal Grandmother         kidney disease     Breast Cancer Maternal Grandmother      Substance Abuse Maternal Grandfather         Alcoholic biological and step dad     Obesity Maternal Grandfather      Cancer Paternal Grandmother         leukemia     Diabetes Other      Hyperlipidemia Other      Anxiety Disorder Other         On both mom and dad's side     Osteoporosis Other         Just found out and she's around 70yrs. Old     Obesity Other         Both sides     Diabetes Cousin      Prostate Cancer Other      Anxiety Disorder Other         Both sides of family     Substance Abuse Other         Alcoholic both sides of family     Obesity Other         Both sides     Depression Other         Major  depressive disorder     Anxiety Disorder Other         Generalized anxiety disorder     Mental Illness Other         Major depressive disorder, generalized anxiety disorder and etc.     Osteoporosis Other         Severe osteoporosis with multiple fractures     Genetic Disorder Other         Endometriosis     Anxiety Disorder Cousin         Both sides of family     Genetic Disorder Cousin         Endometriosis multiple cousins have it     Obesity Cousin         Both sides     Anxiety Disorder Niece         Sister's daughter     Genetic Disorder Sister         Endometriosis     Asthma No family hx of      C.A.D. No family hx of      Diabetes No family hx of      Cerebrovascular Disease No family hx of      Breast Cancer No family hx of      Cancer - colorectal No family hx of      Prostate Cancer No family hx of      Alzheimer Disease No family hx of      Arthritis No family hx of      Blood Disease No family hx of      Circulatory No family hx of      Eye Disorder No family hx of      Gastrointestinal Disease No family hx of      Musculoskeletal Disorder No family hx of      Neurologic Disorder No family hx of      Respiratory No family hx of      Thyroid Disease No family hx of          Current Outpatient Medications   Medication Sig Dispense Refill     butalbital-acetaminophen-caffeine (ESGIC) -40 MG tablet Take 1 tablet by mouth 2 times daily as needed for headaches (do not take more than one in 6 hours) 60 tablet 2     DULoxetine (CYMBALTA) 60 MG capsule Take 60 mg by mouth 2 times daily For depression       EPINEPHrine (ANY BX GENERIC EQUIV) 0.3 MG/0.3ML injection 2-pack Inject 0.3 mLs (0.3 mg) into the muscle as needed for anaphylaxis May repeat one time in 5-15 minutes if response to initial dose is inadequate. 2 each 1     estrogen conj (PREMARIN) 1.25 MG tablet Take 1 tablet (1.25 mg) by mouth daily For hormone replacement r/t hysterectomy 30 tablet 0     hydrOXYzine (ATARAX) 25 MG tablet Take 1 tablet  (25 mg) by mouth 3 times daily as needed for anxiety 90 tablet 0     lamoTRIgine (LAMICTAL) 100 MG tablet Take by mouth daily 50 mg in the morning and 100 mg at hs- mood stabilizer and depression       lithium ER (LITHOBID) 300 MG CR tablet Take 300 mg by mouth At Bedtime 3 tablets at bedtime for mood stability and depression       oxyCODONE-acetaminophen (PERCOCET) 5-325 MG tablet Take 0.5-1 tablets by mouth 2 times daily as needed for severe pain (7-10) (use sparingly- to last one month; at least 6 hours apart) 60 tablet 0     polyethylene glycol (MIRALAX) 17 GM/Dose powder Take 17 g (1 capful) by mouth daily 578 g 2     propranolol (INDERAL) 10 MG tablet Take 1 tablet (10 mg) by mouth 3 times daily 1 TABLET ORALLY 3 TIMES DAILY AS NEEDED (DX: ANXIETY) Strength: 10 mg 90 tablet 1     sertraline (ZOLOFT) 100 MG tablet Take 100 mg by mouth daily In the morning for depression       tiZANidine (ZANAFLEX) 2 MG tablet Take 1 tablet (2 mg) by mouth 3 times daily as needed for muscle spasms (at least 4 hours apart) 90 tablet 2     acetaminophen (TYLENOL) 325 MG tablet Take 650 mg by mouth every 4 hours as needed for pain (Patient not taking: Reported on 12/13/2022)       naloxone (NARCAN) 4 MG/0.1ML nasal spray Spray 1 spray (4 mg) into one nostril alternating nostrils as needed for opioid reversal every 2-3 minutes until assistance arrives (Patient not taking: Reported on 12/13/2022) 0.2 mL 1     Allergies   Allergen Reactions     Ciprofloxacin Dizziness, Shortness Of Breath and Nausea and Vomiting     Paxil [Paroxetine] Anaphylaxis     anaphylaxis     Amitriptyline Hives and Other (See Comments)     shocking feeling up the arm     Amoxicillin Diarrhea     Asa [Dihydroxyaluminum Aminoacetate] GI Disturbance     Cipro [Ciprofloxacin]      Dizziness, vomiting, shortness of breath     Ibuprofen [Aspartame-Ibuprofen]      GI distress       Lyrica      extrematies swell up      Morphine      ithcy     Naproxen      GI  distress     Neurontin [Gabapentin]      rash     No Clinical Screening - See Comments Other (See Comments)     Bees causes watery itchy eyes, swelling in mouth, needs epi pen     Olanzapine Swelling     Phenergan [Promethazine Hcl]      dystonia     Pregabalin Swelling and Unknown     Of all limbs       Prilosec [Omeprazole]      Rash, dizziness     Venlafaxine Other (See Comments)     Intolerance- eye     Gabapentin Rash     Omeprazole Dizziness and Rash     Recent Labs   Lab Test 04/11/22  1510 09/30/21  1005 08/30/21  1040 08/03/21  1043 05/03/21  0901 02/04/21  1056 12/01/20  1046 07/29/20  1053 11/23/18  0000 08/09/18  0801   A1C  --   --   --   --   --  5.5  --   --   --   --    ALT  --   --   --   --   --   --   --  48  --  30   CR 0.97 0.82 0.84   < > 0.71 0.74   < > 0.78   < > 0.90   GFRESTIMATED 74 89 87   < > >90 >90   < > >90   < > 70   GFRESTBLACK  --   --   --   --  >90 >90   < > >90   < > 84   POTASSIUM  --  4.3 4.4  --  4.0  --    < > 4.0   < > 4.0   TSH  --  2.93 2.04   < >  --   --   --   --    < >  --     < > = values in this interval not displayed.        Breast Cancer Screening:  Any new diagnosis of family breast, ovarian, or bowel cancer? No    FHS-7:   Breast CA Risk Assessment (FHS-7) 12/13/2022   Did any of your first-degree relatives have breast or ovarian cancer? Unknown   Did any of your relatives have bilateral breast cancer? No   Did any man in your family have breast cancer? No   Did any woman in your family have breast and ovarian cancer? Yes   Did any woman in your family have breast cancer before age 50 y? Unknown   Do you have 2 or more relatives with breast and/or ovarian cancer? Unknown   Do you have 2 or more relatives with breast and/or bowel cancer? Unknown       Mammogram Screening - Offered annual screening and updated Health Maintenance for mutual plan based on risk factor consideration  Patient is declining mammogram. Discussed symptoms to watch for with regards to  breast cancer.     Pertinent mammograms are reviewed under the imaging tab.    History of abnormal Pap smear: Status post benign hysterectomy. Health Maintenance and Surgical History updated.  PAP / HPV Latest Ref Rng & Units 8/30/2016   PAP (Historical) - NIL   HPV16 NEG Negative   HPV18 NEG Negative   HRHPV NEG Negative     Reviewed and updated as needed this visit by clinical staff   Tobacco  Allergies  Meds  Problems  Med Hx  Surg Hx  Fam Hx          Reviewed and updated as needed this visit by Provider   Tobacco  Allergies  Meds  Problems  Med Hx  Surg Hx  Fam Hx         Past Medical History:   Diagnosis Date     Arthritis Maybe 2004    Multiple sites like hip wrist ankle     Class 2 severe obesity due to excess calories with serious comorbidity and body mass index (BMI) of 35.0 to 35.9 in adult (H)      Depressive disorder 2014 or so     Endometriosis, site unspecified      Gastritis 2010    dx 2010- sees Dr Glover in [facility]     Generalized anxiety disorder      History of blood transfusion 12-?-20    After hysterectomy, had complications     Major depression      Osteoporosis      Urinary calculus, unspecified     kidney stones, age 19-20     Wrist injury 11/19/2003    Workman's Comp- left wrist- narc agreement on file      Past Surgical History:   Procedure Laterality Date     ARTHROSCOPY HIP, OSTEOPLASTY FEMUR PROXIMAL, COMBINED Left 06/29/2016    Procedure: COMBINED ARTHROSCOPY HIP, OSTEOPLASTY FEMUR PROXIMAL;  Surgeon: Godwin Deleon MD;  [facility]     ARTHROSCOPY OF JOINT UNLISTED  04/01/2004    Left wrist, with debridement and repair of tear.     BIOPSY  Not exactly sure    Multiple for lady reproductive stuff     COLONOSCOPY  Not sure    Hi center in [facility] and an endoscopy done too     EXCISE MASS ABDOMEN Left 5/5/2021    Procedure: Excision of abdominal wall lump;  Surgeon: Zackary Alonzo MD;  Location: [facility] OR     HC REMOVAL OF OVARY/TUBE(S)   "2003    right with fallopian tube     HC REPAIR TRIANGULAR CART,WRIST JT  2005    Dr Eloy Sosa     HC REPAIR TRIANGULAR CART,WRIST JT  2007 or so    ulnar excision- Dr Eloy Sosa     LAPAROSCOPIC ABLATION ENDOMETRIOSIS      x 2     LITHOTRIPSY       SOFT TISSUE SURGERY   and     2 tfcc repair l wrist so procedure with partial removal of u     ZZC TOTAL ABDOM HYSTERECTOMY      with LSO     OB History    Para Term  AB Living   0 0 0 0 0 0   SAB IAB Ectopic Multiple Live Births   0 0 0 0 0       Review of Systems  CONSTITUTIONAL: NEGATIVE for fever, chills, change in weight  INTEGUMENTARY/SKIN: NEGATIVE for worrisome rashes, moles or lesions  EYES: NEGATIVE for vision changes or irritation  ENT: NEGATIVE for ear, mouth and throat problems  RESP: NEGATIVE for significant cough or SOB  BREAST: NEGATIVE for masses, tenderness or discharge  CV: NEGATIVE for chest pain, palpitations or peripheral edema  GI: NEGATIVE for nausea, abdominal pain, heartburn, or change in bowel habits  : NEGATIVE for unusual urinary or vaginal symptoms. No vaginal bleeding.  MUSCULOSKELETAL: NEGATIVE for significant arthralgias or myalgia  NEURO: NEGATIVE for weakness, dizziness or paresthesias  ENDOCRINE: NEGATIVE for temperature intolerance, skin/hair changes  HEME/ALLERGY/IMMUNE: NEGATIVE for bleeding problems  PSYCHIATRIC: POSITIVE for flat mood r/t winter- discussed regular exercise and doing activities that you enjoy to keep up mood. Continue medications. Speak up if worsening.      OBJECTIVE:   /70   Pulse 77   Temp (!) 96.6  F (35.9  C) (Tympanic)   Resp 14   Ht 1.544 m (5' 0.79\")   Wt 75.6 kg (166 lb 9.6 oz)   LMP 2020 (Exact Date)   SpO2 97%   BMI 31.70 kg/m    Physical Exam  GENERAL APPEARANCE: healthy, alert and no distress  EYES: Eyes grossly normal to inspection, PERRL and conjunctivae and sclerae normal  HENT: ear canals and TM's normal, nose and mouth without " ulcers or lesions, oropharynx clear and oral mucous membranes moist  NECK: no adenopathy, no asymmetry, masses, or scars and thyroid normal to palpation  RESP: lungs clear to auscultation - no rales, rhonchi or wheezes  BREAST: deferred per guidelines- asymptomatic per pt  CV: regular rate and rhythm, normal S1 S2, no S3 or S4, no murmur, click or rub, no peripheral edema and peripheral pulses strong  ABDOMEN: soft, nontender, no hepatosplenomegaly, no masses and bowel sounds normal   (female): deferred per pt- no concerns  MS: no musculoskeletal defects are noted and gait is age appropriate without ataxia, no cva tenderness  SKIN: no suspicious lesions or rashes  NEURO: Normal strength and tone, cranial nerves intact, sensory exam grossly normal, mentation intact and speech normal  PSYCH: mentation appears normal and affect normal/bright    Diagnostic Test Results:  Labs reviewed in Epic  See orders    ASSESSMENT/PLAN:       ICD-10-CM    1. Routine general medical examination at a health care facility  Z00.00       2. Episodic tension-type headache, not intractable  G44.219 butalbital-acetaminophen-caffeine (ESGIC) -40 MG tablet      3. Other chronic pain  G89.29 tiZANidine (ZANAFLEX) 2 MG tablet     oxyCODONE-acetaminophen (PERCOCET) 5-325 MG tablet     DISCONTINUED: oxyCODONE-acetaminophen (PERCOCET) 5-325 MG tablet      4. Closed nondisplaced intertrochanteric fracture of left femur, sequela  S72.145S oxyCODONE-acetaminophen (PERCOCET) 5-325 MG tablet     DISCONTINUED: oxyCODONE-acetaminophen (PERCOCET) 5-325 MG tablet      5. Anxiety  F41.9 hydrOXYzine (ATARAX) 25 MG tablet      6. Hx of anaphylaxis  Z87.892 EPINEPHrine (ANY BX GENERIC EQUIV) 0.3 MG/0.3ML injection 2-pack      7. Slow transit constipation  K59.01 polyethylene glycol (MIRALAX) 17 GM/Dose powder      8. Laboratory examination ordered as part of a routine general medical examination  Z00.00 Lipid panel reflex to direct LDL Fasting      Hemoglobin A1c     Hepatic panel (Albumin, ALT, AST, Bili, Alk Phos, TP)     CBC with platelets and differential     UA with Microscopic reflex to Culture - Clinic Collect     CBC with platelets and differential     Hepatic panel (Albumin, ALT, AST, Bili, Alk Phos, TP)     Hemoglobin A1c     Lipid panel reflex to direct LDL Fasting      9. Screening cholesterol level  Z13.220 Lipid panel reflex to direct LDL Fasting     Lipid panel reflex to direct LDL Fasting      10. Screening for diabetes mellitus  Z13.1 Hemoglobin A1c     Hemoglobin A1c          Patient has been advised of split billing requirements and indicates understanding: Yes      COUNSELING:  Reviewed preventive health counseling, as reflected in patient instructions        She reports that she quit smoking about 8 years ago. Her smoking use included cigarettes. She has never used smokeless tobacco.          J CARLOS PANCHAL  [facility] EFFIE  Answers for HPI/ROS submitted by the patient on 12/13/2022  If you checked off any problems, how difficult have these problems made it for you to do your work, take care of things at home, or get along with other people?: Very difficult  PHQ9 TOTAL SCORE: 19

## 2022-12-12 ENCOUNTER — TELEPHONE (OUTPATIENT)
Dept: FAMILY MEDICINE | Facility: CLINIC | Age: 42
End: 2022-12-12

## 2022-12-12 NOTE — TELEPHONE ENCOUNTER
Prior Authorization Retail Medication Request    Medication/Dose: estrogen conj (PREMARIN) 1.25 MG tablet  ICD code (if different than what is on RX):  Z79.890  Previously Tried and Failed:  na  Rationale:  na    [facility]  Insurance ID:  [id number]      Pharmacy Information (if different than what is on RX)  Name:  [facility]  Phone:  [phone]

## 2022-12-13 ENCOUNTER — OFFICE VISIT (OUTPATIENT)
Dept: FAMILY MEDICINE | Facility: CLINIC | Age: 42
End: 2022-12-13
Payer: MEDICARE

## 2022-12-13 ENCOUNTER — TRANSFERRED RECORDS (OUTPATIENT)
Dept: HEALTH INFORMATION MANAGEMENT | Facility: CLINIC | Age: 42
End: 2022-12-13

## 2022-12-13 VITALS
RESPIRATION RATE: 14 BRPM | BODY MASS INDEX: 31.45 KG/M2 | TEMPERATURE: 96.6 F | HEIGHT: 61 IN | OXYGEN SATURATION: 97 % | HEART RATE: 77 BPM | SYSTOLIC BLOOD PRESSURE: 112 MMHG | DIASTOLIC BLOOD PRESSURE: 70 MMHG | WEIGHT: 166.6 LBS

## 2022-12-13 DIAGNOSIS — G44.219 EPISODIC TENSION-TYPE HEADACHE, NOT INTRACTABLE: ICD-10-CM

## 2022-12-13 DIAGNOSIS — Z13.220 SCREENING CHOLESTEROL LEVEL: ICD-10-CM

## 2022-12-13 DIAGNOSIS — Z00.00 ROUTINE GENERAL MEDICAL EXAMINATION AT A HEALTH CARE FACILITY: Primary | ICD-10-CM

## 2022-12-13 DIAGNOSIS — G89.29 OTHER CHRONIC PAIN: ICD-10-CM

## 2022-12-13 DIAGNOSIS — S72.145S CLOSED NONDISPLACED INTERTROCHANTERIC FRACTURE OF LEFT FEMUR, SEQUELA: ICD-10-CM

## 2022-12-13 DIAGNOSIS — Z13.1 SCREENING FOR DIABETES MELLITUS: ICD-10-CM

## 2022-12-13 DIAGNOSIS — Z87.892 HX OF ANAPHYLAXIS: ICD-10-CM

## 2022-12-13 DIAGNOSIS — Z00.00 LABORATORY EXAMINATION ORDERED AS PART OF A ROUTINE GENERAL MEDICAL EXAMINATION: ICD-10-CM

## 2022-12-13 DIAGNOSIS — K59.01 SLOW TRANSIT CONSTIPATION: ICD-10-CM

## 2022-12-13 DIAGNOSIS — F41.9 ANXIETY: ICD-10-CM

## 2022-12-13 LAB
ALBUMIN SERPL-MCNC: 3.4 G/DL (ref 3.4–5)
ALBUMIN UR-MCNC: NEGATIVE MG/DL
ALP SERPL-CCNC: 98 U/L (ref 40–150)
ALT SERPL W P-5'-P-CCNC: 42 U/L (ref 0–50)
APPEARANCE UR: CLEAR
AST SERPL W P-5'-P-CCNC: 21 U/L (ref 0–45)
BACTERIA #/AREA URNS HPF: ABNORMAL /HPF
BASOPHILS # BLD AUTO: 0 10E3/UL (ref 0–0.2)
BASOPHILS NFR BLD AUTO: 0 %
BILIRUB DIRECT SERPL-MCNC: <0.1 MG/DL (ref 0–0.2)
BILIRUB SERPL-MCNC: 0.3 MG/DL (ref 0.2–1.3)
BILIRUB UR QL STRIP: NEGATIVE
CHOLEST SERPL-MCNC: 216 MG/DL
COLOR UR AUTO: YELLOW
EOSINOPHIL # BLD AUTO: 0.2 10E3/UL (ref 0–0.7)
EOSINOPHIL NFR BLD AUTO: 2 %
ERYTHROCYTE [DISTWIDTH] IN BLOOD BY AUTOMATED COUNT: 13.7 % (ref 10–15)
FASTING STATUS PATIENT QL REPORTED: ABNORMAL
GLUCOSE UR STRIP-MCNC: NEGATIVE MG/DL
HBA1C MFR BLD: 5.2 % (ref 0–5.6)
HCT VFR BLD AUTO: 41.6 % (ref 35–47)
HDLC SERPL-MCNC: 64 MG/DL
HGB BLD-MCNC: 12.8 G/DL (ref 11.7–15.7)
HGB UR QL STRIP: NEGATIVE
KETONES UR STRIP-MCNC: NEGATIVE MG/DL
LDLC SERPL CALC-MCNC: 126 MG/DL
LEUKOCYTE ESTERASE UR QL STRIP: NEGATIVE
LYMPHOCYTES # BLD AUTO: 2.5 10E3/UL (ref 0.8–5.3)
LYMPHOCYTES NFR BLD AUTO: 26 %
MCH RBC QN AUTO: 24.5 PG (ref 26.5–33)
MCHC RBC AUTO-ENTMCNC: 30.8 G/DL (ref 31.5–36.5)
MCV RBC AUTO: 80 FL (ref 78–100)
MONOCYTES # BLD AUTO: 0.5 10E3/UL (ref 0–1.3)
MONOCYTES NFR BLD AUTO: 6 %
NEUTROPHILS # BLD AUTO: 6.3 10E3/UL (ref 1.6–8.3)
NEUTROPHILS NFR BLD AUTO: 66 %
NITRATE UR QL: NEGATIVE
NONHDLC SERPL-MCNC: 152 MG/DL
PH UR STRIP: 5.5 [PH] (ref 5–7)
PLATELET # BLD AUTO: 329 10E3/UL (ref 150–450)
PROT SERPL-MCNC: 7.1 G/DL (ref 6.8–8.8)
RBC # BLD AUTO: 5.22 10E6/UL (ref 3.8–5.2)
RBC #/AREA URNS AUTO: ABNORMAL /HPF
SP GR UR STRIP: 1.01 (ref 1–1.03)
SQUAMOUS #/AREA URNS AUTO: ABNORMAL /LPF
TRIGL SERPL-MCNC: 132 MG/DL
UROBILINOGEN UR STRIP-ACNC: 0.2 E.U./DL
WBC # BLD AUTO: 9.5 10E3/UL (ref 4–11)
WBC #/AREA URNS AUTO: ABNORMAL /HPF

## 2022-12-13 PROCEDURE — 81001 URINALYSIS AUTO W/SCOPE: CPT | Performed by: NURSE PRACTITIONER

## 2022-12-13 PROCEDURE — 99207 PR ANNUAL WELLNESS VISIT, PPS, SUBSEQUENT STAT: CPT | Performed by: NURSE PRACTITIONER

## 2022-12-13 PROCEDURE — G0010 ADMIN HEPATITIS B VACCINE: HCPCS | Performed by: NURSE PRACTITIONER

## 2022-12-13 PROCEDURE — 36415 COLL VENOUS BLD VENIPUNCTURE: CPT | Performed by: NURSE PRACTITIONER

## 2022-12-13 PROCEDURE — 90746 HEPB VACCINE 3 DOSE ADULT IM: CPT | Performed by: NURSE PRACTITIONER

## 2022-12-13 PROCEDURE — 80061 LIPID PANEL: CPT | Performed by: NURSE PRACTITIONER

## 2022-12-13 PROCEDURE — 80076 HEPATIC FUNCTION PANEL: CPT | Performed by: NURSE PRACTITIONER

## 2022-12-13 PROCEDURE — 83036 HEMOGLOBIN GLYCOSYLATED A1C: CPT | Performed by: NURSE PRACTITIONER

## 2022-12-13 PROCEDURE — 99213 OFFICE O/P EST LOW 20 MIN: CPT | Mod: 25 | Performed by: NURSE PRACTITIONER

## 2022-12-13 PROCEDURE — 85025 COMPLETE CBC W/AUTO DIFF WBC: CPT | Performed by: NURSE PRACTITIONER

## 2022-12-13 RX ORDER — BUTALBITAL, ACETAMINOPHEN AND CAFFEINE 50; 325; 40 MG/1; MG/1; MG/1
1 TABLET ORAL 2 TIMES DAILY PRN
Qty: 60 TABLET | Refills: 2 | Status: SHIPPED | OUTPATIENT
Start: 2022-12-13 | End: 2023-03-01

## 2022-12-13 RX ORDER — OXYCODONE AND ACETAMINOPHEN 5; 325 MG/1; MG/1
.5-1 TABLET ORAL 2 TIMES DAILY PRN
Qty: 60 TABLET | Refills: 0 | Status: SHIPPED | OUTPATIENT
Start: 2022-12-13 | End: 2023-03-01

## 2022-12-13 RX ORDER — HYDROXYZINE HYDROCHLORIDE 25 MG/1
25 TABLET, FILM COATED ORAL 3 TIMES DAILY PRN
Qty: 90 TABLET | Refills: 0 | Status: SHIPPED | OUTPATIENT
Start: 2022-12-13 | End: 2023-03-01

## 2022-12-13 RX ORDER — EPINEPHRINE 0.3 MG/.3ML
0.3 INJECTION SUBCUTANEOUS PRN
Qty: 2 EACH | Refills: 1 | Status: SHIPPED | OUTPATIENT
Start: 2022-12-13 | End: 2023-12-27

## 2022-12-13 RX ORDER — TIZANIDINE 2 MG/1
2 TABLET ORAL 3 TIMES DAILY PRN
Qty: 90 TABLET | Refills: 2 | Status: SHIPPED | OUTPATIENT
Start: 2022-12-13 | End: 2023-03-01

## 2022-12-13 RX ORDER — OXYCODONE AND ACETAMINOPHEN 5; 325 MG/1; MG/1
.5-1 TABLET ORAL 2 TIMES DAILY PRN
Qty: 60 TABLET | Refills: 0 | Status: SHIPPED | OUTPATIENT
Start: 2022-12-13 | End: 2022-12-13

## 2022-12-13 RX ORDER — POLYETHYLENE GLYCOL 3350 17 G/17G
1 POWDER, FOR SOLUTION ORAL DAILY
Qty: 578 G | Refills: 2 | Status: SHIPPED | OUTPATIENT
Start: 2022-12-13 | End: 2023-03-01

## 2022-12-13 ASSESSMENT — ANXIETY QUESTIONNAIRES
7. FEELING AFRAID AS IF SOMETHING AWFUL MIGHT HAPPEN: MORE THAN HALF THE DAYS
GAD7 TOTAL SCORE: 16
5. BEING SO RESTLESS THAT IT IS HARD TO SIT STILL: SEVERAL DAYS
IF YOU CHECKED OFF ANY PROBLEMS ON THIS QUESTIONNAIRE, HOW DIFFICULT HAVE THESE PROBLEMS MADE IT FOR YOU TO DO YOUR WORK, TAKE CARE OF THINGS AT HOME, OR GET ALONG WITH OTHER PEOPLE: SOMEWHAT DIFFICULT
2. NOT BEING ABLE TO STOP OR CONTROL WORRYING: NEARLY EVERY DAY
3. WORRYING TOO MUCH ABOUT DIFFERENT THINGS: NEARLY EVERY DAY
7. FEELING AFRAID AS IF SOMETHING AWFUL MIGHT HAPPEN: MORE THAN HALF THE DAYS
4. TROUBLE RELAXING: MORE THAN HALF THE DAYS
GAD7 TOTAL SCORE: 16
8. IF YOU CHECKED OFF ANY PROBLEMS, HOW DIFFICULT HAVE THESE MADE IT FOR YOU TO DO YOUR WORK, TAKE CARE OF THINGS AT HOME, OR GET ALONG WITH OTHER PEOPLE?: SOMEWHAT DIFFICULT
6. BECOMING EASILY ANNOYED OR IRRITABLE: MORE THAN HALF THE DAYS
1. FEELING NERVOUS, ANXIOUS, OR ON EDGE: NEARLY EVERY DAY
GAD7 TOTAL SCORE: 16

## 2022-12-13 ASSESSMENT — ENCOUNTER SYMPTOMS
CONSTIPATION: 1
NAUSEA: 0
SHORTNESS OF BREATH: 0
FEVER: 0
PARESTHESIAS: 0
DIZZINESS: 0
HEMATOCHEZIA: 0
ABDOMINAL PAIN: 0
FREQUENCY: 0
DIARRHEA: 0
ARTHRALGIAS: 1
EYE PAIN: 0
MYALGIAS: 0
PALPITATIONS: 0
CHILLS: 0
HEARTBURN: 0
COUGH: 0
SORE THROAT: 0
DYSURIA: 0
BREAST MASS: 0
HEMATURIA: 0
HEADACHES: 1
NERVOUS/ANXIOUS: 1
WEAKNESS: 0
JOINT SWELLING: 0

## 2022-12-13 ASSESSMENT — PATIENT HEALTH QUESTIONNAIRE - PHQ9
SUM OF ALL RESPONSES TO PHQ QUESTIONS 1-9: 19
10. IF YOU CHECKED OFF ANY PROBLEMS, HOW DIFFICULT HAVE THESE PROBLEMS MADE IT FOR YOU TO DO YOUR WORK, TAKE CARE OF THINGS AT HOME, OR GET ALONG WITH OTHER PEOPLE: VERY DIFFICULT
SUM OF ALL RESPONSES TO PHQ QUESTIONS 1-9: 19

## 2022-12-13 ASSESSMENT — PAIN SCALES - GENERAL: PAINLEVEL: NO PAIN (0)

## 2022-12-13 NOTE — NURSING NOTE
Prior to immunization administration, verified patients identity using patient s name and date of birth. Please see Immunization Activity for additional information.     Screening Questionnaire for Adult Immunization    Are you sick today?   No   Do you have allergies to medications, food, a vaccine component or latex?   Yes   Have you ever had a serious reaction after receiving a vaccination?   No   Do you have a long-term health problem with heart, lung, kidney, or metabolic disease (e.g., diabetes), asthma, a blood disorder, no spleen, complement component deficiency, a cochlear implant, or a spinal fluid leak?  Are you on long-term aspirin therapy?   No   Do you have cancer, leukemia, HIV/AIDS, or any other immune system problem?   No   Do you have a parent, brother, or sister with an immune system problem?   No   In the past 3 months, have you taken medications that affect  your immune system, such as prednisone, other steroids, or anticancer drugs; drugs for the treatment of rheumatoid arthritis, Crohn s disease, or psoriasis; or have you had radiation treatments?   No   Have you had a seizure, or a brain or other nervous system problem?   No   During the past year, have you received a transfusion of blood or blood    products, or been given immune (gamma) globulin or antiviral drug?   No   For women: Are you pregnant or is there a chance you could become       pregnant during the next month?   No   Have you received any vaccinations in the past 4 weeks?   No     Immunization questionnaire answers were all negative.        Per orders of Xenia Umanzor, PRICILLA-FNP-BC, injection of Hep B. given by Odette Araya MA. Patient instructed to remain in clinic for 15 minutes afterwards, and to report any adverse reaction to me immediately.       Screening performed by Odette Araya MA on 12/13/2022 at 9:47 AM.

## 2022-12-13 NOTE — TELEPHONE ENCOUNTER
Central Prior Authorization Team   Phone: [phone]      Prior Authorization Not Needed per Insurance    Medication: estrogen conj (PREMARIN) 1.25 MG tablet - PA NOT NEEDED  Insurance Company: [employer] - Phone [phone] Fax [phone]  Expected CoPay:      Pharmacy Filling the Rx: [id number] - [place], [place] - [address]  Pharmacy Notified: Yes - verified pharmacy received paid claim  Patient Notified: Yes (pharmacy will notify patient when ready)

## 2022-12-13 NOTE — TELEPHONE ENCOUNTER
Central Prior Authorization Team   Phone: [phone]      PA Initiation    Medication: estrogen conj (PREMARIN) 1.25 MG tablet - INITIATED  Insurance Company: [facility] - Phone [phone] Fax [phone]  Pharmacy Filling the Rx: [id number] - [place], [place] - [address]  Filling Pharmacy Phone: [phone]  Filling Pharmacy Fax:    Start Date: 12/13/2022

## 2022-12-14 ENCOUNTER — TELEPHONE (OUTPATIENT)
Dept: FAMILY MEDICINE | Facility: CLINIC | Age: 42
End: 2022-12-14

## 2022-12-14 NOTE — TELEPHONE ENCOUNTER
Prior Authorization Approval    Authorization Effective Date: 10/1/2022  Authorization Expiration Date: 12/14/2023  Medication: estrogen conj (PREMARIN) 1.25 MG tablet - PA APPROVED  Insurance Company: [employer] - Phone [phone] Fax [phone]  Which Pharmacy is filling the prescription (Not needed for infusion/clinic administered): [id number] - [place], [place] - [address]  Pharmacy Notified: Yes  Patient Notified: Yes (pharmacy will notify patient when ready)

## 2022-12-14 NOTE — TELEPHONE ENCOUNTER
Zainab from [facility] called to question why they received two scripts for the same medication (oxyCODONE-acetaminophen (PERCOCET) 5-325 MG tablet)     RN reviewed medication list and appears that Xenia Umanzor canceled script below: RN confirmed cancellation per provider note    oxyCODONE-acetaminophen (PERCOCET) 5-325 MG tablet (Discontinued) 60 tablet 0 12/13/2022 12/13/2022 No   Sig - Route: Take 0.5-1 tablets by mouth 2 times daily as needed for severe pain (7-10) (use sparingly- to last one month; at least 6 hours apart) Do not take with anxiety medication. - Oral   Sent to pharmacy as: oxyCODONE-Acetaminophen 5-325 MG Oral Tablet (PERCOCET)   Class: E-Prescribe   Earliest Fill Date: 12/13/2022   Order: [id number]   E-Prescribing Status: Receipt confirmed by pharmacy (12/13/2022  9:22 AM CST)     .......    Appears provider resent the medication below with extra pharmacy note:    Disp Refills Start End RL   oxyCODONE-acetaminophen (PERCOCET) 5-325 MG tablet 60 tablet 0 12/13/2022  No   Sig - Route: Take 0.5-1 tablets by mouth 2 times daily as needed for severe pain (7-10) (use sparingly- to last one month; at least 6 hours apart) - Oral   Sent to pharmacy as: oxyCODONE-Acetaminophen 5-325 MG Oral Tablet (PERCOCET)   Class: E-Prescribe   Earliest Fill Date: 12/13/2022   Notes to Pharmacy: Change of direction. Does not need to be filled at this time   Order: [id number]     Precious Brown RN on 12/14/2022 at 9:53 AM

## 2022-12-14 NOTE — RESULT ENCOUNTER NOTE
Jhonathan Minor,    Thank you for your recent office visit.    Here are your recent results.  Your blood labs look pretty normal.  Continue to work on regular exercise and making good diet choices.    Feel free to contact me via MediaV or call the clinic at [phone].    Sincerely,    TARAS Verma, FNP-BC     Pt refuses to wear ECG patches and BP cuff/SPO2

## 2023-01-09 ENCOUNTER — NURSE TRIAGE (OUTPATIENT)
Dept: FAMILY MEDICINE | Facility: CLINIC | Age: 43
End: 2023-01-09
Payer: MEDICARE

## 2023-01-09 NOTE — TELEPHONE ENCOUNTER
Received call from Lola with patient's adult foster care group home ([phone]). She states that patient moved in with her 12/8/22. Between then and 1/2/23, patient had reportedly only missed her medications 2 or 3 times. Since Lola has left the home and gone on vacation (1/2/23), patient has been missing medications frequently. She has only taken medications a couple of times since that day and despite reminders, she still does not take them. She either responds sounding irritated about it or does not respond at all. She also has been staying in her room a lot, only leaving to go outside to smoke. She does not appear to be eating either. It is unclear whether she is showering. Patient is on a commitment due to her mental health.     Lola is also going to relay this information to patient's mental health provider, Lashell Lemus with [facility]. She has also spoken with patient's , Melinda Rosario with [facility] ([phone]). Melinda has not been able to reach the patient the entirety of last week.    Routing update to PCP.    HAYLEY Armas RN  [facility]

## 2023-01-09 NOTE — TELEPHONE ENCOUNTER
FYI-  See previous triage TE today (01/09/2023); attempted to reach Lola at [facility] ([phone]); this number is not in service.    Called patient; group home care worker (Jenifer) there with patient; patient gave approval to speak with Jenifer.     Jenifer verified correct number to reach Lola at [facility] ([phone]).  Jenifer states patient has care giver there 24 hours while Foster parents are away.  Jenifer stated patient was forgetting to take medications with reminders for the past week.  Jenifer confirmed patient is asymptomatic and receiving medications with closer observation and follow-up. Informed caregiver to watch the patient take medications going forward (Foster parents back from vacation on 1/18/2023).    Jenifer verbalized understanding and agreement.    FYI-Called correct number for Lola at group home ([phone]); no answer; did not want to leave message due to unidentified voicemail.    Melissa Lamas, RN

## 2023-01-09 NOTE — TELEPHONE ENCOUNTER
DO THEY NEED POLICE TO MAKE A SAFETY VISIT IF  IS OUT OF TOWN AND THEY AREN'T ABLE TO GET AHOLD OF PATIENT? TARAS Verma, FNP-BC

## 2023-01-09 NOTE — TELEPHONE ENCOUNTER
Writer noted PCP response- just want to clarify, patient's  does have staff that are working with and checking in on patient despite her absence (giving reminders to take meds, they have seen patient outside of her home smoking, etc.) Patient likely has just been ignoring phone calls.    LANCE ArmasN RN  [facility]

## 2023-01-19 NOTE — TELEPHONE ENCOUNTER
Notified Lola of the message, response below per PCP. Read word for word.  She stated understanding and agreeable with the plan of care.     Loretta RN,BSN  Triage Nurse  [facility]: [facility]

## 2023-01-19 NOTE — TELEPHONE ENCOUNTER
Lola with [facility] ([phone])  is calling regarding patient.  She stated that patient has an appointment today with her  at 2:00.  She wants to know if there are any new orders, or anything else she should pass on to her.    1. Lola stated that she was out of town, and patient kept asking staff for her Oxycodone.  She will always state that her pain level is a 4-5/10.  She stated that it states to give it sparingly at a pain level of 7-10/10.  She is worried if they tell patient that she can not have it unless her pain is over 7, then patient will just say that it is.  She is walking, and acting normally, and to the staff does not seem to be in pain.    2. Patient pierced her ears by herself.  She did not know if they looked infected at all, as she only saw patient for a minute before patient left. I advised if they look infected to have her seen.    3. She is concerned about how much milk patient is drinking.  She is drinking about 10 glasses per day.  She does not know if this is behavioral or if it could be another health condition..    4.  Staff will give reminder to patient to stop doing things, and then patient will say ok, but will still go them.    5. Patient has not let Lola know of any appointments, so she has not gone to any provider appointment with her. She is unsure if she has had any and where.    Loretta RN,BSN  Triage Nurse  [facility]: [facility]  Ph: [phone]

## 2023-01-19 NOTE — TELEPHONE ENCOUNTER
I have not seen patient since I saw her with Lola 12/13/2022.  She did have a visit with Dr. Agarwal that she no showed for her.  She has an appointment scheduled with endocrinology in March; no other appointments are scheduled at this time.    If patient is reporting pain they should give her her pain medication as prescribed.  Her pain medication is only 60 tablets that has to last for 1 month's time.    She could be having a behavioral issue with drinking excessive milk.  I am not going to restrict her diet, they can if they will choose to.  Risks of drinking too much milk could include kidney stones.    Patient has behavioral issues which is why she is having to live in an adult foster home.  If they would like her evaluated by psychiatry I can put in a referral.    If ears look infected they should bring her in for evaluation.    Her controlled medication will require a follow-up visit the beginning of March for further refills.    TARAS Verma, FNP-BC

## 2023-01-22 ENCOUNTER — TELEPHONE (OUTPATIENT)
Dept: NURSING | Facility: CLINIC | Age: 43
End: 2023-01-22
Payer: MEDICARE

## 2023-01-22 DIAGNOSIS — Z71.6 ENCOUNTER FOR SMOKING CESSATION COUNSELING: Primary | ICD-10-CM

## 2023-01-23 NOTE — TELEPHONE ENCOUNTER
"Pt calling to \"send a message to her PCP\"    Pt calling to see if he can get Chantix to quit smoking  She has tried it for a week in the past \"a long time ago\" and then stopped.     Writer is routing this message to the patient's PCP pool to follow up with patient on this request.     Stacey Alejandro RN  [facility] Nurse Advisor 12:32 PM 1/22/2023        "
I called and spoke to Mily, advised her of Rx sent and general directions for use (ramp up).    Advised she call us in a few months to let us now how she is doing with smoking cessation.    Patient verbalized understanding of and agreement with plan.    Lashell Woods RN  [facility]      
chantix was sent in for her.  Let us know how quitting smoking goes. TARAS Verma, FNP-BC    
Katlyn Baca

## 2023-01-26 ENCOUNTER — OFFICE VISIT (OUTPATIENT)
Dept: URGENT CARE | Facility: URGENT CARE | Age: 43
End: 2023-01-26
Payer: MEDICARE

## 2023-01-26 VITALS
BODY MASS INDEX: 29.68 KG/M2 | WEIGHT: 156 LBS | OXYGEN SATURATION: 100 % | SYSTOLIC BLOOD PRESSURE: 126 MMHG | TEMPERATURE: 97.8 F | HEART RATE: 71 BPM | RESPIRATION RATE: 14 BRPM | DIASTOLIC BLOOD PRESSURE: 86 MMHG

## 2023-01-26 DIAGNOSIS — R68.84 JAW PAIN: Primary | ICD-10-CM

## 2023-01-26 DIAGNOSIS — R51.9 ACUTE NONINTRACTABLE HEADACHE, UNSPECIFIED HEADACHE TYPE: ICD-10-CM

## 2023-01-26 DIAGNOSIS — Z78.9 HISTORY OF CLENCHING OF MANDIBLE: ICD-10-CM

## 2023-01-26 DIAGNOSIS — Z72.0 TOBACCO ABUSE: ICD-10-CM

## 2023-01-26 PROCEDURE — U0003 INFECTIOUS AGENT DETECTION BY NUCLEIC ACID (DNA OR RNA); SEVERE ACUTE RESPIRATORY SYNDROME CORONAVIRUS 2 (SARS-COV-2) (CORONAVIRUS DISEASE [COVID-19]), AMPLIFIED PROBE TECHNIQUE, MAKING USE OF HIGH THROUGHPUT TECHNOLOGIES AS DESCRIBED BY CMS-2020-01-R: HCPCS | Performed by: NURSE PRACTITIONER

## 2023-01-26 PROCEDURE — 99213 OFFICE O/P EST LOW 20 MIN: CPT | Mod: CS | Performed by: NURSE PRACTITIONER

## 2023-01-26 PROCEDURE — U0005 INFEC AGEN DETEC AMPLI PROBE: HCPCS | Performed by: NURSE PRACTITIONER

## 2023-01-26 NOTE — PROGRESS NOTES
Assessment & Plan     Jaw pain    Acute nonintractable headache, unspecified headache type  - Symptomatic COVID-19 Virus (Coronavirus) by PCR Nose    History of clenching of mandible    Tobacco abuse       Recommend she try to be aware of clenching jaw and stop as that is likely cause of jaw pain. Ice, heat, massage, eat soft foods. Stress reduction recommended. Tobacco cessation recommended. COVID testing in process, will notify if positive. Neurologically stable currently, no red flag symptoms currently.     Follow-up with PCP if symptoms persist for 7 days, and sooner if symptoms worsen or new symptoms develop.     Discussed red flag symptoms which warrant immediate visit in emergency room    All questions were answered and patient verbalized understanding. AVS reviewed with patient.     Krissy Pereira, DNP, APRN, CNP 1/26/2023 3:25 PM  [facility] [place]          Luz Minor is a 42 year old female who presents to clinic today for the following health issues:  Chief Complaint   Patient presents with     Jaw Pain     Sx per pt started about a  week ago. Denies any tooth pain.     Headache     Patient presents for evaluation of bilateral jaw pain. Associated symptoms: headache, neck pain, little bit of runny nose. Pain has been present for a week and has been worsening. Denies teeth pain, cough, fever, chills, vision changes, numbness, tingling. She had ear pain which resolved. Denies jaw cracking. No recent nail biting or chewing on caramels. She has noticed she has been clenching her jaw a lot during the day and thinks that is causing her pain. Pain is intermittently. No increased stress.  No teeth grinding at night.     She tried taking butalbital-acetaminophen-caffeine which helps temporarily, tylenol, tizanidine which makes her tired and does not help with the pain. She took oxycodone last night which helps a little bit. Pain is mild-severe.     She will be starting Chantix next  week. She is smoking 1/2 ppd.     She has an appointment with dentist on 2/14.     Problem list, Medication list, Allergies, and Medical history reviewed in EPIC.    ROS:  Review of systems negative except for noted above        Objective    /86   Pulse 71   Temp 97.8  F (36.6  C) (Tympanic)   Resp 14   Wt 70.8 kg (156 lb)   LMP 11/16/2020 (Exact Date)   SpO2 100%   BMI 29.68 kg/m    Physical Exam  Constitutional:       General: She is not in acute distress.     Appearance: She is not toxic-appearing or diaphoretic.   HENT:      Head: Normocephalic and atraumatic.      Jaw: There is normal jaw occlusion. Tenderness present. No trismus or swelling.      Comments: Tenderness with palpation bilateral TMJ     Right Ear: Tympanic membrane, ear canal and external ear normal.      Left Ear: Tympanic membrane, ear canal and external ear normal.      Nose: Nose normal.      Right Sinus: No maxillary sinus tenderness or frontal sinus tenderness.      Left Sinus: No maxillary sinus tenderness or frontal sinus tenderness.      Mouth/Throat:      Mouth: Mucous membranes are moist.      Dentition: No dental abscesses.      Pharynx: Oropharynx is clear. No oropharyngeal exudate or posterior oropharyngeal erythema.      Tonsils: No tonsillar abscesses. 0 on the right. 0 on the left.   Eyes:      Extraocular Movements: Extraocular movements intact.      Conjunctiva/sclera: Conjunctivae normal.      Pupils: Pupils are equal, round, and reactive to light.   Cardiovascular:      Rate and Rhythm: Normal rate and regular rhythm.      Heart sounds: Normal heart sounds.   Pulmonary:      Effort: Pulmonary effort is normal. No respiratory distress.      Breath sounds: No wheezing, rhonchi or rales.   Lymphadenopathy:      Cervical: No cervical adenopathy.   Neurological:      General: No focal deficit present.      Mental Status: She is alert and oriented to person, place, and time.      Cranial Nerves: No cranial nerve  deficit.      Sensory: No sensory deficit.      Motor: No weakness.      Coordination: Coordination normal.      Gait: Gait normal.

## 2023-01-27 LAB — SARS-COV-2 RNA RESP QL NAA+PROBE: NEGATIVE

## 2023-02-07 ENCOUNTER — TELEPHONE (OUTPATIENT)
Dept: ENDOCRINOLOGY | Facility: CLINIC | Age: 43
End: 2023-02-07
Payer: MEDICARE

## 2023-02-07 NOTE — TELEPHONE ENCOUNTER
[facility] Call Center    Phone Message    May a detailed message be left on voicemail: yes     Reason for Call: Order(s): Other:   Reason for requested: wants to schedule DEXA previous to appt with provider  Date needed: before 3/28/2023  Provider name: Dr Flores      Action Taken: Other: endo    Travel Screening: Not Applicable

## 2023-02-08 NOTE — CONFIDENTIAL NOTE
Writer reviewed Dr. Flores progress note from 3/29/22. Writer will clarify with Dr. Flores to determine if DEXA scan is needed at this time.       Marielle Suarez RN  Endocrine Care Coordinator  [facility]

## 2023-02-09 NOTE — CONFIDENTIAL NOTE
Per Dr. Flores:    Too early for DEXA. Will be due for DEXA in 3/25         Please update patient.       Marielle Suarez RN  Endocrine Care Coordinator  [facility]

## 2023-02-10 NOTE — TELEPHONE ENCOUNTER
Spoke with patient about below.  She will follow up with provider with any additional questions at visit in March.     Yue Moeller on 2/10/2023 at 1:45 PM

## 2023-02-23 ENCOUNTER — VIRTUAL VISIT (OUTPATIENT)
Dept: FAMILY MEDICINE | Facility: CLINIC | Age: 43
End: 2023-02-23
Payer: MEDICARE

## 2023-02-23 DIAGNOSIS — R11.2 NAUSEA AND VOMITING, UNSPECIFIED VOMITING TYPE: Primary | ICD-10-CM

## 2023-02-23 PROCEDURE — 99443 PR PHYSICIAN TELEPHONE EVALUATION 21-30 MIN: CPT | Mod: VID | Performed by: PHYSICIAN ASSISTANT

## 2023-02-23 RX ORDER — SUMATRIPTAN 50 MG/1
50 TABLET, FILM COATED ORAL
Qty: 60 TABLET | Refills: 1 | Status: SHIPPED | OUTPATIENT
Start: 2023-02-23 | End: 2023-06-13

## 2023-02-23 RX ORDER — ONDANSETRON 4 MG/1
4 TABLET, ORALLY DISINTEGRATING ORAL EVERY 8 HOURS PRN
Qty: 8 TABLET | Refills: 0 | Status: SHIPPED | OUTPATIENT
Start: 2023-02-23 | End: 2023-04-06

## 2023-02-23 RX ORDER — PROCHLORPERAZINE MALEATE 10 MG
5-10 TABLET ORAL EVERY 6 HOURS PRN
Qty: 15 TABLET | Refills: 0 | Status: SHIPPED | OUTPATIENT
Start: 2023-02-23 | End: 2023-06-13

## 2023-02-23 NOTE — PATIENT INSTRUCTIONS
Sarah Minor,    Thank you for allowing [facility] to manage your care.    I am unsure of the cause of your symptoms, but we will see what our workup shows.     If you develop worsening/changing symptoms at any time or you change your mind about my recommendation to go to urgent care for a face to face visit today, please call 911 or go to the emergency department for evaluation.    I ordered some lab work, please call [address] [place] [address] or your local [facility] to schedule a lab only visit for swabs.    I sent your prescriptions to your pharmacy.    Please allow 1-2 business days for our office to contact you in regards to your laboratory/radiological studies.  If not done so, I encourage you to login into [employer] ([email or web address] to review your results as well.     Drink 8-10 glasses of fluid daily to stay well-hydrated.    Take a probiotic pill, eat live culture yogurt (Greek yogurt or Activia), drink kefir daily for one week after finishing the antibiotics to encourage growth of good bacteria in your system.    If you have any questions or concerns, please feel free to call us at [phone]    Sincerely,    Joe Nayak PA-C    Did you know?      You can schedule a video visit for follow-up appointments as well as future appointments for certain conditions.  Please see the below link.     [email or web address]    If you have not already done so,  I encourage you to sign up for [employer] ([email or web address]).  This will allow you to review your results, securely communicate with a provider, and schedule virtual visits as well.

## 2023-02-23 NOTE — PROGRESS NOTES
Mily is a 42 year old who is being evaluated via a billable telephone visit.      How would you like to obtain your AVS? MyChart  If thevisit is dropped, the invitation should be resent by: Text to cell phone: [phone]  Will anyone else be joining your visit? No    Assessment & Plan   Problem List Items Addressed This Visit    None  Visit Diagnoses     Nausea and vomiting, unspecified vomiting type    -  Primary    Relevant Medications    prochlorperazine (COMPAZINE) 10 MG tablet    ondansetron (ZOFRAN ODT) 4 MG ODT tab    Other Relevant Orders    Symptomatic COVID-19 Virus (Coronavirus) by PCR    Influenza A & B Antigen         Impression is N/V and other symptoms from unclear etiology, but could be due to viral illness including COVID-19. Recommended F2F evaluation in clinic or UC, but patient declined. Will order flu COVID-19 PCR. Sounds well and non-toxic and I have low suspicion for acute emergent abdominopelvic process, CNS infection, or other worrisome process at this point.  She will push p.o. fluids, use over-the-counter meds for symptoms, atiemetics as above, BRAT diet and follow-up with in 1 day if not improving or urgent care/the ER if symptoms worsen/change at any time.    DDx discussed with and explained to the pt to their satisfaction.  All questions were answered at this time. Pt expressed understanding of and agreement with this dx, tx, and plan. No further workup warranted and standard medication warnings given. I have given the patient a list of pertinent indications for re-evaluation. Will go to the Emergency Department if symptoms worsen or new concerning symptoms arise. Patient left the call in no apparent distress.     Ordering of each unique test  Prescription drug management  23 minutes spent on the date of the encounter doing chart review, history and exam, documentation and further activities per the note     BMI:   Estimated body mass index is 29.68 kg/m  as calculated from the  "following:    Height as of 12/13/22: 1.544 m (5' 0.79\").    Weight as of 1/26/23: 70.8 kg (156 lb).       See Patient Instructions    Return in about 1 day (around 2/24/2023) for a recheck of your symptoms if not improving, or call 911/go to an ER anytime if worsening.    DIANELYS Chase  [facility] EFFIE Minor is a 42 year old presenting for the following health issues:  Ent Problem      HPI     Acute Illness  Acute illness concerns: HA's, runny nose, decreased appetite, nausea, vomiting, fatigue  Onset/Duration: 1 week  Symptoms:  Fever: No  Chills/Sweats: No  Headache (location?): YES  Sinus Pressure: No  Conjunctivitis:  No  Ear Pain: no  Rhinorrhea: YES  Congestion: No  Sore Throat: No  Cough: no  Wheeze: No  Decreased Appetite: YES  Nausea: YES  Vomiting: YES- x 6 past 24 hrs  Diarrhea: No BM in ~1 week  Dysuria/Freq.: No  Dysuria or Hematuria: No  Fatigue/Achiness: YES- fatigue  Sick/Strep Exposure: No  Therapies tried and outcome: None    No previous episodes like this. S/p hysterectomy, but otherwise no abdominal surgeries. Denies neck stiffness, zully abd pain, vaginal or bowel symptoms or other complaints.    Review of Systems   Constitutional, HEENT, cardiovascular, pulmonary, gi and gu systems are negative, except as otherwise noted.      Objective           Vitals:  No vitals were obtained today due to virtual visit.    Physical Exam   GENERAL: Healthy, alert and no distress  RESP: No audible wheeze, cough.  NEURO: Mentation and speech appropriate for age.  PSYCH: Mentation appears normal, affect normal/bright, judgement and insight intact, normal speech    Telephone-Visit Details    Type of service: Phone Visit   Start Time: 11:36 AM  End Time: 11:52 AM    Originating Location (pt. Location): Home  Distant Location (provider location):  On-site  Platform used for Visit: Preet"

## 2023-02-24 ENCOUNTER — TELEPHONE (OUTPATIENT)
Dept: FAMILY MEDICINE | Facility: CLINIC | Age: 43
End: 2023-02-24
Payer: MEDICARE

## 2023-02-24 NOTE — TELEPHONE ENCOUNTER
Spoke with pt to give her below information, she verbalized her understanding and denies further questions.     Sofia Huang RN  [facility]

## 2023-02-24 NOTE — TELEPHONE ENCOUNTER
Patient can stop the Chantix for now.  It is possible she has a stomach flu as that is going around.  If she restarts this Chantix and starts having vomiting again she should stop it and we can try different medication for smoking cessation such as nicotine patches, gum, lozenges, inhaler.  Patient should let us know if she is wanting to try different method of smoking cessation.  If she is not able to keep fluids down with stopping the Chantix and taking her Zofran she should go in for evaluation. Xenia Gaviria, TARAS, FNP-BC

## 2023-02-24 NOTE — TELEPHONE ENCOUNTER
Pt calling in regarding Chantix     Pt reports she started this 6 days ago and the vomiting started Sunday .    Pt has been vomiting today.     Not able to keep liquids down. RN advised that if this continues to get evaluated in the ED for dehyration.     States Zofran has helped.     Pt is wondering if she can stop taking this for a couple days and then restart it? Of note she feels she may be allergic to it     If she is able to stop and restart it , is she able to restart at the same dose ?    RN instructed her to stop for now, and wait further instructions.     Please advise     Sofia Huang RN  [facility]

## 2023-02-24 NOTE — TELEPHONE ENCOUNTER
Forms received from: [email or web address]   Phone number listed: [phone]   Fax listed: [phone]   Date received: 2/3/23  Form description:  Medication   Once forms are completed, please return to [email or web address] via fax [phone].  Is patient requesting to be contacted when forms are completed: na  Phone: na  Form placed:  To Xenia Armendariz

## 2023-02-27 ENCOUNTER — TELEPHONE (OUTPATIENT)
Dept: FAMILY MEDICINE | Facility: CLINIC | Age: 43
End: 2023-02-27
Payer: MEDICARE

## 2023-02-27 NOTE — TELEPHONE ENCOUNTER
"No consent to communicate on file for mother.     Called patient, she stated she is not in a mental health crisis, denies thoughts of harm to herself or others. She stated she \"highly doubts\" that her mom called us. Patient does not give consent to call her back. Patient was speaking clearly during the phone call and did not sound like she was in distress. Advised to call back if she does have any questions or concerns.    Margret Schwartz RN     " Nutrition Assessment   Assessment Type: Initial assessment  Reason for Visit: Registered Dietitian Evaluation  Referral Requested By: Physician/Staff  Chart Medications Lab Results Reviewed:  Yes    Nutritional Risk Factors: Enteral nutrition    Current Diet Order: NPO  Diet Tolerance: n/a  Food Allergies: no  Priority Points: Status 3    Demographic/Anthropometrics Information  Gender: male   Patient Age: 81 year old  Height:    Ht Readings from Last 1 Encounters:   06/25/21 5' 11\" (1.803 m)      Weight:   Wt Readings from Last 1 Encounters:   07/01/21 90 kg      BMI:   BMI Readings from Last 1 Encounters:   07/01/21 27.67 kg/m²     Ideal Body Wt: Ideal body weight: 75.3 kg  Adjusted ideal body weight: 81.2 kg       Weight and Height Weight kg   5/25/2021 84.3 kg   6/1/2021 83 kg   6/3/2021 83.3 kg   6/22/2021 82.8 kg   6/25/2021 83.5 kg   6/25/2021 85.6 kg   6/27/2021 89.3 kg   6/28/2021 90.6 kg   6/29/2021 85.9 kg   6/30/2021 80.1 kg   7/1/2021 90 kg       Physical Appearance: Unable to observe due to current circumstances  Weight Classification: Overweight (BMI 25-29.9)    Estimated Nutritional Needs - 75 kg  Energy Needs: 22-25 kcal/kg  7526-0343 kcal/day  Protein Needs: 1.2 - 1.5 grams/kg   grams/day  Fluid Needs: 1 mL/kcal or per MD    Nutrition Diagnosis (PES)  Inadequate oral intake related to Inability to tolerate as evidenced by Need for NPO status    Nutrition Plan  Recommended Nutrition Intervention: Coordination of nutrition care by a nutrition professional and enteral nutrition  Monitor: Biochemical data, medical tests, procedures and Enteral nutrition    Discharge Needs: Enteral nutrition  Care Plan Discussed With: Pt unable to participate in plan of care  Goals: Transition to enteral nutrition  Goal Progress: Initiated  Timeframe to Achieve Goal: 1-3 days    Dietitian Notes/Impressions/Recommendations:  Stroke, aspiration pneumonia,  ARDS, acute respiratory failure    PMH: CKD, HTN, Heart  failure, CVA, neurodegenerative disease.     Pt now intubated and sedated in ICU     NFPE without significant results.    Current TF order to start: Nepro 50 ml/hr. Provides 2160 kcal/day. Exceeds estimated needs. TF is now off given depleted K and phos, concern for refeeding syndrome.     Noted weight fluctuations - likely fluid related, pt with heart failure and CKD    TREATMENT PLAN: Monitoring & Interventions   1. Suggest discontinue renal formula. Would give Osmolite 1.5 goal of 50 ml/hr. Plus Prosource once a day. Provides 1860 kcal and 90g Protein and 912 ml free H20.    2. Advance TF gradually as tolerated to goal    3. Monitor labs/tolerance and adjust TF as needed    4. Fluid flushes per MD in ICU setting    5. Monitor/replace lytes prior to advancing TF.     Follow    Sujata Wilson RD, LDN  Please contact via nutrition consult or via [employer] secure chat.         Malnutrition Status: at risk.

## 2023-02-27 NOTE — TELEPHONE ENCOUNTER
Pt mother calling back , and no consent to communicate in file and RN stated pt has states that we cannot talk to her mom.     Mom states that that is fine and to call Lola.     It appears things have been handled.     Lola call back number [phone].     Sofia Huang RN  [facility]

## 2023-02-27 NOTE — TELEPHONE ENCOUNTER
Both her ESIG and her Percocet prescriptions say maximum of 2 tablets daily; only if needed for pain/headache- not scheduled. TARAS Verma, FNP-BC

## 2023-02-27 NOTE — TELEPHONE ENCOUNTER
"Routing to PCP- review/advise    Pt mother calling to state that pt is in a mental health crisis and pt foster parents as well as her parents are concerned for pt.    Pt mother states pt has been diagnosed with schizophrenia     Pt mother has concerns of pt and her mental health.     Pt mother states pt is on 10 medications a day and this may be to much for pt.     Pt mother states she is being prescribed 2 narcotics and \"pt does not need these.\"     Pt mother and father are wondering if PCP knows that pt sees mental health at [facility] and is being prescribed medications through them as well.     RN set up virtual appointment to discuss concerns further on 03/01/23.    Pt and family are not together. RN unsure we can make this a three way phone all? Consult?     Pt mother number [phone]      "

## 2023-02-27 NOTE — TELEPHONE ENCOUNTER
Lola is wanting to know if a max daily dose can be added to patient's Oxycodone prescription? She is not in need of a refill at this time but if this could be added then a medication list be mailed to Lola at the home address on file.     Called Lola back, she is reporting over the weekend she noticed some off behavior. She has been secluded in her room all weekend and reported vomiting and this was not seen at all by staff. Patient has been self secluding in her room and they discussed this. She was zoning out, randomly blurting words, doing the same activities over and over, and coming into the kitchen and staring at her PRN medications in the cabinet. Lola consulted non emergency line this weekend and was advised this is not a crisis. Sister advised that patient does these behaviors when she is hearing voices or think people are trying to poison her. Patient is in this foster home on commitment and does not believe she has a mental illness and dose not think she needs to be there. Lola cannot reach out to psychiatry for the patient either but can advise patient to reach out to get further guidance on these behaviors     They are worried about patient's pain medication currently. She reports that the patient has behaviors (physical and verbal) if her PRN medications are not sitting out. Lola is asking for a daily max dose on the oxycodone, and she will have her parents reach out to psychiatry for further guidance.     Margret Schwartz RN

## 2023-02-27 NOTE — TELEPHONE ENCOUNTER
Please mail medication list to Lola to patient's home address on file.     Thank you,  Margret Schwartz RN

## 2023-02-27 NOTE — TELEPHONE ENCOUNTER
Called Lola at [phone]. She is listed as staff at her foster care/group home. She is busy at the moment and requests a call back a bit later.     Margret Schwartz RN

## 2023-02-27 NOTE — TELEPHONE ENCOUNTER
There should be a consent to communicate with the group home.  They should call 911 if they have a concerned about patient's mental health.  I refilled all medication as I was advised by the .  I know she has a psychiatrist.  They are supposed to be managing her medications at the group home.  Please call and notify group [place] of the above. TARAS Verma, FNP-BC

## 2023-02-28 NOTE — PROGRESS NOTES
Mily is a 42 year old who is being evaluated via a billable telephone visit.      What phone number would you like to be contacted at? [phone]  How would you like to obtain your AVS? [place]onna  [facility] Location (provider location):  Off-site    Assessment & Plan     Episodic tension-type headache, not intractable    - butalbital-acetaminophen-caffeine (ESGIC) -40 MG tablet; Take 1 tablet by mouth 2 times daily as needed for headaches (do not take more than one in 6 hours)    Anxiety    - hydrOXYzine (ATARAX) 25 MG tablet; Take 1 tablet (25 mg) by mouth 3 times daily as needed for anxiety  - propranolol (INDERAL) 10 MG tablet; Take 1 tablet (10 mg) by mouth 3 times daily 1 TABLET ORALLY 3 TIMES DAILY AS NEEDED (DX: ANXIETY) Strength: 10 mg    Other chronic pain    - acetaminophen (TYLENOL) 325 MG tablet; Take 2 tablets (650 mg) by mouth every 4 hours as needed for pain  - oxyCODONE-acetaminophen (PERCOCET) 5-325 MG tablet; Take 0.5-1 tablets by mouth 2 times daily as needed for severe pain (7-10) (use sparingly- to last one month; at least 6 hours apart)  - tiZANidine (ZANAFLEX) 2 MG tablet; Take 1 tablet (2 mg) by mouth 3 times daily as needed for muscle spasms (at least 4 hours apart)    Slow transit constipation    - polyethylene glycol (MIRALAX) 17 GM/Dose powder; Take 17 g (1 capful.) by mouth daily    Hormone replacement therapy (HRT)    - estrogen conj (PREMARIN) 1.25 MG tablet; TAKE 1 TABLET BY MOUTH DAILY FOR HORMONE REPLACEMENT R/T HYSTERECTOMY    Encounter for smoking cessation counseling    - varenicline (CHANTIX AHSAN) 0.5 MG X 11 & 1 MG X 42 tablet; Take 0.5 mg tab daily for 3 days, THEN 0.5 mg tab twice daily for 4 days, THEN 1 mg twice daily.    Psychosis, unspecified psychosis type (H)      Class 2 severe obesity due to excess calories with serious comorbidity and body mass index (BMI) of 36.0 to 36.9 in adult (H)        30 minutes spent on the date of the encounter doing chart review,  "history and exam, documentation and further activities per the note     Nicotine/Tobacco Cessation:  She reports that she has been smoking cigarettes. She has never used smokeless tobacco.  Nicotine/Tobacco Cessation Plan:   Pharmacotherapies : varenicline (Chantix)      BMI:   Estimated body mass index is 29.68 kg/m  as calculated from the following:    Height as of 12/13/22: 1.544 m (5' 0.79\").    Weight as of 1/26/23: 70.8 kg (156 lb).   Weight management plan: Discussed healthy diet and exercise guidelines discussed regular exercise would help mental health as well as obesity    See Patient Instructions: Take medication as prescribed needed.  Review after visit summary tips.  Contact or follow-up at any time as needed for healthcare questions or concerns.  Controlled medications require follow-up every 3 months or when needed for refills.  Patient in agreement    Return in about 3 months (around 6/1/2023), or if symptoms worsen or fail to improve, for using a phone visit.    GIA WOODARD, DOV  [employer] EFFIE Minor is a 42 year old, presenting for the following health issues:  Consult and Recheck Medication    Patient would like to discuss and follow up on all medications for refills.     Discussed with patient that we got contacted by her group home/family regarding their concerns about her mental health secluding herself in her room and possibly overusing her pain medication.  Discussed I do not believe she is seeking more pain medication than is appropriate as she can only take 1 to 2 pills a day for pain and for headache.  Patient reports she does not even take them 2 times a day every day.  Discussed they reported in the past when she isolates from others this could be her worsening mental health or hearing or delusional thinking.  Patient reports her mental health has been fine she is concerned that people are going behind her back instead of talking to her.  " Discussed that she should bring this up with her group home and family that she is an adult and if they have concerns they can bring them to her and advised patient if she is ever struggling she can always reach out to me.  But if they have significant concerns they should go to the hospital or call 911.  Patient in agreement she reports she is still working with psychiatry.  She thought this was just a medication follow-up for refills she feels her meds are going well and she has no concerns.  Advised patient to follow-up anytime, I am here to help her we have always had a good working relationship; and I am honest with my patients.  I will refill her medications use them only as needed.  Controlled meds needs some type of follow-up visit every 3 months.  Patient in agreement    HPI         Review of Systems   Constitutional, HEENT, cardiovascular, pulmonary, GI, , musculoskeletal, neuro, skin, endocrine and psych systems are negative, except as otherwise noted.      Objective           Vitals:  No vitals were obtained today due to virtual visit.    Physical Exam   healthy, alert and no distress  PSYCH: Alert and oriented times 3; coherent speech, normal   rate and volume, able to articulate logical thoughts, able   to abstract reason, no tangential thoughts, no hallucinations   or delusions  Her affect is normal  RESP: No cough, no audible wheezing, able to talk in full sentences  Remainder of exam unable to be completed due to telephone visits        Phone call duration: 15 minutes

## 2023-03-01 ENCOUNTER — VIRTUAL VISIT (OUTPATIENT)
Dept: FAMILY MEDICINE | Facility: CLINIC | Age: 43
End: 2023-03-01
Payer: MEDICARE

## 2023-03-01 DIAGNOSIS — E66.812 CLASS 2 SEVERE OBESITY DUE TO EXCESS CALORIES WITH SERIOUS COMORBIDITY AND BODY MASS INDEX (BMI) OF 36.0 TO 36.9 IN ADULT (H): ICD-10-CM

## 2023-03-01 DIAGNOSIS — F41.9 ANXIETY: ICD-10-CM

## 2023-03-01 DIAGNOSIS — K59.01 SLOW TRANSIT CONSTIPATION: ICD-10-CM

## 2023-03-01 DIAGNOSIS — Z79.890 HORMONE REPLACEMENT THERAPY (HRT): ICD-10-CM

## 2023-03-01 DIAGNOSIS — G44.219 EPISODIC TENSION-TYPE HEADACHE, NOT INTRACTABLE: ICD-10-CM

## 2023-03-01 DIAGNOSIS — E66.01 CLASS 2 SEVERE OBESITY DUE TO EXCESS CALORIES WITH SERIOUS COMORBIDITY AND BODY MASS INDEX (BMI) OF 36.0 TO 36.9 IN ADULT (H): ICD-10-CM

## 2023-03-01 DIAGNOSIS — G89.29 OTHER CHRONIC PAIN: ICD-10-CM

## 2023-03-01 DIAGNOSIS — Z71.6 ENCOUNTER FOR SMOKING CESSATION COUNSELING: ICD-10-CM

## 2023-03-01 DIAGNOSIS — F29 PSYCHOSIS, UNSPECIFIED PSYCHOSIS TYPE (H): Primary | ICD-10-CM

## 2023-03-01 PROCEDURE — 99442 PR PHYSICIAN TELEPHONE EVALUATION 11-20 MIN: CPT | Mod: 93 | Performed by: NURSE PRACTITIONER

## 2023-03-01 RX ORDER — ACETAMINOPHEN 325 MG/1
650 TABLET ORAL EVERY 4 HOURS PRN
Qty: 360 TABLET | Refills: 1 | Status: SHIPPED | OUTPATIENT
Start: 2023-03-01 | End: 2023-06-13

## 2023-03-01 RX ORDER — POLYETHYLENE GLYCOL 3350 17 G/17G
1 POWDER, FOR SOLUTION ORAL DAILY
Qty: 578 G | Refills: 2 | Status: SHIPPED | OUTPATIENT
Start: 2023-03-01 | End: 2023-04-06

## 2023-03-01 RX ORDER — HYDROXYZINE HYDROCHLORIDE 25 MG/1
25 TABLET, FILM COATED ORAL 3 TIMES DAILY PRN
Qty: 90 TABLET | Refills: 3 | Status: SHIPPED | OUTPATIENT
Start: 2023-03-01 | End: 2023-06-13

## 2023-03-01 RX ORDER — OXYCODONE AND ACETAMINOPHEN 5; 325 MG/1; MG/1
.5-1 TABLET ORAL 2 TIMES DAILY PRN
Qty: 60 TABLET | Refills: 0 | Status: SHIPPED | OUTPATIENT
Start: 2023-03-01 | End: 2023-05-30

## 2023-03-01 RX ORDER — PROPRANOLOL HYDROCHLORIDE 10 MG/1
10 TABLET ORAL 3 TIMES DAILY
Qty: 90 TABLET | Refills: 1 | Status: SHIPPED | OUTPATIENT
Start: 2023-03-01 | End: 2023-12-27

## 2023-03-01 RX ORDER — TIZANIDINE 2 MG/1
2 TABLET ORAL 3 TIMES DAILY PRN
Qty: 90 TABLET | Refills: 2 | Status: SHIPPED | OUTPATIENT
Start: 2023-03-01 | End: 2023-06-13

## 2023-03-01 RX ORDER — BUTALBITAL, ACETAMINOPHEN AND CAFFEINE 50; 325; 40 MG/1; MG/1; MG/1
1 TABLET ORAL 2 TIMES DAILY PRN
Qty: 60 TABLET | Refills: 2 | Status: SHIPPED | OUTPATIENT
Start: 2023-03-01 | End: 2023-03-16

## 2023-03-01 ASSESSMENT — ANXIETY QUESTIONNAIRES
2. NOT BEING ABLE TO STOP OR CONTROL WORRYING: SEVERAL DAYS
7. FEELING AFRAID AS IF SOMETHING AWFUL MIGHT HAPPEN: NEARLY EVERY DAY
6. BECOMING EASILY ANNOYED OR IRRITABLE: NEARLY EVERY DAY
3. WORRYING TOO MUCH ABOUT DIFFERENT THINGS: MORE THAN HALF THE DAYS
5. BEING SO RESTLESS THAT IT IS HARD TO SIT STILL: MORE THAN HALF THE DAYS
IF YOU CHECKED OFF ANY PROBLEMS ON THIS QUESTIONNAIRE, HOW DIFFICULT HAVE THESE PROBLEMS MADE IT FOR YOU TO DO YOUR WORK, TAKE CARE OF THINGS AT HOME, OR GET ALONG WITH OTHER PEOPLE: SOMEWHAT DIFFICULT
GAD7 TOTAL SCORE: 15
4. TROUBLE RELAXING: NEARLY EVERY DAY
1. FEELING NERVOUS, ANXIOUS, OR ON EDGE: SEVERAL DAYS
GAD7 TOTAL SCORE: 15

## 2023-03-01 ASSESSMENT — PATIENT HEALTH QUESTIONNAIRE - PHQ9: SUM OF ALL RESPONSES TO PHQ QUESTIONS 1-9: 14

## 2023-03-07 ENCOUNTER — TELEPHONE (OUTPATIENT)
Dept: FAMILY MEDICINE | Facility: CLINIC | Age: 43
End: 2023-03-07
Payer: MEDICARE

## 2023-03-07 DIAGNOSIS — F17.200 TOBACCO USE DISORDER: Primary | ICD-10-CM

## 2023-03-07 DIAGNOSIS — Z71.6 ENCOUNTER FOR SMOKING CESSATION COUNSELING: ICD-10-CM

## 2023-03-07 NOTE — TELEPHONE ENCOUNTER
Reason for Call:  Other     Detailed comments: Patient would like a different script for her quit smoking with less side effects.    Phone Number Patient can be reached at: Home number on file [phone] (home)  [phone]   Best Time:     Can we leave a detailed message on this number? YES    Call taken on 3/7/2023 at 11:17 AM by Donna Caballero

## 2023-03-07 NOTE — TELEPHONE ENCOUNTER
Forms received from: [employer]   Phone number listed: [phone]   Fax listed: [phone]  Date received: 3/3/23  Form description: Request for reconsideration of medicare prescription drug denial  Once forms are completed, please return to [employer] via fX [phone].  Is patient requesting to be contacted when forms are completed: NA  Phone: NA  Form placed:  To Xenia Armendariz

## 2023-03-08 RX ORDER — NICOTINE 21 MG/24HR
1 PATCH, TRANSDERMAL 24 HOURS TRANSDERMAL EVERY 24 HOURS
Qty: 14 PATCH | Refills: 0 | Status: SHIPPED | OUTPATIENT
Start: 2023-03-08 | End: 2023-04-06

## 2023-03-28 ENCOUNTER — VIRTUAL VISIT (OUTPATIENT)
Dept: ENDOCRINOLOGY | Facility: CLINIC | Age: 43
End: 2023-03-28
Payer: MEDICARE

## 2023-03-28 DIAGNOSIS — M81.0 OSTEOPOROSIS OF MULTIPLE SITES: Primary | ICD-10-CM

## 2023-03-28 PROCEDURE — 99214 OFFICE O/P EST MOD 30 MIN: CPT | Mod: TEL | Performed by: INTERNAL MEDICINE

## 2023-03-28 RX ORDER — VITAMIN B COMPLEX
1 TABLET ORAL DAILY
Qty: 90 TABLET | Refills: 3 | Status: SHIPPED | OUTPATIENT
Start: 2023-03-28 | End: 2023-04-06

## 2023-03-28 NOTE — NURSING NOTE
Is the patient currently in the state of [place]? YES    Visit mode:TELEPHONE    If the visit is dropped, the patient can be reconnected by: TELEPHONE VISIT: Phone number: [phone]    Will anyone else be joining the visit? NO    How would you like to obtain your AVS? Mail a copy    Are changes needed to the allergy or medication list? NO    Reason for visit:   Chief Complaint   Patient presents with     Telephone     Osteoporosis       Margret Sommer MA

## 2023-03-28 NOTE — PROGRESS NOTES
Endocrinology Clinic Visit    Chief Complaint: Telephone (Osteoporosis)     Information obtained from:Patient     Assessment/Treatment Plan:      Osteoporosis with history of recurrent pathological fracture/patient has had multiple fragility fractures prior to starting treatment with Forteo: personally reviewed bone density from 3/2022. Agree with the interpretation.     Risk factors for osteoporosis - Depo-Provera use for 16 years since the age of 22. She was taken off of Depo-Provera in Jan 2019. She has had workup for other possible secondary causes of osteoporosis including Cushing syndrome, celiac disease, hypercalciuria and primary hyperparathyroidism which was unremarkable.      Now s/p completion of FORTEO and RECLAST X2 in 3/2022. Follow up bone density noted; stable. We will continue to monitor with DEXA scan 2 years from the last check.         Plan:    Weight bearing Exercises    Fall prevention     Adequate Calcium and vitamin D intake: for maintenance calcium 1200 mg daily from all sources and vitamin D 1000 IU daily.          Currently on HRT s/p total hysterectomy and oophorectomy.     Dallas Flores MD  Staff Endocrinologist    Division of Endocrinology and Diabetes  Subjective:         HPI: Mily Grullon is a 42 year old female with history of Osteoporosis and fragility fracture who is here for a follow up.     Surgery = total hysterectomy with oophorectomy on 12/4/20. Currently on HRT    Patient has history of endometriosis and per report has underwent right oophorectomy at the age of 22.  She was started on Depo-Provera at the  age of 22.  She has been on this medication until she was taken off of it in January of 2019.   Osteoporosis and fragility fracture presumed to be secondary to Depo-Provera    She had a screening test for celiac disease which was within the normal limits.  She was screened for Cushing's syndrome which was normal.  She was screened for hyper-calciuria again which came  back within the normal limits.  Thyroid lab results were within the normal limits.  GFR is within the normal limits and electrolytes including calcium.  He has had elevated PTH level in the setting of low vitamin D level & PTH elevation resolved after correcting low vitamin D level.     She has never been on steroids. Multiple fractures over the five years prior to starting FORTEO:  Hip fracture, wrist fracture, multiple ankle fractures, now foot and rib fractures. All of these fractures are fragility fracture without any trauma. She feels just pain. Hip # was following lifting.    After discussing risk benefits of each options and discussing treatment options; patient was started on Forteo for the treatment of osteoporosis and fragility fracture; 4/2019.  She was able to manage a daily injection without any problem. No fractures since she was started on forteo. Completed FORTEO AND RECLAST ADMINISTERED IN 2/2021      She is taking calcium and vitamin D supplements in addition.  She has not had any fractures over the last 4 years.     Allergies   Allergen Reactions     Ciprofloxacin Dizziness, Shortness Of Breath and Nausea and Vomiting     Paxil [Paroxetine] Anaphylaxis     anaphylaxis     Amitriptyline Hives and Other (See Comments)     shocking feeling up the arm     Amoxicillin Diarrhea     Asa [Dihydroxyaluminum Aminoacetate] GI Disturbance     Cipro [Ciprofloxacin]      Dizziness, vomiting, shortness of breath     Ibuprofen [Aspartame-Ibuprofen]      GI distress       Lyrica      extrematies swell up      Morphine      ithcy     Naproxen      GI distress     Neurontin [Gabapentin]      rash     No Clinical Screening - See Comments Other (See Comments)     Bees causes watery itchy eyes, swelling in mouth, needs epi pen     Olanzapine Swelling     Phenergan [Promethazine Hcl]      dystonia     Pregabalin Swelling and Unknown     Of all limbs       Prilosec [Omeprazole]      Rash, dizziness     Venlafaxine Other  (See Comments)     Intolerance- eye     Gabapentin Rash     Omeprazole Dizziness and Rash       Current Outpatient Medications   Medication Sig Dispense Refill     acetaminophen (TYLENOL) 325 MG tablet Take 2 tablets (650 mg) by mouth every 4 hours as needed for pain 360 tablet 1     DULoxetine (CYMBALTA) 60 MG capsule Take 60 mg by mouth 2 times daily For depression       estrogen conj (PREMARIN) 1.25 MG tablet TAKE 1 TABLET BY MOUTH DAILY FOR HORMONE REPLACEMENT R/T HYSTERECTOMY 28 tablet 2     hydrOXYzine (ATARAX) 25 MG tablet Take 1 tablet (25 mg) by mouth 3 times daily as needed for anxiety 90 tablet 3     lamoTRIgine (LAMICTAL) 100 MG tablet Take by mouth daily 50 mg in the morning and 100 mg at hs- mood stabilizer and depression       lithium ER (LITHOBID) 300 MG CR tablet Take 300 mg by mouth At Bedtime 3 tablets at bedtime for mood stability and depression       nicotine (NICODERM CQ) 14 MG/24HR 24 hr patch Place 1 patch onto the skin every 24 hours Use first 14 patch 0     nicotine (NICODERM CQ) 7 MG/24HR 24 hr patch Place 1 patch onto the skin every 24 hours Use second 90 patch 1     nicotine (NICOTROL) 10 MG inhaler Use 1 cartridge as needed for urge to smoke by puffing over course of 20min.  Use 6-16 cart/day; reduce number of cart/day over 6-12 weeks. 168 each 1     ondansetron (ZOFRAN ODT) 4 MG ODT tab Take 1 tablet (4 mg) by mouth every 8 hours as needed for nausea or vomiting (Take only if prochlorperazine is not helpful.) 8 tablet 0     oxyCODONE-acetaminophen (PERCOCET) 5-325 MG tablet Take 0.5-1 tablets by mouth 2 times daily as needed for severe pain (7-10) (use sparingly- to last one month; at least 6 hours apart) 60 tablet 0     polyethylene glycol (MIRALAX) 17 GM/Dose powder Take 17 g (1 capful.) by mouth daily 578 g 2     prochlorperazine (COMPAZINE) 10 MG tablet Take 0.5-1 tablets (5-10 mg) by mouth every 6 hours as needed for nausea or vomiting 15 tablet 0     propranolol (INDERAL) 10 MG  tablet Take 1 tablet (10 mg) by mouth 3 times daily 1 TABLET ORALLY 3 TIMES DAILY AS NEEDED (DX: ANXIETY) Strength: 10 mg 90 tablet 1     sertraline (ZOLOFT) 100 MG tablet Take 100 mg by mouth daily In the morning for depression       tiZANidine (ZANAFLEX) 2 MG tablet Take 1 tablet (2 mg) by mouth 3 times daily as needed for muscle spasms (at least 4 hours apart) 90 tablet 2     EPINEPHrine (ANY BX GENERIC EQUIV) 0.3 MG/0.3ML injection 2-pack Inject 0.3 mLs (0.3 mg) into the muscle as needed for anaphylaxis May repeat one time in 5-15 minutes if response to initial dose is inadequate. (Patient not taking: Reported on 3/1/2023) 2 each 1     naloxone (NARCAN) 4 MG/0.1ML nasal spray Spray 1 spray (4 mg) into one nostril alternating nostrils as needed for opioid reversal every 2-3 minutes until assistance arrives (Patient not taking: Reported on 3/1/2023) 0.2 mL 1     SUMAtriptan (IMITREX) 50 MG tablet Take 1 tablet (50 mg) by mouth at onset of headache for migraine May repeat in 2 hours. Max 4 tablets/24 hours. (Patient not taking: Reported on 3/1/2023) 60 tablet 1     No family history of osteoporosis.     Former smoker stopped in 2014    Objective:   LMP 11/16/2020 (Exact Date)   LMP 11/16/2020 (Exact Date)   Constitutional: Pleasant   Psychological: appropriate mood and affect     In House Labs:     TSH   Date Value Ref Range Status   09/30/2021 2.93 0.40 - 4.00 mU/L Final   08/30/2021 2.04 0.40 - 4.00 mU/L Final   08/03/2021 1.31 0.40 - 4.00 mU/L Final   01/27/2020 0.62 0.40 - 4.00 mU/L Final   11/23/2018 0.68 0.30 - 5.00 uIU/mL Final   09/11/2017 1.36 0.40 - 4.00 mU/L Final   12/10/2013 2.37 0.4 - 5.0 mU/L Final   01/07/2013 0.70 mcU/mL Final     T4 Free   Date Value Ref Range Status   12/10/2013 1.08 0.70 - 1.85 ng/dL Final     Free T4   Date Value Ref Range Status   09/30/2021 0.84 0.76 - 1.46 ng/dL Final   08/30/2021 0.83 0.76 - 1.46 ng/dL Final       Creatinine   Date Value Ref Range Status   04/11/2022 0.97  0.52 - 1.04 mg/dL Final   05/03/2021 0.71 0.52 - 1.04 mg/dL Final     24-hour urine for calcium  Sodium to creatinine ratio was 150 reference range   Sodium 24-hour 184 difference for   Creatinine 24-hour was 1.59 (0.5-2.15  Calcium to creatinine ratio was 68 reference range 30- 275  Calcium 24-hour urine was 108 reference range   Creatinine 24-hour 1.59  Total volume 2700  Urine free cortisol 3.2    TSH was 0.68 and free T4 0.74 tissue transglutaminase IgG and IgA was undetectable creatinine within the normal limits BMD in 2014 was 0.718 at the total hip in 2018 was 0.751.  Z score -3.1 at the neck, trochanteric -2.7 and total -2.4      ENDO CALCIUM LABS-UMP Latest Ref Rng & Units 9/30/2021   VITAMIN D DEFICIENCY SCREENING 20 - 75 ug/L    ALBUMIN 3.4 - 5.0 g/dL    ALKPHOS 40 - 150 U/L    CALCIUM 8.5 - 10.1 mg/dL 10.1   CREATININE 0.52 - 1.04 mg/dL 0.82     ENDO CALCIUM LABS-UMP Latest Ref Rng & Units 5/3/2021 2/4/2021   VITAMIN D DEFICIENCY SCREENING 20 - 75 ug/L  47   ALBUMIN 3.4 - 5.0 g/dL  4.0   ALKPHOS 40 - 150 U/L  111   CALCIUM 8.5 - 10.1 mg/dL 8.8 9.3   CREATININE 0.52 - 1.04 mg/dL 0.71 0.74   3/2022  FINDINGS:               Lumbar Spine (L1-L4)      Z-score: -0.5               Right Femoral Neck          Z-score:  -2.8               Forearm (radius 33%)      Zscore:  -0.8                               Lumbar (L1-L4) BMD: 1.180  Previous: 1.152                                       Right Total Hip BMD: 0.760   Previous: 0.740               Forearm (radius 33%) BMD: 0.657   Previous: 0.672                       Total telephone time <10 minutes.

## 2023-03-28 NOTE — LETTER
3/28/2023         RE: Mily Grullon  [address] [facility] [address]        Dear Colleague,    Thank you for referring your patient, Mily Grullon, to the [facility]. Please see a copy of my visit note below.    Endocrinology Clinic Visit    Chief Complaint: Telephone (Osteoporosis)     Information obtained from:Patient     Assessment/Treatment Plan:      Osteoporosis with history of recurrent pathological fracture/patient has had multiple fragility fractures prior to starting treatment with Forteo: personally reviewed bone density from 3/2022. Agree with the interpretation.     Risk factors for osteoporosis - Depo-Provera use for 16 years since the age of 22. She was taken off of Depo-Provera in Jan 2019. She has had workup for other possible secondary causes of osteoporosis including Cushing syndrome, celiac disease, hypercalciuria and primary hyperparathyroidism which was unremarkable.      Now s/p completion of FORTEO and RECLAST X2 in 3/2022. Follow up bone density noted; stable. We will continue to monitor with DEXA scan 2 years from the last check.         Plan:    Weight bearing Exercises    Fall prevention     Adequate Calcium and vitamin D intake: for maintenance calcium 1200 mg daily from all sources and vitamin D 1000 IU daily.          Currently on HRT s/p total hysterectomy and oophorectomy.     Dallas Flores MD  Staff Endocrinologist    Division of Endocrinology and Diabetes  Subjective:         HPI: Mily Grullon is a 42 year old female with history of Osteoporosis and fragility fracture who is here for a follow up.     Surgery = total hysterectomy with oophorectomy on 12/4/20. Currently on HRT    Patient has history of endometriosis and per report has underwent right oophorectomy at the age of 22.  She was started on Depo-Provera at the  age of 22.  She has been on this medication until she was taken off of it in January of 2019.   Osteoporosis and fragility  fracture presumed to be secondary to Depo-Provera    She had a screening test for celiac disease which was within the normal limits.  She was screened for Cushing's syndrome which was normal.  She was screened for hyper-calciuria again which came back within the normal limits.  Thyroid lab results were within the normal limits.  GFR is within the normal limits and electrolytes including calcium.  He has had elevated PTH level in the setting of low vitamin D level & PTH elevation resolved after correcting low vitamin D level.     She has never been on steroids. Multiple fractures over the five years prior to starting FORTEO:  Hip fracture, wrist fracture, multiple ankle fractures, now foot and rib fractures. All of these fractures are fragility fracture without any trauma. She feels just pain. Hip # was following lifting.    After discussing risk benefits of each options and discussing treatment options; patient was started on Forteo for the treatment of osteoporosis and fragility fracture; 4/2019.  She was able to manage a daily injection without any problem. No fractures since she was started on forteo. Completed FORTEO AND RECLAST ADMINISTERED IN 2/2021      She is taking calcium and vitamin D supplements in addition.  She has not had any fractures over the last 4 years.     Allergies   Allergen Reactions     Ciprofloxacin Dizziness, Shortness Of Breath and Nausea and Vomiting     Paxil [Paroxetine] Anaphylaxis     anaphylaxis     Amitriptyline Hives and Other (See Comments)     shocking feeling up the arm     Amoxicillin Diarrhea     Asa [Dihydroxyaluminum Aminoacetate] GI Disturbance     Cipro [Ciprofloxacin]      Dizziness, vomiting, shortness of breath     Ibuprofen [Aspartame-Ibuprofen]      GI distress       Lyrica      extrematies swell up      Morphine      ithcy     Naproxen      GI distress     Neurontin [Gabapentin]      rash     No Clinical Screening - See Comments Other (See Comments)     Bees causes  watery itchy eyes, swelling in mouth, needs epi pen     Olanzapine Swelling     Phenergan [Promethazine Hcl]      dystonia     Pregabalin Swelling and Unknown     Of all limbs       Prilosec [Omeprazole]      Rash, dizziness     Venlafaxine Other (See Comments)     Intolerance- eye     Gabapentin Rash     Omeprazole Dizziness and Rash       Current Outpatient Medications   Medication Sig Dispense Refill     acetaminophen (TYLENOL) 325 MG tablet Take 2 tablets (650 mg) by mouth every 4 hours as needed for pain 360 tablet 1     DULoxetine (CYMBALTA) 60 MG capsule Take 60 mg by mouth 2 times daily For depression       estrogen conj (PREMARIN) 1.25 MG tablet TAKE 1 TABLET BY MOUTH DAILY FOR HORMONE REPLACEMENT R/T HYSTERECTOMY 28 tablet 2     hydrOXYzine (ATARAX) 25 MG tablet Take 1 tablet (25 mg) by mouth 3 times daily as needed for anxiety 90 tablet 3     lamoTRIgine (LAMICTAL) 100 MG tablet Take by mouth daily 50 mg in the morning and 100 mg at hs- mood stabilizer and depression       lithium ER (LITHOBID) 300 MG CR tablet Take 300 mg by mouth At Bedtime 3 tablets at bedtime for mood stability and depression       nicotine (NICODERM CQ) 14 MG/24HR 24 hr patch Place 1 patch onto the skin every 24 hours Use first 14 patch 0     nicotine (NICODERM CQ) 7 MG/24HR 24 hr patch Place 1 patch onto the skin every 24 hours Use second 90 patch 1     nicotine (NICOTROL) 10 MG inhaler Use 1 cartridge as needed for urge to smoke by puffing over course of 20min.  Use 6-16 cart/day; reduce number of cart/day over 6-12 weeks. 168 each 1     ondansetron (ZOFRAN ODT) 4 MG ODT tab Take 1 tablet (4 mg) by mouth every 8 hours as needed for nausea or vomiting (Take only if prochlorperazine is not helpful.) 8 tablet 0     oxyCODONE-acetaminophen (PERCOCET) 5-325 MG tablet Take 0.5-1 tablets by mouth 2 times daily as needed for severe pain (7-10) (use sparingly- to last one month; at least 6 hours apart) 60 tablet 0     polyethylene glycol  (MIRALAX) 17 GM/Dose powder Take 17 g (1 capful.) by mouth daily 578 g 2     prochlorperazine (COMPAZINE) 10 MG tablet Take 0.5-1 tablets (5-10 mg) by mouth every 6 hours as needed for nausea or vomiting 15 tablet 0     propranolol (INDERAL) 10 MG tablet Take 1 tablet (10 mg) by mouth 3 times daily 1 TABLET ORALLY 3 TIMES DAILY AS NEEDED (DX: ANXIETY) Strength: 10 mg 90 tablet 1     sertraline (ZOLOFT) 100 MG tablet Take 100 mg by mouth daily In the morning for depression       tiZANidine (ZANAFLEX) 2 MG tablet Take 1 tablet (2 mg) by mouth 3 times daily as needed for muscle spasms (at least 4 hours apart) 90 tablet 2     EPINEPHrine (ANY BX GENERIC EQUIV) 0.3 MG/0.3ML injection 2-pack Inject 0.3 mLs (0.3 mg) into the muscle as needed for anaphylaxis May repeat one time in 5-15 minutes if response to initial dose is inadequate. (Patient not taking: Reported on 3/1/2023) 2 each 1     naloxone (NARCAN) 4 MG/0.1ML nasal spray Spray 1 spray (4 mg) into one nostril alternating nostrils as needed for opioid reversal every 2-3 minutes until assistance arrives (Patient not taking: Reported on 3/1/2023) 0.2 mL 1     SUMAtriptan (IMITREX) 50 MG tablet Take 1 tablet (50 mg) by mouth at onset of headache for migraine May repeat in 2 hours. Max 4 tablets/24 hours. (Patient not taking: Reported on 3/1/2023) 60 tablet 1     No family history of osteoporosis.     Former smoker stopped in 2014    Objective:   LMP 11/16/2020 (Exact Date)   LMP 11/16/2020 (Exact Date)   Constitutional: Pleasant   Psychological: appropriate mood and affect     In House Labs:     TSH   Date Value Ref Range Status   09/30/2021 2.93 0.40 - 4.00 mU/L Final   08/30/2021 2.04 0.40 - 4.00 mU/L Final   08/03/2021 1.31 0.40 - 4.00 mU/L Final   01/27/2020 0.62 0.40 - 4.00 mU/L Final   11/23/2018 0.68 0.30 - 5.00 uIU/mL Final   09/11/2017 1.36 0.40 - 4.00 mU/L Final   12/10/2013 2.37 0.4 - 5.0 mU/L Final   01/07/2013 0.70 mcU/mL Final     T4 Free   Date Value Ref  Range Status   12/10/2013 1.08 0.70 - 1.85 ng/dL Final     Free T4   Date Value Ref Range Status   09/30/2021 0.84 0.76 - 1.46 ng/dL Final   08/30/2021 0.83 0.76 - 1.46 ng/dL Final       Creatinine   Date Value Ref Range Status   04/11/2022 0.97 0.52 - 1.04 mg/dL Final   05/03/2021 0.71 0.52 - 1.04 mg/dL Final     24-hour urine for calcium  Sodium to creatinine ratio was 150 reference range   Sodium 24-hour 184 difference for   Creatinine 24-hour was 1.59 (0.5-2.15  Calcium to creatinine ratio was 68 reference range 30- 275  Calcium 24-hour urine was 108 reference range   Creatinine 24-hour 1.59  Total volume 2700  Urine free cortisol 3.2    TSH was 0.68 and free T4 0.74 tissue transglutaminase IgG and IgA was undetectable creatinine within the normal limits BMD in 2014 was 0.718 at the total hip in 2018 was 0.751.  Z score -3.1 at the neck, trochanteric -2.7 and total -2.4      ENDO CALCIUM LABS-UMP Latest Ref Rng & Units 9/30/2021   VITAMIN D DEFICIENCY SCREENING 20 - 75 ug/L    ALBUMIN 3.4 - 5.0 g/dL    ALKPHOS 40 - 150 U/L    CALCIUM 8.5 - 10.1 mg/dL 10.1   CREATININE 0.52 - 1.04 mg/dL 0.82     ENDO CALCIUM LABS-UMP Latest Ref Rng & Units 5/3/2021 2/4/2021   VITAMIN D DEFICIENCY SCREENING 20 - 75 ug/L  47   ALBUMIN 3.4 - 5.0 g/dL  4.0   ALKPHOS 40 - 150 U/L  111   CALCIUM 8.5 - 10.1 mg/dL 8.8 9.3   CREATININE 0.52 - 1.04 mg/dL 0.71 0.74   3/2022  FINDINGS:               Lumbar Spine (L1-L4)      Z-score: -0.5               Right Femoral Neck          Z-score:  -2.8               Forearm (radius 33%)      Zscore:  -0.8                               Lumbar (L1-L4) BMD: 1.180  Previous: 1.152                                       Right Total Hip BMD: 0.760   Previous: 0.740               Forearm (radius 33%) BMD: 0.657   Previous: 0.672                       Total telephone time <10 minutes.       Again, thank you for allowing me to participate in the care of your patient.         Sincerely,        Dallas Flores MD

## 2023-04-06 ENCOUNTER — VIRTUAL VISIT (OUTPATIENT)
Dept: FAMILY MEDICINE | Facility: CLINIC | Age: 43
End: 2023-04-06
Payer: MEDICARE

## 2023-04-06 ENCOUNTER — TELEPHONE (OUTPATIENT)
Dept: FAMILY MEDICINE | Facility: CLINIC | Age: 43
End: 2023-04-06

## 2023-04-06 DIAGNOSIS — Z71.6 ENCOUNTER FOR SMOKING CESSATION COUNSELING: ICD-10-CM

## 2023-04-06 DIAGNOSIS — M81.0 OSTEOPOROSIS OF MULTIPLE SITES: ICD-10-CM

## 2023-04-06 DIAGNOSIS — K59.01 SLOW TRANSIT CONSTIPATION: ICD-10-CM

## 2023-04-06 DIAGNOSIS — F17.200 TOBACCO USE DISORDER: Primary | ICD-10-CM

## 2023-04-06 PROCEDURE — 99441 PR PHYSICIAN TELEPHONE EVALUATION 5-10 MIN: CPT | Performed by: NURSE PRACTITIONER

## 2023-04-06 RX ORDER — VITAMIN B COMPLEX
1 TABLET ORAL DAILY
Qty: 90 TABLET | Refills: 3 | Status: SHIPPED | OUTPATIENT
Start: 2023-04-06 | End: 2023-05-04

## 2023-04-06 RX ORDER — POLYETHYLENE GLYCOL 3350 17 G/17G
1 POWDER, FOR SOLUTION ORAL DAILY
Qty: 578 G | Refills: 2 | Status: SHIPPED | OUTPATIENT
Start: 2023-04-06 | End: 2023-06-13

## 2023-04-06 RX ORDER — NICOTINE 21 MG/24HR
1 PATCH, TRANSDERMAL 24 HOURS TRANSDERMAL EVERY 24 HOURS
Qty: 14 PATCH | Refills: 0 | Status: SHIPPED | OUTPATIENT
Start: 2023-04-06 | End: 2023-06-13

## 2023-04-06 ASSESSMENT — ANXIETY QUESTIONNAIRES
GAD7 TOTAL SCORE: 18
4. TROUBLE RELAXING: MORE THAN HALF THE DAYS
5. BEING SO RESTLESS THAT IT IS HARD TO SIT STILL: SEVERAL DAYS
3. WORRYING TOO MUCH ABOUT DIFFERENT THINGS: NEARLY EVERY DAY
1. FEELING NERVOUS, ANXIOUS, OR ON EDGE: NEARLY EVERY DAY
7. FEELING AFRAID AS IF SOMETHING AWFUL MIGHT HAPPEN: NEARLY EVERY DAY
2. NOT BEING ABLE TO STOP OR CONTROL WORRYING: NEARLY EVERY DAY
6. BECOMING EASILY ANNOYED OR IRRITABLE: NEARLY EVERY DAY
IF YOU CHECKED OFF ANY PROBLEMS ON THIS QUESTIONNAIRE, HOW DIFFICULT HAVE THESE PROBLEMS MADE IT FOR YOU TO DO YOUR WORK, TAKE CARE OF THINGS AT HOME, OR GET ALONG WITH OTHER PEOPLE: VERY DIFFICULT
GAD7 TOTAL SCORE: 18

## 2023-04-06 NOTE — TELEPHONE ENCOUNTER
Please print off new medication list with directions on how to take them and what meds are for and mail to patient/adult foster home.  Thanks, TARAS Verma, FNP-BC

## 2023-04-06 NOTE — PROGRESS NOTES
Mily is a 42 year old who is being evaluated via a billable telephone visit.      What phone number would you like to be contacted at? [phone]  How would you like to obtain your AVS? [place]  [place] (provider location):  On-site    Assessment & Plan     Tobacco use disorder    - nicotine (NICODERM CQ) 7 MG/24HR 24 hr patch; Place 1 patch onto the skin every 24 hours For smoking cessation (can remove at night if causes bad dreams- Use second- step down treatment)  - nicotine (NICODERM CQ) 14 MG/24HR 24 hr patch; Place 1 patch onto the skin every 24 hours For smoking cessation (can remove at night if causes bad dreams- Use first- step down treatment)  - nicotine (NICOTROL) 10 MG inhaler; Use 1 cartridge as needed for urge to smoke by puffing over course of 20min.  Use 6-16 cartridges(inhalers)/day; reduce number of cartridges/day over 6-12 weeks as able    Encounter for smoking cessation counseling    - nicotine (NICODERM CQ) 7 MG/24HR 24 hr patch; Place 1 patch onto the skin every 24 hours For smoking cessation (can remove at night if causes bad dreams- Use second- step down treatment)  - nicotine (NICODERM CQ) 14 MG/24HR 24 hr patch; Place 1 patch onto the skin every 24 hours For smoking cessation (can remove at night if causes bad dreams- Use first- step down treatment)  - nicotine (NICOTROL) 10 MG inhaler; Use 1 cartridge as needed for urge to smoke by puffing over course of 20min.  Use 6-16 cartridges(inhalers)/day; reduce number of cartridges/day over 6-12 weeks as able    Slow transit constipation    - polyethylene glycol (MIRALAX) 17 GM/Dose powder; Take 17 g (1 capful.) by mouth daily As needed for constipation, push water    Osteoporosis of multiple sites    - Vitamin D3 (CHOLECALCIFEROL) 25 mcg (1000 units) tablet; Take 1 tablet (25 mcg) by mouth daily For vitamin d deficiency.    25 minutes spent by me on the date of the encounter doing chart review, history and exam, documentation and  further activities per the note     See Patient Instructions: Follow-up as needed for healthcare questions or concerns or every 3 months as needing controlled medication refills.  Patient in agreement    J CARLOS PANCHAL  [facility] EFFIE Mnior is a 42 year old, presenting for the following health issues:  Recheck Medication        4/6/2023     1:30 PM   Additional Questions   Roomed by Odette MOSLEY     Patient would like to recheck her medications.  She reports she received her smoking cessation patches and inhalers but needs an updated medication list with instructions on how her adult foster home is to give her the medications and what they are for.  Medications had to be reordered to adjust instructions will have  mail medication list to patient.  Additionally she would like her vitamin D refilled by me as her endocrinologist put it in and did not indicate why she is taking that.  She reports she is doing okay her mental health is stable things are going fine at her adult foster home.  She is not needing other medication refills at this time.  She has no other concerns at this time.      Review of Systems   Constitutional, HEENT, cardiovascular, pulmonary, GI, , musculoskeletal, neuro, skin, endocrine and psych systems are negative, except as otherwise noted.      Objective           Vitals:  No vitals were obtained today due to virtual visit.    Physical Exam   healthy, alert and no distress  PSYCH: Alert and oriented times 3; coherent speech, normal   rate and volume, able to articulate logical thoughts, able   to abstract reason, no tangential thoughts, no hallucinations   or delusions  Her affect is normal  RESP: No cough, no audible wheezing, able to talk in full sentences  Remainder of exam unable to be completed due to telephone visits          Phone call duration: 8 minutes

## 2023-04-12 ENCOUNTER — TELEPHONE (OUTPATIENT)
Dept: FAMILY MEDICINE | Facility: CLINIC | Age: 43
End: 2023-04-12
Payer: MEDICARE

## 2023-04-12 NOTE — TELEPHONE ENCOUNTER
Lola, patient's foster caretaker, called because they received a list of meds in the mail but the med list is not accurate.  In addition, the PCP did not sign the med list, so she is unable to receive the list as orders for her to administer.      There is no consent to communicate on file with Lola.    RN encouraged lola to make an appointment with PCP, for the three of them to review meds and to obtain requested orders.  Lola states she is leaving town but will call patient's care worker to further discuss.    Waqas Siegel RN

## 2023-04-14 ENCOUNTER — TELEPHONE (OUTPATIENT)
Dept: FAMILY MEDICINE | Facility: CLINIC | Age: 43
End: 2023-04-14
Payer: MEDICARE

## 2023-04-14 ENCOUNTER — MYC MEDICAL ADVICE (OUTPATIENT)
Dept: FAMILY MEDICINE | Facility: CLINIC | Age: 43
End: 2023-04-14
Payer: MEDICARE

## 2023-04-14 DIAGNOSIS — R51.9 DAILY HEADACHE: ICD-10-CM

## 2023-04-14 DIAGNOSIS — G44.219 EPISODIC TENSION-TYPE HEADACHE, NOT INTRACTABLE: Primary | ICD-10-CM

## 2023-04-14 NOTE — TELEPHONE ENCOUNTER
Pt states she pay out of pocket for this , she is requesting it be added back on to her med list.     Sofia Huang RN  [facility]

## 2023-04-14 NOTE — TELEPHONE ENCOUNTER
Patient's insurance advised provider that it will not be covered.  She can call her insurance and ask them why it is not being covered. TARAS Verma, FNP-BC

## 2023-04-14 NOTE — TELEPHONE ENCOUNTER
"Pt calling to ask why butalbital-acetaminophen-caffeine (ESGIC) -40 MG tablet was discontinued. Pt stated they didn't ask this medication to be stopped. Writer informed pt that this medication was stopped per pcp and the reason was \"stop at discharge\".    Pt insisted writer to ask pcp (Xenia) why butalbital-acetaminophen-caffeine (ESGIC) -40 MG tablet was stopped because she still wants to take this medication.    Routing to pcp to advise.     Chen Martinez, RN      "

## 2023-04-14 NOTE — TELEPHONE ENCOUNTER
Attempted to call patient with the number on file to relay pcp's message below with no answer, left voicemail to call clinic back at [phone].    Chen Martinez RN

## 2023-04-17 RX ORDER — BUTALBITAL, ACETAMINOPHEN AND CAFFEINE 50; 325; 40 MG/1; MG/1; MG/1
1 TABLET ORAL 2 TIMES DAILY PRN
Qty: 60 TABLET | Refills: 2 | Status: SHIPPED | OUTPATIENT
Start: 2023-04-17 | End: 2023-05-30

## 2023-04-19 NOTE — TELEPHONE ENCOUNTER
Attempt #1       Left message for patient to call [facility] at [phone].    Sent MyChart, as well, regarding message from provider below.    Waqas Siegel RN

## 2023-04-19 NOTE — TELEPHONE ENCOUNTER
Can you call patient and ask her if she is needing paper prescriptions with the signature or if she just needs me to sign the MAR that were going to mail to her?  She can start the medication now and they are as needed. TARAS Verma, FNP-BC

## 2023-04-23 ENCOUNTER — HEALTH MAINTENANCE LETTER (OUTPATIENT)
Age: 43
End: 2023-04-23

## 2023-04-25 DIAGNOSIS — Z79.899 HIGH RISK MEDICATION USE: Primary | ICD-10-CM

## 2023-04-26 ENCOUNTER — TELEPHONE (OUTPATIENT)
Dept: FAMILY MEDICINE | Facility: CLINIC | Age: 43
End: 2023-04-26
Payer: MEDICARE

## 2023-04-26 NOTE — TELEPHONE ENCOUNTER
Routing to PCP and TC.    Lashell, health care provider from [facility], called to report patient did not have any type of allergic reaction or reaction from taking Olanzapine last summer during her hospital admission from 6/14 to 7/7.  Medication was stopped at discharge by PCP on 7/14.    Lashell states patient is very paranoid and guarded.  She is hoping patient is able to go back on medication.    Also, Lashell is requesting TC to please fax current MAR to [facility] at [phone].     Then RN to call Lashell and leave a detailed message on confidential voicemail.     Waqas Siegel RN

## 2023-04-26 NOTE — TELEPHONE ENCOUNTER
Patient reported leg swelling from olanzapine.  If Pedro Luis wants to restart her on that medication that is fine.  Please fax Pedro Luis her updated MAR.  Thanks. TARAS Verma, FNP-BC

## 2023-05-04 RX ORDER — CHOLECALCIFEROL (VITAMIN D3) 25 MCG
1 CAPSULE ORAL DAILY
COMMUNITY
Start: 2023-04-11 | End: 2024-04-19

## 2023-05-04 RX ORDER — OLANZAPINE 5 MG/1
5 TABLET ORAL DAILY
COMMUNITY
Start: 2023-04-20 | End: 2023-06-13

## 2023-05-04 NOTE — TELEPHONE ENCOUNTER
Medication list updated. Please reprint and send to adult foster home/ group home. TARAS Verma, FNP-BC

## 2023-05-05 ENCOUNTER — TELEPHONE (OUTPATIENT)
Dept: FAMILY MEDICINE | Facility: CLINIC | Age: 43
End: 2023-05-05
Payer: MEDICARE

## 2023-05-05 DIAGNOSIS — R51.9 CHRONIC DAILY HEADACHE: Primary | ICD-10-CM

## 2023-05-05 NOTE — TELEPHONE ENCOUNTER
"Pt representative from [facility]; Lola calling to state medication usage for pt. Pt has PRN butalbital-acetaminophen-caffeine (ESGIC) -40 MG tablet and oxyCODONE-acetaminophen (PERCOCET) 5-325 MG tablet. Pt has been consistently asking for these medications around the clock and Lola is worried about drug dependency. Lola stated that she is aware that (ESGIC) -40 MG is for pt's headaches, but pt has been asking for these when she doesn't have an active headache. Pt has stated \"she feels a headache coming on\" \"she's frantically asking me for her medications\" in order to receive this medication. The PERCOCET 5-325 MG can only be given to pt when her pain is rated at a 7 or above. Prior to this knowledge, pt would consistently state her pain around 3-4 and no greater. After pt found out about the pain rating of 7 to take this medication Lola stated that pt has consistently stated that her pain is a 7 to receive this medication. When pt does take these medications, pt requests both medications together or within the hour.    Per Lola pt called 911 on 4/28/23 stating that Lola was withholding these medications even though they were given in a timely manner and Lola couldn't give her the PRN medications because it wasn't time yet. Per security camera pt told EMT's that her pain was a 4 on the 1-10 pain scale.    Lola stated that when pt does not get these medications she screams and yells for them even though she knows it is not time to take the PRNs.    Family is fearful for what pt is doing with her drugs. Per Lola family is upset about pt taking these medications consistently.    Lola stated that if pt has reported multiple headaches and this has been ongoing for a few months now, should she see neurology or pain clinic?    -Pt has orders for nicotine patches and inhaler:    nicotine (NICODERM CQ) 7 MG/24HR 24 hr patch, nicotine (NIODERM CQ) 14 MG/24HR 24 hr patch, " and nicotine (NICOTROL) 10 MG inhaler. Can she take all three nicotine medications together?    LILI signed and sent 5/4/23. They stated that they spoke with a clinic representative for confirmation.     Can we have a strict medication list with no PRN's due to possible drug dependency?     [phone] okay to leave message for Lola.    Routing to pcp (Xenia) to advise.    Chen Martinez RN

## 2023-05-08 NOTE — TELEPHONE ENCOUNTER
Attempted to call patient foster care representative Lola's number below to relay provider's message below with no answer, left voicemail to call clinic back at [phone].    Chen Martinez RN

## 2023-05-08 NOTE — TELEPHONE ENCOUNTER
Spoke with patient and patient's foster care representative who is in charge of pt's medication, relayed providers message below and patient verbalized understanding. Pt stated no further questions.    Chen Martinez, RN      Per Xenia:  She should try to refrain from smoking if using the nicotine replacement.     TARAS Verma, FNP-BC

## 2023-05-08 NOTE — TELEPHONE ENCOUNTER
Representative Lola returned call.    Family doesn't want pt on butalbital-acetaminophen-caffeine (ESGIC) -40 MG tablet and will talk with pt about this.    Pt representative Lola stated that pt is still using tobacco with these nicotine replacements, can pt do this?     Pt was prescribed SUMAtriptan (IMITREX) 50 MG tablet 2/23/23 and has a representive that has to legally hold her medications for pt and pt did not disclose this medication to representative. Filled but not picked up.     Called [facility] [place] to see which medications pt filled because Lola was worried that pt filled imitrex without her knowledge: nothing was filled in person, only mailed out.    Lola (foster representative) notified of findings above.    Routing to pcp (Xenia) advise on nicotine replacement and tobacco usage.     Chen Martinez RN

## 2023-05-08 NOTE — TELEPHONE ENCOUNTER
She is only able to take the esgic 1 tablet twice daily as needed and Percocet half to 1 tablet twice daily as needed.  I do not feel this is excessive but I have put in a pain management referral and a neurology referral for their opinion and assessment.  They should schedule with them at this time.  She is able to use all 3 nicotine replacements at 1 time. TARAS Verma, FNP-BC

## 2023-05-17 ENCOUNTER — TELEPHONE (OUTPATIENT)
Dept: FAMILY MEDICINE | Facility: CLINIC | Age: 43
End: 2023-05-17
Payer: MEDICARE

## 2023-05-17 NOTE — TELEPHONE ENCOUNTER
Called Lola to relay provider's message below. Lola verbalized understanding and will reach out to family to see if they want to move forward with an in person appointment with pcp (Xenia).    Lola stated that they did have an intervention with pt. pt didn't qualify for intervention as she was unmotivated to go.     Lola reported that pt hasn't had oxyCODONE-acetaminophen (PERCOCET) 5-325 MG tablet since 5/4/23 and had 2 doses of butalbital-acetaminophen-caffeine (ESGIC) -40 MG tablet since 5/4/23.      told family to petition to take guardianship.     Chen Martinez RN

## 2023-05-17 NOTE — TELEPHONE ENCOUNTER
I thought Mily's parents were going to speak with her about the controlled medication.  Technically I do not feel like she is taking more pain medications than are appropriate.  If they do not want to give her the pain medication that is their choice she is in an adult foster home.  If they want to come in and address this issue in person with her parents and the  and the patient she can take a same-day appointment and we can discuss it altogether.  The directions are on all her medications as to when she can take it. TARAS Verma, FNP-BC

## 2023-05-17 NOTE — TELEPHONE ENCOUNTER
Lola, patients foster caregiver called.  She stated that she is concerned with patient asking for Butalbital.    1.  She stated that patient will ask for it, in case she gets a headache, and will demand it.  2. Patient will tell them she has a headache at a 2-3/10, and will demand it.  3. Patient will become aggressive with staff if she does not get it, where they have had to leave the home with other residents.  4.  She has told family, that she has some in her room.. like she is saving them. Lola is unsure if she does, as she is not allowed to go through her things.  5. Patient will refuse to try anything like tylenol first, and just wants the Butalbital.  6. Patient tells Pedro Luis (her psych), that she is not taking any controlled substances.  7. They are working to get her into treatment for this.    8. They are needing the exact parameters for the Butalbital in writing.     A:They need it to state, what pain level should be at before giving med.  They are only to give this medication if patient is over a certain pain level. What pain level should it be.       B:How many hours between doses and it states PRN 2x per day.         C:Also, should she be trying a different analgesic first before giving it.    9. They are concerned about the amount of Percovet given to patient at a time. # 60.    10.  They stated that that patient has not asked for these meds since 5/4/23, but they need all order for them in writing.    Routed to PCP to please advise.    Loretta BULLOCK BSN  Triage Nurse  [facility]: [facility]  Ph: [phone]

## 2023-05-24 ENCOUNTER — TELEPHONE (OUTPATIENT)
Dept: FAMILY MEDICINE | Facility: CLINIC | Age: 43
End: 2023-05-24

## 2023-05-24 ENCOUNTER — OFFICE VISIT (OUTPATIENT)
Dept: URGENT CARE | Facility: URGENT CARE | Age: 43
End: 2023-05-24
Payer: MEDICARE

## 2023-05-24 VITALS
WEIGHT: 149 LBS | SYSTOLIC BLOOD PRESSURE: 134 MMHG | OXYGEN SATURATION: 90 % | RESPIRATION RATE: 12 BRPM | BODY MASS INDEX: 28.35 KG/M2 | DIASTOLIC BLOOD PRESSURE: 84 MMHG | HEART RATE: 70 BPM | TEMPERATURE: 97.1 F

## 2023-05-24 DIAGNOSIS — M25.511 ACUTE PAIN OF RIGHT SHOULDER: Primary | ICD-10-CM

## 2023-05-24 PROCEDURE — 99213 OFFICE O/P EST LOW 20 MIN: CPT | Performed by: PHYSICIAN ASSISTANT

## 2023-05-24 RX ORDER — LAMOTRIGINE 25 MG/1
1 TABLET ORAL DAILY
COMMUNITY
Start: 2023-04-28

## 2023-05-24 ASSESSMENT — ENCOUNTER SYMPTOMS
SORE THROAT: 0
ARTHRALGIAS: 1
RHINORRHEA: 0
NECK STIFFNESS: 0
ENDOCRINE NEGATIVE: 1
SHORTNESS OF BREATH: 0
NECK PAIN: 0
EYES NEGATIVE: 1
HEADACHES: 0
NAUSEA: 0
LIGHT-HEADEDNESS: 0
VOMITING: 0
PALPITATIONS: 0
COUGH: 0
ALLERGIC/IMMUNOLOGIC NEGATIVE: 1
BACK PAIN: 0
DIARRHEA: 0
CHILLS: 0
WOUND: 0
DIZZINESS: 0
FEVER: 0
JOINT SWELLING: 0
MYALGIAS: 0
WEAKNESS: 0
RESPIRATORY NEGATIVE: 1
CARDIOVASCULAR NEGATIVE: 1

## 2023-05-24 NOTE — TELEPHONE ENCOUNTER
"Patient was seen by Romie Prajapati PA-C at [facility] today for her right shoulder pain. He ordered Physical Therapy for patient.     Taryn asked that we call Lola at her Foster Care facility ([phone])  to give more \"specific\" instructions for the following medications:     Butalbital-acetaminophen-caffeine (ESGIC) -40 MG tablet Take 1 tablet by mouth 2 times daily as needed for headaches    OxyCODONE-acetaminophen (PERCOCET) 5-325 MG tablet  Take 0.5-1 tablets by mouth 2 times daily as needed for severe pain (7-10) (use sparingly- to last one month; at least 6 hours apart).    Patient is scheduled for an \"in person\" visit with Xenia Gaviria NP on June 13. Patient tells me that no other provider can order her pain medication due to her pain contract with Xenia (last signed on 5/27/22).     I asked patient if she thought she would need pain medication prior to her PT sessions. She replied \"No, I want pain medication now.\"         Carola LUCASN  [facility]        "

## 2023-05-24 NOTE — LETTER
June 2, 2023      Mily Grullon  [address] [facility] [address]        To Whom It May Concern/Lola:    Mily CERDA Yadi decided to discontinue her butalbital and percocet meds May 30, 2023, due to ongoing concerns regarding patients medication usage by staff.     Sincerely,        GIA WOODARD, J CARLOS

## 2023-05-24 NOTE — PROGRESS NOTES
Chief Complaint:     Chief Complaint   Patient presents with     Shoulder Pain     Per pt pain goes from the shoulder , down the back side of the arm to the back and tingles.     Medication Request     Per foster care - she is not to be dispensed any new medications without instructions. (See note provided by patient)        ASSESSMENT     1. Acute pain of right shoulder           PLAN    No acute injury.  No indication for imaging.  RICE discussed  Recommended rest and avoidance of activities which cause pain or swelling.  Pain relief: Acetaminophen.  Order placed for follow up with PT  Patient verbalized understanding, and agrees with this plan.       HPI: Mily Grullon is an 42 year old female who presents today for evaluation of R shoulder pain.  Patient reports pain for the past 3-4 days that is worse with movement.  She does not recall any acute injury.  She has not tried anything for the pain.  [address]} ago.    Patient denies any numbness, tingling, or dysfunction of the R shoulder      ROS:      Review of Systems   Constitutional: Negative for chills and fever.   HENT: Negative for congestion, ear pain, rhinorrhea and sore throat.    Eyes: Negative.    Respiratory: Negative.  Negative for cough and shortness of breath.    Cardiovascular: Negative.  Negative for chest pain and palpitations.   Gastrointestinal: Negative for diarrhea, nausea and vomiting.   Endocrine: Negative.    Genitourinary: Negative.    Musculoskeletal: Positive for arthralgias. Negative for back pain, joint swelling, myalgias, neck pain and neck stiffness.   Skin: Negative.  Negative for rash and wound.   Allergic/Immunologic: Negative.  Negative for immunocompromised state.   Neurological: Negative for dizziness, weakness, light-headedness and headaches.        Problem history  Patient Active Problem List   Diagnosis     Personal history of nicotine dependence     CARDIOVASCULAR SCREENING; LDL GOAL LESS THAN 160     Gastritis      Osteoporosis of multiple sites     Closed nondisplaced intertrochanteric fracture of left femur (H)     Migraine without aura and without status migrainosus, not intractable     Closed nondisplaced fracture of body of left calcaneus with delayed healing, subsequent encounter     Heel pain, chronic, left     Gastroesophageal reflux disease, esophagitis presence not specified     Superficial phlebitis     Class 2 severe obesity due to excess calories with serious comorbidity and body mass index (BMI) of 36.0 to 36.9 in adult (H)     Other chronic pain     Low serum cortisol level     Anxiety     Eosinophilic gastroenteritis     Chronic wrist pain     Moderate episode of recurrent major depressive disorder (H)     Falls frequently     Abdominal wall lump     Chronic, continuous use of opioids     Deliberate self-cutting     At risk for self harm     Morbid obesity (H)     Encounter for removal of sutures     Episodic tension-type headache, not intractable     Hormone replacement therapy (HRT)     Severe episode of recurrent major depressive disorder, without psychotic features (H)     Lives in group home     Slow transit constipation     Hx of anaphylaxis     Psychosis, unspecified psychosis type (H)     Tobacco use disorder     Encounter for smoking cessation counseling        Allergies  Allergies   Allergen Reactions     Ciprofloxacin Dizziness, Shortness Of Breath and Nausea and Vomiting     Paxil [Paroxetine] Anaphylaxis     anaphylaxis     Amitriptyline Hives and Other (See Comments)     shocking feeling up the arm     Amoxicillin Diarrhea     Asa [Dihydroxyaluminum Aminoacetate] GI Disturbance     Cipro [Ciprofloxacin]      Dizziness, vomiting, shortness of breath     Ibuprofen [Aspartame-Ibuprofen]      GI distress       Morphine      ithcy     Naproxen      GI distress     Neurontin [Gabapentin]      rash     No Clinical Screening - See Comments Other (See Comments)     Bees causes watery itchy eyes, swelling  in mouth, needs epi pen     Olanzapine Swelling     Phenergan [Promethazine Hcl]      dystonia     Pregabalin      extrematies swell up      Pregabalin Swelling and Unknown     Of all limbs       Prilosec [Omeprazole]      Rash, dizziness     Venlafaxine Other (See Comments)     Intolerance- eye     Gabapentin Rash     Omeprazole Dizziness and Rash        Smoking History  History   Smoking Status     Some Days     Packs/day: 0.00     Years: 18.00     Types: Cigarettes     Last attempt to quit: 2/2/2014   Smokeless Tobacco     Never        Current Meds    Current Outpatient Medications:      butalbital-acetaminophen-caffeine (ESGIC) -40 MG tablet, Take 1 tablet by mouth 2 times daily as needed for headaches Requires follow-up visit every 3 months for refill can be a MyChart E-visit or phone visit, Disp: 60 tablet, Rfl: 2     Cholecalciferol (D3 HIGH POTENCY) 25 MCG (1000 UT) CAPS, Take 1 capsule by mouth daily Vitamin d deficiency, Disp: , Rfl:      DULoxetine (CYMBALTA) 60 MG capsule, Take 60 mg by mouth 2 times daily For depression, Disp: , Rfl:      EPINEPHrine (ANY BX GENERIC EQUIV) 0.3 MG/0.3ML injection 2-pack, Inject 0.3 mLs (0.3 mg) into the muscle as needed for anaphylaxis May repeat one time in 5-15 minutes if response to initial dose is inadequate., Disp: 2 each, Rfl: 1     estrogen conj (PREMARIN) 1.25 MG tablet, TAKE 1 TABLET BY MOUTH DAILY FOR HORMONE REPLACEMENT R/T HYSTERECTOMY, Disp: 28 tablet, Rfl: 2     hydrOXYzine (ATARAX) 25 MG tablet, Take 1 tablet (25 mg) by mouth 3 times daily as needed for anxiety, Disp: 90 tablet, Rfl: 3     lamoTRIgine (LAMICTAL) 100 MG tablet, Take by mouth daily 50 mg in the morning and 100 mg at hs- mood stabilizer and depression, Disp: , Rfl:      lamoTRIgine (LAMICTAL) 25 MG tablet, Take 1 tablet by mouth daily, Disp: , Rfl:      lithium ER (LITHOBID) 300 MG CR tablet, Take 300 mg by mouth At Bedtime 3 tablets at bedtime for mood stability and depression, Disp: ,  Rfl:      naloxone (NARCAN) 4 MG/0.1ML nasal spray, Spray 1 spray (4 mg) into one nostril alternating nostrils as needed for opioid reversal every 2-3 minutes until assistance arrives, Disp: 0.2 mL, Rfl: 1     nicotine (NICODERM CQ) 14 MG/24HR 24 hr patch, Place 1 patch onto the skin every 24 hours For smoking cessation (can remove at night if causes bad dreams- Use first- step down treatment), Disp: 14 patch, Rfl: 0     nicotine (NICODERM CQ) 7 MG/24HR 24 hr patch, Place 1 patch onto the skin every 24 hours For smoking cessation (can remove at night if causes bad dreams- Use second- step down treatment), Disp: 90 patch, Rfl: 1     nicotine (NICOTROL) 10 MG inhaler, Use 1 cartridge as needed for urge to smoke by puffing over course of 20min.  Use 6-16 cartridges(inhalers)/day; reduce number of cartridges/day over 6-12 weeks as able, Disp: 168 each, Rfl: 1     OLANZapine (ZYPREXA) 5 MG tablet, Take 5 mg by mouth daily Major Depression , Disp: , Rfl:      oxyCODONE-acetaminophen (PERCOCET) 5-325 MG tablet, Take 0.5-1 tablets by mouth 2 times daily as needed for severe pain (7-10) (use sparingly- to last one month; at least 6 hours apart), Disp: 60 tablet, Rfl: 0     polyethylene glycol (MIRALAX) 17 GM/Dose powder, Take 17 g (1 capful.) by mouth daily As needed for constipation, push water, Disp: 578 g, Rfl: 2     prochlorperazine (COMPAZINE) 10 MG tablet, Take 0.5-1 tablets (5-10 mg) by mouth every 6 hours as needed for nausea or vomiting, Disp: 15 tablet, Rfl: 0     propranolol (INDERAL) 10 MG tablet, Take 1 tablet (10 mg) by mouth 3 times daily 1 TABLET ORALLY 3 TIMES DAILY AS NEEDED (DX: ANXIETY) Strength: 10 mg, Disp: 90 tablet, Rfl: 1     sertraline (ZOLOFT) 100 MG tablet, Take 100 mg by mouth daily In the morning for depression, Disp: , Rfl:      SUMAtriptan (IMITREX) 50 MG tablet, Take 1 tablet (50 mg) by mouth at onset of headache for migraine May repeat in 2 hours. Max 4 tablets/24 hours., Disp: 60 tablet,  Rfl: 1     tiZANidine (ZANAFLEX) 2 MG tablet, Take 1 tablet (2 mg) by mouth 3 times daily as needed for muscle spasms (at least 4 hours apart), Disp: 90 tablet, Rfl: 2     acetaminophen (TYLENOL) 325 MG tablet, Take 2 tablets (650 mg) by mouth every 4 hours as needed for pain (Patient not taking: Reported on 5/24/2023), Disp: 360 tablet, Rfl: 1        Vital signs reviewed by Romie Prajapati PA-C  /84   Pulse 70   Temp 97.1  F (36.2  C) (Tympanic)   Resp 12   Wt 67.6 kg (149 lb)   LMP 11/16/2020 (Exact Date)   SpO2 90%   BMI 28.35 kg/m      Physical Exam     Physical Exam  Vitals and nursing note reviewed.   Constitutional:       General: She is not in acute distress.     Appearance: She is well-developed. She is not ill-appearing, toxic-appearing or diaphoretic.   HENT:      Head: Normocephalic and atraumatic.      Right Ear: Tympanic membrane and external ear normal. No drainage, swelling or tenderness. Tympanic membrane is not perforated, erythematous, retracted or bulging.      Left Ear: Tympanic membrane and external ear normal. No drainage, swelling or tenderness. Tympanic membrane is not perforated, erythematous, retracted or bulging.      Nose: No mucosal edema, congestion or rhinorrhea.      Right Sinus: No maxillary sinus tenderness or frontal sinus tenderness.      Left Sinus: No maxillary sinus tenderness or frontal sinus tenderness.      Mouth/Throat:      Pharynx: No pharyngeal swelling, oropharyngeal exudate, posterior oropharyngeal erythema or uvula swelling.      Tonsils: No tonsillar abscesses.   Eyes:      Pupils: Pupils are equal, round, and reactive to light.   Neck:      Trachea: Trachea normal.   Cardiovascular:      Rate and Rhythm: Normal rate and regular rhythm.      Heart sounds: Normal heart sounds, S1 normal and S2 normal. No murmur heard.     No friction rub. No gallop.   Pulmonary:      Effort: Pulmonary effort is normal. No respiratory distress.      Breath sounds: Normal  breath sounds. No decreased breath sounds, wheezing, rhonchi or rales.   Abdominal:      General: Bowel sounds are normal. There is no distension.      Palpations: Abdomen is soft. Abdomen is not rigid. There is no mass.      Tenderness: There is no abdominal tenderness. There is no guarding or rebound.   Musculoskeletal:      Right shoulder: No swelling, deformity, effusion, laceration, tenderness, bony tenderness or crepitus. Normal range of motion. Normal strength. Normal pulse.      Cervical back: Full passive range of motion without pain, normal range of motion and neck supple.   Lymphadenopathy:      Cervical: No cervical adenopathy.   Skin:     General: Skin is warm and dry.   Neurological:      Mental Status: She is alert and oriented to person, place, and time.      Cranial Nerves: No cranial nerve deficit.      Deep Tendon Reflexes: Reflexes are normal and symmetric.   Psychiatric:         Behavior: Behavior normal. Behavior is cooperative.         Thought Content: Thought content normal.         Judgment: Judgment normal.             Romie Prajapati PA-C  5/24/2023, 1:40 PM

## 2023-05-24 NOTE — LETTER
June 2, 2023      Mily M Yadi  [address] [facility] [address]        Dear Parent or Guardian of Mily (Lola),    Please discontinue Mily's butalbital-acetaminophen-caffeine (-40 mg) tablet and the oxycodone-acetaminophen 5-325 mg tablet medications effective 5/30/23.              Sincerely,        GIA WOODARD NP

## 2023-05-25 NOTE — TELEPHONE ENCOUNTER
These are specific instructions:  Butalbital-acetaminophen-caffeine (ESGIC) -40 MG tablet Take 1 tablet by mouth 2 times daily as needed for headaches     OxyCODONE-acetaminophen (PERCOCET) 5-325 MG tablet  Take 0.5-1 tablets by mouth 2 times daily as needed for severe pain (7-10) (use sparingly- to last one month; at least 6 hours apart).    I was told her her parents are going to talk to her about discontinuing use of these medications.  I do not feel that this is a high quantity of medication for her chronic headaches and pain; she has not increased dose or quantity.  If they do not want her using controlled medications that is up to them.  I advised that they want to discuss further they can be seen in person or do a virtual visit.    TARAS Verma, FNP-BC

## 2023-05-25 NOTE — TELEPHONE ENCOUNTER
"I called Lola at patient's foster home. Patient Taryn gave me verbal permission yesterday to call Lola.     There is not a completed \"Consent to Communicate\" form in patient's chart. Lola said she had assisted Taryn in e-mailing a completed form to [facility] just a few weeks ago. However, there is nothing scanned into Media. I provided the phone number for Medical records to Lola.     Note added to the June 13 office visit also to complete a \"Consent to communicate\" at that appointment check in.       I relayed the message from Xenia Gaviria NP to Lola.   She verbalized a good understanding, however continued to voice concerns. She reports the following:   Taryn will ask for butalbital-acetaminophen-caffeine (ESGIC) -40 MG tablet just because she thinks something may be coming on. She also tells staff her headache is a 3 on the pain scale & a few minutes later its a 2. However, she demands to take the Butalbital and refuses to try Tylenol first.   Taryn does not appear to be displaying any evidence of pain and Lola is concerned that this is drug seeking behavior. If staff say No, Taryn becomes very agitated. Taryn tells staff that it's her right to have these medications because Xenia has ordered them for her.     Meanwhile, Patient has made a Pact with her Mental Health provider Hermila Quevedo ([facility])  that she will not take Percocet or Butalbital for 30 days (starting May 11). Lola reports that Taryn has taken the Butalbital at least 2 times since May 11. She asked for Percocet yesterday, however they did not give it.     I suggested the parents attend patient's next office visit with Xenia Gaviria on June 13. However, Taryn's parents are staying away & not speaking to her until she stops taking the Butalbital & Percocet OR she enters a drug treatment program. Lola would like the  Melinda Rosario to accompany Taryn to her appointment if possible. "     Taryn will sometimes isolate herself for days and just not eat. She will also sleep a lot and sometimes  talks to herself. If staff ask who she is talking to, she just denies saying anything.     There will be a meeting at 3:00 PM today with Lola Centeno Jill Adams (), patient's  and  Therapist Hermila Qeuvedo ([facility]) to discuss patient's current  behavior.     There will be another meeting tomorrow to discuss if Taryn is appropriate to stay at this [facility].   Lola states that she's not comfortable giving these medications any longer and may need a nurse to come in to the home to give them.     Carola Pabon RN BSN  [facility]

## 2023-05-26 ENCOUNTER — TELEPHONE (OUTPATIENT)
Dept: FAMILY MEDICINE | Facility: CLINIC | Age: 43
End: 2023-05-26
Payer: MEDICARE

## 2023-05-26 DIAGNOSIS — Z79.890 HORMONE REPLACEMENT THERAPY (HRT): ICD-10-CM

## 2023-05-26 NOTE — TELEPHONE ENCOUNTER
"I called rosales Davila, offered to schedule the meeting currently on 6/13/23 (office visit) for a sooner date (using an \"approval req\" spot per my discretion).   Looked at openings over the next 2 weeks, Lola has conflicts with most and says she has \"no idea\" what anyone else's schedule is.   She states she is \"ready to quit\" this job due to the frustrations in getting a straight answer regarding how to manage iMly's pain meds, conflicting opinions from different providers, some say she is fine to be on narcotics, others say they don't advise it.    Lola says Melinda Rosario, the [facility]  is the person we should call to see if the office visit/meeting can be scheduled sooner.    Her phone number is listed in this encounter:   [phone].    Re-sent to [facility] to contact Melinda on Tuesday when clinic is open again.   Plan is to try to schedule the visit/meeting on a sooner date.    Lashell Woods RN  [facility]      "

## 2023-05-26 NOTE — TELEPHONE ENCOUNTER
"Melinda Rosario, [facility] calling, says she can fax signed release if needed.    Melinda says the foster home used to just leave the pain meds out for patient to take as she feels needed.   Licensing agency came through and told them these meds need to be locked up.   Currently, the  has control, she says she feels Mily is asking for pain meds very frequently or if  is not immediately available, Mily gets very agitated.    Melinda will be attending the visit on 6/13, she says Mily wants to be able to have her percocet and esgic in her room in a lock box to self-administer.   Melinda does not think this is a good idea for Mily.    Meeting/office visit with Xenia Gaviria is not until June 13, see previous notes, foster care provider is planning to NOT administer either esgic or percocet until after this visit when a better plan is communicated.    Melinda () suggests perhaps a \"pain patch\" or something not requiring so much assessment and dispensing might be an option in the meantime?    Pharmacy selected, routed to Xenia Gaviria to advise.    Lashell Woods RN  [facility]          "

## 2023-05-26 NOTE — TELEPHONE ENCOUNTER
"I called Lola (foster home director)  to inform her of the recommendation from Xenia Gaviria NP. She verbalized a good understanding.     Lola  states the meeting yesterday was challenging.     Taryn became very defensive and confrontational. She denies having any issue with medications.     Melinda Rosario (Care Manager) said she would be calling to discuss the following with Xenia Gaviria.  We have not heard from Melinda as of yet.     Taryn is  asking to be able to manage/ administer her  medications independently. She told staff that because she has a contract, it's the law that she can take her medications.     According to Melinda Rosario (Care Manager) , Xenia Gaviria will need to approve/deny this request from patient.     Meanwhile, Lola says that staff will not give Taryn her \"PRN meds\" until these issues have been resolved.     Melinda Rosario (Care Manager) plans to accompany Taryn to her office visit with Xenia Gaviria on June 13.   They may be able to bring Hermila (therapist) on the phone also. However, Lola does not think she or the parents would be able to come in that day.     Staff are meeting again today at 11:00 AM.   Taryn will not be present for this meeting.   The focus of this meeting will be to decide if the current foster home is appropriate for Taryn.   Lola said she will call to let Xenia know the outcome of this meeting.     Carola Pabon RN BSN  [facility]    "

## 2023-05-26 NOTE — TELEPHONE ENCOUNTER
Drug-seeking behavior is a subjective.  If they want to discuss discontinuing medications with patient they can come in for a visit and we can all discuss it together.  It would probably be beneficial for the /owner and parents, and  and mental health provider to be present for visit.  That way we can all be on the same page. TARAS Verma, FNP-BC

## 2023-05-26 NOTE — TELEPHONE ENCOUNTER
"Kun,  through [employer] calling and can be reached at [phone]. States pt's Mental health , Melinda Rosario called for an alternate plan- requesting more specific guidelines and parameters for administering pt's medications.    Message from TE 5/24/23 was reviewed with Kun: \"Xenia Umanzor, NP     MG    5/26/23  1:27 PM  Note  Currently patient is on very mild controlled medicine-ESIG 1 tablet twice daily as needed and Percocet 1/2 to 1 tablet twice daily as needed.  Any pain patch would be way stronger than these narcotics.  I am not willing to give her any other medication for pain.  This is not going to continue to be discussed in message.  They can come in anytime to discuss medication concerns. TARAS Verma, FNP-BC        \"    States he will follow up with the team and let them know.     Provider that Mily is staying with is interested in more parameters on how medication should be administered. Ie headache or pain of 2, is not high enough to have a pain medication administered, then changes her mind and pain is now a 7. States there have been some concerns and \"power struggles\" in the house.     Routing as JACOB.    Lola Brennan RN  [facility]    "

## 2023-05-26 NOTE — TELEPHONE ENCOUNTER
Currently patient is on very mild controlled medicine-ESIG 1 tablet twice daily as needed and Percocet 1/2 to 1 tablet twice daily as needed.  Any pain patch would be way stronger than these narcotics.  I am not willing to give her any other medication for pain.  This is not going to continue to be discussed in message.  They can come in anytime to discuss medication concerns. TARAS Verma, FNP-BC

## 2023-05-26 NOTE — TELEPHONE ENCOUNTER
"Noted.    Routed back to PCP to clarify, patient is currently scheduled to come in with family/staff/ on 6/13.   Can we offer \"approval req\" or \"same day\" spot if they want to come in sooner?    We are expecting an update from the [facility] regarding meeting they had today.    Lashell Woods RN  [facility]      "

## 2023-05-30 ENCOUNTER — TELEPHONE (OUTPATIENT)
Dept: FAMILY MEDICINE | Facility: CLINIC | Age: 43
End: 2023-05-30
Payer: MEDICARE

## 2023-05-30 NOTE — TELEPHONE ENCOUNTER
I see patient called in today to report she is stopping her butalbital-acet-caffeine and percocet.    My understanding is the need to manage the pain meds is the root of the issue at the foster home.    Do we still need to arrange an office visit sooner than 6/13/23 which includes social work and foster care provider?    I called and spoke to YOANA Lawrence with [facility]; advised her we got a call today from Mily stating she is stopping her pain meds.    Melinda says she will call Mily to clarify this as she understood Mily was having shoulder pain and needing pain med.   Also uses the [facility] for headaches.      Pain clinic visit is not until 7/6/23    Melinda will call us back with update.    Lashell Woods RN  [facility]

## 2023-05-30 NOTE — TELEPHONE ENCOUNTER
Pt calling to discontinue:    butalbital-acetaminophen-caffeine (ESGIC) -40 MG tablet    oxyCODONE-acetaminophen (PERCOCET) 5-325 MG tablet    Pt stated she will be going to pain clinic and would like to discontinue these medications as she has been on them for quite some time now.    I discontinued these medications as stopped by patient and huddled with pcp (Xenia) to state these pt initiated discontinuation. Pcp verbalized understanding and no further actions needed.     Chen Martinez RN

## 2023-05-30 NOTE — TELEPHONE ENCOUNTER
I don't see any call back from Melinda yet.   I called and spoke to Melinda who says she has not gotten a hold of Mily, she thinks Mily goes to a day treatment program in the afternoon so will keep trying.    Lashell Woods RN  [facility]

## 2023-05-31 NOTE — TELEPHONE ENCOUNTER
I received a message from our nurses yesterday that Mily is stopping all her controlled medications; they removed controlled medications from her med list.  This should no longer be an issue. TARAS Verma, FNP-BC

## 2023-05-31 NOTE — TELEPHONE ENCOUNTER
Melinda spoke with Mily this morning.     Mily confirmed that she is going to stop taking all pain medications.     The foster care home needs an order from the Provider that it is okay to discontinue all pain medications (listed out) for their file.    Melinda would like this copy and she will send it to the [facility] home staff, Lola Yuan as well.  Melinda's fax: [phone]    Precious Brown RN on 5/31/2023 at 9:57 AM

## 2023-06-01 NOTE — TELEPHONE ENCOUNTER
Routed to TC pool, will need to print out the med list, sounds like they also need note/order indicating the specific meds to be discontinued.    Provider to hand write that on the printed/signed med list to serve as order (or may need to compose and print a letter).   See note per RN below.    Lashell Woods RN  [facility]

## 2023-06-02 ENCOUNTER — TELEPHONE (OUTPATIENT)
Dept: FAMILY MEDICINE | Facility: CLINIC | Age: 43
End: 2023-06-02
Payer: MEDICARE

## 2023-06-02 ENCOUNTER — TRANSFERRED RECORDS (OUTPATIENT)
Dept: HEALTH INFORMATION MANAGEMENT | Facility: CLINIC | Age: 43
End: 2023-06-02
Payer: MEDICARE

## 2023-06-02 NOTE — TELEPHONE ENCOUNTER
Melinda Rosario from [facility] calling, foster mother Lola will need a letter addressed to her (Lola) stating that the butalbital and percocet meds are discontinued with the effective date, this would be May 30 per our call with Mily.    Please fax letter and the signed med list to Melinda Rosario with [facility] at:   [phone]; she will make sure Lola gets these.   Melinda plans to attend the 6/13/23 office visit with Mily, hopes a new plan for pain management can be addressed.   I advised I see pain clinic visit on 7/6 as well.    I pended a possible letter, routed to PCP to address, print, sign, have team fax along with the med list.    Lashell Woods RN  [facility]

## 2023-06-02 NOTE — TELEPHONE ENCOUNTER
Patient Quality Outreach    Patient is due for the following:   Physical Annual Wellness Visit      Topic Date Due     COVID-19 Vaccine (1) Never done     Pneumococcal Vaccine (1 - PCV) Never done     Hepatitis B Vaccine (2 of 3 - 19+ 3-dose series) 01/10/2023     Chronic Opioid Use -  Treatment Agreement (CSA) and Urine Drug Screen    Next Steps:   Schedule a Adult Preventative  Patient has upcoming appointment, these items will be addressed at that time.    Type of outreach:    Sent [employer] message.      Questions for provider review:    None           Odette Araya MA

## 2023-06-06 NOTE — TELEPHONE ENCOUNTER
Appears provider created her own letter.   I removed my pended letter.    Lashell Woods RN  [facility]

## 2023-06-13 ENCOUNTER — OFFICE VISIT (OUTPATIENT)
Dept: FAMILY MEDICINE | Facility: CLINIC | Age: 43
End: 2023-06-13
Payer: MEDICARE

## 2023-06-13 ENCOUNTER — MEDICAL CORRESPONDENCE (OUTPATIENT)
Dept: HEALTH INFORMATION MANAGEMENT | Facility: CLINIC | Age: 43
End: 2023-06-13

## 2023-06-13 VITALS
RESPIRATION RATE: 20 BRPM | HEART RATE: 70 BPM | HEIGHT: 62 IN | OXYGEN SATURATION: 100 % | TEMPERATURE: 97.7 F | DIASTOLIC BLOOD PRESSURE: 88 MMHG | SYSTOLIC BLOOD PRESSURE: 118 MMHG | BODY MASS INDEX: 26.87 KG/M2 | WEIGHT: 146 LBS

## 2023-06-13 DIAGNOSIS — G89.29 CHRONIC RIGHT SHOULDER PAIN: ICD-10-CM

## 2023-06-13 DIAGNOSIS — G44.219 EPISODIC TENSION-TYPE HEADACHE, NOT INTRACTABLE: ICD-10-CM

## 2023-06-13 DIAGNOSIS — R51.9 CHRONIC DAILY HEADACHE: Primary | ICD-10-CM

## 2023-06-13 DIAGNOSIS — M54.2 NECK PAIN: ICD-10-CM

## 2023-06-13 DIAGNOSIS — G89.29 OTHER CHRONIC PAIN: ICD-10-CM

## 2023-06-13 DIAGNOSIS — F17.200 TOBACCO USE DISORDER: ICD-10-CM

## 2023-06-13 DIAGNOSIS — Z71.6 ENCOUNTER FOR SMOKING CESSATION COUNSELING: ICD-10-CM

## 2023-06-13 DIAGNOSIS — M25.511 CHRONIC RIGHT SHOULDER PAIN: ICD-10-CM

## 2023-06-13 PROCEDURE — 99214 OFFICE O/P EST MOD 30 MIN: CPT | Performed by: NURSE PRACTITIONER

## 2023-06-13 RX ORDER — IBUPROFEN 600 MG/1
600 TABLET, FILM COATED ORAL 2 TIMES DAILY PRN
Qty: 180 TABLET | Refills: 1 | Status: SHIPPED | OUTPATIENT
Start: 2023-06-13 | End: 2023-12-27

## 2023-06-13 RX ORDER — NICOTINE 21 MG/24HR
1 PATCH, TRANSDERMAL 24 HOURS TRANSDERMAL EVERY 24 HOURS
Qty: 14 PATCH | Refills: 0 | Status: SHIPPED | OUTPATIENT
Start: 2023-06-13 | End: 2023-12-27

## 2023-06-13 RX ORDER — ACETAMINOPHEN 325 MG/1
650 TABLET ORAL EVERY 4 HOURS PRN
Qty: 360 TABLET | Refills: 1 | Status: SHIPPED | OUTPATIENT
Start: 2023-06-13

## 2023-06-13 RX ORDER — IBUPROFEN 600 MG/1
600 TABLET, FILM COATED ORAL 2 TIMES DAILY PRN
Qty: 180 TABLET | Refills: 1 | Status: SHIPPED | OUTPATIENT
Start: 2023-06-13 | End: 2023-06-13

## 2023-06-13 ASSESSMENT — ANXIETY QUESTIONNAIRES
IF YOU CHECKED OFF ANY PROBLEMS ON THIS QUESTIONNAIRE, HOW DIFFICULT HAVE THESE PROBLEMS MADE IT FOR YOU TO DO YOUR WORK, TAKE CARE OF THINGS AT HOME, OR GET ALONG WITH OTHER PEOPLE: VERY DIFFICULT
GAD7 TOTAL SCORE: 15
8. IF YOU CHECKED OFF ANY PROBLEMS, HOW DIFFICULT HAVE THESE MADE IT FOR YOU TO DO YOUR WORK, TAKE CARE OF THINGS AT HOME, OR GET ALONG WITH OTHER PEOPLE?: VERY DIFFICULT
5. BEING SO RESTLESS THAT IT IS HARD TO SIT STILL: SEVERAL DAYS
GAD7 TOTAL SCORE: 15
7. FEELING AFRAID AS IF SOMETHING AWFUL MIGHT HAPPEN: SEVERAL DAYS
GAD7 TOTAL SCORE: 15
1. FEELING NERVOUS, ANXIOUS, OR ON EDGE: NEARLY EVERY DAY
3. WORRYING TOO MUCH ABOUT DIFFERENT THINGS: NEARLY EVERY DAY
4. TROUBLE RELAXING: SEVERAL DAYS
7. FEELING AFRAID AS IF SOMETHING AWFUL MIGHT HAPPEN: SEVERAL DAYS
2. NOT BEING ABLE TO STOP OR CONTROL WORRYING: NEARLY EVERY DAY
6. BECOMING EASILY ANNOYED OR IRRITABLE: NEARLY EVERY DAY

## 2023-06-13 ASSESSMENT — PATIENT HEALTH QUESTIONNAIRE - PHQ9
10. IF YOU CHECKED OFF ANY PROBLEMS, HOW DIFFICULT HAVE THESE PROBLEMS MADE IT FOR YOU TO DO YOUR WORK, TAKE CARE OF THINGS AT HOME, OR GET ALONG WITH OTHER PEOPLE: VERY DIFFICULT
SUM OF ALL RESPONSES TO PHQ QUESTIONS 1-9: 23
SUM OF ALL RESPONSES TO PHQ QUESTIONS 1-9: 23

## 2023-06-13 ASSESSMENT — PAIN SCALES - GENERAL: PAINLEVEL: MODERATE PAIN (4)

## 2023-06-13 NOTE — PROGRESS NOTES
DME (Durable Medical Equipment) Orders and Documentation  Orders Placed This Encounter   Procedures     Tens Unit/Supplies Order      The patient was assessed and it was determined the patient is in need of the following listed DME Supplies/Equipment. Please complete supporting documentation below to demonstrate medical necessity.      TENS Unit/Supplies Documentation  The patient needs a TENS unit for the treatment of: Chronic Pain Other than Low Back Pain   TENS Unit for Chronic Pain Other than Low Back Pain  Has pain been present for at least three months? yes  Other appropriate treatment modalities have been tried and failed?  yes  Patient used TENS for a trial basis for a minimum of 30 days? no and she does not have a tens unit  Is the patient is likely to derive significant therapeutic benefit from continuous use of the unit over a long period of time? yes    1) chronic neck pain  2) chronic R shoulder pain  3) chronic headaches  TARAS Verma, FNP-BC        Answers for HPI/ROS submitted by the patient on 6/13/2023  If you checked off any problems, how difficult have these problems made it for you to do your work, take care of things at home, or get along with other people?: Very difficult  PHQ9 TOTAL SCORE: 23  DEJON 7 TOTAL SCORE: 15  What is the reason for your visit today? : meds left shoulder arm  How many servings of fruits and vegetables do you eat daily?: 0-1  On average, how many sweetened beverages do you drink each day (Examples: soda, juice, sweet tea, etc.  Do NOT count diet or artificially sweetened beverages)?: 3  How many minutes a day do you exercise enough to make your heart beat faster?: 10 to 19  How many days a week do you exercise enough to make your heart beat faster?: 7  How many days per week do you miss taking your medication?: 0

## 2023-06-13 NOTE — PATIENT INSTRUCTIONS
Use 14 mg nicotine patch first, once 14 mg nicotine patch completed step down to 7 mg using patch.  Reduce smoking while using the patch.  Do not use 2 patches at once.  Can use Nicotrol inhaler with patch.  Should not be smoking and using Nicotrol inhaler.  Treat headaches/pain at onset.  Review below tips.  Take ibuprofen with food.  Schedule with physical therapy.  Keep appointment with pain management 7/6/23.  It appears you have physical therapy scheduled 6/20/23.  Use TENS unit.

## 2023-06-13 NOTE — PROGRESS NOTES
Assessment & Plan     Chronic daily headache    - Physical Therapy Referral; Future  - ibuprofen (ADVIL/MOTRIN) 600 MG tablet; Take 1 tablet (600 mg) by mouth 2 times daily as needed for moderate pain (take with food at onset of headache/ pain. Take 6-8 hours apart from first dose)    Episodic tension-type headache, not intractable    - Tens Unit/Supplies Order  - Physical Therapy Referral; Future  - ibuprofen (ADVIL/MOTRIN) 600 MG tablet; Take 1 tablet (600 mg) by mouth 2 times daily as needed for moderate pain (take with food at onset of headache/ pain. Take 6-8 hours apart from first dose)    Other chronic pain    - acetaminophen (TYLENOL) 325 MG tablet; Take 2 tablets (650 mg) by mouth every 4 hours as needed for pain (take at onset of headache if needed, alternate with ibuprofen)    Neck pain    - Tens Unit/Supplies Order  - Physical Therapy Referral; Future  - ibuprofen (ADVIL/MOTRIN) 600 MG tablet; Take 1 tablet (600 mg) by mouth 2 times daily as needed for moderate pain (take with food at onset of headache/ pain. Take 6-8 hours apart from first dose)    Chronic right shoulder pain    - Tens Unit/Supplies Order  - Physical Therapy Referral; Future  - ibuprofen (ADVIL/MOTRIN) 600 MG tablet; Take 1 tablet (600 mg) by mouth 2 times daily as needed for moderate pain (take with food at onset of headache/ pain. Take 6-8 hours apart from first dose)    Tobacco use disorder    - nicotine (NICODERM CQ) 7 MG/24HR 24 hr patch; Place 1 patch onto the skin every 24 hours For smoking cessation (can remove at night if causes bad dreams- Use second- step down treatment- do not use with 14 mg patch. Reduce smoking while using. Can use with nicotrol inhaler)  - nicotine (NICODERM CQ) 14 MG/24HR 24 hr patch; Place 1 patch onto the skin every 24 hours For smoking cessation (can remove at night if causes bad dreams- Use first- step down treatment- do not use with 7 mg patch. Reduce smoking while using. Can use with nicotrol  inhaler)  - nicotine (NICOTROL) 10 MG inhaler; Use 1 cartridge as needed for urge to smoke by puffing over course of 20min.  Use 6-16 cartridges(inhalers)/day; reduce number of cartridges/day over 6-12 weeks as able. Can use with nicotine patch, use in place of cigarettes)    Encounter for smoking cessation counseling    - nicotine (NICODERM CQ) 7 MG/24HR 24 hr patch; Place 1 patch onto the skin every 24 hours For smoking cessation (can remove at night if causes bad dreams- Use second- step down treatment- do not use with 14 mg patch. Reduce smoking while using. Can use with nicotrol inhaler)  - nicotine (NICODERM CQ) 14 MG/24HR 24 hr patch; Place 1 patch onto the skin every 24 hours For smoking cessation (can remove at night if causes bad dreams- Use first- step down treatment- do not use with 7 mg patch. Reduce smoking while using. Can use with nicotrol inhaler)  - nicotine (NICOTROL) 10 MG inhaler; Use 1 cartridge as needed for urge to smoke by puffing over course of 20min.  Use 6-16 cartridges(inhalers)/day; reduce number of cartridges/day over 6-12 weeks as able. Can use with nicotine patch, use in place of cigarettes)      45 minutes spent by me on the date of the encounter doing chart review, history and exam, documentation and further activities per the note     See Patient Instructions:Use 14 mg nicotine patch first, once 14 mg nicotine patch completed step down to 7 mg using patch.  Reduce smoking while using the patch.  Do not use 2 patches at once.  Can use Nicotrol inhaler with patch.  Should not be smoking and using Nicotrol inhaler.  Treat headaches/pain at onset.  Review below tips.  Take ibuprofen with food.  Schedule with physical therapy.  Keep appointment with pain management 7/6/23.  It appears you have physical therapy scheduled 6/20/23.  Use TENS unit.    J CARLOS PANCHAL  [facility] EFFIE Minor is a 42 year old, presenting for the following health  issues:  Arm Pain and Recheck Medication  She reports to clinic with adult  and [facility] .  She reports she has been doing ADT (adult a treatment) and DBT therapy which she is unsure if is helping or not as she does not understand all components.  She feels her mental health is okay she declines need to speak to provider without others in the room to address concerns and reports she feels safe at her foster home.  Discussed completing a cognitive test to see if she has learning disabilities -adult  says she believes her therapist is looking into this and it seems to be covered every 6 years.  Discussed this would be a good idea.      She reports she exercises daily- when asked what exercise she does she reports she walks- adult foster mom reports they have a large driveway where she walks.  Discussed controlled medications that they were reporting issues with regarding pain levels and requesting them when not seeming in pain.  Discussed with foster mom and  that I did not feel patient was taking an appropriate amount of pain medication; that she was not asking to increase the dosage; but as a nurse I am supposed to believe the pain level medications are telling they have/that the workers at home probably should not be doing an assessment of the patient's reported pain; however the patient is able to be without meds that is better.  Patient reports her pain has been tolerable without pain medications. Patient reports right shoulder pain into her arm and neck- she does not feel it is worsening- she does report tingling to her elbow. Discussed whether she has tried physical therapy in the past-they report she starting physical therapy at the end of month.  Discussed that we should try other interventions for pain such as massage, TENS unit, theracane or topical medicine such as Voltaren for her pain; that way she will be able to report if therapies are helpful or not  when she goes to see pain management. Discussed if she is taken Tylenol for her pain-foster mom reports she often declines this.  She reports Mily purchase ibuprofen at the store and they were not able to give it to her due to it being on her allergy list; when asking Mily what allergic reaction she had to ibuprofen she reports gastritis.  Discussed trying ibuprofen and taking it with food.  Discussed need to treat headaches and pain at onset to get it under control.  Tips given on after visit summary to help her manage pain and prevent headaches.  We will see if physical therapy and alternative treatment measures help her pain she should keep follow-up visit with pain management; patient is in agreement to this.  Advised foster mom and  to try to go to pain clinic appointment if available.      Clarification needs to be done on the following meds per foster mom-discussed nicotine patch- she is able to smoke when on the nicotine patches however she be reducing her smoking while using them as they are indicated for smoking cessation.  She is not able to use multiple nicotine patches at one time.  She is able to use nicotine patch and nicotrol inhalers but she should not be using Nicotrol inhalers with cigarettes as this is in place of cigarettes.  They report she is following with psychiatry for her mental health medications.  Patient declines needing Imitrex for headaches and wants a olanzapine removd from her med list due to swelling.      They report her family told her she needs to get healthy before they can talk with her; discussed that this is probably hard for everyone needs a provider doing well or not.  Discussed alternative option of support including [employer] where she could eat healthy friends; patient reports she would not have a ride most likely-discussed if she is wanting to go we could contact insurance to see if there is a bed available for her.  She will let us know.           "6/13/2023     9:28 AM   Additional Questions   Roomed by Odette   Accompanied by Lola ()Melinda (foster care)     Arm Pain    History of Present Illness       Reason for visit:  Meds left shoulder arm    She eats 0-1 servings of fruits and vegetables daily.She consumes 3 sweetened beverage(s) daily.She exercises with enough effort to increase her heart rate 10 to 19 minutes per day.  She exercises with enough effort to increase her heart rate 7 days per week.   She is taking medications regularly.    Today's PHQ-9         PHQ-9 Total Score: 23    PHQ-9 Q9 Thoughts of better off dead/self-harm past 2 weeks :   Not at all    How difficult have these problems made it for you to do your work, take care of things at home, or get along with other people: Very difficult  Today's DEJON-7 Score: 15             Review of Systems   Constitutional, HEENT, cardiovascular, pulmonary, GI, , musculoskeletal, neuro, skin, endocrine and psych systems are negative, except as otherwise noted.      Objective    /88   Pulse 70   Temp 97.7  F (36.5  C) (Temporal)   Resp 20   Ht 1.563 m (5' 1.54\")   Wt 66.2 kg (146 lb)   LMP 11/16/2020 (Exact Date)   SpO2 100%   BMI 27.11 kg/m    Body mass index is 27.11 kg/m .  Physical Exam   GENERAL: healthy, alert and no distress  EYES: Eyes grossly normal to inspection  NECK: no adenopathy  RESP: lungs clear to auscultation - no rales, rhonchi or wheezes  CV: regular rate and rhythm, normal S1 S2, no S3 or S4, no murmur, click or rub, no peripheral edema and peripheral pulses strong  MS: no gross musculoskeletal defects noted, no edema.  Normal range of motion of neck and shoulders-is able to reach above her head, out to the sides, resist downward or upward applied pressure to arms, and reach behind her back.  Normal hand grasp. POSITIVE: Intermittent abnormal stretching throughout visit of her neck and shoulders.  SKIN: no suspicious rashes  NEURO: Normal strength and " tone, cranial nerves intact, mentation intact and speech normal  PSYCH: mentation appears normal, affect normal/bright

## 2023-06-13 NOTE — LETTER
Opioid / Opioid Plus Controlled Substance Agreement    This is an agreement between you and your provider about the safe and appropriate use of controlled substance/opioids prescribed by your care team. Controlled substances are medicines that can cause physical and mental dependence (abuse).    There are strict laws about having and using these medicines. We here at [facility] are committing to working with you in your efforts to get better. To support you in this work, we ll help you schedule regular office appointments for medicine refills. If we must cancel or change your appointment for any reason, we ll make sure you have enough medicine to last until your next appointment.     As a Provider, I will:  Listen carefully to your concerns and treat you with respect.   Recommend a treatment plan that I believe is in your best interest. This plan may involve therapies other than opioid pain medication.   Talk with you often about the possible benefits, and the risk of harm of any medicine that we prescribe for you.   Provide a plan on how to taper (discontinue or go off) using this medicine if the decision is made to stop its use.    As a Patient, I understand that opioid(s):   Are a controlled substance prescribed by my care team to help me function or work and manage my condition(s).   Are strong medicines and can cause serious side effects such as:  Drowsiness, which can seriously affect my driving ability  A lower breathing rate, enough to cause death  Harm to my thinking ability   Depression   Abuse of and addiction to this medicine  Need to be taken exactly as prescribed. Combining opioids with certain medicines or chemicals (such as illegal drugs, sedatives, sleeping pills, and benzodiazepines) can be dangerous or even fatal. If I stop opioids suddenly, I may have severe withdrawal symptoms.  Do not work for all types of pain nor for all patients. If they re not helpful, I may be asked to stop  them.        The risks, benefits and side effects of these medicine(s) were explained to me. I agree that:  I will take part in other treatments as advised by my care team. This may be psychiatry or counseling, physical therapy, behavioral therapy, group treatment or a referral to a specialist.     I will keep all my appointments. I understand that this is part of the monitoring of opioids. My care team may require an office visit for EVERY opioid/controlled substance refill. If I miss appointments or don t follow instructions, my care team may stop my medicine.    I will take my medicines as prescribed. I will not change the dose or schedule unless my care team tells me to. There will be no refills if I run out early.     I may be asked to come to the clinic and complete a urine drug test or complete a pill count at any time. If I don t give a urine sample or participate in a pill count, the care team may stop my medicine.    I will only receive prescriptions from this clinic for chronic pain. If I am treated by another provider for acute pain issues, I will tell them that I am taking opioid pain medication for chronic pain and that I have a treatment agreement with this provider. I will inform my [facility] care team within one business day if I am given a prescription for any pain medication by another healthcare provider. My [facility] care team can contact other providers and pharmacists about my use of any medicines.    It is up to me to make sure that I don t run out of my medicines on weekends or holidays. If my care team is willing to refill my opioid prescription without a visit, I must request refills only during office hours. Refills may take up to 3 business days to process. I will use one pharmacy to fill all my opioid and other controlled substance prescriptions. I will notify the clinic about any changes to my insurance or medication availability.    I am responsible for my  prescriptions. If the medicine/prescription is lost, stolen or destroyed, it will not be replaced. I also agree not to share controlled substance medicines with anyone.    I am aware I should not use any illegal or recreational drugs. I agree not to drink alcohol unless my care team says I can.       If I enroll in the [place] [facility] Cann[facility] program, I will tell my care team prior to my next refill.     I will tell my care team right away if I become pregnant, have a new medical problem treated outside of my regular clinic, or have a change in my medications.    I understand that this medicine can affect my thinking, judgment and reaction time. Alcohol and drugs affect the brain and body, which can affect the safety of my driving. Being under the influence of alcohol or drugs can affect my decision-making, behaviors, personal safety, and the safety of others. Driving while impaired (DWI) can occur if a person is driving, operating, or in physical control of a car, motorcycle, boat, snowmobile, ATV, motorbike, off-road vehicle, or any other motor vehicle ([place] Statute 169A.20). I understand the risk if I choose to drive or operate any vehicle or machinery.    I understand that if I do not follow any of the conditions above, my prescriptions or treatment may be stopped or changed.          Opioids  What You Need to Know    What are opioids?   Opioids are pain medicines that must be prescribed by a doctor. They are also known as narcotics.     Examples are:   morphine (MS Contin, Patrizia)  oxycodone (Oxycontin)  oxycodone and acetaminophen (Percocet)  hydrocodone and acetaminophen (Vicodin, Norco)   fentanyl patch (Duragesic)   hydromorphone (Dilaudid)   methadone  codeine (Tylenol #3)     What do opioids do well?   Opioids are best for severe short-term pain such as after a surgery or injury. They may work well for cancer pain. They may help some people with long-lasting (chronic) pain.     What do opioids NOT do  well?   Opioids never get rid of pain entirely, and they don t work well for most patients with chronic pain. Opioids don t reduce swelling, one of the causes of pain.                                    Other ways to manage chronic pain and improve function include:     Treat the health problem that may be causing pain  Anti-inflammation medicines, which reduce swelling and tenderness, such as ibuprofen (Advil, Motrin) or naproxen (Aleve)  Acetaminophen (Tylenol)  Antidepressants and anti-seizure medicines, especially for nerve pain  Topical treatments such as patches or creams  Injections or nerve blocks  Chiropractic or osteopathic treatment  Acupuncture, massage, deep breathing, meditation, visual imagery, aromatherapy  Use heat or ice at the pain site  Physical therapy   Exercise  Stop smoking  Take part in therapy       Risks and side effects     Talk to your doctor before you start or decide to keep taking opioids. Possible side effects include:    Lowering your breathing rate enough to cause death  Overdose, including death, especially if taking higher than prescribed doses  Worse depression symptoms; less pleasure in things you usually enjoy  Feeling tired or sluggish  Slower thoughts or cloudy thinking  Being more sensitive to pain over time; pain is harder to control  Trouble sleeping or restless sleep  Changes in hormone levels (for example, less testosterone)  Changes in sex drive or ability to have sex  Constipation  Unsafe driving  Itching and sweating  Dizziness  Nausea, throwing up and dry mouth    What else should I know about opioids?    Opioids may lead to dependence, tolerance, or addiction.    Dependence means that if you stop or reduce the medicine too quickly, you will have withdrawal symptoms. These include loose poop (diarrhea), jitters, flu-like symptoms, nervousness and tremors. Dependence is not the same as addiction.                     Tolerance means needing higher doses over time to  get the same effect. This may increase the chance of serious side effects.    Addiction is when people improperly use a substance that harms their body, their mind or their relations with others. Use of opiates can cause a relapse of addiction if you have a history of drug or alcohol abuse.    People who have used opioids for a long time may have a lower quality of life, worse depression, higher levels of pain and more visits to doctors.    You can overdose on opioids. Take these steps to lower your risk of overdose:    Recognize the signs:  Signs of overdose include decrease or loss of consciousness (blackout), slowed breathing, trouble waking up and blue lips. If someone is worried about overdose, they should call 911.    Talk to your doctor about Narcan (naloxone).   If you are at risk for overdose, you may be given a prescription for Narcan. This medicine very quickly reverses the effects of opioids.   If you overdose, a friend or family member can give you Narcan while waiting for the ambulance. They need to know the signs of overdose and how to give Narcan.     Don't use alcohol or street drugs.   Taking them with opioids can cause death.    Do not take any of these medicines unless your doctor says it s OK. Taking these with opioids can cause death:  Benzodiazepines, such as lorazepam (Ativan), alprazolam (Xanax) or diazepam (Valium)  Muscle relaxers, such as cyclobenzaprine (Flexeril)  Sleeping pills like zolpidem (Ambien)   Other opioids      How to keep you and other people safe while taking opioids:    Never share your opioids with others.  Opioid medicines are regulated by the Drug Enforcement Agency (SERAFIN). Selling or sharing medications is a criminal act.    2. Be sure to store opioids in a secure place, locked up if possible. Young children can easily swallow them and overdose.    3. When you are traveling with your medicines, keep them in the original bottles. If you use a pill box, be sure you also  carry a copy of your medicine list from your clinic or pharmacy.    4. Safe disposal of opioids    Most pharmacies have places to get rid of medicine, called disposal kiosks. Medicine disposal options are also available in every [facility]. Search your county and  medication disposal  to find more options. You can find more details at:  [email or web address]://[email or web address]-green/managing-unwanted-medications     I agree that my provider, clinic care team, and pharmacy may work with any city, state or federal law enforcement agency that investigates the misuse, sale, or other diversion of my controlled medicine. I will allow my provider to discuss my care with, or share a copy of, this agreement with any other treating provider, pharmacy or emergency room where I receive care.    I have read this agreement and have asked questions about anything I did not understand.    _______________________________________________________  Patient Signature - Mily Grullon _____________________                   Date     _______________________________________________________  Provider Signature - GIA WOODARD, NP   _____________________                   Date     _______________________________________________________  Witness Signature (required if provider not present while patient signing)   _____________________                   Date

## 2023-06-14 PROBLEM — M25.511 CHRONIC RIGHT SHOULDER PAIN: Status: ACTIVE | Noted: 2023-06-14

## 2023-06-14 PROBLEM — G89.29 CHRONIC RIGHT SHOULDER PAIN: Status: ACTIVE | Noted: 2023-06-14

## 2023-06-14 PROBLEM — R51.9 CHRONIC DAILY HEADACHE: Status: ACTIVE | Noted: 2023-06-14

## 2023-06-14 PROBLEM — M54.2 NECK PAIN: Status: ACTIVE | Noted: 2023-06-14

## 2023-06-29 ENCOUNTER — TRANSFERRED RECORDS (OUTPATIENT)
Dept: HEALTH INFORMATION MANAGEMENT | Facility: CLINIC | Age: 43
End: 2023-06-29
Payer: MEDICARE

## 2023-07-07 ENCOUNTER — MEDICAL CORRESPONDENCE (OUTPATIENT)
Dept: HEALTH INFORMATION MANAGEMENT | Facility: CLINIC | Age: 43
End: 2023-07-07
Payer: MEDICARE

## 2023-07-10 ENCOUNTER — TELEPHONE (OUTPATIENT)
Dept: FAMILY MEDICINE | Facility: CLINIC | Age: 43
End: 2023-07-10
Payer: MEDICARE

## 2023-07-10 NOTE — TELEPHONE ENCOUNTER
Forms received from: [facility]   Phone number listed: [phone]   Fax listed: [phone]  Date received: 7/7/23  Form description: Specialty referral  Once forms are completed, please return to [facility] via fax.  Is patient requesting to be contacted when forms are completed: na  Phone: na  Form placed:  Xenia Armendariz

## 2023-07-19 ENCOUNTER — TELEPHONE (OUTPATIENT)
Dept: FAMILY MEDICINE | Facility: CLINIC | Age: 43
End: 2023-07-19
Payer: MEDICARE

## 2023-07-19 NOTE — TELEPHONE ENCOUNTER
Patient was was started on OLANzapine (ZYPREXA, FILM COATED TABLET,) 10 mg tablet, and OLANzapine (ZYPREXA) 5 mg tablet while she was in hospital. Patient is allergic to medication. Patient is requesting order to be faxed to group home to discontinue medications. Fax # [phone].

## 2023-07-19 NOTE — TELEPHONE ENCOUNTER
Called [phone] is not in service.    Loretta RN BSN  Triage Nurse  [facility]: [facility]  Ph: [phone]

## 2023-07-20 NOTE — TELEPHONE ENCOUNTER
"I see olanzapine listed on allergy list, \"swelling\" is the reaction.    I see patient was scheduled for an office visit today, PCP unexpectedly out so needs to reschedule.    Phone number on file not in service?    I don't see the previous foster caregiver, Lola, listed in contacts anymore.    Where is she living now?    I see we have consent to communicate on file with parents (Santiago and Coretta), Lola Kwabena () and Melinda Rosario ().      Parents number on consent is:   [phone]     is:   [phone]    I callled and spoke to mother, patient is in [facility] group, new group home near [place] in [place], temporary stay, hope is she will go back to previous foster caregiver Lola Yuan.    Phone number for [facility] is [phone].    Mother says patient is supposed to be getting off her narcotics during this temporary stay.  Mother says the number we have on file is the number for Mily, maybe her phone minutes ran out?    I see hosp discharge notes ([facility]) indicate patient is on olanzapine 5 mg tab once daily plus olanzapine 10 mg at HS.    Routed message to Xenia Gaviria, [facility] needs letter/order faxed advising they discontinue the olanzapine as patient is allergic, if appropriate.    Lashell Woods RN  [facility]                            "

## 2023-07-24 NOTE — TELEPHONE ENCOUNTER
I called [facility] is [phone]. I had to leave a voicemail asking them to call the clinic at [phone].    See note below from provider.   Patient also needs to reschedule her Hospital Follow up visit (previous appointment on 7/20/23 due to provider being out).     Carola Pabon RN BSN  [facility]

## 2023-07-24 NOTE — TELEPHONE ENCOUNTER
I have been told that patient is no longer taking olanzapine due to allergies. Additionally she has not been using narcotics. TARAS Verma, FNP-BC

## 2023-07-25 NOTE — TELEPHONE ENCOUNTER
"I see Xenia's note regarding her understanding that the patient is allergic to olanzapine.   Looks like that is the concern the patient/group home has and that is why the patient and facility are requesting a letter.    I spoke to [facility] staff, Jessica.   She says Mily is taking the olanzapine without problems but says it \"does nothing for her\"; staff person says she wonders if it is actually the propranolol Mily wants removed from her list?    She went to see if Mily can come to the phone to discuss this with me.  I also advised we need to reschedule Mily's hosp follow up with her provider as it got cancelled by provider.    Staff says Mily can schedule this and they will help her arrange medical transportation.    I spoke to Mily, she says she is allergic to the olanzapine.   She denies she is having any lip or oral swelling, hands and feet are swollen, Jessica is with her now and says she is visibly swollen in the hands and feet.       Mily feels she has gained 20-30 lbs since starting the olanzapine in the hospital.       Mily also wants to reduce the propranolol from TID to BID.   I advised she should be seen.   Mily declines to come in but is agreeable to a phone visit.    Scheduled for 8/2/23.    Staff member Jessica would like provider to send a signed letter (see fax number in the first note) to [facility] with order to discontinue the olanzapine if that is what provider is agreeable to.    Routed to Xenia Gaviria to address letter/order to be faxed.    Lashell Woods RN  [facility]                    "

## 2023-07-25 NOTE — TELEPHONE ENCOUNTER
Who restarted the olanzaprine? The hospital?  I cannot discontinue a medication that the patient is needing to manage her psychosis.  It shouldn't have been restarted. They will need to contact her psychiatrist. TARAS Verma, FNP-BC

## 2023-07-26 NOTE — TELEPHONE ENCOUNTER
RN left message with Kyleigh Mendoza at [phone].     .....    RN spoke with Mily.     She believes she started this at the hospital. She does not know if they were aware she was allergic and states she didn't even know she was on it until she returned home.      RN stated Xenia cannot discontinue medication and urged her to follow up with her psychiatrist and encouraged her to reach out to that psychiatrist team today.     RN offered to speak to staff at living facility to update them with PCP directives. She declined and stated she would let them know.     Precious Brown RN on 7/26/2023 at 10:23 AM

## 2023-07-28 NOTE — TELEPHONE ENCOUNTER
"I see patient has a phone visit with PCP on 8/2/23, next Tuesday.    I called [facility] at number provided.   Chose option for \"general nursing line\".    No answer, left message requesting call back to [facility] at [phone].    Will leave this open for call back from facility, otherwise assume this will be discussed at 8/2 phone visit.    Lashell CRAIN RN  [facility] Triage     "

## 2023-07-28 NOTE — TELEPHONE ENCOUNTER
Мария called back from [facility].    She stated that she will call patients psychiatrist today regarding the issues with olanzapine, and propanolol.  Patient does have a follow up visit scheduled with PCP on 8/2/23.    Routed to PCP to advise of the above.    Loretta HARDY RN  Triage Nurse  [facility]

## 2023-07-29 NOTE — CONFIDENTIAL NOTE
Can you please update the facility that she is staying at and let them know that she had many of her old meds refilled at the [place] pharmacy at her last visit; and she could possibly be keeping them in her room as well. Please send them out current med list to verify what they are giving her.  TARAS Verma, FNP-BC    
Spoke with Kieran at [facility]. Informed as below. Med list faxed to Kieran at fax # [phone]    
Spontaneous, unlabored and symmetrical

## 2023-08-04 NOTE — TELEPHONE ENCOUNTER
RECORDS RECEIVED FROM: Internal   REASON FOR VISIT: Chronic daily headache    Date of Appt: 11/02/2023   NOTES (FOR ALL VISITS) STATUS DETAILS   OFFICE NOTE from referring provider Internal 05/05/2023 Dr Umanzor [facility] TE   OFFICE NOTE from other specialist N/A    DISCHARGE SUMMARY from hospital N/A    DISCHARGE REPORT from the ER N/A    OPERATIVE REPORT N/A    SY Virus Labs (MS ONLY) N/A    EMG N/A    EEG N/A    MEDICATION LIST N/A    IMAGING  (FOR ALL VISITS)     LUMBAR PUNCTURE N/A    DAGOBERTO SCAN (MOVEMENT) N/A    ULTRASOUND (CAROTID BILAT) *VASCULAR* N/A    MRI (HEAD, NECK, SPINE) N/A    CT (HEAD, NECK, SPINE) N/A

## 2023-08-15 ENCOUNTER — TELEPHONE (OUTPATIENT)
Dept: FAMILY MEDICINE | Facility: CLINIC | Age: 43
End: 2023-08-15
Payer: MEDICARE

## 2023-08-15 NOTE — TELEPHONE ENCOUNTER
Patient Quality Outreach    Patient is due for the following:       Topic Date Due    COVID-19 Vaccine (1) Never done    Pneumococcal Vaccine (1 - PCV) Never done    Hepatitis B Vaccine (2 of 3 - 19+ 3-dose series) 01/10/2023     Chronic Opioid Use -  Treatment Agreement (CSA) and Urine Drug Screen    Next Steps:   Schedule a lab only visit for urine drug screen.     Type of outreach:    Sent to PRICILLA Puentes-FNP-BC to order urine drug screen prior to notifying patient.       Questions for provider review:    Patient needs updated Urine Drug Screen and CSA.            Odette Araya MA  Chart routed to Provider.

## 2023-08-16 DIAGNOSIS — R51.9 CHRONIC DAILY HEADACHE: ICD-10-CM

## 2023-08-16 DIAGNOSIS — F11.90 CHRONIC, CONTINUOUS USE OF OPIOIDS: Primary | ICD-10-CM

## 2023-08-16 DIAGNOSIS — M25.50 ARTHRALGIA, UNSPECIFIED JOINT: ICD-10-CM

## 2023-08-29 DIAGNOSIS — Z79.899 HIGH RISK MEDICATION USE: Primary | ICD-10-CM

## 2023-08-29 DIAGNOSIS — Z13.21 ENCOUNTER FOR VITAMIN DEFICIENCY SCREENING: ICD-10-CM

## 2023-08-29 DIAGNOSIS — Z13.228 ENCOUNTER FOR SCREENING FOR METABOLIC DISORDER: ICD-10-CM

## 2023-09-05 NOTE — PROGRESS NOTES
See telephone encounter.   Kristen Kehr PA-C  September 12, 2017   Isotretinoin Pregnancy And Lactation Text: This medication is Pregnancy Category X and is considered extremely dangerous during pregnancy. It is unknown if it is excreted in breast milk.

## 2023-09-07 ENCOUNTER — LAB (OUTPATIENT)
Dept: LAB | Facility: CLINIC | Age: 43
End: 2023-09-07
Payer: MEDICARE

## 2023-09-07 DIAGNOSIS — Z13.228 ENCOUNTER FOR SCREENING FOR METABOLIC DISORDER: ICD-10-CM

## 2023-09-07 DIAGNOSIS — Z13.21 ENCOUNTER FOR VITAMIN DEFICIENCY SCREENING: ICD-10-CM

## 2023-09-07 DIAGNOSIS — M81.0 OSTEOPOROSIS OF MULTIPLE SITES: ICD-10-CM

## 2023-09-07 DIAGNOSIS — Z79.899 HIGH RISK MEDICATION USE: ICD-10-CM

## 2023-09-07 LAB
ALBUMIN SERPL BCG-MCNC: 4.4 G/DL (ref 3.5–5.2)
ANION GAP SERPL CALCULATED.3IONS-SCNC: 15 MMOL/L (ref 7–15)
BUN SERPL-MCNC: 11.3 MG/DL (ref 6–20)
CALCIUM SERPL-MCNC: 10.2 MG/DL (ref 8.6–10)
CHLORIDE SERPL-SCNC: 106 MMOL/L (ref 98–107)
CHOLEST SERPL-MCNC: 220 MG/DL
CREAT SERPL-MCNC: 0.84 MG/DL (ref 0.51–0.95)
DEPRECATED HCO3 PLAS-SCNC: 20 MMOL/L (ref 22–29)
EGFRCR SERPLBLD CKD-EPI 2021: 87 ML/MIN/1.73M2
FASTING STATUS PATIENT QL REPORTED: NO
GLUCOSE SERPL-MCNC: 100 MG/DL (ref 70–99)
HBA1C MFR BLD: 5.4 % (ref 0–5.6)
HDLC SERPL-MCNC: 47 MG/DL
LDLC SERPL CALC-MCNC: 138 MG/DL
LITHIUM SERPL-SCNC: 0.69 MMOL/L (ref 0.6–1.2)
NONHDLC SERPL-MCNC: 173 MG/DL
POTASSIUM SERPL-SCNC: 3.6 MMOL/L (ref 3.4–5.3)
SODIUM SERPL-SCNC: 141 MMOL/L (ref 136–145)
TRIGL SERPL-MCNC: 177 MG/DL
TSH SERPL DL<=0.005 MIU/L-ACNC: 2.91 UIU/ML (ref 0.3–4.2)

## 2023-09-07 PROCEDURE — 80061 LIPID PANEL: CPT

## 2023-09-07 PROCEDURE — 80178 ASSAY OF LITHIUM: CPT

## 2023-09-07 PROCEDURE — 82040 ASSAY OF SERUM ALBUMIN: CPT

## 2023-09-07 PROCEDURE — 80048 BASIC METABOLIC PNL TOTAL CA: CPT

## 2023-09-07 PROCEDURE — 82306 VITAMIN D 25 HYDROXY: CPT

## 2023-09-07 PROCEDURE — 84443 ASSAY THYROID STIM HORMONE: CPT

## 2023-09-07 PROCEDURE — 83036 HEMOGLOBIN GLYCOSYLATED A1C: CPT

## 2023-09-07 PROCEDURE — 36415 COLL VENOUS BLD VENIPUNCTURE: CPT

## 2023-09-07 NOTE — LETTER
Opioid / Opioid Plus Controlled Substance Agreement    This is an agreement between you and your provider about the safe and appropriate use of controlled substance/opioids prescribed by your care team. Controlled substances are medicines that can cause physical and mental dependence (abuse).    There are strict laws about having and using these medicines. We here at [facility] are committing to working with you in your efforts to get better. To support you in this work, we ll help you schedule regular office appointments for medicine refills. If we must cancel or change your appointment for any reason, we ll make sure you have enough medicine to last until your next appointment.     As a Provider, I will:  Listen carefully to your concerns and treat you with respect.   Recommend a treatment plan that I believe is in your best interest. This plan may involve therapies other than opioid pain medication.   Talk with you often about the possible benefits, and the risk of harm of any medicine that we prescribe for you.   Provide a plan on how to taper (discontinue or go off) using this medicine if the decision is made to stop its use.    As a Patient, I understand that opioid(s):   Are a controlled substance prescribed by my care team to help me function or work and manage my condition(s).   Are strong medicines and can cause serious side effects such as:  Drowsiness, which can seriously affect my driving ability  A lower breathing rate, enough to cause death  Harm to my thinking ability   Depression   Abuse of and addiction to this medicine  Need to be taken exactly as prescribed. Combining opioids with certain medicines or chemicals (such as illegal drugs, sedatives, sleeping pills, and benzodiazepines) can be dangerous or even fatal. If I stop opioids suddenly, I may have severe withdrawal symptoms.  Do not work for all types of pain nor for all patients. If they re not helpful, I may be asked to stop  them.        The risks, benefits and side effects of these medicine(s) were explained to me. I agree that:  I will take part in other treatments as advised by my care team. This may be psychiatry or counseling, physical therapy, behavioral therapy, group treatment or a referral to a specialist.     I will keep all my appointments. I understand that this is part of the monitoring of opioids. My care team may require an office visit for EVERY opioid/controlled substance refill. If I miss appointments or don t follow instructions, my care team may stop my medicine.    I will take my medicines as prescribed. I will not change the dose or schedule unless my care team tells me to. There will be no refills if I run out early.     I may be asked to come to the clinic and complete a urine drug test or complete a pill count at any time. If I don t give a urine sample or participate in a pill count, the care team may stop my medicine.    I will only receive prescriptions from this clinic for chronic pain. If I am treated by another provider for acute pain issues, I will tell them that I am taking opioid pain medication for chronic pain and that I have a treatment agreement with this provider. I will inform my [facility] care team within one business day if I am given a prescription for any pain medication by another healthcare provider. My [facility] care team can contact other providers and pharmacists about my use of any medicines.    It is up to me to make sure that I don t run out of my medicines on weekends or holidays. If my care team is willing to refill my opioid prescription without a visit, I must request refills only during office hours. Refills may take up to 3 business days to process. I will use one pharmacy to fill all my opioid and other controlled substance prescriptions. I will notify the clinic about any changes to my insurance or medication availability.    I am responsible for my  prescriptions. If the medicine/prescription is lost, stolen or destroyed, it will not be replaced. I also agree not to share controlled substance medicines with anyone.    I am aware I should not use any illegal or recreational drugs. I agree not to drink alcohol unless my care team says I can.       If I enroll in the [place] [facility] program, I will tell my care team prior to my next refill.     I will tell my care team right away if I become pregnant, have a new medical problem treated outside of my regular clinic, or have a change in my medications.    I understand that this medicine can affect my thinking, judgment and reaction time. Alcohol and drugs affect the brain and body, which can affect the safety of my driving. Being under the influence of alcohol or drugs can affect my decision-making, behaviors, personal safety, and the safety of others. Driving while impaired (DWI) can occur if a person is driving, operating, or in physical control of a car, motorcycle, boat, snowmobile, ATV, motorbike, off-road vehicle, or any other motor vehicle ([place] Statute 169A.20). I understand the risk if I choose to drive or operate any vehicle or machinery.    I understand that if I do not follow any of the conditions above, my prescriptions or treatment may be stopped or changed.          Opioids  What You Need to Know    What are opioids?   Opioids are pain medicines that must be prescribed by a doctor. They are also known as narcotics.     Examples are:   morphine (MS Contin, Patrizia)  oxycodone (Oxycontin)  oxycodone and acetaminophen (Percocet)  hydrocodone and acetaminophen (Vicodin, Norco)   fentanyl patch (Duragesic)   hydromorphone (Dilaudid)   methadone  codeine (Tylenol #3)     What do opioids do well?   Opioids are best for severe short-term pain such as after a surgery or injury. They may work well for cancer pain. They may help some people with long-lasting (chronic) pain.     What do opioids NOT do  well?   Opioids never get rid of pain entirely, and they don t work well for most patients with chronic pain. Opioids don t reduce swelling, one of the causes of pain.                                    Other ways to manage chronic pain and improve function include:     Treat the health problem that may be causing pain  Anti-inflammation medicines, which reduce swelling and tenderness, such as ibuprofen (Advil, Motrin) or naproxen (Aleve)  Acetaminophen (Tylenol)  Antidepressants and anti-seizure medicines, especially for nerve pain  Topical treatments such as patches or creams  Injections or nerve blocks  Chiropractic or osteopathic treatment  Acupuncture, massage, deep breathing, meditation, visual imagery, aromatherapy  Use heat or ice at the pain site  Physical therapy   Exercise  Stop smoking  Take part in therapy       Risks and side effects     Talk to your doctor before you start or decide to keep taking opioids. Possible side effects include:    Lowering your breathing rate enough to cause death  Overdose, including death, especially if taking higher than prescribed doses  Worse depression symptoms; less pleasure in things you usually enjoy  Feeling tired or sluggish  Slower thoughts or cloudy thinking  Being more sensitive to pain over time; pain is harder to control  Trouble sleeping or restless sleep  Changes in hormone levels (for example, less testosterone)  Changes in sex drive or ability to have sex  Constipation  Unsafe driving  Itching and sweating  Dizziness  Nausea, throwing up and dry mouth    What else should I know about opioids?    Opioids may lead to dependence, tolerance, or addiction.    Dependence means that if you stop or reduce the medicine too quickly, you will have withdrawal symptoms. These include loose poop (diarrhea), jitters, flu-like symptoms, nervousness and tremors. Dependence is not the same as addiction.                     Tolerance means needing higher doses over time to  get the same effect. This may increase the chance of serious side effects.    Addiction is when people improperly use a substance that harms their body, their mind or their relations with others. Use of opiates can cause a relapse of addiction if you have a history of drug or alcohol abuse.    People who have used opioids for a long time may have a lower quality of life, worse depression, higher levels of pain and more visits to doctors.    You can overdose on opioids. Take these steps to lower your risk of overdose:    Recognize the signs:  Signs of overdose include decrease or loss of consciousness (blackout), slowed breathing, trouble waking up and blue lips. If someone is worried about overdose, they should call 911.    Talk to your doctor about Narcan (naloxone).   If you are at risk for overdose, you may be given a prescription for Narcan. This medicine very quickly reverses the effects of opioids.   If you overdose, a friend or family member can give you Narcan while waiting for the ambulance. They need to know the signs of overdose and how to give Narcan.     Don't use alcohol or street drugs.   Taking them with opioids can cause death.    Do not take any of these medicines unless your doctor says it s OK. Taking these with opioids can cause death:  Benzodiazepines, such as lorazepam (Ativan), alprazolam (Xanax) or diazepam (Valium)  Muscle relaxers, such as cyclobenzaprine (Flexeril)  Sleeping pills like zolpidem (Ambien)   Other opioids      How to keep you and other people safe while taking opioids:    Never share your opioids with others.  Opioid medicines are regulated by the Drug Enforcement Agency (SERAFIN). Selling or sharing medications is a criminal act.    2. Be sure to store opioids in a secure place, locked up if possible. Young children can easily swallow them and overdose.    3. When you are traveling with your medicines, keep them in the original bottles. If you use a pill box, be sure you also  carry a copy of your medicine list from your clinic or pharmacy.    4. Safe disposal of opioids    Most pharmacies have places to get rid of medicine, called disposal kiosks. Medicine disposal options are also available in every [facility]. Search your county and  medication disposal  to find more options. You can find more details at:  [email or web address]     I agree that my provider, clinic care team, and pharmacy may work with any city, state or federal law enforcement agency that investigates the misuse, sale, or other diversion of my controlled medicine. I will allow my provider to discuss my care with, or share a copy of, this agreement with any other treating provider, pharmacy or emergency room where I receive care.    I have read this agreement and have asked questions about anything I did not understand.    _______________________________________________________  Patient Signature - Mily Grullon _____________________                   Date     _______________________________________________________  Provider Signature - [facility]   _____________________                   Date     _______________________________________________________  Witness Signature (required if provider not present while patient signing)   _____________________                   Date

## 2023-09-08 LAB — DEPRECATED CALCIDIOL+CALCIFEROL SERPL-MC: 45 UG/L (ref 20–75)

## 2023-09-14 NOTE — RESULT ENCOUNTER NOTE
Hello -    Your calcium was high on the current blood work.  This could be due to one of the medication you are currently taking.  I recommend repeating calcium in a month or so.  We will determine next steps based on follow-up blood work.  Please let us know if you have any questions or concerns.     Regards,  Dallas Flores MD

## 2023-11-02 ENCOUNTER — PRE VISIT (OUTPATIENT)
Dept: NEUROLOGY | Facility: CLINIC | Age: 43
End: 2023-11-02

## 2023-12-13 ENCOUNTER — TELEPHONE (OUTPATIENT)
Dept: FAMILY MEDICINE | Facility: CLINIC | Age: 43
End: 2023-12-13
Payer: MEDICARE

## 2023-12-13 NOTE — TELEPHONE ENCOUNTER
Xenia Umanzor, NP Nurse Practitioner Signed4:09 PM        Day 1 would be the day they are placed.  She either needs to follow the guidelines of the doctor who placed the staples, or the doctor she is scheduled to see. TARAS Verma, [facility]-BC             Precious Brown RN on 12/13/2023 at 5:19 PM

## 2023-12-13 NOTE — TELEPHONE ENCOUNTER
RN updated patient. She verbalized understanding. RN assisted with rescheduling patient on 12/18/23 with Joe Nayak.     Precious Brown RN on 12/13/2023 at 5:29 PM

## 2023-12-13 NOTE — TELEPHONE ENCOUNTER
"Patient had staples placed on 12/11/23 and ER notes state \"Recommended staples removed in 8 to 10 days\"     Is the day the staples are placed (12/11/23) considered \"day 1\" or is the next day (12/12/23) considered \"day 1\"?     If the 12/12/23 is considered \"day 1\" then 10 days would be 12/21/23, which is the same day she is going to see Dav Benavides.     Precious Brown RN on 12/13/2023 at 4:00 PM    "

## 2023-12-13 NOTE — TELEPHONE ENCOUNTER
The length of time the staples should remain depend on the area of the body there placed; removal of staples should not be delayed or complications can arise such as infection or difficulty removing.TARAS Verma, FNP-BC

## 2023-12-13 NOTE — TELEPHONE ENCOUNTER
Day 1 would be the day they are placed.  She either needs to follow the guidelines of the doctor who placed the staples, or the doctor she is scheduled to see. TARAS Verma, FNP-BC

## 2023-12-13 NOTE — TELEPHONE ENCOUNTER
Patient called, and stated that she was seen at [facility] ER, on 12/11/23, for a cut on her thigh.  She stated that she had staples put in. She was told to have them removed on 12/19/23. Patient has a ER follow up with Dav Benavides PA-C, on 12/21/23, and she is wondering if she can just wait a couple more days, and have them taken out then.    She finds it hard to get here for appointments.    Routed to please advise.    Loretta HARDY RN  Triage Nurse  [facility]

## 2023-12-13 NOTE — TELEPHONE ENCOUNTER
Appears this is in regards to other TE created 12/13/23. RN closing this encounter and adding this note on other TE.    Precious Brown RN on 12/13/2023 at 5:19 PM

## 2023-12-18 ENCOUNTER — OFFICE VISIT (OUTPATIENT)
Dept: FAMILY MEDICINE | Facility: CLINIC | Age: 43
End: 2023-12-18
Payer: MEDICARE

## 2023-12-18 VITALS
SYSTOLIC BLOOD PRESSURE: 124 MMHG | BODY MASS INDEX: 29.3 KG/M2 | RESPIRATION RATE: 16 BRPM | HEART RATE: 99 BPM | TEMPERATURE: 97.6 F | WEIGHT: 155.2 LBS | HEIGHT: 61 IN | DIASTOLIC BLOOD PRESSURE: 68 MMHG | OXYGEN SATURATION: 99 %

## 2023-12-18 DIAGNOSIS — Z48.02 ENCOUNTER FOR STAPLE REMOVAL: ICD-10-CM

## 2023-12-18 DIAGNOSIS — S71.111D LACERATION OF RIGHT THIGH, SUBSEQUENT ENCOUNTER: Primary | ICD-10-CM

## 2023-12-18 PROCEDURE — 99212 OFFICE O/P EST SF 10 MIN: CPT | Performed by: PHYSICIAN ASSISTANT

## 2023-12-18 ASSESSMENT — PATIENT HEALTH QUESTIONNAIRE - PHQ9
SUM OF ALL RESPONSES TO PHQ QUESTIONS 1-9: 22
SUM OF ALL RESPONSES TO PHQ QUESTIONS 1-9: 22
10. IF YOU CHECKED OFF ANY PROBLEMS, HOW DIFFICULT HAVE THESE PROBLEMS MADE IT FOR YOU TO DO YOUR WORK, TAKE CARE OF THINGS AT HOME, OR GET ALONG WITH OTHER PEOPLE: VERY DIFFICULT

## 2023-12-18 ASSESSMENT — PAIN SCALES - GENERAL: PAINLEVEL: NO PAIN (0)

## 2023-12-18 NOTE — PROGRESS NOTES
"  Assessment & Plan   Problem List Items Addressed This Visit    None  Visit Diagnoses       Laceration of right thigh, subsequent encounter    -  Primary    Encounter for staple removal               Staples removed as below with good tolerance of the procedure.  Patient's tetanus is up-to-date.  They have no evidence of wound infection or other complication. Steri-strips and bandage were applied. Plan to discharge with wound care instructions, over-the-counter analgesics, return precautions and follow-up with primary or myself as needed. Contracts for safety verbally.     Complete history and physical exam as below. Afebrile with normal vital signs.    DDx and Dx discussed with and explained to the pt to their satisfaction.  All questions were answered at this time. Pt expressed understanding of and agreement with this dx, tx, and plan. No further workup warranted and standard medication warnings given. I have given the patient a list of pertinent indications for re-evaluation. Will go to the Emergency Department if symptoms worsen or new concerning symptoms arise. Patient left in no apparent distress.     Review of prior external note(s) from - [facility]where information from [facility] reviewed     MED REC REQUIRED  Post Medication Reconciliation Status: discharge medications reconciled, continue medications without change  BMI:   Estimated body mass index is 29.65 kg/m  as calculated from the following:    Height as of this encounter: 1.541 m (5' 0.67\").    Weight as of this encounter: 70.4 kg (155 lb 3.2 oz).     See Patient Instructions    DIANELYS Chase  [facility] EFFIE Minor is a 43 year old, presenting for the following health issues:  Hospital F/U        12/18/2023     2:07 PM   Additional Questions   Roomed by Jerrod Mello CMA   Accompanied by N/A         12/18/2023     2:07 PM   Patient Reported Additional Medications   Patient reports taking the following new " "medications No new medications       History of Present Illness       Reason for visit:  Staple removel  Symptom onset:  3-7 days ago    She eats 2-3 servings of fruits and vegetables daily.She consumes 3 sweetened beverage(s) daily.She exercises with enough effort to increase her heart rate 20 to 29 minutes per day.  She exercises with enough effort to increase her heart rate 7 days per week.   She is taking medications regularly.     ED/UC Followup:    Facility:  [facility]  Date of visit: 12/11/2023  Reason for visit: Laceration on the right thigh. Needs staple removed. Self inflicted self injurious behavior. Denies SI or current thoughts of SIB.  Current Status: Improve, but patient it bleeds at times, other day was the last time, enough to leak through the gauze.    Review of Systems   Constitutional, HEENT, cardiovascular, pulmonary, gi and gu systems are negative, except as otherwise noted.      Objective    /68   Pulse 99   Temp 97.6  F (36.4  C) (Temporal)   Resp 16   Ht 1.541 m (5' 0.67\")   Wt 70.4 kg (155 lb 3.2 oz)   LMP 11/16/2020 (Exact Date)   SpO2 99%   BMI 29.65 kg/m    Body mass index is 29.65 kg/m .  Physical Exam  Vitals and nursing note reviewed.   Constitutional:       General: She is not in acute distress.     Appearance: Normal appearance. She is not diaphoretic.   HENT:      Head: Normocephalic and atraumatic.      Nose: Nose normal.   Eyes:      Conjunctiva/sclera: Conjunctivae normal.   Pulmonary:      Effort: Pulmonary effort is normal. No respiratory distress.   Skin:     General: Skin is dry.      Coloration: Skin is not jaundiced or pale.      Comments: ~6cm laceration to the anterior right thigh with staples in place. Good wound margin approximation with no drainage, warmth, redness or other overlying signs of trauma or infection. Distal CMS intact. Remainder of limb non-tender.    Neurological:      General: No focal deficit present.      Mental Status: " She is alert. Mental status is at baseline.   Psychiatric:         Mood and Affect: Mood normal.         Behavior: Behavior normal.      Comments: Denies thoughts of SIB/ SI.      Staples were removed from the wound without complication using a staple remover after cleaning with isopropyl alcohol. Steristrips with benzoin tincture were placed.

## 2023-12-18 NOTE — PATIENT INSTRUCTIONS
Sarah Minor,    Thank you for allowing [facility] to manage your care.    If you develop worsening/changing symptoms at any time, please be seen in clinic/urgent care.    If you have any questions or concerns, please feel free to call us at [phone]    Sincerely,    Joe Nayak PA-C    Did you know?      You can schedule a video visit for follow-up appointments as well as future appointments for certain conditions.  Please see the below link.     [email or web address]    If you have not already done so,  I encourage you to sign up for [email or web address] ([email or web address]).  This will allow you to review your results, securely communicate with a provider, and schedule virtual visits as well.

## 2023-12-22 ENCOUNTER — OFFICE VISIT (OUTPATIENT)
Dept: URGENT CARE | Facility: URGENT CARE | Age: 43
End: 2023-12-22
Payer: MEDICARE

## 2023-12-22 VITALS
SYSTOLIC BLOOD PRESSURE: 141 MMHG | HEART RATE: 78 BPM | OXYGEN SATURATION: 98 % | DIASTOLIC BLOOD PRESSURE: 86 MMHG | WEIGHT: 157.6 LBS | RESPIRATION RATE: 16 BRPM | TEMPERATURE: 97.5 F | BODY MASS INDEX: 30.1 KG/M2

## 2023-12-22 DIAGNOSIS — S71.112A LACERATION OF LEFT THIGH, INITIAL ENCOUNTER: Primary | ICD-10-CM

## 2023-12-22 PROCEDURE — 99207 PR NO CHARGE LOS: CPT | Performed by: PHYSICIAN ASSISTANT

## 2023-12-22 ASSESSMENT — PAIN SCALES - GENERAL: PAINLEVEL: NO PAIN (0)

## 2023-12-22 NOTE — PROGRESS NOTES
Chief Complaint:    Chief Complaint   Patient presents with    Laceration     Pt cut left upper thigh with knife. Onset- Thursday      Medical Decision Making:    Vital signs reviewed by Romie Prajapati PA-C  BP (!) 141/86 (BP Location: Left arm, Patient Position: Sitting, Cuff Size: Adult Regular)   Pulse 78   Temp 97.5  F (36.4  C) (Tympanic)   Resp 16   Wt 71.5 kg (157 lb 9.6 oz)   LMP 11/16/2020 (Exact Date)   SpO2 98%   BMI 30.10 kg/m      Differential Diagnosis:  {[facility] Differential Choices:609911}      ASSESSMENT:     No diagnosis found.       PLAN:     ***  Patient instructed to follow up with PCP in 1 week if symptoms are not improving.  Sooner if symptoms worsen.  Worrisome symptoms discussed with instructions to go to the ED.  Patient verbalized understanding and agreed with this plan.    Labs:     No results found for any visits on 12/22/23.    Current Meds:    Current Outpatient Medications:     acetaminophen (TYLENOL) 325 MG tablet, Take 2 tablets (650 mg) by mouth every 4 hours as needed for pain (take at onset of headache if needed, alternate with ibuprofen), Disp: 360 tablet, Rfl: 1    Cholecalciferol (D3 HIGH POTENCY) 25 MCG (1000 UT) CAPS, Take 1 capsule by mouth daily Vitamin d deficiency, Disp: , Rfl:     DULoxetine (CYMBALTA) 60 MG capsule, Take 60 mg by mouth 2 times daily For depression, Disp: , Rfl:     estrogen conj (PREMARIN) 1.25 MG tablet, TAKE 1 TABLET BY MOUTH DAILY FOR HORMONE REPLACEMENT R/T HYSTERECTOMY, Disp: 28 tablet, Rfl: 2    lamoTRIgine (LAMICTAL) 100 MG tablet, Take by mouth daily 50 mg in the morning and 100 mg at hs- mood stabilizer and depression, Disp: , Rfl:     lamoTRIgine (LAMICTAL) 25 MG tablet, Take 1 tablet by mouth daily, Disp: , Rfl:     lithium ER (LITHOBID) 300 MG CR tablet, Take 300 mg by mouth At Bedtime 3 tablets at bedtime for mood stability and depression, Disp: , Rfl:     EPINEPHrine (ANY BX GENERIC EQUIV) 0.3 MG/0.3ML injection 2-pack, Inject 0.3 mLs  (0.3 mg) into the muscle as needed for anaphylaxis May repeat one time in 5-15 minutes if response to initial dose is inadequate. (Patient not taking: Reported on 12/18/2023), Disp: 2 each, Rfl: 1    ibuprofen (ADVIL/MOTRIN) 600 MG tablet, Take 1 tablet (600 mg) by mouth 2 times daily as needed for moderate pain (take with food at onset of headache/ pain. Take 6-8 hours apart from first dose) (Patient not taking: Reported on 12/18/2023), Disp: 180 tablet, Rfl: 1    naloxone (NARCAN) 4 MG/0.1ML nasal spray, Spray 1 spray (4 mg) into one nostril alternating nostrils as needed for opioid reversal every 2-3 minutes until assistance arrives (Patient not taking: Reported on 12/18/2023), Disp: 0.2 mL, Rfl: 1    nicotine (NICODERM CQ) 14 MG/24HR 24 hr patch, Place 1 patch onto the skin every 24 hours For smoking cessation (can remove at night if causes bad dreams- Use first- step down treatment- do not use with 7 mg patch. Reduce smoking while using. Can use with nicotrol inhaler) (Patient not taking: Reported on 12/18/2023), Disp: 14 patch, Rfl: 0    nicotine (NICODERM CQ) 7 MG/24HR 24 hr patch, Place 1 patch onto the skin every 24 hours For smoking cessation (can remove at night if causes bad dreams- Use second- step down treatment- do not use with 14 mg patch. Reduce smoking while using. Can use with nicotrol inhaler) (Patient not taking: Reported on 12/18/2023), Disp: 90 patch, Rfl: 1    nicotine (NICOTROL) 10 MG inhaler, Use 1 cartridge as needed for urge to smoke by puffing over course of 20min.  Use 6-16 cartridges(inhalers)/day; reduce number of cartridges/day over 6-12 weeks as able. Can use with nicotine patch, use in place of cigarettes) (Patient not taking: Reported on 12/18/2023), Disp: 168 each, Rfl: 1    propranolol (INDERAL) 10 MG tablet, Take 1 tablet (10 mg) by mouth 3 times daily 1 TABLET ORALLY 3 TIMES DAILY AS NEEDED (DX: ANXIETY) Strength: 10 mg (Patient not taking: Reported on 12/18/2023), Disp: 90  tablet, Rfl: 1    sertraline (ZOLOFT) 100 MG tablet, Take 100 mg by mouth daily In the morning for depression (Patient not taking: Reported on 12/18/2023), Disp: , Rfl:     Allergies:  Allergies   Allergen Reactions    Ciprofloxacin Dizziness, Shortness Of Breath and Nausea and Vomiting    Paxil [Paroxetine] Anaphylaxis     anaphylaxis    Amitriptyline Hives and Other (See Comments)     shocking feeling up the arm    Amoxicillin Diarrhea    Asa [Dihydroxyaluminum Aminoacetate] GI Disturbance    Cipro [Ciprofloxacin]      Dizziness, vomiting, shortness of breath    Morphine      ithcy    Naproxen      GI distress    Neurontin [Gabapentin]      rash    No Clinical Screening - See Comments Other (See Comments)     Bees causes watery itchy eyes, swelling in mouth, needs epi pen    Olanzapine Swelling    Phenergan [Promethazine Hcl]      dystonia    Pregabalin      extrematies swell up     Pregabalin Swelling and Unknown     Of all limbs      Prilosec [Omeprazole]      Rash, dizziness    Venlafaxine Other (See Comments)     Intolerance- eye    Gabapentin Rash    Omeprazole Dizziness and Rash       SUBJECTIVE    HPI: Mily Grullon is an 43 year old female who presents for reevaluation of R thigh laceration.  Laceration was repaired 11 days ago in the ED.      ROS:      Review of Systems     Family History   Family History   Problem Relation Age of Onset    Hypertension Mother     Other Cancer Mother         Stomach    Depression Mother     Obesity Mother     Hypertension Father     Lipids Father     Hyperlipidemia Father     Hypertension Maternal Grandmother     Genitourinary Problems Maternal Grandmother         kidney disease    Breast Cancer Maternal Grandmother     Substance Abuse Maternal Grandfather         Alcoholic biological and step dad    Obesity Maternal Grandfather     Cancer Paternal Grandmother         leukemia    Diabetes Other     Hyperlipidemia Other     Anxiety Disorder Other         On both mom and  dad's side    Osteoporosis Other         Just found out and she's around 70yrs. Old    Obesity Other         Both sides    Diabetes Cousin     Prostate Cancer Other     Anxiety Disorder Other         Both sides of family    Substance Abuse Other         Alcoholic both sides of family    Obesity Other         Both sides    Depression Other         Major depressive disorder    Anxiety Disorder Other         Generalized anxiety disorder    Mental Illness Other         Major depressive disorder, generalized anxiety disorder and etc.    Osteoporosis Other         Severe osteoporosis with multiple fractures    Genetic Disorder Other         Endometriosis    Anxiety Disorder Cousin         Both sides of family    Genetic Disorder Cousin         Endometriosis multiple cousins have it    Obesity Cousin         Both sides    Anxiety Disorder Niece         Sister's daughter    Genetic Disorder Sister         Endometriosis    Asthma No family hx of     C.A.D. No family hx of     Diabetes No family hx of     Cerebrovascular Disease No family hx of     Breast Cancer No family hx of     Cancer - colorectal No family hx of     Prostate Cancer No family hx of     Alzheimer Disease No family hx of     Arthritis No family hx of     Blood Disease No family hx of     Circulatory No family hx of     Eye Disorder No family hx of     Gastrointestinal Disease No family hx of     Musculoskeletal Disorder No family hx of     Neurologic Disorder No family hx of     Respiratory No family hx of     Thyroid Disease No family hx of        Social History  Social History     Socioeconomic History    Marital status: Single     Spouse name: Not on file    Number of children: 0    Years of education: Not on file    Highest education level: Not on file   Occupational History    Not on file   Tobacco Use    Smoking status: Every Day     Packs/day: 0.00     Years: 18.00     Additional pack years: 0.00     Total pack years: 0.00     Types: Cigarettes      Last attempt to quit: 2014     Years since quittin.8     Passive exposure: Current    Smokeless tobacco: Never    Tobacco comments:     Started in probably  stopped in    Vaping Use    Vaping Use: Former    Substances: Nicotine, Flavoring    Devices: Disposable   Substance and Sexual Activity    Alcohol use: Yes     Comment: Once in a great while like a drink on a holiday. Something l    Drug use: Never     Comment: rare    Sexual activity: Yes     Partners: Male     Birth control/protection: Injection, Female Surgical     Comment: Hysterectomy   Other Topics Concern    Parent/sibling w/ CABG, MI or angioplasty before 65F 55M? No   Social History Narrative    Lives alone in [place]    Works at [employer]    Enjoying spending time with dog and nieces    Has [facility] worker     Social Determinants of Health     Financial Resource Strain: Low Risk  (2023)    Financial Resource Strain     Within the past 12 months, have you or your family members you live with been unable to get utilities (heat, electricity) when it was really needed?: No   Food Insecurity: Low Risk  (2023)    Food Insecurity     Within the past 12 months, did you worry that your food would run out before you got money to buy more?: No     Within the past 12 months, did the food you bought just not last and you didn t have money to get more?: No   Transportation Needs: Low Risk  (2023)    Transportation Needs     Within the past 12 months, has lack of transportation kept you from medical appointments, getting your medicines, non-medical meetings or appointments, work, or from getting things that you need?: No   Physical Activity: Not on file   Stress: Not on file   Social Connections: Not on file   Interpersonal Safety: Low Risk  (2023)    Interpersonal Safety     Do you feel physically and emotionally safe where you currently live?: Yes     Within the past 12 months, have you been hit, slapped, kicked or  otherwise physically hurt by someone?: No     Within the past 12 months, have you been humiliated or emotionally abused in other ways by your partner or ex-partner?: No   Housing Stability: Low Risk  (12/22/2023)    Housing Stability     Do you have housing? : Yes     Are you worried about losing your housing?: No        Surgical History:  Past Surgical History:   Procedure Laterality Date    ARTHROSCOPY HIP, OSTEOPLASTY FEMUR PROXIMAL, COMBINED Left 06/29/2016    Procedure: COMBINED ARTHROSCOPY HIP, OSTEOPLASTY FEMUR PROXIMAL;  Surgeon: Godwin Deleon MD;  Location: [facility] OR    ARTHROSCOPY OF JOINT UNLISTED  04/01/2004    Left wrist, with debridement and repair of tear.    BIOPSY  Not exactly sure    Multiple for lady reproductive stuff    COLONOSCOPY  Not sure    [facility] and an endoscopy done too    EXCISE MASS ABDOMEN Left 5/5/2021    Procedure: Excision of abdominal wall lump;  Surgeon: Zakcary Alonzo MD;  Location: [facility] OR    HC REMOVAL OF OVARY/TUBE(S)  06/01/2003    right with fallopian tube    HC REPAIR TRIANGULAR CART,WRIST JT  01/01/2005    Dr Eloy Sosa    HC REPAIR TRIANGULAR CART,WRIST JT  2007 or so    ulnar excision- Dr Eloy Sosa    LAPAROSCOPIC ABLATION ENDOMETRIOSIS      x 2    LITHOTRIPSY      SOFT TISSUE SURGERY  2004 and 2006    2 tfcc repair l wrist so procedure with partial removal of u    ZZC TOTAL ABDOM HYSTERECTOMY  2020    with LSO        Problem List:  Patient Active Problem List   Diagnosis    Personal history of nicotine dependence    CARDIOVASCULAR SCREENING; LDL GOAL LESS THAN 160    Gastritis    Osteoporosis of multiple sites    Closed nondisplaced intertrochanteric fracture of left femur (H)    Migraine without aura and without status migrainosus, not intractable    Closed nondisplaced fracture of body of left calcaneus with delayed healing, subsequent encounter    Heel pain, chronic, left    Gastroesophageal reflux disease, esophagitis presence not  specified    Superficial phlebitis    Class 2 severe obesity due to excess calories with serious comorbidity and body mass index (BMI) of 36.0 to 36.9 in adult (H)    Other chronic pain    Low serum cortisol level    Anxiety    Eosinophilic gastroenteritis    Chronic wrist pain    Moderate episode of recurrent major depressive disorder (H)    Falls frequently    Abdominal wall lump    Chronic, continuous use of opioids    Deliberate self-cutting    At risk for self harm    Morbid obesity (H)    Encounter for removal of sutures    Episodic tension-type headache, not intractable    Hormone replacement therapy (HRT)    Severe episode of recurrent major depressive disorder, without psychotic features (H)    Lives in group home    Slow transit constipation    Hx of anaphylaxis    Psychosis, unspecified psychosis type (H)    Tobacco use disorder    Encounter for smoking cessation counseling    Chronic right shoulder pain    Neck pain    Chronic daily headache           OBJECTIVE:     Vital signs noted and reviewed by Romie Prajapati PA-C  BP (!) 141/86 (BP Location: Left arm, Patient Position: Sitting, Cuff Size: Adult Regular)   Pulse 78   Temp 97.5  F (36.4  C) (Tympanic)   Resp 16   Wt 71.5 kg (157 lb 9.6 oz)   LMP 11/16/2020 (Exact Date)   SpO2 98%   BMI 30.10 kg/m       PEFR:    Physical Exam        Romie Prajapati PA-C  12/22/2023, 4:08 PM

## 2023-12-26 ENCOUNTER — TELEPHONE (OUTPATIENT)
Dept: FAMILY MEDICINE | Facility: CLINIC | Age: 43
End: 2023-12-26
Payer: MEDICARE

## 2023-12-26 NOTE — TELEPHONE ENCOUNTER
Reason for Call:  Appointment Request    Patient requesting this type of appt:  Staple Removal    Requested provider: Staples placed at [place] and due to be removed 12/29    Reason patient unable to be scheduled: Not within requested timeframe    When does patient want to be seen/preferred time: 3-7 days    Comments:     Could we send this information to you in [facility] or would you prefer to receive a phone call?:   Patient would prefer a phone call   Okay to leave a detailed message?: Yes at Home number on file [phone] (home) or Cell number on file:    Telephone Information:   Mobile [phone]       Call taken on 12/26/2023 at 9:52 AM by Kait Moore

## 2023-12-29 ENCOUNTER — OFFICE VISIT (OUTPATIENT)
Dept: FAMILY MEDICINE | Facility: CLINIC | Age: 43
End: 2023-12-29
Payer: MEDICARE

## 2023-12-29 VITALS
HEART RATE: 73 BPM | HEIGHT: 61 IN | TEMPERATURE: 98.7 F | WEIGHT: 157.8 LBS | DIASTOLIC BLOOD PRESSURE: 77 MMHG | SYSTOLIC BLOOD PRESSURE: 128 MMHG | RESPIRATION RATE: 19 BRPM | OXYGEN SATURATION: 100 % | BODY MASS INDEX: 29.79 KG/M2

## 2023-12-29 DIAGNOSIS — Z72.89 DELIBERATE SELF-CUTTING: ICD-10-CM

## 2023-12-29 DIAGNOSIS — S71.112D LACERATION OF LEFT THIGH, SUBSEQUENT ENCOUNTER: ICD-10-CM

## 2023-12-29 DIAGNOSIS — Z48.02 ENCOUNTER FOR STAPLE REMOVAL: Primary | ICD-10-CM

## 2023-12-29 PROCEDURE — 99213 OFFICE O/P EST LOW 20 MIN: CPT | Performed by: PHYSICIAN ASSISTANT

## 2023-12-29 ASSESSMENT — ENCOUNTER SYMPTOMS
FEVER: 0
WOUND: 1

## 2023-12-29 ASSESSMENT — PAIN SCALES - GENERAL: PAINLEVEL: NO PAIN (0)

## 2023-12-29 NOTE — PROGRESS NOTES
Assessment & Plan     Encounter for staple removal  Deliberate self-cutting  Laceration of left thigh, subsequent encounter  Patient is a 43-year-old female who presents to clinic for emergency department follow-up.  Patient was evaluated in emergency department 12/22/2023 due to laceration on left thigh caused by deliberate self cutting.  Patient was evaluated by psychiatry and discharged with crisis plan including removing alcohol/knives from house, working with therapist, evaluation psychiatry, and using crisis information if future plans for self-harm.  Patient notes that she has followed care plan and does not have plans for self-harm at this time.  She does have psychiatry and counseling follow-up scheduled next week.  Vital signs normal.  Physical exam significant for well-healing laceration.  Staples removed successfully and bacitracin as well as dressings applied.  Patient to continue with bacitracin and soft dressings x 2 days.  Crisis information provided as well as return precautions.      See Patient Instructions    Stacey Guthrie PA-C  [facility] EFFIE Minor is a 43 year old, presenting for the following health issues:  staple removal      HPI   Pt is here for staple removal that was placed at [facility]. Pt states there are 5 staples in her thigh.  Patient notes she is living in a transitional community and feels she has good support at home and with family.  She has removed alcohol from the house as well as knives.  Patient has crisis information available.  No plans for self-harm at this time.    Patient has scheudled follow up with psychiaty and counseling next week.     Per chart review, patient seen in emergency department 12/22/2023 due to laceration of left thigh caused by deliberate self cutting.  Patient was placed on 72-hour emergency hold.  Patient evaluated by psychiatry and plan of care included discharge, taking all alcohol from house, working with  "therapist, calling crisis line before acting on urges, evaluation with psychiatry, removing knives from the house.    Review of Systems   Constitutional:  Negative for fever.   Skin:  Positive for wound.   Psychiatric/Behavioral:  Positive for self-injury.             Objective    /77   Pulse 73   Temp 98.7  F (37.1  C) (Temporal)   Resp 19   Ht 1.541 m (5' 0.67\")   Wt 71.6 kg (157 lb 12.8 oz)   LMP 11/16/2020 (Exact Date)   SpO2 100%   BMI 30.14 kg/m    Body mass index is 30.14 kg/m .  Physical Exam  Vitals and nursing note reviewed.   Constitutional:       General: She is not in acute distress.     Appearance: Normal appearance.   HENT:      Head: Normocephalic and atraumatic.   Eyes:      Extraocular Movements: Extraocular movements intact.      Pupils: Pupils are equal, round, and reactive to light.   Cardiovascular:      Rate and Rhythm: Normal rate.   Pulmonary:      Effort: Pulmonary effort is normal.   Musculoskeletal:         General: Normal range of motion.      Cervical back: Normal range of motion.   Skin:     General: Skin is warm and dry.      Comments: Approximately 6 cm linear healing laceration to left thigh with 5 staples in place.  No erythema, induration, discharge, fluctuance.   Neurological:      General: No focal deficit present.      Mental Status: She is alert.   Psychiatric:         Mood and Affect: Mood normal.         Behavior: Behavior normal.                              "

## 2023-12-29 NOTE — PATIENT INSTRUCTIONS
The cut of your left thigh appears to be healing well.  I would recommend applying bacitracin and covering with soft dressings/Band-Aids for at least 2 more days.  After that you can let it air dry.    Please continue with your plan of care from the hospital including keeping all alcohol and knives out of your house, following up with counseling and psychiatry next week, and using crisis line information or calling 911 if you have plans for self-harm.  I have added additional crisis information below.    In an emergency--call 911  Don't leave the person alone. Anyone who is at imminent risk of suicide needs psychiatric services right away. The person must be constantly watched, and never left out of sight. Call 911 or a 24-hour suicide crisis hotline. You can search for this online. You can also take the person to the nearest hospital emergency room.     Don't keep it a secret and don't wait  Call a mental health clinic or a licensed mental health professional in your area right away. This may be a psychiatrist, clinical psychologist, psychiatric or licensed clinical , marriage and family counselor, or clergy. If you don't know how to contact such professionals and there is an immediate risk, call 911. Tell them you need help for a person who is thinking about suicide.     Resources  National Suicide Prevention Nqonwjpx905-503-9881 (733-424-YWDC)www.suicidepreventionlifeline.org  National Suicide Ieicmfs988-816-9673 (800-SUICIDE)  National [place] of Mental Szabpr137-613-7205kdh.[facility] Hospital[facility].nih.gov  National [place] on Mental Tdxpxby368-930-2177jes.[place]ia.org  Mental Health Myxxfmw118-749-4259orv.[facility].org

## 2024-02-11 ENCOUNTER — HEALTH MAINTENANCE LETTER (OUTPATIENT)
Age: 44
End: 2024-02-11

## 2024-04-19 DIAGNOSIS — E56.9 VITAMIN DEFICIENCY: Primary | ICD-10-CM

## 2024-04-19 RX ORDER — CHOLECALCIFEROL (VITAMIN D3) 25 MCG
1 CAPSULE ORAL DAILY
Qty: 28 CAPSULE | Refills: 1 | Status: SHIPPED | OUTPATIENT
Start: 2024-04-19 | End: 2024-06-17

## 2024-06-10 ENCOUNTER — PATIENT OUTREACH (OUTPATIENT)
Dept: CARE COORDINATION | Facility: CLINIC | Age: 44
End: 2024-06-10
Payer: MEDICARE

## 2024-06-17 DIAGNOSIS — E56.9 VITAMIN DEFICIENCY: ICD-10-CM

## 2024-06-17 RX ORDER — CHOLECALCIFEROL (VITAMIN D3) 25 MCG
1 CAPSULE ORAL DAILY
Qty: 28 CAPSULE | Refills: 1 | Status: SHIPPED | OUTPATIENT
Start: 2024-06-17 | End: 2024-08-13

## 2024-08-12 DIAGNOSIS — E56.9 VITAMIN DEFICIENCY: ICD-10-CM

## 2024-08-27 DIAGNOSIS — Z79.899 HIGH RISK MEDICATION USE: Primary | ICD-10-CM

## 2024-08-27 DIAGNOSIS — Z13.9 ENCOUNTER FOR SCREENING: ICD-10-CM

## 2024-09-30 SDOH — HEALTH STABILITY: PHYSICAL HEALTH: ON AVERAGE, HOW MANY DAYS PER WEEK DO YOU ENGAGE IN MODERATE TO STRENUOUS EXERCISE (LIKE A BRISK WALK)?: 2 DAYS

## 2024-09-30 SDOH — HEALTH STABILITY: PHYSICAL HEALTH: ON AVERAGE, HOW MANY MINUTES DO YOU ENGAGE IN EXERCISE AT THIS LEVEL?: 10 MIN

## 2024-09-30 ASSESSMENT — SOCIAL DETERMINANTS OF HEALTH (SDOH): HOW OFTEN DO YOU GET TOGETHER WITH FRIENDS OR RELATIVES?: PATIENT DECLINED

## 2024-09-30 ASSESSMENT — PATIENT HEALTH QUESTIONNAIRE - PHQ9
SUM OF ALL RESPONSES TO PHQ QUESTIONS 1-9: 21
SUM OF ALL RESPONSES TO PHQ QUESTIONS 1-9: 21
10. IF YOU CHECKED OFF ANY PROBLEMS, HOW DIFFICULT HAVE THESE PROBLEMS MADE IT FOR YOU TO DO YOUR WORK, TAKE CARE OF THINGS AT HOME, OR GET ALONG WITH OTHER PEOPLE: VERY DIFFICULT

## 2024-10-01 ENCOUNTER — OFFICE VISIT (OUTPATIENT)
Dept: FAMILY MEDICINE | Facility: CLINIC | Age: 44
End: 2024-10-01

## 2024-10-01 ENCOUNTER — VIRTUAL VISIT (OUTPATIENT)
Dept: ENDOCRINOLOGY | Facility: CLINIC | Age: 44
End: 2024-10-01

## 2024-10-01 ENCOUNTER — MYC MEDICAL ADVICE (OUTPATIENT)
Dept: FAMILY MEDICINE | Facility: CLINIC | Age: 44
End: 2024-10-01

## 2024-10-01 ENCOUNTER — TELEPHONE (OUTPATIENT)
Dept: FAMILY MEDICINE | Facility: CLINIC | Age: 44
End: 2024-10-01

## 2024-10-01 VITALS
HEIGHT: 61 IN | RESPIRATION RATE: 18 BRPM | DIASTOLIC BLOOD PRESSURE: 70 MMHG | OXYGEN SATURATION: 99 % | TEMPERATURE: 96.6 F | SYSTOLIC BLOOD PRESSURE: 122 MMHG | BODY MASS INDEX: 34.25 KG/M2 | HEART RATE: 89 BPM | WEIGHT: 181.4 LBS

## 2024-10-01 DIAGNOSIS — E66.01 CLASS 2 SEVERE OBESITY DUE TO EXCESS CALORIES WITH SERIOUS COMORBIDITY AND BODY MASS INDEX (BMI) OF 36.0 TO 36.9 IN ADULT (H): ICD-10-CM

## 2024-10-01 DIAGNOSIS — E27.40 UNSPECIFIED ADRENOCORTICAL INSUFFICIENCY (H): ICD-10-CM

## 2024-10-01 DIAGNOSIS — F17.290 VAPING NICOTINE DEPENDENCE, TOBACCO PRODUCT: ICD-10-CM

## 2024-10-01 DIAGNOSIS — E66.812 CLASS 2 SEVERE OBESITY DUE TO EXCESS CALORIES WITH SERIOUS COMORBIDITY AND BODY MASS INDEX (BMI) OF 36.0 TO 36.9 IN ADULT (H): Primary | ICD-10-CM

## 2024-10-01 DIAGNOSIS — E66.812 CLASS 2 SEVERE OBESITY DUE TO EXCESS CALORIES WITH SERIOUS COMORBIDITY AND BODY MASS INDEX (BMI) OF 36.0 TO 36.9 IN ADULT (H): ICD-10-CM

## 2024-10-01 DIAGNOSIS — R73.09 ELEVATED GLUCOSE: ICD-10-CM

## 2024-10-01 DIAGNOSIS — Z11.59 NEED FOR HEPATITIS C SCREENING TEST: ICD-10-CM

## 2024-10-01 DIAGNOSIS — Z13.220 SCREENING CHOLESTEROL LEVEL: ICD-10-CM

## 2024-10-01 DIAGNOSIS — Z13.29 SCREENING FOR THYROID DISORDER: ICD-10-CM

## 2024-10-01 DIAGNOSIS — Z13.9 ENCOUNTER FOR SCREENING: ICD-10-CM

## 2024-10-01 DIAGNOSIS — Z13.1 SCREENING FOR DIABETES MELLITUS: ICD-10-CM

## 2024-10-01 DIAGNOSIS — Z13.9 ENCOUNTER FOR SCREENING INVOLVING SOCIAL DETERMINANTS OF HEALTH (SDOH): ICD-10-CM

## 2024-10-01 DIAGNOSIS — E66.01 CLASS 2 SEVERE OBESITY DUE TO EXCESS CALORIES WITH SERIOUS COMORBIDITY AND BODY MASS INDEX (BMI) OF 36.0 TO 36.9 IN ADULT (H): Primary | ICD-10-CM

## 2024-10-01 DIAGNOSIS — F29 PSYCHOSIS, UNSPECIFIED PSYCHOSIS TYPE (H): ICD-10-CM

## 2024-10-01 DIAGNOSIS — Z12.31 VISIT FOR SCREENING MAMMOGRAM: ICD-10-CM

## 2024-10-01 DIAGNOSIS — F17.200 TOBACCO USE DISORDER: ICD-10-CM

## 2024-10-01 DIAGNOSIS — M81.0 OSTEOPOROSIS OF MULTIPLE SITES: ICD-10-CM

## 2024-10-01 DIAGNOSIS — Z23 NEED FOR PROPHYLACTIC VACCINATION AGAINST HEPATITIS B VIRUS: ICD-10-CM

## 2024-10-01 DIAGNOSIS — Z00.00 ROUTINE GENERAL MEDICAL EXAMINATION AT A HEALTH CARE FACILITY: Primary | ICD-10-CM

## 2024-10-01 DIAGNOSIS — M81.0 OSTEOPOROSIS OF MULTIPLE SITES: Primary | ICD-10-CM

## 2024-10-01 LAB
EST. AVERAGE GLUCOSE BLD GHB EST-MCNC: 114 MG/DL
HBA1C MFR BLD: 5.6 % (ref 0–5.6)
LITHIUM SERPL-SCNC: 0.27 MMOL/L (ref 0.6–1.2)

## 2024-10-01 PROCEDURE — 80061 LIPID PANEL: CPT | Performed by: NURSE PRACTITIONER

## 2024-10-01 PROCEDURE — 86803 HEPATITIS C AB TEST: CPT | Performed by: NURSE PRACTITIONER

## 2024-10-01 PROCEDURE — 99214 OFFICE O/P EST MOD 30 MIN: CPT | Mod: 95 | Performed by: INTERNAL MEDICINE

## 2024-10-01 PROCEDURE — 36415 COLL VENOUS BLD VENIPUNCTURE: CPT | Performed by: NURSE PRACTITIONER

## 2024-10-01 PROCEDURE — G2211 COMPLEX E/M VISIT ADD ON: HCPCS | Mod: 95 | Performed by: INTERNAL MEDICINE

## 2024-10-01 PROCEDURE — 99213 OFFICE O/P EST LOW 20 MIN: CPT | Mod: 25 | Performed by: NURSE PRACTITIONER

## 2024-10-01 PROCEDURE — 99396 PREV VISIT EST AGE 40-64: CPT | Performed by: NURSE PRACTITIONER

## 2024-10-01 PROCEDURE — 84443 ASSAY THYROID STIM HORMONE: CPT | Performed by: NURSE PRACTITIONER

## 2024-10-01 PROCEDURE — 83036 HEMOGLOBIN GLYCOSYLATED A1C: CPT | Performed by: NURSE PRACTITIONER

## 2024-10-01 PROCEDURE — 80048 BASIC METABOLIC PNL TOTAL CA: CPT | Performed by: NURSE PRACTITIONER

## 2024-10-01 PROCEDURE — 80178 ASSAY OF LITHIUM: CPT | Performed by: NURSE PRACTITIONER

## 2024-10-01 ASSESSMENT — ANXIETY QUESTIONNAIRES
8. IF YOU CHECKED OFF ANY PROBLEMS, HOW DIFFICULT HAVE THESE MADE IT FOR YOU TO DO YOUR WORK, TAKE CARE OF THINGS AT HOME, OR GET ALONG WITH OTHER PEOPLE?: SOMEWHAT DIFFICULT
4. TROUBLE RELAXING: SEVERAL DAYS
7. FEELING AFRAID AS IF SOMETHING AWFUL MIGHT HAPPEN: MORE THAN HALF THE DAYS
5. BEING SO RESTLESS THAT IT IS HARD TO SIT STILL: SEVERAL DAYS
1. FEELING NERVOUS, ANXIOUS, OR ON EDGE: NEARLY EVERY DAY
GAD7 TOTAL SCORE: 16
IF YOU CHECKED OFF ANY PROBLEMS ON THIS QUESTIONNAIRE, HOW DIFFICULT HAVE THESE PROBLEMS MADE IT FOR YOU TO DO YOUR WORK, TAKE CARE OF THINGS AT HOME, OR GET ALONG WITH OTHER PEOPLE: SOMEWHAT DIFFICULT
7. FEELING AFRAID AS IF SOMETHING AWFUL MIGHT HAPPEN: MORE THAN HALF THE DAYS
2. NOT BEING ABLE TO STOP OR CONTROL WORRYING: NEARLY EVERY DAY
GAD7 TOTAL SCORE: 16
3. WORRYING TOO MUCH ABOUT DIFFERENT THINGS: NEARLY EVERY DAY
GAD7 TOTAL SCORE: 16
6. BECOMING EASILY ANNOYED OR IRRITABLE: NEARLY EVERY DAY

## 2024-10-01 NOTE — PATIENT INSTRUCTIONS
Patient Education   Preventive Care Advice   This is general advice given by our system to help you stay healthy. However, your care team may have specific advice just for you. Please talk to your care team about your preventive care needs.  Nutrition  Eat 5 or more servings of fruits and vegetables each day.  Try wheat bread, brown rice and whole grain pasta (instead of white bread, rice, and pasta).  Get enough calcium and vitamin D. Check the label on foods and aim for 100% of the RDA (recommended daily allowance).  Lifestyle  Exercise at least 150 minutes each week  (30 minutes a day, 5 days a week).  Do muscle strengthening activities 2 days a week. These help control your weight and prevent disease.  No smoking.  Wear sunscreen to prevent skin cancer.  Have a dental exam and cleaning every 6 months.  Yearly exams  See your health care team every year to talk about:  Any changes in your health.  Any medicines your care team has prescribed.  Preventive care, family planning, and ways to prevent chronic diseases.  Shots (vaccines)   HPV shots (up to age 26), if you've never had them before.  Hepatitis B shots (up to age 59), if you've never had them before.  COVID-19 shot: Get this shot when it's due.  Flu shot: Get a flu shot every year.  Tetanus shot: Get a tetanus shot every 10 years.  Pneumococcal, hepatitis A, and RSV shots: Ask your care team if you need these based on your risk.  Shingles shot (for age 50 and up)  General health tests  Diabetes screening:  Starting at age 35, Get screened for diabetes at least every 3 years.  If you are younger than age 35, ask your care team if you should be screened for diabetes.  Cholesterol test: At age 39, start having a cholesterol test every 5 years, or more often if advised.  Bone density scan (DEXA): At age 50, ask your care team if you should have this scan for osteoporosis (brittle bones).  Hepatitis C: Get tested at least once in your life.  STIs (sexually  transmitted infections)  Before age 24: Ask your care team if you should be screened for STIs.  After age 24: Get screened for STIs if you're at risk. You are at risk for STIs (including HIV) if:  You are sexually active with more than one person.  You don't use condoms every time.  You or a partner was diagnosed with a sexually transmitted infection.  If you are at risk for HIV, ask about PrEP medicine to prevent HIV.  Get tested for HIV at least once in your life, whether you are at risk for HIV or not.  Cancer screening tests  Cervical cancer screening: If you have a cervix, begin getting regular cervical cancer screening tests starting at age 21.  Breast cancer scan (mammogram): If you've ever had breasts, begin having regular mammograms starting at age 40. This is a scan to check for breast cancer.  Colon cancer screening: It is important to start screening for colon cancer at age 45.  Have a colonoscopy test every 10 years (or more often if you're at risk) Or, ask your provider about stool tests like a FIT test every year or Cologuard test every 3 years.  To learn more about your testing options, visit:   .  For help making a decision, visit:   https://bit.ly/fh14203.  Prostate cancer screening test: If you have a prostate, ask your care team if a prostate cancer screening test (PSA) at age 55 is right for you.  Lung cancer screening: If you are a current or former smoker ages 50 to 80, ask your care team if ongoing lung cancer screenings are right for you.  For informational purposes only. Not to replace the advice of your health care provider. Copyright   2023 [facility]. All rights reserved. Clinically reviewed by the [facility]. [employer] [id number] - REV 01/24.  Learning About Stress  What is stress?     Stress is your body's response to a hard situation. Your body can have a physical, emotional, or mental response. Stress is a fact of life for most people, and it  affects everyone differently. What causes stress for you may not be stressful for someone else.  A lot of things can cause stress. You may feel stress when you go on a job interview, take a test, or run a race. This kind of short-term stress is normal and even useful. It can help you if you need to work hard or react quickly. For example, stress can help you finish an important job on time.  Long-term stress is caused by ongoing stressful situations or events. Examples of long-term stress include long-term health problems, ongoing problems at work, or conflicts in your family. Long-term stress can harm your health.  How does stress affect your health?  When you are stressed, your body responds as though you are in danger. It makes hormones that speed up your heart, make you breathe faster, and give you a burst of energy. This is called the fight-or-flight stress response. If the stress is over quickly, your body goes back to normal and no harm is done.  But if stress happens too often or lasts too long, it can have bad effects. Long-term stress can make you more likely to get sick, and it can make symptoms of some diseases worse. If you tense up when you are stressed, you may develop neck, shoulder, or low back pain. Stress is linked to high blood pressure and heart disease.  Stress also harms your emotional health. It can make you medeiros, tense, or depressed. Your relationships may suffer, and you may not do well at work or school.  What can you do to manage stress?  You can try these things to help manage stress:   Do something active. Exercise or activity can help reduce stress. Walking is a great way to get started. Even everyday activities such as housecleaning or yard work can help.  Try yoga or melita chi. These techniques combine exercise and meditation. You may need some training at first to learn them.  Do something you enjoy. For example, listen to music or go to a movie. Practice your hobby or do volunteer  "work.  Meditate. This can help you relax, because you are not worrying about what happened before or what may happen in the future.  Do guided imagery. Imagine yourself in any setting that helps you feel calm. You can use online videos, books, or a teacher to guide you.  Do breathing exercises. For example:  From a standing position, bend forward from the waist with your knees slightly bent. Let your arms dangle close to the floor.  Breathe in slowly and deeply as you return to a standing position. Roll up slowly and lift your head last.  Hold your breath for just a few seconds in the standing position.  Breathe out slowly and bend forward from the waist.  Let your feelings out. Talk, laugh, cry, and express anger when you need to. Talking with supportive friends or family, a counselor, or a katie leader about your feelings is a healthy way to relieve stress. Avoid discussing your feelings with people who make you feel worse.  Write. It may help to write about things that are bothering you. This helps you find out how much stress you feel and what is causing it. When you know this, you can find better ways to cope.  What can you do to prevent stress?  You might try some of these things to help prevent stress:  Manage your time. This helps you find time to do the things you want and need to do.  Get enough sleep. Your body recovers from the stresses of the day while you are sleeping.  Get support. Your family, friends, and community can make a difference in how you experience stress.  Limit your news feed. Avoid or limit time on social media or news that may make you feel stressed.  Do something active. Exercise or activity can help reduce stress. Walking is a great way to get started.  Where can you learn more?  Go to https://www.healthwise.net/patiented  Enter N032 in the search box to learn more about \"Learning About Stress.\"  Current as of: October 24, 2023               Content Version: 14.0    3196-1844 " [employer].   Care instructions adapted under license by your healthcare professional. If you have questions about a medical condition or this instruction, always ask your healthcare professional. [employer] disclaims any warranty or liability for your use of this information.      Learning About Sleeping Well  What does sleeping well mean?     Sleeping well means getting enough sleep to feel good and stay healthy. How much sleep is enough varies among people.  The number of hours you sleep and how you feel when you wake up are both important. If you do not feel refreshed, you probably need more sleep. Another sign of not getting enough sleep is feeling tired during the day.  Experts recommend that adults get at least 7 or more hours of sleep per day. Children and older adults need more sleep.  Why is getting enough sleep important?  Getting enough quality sleep is a basic part of good health. When your sleep suffers, your physical health, mood, and your thoughts can suffer too. You may find yourself feeling more grumpy or stressed. Not getting enough sleep also can lead to serious problems, including injury, accidents, anxiety, and depression.  What might cause poor sleeping?  Many things can cause sleep problems, including:  Changes to your sleep schedule.  Stress. Stress can be caused by fear about a single event, such as giving a speech. Or you may have ongoing stress, such as worry about work or school.  Depression, anxiety, and other mental or emotional conditions.  Changes in your sleep habits or surroundings. This includes changes that happen where you sleep, such as noise, light, or sleeping in a different bed. It also includes changes in your sleep pattern, such as having jet lag or working a late shift.  Health problems, such as pain, breathing problems, and restless legs syndrome.  Lack of regular exercise.  Using alcohol, nicotine, or caffeine before bed.  How can you help  "yourself?  Here are some tips that may help you sleep more soundly and wake up feeling more refreshed.  Your sleeping area   Use your bedroom only for sleeping and sex. A bit of light reading may help you fall asleep. But if it doesn't, do your reading elsewhere in the house. Try not to use your TV, computer, smartphone, or tablet while you are in bed.  Be sure your bed is big enough to stretch out comfortably, especially if you have a sleep partner.  Keep your bedroom quiet, dark, and cool. Use curtains, blinds, or a sleep mask to block out light. To block out noise, use earplugs, soothing music, or a \"white noise\" machine.  Your evening and bedtime routine   Create a relaxing bedtime routine. You might want to take a warm shower or bath, or listen to soothing music.  Go to bed at the same time every night. And get up at the same time every morning, even if you feel tired.  What to avoid   Limit caffeine (coffee, tea, caffeinated sodas) during the day, and don't have any for at least 6 hours before bedtime.  Avoid drinking alcohol before bedtime. Alcohol can cause you to wake up more often during the night.  Try not to smoke or use tobacco, especially in the evening. Nicotine can keep you awake.  Limit naps during the day, especially close to bedtime.  Avoid lying in bed awake for too long. If you can't fall asleep or if you wake up in the middle of the night and can't get back to sleep within about 20 minutes, get out of bed and go to another room until you feel sleepy.  Avoid taking medicine right before bed that may keep you awake or make you feel hyper or energized. Your doctor can tell you if your medicine may do this and if you can take it earlier in the day.  If you can't sleep   Imagine yourself in a peaceful, pleasant scene. Focus on the details and feelings of being in a place that is relaxing.  Get up and do a quiet or boring activity until you feel sleepy.  Avoid drinking any liquids before going to bed " "to help prevent waking up often to use the bathroom.  Where can you learn more?  Go to https://www.[employer].net/patiented  Enter J942 in the search box to learn more about \"Learning About Sleeping Well.\"  Current as of: July 10, 2023  Content Version: 14.1 2006-2024 [employer].   Care instructions adapted under license by your healthcare professional. If you have questions about a medical condition or this instruction, always ask your healthcare professional. [employer] disclaims any warranty or liability for your use of this information.    Bladder Training: Care Instructions  Your Care Instructions     Bladder training is used to treat urge incontinence and stress incontinence. Urge incontinence means that the need to urinate comes on so fast that you can't get to a toilet in time. Stress incontinence means that you leak urine because of pressure on your bladder. For example, it may happen when you laugh, cough, or lift something heavy.  Bladder training can increase how long you can wait before you have to urinate. It can also help your bladder hold more urine. And it can give you better control over the urge to urinate.  It is important to remember that bladder training takes a few weeks to a few months to make a difference. You may not see results right away, but don't give up.  Follow-up care is a key part of your treatment and safety. Be sure to make and go to all appointments, and call your doctor if you are having problems. It's also a good idea to know your test results and keep a list of the medicines you take.  How can you care for yourself at home?  Work with your doctor to come up with a bladder training program that is right for you. You may use one or more of the following methods.  Delayed urination  In the beginning, try to keep from urinating for 5 minutes after you first feel the need to go.  While you wait, take deep, slow breaths to relax. Kegel exercises can " "also help you delay the need to go to the bathroom.  After some practice, when you can easily wait 5 minutes to urinate, try to wait 10 minutes before you urinate.  Slowly increase the waiting period until you are able to control when you have to urinate.  Scheduled urination  Empty your bladder when you first wake up in the morning.  Schedule times throughout the day when you will urinate.  Start by going to the bathroom every hour, even if you don't need to go.  Slowly increase the time between trips to the bathroom.  When you have found a schedule that works well for you, keep doing it.  If you wake up during the night and have to urinate, do it. Apply your schedule to waking hours only.  Kegel exercises  These tighten and strengthen pelvic muscles, which can help you control the flow of urine. (If doing these exercises causes pain, stop doing them and talk with your doctor.) To do Kegel exercises:  Squeeze your muscles as if you were trying not to pass gas. Or squeeze your muscles as if you were stopping the flow of urine. Your belly, legs, and buttocks shouldn't move.  Hold the squeeze for 3 seconds, then relax for 5 to 10 seconds.  Start with 3 seconds, then add 1 second each week until you are able to squeeze for 10 seconds.  Repeat the exercise 10 times a session. Do 3 to 8 sessions a day.  When should you call for help?  Watch closely for changes in your health, and be sure to contact your doctor if:    Your incontinence is getting worse.     You do not get better as expected.   Where can you learn more?  Go to https://www.[employer].net/patiented  Enter V684 in the search box to learn more about \"Bladder Training: Care Instructions.\"  Current as of: November 15, 2023               Content Version: 14.0    5768-2449 [employer].   Care instructions adapted under license by your healthcare professional. If you have questions about a medical condition or this instruction, always ask your " healthcare professional. [employer] disclaims any warranty or liability for your use of this information.      Learning About Depression Screening  What is depression screening?  Depression screening is a way to see if you have depression symptoms. It may be done by a doctor or counselor. It's often part of a routine checkup. That's because your mental health is just as important as your physical health.  Depression is a mental health condition that affects how you feel, think, and act. You may:  Have less energy.  Lose interest in your daily activities.  Feel sad and grouchy for a long time.  Depression is very common. It affects people of all ages.  Many things can lead to depression. Some people become depressed after they have a stroke or find out they have a major illness like cancer or heart disease. The death of a loved one or a breakup may lead to depression. It can run in families. Most experts believe that a combination of inherited genes and stressful life events can cause it.  What happens during screening?  You may be asked to fill out a form about your depression symptoms. You and the doctor will discuss your answers. The doctor may ask you more questions to learn more about how you think, act, and feel.  What happens after screening?  If you have symptoms of depression, your doctor will talk to you about your options.  Doctors usually treat depression with medicines or counseling. Often, combining the two works best. Many people don't get help because they think that they'll get over the depression on their own. But people with depression may not get better unless they get treatment.  The cause of depression is not well understood. There may be many factors involved. But if you have depression, it's not your fault.  A serious symptom of depression is thinking about death or suicide. If you or someone you care about talks about this or about feeling hopeless, get help right away.  It's  "important to know that depression can be treated. Medicine, counseling, and self-care may help.  Where can you learn more?  Go to https://www.[employer].net/patiented  Enter T185 in the search box to learn more about \"Learning About Depression Screening.\"  Current as of: June 24, 2023  Content Version: 14.1 2006-2024 [employer].   Care instructions adapted under license by your healthcare professional. If you have questions about a medical condition or this instruction, always ask your healthcare professional. [employer] disclaims any warranty or liability for your use of this information.       "

## 2024-10-01 NOTE — LETTER
10/1/2024      Mily Grullon  [address]  [address]  [place] [address]      Dear Colleague,    Thank you for referring your patient, Mily Grullon, to the [facility]. Please see a copy of my visit note below.    Endocrinology Clinic Visit    Chief Complaint: RECHECK     Information obtained from:Patient     Assessment/Treatment Plan:      Osteoporosis with history of recurrent pathological fracture  No fracture since 2019.   Forteo [id number]  Reclast 2021, 2022  No bone sp therapy [id number]  Check follow up DEXA and c-telopeptide      Risk factors for osteoporosis - Depo-Provera use for 16 years since the age of 22. She was taken off of Depo-Provera in Jan 2019. She has had workup for other possible secondary causes of osteoporosis including Cushing syndrome, celiac disease, hypercalciuria and primary hyperparathyroidism which was unremarkable.      Plan:  Weight bearing Exercises  Fall prevention   Adequate Calcium and vitamin D intake: for maintenance calcium 1200 mg daily from all sources and vitamin D 1000 IU daily.    DEXA scan and c telopeptide.   Orders Placed This Encounter   Procedures     DX Bone Density     C-Telopeptide, Beta-Cross-Linked     Calcium timed urine     Creatinine timed urine     Albumin level     Calcium     Parathyroid Hormone Intact     Phosphorus     Urea nitrogen     Creatinine      Currently on HRT s/p total hysterectomy and oophorectomy.     Dallas Flores MD  Staff Endocrinologist    Division of Endocrinology and Diabetes  Subjective:         HPI: Mily Grullon is a 44 year old female with history of Osteoporosis and fragility fracture who is here for a follow up.     Surgery = total hysterectomy with oophorectomy on 12/4/20. Currently on HRT    Patient has history of endometriosis and per report has underwent right oophorectomy at the age of 22.  She was started on Depo-Provera at the  age of 22.  She has been on this medication until she was taken off of  it in January of 2019.   Osteoporosis and fragility fracture presumed to be secondary to Depo-Provera    She had a screening test for celiac disease which was within the normal limits.  She was screened for Cushing's syndrome which was normal.  She was screened for hyper-calciuria again which came back within the normal limits.  Thyroid lab results were within the normal limits.  GFR is within the normal limits and electrolytes including calcium.  He has had elevated PTH level in the setting of low vitamin D level & PTH elevation resolved after correcting low vitamin D level.     She has never been on steroids. Multiple fractures over the five years prior to starting FORTEO:  Hip fracture, wrist fracture, multiple ankle fractures, now foot and rib fractures. All of these fractures are fragility fracture without any trauma. She feels just pain. Hip # was following lifting.    She is taking calcium and vitamin D supplements in addition.  She has not had any fractures over the last 5 years.     Allergies   Allergen Reactions     Ciprofloxacin Dizziness, Shortness Of Breath and Nausea and Vomiting     Paxil [Paroxetine] Anaphylaxis     anaphylaxis     Amitriptyline Hives and Other (See Comments)     shocking feeling up the arm     Amoxicillin Diarrhea     Asa [Dihydroxyaluminum Aminoacetate] GI Disturbance     Cipro [Ciprofloxacin]      Dizziness, vomiting, shortness of breath     Morphine      ithcy     Naproxen      GI distress     Neurontin [Gabapentin]      rash     No Clinical Screening - See Comments Other (See Comments)     Bees causes watery itchy eyes, swelling in mouth, needs epi pen     Olanzapine Swelling     Phenergan [Promethazine Hcl]      dystonia     Pregabalin      extrematies swell up      Pregabalin Swelling and Unknown     Of all limbs       Prilosec [Omeprazole]      Rash, dizziness     Venlafaxine Other (See Comments)     Intolerance- eye     Gabapentin Rash     Omeprazole Dizziness and Rash        Current Outpatient Medications   Medication Sig Dispense Refill     acetaminophen (TYLENOL) 325 MG tablet Take 2 tablets (650 mg) by mouth every 4 hours as needed for pain (take at onset of headache if needed, alternate with ibuprofen) 360 tablet 1     Cholecalciferol (VITAMIN D3) 25 MCG (1000 UT) CAPS TAKE 1 CAPSULE BY MOUTH DAILY 28 capsule 1     DULoxetine (CYMBALTA) 60 MG capsule Take 60 mg by mouth 2 times daily For depression       estrogen conj (PREMARIN) 1.25 MG tablet TAKE 1 TABLET BY MOUTH DAILY FOR HORMONE REPLACEMENT R/T HYSTERECTOMY 28 tablet 2     lamoTRIgine (LAMICTAL) 100 MG tablet Take by mouth daily 50 mg in the morning and 100 mg at hs- mood stabilizer and depression       lamoTRIgine (LAMICTAL) 25 MG tablet Take 1 tablet by mouth daily       lithium ER (LITHOBID) 300 MG CR tablet Take 300 mg by mouth At Bedtime 3 tablets at bedtime for mood stability and depression       tirzepatide-Weight Management (ZEPBOUND) 2.5 MG/0.5ML prefilled pen Inject 0.5 mLs (2.5 mg) subcutaneously every 7 days. 2 mL 0     [START ON 11/1/2024] tirzepatide-Weight Management (ZEPBOUND) 5 MG/0.5ML prefilled pen Inject 0.5 mLs (5 mg) subcutaneously every 7 days. 2 mL 0     [START ON 12/2/2024] tirzepatide-Weight Management (ZEPBOUND) 7.5 MG/0.5ML prefilled pen Inject 0.5 mLs (7.5 mg) subcutaneously every 7 days. 2 mL 0     No family history of osteoporosis.     Former smoker stopped in 2014    Objective:   LMP 11/16/2020 (Exact Date)   LMP 11/16/2020 (Exact Date)   Constitutional: Pleasant   Psychological: appropriate mood and affect     In House Labs:     TSH   Date Value Ref Range Status   09/07/2023 2.91 0.30 - 4.20 uIU/mL Final   09/30/2021 2.93 0.40 - 4.00 mU/L Final   08/30/2021 2.04 0.40 - 4.00 mU/L Final   08/03/2021 1.31 0.40 - 4.00 mU/L Final   01/27/2020 0.62 0.40 - 4.00 mU/L Final   11/23/2018 0.68 0.30 - 5.00 uIU/mL Final   09/11/2017 1.36 0.40 - 4.00 mU/L Final   12/10/2013 2.37 0.4 - 5.0 mU/L Final    01/07/2013 0.70 mcU/mL Final     T4 Free   Date Value Ref Range Status   12/10/2013 1.08 0.70 - 1.85 ng/dL Final     Free T4   Date Value Ref Range Status   09/30/2021 0.84 0.76 - 1.46 ng/dL Final   08/30/2021 0.83 0.76 - 1.46 ng/dL Final       Creatinine   Date Value Ref Range Status   09/07/2023 0.84 0.51 - 0.95 mg/dL Final   05/03/2021 0.71 0.52 - 1.04 mg/dL Final     24-hour urine for calcium  Sodium to creatinine ratio was 150 reference range   Sodium 24-hour 184 difference for   Creatinine 24-hour was 1.59 (0.5-2.15  Calcium to creatinine ratio was 68 reference range 30- 275  Calcium 24-hour urine was 108 reference range   Creatinine 24-hour 1.59  Total volume 2700  Urine free cortisol 3.2    TSH was 0.68 and free T4 0.74 tissue transglutaminase IgG and IgA was undetectable creatinine within the normal limits BMD in 2014 was 0.718 at the total hip in 2018 was 0.751.  Z score -3.1 at the neck, trochanteric -2.7 and total -2.4                       Latest Ref Rng 3/23/2022  10:21 AM 4/11/2022  3:10 PM 12/13/2022  10:18 AM 9/7/2023  2:25 PM   ENDO CALCIUM LABS-[facility]        Vitamin D Deficiency screening 20 - 75 ug/L 59    45    Albumin 3.4 - 5.0 g/dL   3.4     Albumin 3.5 - 5.2 g/dL    4.4    Alkaline Phosphatase 40 - 150 U/L   98     Calcium 8.6 - 10.0 mg/dL  10.0   10.2 (H)    Urea Nitrogen 7 - 30 mg/dL       Urea Nitrogen 6.0 - 20.0 mg/dL    11.3    Creatinine 0.51 - 0.95 mg/dL  0.97   0.84    OSTEOCALCIN ng/mL            Video-Visit Details    Type of service:  Video Visit  Joined the call at 10/1/2024, 12:37:29 pm.  Left the call at 10/1/2024, 12:44:58 pm.  You were on the call for 7 minutes 29 seconds .  Distant Location (provider location):  Off-site.   Platform used for Video Visit: Mely  The longitudinal plan of care for the diagnosis(es)/condition(s) as documented were addressed during this visit. Due to the added complexity in care, I will continue to support Mily in the  subsequent management and with ongoing continuity of care.      Again, thank you for allowing me to participate in the care of your patient.        Sincerely,        Dallas Flores MD

## 2024-10-01 NOTE — NURSING NOTE
Current patient location: Clinic lab    Is the patient currently in the state of [place]? YES    Visit mode:VIDEO    If the visit is dropped, the patient can be reconnected by: VIDEO VISIT: Text to cell phone:   Telephone Information:   Mobile [phone]       Will anyone else be joining the visit? NO  (If patient encounters technical issues they should call [phone]    Are changes needed to the allergy or medication list? No, Pt stated no changes to allergies, and Pt stated no med changes    Are refills needed on medications prescribed by this physician? NO    Rooming Documentation:  Not applicable    Reason for visit: RECHECK    Celine TROTTER

## 2024-10-01 NOTE — PROGRESS NOTES
Preventive Care Visit  [facility] J CARLOS MENDIETA, Family Medicine  Oct 1, 2024      Assessment & Plan     Routine general medical examination at a health care facility      Need for prophylactic vaccination against hepatitis B virus      Encounter for screening involving social determinants of health (SDoH)    - Primary Care - Care Coordination Referral; Future    Visit for screening mammogram    - MA Screening Bilateral w/ Jeosph; Future    Need for hepatitis C screening test    - Hepatitis C Screen Reflex to HCV RNA Quant and Genotype; Future  - Hepatitis C Screen Reflex to HCV RNA Quant and Genotype    Screening cholesterol level    - Lipid panel reflex to direct LDL Fasting; Future  - Lipid panel reflex to direct LDL Fasting    Screening for thyroid disorder    - TSH with free T4 reflex; Future  - TSH with free T4 reflex    Screening for diabetes mellitus    - Glucose; Future  - Hemoglobin A1c; Future  - Hemoglobin A1c    Unspecified adrenocortical insufficiency (H)      Psychosis, unspecified psychosis type (H)      Class 2 severe obesity due to excess calories with serious comorbidity and body mass index (BMI) of 36.0 to 36.9 in adult (H)      Class 1 obesity due to excess calories with serious comorbidity and body mass index (BMI) of 34.0 to 34.9 in adult    - tirzepatide-Weight Management (ZEPBOUND) 2.5 MG/0.5ML prefilled pen; Inject 0.5 mLs (2.5 mg) subcutaneously every 7 days.  - tirzepatide-Weight Management (ZEPBOUND) 5 MG/0.5ML prefilled pen; Inject 0.5 mLs (5 mg) subcutaneously every 7 days.  - tirzepatide-Weight Management (ZEPBOUND) 7.5 MG/0.5ML prefilled pen; Inject 0.5 mLs (7.5 mg) subcutaneously every 7 days.    Osteoporosis of multiple sites    - tirzepatide-Weight Management (ZEPBOUND) 2.5 MG/0.5ML prefilled pen; Inject 0.5 mLs (2.5 mg) subcutaneously every 7 days.  - tirzepatide-Weight Management (ZEPBOUND) 5 MG/0.5ML prefilled pen; Inject 0.5 mLs (5 mg)  "subcutaneously every 7 days.  - tirzepatide-Weight Management (ZEPBOUND) 7.5 MG/0.5ML prefilled pen; Inject 0.5 mLs (7.5 mg) subcutaneously every 7 days.    Tobacco use disorder      High risk medication use    - Lithium level  - Basic metabolic panel    Encounter for screening    - Lithium level  - Basic metabolic panel    Patient has been advised of split billing requirements and indicates understanding: Yes        BMI  Estimated body mass index is 34.69 kg/m  as calculated from the following:    Height as of this encounter: 1.54 m (5' 0.63\").    Weight as of this encounter: 82.3 kg (181 lb 6.4 oz).   Weight management plan: Discussed healthy diet and exercise guidelines she would like to try Ozempic or similar weight loss medication due to her osteoporosis which prevents her from exercising.  She has had significant weight gain and weight causes additional pain.  Discussed risks and benefits of medication, potential side effects, risks of obesity.  Discussed need for labs    Depression Screening Follow Up        9/30/2024     4:41 PM   PHQ   PHQ-9 Total Score 21   Q9: Thoughts of better off dead/self-harm past 2 weeks Several days   F/U: Thoughts of suicide or self-harm Yes   F/U: Self harm-plan No   F/U: Self-harm action No   F/U: Safety concerns No         9/30/2024     4:41 PM   Last PHQ-9   1.  Little interest or pleasure in doing things 3   2.  Feeling down, depressed, or hopeless 3   3.  Trouble falling or staying asleep, or sleeping too much 3   4.  Feeling tired or having little energy 1   5.  Poor appetite or overeating 2   6.  Feeling bad about yourself 3   7.  Trouble concentrating 3   8.  Moving slowly or restless 2   Q9: Thoughts of better off dead/self-harm past 2 weeks 1   PHQ-9 Total Score 21   In the past two weeks have you had thoughts of suicide or self harm? Yes   Do you have concerns about your personal safety or the safety of others? No   In the past 2 weeks have you thought about a plan or " had intention to harm yourself? No   In the past 2 weeks have you acted on these thoughts in any way? No                No data to display                      Follow Up Actions Taken  Crisis resource information provided in the After Visit Summary  Referred patient back to mental health provider    Discussed the following ways the patient can remain in a safe environment:  remove alcohol, remove drugs, dispose of old medications , and be around others  Counseling  Appropriate preventive services were addressed with this patient via screening, questionnaire, or discussion as appropriate for fall prevention, nutrition, physical activity, Tobacco-use cessation, social engagement, weight loss and cognition.  Checklist reviewing preventive services available has been given to the patient.  Reviewed patient's diet, addressing concerns and/or questions.   She is at risk for lack of exercise and has been provided with information to increase physical activity for the benefit of her well-being.   Discussed possible causes of fatigue. Information on urinary incontinence and treatment options given to patient.   The patient's PHQ-9 score is consistent with severe depression. She was provided with information regarding depression.         Work on weight loss  Regular exercise  See Patient Instructions    Luz Minor is a 44 year old, presenting for the following:  Physical and Weight Loss Medication        10/1/2024    11:12 AM   Additional Questions   Roomed by Nicolette DOMÍNGUEZ         8/2/2023     8:24 AM 12/18/2023     2:02 PM 9/30/2024     4:41 PM   PHQ   PHQ-9 Total Score 10 22 21   Q9: Thoughts of better off dead/self-harm past 2 weeks Not at all Nearly every day Several days   F/U: Thoughts of suicide or self-harm  Yes Yes   F/U: Self harm-plan  Yes No   F/U: Self-harm action  Yes No   F/U: Safety concerns  No No          6/13/2023     9:12 AM 8/2/2023     8:26 AM 10/1/2024    11:05 AM   DEJON-7 SCORE   Total Score 15  (severe anxiety) 14 (moderate anxiety) 16 (severe anxiety)   Total Score 15 14 16             Health Care Directive  Patient does not have a Health Care Directive or Living Will: Patient states has Advance Directive and will bring in a copy to clinic.  Patient completed her healthcare directive while in clinic and requested a POLST form.  She made corrections on form while in clinic.  Copy given to patient.    HPI    She reports she is living in transitional living apartment for the last year, is like an independent apartment.  She feels it is going well.  She is about to restart working at [employer].  She reports issues with anger and anxiety.  She is working with a psychiatrist and feels this is helping.  She wants to get back to work and she wants to get on her own.  She reports she does have a CADI waiver/worker but she would like a care coordinator to help her with this transition.  Referral placed.  She does smoke the same, uses it throughout the day and is not interested in quitting smoking.  She is aware of the risks.  Discussed due for mammogram.  Due for screening labs she is in agreement.  Declining vaccines.        9/30/2024   General Health   How would you rate your overall physical health? (!) POOR   Feel stress (tense, anxious, or unable to sleep) Very much      (!) STRESS CONCERN      9/30/2024   Nutrition   Diet: Regular (no restrictions)            9/30/2024   Exercise   Days per week of moderate/strenous exercise 2 days   Average minutes spent exercising at this level 10 min      (!) EXERCISE CONCERN      9/30/2024   Social Factors   Frequency of gathering with friends or relatives Patient declined   Worry food won't last until get money to buy more No   Food not last or not have enough money for food? No   Do you have housing? (Housing is defined as stable permanent housing and does not include staying ouside in a car, in a tent, in an abandoned building, in an overnight shelter, or couch-surfing.)  Yes   Are you worried about losing your housing? No   Lack of transportation? No   Unable to get utilities (heat,electricity)? Yes   Want help with housing or utility concern? (!) YES      (!) FINANCIAL RESOURCE STRAIN CONCERN      2024   Fall Risk   Fallen 2 or more times in the past year? No   Trouble with walking or balance? No             2024   Dental   Dentist two times every year? Yes            2024   TB Screening   Were you born outside of the [place]? No          Today's PHQ-9 Score:       2024     4:41 PM   PHQ-9 SCORE   PHQ-9 Total Score MyChart 21 (Severe depression)   PHQ-9 Total Score 21         2024   Substance Use   Alcohol more than 3/day or more than 7/wk No   Do you use any other substances recreationally? No        Social History     Tobacco Use     Smoking status: Former     Current packs/day: 0.00     Types: Cigarettes     Quit date: 1996     Years since quittin.6     Passive exposure: Current     Smokeless tobacco: Never     Tobacco comments:     Started in 1996 stopped in    Vaping Use     Vaping status: Former     Substances: Nicotine, Flavoring     Devices: Disposable   Substance Use Topics     Alcohol use: Yes     Comment: Once in a great while like a drink on a holiday. Something l     Drug use: Never     Comment: rare           2022   LAST FHS-7 RESULTS   1st degree relative breast or ovarian cancer Unknown   Any relative bilateral breast cancer No   Any male have breast cancer No   Any ONE woman have BOTH breast AND ovarian cancer Yes   Any woman with breast cancer before 50yrs Unknown   2 or more relatives with breast AND/OR ovarian cancer Unknown   2 or more relatives with breast AND/OR bowel cancer Unknown           Mammogram Screening - Mammogram every 1-2 years updated in Health Maintenance based on mutual decision making      History of abnormal Pap smear: Status post hysterectomy with removal of cervix and no history of CIN2 or  greater or cervical cancer. Health Maintenance and Surgical History updated.        Latest Ref Rng & Units 8/30/2016    10:44 AM 8/30/2016    12:00 AM   PAP / HPV   PAP (Historical)   NIL    HPV 16 DNA NEG Negative     HPV 18 DNA NEG Negative     Other HR HPV NEG Negative       ASCVD Risk   The 10-year ASCVD risk score (Keith GARCIA, et al., 2019) is: 1.1%    Values used to calculate the score:      Age: 44 years      Sex: Female      Is Non- : No      Diabetic: No      Tobacco smoker: No      Systolic Blood Pressure: 122 mmHg      Is BP treated: No      HDL Cholesterol: 47 mg/dL      Total Cholesterol: 220 mg/dL       Reviewed and updated as needed this visit by Provider   Tobacco  Allergies  Meds  Problems  Med Hx  Surg Hx  Fam Hx  Soc   Hx Sexual Activity          Past Medical History:   Diagnosis Date     Arthritis Maybe 2004    Multiple sites like hip wrist ankle     Class 2 severe obesity due to excess calories with serious comorbidity and body mass index (BMI) of 35.0 to 35.9 in adult (H)      Depressive disorder 2014 or so     Endometriosis, site unspecified      Gastritis 2010    dx 2010- sees Dr Glover in [facility]     Generalized anxiety disorder      History of blood transfusion 12-?-20    After hysterectomy, had complications     Major depression      Osteoporosis      Urinary calculus, unspecified     kidney stones, age 19-20     Wrist injury 11/19/2003    Workman's Comp- left wrist- narc agreement on file     Past Surgical History:   Procedure Laterality Date     ARTHROSCOPY HIP, OSTEOPLASTY FEMUR PROXIMAL, COMBINED Left 06/29/2016    Procedure: COMBINED ARTHROSCOPY HIP, OSTEOPLASTY FEMUR PROXIMAL;  Surgeon: Godwin Deleon MD;  Location: [facility] OR     ARTHROSCOPY OF JOINT UNLISTED  04/01/2004    Left wrist, with debridement and repair of tear.     BIOPSY  Not exactly sure    Multiple for lady reproductive stuff     COLONOSCOPY  Not sure    [facility]  [place] and an endoscopy done too     EXCISE MASS ABDOMEN Left 2021    Procedure: Excision of abdominal wall lump;  Surgeon: Zackary Alonzo MD;  Location: [facility] OR     HC REMOVAL OF OVARY/TUBE(S)  2003    right with fallopian tube     HC REPAIR TRIANGULAR CART,WRIST JT  2005    Dr Eloy Sosa     HC REPAIR TRIANGULAR CART,WRIST JT  2007 or so    ulnar excision- Dr Eloy Sosa     LAPAROSCOPIC ABLATION ENDOMETRIOSIS      x 2     LITHOTRIPSY       SOFT TISSUE SURGERY   and     2 tfcc repair l wrist so procedure with partial removal of u     ZZC TOTAL ABDOM HYSTERECTOMY      with LSO     OB History    Para Term  AB Living   0 0 0 0 0 0   SAB IAB Ectopic Multiple Live Births   0 0 0 0 0     Lab work is in process  Labs reviewed in EPIC  BP Readings from Last 3 Encounters:   10/01/24 122/70   23 128/77   23 (!) 141/86    Wt Readings from Last 3 Encounters:   10/01/24 82.3 kg (181 lb 6.4 oz)   23 71.6 kg (157 lb 12.8 oz)   23 71.5 kg (157 lb 9.6 oz)                  Patient Active Problem List   Diagnosis     Personal history of nicotine dependence     CARDIOVASCULAR SCREENING; LDL GOAL LESS THAN 160     Gastritis     Osteoporosis of multiple sites     Closed nondisplaced intertrochanteric fracture of left femur (H)     Migraine without aura and without status migrainosus, not intractable     Closed nondisplaced fracture of body of left calcaneus with delayed healing, subsequent encounter     Heel pain, chronic, left     Gastroesophageal reflux disease, esophagitis presence not specified     Superficial phlebitis     Class 2 severe obesity due to excess calories with serious comorbidity and body mass index (BMI) of 36.0 to 36.9 in adult (H)     Other chronic pain     Low serum cortisol level     Anxiety     Eosinophilic gastroenteritis     Chronic wrist pain     Moderate episode of recurrent major depressive disorder (H)     Falls  frequently     Abdominal wall lump     Chronic, continuous use of opioids     Deliberate self-cutting     At risk for self harm     Morbid obesity (H)     Encounter for removal of sutures     Episodic tension-type headache, not intractable     Hormone replacement therapy (HRT)     Severe episode of recurrent major depressive disorder, without psychotic features (H)     Lives in group home     Slow transit constipation     Hx of anaphylaxis     Psychosis, unspecified psychosis type (H)     Tobacco use disorder     Encounter for smoking cessation counseling     Chronic right shoulder pain     Neck pain     Chronic daily headache     Unspecified adrenocortical insufficiency (H)     Vaping nicotine dependence, tobacco product     Past Surgical History:   Procedure Laterality Date     ARTHROSCOPY HIP, OSTEOPLASTY FEMUR PROXIMAL, COMBINED Left 06/29/2016    Procedure: COMBINED ARTHROSCOPY HIP, OSTEOPLASTY FEMUR PROXIMAL;  Surgeon: Godwin Deleon MD;  Location: [facility]     ARTHROSCOPY OF JOINT UNLISTED  04/01/2004    Left wrist, with debridement and repair of tear.     BIOPSY  Not exactly sure    Multiple for lady reproductive stuff     COLONOSCOPY  Not sure    [facility] and an endoscopy done too     EXCISE MASS ABDOMEN Left 5/5/2021    Procedure: Excision of abdominal wall lump;  Surgeon: Zackary Alonzo MD;  Location: [facility] OR     HC REMOVAL OF OVARY/TUBE(S)  06/01/2003    right with fallopian tube     HC REPAIR TRIANGULAR CART,WRIST JT  01/01/2005    Dr Eloy Sosa     HC REPAIR TRIANGULAR CART,WRIST JT  2007 or so    ulnar excision- Dr Eloy Sosa     LAPAROSCOPIC ABLATION ENDOMETRIOSIS      x 2     LITHOTRIPSY       SOFT TISSUE SURGERY  2004 and 2006    2 tfcc repair l wrist so procedure with partial removal of u     ZZC TOTAL ABDOM HYSTERECTOMY  2020    with LSO       Social History     Tobacco Use     Smoking status: Former     Current packs/day: 0.00     Types: Cigarettes     Quit date:  1996     Years since quittin.6     Passive exposure: Current     Smokeless tobacco: Never     Tobacco comments:     Started in probably  stopped in    Substance Use Topics     Alcohol use: Yes     Comment: Once in a great while like a drink on a holiday. Something l     Family History   Problem Relation Age of Onset     Hypertension Mother      Other Cancer Mother         Stomach     Depression Mother      Obesity Mother      Hypertension Father      Lipids Father      Hyperlipidemia Father      Hypertension Maternal Grandmother      Genitourinary Problems Maternal Grandmother         kidney disease     Breast Cancer Maternal Grandmother      Substance Abuse Maternal Grandfather         Alcoholic biological and step dad     Obesity Maternal Grandfather      Cancer Paternal Grandmother         leukemia     Diabetes Other      Hyperlipidemia Other      Anxiety Disorder Other         On both mom and dad's side     Osteoporosis Other         Just found out and she's around 70yrs. Old     Obesity Other         Both sides     Diabetes Cousin      Prostate Cancer Other      Anxiety Disorder Other         Both sides of family     Substance Abuse Other         Alcoholic both sides of family     Obesity Other         Both sides     Depression Other         Major depressive disorder     Anxiety Disorder Other         Generalized anxiety disorder     Mental Illness Other         Major depressive disorder, generalized anxiety disorder and etc.     Osteoporosis Other         Severe osteoporosis with multiple fractures     Genetic Disorder Other         Endometriosis     Anxiety Disorder Cousin         Both sides of family     Genetic Disorder Cousin         Endometriosis multiple cousins have it     Obesity Cousin         Both sides     Anxiety Disorder Niece         Sister's daughter     Genetic Disorder Sister         Endometriosis     Asthma No family hx of      C.A.D. No family hx of      Diabetes No family  hx of      Cerebrovascular Disease No family hx of      Breast Cancer No family hx of      Cancer - colorectal No family hx of      Prostate Cancer No family hx of      Alzheimer Disease No family hx of      Arthritis No family hx of      Blood Disease No family hx of      Circulatory No family hx of      Eye Disorder No family hx of      Gastrointestinal Disease No family hx of      Musculoskeletal Disorder No family hx of      Neurologic Disorder No family hx of      Respiratory No family hx of      Thyroid Disease No family hx of          Current Outpatient Medications   Medication Sig Dispense Refill     acetaminophen (TYLENOL) 325 MG tablet Take 2 tablets (650 mg) by mouth every 4 hours as needed for pain (take at onset of headache if needed, alternate with ibuprofen) 360 tablet 1     Cholecalciferol (VITAMIN D3) 25 MCG (1000 UT) CAPS TAKE 1 CAPSULE BY MOUTH DAILY 28 capsule 1     DULoxetine (CYMBALTA) 60 MG capsule Take 60 mg by mouth 2 times daily For depression       estrogen conj (PREMARIN) 1.25 MG tablet TAKE 1 TABLET BY MOUTH DAILY FOR HORMONE REPLACEMENT R/T HYSTERECTOMY 28 tablet 2     lamoTRIgine (LAMICTAL) 100 MG tablet Take by mouth daily 50 mg in the morning and 100 mg at hs- mood stabilizer and depression       lamoTRIgine (LAMICTAL) 25 MG tablet Take 1 tablet by mouth daily       lithium ER (LITHOBID) 300 MG CR tablet Take 300 mg by mouth At Bedtime 3 tablets at bedtime for mood stability and depression       tirzepatide-Weight Management (ZEPBOUND) 2.5 MG/0.5ML prefilled pen Inject 0.5 mLs (2.5 mg) subcutaneously every 7 days. 2 mL 0     [START ON 11/1/2024] tirzepatide-Weight Management (ZEPBOUND) 5 MG/0.5ML prefilled pen Inject 0.5 mLs (5 mg) subcutaneously every 7 days. 2 mL 0     [START ON 12/2/2024] tirzepatide-Weight Management (ZEPBOUND) 7.5 MG/0.5ML prefilled pen Inject 0.5 mLs (7.5 mg) subcutaneously every 7 days. 2 mL 0     Allergies   Allergen Reactions     Ciprofloxacin Dizziness,  Shortness Of Breath and Nausea and Vomiting     Paxil [Paroxetine] Anaphylaxis     anaphylaxis     Amitriptyline Hives and Other (See Comments)     shocking feeling up the arm     Amoxicillin Diarrhea     Asa [Dihydroxyaluminum Aminoacetate] GI Disturbance     Cipro [Ciprofloxacin]      Dizziness, vomiting, shortness of breath     Morphine      ithcy     Naproxen      GI distress     Neurontin [Gabapentin]      rash     No Clinical Screening - See Comments Other (See Comments)     Bees causes watery itchy eyes, swelling in mouth, needs epi pen     Olanzapine Swelling     Phenergan [Promethazine Hcl]      dystonia     Pregabalin      extrematies swell up      Pregabalin Swelling and Unknown     Of all limbs       Prilosec [Omeprazole]      Rash, dizziness     Venlafaxine Other (See Comments)     Intolerance- eye     Gabapentin Rash     Omeprazole Dizziness and Rash     Recent Labs   Lab Test 09/07/23  1425 12/13/22  1030 12/13/22  1018 04/11/22  1510 09/30/21  1005 08/03/21  1043 05/03/21  0901 02/04/21  1056 12/01/20  1046 07/29/20  1053 11/23/18  0000 08/09/18  0801   A1C 5.4 5.2  --   --   --   --   --  5.5  --   --   --   --    *  --  126*  --   --   --   --   --   --   --   --   --    HDL 47*  --  64  --   --   --   --   --   --   --   --   --    TRIG 177*  --  132  --   --   --   --   --   --   --   --   --    ALT  --   --  42  --   --   --   --   --   --  48  --  30   CR 0.84  --   --  0.97 0.82   < > 0.71 0.74   < > 0.78   < > 0.90   GFRESTIMATED 87  --   --  74 89   < > >90 >90   < > >90   < > 70   GFRESTBLACK  --   --   --   --   --   --  >90 >90   < > >90   < > 84   POTASSIUM 3.6  --   --   --  4.3   < > 4.0  --    < > 4.0   < > 4.0   TSH 2.91  --   --   --  2.93   < >  --   --   --   --    < >  --     < > = values in this interval not displayed.          Review of Systems  CONSTITUTIONAL: NEGATIVE for fever, chills, change in weight  INTEGUMENTARY/SKIN: NEGATIVE for worrisome rashes, moles or  "lesions  EYES: NEGATIVE for vision changes or irritation  ENT/MOUTH: NEGATIVE for ear, mouth and throat problems  RESP: NEGATIVE for significant cough or SOB  BREAST: NEGATIVE for masses, tenderness or discharge  CV: NEGATIVE for chest pain, palpitations or peripheral edema  GI: NEGATIVE for nausea, abdominal pain, heartburn, or change in bowel habits  : NEGATIVE for frequency, dysuria, or hematuria  MUSCULOSKELETAL: NEGATIVE for significant arthralgias or myalgia  NEURO: NEGATIVE for weakness, dizziness or paresthesias  ENDOCRINE: NEGATIVE for temperature intolerance, skin/hair changes  HEME: NEGATIVE for bleeding problems  PSYCHIATRIC: NEGATIVE for changes in mood or affect     Objective    Exam  /70   Pulse 89   Temp (!) 96.6  F (35.9  C) (Temporal)   Resp 18   Ht 1.54 m (5' 0.63\")   Wt 82.3 kg (181 lb 6.4 oz)   LMP 11/16/2020 (Exact Date)   SpO2 99%   BMI 34.69 kg/m     Estimated body mass index is 34.69 kg/m  as calculated from the following:    Height as of this encounter: 1.54 m (5' 0.63\").    Weight as of this encounter: 82.3 kg (181 lb 6.4 oz).    Physical Exam  GENERAL: alert and no distress  EYES: Eyes grossly normal to inspection, PERRL and conjunctivae and sclerae normal  HENT: ear canals and TM's normal, nose and mouth without ulcers or lesions  NECK: no adenopathy, no asymmetry, masses, or scars  RESP: lungs clear to auscultation - no rales, rhonchi or wheezes  BREAST: Deferred per guidelines-asymptomatic per patient.  Discussed self breast exam, symptoms to watch out for and when to follow-up.  Discussed due for mammogram  CV: regular rate and rhythm, normal S1 S2, no S3 or S4, no murmur, click or rub, no peripheral edema  ABDOMEN: soft, nontender, no hepatosplenomegaly, no masses and bowel sounds normal   (female): Deferred per patient no concerns  MS: no gross musculoskeletal defects noted, no edema, no CVA tenderness  SKIN: no suspicious lesions or rashes  NEURO: Normal strength " and tone, cranial nerves intact, mentation intact and speech normal  PSYCH: mentation appears normal, affect normal/bright  LYMPH: no cervical, supraclavicular adenopathy        Signed Electronically by: J CARLOS PANCHAL    Answers submitted by the patient for this visit:  Patient Health Questionnaire (Submitted on 9/30/2024)  If you checked off any problems, how difficult have these problems made it for you to do your work, take care of things at home, or get along with other people?: Very difficult  PHQ9 TOTAL SCORE: 21  Patient Health Questionnaire (G7) (Submitted on 10/1/2024)  DEJON 7 TOTAL SCORE: 16

## 2024-10-01 NOTE — TELEPHONE ENCOUNTER
Prior Authorization Retail Medication Request    Medication/Dose: tirzepatide-Weight Management (ZEPBOUND) 2.5 MG/0.5ML prefilled pen  Diagnosis and ICD code (if different than what is on RX):  E66.812, E66.01, Z68.36 /M81.0   New/renewal/insurance change PA/secondary ins. PA:  Previously Tried and Failed:  na  Rationale:  na    Insurance   Primary:  [facility]   Insurance ID:  [id number]    Secondary (if applicable):shawnee  Insurance ID:  shawnee    Pharmacy Information (if different than what is on RX)  Name:   [facility]  Phone:  [phone]  Fax:     [phone]    Clinic Information  Preferred routing pool for dept communication: shawnee

## 2024-10-01 NOTE — PROGRESS NOTES
Endocrinology Clinic Visit    Chief Complaint: RECHECK     Information obtained from:Patient     Assessment/Treatment Plan:      Osteoporosis with history of recurrent pathological fracture  No fracture since 2019.   Forteo [phone]  Reclast 2021, 2022  No bone sp therapy [id number]  Check follow up DEXA and c-telopeptide      Risk factors for osteoporosis - Depo-Provera use for 16 years since the age of 22. She was taken off of Depo-Provera in Jan 2019. She has had workup for other possible secondary causes of osteoporosis including Cushing syndrome, celiac disease, hypercalciuria and primary hyperparathyroidism which was unremarkable.      Plan:  Weight bearing Exercises  Fall prevention   Adequate Calcium and vitamin D intake: for maintenance calcium 1200 mg daily from all sources and vitamin D 1000 IU daily.    DEXA scan and c telopeptide.   Orders Placed This Encounter   Procedures    DX Bone Density    C-Telopeptide, Beta-Cross-Linked    Calcium timed urine    Creatinine timed urine    Albumin level    Calcium    Parathyroid Hormone Intact    Phosphorus    Urea nitrogen    Creatinine      Currently on HRT s/p total hysterectomy and oophorectomy.     Dallas Flores MD  Staff Endocrinologist    Division of Endocrinology and Diabetes  Subjective:         HPI: Mily Grullon is a 44 year old female with history of Osteoporosis and fragility fracture who is here for a follow up.     Surgery = total hysterectomy with oophorectomy on 12/4/20. Currently on HRT    Patient has history of endometriosis and per report has underwent right oophorectomy at the age of 22.  She was started on Depo-Provera at the  age of 22.  She has been on this medication until she was taken off of it in January of 2019.   Osteoporosis and fragility fracture presumed to be secondary to Depo-Provera    She had a screening test for celiac disease which was within the normal limits.  She was screened for Cushing's syndrome which was normal.   She was screened for hyper-calciuria again which came back within the normal limits.  Thyroid lab results were within the normal limits.  GFR is within the normal limits and electrolytes including calcium.  He has had elevated PTH level in the setting of low vitamin D level & PTH elevation resolved after correcting low vitamin D level.     She has never been on steroids. Multiple fractures over the five years prior to starting FORTEO:  Hip fracture, wrist fracture, multiple ankle fractures, now foot and rib fractures. All of these fractures are fragility fracture without any trauma. She feels just pain. Hip # was following lifting.    She is taking calcium and vitamin D supplements in addition.  She has not had any fractures over the last 5 years.     Allergies   Allergen Reactions    Ciprofloxacin Dizziness, Shortness Of Breath and Nausea and Vomiting    Paxil [Paroxetine] Anaphylaxis     anaphylaxis    Amitriptyline Hives and Other (See Comments)     shocking feeling up the arm    Amoxicillin Diarrhea    Asa [Dihydroxyaluminum Aminoacetate] GI Disturbance    Cipro [Ciprofloxacin]      Dizziness, vomiting, shortness of breath    Morphine      ithcy    Naproxen      GI distress    Neurontin [Gabapentin]      rash    No Clinical Screening - See Comments Other (See Comments)     Bees causes watery itchy eyes, swelling in mouth, needs epi pen    Olanzapine Swelling    Phenergan [Promethazine Hcl]      dystonia    Pregabalin      extrematies swell up     Pregabalin Swelling and Unknown     Of all limbs      Prilosec [Omeprazole]      Rash, dizziness    Venlafaxine Other (See Comments)     Intolerance- eye    Gabapentin Rash    Omeprazole Dizziness and Rash       Current Outpatient Medications   Medication Sig Dispense Refill    acetaminophen (TYLENOL) 325 MG tablet Take 2 tablets (650 mg) by mouth every 4 hours as needed for pain (take at onset of headache if needed, alternate with ibuprofen) 360 tablet 1     Cholecalciferol (VITAMIN D3) 25 MCG (1000 UT) CAPS TAKE 1 CAPSULE BY MOUTH DAILY 28 capsule 1    DULoxetine (CYMBALTA) 60 MG capsule Take 60 mg by mouth 2 times daily For depression      estrogen conj (PREMARIN) 1.25 MG tablet TAKE 1 TABLET BY MOUTH DAILY FOR HORMONE REPLACEMENT R/T HYSTERECTOMY 28 tablet 2    lamoTRIgine (LAMICTAL) 100 MG tablet Take by mouth daily 50 mg in the morning and 100 mg at hs- mood stabilizer and depression      lamoTRIgine (LAMICTAL) 25 MG tablet Take 1 tablet by mouth daily      lithium ER (LITHOBID) 300 MG CR tablet Take 300 mg by mouth At Bedtime 3 tablets at bedtime for mood stability and depression      tirzepatide-Weight Management (ZEPBOUND) 2.5 MG/0.5ML prefilled pen Inject 0.5 mLs (2.5 mg) subcutaneously every 7 days. 2 mL 0    [START ON 11/1/2024] tirzepatide-Weight Management (ZEPBOUND) 5 MG/0.5ML prefilled pen Inject 0.5 mLs (5 mg) subcutaneously every 7 days. 2 mL 0    [START ON 12/2/2024] tirzepatide-Weight Management (ZEPBOUND) 7.5 MG/0.5ML prefilled pen Inject 0.5 mLs (7.5 mg) subcutaneously every 7 days. 2 mL 0     No family history of osteoporosis.     Former smoker stopped in 2014    Objective:   LMP 11/16/2020 (Exact Date)   LMP 11/16/2020 (Exact Date)   Constitutional: Pleasant   Psychological: appropriate mood and affect     In House Labs:     TSH   Date Value Ref Range Status   09/07/2023 2.91 0.30 - 4.20 uIU/mL Final   09/30/2021 2.93 0.40 - 4.00 mU/L Final   08/30/2021 2.04 0.40 - 4.00 mU/L Final   08/03/2021 1.31 0.40 - 4.00 mU/L Final   01/27/2020 0.62 0.40 - 4.00 mU/L Final   11/23/2018 0.68 0.30 - 5.00 uIU/mL Final   09/11/2017 1.36 0.40 - 4.00 mU/L Final   12/10/2013 2.37 0.4 - 5.0 mU/L Final   01/07/2013 0.70 mcU/mL Final     T4 Free   Date Value Ref Range Status   12/10/2013 1.08 0.70 - 1.85 ng/dL Final     Free T4   Date Value Ref Range Status   09/30/2021 0.84 0.76 - 1.46 ng/dL Final   08/30/2021 0.83 0.76 - 1.46 ng/dL Final       Creatinine   Date Value  Ref Range Status   09/07/2023 0.84 0.51 - 0.95 mg/dL Final   05/03/2021 0.71 0.52 - 1.04 mg/dL Final     24-hour urine for calcium  Sodium to creatinine ratio was 150 reference range   Sodium 24-hour 184 difference for   Creatinine 24-hour was 1.59 (0.5-2.15  Calcium to creatinine ratio was 68 reference range 30- 275  Calcium 24-hour urine was 108 reference range   Creatinine 24-hour 1.59  Total volume 2700  Urine free cortisol 3.2    TSH was 0.68 and free T4 0.74 tissue transglutaminase IgG and IgA was undetectable creatinine within the normal limits BMD in 2014 was 0.718 at the total hip in 2018 was 0.751.  Z score -3.1 at the neck, trochanteric -2.7 and total -2.4                       Latest Ref Rng 3/23/2022  10:21 AM 4/11/2022  3:10 PM 12/13/2022  10:18 AM 9/7/2023  2:25 PM   ENDO CALCIUM LABS-UMP        Vitamin D Deficiency screening 20 - 75 ug/L 59    45    Albumin 3.4 - 5.0 g/dL   3.4     Albumin 3.5 - 5.2 g/dL    4.4    Alkaline Phosphatase 40 - 150 U/L   98     Calcium 8.6 - 10.0 mg/dL  10.0   10.2 (H)    Urea Nitrogen 7 - 30 mg/dL       Urea Nitrogen 6.0 - 20.0 mg/dL    11.3    Creatinine 0.51 - 0.95 mg/dL  0.97   0.84    OSTEOCALCIN ng/mL            Video-Visit Details    Type of service:  Video Visit  Joined the call at 10/1/2024, 12:37:29 pm.  Left the call at 10/1/2024, 12:44:58 pm.  You were on the call for 7 minutes 29 seconds .  Distant Location (provider location):  Off-site.   Platform used for Video Visit: Mely  The longitudinal plan of care for the diagnosis(es)/condition(s) as documented were addressed during this visit. Due to the added complexity in care, I will continue to support Mily in the subsequent management and with ongoing continuity of care.

## 2024-10-01 NOTE — PROGRESS NOTES
"Virtual Visit Details    Type of service:  Video Visit   Video Start Time: {video visit start/end time for provider to select:598094}  Video End Time:{video visit start/end time for provider to select:204976}    Originating Location (pt. Location): {video visit patient location:547446::\"Home\"}  {PROVIDER LOCATION On-site should be selected for visits conducted from your clinic location or adjoining [facility] hospital, academic office, or other nearby [facility] building. Off-site should be selected for all other provider locations, including home:093993}  Distant Location (provider location):  {virtual location provider:524261}  Platform used for Video Visit: {Virtual Visit Platforms:604768::\"[employer]\"}  "

## 2024-10-02 ENCOUNTER — PATIENT OUTREACH (OUTPATIENT)
Dept: CARE COORDINATION | Facility: CLINIC | Age: 44
End: 2024-10-02

## 2024-10-02 LAB
ANION GAP SERPL CALCULATED.3IONS-SCNC: 13 MMOL/L (ref 7–15)
BUN SERPL-MCNC: 15.6 MG/DL (ref 6–20)
CALCIUM SERPL-MCNC: 9.9 MG/DL (ref 8.8–10.4)
CHLORIDE SERPL-SCNC: 106 MMOL/L (ref 98–107)
CHOLEST SERPL-MCNC: 278 MG/DL
CREAT SERPL-MCNC: 0.76 MG/DL (ref 0.51–0.95)
EGFRCR SERPLBLD CKD-EPI 2021: >90 ML/MIN/1.73M2
FASTING STATUS PATIENT QL REPORTED: YES
FASTING STATUS PATIENT QL REPORTED: YES
GLUCOSE SERPL-MCNC: 77 MG/DL (ref 70–99)
HCO3 SERPL-SCNC: 19 MMOL/L (ref 22–29)
HCV AB SERPL QL IA: NONREACTIVE
HDLC SERPL-MCNC: 57 MG/DL
LDLC SERPL CALC-MCNC: 201 MG/DL
NONHDLC SERPL-MCNC: 221 MG/DL
POTASSIUM SERPL-SCNC: 4.2 MMOL/L (ref 3.4–5.3)
SODIUM SERPL-SCNC: 138 MMOL/L (ref 135–145)
TRIGL SERPL-MCNC: 98 MG/DL
TSH SERPL DL<=0.005 MIU/L-ACNC: 2.34 UIU/ML (ref 0.3–4.2)

## 2024-10-02 NOTE — RESULT ENCOUNTER NOTE
Jhonathan Minor,    Thank you for your recent office visit.    Here are your recent results.  Cholesterol is high, do you want a cholesterol lowering medication?  A1C is normal you are not diabetic, normal thyroid level, normal hep c screening done once for adult in [place] as national standard screening test.     Feel free to contact me via [place] or call the clinic at [phone].    Sincerely,    TARAS Verma, FNP-BC

## 2024-10-02 NOTE — PROGRESS NOTES
Clinic Care Coordination Contact  [facility]/Voicemail    Clinical Data: Care Coordinator Outreach    Outreach Documentation Number of Outreach Attempt   10/2/2024  10:54 AM 1       Left message on patient's voicemail with call back information and requested return call.    Plan: Care Coordinator will try to reach patient again in 1-2 business days.    PARKER Browne  [phone]  [facility]

## 2024-10-03 NOTE — PROGRESS NOTES
"Clinic Care Coordination Contact  Community Health Worker Initial Outreach    CHW Initial Information Gathering:  Referral Source: PCP  [phone]  Preferred Urgent Care: Other ([facility] [place])  Current living arrangement:: I live alone  Type of residence:: Other (\"Transitiong place)  Community Resources: County Worker, Financial/Insurance, County Programs ([place] Waiver and Social Security)  Supplies Currently Used at Home: None  Equipment Currently Used at Home: none  Informal Support system:: Family, Parent  No PCP office visit in Past Year: No  Transportation means:: Regular car       Patient accepts CC: Yes. Patient scheduled for assessment with the SW on 10/7/24 at 2:00 pm. Patient noted desire to discuss supports with housing.     BERNARDINO BrowneW  [phone]  [facility]  [facility]    "

## 2024-10-04 NOTE — TELEPHONE ENCOUNTER
[facility]ola [facility] Pool7 minutes ago (12:50 PM)     JH  Please clarify. I see orders for ozempic, but pt is not diabetic and have received prior auth requests for zepbound which is not active on pt chart. How would you like to proceed? If pt is not diabetic please resubmit orders for weight loss meds Zepbound or Wegovy and I can submit a PA for them. Please route encounter back to me once this is done.    Thank you,  Lola Ravi [facility]  Prior Authorization Specialist  [facility], [place] [id number] [phone]   (Fax) [phone]

## 2024-10-07 ENCOUNTER — PATIENT OUTREACH (OUTPATIENT)
Dept: NURSING | Facility: CLINIC | Age: 44
End: 2024-10-07
Payer: COMMERCIAL

## 2024-10-07 DIAGNOSIS — Z71.9 COUNSELING, UNSPECIFIED: Primary | ICD-10-CM

## 2024-10-07 NOTE — TELEPHONE ENCOUNTER
Central Prior Authorization Team   Phone: [phone]    PA Initiation    Medication: tirzepatide-Weight Management (ZEPBOUND) 2.5 MG/0.5ML prefilled pen  Insurance Company: [employer] - Phone [phone] Fax [phone]  Pharmacy Filling the Rx: [id number] - [place], [place] - [address]  Filling Pharmacy Phone: [phone]  Filling Pharmacy Fax:    Start Date: 10/7/2024

## 2024-10-07 NOTE — TELEPHONE ENCOUNTER
I sent in zepbound, then patient asked me to order ozempic. I told her ozempic probably would not be covered as she is not diabetic, but I would send it in. Now she wants prior auth done for zepbound. So please do so. Thanks. TARAS Verma, FNP-BC

## 2024-10-07 NOTE — LETTER
[facility] CARE COORDINATION  [address]  [place] [address]     October 21, 2024    Mily Grullon  [address]  [place] [address]      Dear Mily,    I am a clinic care coordinator who works with GIA WOODARD NP with the [facility]. I wanted to thank you for spending the time to talk with me.  Below is a description of clinic care coordination and how I can further assist you.       The clinic care coordination team is made up of a registered nurse, , financial resource worker and community health worker who understand the health care system. The goal of clinic care coordination is to help you manage your health and improve access to the health care system. Our team works alongside your provider to assist you in determining your health and social needs. We can help you obtain health care and community resources, providing you with necessary information and education. We can work with you through any barriers and develop a care plan that helps coordinate and strengthen the communication between you and your care team.  Our services are voluntary and are offered without charge to you personally.    Please feel free to contact me with any questions or concerns regarding care coordination and what we can offer.      We are focused on providing you with the highest-quality healthcare experience possible.    Sincerely,     Silva London, MONCHOW, MSW   [facility]  Care Coordination  [facility] [place] [facility] [place] [facility]  [phone]  10/21/2024 1:37 PM     Enclosed: I have enclosed a copy of a 24 Hour Access Plan. This has helpful phone numbers for you to call when needed. Please keep this in an easy to access place to use as needed.

## 2024-10-07 NOTE — LETTER
[facility]  Patient Centered Plan of Care  About Me:        Patient Name:  Mily Escobedo    YOB: 1980  Age:         44 year old   Rivera MRN:    [id number] Telephone Information:  Home Phone [phone]   Mobile [phone]       Address:  [address] [place] [address]  [address]  [place] [address] Email address:  [email or web address]      Emergency Contact(s)    Name Relationship Lgl Grd Work Phone Home Phone Mobile Phone   1. MARQUITA ESCOBEDO Father No   [phone]   2. SUSAN ECSOBEDO Mother No   [phone]   3. XENIA MENDOZA Sister No   [phone]   4. ALYSHA Brother No              Primary language:  English     needed? No   [facility]:  [phone] op. 1  Other communication barriers:None    Preferred Method of Communication:  Mail  Current living arrangement: I live alone    Mobility Status/ Medical Equipment: Independent        Health Maintenance  Health Maintenance Reviewed: Due/Overdue   Health Maintenance Due   Topic Date Due    MAMMO SCREENING  Never done    HEPATITIS B IMMUNIZATION (2 of 3 - 19+ 3-dose series) 01/10/2023    INFLUENZA VACCINE (1) 09/01/2024    COVID-19 Vaccine (1 - 2024-25 season) Never done          My Access Plan  Medical Emergency 911   Primary Clinic Line [facility] - [phone]   24 Hour Appointment Line [phone] or  [phone]) (toll-free)   24 Hour Nurse Line [phone] (toll-free)   Preferred Urgent Care [facility], [phone] ([facility])     Preferred Hospital [facility]  [phone]     Preferred Pharmacy [facility] - [place], [place] - [address]     Behavioral Health Crisis Line The National Suicide Prevention Lifeline at 1-170.298.4334 or Text/Call 958           My Care Team Members  Patient Care Team         Relationship Specialty Notifications Start End    Xenia Umanzor, YAMILKA PCP -  General Family Medicine  6/1/21     Phone: [phone] Fax: [phone]         [address] [facility] [address]    Godwin Deleon MD MD Orthopedics  8/12/16     Phone: [phone] Fax: [phone]         [address] [facility] [address]    Dallas Flores MD Assigned Endocrinology Provider   10/23/20     Phone: [phone] Pager: [phone] Fax: [phone]        [address] [facility] [address]    Xenia Umanzor NP Assigned PCP   6/6/21     Phone: [phone] Fax: [phone]         [address] [facility] [address] [facility] [address]    Dominick Agudelo MD MD Neurology  5/10/23     Phone: [phone] Fax: [phone]         [address] [facility] [address]    Silva London BSW Lead Care Coordinator Primary Care - [facility] Admissions 10/3/24     Phone: [phone] Fax: [phone]        Yulia Amado CHW Community Health Worker Primary Care - [facility] Admissions 10/8/24     Phone: [phone] Fax: [phone]                    My Care Plans  Self Management and Treatment Plan    Care Plan  Care Plan: Mental Health       Problem: Mental Health Symptoms Need Improvement       Goal: Improve management of mental health symptoms and establish with mental health/psychosocial supports       Start Date: 10/8/2024 Expected End Date: 2/7/2025    This Visit's Progress: 10%    Priority: High    Note:     Barriers: social anxiety  Strengths: walks daily   Patient expressed understanding of goal: yes  Action steps to achieve this goal:  1. I will take medications as prescribed   2. I will attend all scheduled appts   3. I will reach out to  and discuss support options                            Care Plan: Health Maintenance       Problem: Health Maintenance Due or Overdue       Goal: Become up-to-date with health maintenance visit(s)       Start Date: 10/8/2024 Expected End Date: 2/7/2025    This Visit's Progress: 10%    Priority: Low    Note:     Barriers:  due/overdue for mammogram, Hepatitis B immunization series, flu and Covid vaccines; missed appts   Strengths: capable of addressing and has transport   Patient expressed understanding of goal: yes  Action steps to achieve this goal:  1. I will find time to address care gaps noted above   2. I will call and schedule appt to address care gaps  3. I will attend this appt and discuss my current needs                             Care Plan: Housing       Problem: Living in transitional housing       Goal: I would like independent housing       Start Date: 10/8/2024 Expected End Date: 3/7/2025    This Visit's Progress: 0%    Priority: Medium    Note:     Barriers: lives in transitional housing, limited independence, limited income, has a previous misdemeanor   Strengths: will call and discuss housing [place]    Patient expressed understanding of goal: yes  Action steps to achieve this goal:  1. I will call [place]    2. I will discuss alternate housing   3. I will discuss my current needs                              Action Plans on File:            Depression          Advance Care Plans/Directives:   Advanced Care Plan/Directives on file:   No    Discussed with patient/caregiver(s):   Declined Further Information             My Medical and Care Information  Problem List   Patient Active Problem List   Diagnosis    Personal history of nicotine dependence    CARDIOVASCULAR SCREENING; LDL GOAL LESS THAN 160    Gastritis    Osteoporosis of multiple sites    Closed nondisplaced intertrochanteric fracture of left femur (H)    Migraine without aura and without status migrainosus, not intractable    Closed nondisplaced fracture of body of left calcaneus with delayed healing, subsequent encounter    Heel pain, chronic, left    Gastroesophageal reflux disease, esophagitis presence not specified    Superficial phlebitis    Class 2 severe obesity due to excess calories with serious comorbidity and body mass index  (BMI) of 36.0 to 36.9 in adult (H)    Other chronic pain    Low serum cortisol level    Anxiety    Eosinophilic gastroenteritis    Chronic wrist pain    Moderate episode of recurrent major depressive disorder (H)    Falls frequently    Abdominal wall lump    Chronic, continuous use of opioids    Deliberate self-cutting    At risk for self harm    Morbid obesity (H)    Encounter for removal of sutures    Episodic tension-type headache, not intractable    Hormone replacement therapy (HRT)    Severe episode of recurrent major depressive disorder, without psychotic features (H)    Lives in group home    Slow transit constipation    Hx of anaphylaxis    Psychosis, unspecified psychosis type (H)    Tobacco use disorder    Encounter for smoking cessation counseling    Chronic right shoulder pain    Neck pain    Chronic daily headache    Unspecified adrenocortical insufficiency (H)    Vaping nicotine dependence, tobacco product          Care Coordination Start Date: 10/1/2024   Frequency of Care Coordination: monthly, more frequently as needed     Form Last Updated: 10/21/2024

## 2024-10-08 NOTE — TELEPHONE ENCOUNTER
Prior Authorization Approval    Authorization Effective Date: 10/7/2024  Authorization Expiration Date: 4/7/2025  Medication: tirzepatide-Weight Management (ZEPBOUND) 2.5 MG/0.5ML prefilled pen  Reference #:     Insurance Company: [employer] - Phone [phone] Fax [phone]  Which Pharmacy is filling the prescription (Not needed for infusion/clinic administered): [id number] - [place], [place] - [address] [place] [address]  Pharmacy Notified: Yes  Patient Notified: Instructed pharmacy to notify patient when script is ready to /ship.

## 2024-10-10 DIAGNOSIS — F33.2 MAJOR DEPRESSIVE DISORDER, RECURRENT EPISODE, SEVERE (H): Primary | ICD-10-CM

## 2024-10-10 DIAGNOSIS — Z79.899 HIGH RISK MEDICATION USE: ICD-10-CM

## 2024-10-11 ENCOUNTER — VIRTUAL VISIT (OUTPATIENT)
Dept: FAMILY MEDICINE | Facility: CLINIC | Age: 44
End: 2024-10-11
Payer: COMMERCIAL

## 2024-10-11 DIAGNOSIS — F41.9 ANXIETY: Primary | ICD-10-CM

## 2024-10-11 PROCEDURE — 99214 OFFICE O/P EST MOD 30 MIN: CPT | Mod: 95 | Performed by: NURSE PRACTITIONER

## 2024-10-11 RX ORDER — LORAZEPAM 0.5 MG/1
0.5 TABLET ORAL DAILY PRN
Qty: 4 TABLET | Refills: 0 | Status: SHIPPED | OUTPATIENT
Start: 2024-10-11 | End: 2024-10-14

## 2024-10-11 NOTE — PATIENT INSTRUCTIONS
Ativan 4 tablets prescribed to get you through until Monday. This is not a medication that is recommended to be taken on a regular basis. This is only short term until your PCP and psychiatrist can adjust your medications. While taking this medication, do not drive, drink alcohol or mix with other medications (take 1 hour from other medications).     Call the crisis hotline if your anxiety worsens or go to ER over the weekend.     Follow-up with PCP as scheduled on Monday.     Reach out to psychiatrist on Monday for recommendations/next steps.    [employer]

## 2024-10-11 NOTE — PROGRESS NOTES
Mily is a 44 year old who is being evaluated via a billable video visit.    How would you like to obtain your AVS? MyChart  If the video visit is dropped, the invitation should be resent by: Text to cell phone: [phone]  Will anyone else be joining your video visit? No      Assessment & Plan     Anxiety  New to this provider. Worsening recently. Continue medications as planned. Discussed that it is important to follow-up with PCP and psychiatry who know her and are familiar with her PMH and POC. Advised that benzos are not a medication that are safe to take on a regular basis, but that willing to prescribe for the weekend to get her through until she sees PCP on Monday. Advised against using any alcohol, driving while on medication or mixing with any of her other medications (take 1 hour apart). 4 tablets given. She has virtual scheduled with PCP on Monday. She will also reach out to psychiatry on Monday for further recommendations. She feels safe and has crisis line or will go to ER if things change over the weekend.     - LORazepam (ATIVAN) 0.5 MG tablet; Take 1 tablet (0.5 mg) by mouth daily as needed for anxiety.        MED REC REQUIRED  Post Medication Reconciliation Status:  Discharge medications reconciled and changed, see notes/orders    Patient Instructions   Ativan 4 tablets prescribed to get you through until Monday. This is not a medication that is recommended to be taken on a regular basis. This is only short term until your PCP and psychiatrist can adjust your medications. While taking this medication, do not drive, drink alcohol or mix with other medications (take 1 hour from other medications).     Call the crisis hotline if your anxiety worsens or go to ER over the weekend.     Follow-up with PCP as scheduled on Monday.     Reach out to psychiatrist on Monday for recommendations/next steps.     Subjective   Mily is a 44 year old, presenting for the following health issues:  ER F/U       10/11/2024     9:43 AM   Additional Questions   Roomed by Mona   Accompanied by none         10/11/2024     9:43 AM   Patient Reported Additional Medications   Patient reports taking the following new medications hydroxyzine     Video Start Time:    10:06am    History of Present Illness       Mental Health Follow-up:  Patient presents to follow-up on Anxiety.    Patient's anxiety since last visit has been:  Worse  The patient is having other symptoms associated with anxiety.  Any significant life events: other  Patient is feeling anxious or having panic attacks.  Patient has no concerns about alcohol or drug use.    She eats 2-3 servings of fruits and vegetables daily.She consumes 3 sweetened beverage(s) daily.She exercises with enough effort to increase her heart rate 10 to 19 minutes per day.  She exercises with enough effort to increase her heart rate 3 or less days per week.   She is taking medications regularly.         8/2/2023     8:24 AM 12/18/2023     2:02 PM 9/30/2024     4:41 PM   PHQ   PHQ-9 Total Score 10 22 21   Q9: Thoughts of better off dead/self-harm past 2 weeks Not at all Nearly every day Several days   F/U: Thoughts of suicide or self-harm  Yes Yes   F/U: Self harm-plan  Yes No   F/U: Self-harm action  Yes No   F/U: Safety concerns  No No          6/13/2023     9:12 AM 8/2/2023     8:26 AM 10/1/2024    11:05 AM   DEJON-7 SCORE   Total Score 15 (severe anxiety) 14 (moderate anxiety) 16 (severe anxiety)   Total Score 15 14 16        ED/UC Followup:    Facility:  [facility]  Date of visit: 10/10/24  Reason for visit: Anxiety   Current Status: a little better but is still feeling a lot of anxiety, wondering about another med until she can get in with her psychiatrist in November     ER notes from 10/11/24:   ED Provider Note - Violet Garcia MD - 10/10/2024 9:21 PM CDT  Formatting of this note is different from the original.      Emergency Department Note    History of Present Illness    Chief  "Complaint  Anxiety    HPI  Mily Grullon is a 44 y.o. female with a past medical history of anxiety and MDD who reports to the emergency department with anxiety. Patient reports her anxiety has gotten worse this past week and would like a prescription of new medication. She states she used to get panic attacks in the past but is not having them currently. Patient denies seeing her primary care doctor for this issue. She reports her next appointment with her psychiatrist is in November. She states she is currently taking hydroxyzine but it has not improved her anxiety as of recently.     MDM   Mily Grullon is a 44 y.o. female with past medical history of anxiety who presents with anxiety. Patient reports that her anxiety has worsened the last week. She describes just having anxiety when trying to fall asleep without any physical symptoms such as shortness of breath or chest pain. However due to that she is having trouble sleeping. We discussed that she has used hydroxyzine and it is no longer effective. We also discussed that I would not be able to provide her with a benzodiazepine prescription and that she will need to follow-up with primary care or psychiatry to discuss this. She was given a single dose of Ativan in the emergency department. We also discussed over-the-counter medication she can try to help sleep including doxylamine or Benadryl. She will reach out to her primary care provider and psychiatrist tomorrow.     ------------------------------------------  Additional provider notes: Patient presents virtually via video visit for the following:     Anxiety: She has had increasing anxiety recently. Patient is on multiple different psych medications. She has been taking Hydroxyzine 50mg QID \"for a while now\" but doesn't feel like it is working. She went to ER yesterday to get something else for anxiety. She was given ativan and instructed to follow-up with psychiatrist and PCP. Her psychiatrist is out of " "office today and she couldn't get in with PCP until Monday. She still feels very anxious and isn't able to see her psychiatrist until November 2024. Feels like her insides are \"driving me nuts\". She is getting nausea, neck is hurting. Denies self harm. She is wanting something to help get her through until she can see her PCP Monday  -last saw psychiatrist 1-2 weeks ago, next OV is in November and she has a call into her now  -Last saw PCP 1.5 weeks ago for annual but it doesn't appear patient brought up anxiety at that time. Next appt Monday with PCP.      Allergies   Allergen Reactions    Ciprofloxacin Dizziness, Shortness Of Breath and Nausea and Vomiting    Paxil [Paroxetine] Anaphylaxis     anaphylaxis    Amitriptyline Hives and Other (See Comments)     shocking feeling up the arm    Amoxicillin Diarrhea    Asa [Dihydroxyaluminum Aminoacetate] GI Disturbance    Cipro [Ciprofloxacin]      Dizziness, vomiting, shortness of breath    Morphine      ithcy    Naproxen      GI distress    Neurontin [Gabapentin]      rash    No Clinical Screening - See Comments Other (See Comments)     Bees causes watery itchy eyes, swelling in mouth, needs epi pen    Olanzapine Swelling    Phenergan [Promethazine Hcl]      dystonia    Pregabalin      extrematies swell up     Pregabalin Swelling and Unknown     Of all limbs      Prilosec [Omeprazole]      Rash, dizziness    Venlafaxine Other (See Comments)     Intolerance- eye    Gabapentin Rash    Omeprazole Dizziness and Rash       Current Outpatient Medications   Medication Sig Dispense Refill    acetaminophen (TYLENOL) 325 MG tablet Take 2 tablets (650 mg) by mouth every 4 hours as needed for pain (take at onset of headache if needed, alternate with ibuprofen) 360 tablet 1    Cholecalciferol (VITAMIN D3) 25 MCG (1000 UT) CAPS TAKE 1 CAPSULE BY MOUTH DAILY 28 capsule 1    DULoxetine (CYMBALTA) 60 MG capsule Take 60 mg by mouth 2 times daily For depression      lamoTRIgine (LAMICTAL) " 100 MG tablet Take by mouth daily 50 mg in the morning and 100 mg at hs- mood stabilizer and depression      lamoTRIgine (LAMICTAL) 25 MG tablet Take 1 tablet by mouth daily      lithium ER (LITHOBID) 300 MG CR tablet Take 300 mg by mouth At Bedtime 3 tablets at bedtime for mood stability and depression      tirzepatide-Weight Management (ZEPBOUND) 2.5 MG/0.5ML prefilled pen Inject 0.5 mLs (2.5 mg) subcutaneously every 7 days. 2 mL 0    estrogen conj (PREMARIN) 1.25 MG tablet TAKE 1 TABLET BY MOUTH DAILY FOR HORMONE REPLACEMENT R/T HYSTERECTOMY (Patient not taking: Reported on 10/11/2024) 28 tablet 2    [START ON 11/7/2024] tirzepatide-Weight Management (ZEPBOUND) 5 MG/0.5ML prefilled pen Inject 0.5 mLs (5 mg) subcutaneously every 7 days. (Patient not taking: Reported on 10/11/2024) 2 mL 0    tirzepatide-Weight Management (ZEPBOUND) 5 MG/0.5ML prefilled pen Inject 0.5 mLs (5 mg) subcutaneously every 7 days. (Patient not taking: Reported on 10/11/2024) 2 mL 0     No current facility-administered medications for this visit.       Past Medical History:   Diagnosis Date    Arthritis Maybe 2004    Multiple sites like hip wrist ankle    Class 2 severe obesity due to excess calories with serious comorbidity and body mass index (BMI) of 35.0 to 35.9 in adult (H)     Depressive disorder 2014 or so    Endometriosis, site unspecified     Gastritis 2010    dx 2010- sees Dr Glover in [facility]    Generalized anxiety disorder     History of blood transfusion 12-?-20    After hysterectomy, had complications    Major depression     Osteoporosis     Urinary calculus, unspecified     kidney stones, age 19-20    Wrist injury 11/19/2003    Workman's Comp- left wrist- narc agreement on file            Review of Systems  Constitutional, HEENT, cardiovascular, pulmonary, gi and gu systems are negative, except as otherwise noted.      Objective    Vitals - Patient Reported  Weight (Patient Reported): 82.1 kg (181 lb)  Height  "(Patient Reported): 154 cm (5' 0.63\")  BMI (Based on Pt Reported Ht/Wt): 34.62  Pain Score: No Pain (0)        Physical Exam   GENERAL: alert and no distress  EYES: Eyes grossly normal to inspection.  No discharge or erythema, or obvious scleral/conjunctival abnormalities.  RESP: No audible wheeze, cough, or visible cyanosis.    SKIN: Visible skin clear. No significant rash, abnormal pigmentation or lesions.  NEURO: Cranial nerves grossly intact.  Mentation and speech appropriate for age.  PSYCH: Appropriate affect, tone, and pace of words          Video-Visit Details    Type of service:  Video Visit   Video End Time:10:14 AM  Originating Location (pt. Location): Home    Distant Location (provider location):  Off-site  Platform used for Video Visit: Mely  Signed Electronically by: Chasity Sandoval DNP    "

## 2024-10-14 ENCOUNTER — VIRTUAL VISIT (OUTPATIENT)
Dept: FAMILY MEDICINE | Facility: CLINIC | Age: 44
End: 2024-10-14
Payer: COMMERCIAL

## 2024-10-14 DIAGNOSIS — Z09 HOSPITAL DISCHARGE FOLLOW-UP: Primary | ICD-10-CM

## 2024-10-14 DIAGNOSIS — F41.9 ANXIETY: ICD-10-CM

## 2024-10-14 DIAGNOSIS — F41.0 PANIC ATTACK: ICD-10-CM

## 2024-10-14 PROCEDURE — G2211 COMPLEX E/M VISIT ADD ON: HCPCS | Mod: 95 | Performed by: NURSE PRACTITIONER

## 2024-10-14 PROCEDURE — 99213 OFFICE O/P EST LOW 20 MIN: CPT | Mod: 95 | Performed by: NURSE PRACTITIONER

## 2024-10-14 RX ORDER — METFORMIN HYDROCHLORIDE 500 MG/1
500 TABLET, EXTENDED RELEASE ORAL
Status: CANCELLED | OUTPATIENT
Start: 2024-10-14

## 2024-10-14 RX ORDER — ALPRAZOLAM 0.5 MG
0.5 TABLET ORAL DAILY PRN
Qty: 30 TABLET | Refills: 0 | Status: SHIPPED | OUTPATIENT
Start: 2024-10-14

## 2024-10-15 ENCOUNTER — ANCILLARY PROCEDURE (OUTPATIENT)
Dept: BONE DENSITY | Facility: CLINIC | Age: 44
End: 2024-10-15
Attending: INTERNAL MEDICINE
Payer: COMMERCIAL

## 2024-10-15 ENCOUNTER — LAB (OUTPATIENT)
Dept: LAB | Facility: CLINIC | Age: 44
End: 2024-10-15
Payer: COMMERCIAL

## 2024-10-15 DIAGNOSIS — Z79.899 HIGH RISK MEDICATION USE: ICD-10-CM

## 2024-10-15 DIAGNOSIS — M81.0 OSTEOPOROSIS OF MULTIPLE SITES: ICD-10-CM

## 2024-10-15 DIAGNOSIS — Z13.9 ENCOUNTER FOR SCREENING: ICD-10-CM

## 2024-10-15 DIAGNOSIS — F33.2 MAJOR DEPRESSIVE DISORDER, RECURRENT EPISODE, SEVERE (H): ICD-10-CM

## 2024-10-15 LAB — LITHIUM SERPL-SCNC: 0.85 MMOL/L (ref 0.6–1.2)

## 2024-10-15 PROCEDURE — 77081 DXA BONE DENSITY APPENDICULR: CPT | Mod: TC | Performed by: PHYSICIAN ASSISTANT

## 2024-10-15 PROCEDURE — 80178 ASSAY OF LITHIUM: CPT

## 2024-10-15 PROCEDURE — 84443 ASSAY THYROID STIM HORMONE: CPT

## 2024-10-15 PROCEDURE — 36415 COLL VENOUS BLD VENIPUNCTURE: CPT

## 2024-10-15 PROCEDURE — 83970 ASSAY OF PARATHORMONE: CPT

## 2024-10-15 PROCEDURE — 82523 COLLAGEN CROSSLINKS: CPT | Mod: 90

## 2024-10-15 PROCEDURE — 99000 SPECIMEN HANDLING OFFICE-LAB: CPT

## 2024-10-15 PROCEDURE — 77080 DXA BONE DENSITY AXIAL: CPT | Mod: TC | Performed by: PHYSICIAN ASSISTANT

## 2024-10-15 PROCEDURE — 80069 RENAL FUNCTION PANEL: CPT

## 2024-10-15 PROCEDURE — 84439 ASSAY OF FREE THYROXINE: CPT

## 2024-10-16 LAB
ALBUMIN SERPL BCG-MCNC: 4.5 G/DL (ref 3.5–5.2)
ANION GAP SERPL CALCULATED.3IONS-SCNC: 12 MMOL/L (ref 7–15)
BUN SERPL-MCNC: 11.9 MG/DL (ref 6–20)
CALCIUM SERPL-MCNC: 9.6 MG/DL (ref 8.8–10.4)
CALCIUM SERPL-MCNC: 9.6 MG/DL (ref 8.8–10.4)
CHLORIDE SERPL-SCNC: 106 MMOL/L (ref 98–107)
CREAT SERPL-MCNC: 0.94 MG/DL (ref 0.51–0.95)
EGFRCR SERPLBLD CKD-EPI 2021: 76 ML/MIN/1.73M2
GLUCOSE SERPL-MCNC: 85 MG/DL (ref 70–99)
HCO3 SERPL-SCNC: 20 MMOL/L (ref 22–29)
PHOSPHATE SERPL-MCNC: 3.4 MG/DL (ref 2.5–4.5)
POTASSIUM SERPL-SCNC: 3.8 MMOL/L (ref 3.4–5.3)
PTH-INTACT SERPL-MCNC: 83 PG/ML (ref 15–65)
SODIUM SERPL-SCNC: 138 MMOL/L (ref 135–145)
T4 FREE SERPL-MCNC: 0.94 NG/DL (ref 0.9–1.7)
TSH SERPL DL<=0.005 MIU/L-ACNC: 2.36 UIU/ML (ref 0.3–4.2)

## 2024-10-17 ENCOUNTER — DOCUMENTATION ONLY (OUTPATIENT)
Dept: OTHER | Facility: CLINIC | Age: 44
End: 2024-10-17
Payer: COMMERCIAL

## 2024-10-18 ENCOUNTER — LAB (OUTPATIENT)
Dept: LAB | Facility: CLINIC | Age: 44
End: 2024-10-18
Payer: COMMERCIAL

## 2024-10-18 LAB
CALCIUM 24H UR-MRATE: 0.1 G/SPEC (ref 0.1–0.3)
CALCIUM UR-MCNC: 5.8 MG/DL
COLLAGEN CTX SERPL-MCNC: 391 PG/ML
COLLECT DURATION TIME UR: 24 H
COLLECT DURATION TIME UR: 24 H
CREAT 24H UR-MRATE: 1.05 G/SPEC (ref 0.72–1.51)
CREAT UR-MCNC: 61.1 MG/DL
SPECIMEN VOL UR: 1725 ML
SPECIMEN VOL UR: 1725 ML

## 2024-10-18 PROCEDURE — 81050 URINALYSIS VOLUME MEASURE: CPT

## 2024-10-18 PROCEDURE — 82340 ASSAY OF CALCIUM IN URINE: CPT

## 2024-10-18 PROCEDURE — 82570 ASSAY OF URINE CREATININE: CPT

## 2024-10-21 DIAGNOSIS — E56.9 VITAMIN DEFICIENCY: ICD-10-CM

## 2024-10-21 SDOH — HEALTH STABILITY: PHYSICAL HEALTH: ON AVERAGE, HOW MANY DAYS PER WEEK DO YOU ENGAGE IN MODERATE TO STRENUOUS EXERCISE (LIKE A BRISK WALK)?: 3 DAYS

## 2024-10-21 SDOH — HEALTH STABILITY: PHYSICAL HEALTH: ON AVERAGE, HOW MANY MINUTES DO YOU ENGAGE IN EXERCISE AT THIS LEVEL?: 20 MIN

## 2024-10-21 ASSESSMENT — LIFESTYLE VARIABLES
HOW MANY STANDARD DRINKS CONTAINING ALCOHOL DO YOU HAVE ON A TYPICAL DAY: 1 OR 2
HOW OFTEN DO YOU HAVE SIX OR MORE DRINKS ON ONE OCCASION: NEVER
HOW OFTEN DO YOU HAVE A DRINK CONTAINING ALCOHOL: MONTHLY OR LESS
SKIP TO QUESTIONS 9-10: 1
AUDIT-C TOTAL SCORE: 1

## 2024-10-21 ASSESSMENT — SOCIAL DETERMINANTS OF HEALTH (SDOH)
HOW OFTEN DO YOU GET TOGETHER WITH FRIENDS OR RELATIVES?: PATIENT DECLINED
HOW OFTEN DO YOU ATTENT MEETINGS OF THE CLUB OR ORGANIZATION YOU BELONG TO?: PATIENT DECLINED
ARE YOU MARRIED, WIDOWED, DIVORCED, SEPARATED, NEVER MARRIED, OR LIVING WITH A PARTNER?: PATIENT DECLINED
HOW OFTEN DO YOU ATTEND CHURCH OR RELIGIOUS SERVICES?: PATIENT DECLINED
IN A TYPICAL WEEK, HOW MANY TIMES DO YOU TALK ON THE PHONE WITH FAMILY, FRIENDS, OR NEIGHBORS?: PATIENT DECLINED
DO YOU BELONG TO ANY CLUBS OR ORGANIZATIONS SUCH AS CHURCH GROUPS UNIONS, FRATERNAL OR ATHLETIC GROUPS, OR SCHOOL GROUPS?: PATIENT DECLINED

## 2024-10-21 ASSESSMENT — ACTIVITIES OF DAILY LIVING (ADL): DEPENDENT_IADLS:: INDEPENDENT

## 2024-10-21 NOTE — PROGRESS NOTES
Clinic Care Coordination Contact  Clinic Care Coordination Contact  OUTREACH    Referral Information:  initial phone assessment     Referral Source: PCP    Primary Diagnosis: Psychosocial    Chief Complaint   Patient presents with    Clinic Care Coordination - Initial     SW        Universal Utilization: no shows and cancelled appts  Clinic Utilization  Difficulty keeping appointments:: Yes  Compliance Concerns: Yes  No-Show Concerns: Yes  No PCP office visit in Past Year: No  Utilization      No Show Count (past year)  2             ED Visits  0             Hospital Admissions  0                    Current as of: 10/21/2024  1:31 PM              Clinical Concerns: Patient reports she lives in transitional housing with her puppy.  She stated several times her desire to live independently.  SW discussed costs involved with potential housing.  Patient stated she has been discussing her care manager through [place].  Patient will discuss further with  stated thus far this has not been helpful.  Patient discussed her income with , it is possible she cannot afford other housing at present     SW and patient discussed care gaps.  Patient will address in the near future.      Patient expressed interest in [facility] application, FRW referral discussed and placed for potential assistance of [place] outstanding  balance      Current Medical Concerns:   Patient Active Problem List   Diagnosis    Personal history of nicotine dependence    CARDIOVASCULAR SCREENING; LDL GOAL LESS THAN 160    Gastritis    Osteoporosis of multiple sites    Closed nondisplaced intertrochanteric fracture of left femur (H)    Migraine without aura and without status migrainosus, not intractable    Closed nondisplaced fracture of body of left calcaneus with delayed healing, subsequent encounter    Heel pain, chronic, left    Gastroesophageal reflux disease, esophagitis presence not specified    Superficial phlebitis    Class 2 severe  obesity due to excess calories with serious comorbidity and body mass index (BMI) of 36.0 to 36.9 in adult (H)    Other chronic pain    Low serum cortisol level    Anxiety    Eosinophilic gastroenteritis    Chronic wrist pain    Moderate episode of recurrent major depressive disorder (H)    Falls frequently    Abdominal wall lump    Chronic, continuous use of opioids    Deliberate self-cutting    At risk for self harm    Morbid obesity (H)    Encounter for removal of sutures    Episodic tension-type headache, not intractable    Hormone replacement therapy (HRT)    Severe episode of recurrent major depressive disorder, without psychotic features (H)    Lives in group home    Slow transit constipation    Hx of anaphylaxis    Psychosis, unspecified psychosis type (H)    Tobacco use disorder    Encounter for smoking cessation counseling    Chronic right shoulder pain    Neck pain    Chronic daily headache    Unspecified adrenocortical insufficiency (H)    Vaping nicotine dependence, tobacco product      Current Behavioral Concerns: social anxiety    Education Provided to patient: discussed alternate housing, patient will discuss with her [place]    Pain  Pain (GOAL):: Yes  Type: Chronic (>3mo)  Location of chronic pain:: OP multiple sites, chronic daily headache  Radiating: Yes  Location pain radiates to: throughout her joints and muscles in back and legs, shoulders  Progression: Intermittent  Description of pain: Aching  Chronic pain severity:: 5  Limitation of routine activities due to chronic pain:: Yes  Description: Unable to perform most daily activities (chores, hobbies, social activities, driving)  Alleviating Factors: Rest  Aggravating Factors: Activity, Other (OA)  Health Maintenance Reviewed: Due/Overdue   Health Maintenance Due   Topic Date Due    MAMMO SCREENING  Never done    HEPATITIS B IMMUNIZATION (2 of 3 - 19+ 3-dose series) 01/10/2023    INFLUENZA VACCINE (1) 09/01/2024    COVID-19 Vaccine (1  - 2024-25 season) Never done      Clinical Pathway: None    Medication Management:  Medication review status: Medications reviewed and no changes reported per patient.           Functional Status:  Dependent ADLs:: Independent  Dependent IADLs:: Independent  Bed or wheelchair confined:: No  Mobility Status: Independent  Fallen 2 or more times in the past year?: No  Any fall with injury in the past year?: No    Living Situation:  Current living arrangement:: I live alone  Type of residence:: Other ([facility]-transitional housing)    Lifestyle & Psychosocial Needs:    Social Determinants of Health     Food Insecurity: Low Risk  (9/30/2024)    Food Insecurity     Within the past 12 months, did you worry that your food would run out before you got money to buy more?: No     Within the past 12 months, did the food you bought just not last and you didn t have money to get more?: No   Depression: At risk (10/1/2024)    PHQ-2     PHQ-2 Score: 6   Housing Stability: Low Risk  (9/30/2024)    Housing Stability     Do you have housing? : Yes     Are you worried about losing your housing?: No   Tobacco Use: Medium Risk (10/21/2024)    Patient History     Smoking Tobacco Use: Former     Smokeless Tobacco Use: Never     Passive Exposure: Current   Financial Resource Strain: Low Risk  (10/21/2024)    Financial Resource Strain     Within the past 12 months, have you or your family members you live with been unable to get utilities (heat, electricity) when it was really needed?: No   Recent Concern: Financial Resource Strain - High Risk (9/30/2024)    Financial Resource Strain     Within the past 12 months, have you or your family members you live with been unable to get utilities (heat, electricity) when it was really needed?: Yes   Alcohol Use: Not At Risk (10/21/2024)    AUDIT-C     Frequency of Alcohol Consumption: Monthly or less     Average Number of Drinks: 1 or 2     Frequency of Binge Drinking: Never   Transportation Needs: Low  Risk  (9/30/2024)    Transportation Needs     Within the past 12 months, has lack of transportation kept you from medical appointments, getting your medicines, non-medical meetings or appointments, work, or from getting things that you need?: No   Physical Activity: Insufficiently Active (10/21/2024)    Exercise Vital Sign     Days of Exercise per Week: 3 days     Minutes of Exercise per Session: 20 min   Interpersonal Safety: Low Risk  (10/1/2024)    Interpersonal Safety     Do you feel physically and emotionally safe where you currently live?: Yes     Within the past 12 months, have you been hit, slapped, kicked or otherwise physically hurt by someone?: No     Within the past 12 months, have you been humiliated or emotionally abused in other ways by your partner or ex-partner?: No   Stress: Stress Concern Present (10/21/2024)    [employer] - Occupational Stress Questionnaire     Feeling of Stress : To some extent   Social Connections: Patient Declined (10/21/2024)    Social Connection and Isolation Panel [NHANES]     Frequency of Communication with Friends and Family: Patient declined     Frequency of Social Gatherings with Friends and Family: Patient declined     Attends Hindu Services: Patient declined     Active Member of Clubs or Organizations: Patient declined     Attends Club or Organization Meetings: Patient declined     Marital Status: Patient declined   Health Literacy: Inadequate Health Literacy (10/21/2024)     Health Literacy     Frequency of need for help with medical instructions: Sometimes     Diet:: Regular  Inadequate nutrition (GOAL):: No  Tube Feeding: No  Inadequate activity/exercise (GOAL):: No  Significant changes in sleep pattern (GOAL): No  Transportation means:: Regular car     Hindu or spiritual beliefs that impact treatment:: No  Mental health DX:: Yes  Mental health DX how managed:: Medication  Mental health management concern (GOAL)::  Yes  Chemical Dependency Status: No Current Concerns  Chemical Dependency Management: Other (see comment) (N/A)  Informal Support system:: Family, Parent     Resources and Interventions: discussed alternate housing, patient will discuss with her [place]    Current Resources: Financial/Insurance, [facility] ([place] [facility] , [place] Waiver; Social Security, Endocrinology)     Community Resources: County Worker, Financial/Insurance, [facility] Programs ([place] [facility] , [place] Waiver; Social Security, Endocrinology)  Supplies Currently Used at Home: None  Equipment Currently Used at Home: none  Employment Status: disabled   Advance Care Plan/Directive  Advanced Care Plans/Directives on file:: No  Discussed with patient/caregiver:: Declined Further Information    Referrals Placed: Other ([facility] referral [facility] and [facility])     Care Plan:  Care Plan: Mental Health       Problem: Mental Health Symptoms Need Improvement       Goal: Improve management of mental health symptoms and establish with mental health/psychosocial supports       Start Date: 10/8/2024 Expected End Date: 2/7/2025    This Visit's Progress: 10%    Priority: High    Note:     Barriers: social anxiety  Strengths: walks daily   Patient expressed understanding of goal: yes  Action steps to achieve this goal:  1. I will take medications as prescribed   2. I will attend all scheduled appts   3. I will reach out to  and discuss support options                            Care Plan: Health Maintenance       Problem: Health Maintenance Due or Overdue       Goal: Become up-to-date with health maintenance visit(s)       Start Date: 10/8/2024 Expected End Date: 2/7/2025    This Visit's Progress: 10%    Priority: Low    Note:     Barriers: due/overdue for mammogram, Hepatitis B immunization series, flu and Covid vaccines; missed appts   Strengths: capable of addressing and has transport   Patient expressed understanding of goal:  yes  Action steps to achieve this goal:  1. I will find time to address care gaps noted above   2. I will call and schedule appt to address care gaps  3. I will attend this appt and discuss my current needs                             Care Plan: Housing       Problem: Living in transitional housing       Goal: I would like independent housing       Start Date: 10/8/2024 Expected End Date: 3/7/2025    This Visit's Progress: 0%    Priority: Medium    Note:     Barriers: lives in transitional housing, limited independence, limited income, has a previous misdemeanor   Strengths: will call and discuss housing [place]    Patient expressed understanding of goal: yes  Action steps to achieve this goal:  1. I will call [place]    2. I will discuss alternate housing   3. I will discuss my current needs                              Patient/Caregiver understanding: Patient will schedule clinic visit to address care gaps.  Patient will discus alternate housing with      Outreach Frequency: monthly, more frequently as needed  Future Appointments                In 2 months Dallas Flores MD [facility]            Plan:     1) Patient will schedule clinic visit to address care gaps  2) Patient will discus alternate housing with    3) Patient will reach out for emotional support in times of increased stress  4) SW will send introduction letter and Complex care plan   5) SW will update PCP  6) CHW will caterina patient in 1 month for update, Care Coordinator will review progress to goals and plan of care in 6 weeks.     Silva London, MONCHOW, MSW   [facility]  Care Coordination  [facility]  [phone]  10/14/2024 1:53 PM

## 2024-10-22 ENCOUNTER — PATIENT OUTREACH (OUTPATIENT)
Dept: CARE COORDINATION | Facility: CLINIC | Age: 44
End: 2024-10-22
Payer: COMMERCIAL

## 2024-10-28 ENCOUNTER — TELEPHONE (OUTPATIENT)
Dept: FAMILY MEDICINE | Facility: CLINIC | Age: 44
End: 2024-10-28
Payer: COMMERCIAL

## 2024-10-28 DIAGNOSIS — E66.01 CLASS 2 SEVERE OBESITY DUE TO EXCESS CALORIES WITH SERIOUS COMORBIDITY AND BODY MASS INDEX (BMI) OF 36.0 TO 36.9 IN ADULT (H): Primary | ICD-10-CM

## 2024-10-28 DIAGNOSIS — R73.09 ELEVATED GLUCOSE: ICD-10-CM

## 2024-10-28 DIAGNOSIS — E66.812 CLASS 2 SEVERE OBESITY DUE TO EXCESS CALORIES WITH SERIOUS COMORBIDITY AND BODY MASS INDEX (BMI) OF 36.0 TO 36.9 IN ADULT (H): Primary | ICD-10-CM

## 2024-10-28 DIAGNOSIS — M81.0 OSTEOPOROSIS OF MULTIPLE SITES: ICD-10-CM

## 2024-10-28 NOTE — TELEPHONE ENCOUNTER
Patient asking for a refill of Zepbound, but in the 5 mg dose as she discussed during her 10/1/24 visit (per patient). This medication is not on her medication list currently.         Per 10/1/24 note:     Class 1 obesity due to excess calories with serious comorbidity and body mass index (BMI) of 34.0 to 34.9 in adult     - tirzepatide-Weight Management (ZEPBOUND) 2.5 MG/0.5ML prefilled pen; Inject 0.5 mLs (2.5 mg) subcutaneously every 7 days.  - tirzepatide-Weight Management (ZEPBOUND) 5 MG/0.5ML prefilled pen; Inject 0.5 mLs (5 mg) subcutaneously every 7 days.  - tirzepatide-Weight Management (ZEPBOUND) 7.5 MG/0.5ML prefilled pen; Inject 0.5 mLs (7.5 mg) subcutaneously every 7 days.        Patient would like to be notified if this is sent.     Precious Brown RN on 10/28/2024 at 9:44 AM

## 2024-11-02 NOTE — TELEPHONE ENCOUNTER
Routing refill request to provider for review/approval because:  Drug not on the [facility] refill protocol     Last Written Prescription Date:  6-1-21  Last Fill Quantity: 180,  # refills: 0   Last office visit: 6/1/2021 with prescribing provider:  Xenia Gaviria   Future Office Visit:          
SBIRT referral

## 2024-11-07 ENCOUNTER — PATIENT OUTREACH (OUTPATIENT)
Dept: CARE COORDINATION | Facility: CLINIC | Age: 44
End: 2024-11-07
Payer: COMMERCIAL

## 2024-11-07 NOTE — PROGRESS NOTES
[facility]/Voicemail    Clinical Data: Care Coordinator Outreach    Outreach Documentation Number of Outreach Attempt   10/2/2024  10:54 AM 1   11/7/2024   1:56 PM 1       Left message on patient's voicemail with call back information and requested return call.      Plan:   Care Coordinator will try to reach patient again in 3-5 business days.    Next outreach due: 11/13/24

## 2024-11-11 ENCOUNTER — PATIENT OUTREACH (OUTPATIENT)
Dept: CARE COORDINATION | Facility: CLINIC | Age: 44
End: 2024-11-11
Payer: COMMERCIAL

## 2024-11-11 NOTE — PROGRESS NOTES
Clinic Care Coordination Contact    Situation: Patient chart reviewed by care coordinator.    Background: Patient is enrolled     Assessment: CHW attempting to reach patient for update, will call again in 1-2 weeks    Plan/Recommendations: SW will review in 1-2 weeks     EDWINA Hansen, MSW   [facility]  Care Coordination  [facility]  [phone]  11/11/2024 5:22 PM

## 2024-11-18 DIAGNOSIS — E66.812 CLASS 2 SEVERE OBESITY DUE TO EXCESS CALORIES WITH SERIOUS COMORBIDITY AND BODY MASS INDEX (BMI) OF 36.0 TO 36.9 IN ADULT (H): ICD-10-CM

## 2024-11-18 DIAGNOSIS — M81.0 OSTEOPOROSIS OF MULTIPLE SITES: ICD-10-CM

## 2024-11-18 DIAGNOSIS — R73.09 ELEVATED GLUCOSE: ICD-10-CM

## 2024-11-18 DIAGNOSIS — E66.01 CLASS 2 SEVERE OBESITY DUE TO EXCESS CALORIES WITH SERIOUS COMORBIDITY AND BODY MASS INDEX (BMI) OF 36.0 TO 36.9 IN ADULT (H): ICD-10-CM

## 2024-11-18 RX ORDER — TIRZEPATIDE 5 MG/.5ML
INJECTION, SOLUTION SUBCUTANEOUS
Qty: 2 ML | Refills: 0 | Status: SHIPPED | OUTPATIENT
Start: 2024-11-18

## 2024-11-20 ENCOUNTER — PATIENT OUTREACH (OUTPATIENT)
Dept: CARE COORDINATION | Facility: CLINIC | Age: 44
End: 2024-11-20
Payer: COMMERCIAL

## 2024-11-20 NOTE — PROGRESS NOTES
Clinic Care Coordination Contact  Community Health Worker Follow Up    Care Gaps:     Health Maintenance Due   Topic Date Due    MAMMO SCREENING  Never done    HEPATITIS B IMMUNIZATION (2 of 3 - 19+ 3-dose series) 01/10/2023    INFLUENZA VACCINE (1) 09/01/2024    COVID-19 Vaccine (1 - 2024-25 season) Never done       Reminded patient she needs to schedule mammogram and schedule appointment for flu and covid vaccine.    Care Plan:   Care Plan: Mental Health       Problem: Mental Health Symptoms Need Improvement       Goal: Improve management of mental health symptoms and establish with mental health/psychosocial supports       Start Date: 10/8/2024 Expected End Date: 2/7/2025    This Visit's Progress: 20% Recent Progress: 10%    Priority: High    Note:     Barriers: social anxiety  Strengths: walks daily   Patient expressed understanding of goal: yes  Action steps to achieve this goal:  1. I will continue to take medications as prescribed   2. I will continue to attend all scheduled appts   3. I will reach out to  and discuss support options                            Care Plan: Health Maintenance       Problem: Health Maintenance Due or Overdue       Goal: Become up-to-date with health maintenance visit(s)       Start Date: 10/8/2024 Expected End Date: 2/7/2025    This Visit's Progress: 10%    Priority: Low    Note:     Barriers: due/overdue for mammogram, Hepatitis B immunization series, flu and Covid vaccines; missed appts   Strengths: capable of addressing and has transport   Patient expressed understanding of goal: yes  Action steps to achieve this goal:  1. I will find time to address care gaps noted above   2. I will call and schedule appt to address care gaps  3. I will attend this appt and discuss my current needs                             Care Plan: Housing       Problem: Living in transitional housing       Goal: I would like independent housing       Start Date: 10/8/2024 Expected End Date:  3/7/2025    This Visit's Progress: 10% Recent Progress: 0%    Priority: Medium    Note:     Barriers: lives in transitional housing, limited independence, limited income, has a previous misdemeanor   Strengths: will call and discuss housing [place]    Patient expressed understanding of goal: yes  Action steps to achieve this goal:  1. I will call [place]    2. I will discuss alternate housing   3. I will discuss my current needs                              Intervention and Education during outreach: Patient states that she is still in transitional housing. She was not sure who her AXIS  was. CHW shared that information with her. She is also going to contact her CADI  to discuss housing options.     She states she is taking her medications as prescribed and attending her scheduled appointments.     CHW Plan: CHW will continue to support patient with goals through routine scheduled outreach.     Next outreach due:  12/20/24

## 2024-11-20 NOTE — PROGRESS NOTES
Clinic Care Coordination Contact  [facility]/Voicemail    Clinical Data: Care Coordinator Outreach    Outreach Documentation Number of Outreach Attempt   11/7/2024   1:56 PM 1   11/20/2024   9:51 AM 2       Unable to leave a message due to: Voicemail is not set up.    CHW and SW have both attempted to reach patient for update       Plan: Care Coordinator will send unable to contact letter with care coordinator contact information via [employer]. Care Coordinator will try to reach patient again in 1 month.    Silva London, MONCHOW, MSW   [facility]  Care Coordination  [facility]  [phone]  11/20/2024 9:51 AM

## 2024-11-20 NOTE — LETTER
[facility]ORD[facility]  [address]  [place] [address]     November 20, 2024    Mily Grullon  [address]  [address]  [place] [address]      Dear Mily,    I have been attempting to reach you since our last contact. I would like to continue to work with you and provide any additional support you may need on achieving your health care related goals. I would appreciate if you would give me a call at [phone] to let me know if you would like to continue working together. I know that there are many things that can affect our ability to communicate and I hope we can continue to work together.    We are focused on providing you with the highest-quality healthcare experience possible.    Sincerely,    EDWINA Hansen, MSW Clinic   [facility]  Care [facility] [place] and [place] [facility]   [email or web address]  Office: [phone]  Employed by [employer]

## 2024-12-30 DIAGNOSIS — R73.09 ELEVATED GLUCOSE: ICD-10-CM

## 2024-12-30 DIAGNOSIS — E56.9 VITAMIN DEFICIENCY: ICD-10-CM

## 2024-12-30 DIAGNOSIS — M81.0 OSTEOPOROSIS OF MULTIPLE SITES: ICD-10-CM

## 2024-12-30 DIAGNOSIS — E66.812 CLASS 2 SEVERE OBESITY DUE TO EXCESS CALORIES WITH SERIOUS COMORBIDITY AND BODY MASS INDEX (BMI) OF 36.0 TO 36.9 IN ADULT (H): ICD-10-CM

## 2024-12-30 DIAGNOSIS — E66.01 CLASS 2 SEVERE OBESITY DUE TO EXCESS CALORIES WITH SERIOUS COMORBIDITY AND BODY MASS INDEX (BMI) OF 36.0 TO 36.9 IN ADULT (H): ICD-10-CM

## 2024-12-30 RX ORDER — TIRZEPATIDE 5 MG/.5ML
INJECTION, SOLUTION SUBCUTANEOUS
Qty: 2 ML | Refills: 0 | Status: SHIPPED | OUTPATIENT
Start: 2024-12-30

## 2025-02-06 ENCOUNTER — PATIENT OUTREACH (OUTPATIENT)
Dept: CARE COORDINATION | Facility: CLINIC | Age: 45
End: 2025-02-06
Payer: COMMERCIAL

## 2025-02-06 NOTE — PROGRESS NOTES
[facility]/Voicemail    Clinical Data: Care Coordinator Outreach    Outreach Documentation Number of Outreach Attempt   2/6/2025   3:26 PM 1       Left message on patient's voicemail with call back information and requested return call.      Plan:   Care Coordinator will try to reach patient again in 10 business days.    Next outreach due: 2/20/25

## 2025-02-25 ENCOUNTER — PATIENT OUTREACH (OUTPATIENT)
Dept: CARE COORDINATION | Facility: CLINIC | Age: 45
End: 2025-02-25
Payer: COMMERCIAL

## 2025-02-25 NOTE — PROGRESS NOTES
[facility]/Voicemail    Clinical Data: Care Coordinator Outreach    Outreach Documentation Number of Outreach Attempt   2/6/2025   3:26 PM 1   2/25/2025   2:51 PM 2       Left message on patient's voicemail with call back information and requested return call.      Plan: Care Coordinator will send unable to contact letter with care coordinator contact information via [employer]. Care Coordinator will try to reach patient again in 3-5 business days.    Next outreach due: 3/3/25

## 2025-03-03 ENCOUNTER — PATIENT OUTREACH (OUTPATIENT)
Dept: CARE COORDINATION | Facility: CLINIC | Age: 45
End: 2025-03-03
Payer: COMMERCIAL

## 2025-03-03 DIAGNOSIS — E56.9 VITAMIN DEFICIENCY: ICD-10-CM

## 2025-03-03 RX ORDER — UBIDECARENONE 100 MG
1 CAPSULE ORAL DAILY
Qty: 28 CAPSULE | Refills: 1 | Status: SHIPPED | OUTPATIENT
Start: 2025-03-03

## 2025-03-03 NOTE — PROGRESS NOTES
Clinic Care Coordination Contact    Situation: Patient chart reviewed by care coordinator.    Background: Patient is enrolled     Assessment: CHW is attempting to reach patient, will again early this week     Plan/Recommendations: SW will review in 1-2 weeks    EDWINA Hansen, MSW   [facility]  Care Coordination  [facility]  [phone]  3/3/2025 10:57 AM

## 2025-03-03 NOTE — LETTER
[facility]ORD[facility]  [address]  [place] [address]     March 3, 2025      Mily Escobedo  [address]  [place] [address]      Dear Mily,     Attached is an updated Patient Centered Plan of Care for your continued enrollment in Care Coordination. Please let us know if you have additional questions, concerns, or goals that we can assist with.    Sincerely,    Silva London, MONCHOW, MSW Clinic   [facility]  Care Coordination  [facility] [place] [facility]   [email or web address]  Office: [phone]  Employed by [facility] [employer] [facility]  Patient Centered Plan of Care  About Me:        Patient Name:  Mily Escobedo    YOB: 1980  Age:         44 year old   [place] MRN:    [id number] Telephone Information:  Home Phone [phone]   Mobile [phone]       Address:  [address]  [place] [address] Email address:  [email or web address]      Emergency Contact(s)    Name Relationship Lgl Grd Work Phone Home Phone Mobile Phone   1. MARQUITA ESCOBEDO Father No   [phone]   2. SUSAN ESCOBEDO Mother No   [phone]   3. GIA MENDOZA Sister No   [phone]   4. ALYSHA Brother No   [phone]           Primary language:  English     needed? No   [place] [employer]:  [phone] op. 1  Other communication barriers:None    Preferred Method of Communication:  Mail  Current living arrangement: I live alone    Mobility Status/ Medical Equipment: Independent        Health Maintenance  Health Maintenance Reviewed: Due/Overdue   Health Maintenance Due   Topic Date Due    MAMMO SCREENING  Never done    HEPATITIS B IMMUNIZATION (2 of 3 - 19+ 3-dose series) 01/10/2023    INFLUENZA VACCINE (1) 09/01/2024    COVID-19 Vaccine (1 - 2024-25 season) Never done          My Access Plan  Medical Emergency 911   Primary Clinic Line [facility] - [phone]   24 Hour  Appointment Line [phone] or  [phone]) (toll-free)   24 Hour Nurse Line [phone] (toll-free)   Preferred Urgent Care [facility], [phone] ([facility] [place])     Preferred Hospital [facility]  [phone]     Preferred Pharmacy [facility] [address] [place] [address]     Behavioral Health Crisis Line The National Suicide Prevention Lifeline at 1-553.441.4071 or Text/Call 448           My Care Team Members  Patient Care Team         Relationship Specialty Notifications Start End    Xenia Umanzor NP PCP - General Family Medicine  6/1/21     Phone: [phone] Fax: [phone] [address] [place] [address]    Godwin Deleon MD MD Orthopedics  8/12/16     Phone: [phone] Fax: [phone]         [address] [place] [address]    Dallas Flores MD Assigned Endocrinology Provider   10/23/20     Phone: [phone] Pager: [phone] Fax: [phone]        [address] [address]    Xenia Umanzor NP Assigned PCP   6/6/21     Phone: [phone] Fax: [phone] [address] [place] [address]    Dominick Agudelo MD MD Neurology  5/10/23     Phone: [phone] Fax: [phone]         [address] [facility] [address]    Silva London BSW Lead Care Coordinator Primary Care - CC Admissions 10/3/24     Phone: [phone] Fax: [phone]                    My Care Plans  Self Management and Treatment Plan    Care Plan  Care Plan: Mental Health       Problem: Mental Health Symptoms Need Improvement       Goal: Improve management of mental health symptoms and establish with mental health/psychosocial supports       Start Date: 10/8/2024 Expected End Date: 2/7/2025    This Visit's Progress: 40% Recent Progress: 20%    Priority: High    Note:     Barriers: social  anxiety  Strengths: walks daily   Patient expressed understanding of goal: yes  Action steps to achieve this goal:  1. I will continue to take medications as prescribed (ongoing)  2. I will continue to attend all scheduled appts   3. I will reach out to  and discuss support options    1/3/2025                              Care Plan: Health Maintenance       Problem: Health Maintenance Due or Overdue       Goal: Become up-to-date with health maintenance visit(s)       Start Date: 10/8/2024 Expected End Date: 2/7/2025    This Visit's Progress: 40% Recent Progress: 10%    Priority: Low    Note:     Barriers: due/overdue for mammogram, Hepatitis B immunization series, flu and Covid vaccines; missed appts   Strengths: capable of addressing and has transport   Patient expressed understanding of goal: yes  Action steps to achieve this goal:  1. I will find time to address care gaps noted above   2. I will call and schedule appt to address care gaps 1/20/25 appt  3. I will attend this appt and discuss my current needs                             Care Plan: Housing       Problem: Living in transitional housing       Goal: I would like independent housing       Start Date: 10/8/2024 Expected End Date: 3/7/2025    This Visit's Progress: 30% Recent Progress: 10%    Priority: Medium    Note:     Barriers: lives in transitional housing, limited independence, limited income, has a previous misdemeanor   Strengths: will call and discuss housing [place]    Patient expressed understanding of goal: yes  Action steps to achieve this goal:  1. I will call [place]    2. I will discuss alternate housing   3. I will discuss my current needs                              Action Plans on File:            Depression          Advance Care Plans/Directives:   Advanced Care Plan/Directives on file: No    Discussed with patient/caregiver(s): Declined Further Information             My Medical and Care  Information  Problem List   Patient Active Problem List   Diagnosis    Personal history of nicotine dependence    CARDIOVASCULAR SCREENING; LDL GOAL LESS THAN 160    Gastritis    Osteoporosis of multiple sites    Closed nondisplaced intertrochanteric fracture of left femur (H)    Migraine without aura and without status migrainosus, not intractable    Closed nondisplaced fracture of body of left calcaneus with delayed healing, subsequent encounter    Heel pain, chronic, left    Gastroesophageal reflux disease, esophagitis presence not specified    Superficial phlebitis    Class 2 severe obesity due to excess calories with serious comorbidity and body mass index (BMI) of 36.0 to 36.9 in adult (H)    Other chronic pain    Low serum cortisol level    Anxiety    Eosinophilic gastroenteritis    Chronic wrist pain    Moderate episode of recurrent major depressive disorder (H)    Falls frequently    Abdominal wall lump    Chronic, continuous use of opioids    Deliberate self-cutting    At risk for self harm    Morbid obesity (H)    Encounter for removal of sutures    Episodic tension-type headache, not intractable    Hormone replacement therapy (HRT)    Severe episode of recurrent major depressive disorder, without psychotic features (H)    Lives in group home    Slow transit constipation    Hx of anaphylaxis    Psychosis, unspecified psychosis type (H)    Tobacco use disorder    Encounter for smoking cessation counseling    Chronic right shoulder pain    Neck pain    Chronic daily headache    Unspecified adrenocortical insufficiency    Vaping nicotine dependence, tobacco product      Current Medications:  Please refer to the most recent medication list provided to you by your medical team and reach out to your provider with any questions or to make any corrections.    Care Coordination Start Date: 10/1/2024   Frequency of Care Coordination: monthly, more frequently as needed     Form Last Updated: 03/03/2025

## 2025-03-04 ENCOUNTER — PATIENT OUTREACH (OUTPATIENT)
Dept: CARE COORDINATION | Facility: CLINIC | Age: 45
End: 2025-03-04
Payer: COMMERCIAL

## 2025-03-04 NOTE — PROGRESS NOTES
Clinic Care Coordination Contact    Situation: Patient chart reviewed by care coordinator.    Background: Patient is enrolled     Assessment: CHW has been unable to reach patient for update x, VM is not set up    Plan/Recommendations: SW will close to CC, send disenrollment letter and update PCP    Silva London, MONCHOW, MSW   [facility]  Care Coordination  [facility]  [phone]  3/4/2025 3:07 PM

## 2025-03-04 NOTE — PROGRESS NOTES
[facility]/Voicemail    Clinical Data: Care Coordinator Outreach    Outreach Documentation Number of Outreach Attempt   2/6/2025   3:26 PM 1   2/25/2025   2:51 PM 2   3/4/2025   2:21 PM 3       Unable to leave a message due to: Voicemail is not set up.      Plan: CHW will request chart review to disenroll  Care Coordinator will do no further outreaches at this time.    PARKER Larsen  [place], [place], [place] and [facility]  [phone]

## 2025-03-04 NOTE — LETTER
[facility]ARE COORDIN[facility]  [address]  [place] [address]     March 4, 2025    Mily Grullon  [address] [place] [address]  [address]  [place] [address]      Dear Mily,    I have been unsuccessful in reaching you since our last contact. At this time the Care Coordination team will make no further attempts to reach you, however this does not change your ability to continue receiving care from your providers at your primary care clinic. If you need additional support from a care coordinator in the future please contact Silva London at [phone]    We are focused on providing you with the highest-quality healthcare experience possible.    Sincerely,    Silva London, EDWINA, MSW Clinic   [facility]  Care [facility] [place] [facility]   [email or web address]  Office: [phone]  Employed by [facility] [employer] [facility]

## 2025-04-14 ENCOUNTER — MYC MEDICAL ADVICE (OUTPATIENT)
Dept: FAMILY MEDICINE | Facility: CLINIC | Age: 45
End: 2025-04-14
Payer: COMMERCIAL

## 2025-04-28 DIAGNOSIS — E56.9 VITAMIN DEFICIENCY: ICD-10-CM

## 2025-04-28 RX ORDER — UBIDECARENONE 100 MG
1 CAPSULE ORAL DAILY
Qty: 28 CAPSULE | Refills: 1 | Status: SHIPPED | OUTPATIENT
Start: 2025-04-28

## 2025-06-10 NOTE — TELEPHONE ENCOUNTER
1/5 1st attempt.  LVM for patient to schedule:    - a DEXA scan (bone density test) at patient convenience.    - a follow up visit with Dr. Flores around 2/15/22 (after Dexa scan has been completed).    Please assist patient in scheduling when she calls back.    Thanks    Nola Tripathi  Pediatric Specialty /Adult Endocrinology  [facility] [place]   2 seconds or less

## 2025-06-23 DIAGNOSIS — E56.9 VITAMIN DEFICIENCY: ICD-10-CM

## 2025-06-23 RX ORDER — VIT C/E/ZN/COPPR/LUTEIN/ZEAXAN 250MG-90MG
1 CAPSULE ORAL DAILY
Qty: 28 CAPSULE | Refills: 1 | Status: SHIPPED | OUTPATIENT
Start: 2025-06-23

## 2025-06-30 ENCOUNTER — E-VISIT (OUTPATIENT)
Dept: FAMILY MEDICINE | Facility: CLINIC | Age: 45
End: 2025-06-30
Payer: COMMERCIAL

## 2025-06-30 DIAGNOSIS — R51.9 CHRONIC DAILY HEADACHE: Primary | ICD-10-CM

## 2025-07-01 ENCOUNTER — TELEPHONE (OUTPATIENT)
Dept: FAMILY MEDICINE | Facility: CLINIC | Age: 45
End: 2025-07-01

## 2025-07-01 RX ORDER — BUTALBITAL, ACETAMINOPHEN AND CAFFEINE 50; 325; 40 MG/1; MG/1; MG/1
1 TABLET ORAL DAILY PRN
Qty: 20 TABLET | Refills: 3 | Status: SHIPPED | OUTPATIENT
Start: 2025-07-01

## 2025-07-01 NOTE — TELEPHONE ENCOUNTER
Provider E-Visit time total (minutes): 6 minutes    Hx: of chronic daily headache/ tension headache. Headaches have restarted, feels similar to previous. Fioricet used to help headaches. Needing mediation refills.     Tx: discussed taking medication as needed/ prescribed only. Review after visit summary tips. Follow up if needed for persistent or worsening symptoms.     (R51.9) Chronic daily headache  (primary encounter diagnosis)  Comment:   Plan: butalbital-acetaminophen-caffeine (ESGIC)         -40 MG tablet            TARAS Verma, FNP-BC

## 2025-07-01 NOTE — TELEPHONE ENCOUNTER
Prior Authorization Retail Medication Request    Medication/Dose: butalbital-acetaminophen-caffeine (ESGIC) -40 MG tablet  Diagnosis and ICD code (if different than what is on RX):    Chronic daily headache [R51.9]  - Primary     New/renewal/insurance change PA/secondary ins. PA:  Previously Tried and Failed:    Rationale:      Insurance   Primary: are  Insurance ID:  [id number]     Secondary (if applicable):  Insurance ID:      Pharmacy Information (if different than what is on RX)  Name:    Phone:    Fax:    Clinic Information  Preferred routing pool for dept communication:

## 2025-07-02 NOTE — TELEPHONE ENCOUNTER
Central Prior Authorization Team   Phone: [phone]    PA Initiation    Medication: butalbital-acetaminophen-caffeine (ESGIC) -40 MG tablet  Insurance Company: [employer] - Phone [phone] Fax [phone]  Pharmacy Filling the Rx: [facility] [id number] - [place], [place] - [address]  Filling Pharmacy Phone: [phone]  Filling Pharmacy Fax:    Start Date: 7/2/2025    [facility] KEY: [id number]

## 2025-07-09 NOTE — TELEPHONE ENCOUNTER
Prior Authorization Approval    Authorization Effective Date: 7/8/2025  Authorization Expiration Date: 7/8/2026  Medication: butalbital-acetaminophen-caffeine (ESGIC) -40 MG tablet  Approved Dose/Quantity:    Reference #:     Insurance Company: [employer] - Phone [phone] Fax [phone]  Expected CoPay:       CoPay Card Available:      Foundation Assistance Needed:    Which Pharmacy is filling the prescription (Not needed for infusion/clinic administered): [id number] - [place], [place] - [address] [place] [address]  Pharmacy Notified:  Yes  Patient Notified:  **Instructed pharmacy to notify patient when script is ready to /ship.**

## 2025-08-18 DIAGNOSIS — E56.9 VITAMIN DEFICIENCY: ICD-10-CM

## 2025-08-18 RX ORDER — VIT C/E/ZN/COPPR/LUTEIN/ZEAXAN 250MG-90MG
1 CAPSULE ORAL DAILY
Qty: 28 CAPSULE | Refills: 1 | Status: SHIPPED | OUTPATIENT
Start: 2025-08-18

## 2025-08-20 ENCOUNTER — MYC MEDICAL ADVICE (OUTPATIENT)
Dept: FAMILY MEDICINE | Facility: CLINIC | Age: 45
End: 2025-08-20
Payer: COMMERCIAL

## 2025-08-20 DIAGNOSIS — R51.9 CHRONIC DAILY HEADACHE: ICD-10-CM

## 2025-08-20 RX ORDER — BUTALBITAL, ACETAMINOPHEN AND CAFFEINE 50; 325; 40 MG/1; MG/1; MG/1
1 TABLET ORAL DAILY PRN
Qty: 30 TABLET | Refills: 3 | Status: SHIPPED | OUTPATIENT
Start: 2025-08-20

## 2025-09-01 ENCOUNTER — PATIENT OUTREACH (OUTPATIENT)
Dept: CARE COORDINATION | Facility: CLINIC | Age: 45
End: 2025-09-01
Payer: COMMERCIAL

## (undated) DEVICE — DRAPE LAP W/ARMBOARD 29410

## (undated) DEVICE — PREP CHLORAPREP 26ML TINTED ORANGE  260815

## (undated) DEVICE — SOL WATER IRRIG 1000ML BOTTLE 07139-09

## (undated) DEVICE — SUCTION TIP YANKAUER W/O VENT K86

## (undated) DEVICE — PACK MINOR SBA15MIFSE

## (undated) DEVICE — PREP SKIN SCRUB TRAY 4461A

## (undated) DEVICE — ESU GROUND PAD ADULT W/CORD E7507

## (undated) DEVICE — DRSG GAUZE 4X4" TRAY 6939

## (undated) DEVICE — DECANTER TRANSFER DEVICE 2008S

## (undated) DEVICE — GLOVE PROTEXIS W/NEU-THERA 7.5  2D73TE75

## (undated) DEVICE — NDL 19GA 1.5"

## (undated) RX ORDER — OXYCODONE HYDROCHLORIDE 5 MG/1
TABLET ORAL
Status: DISPENSED
Start: 2021-05-05

## (undated) RX ORDER — FENTANYL CITRATE 50 UG/ML
INJECTION, SOLUTION INTRAMUSCULAR; INTRAVENOUS
Status: DISPENSED
Start: 2021-05-05

## (undated) RX ORDER — ACETAMINOPHEN 325 MG/1
TABLET ORAL
Status: DISPENSED
Start: 2021-05-05

## (undated) RX ORDER — KETOROLAC TROMETHAMINE 30 MG/ML
INJECTION, SOLUTION INTRAMUSCULAR; INTRAVENOUS
Status: DISPENSED
Start: 2021-05-05